# Patient Record
Sex: FEMALE | Race: OTHER | Employment: FULL TIME | URBAN - METROPOLITAN AREA
[De-identification: names, ages, dates, MRNs, and addresses within clinical notes are randomized per-mention and may not be internally consistent; named-entity substitution may affect disease eponyms.]

---

## 2018-03-13 ENCOUNTER — APPOINTMENT (OUTPATIENT)
Dept: CT IMAGING | Age: 48
End: 2018-03-13
Payer: COMMERCIAL

## 2018-03-13 ENCOUNTER — HOSPITAL ENCOUNTER (EMERGENCY)
Age: 48
Discharge: HOME | End: 2018-03-13
Attending: EMERGENCY MEDICINE
Payer: COMMERCIAL

## 2018-03-13 VITALS
SYSTOLIC BLOOD PRESSURE: 159 MMHG | OXYGEN SATURATION: 98 % | HEIGHT: 64 IN | BODY MASS INDEX: 23.9 KG/M2 | RESPIRATION RATE: 16 BRPM | TEMPERATURE: 98.8 F | DIASTOLIC BLOOD PRESSURE: 91 MMHG | HEART RATE: 62 BPM | WEIGHT: 140 LBS

## 2018-03-13 DIAGNOSIS — R10.30 LOWER ABDOMINAL PAIN: Primary | ICD-10-CM

## 2018-03-13 LAB
BILIRUBIN URINE: NEGATIVE
BLOOD, URINE: NEGATIVE
CLARITY: CLEAR
COLOR: ABNORMAL
GLUCOSE URINE: NEGATIVE MG/DL
HCT VFR BLD CALC: 31.4 % (ref 34–48)
HEMOGLOBIN: 10.9 G/DL (ref 11.5–15.5)
KETONES, URINE: NEGATIVE MG/DL
LACTIC ACID: 0.4 MMOL/L (ref 0.5–2.2)
LEUKOCYTE ESTERASE, URINE: NEGATIVE
MCH RBC QN AUTO: 29.9 PG (ref 26–35)
MCHC RBC AUTO-ENTMCNC: 34.7 % (ref 32–34.5)
MCV RBC AUTO: 86 FL (ref 80–99.9)
NITRITE, URINE: NEGATIVE
PDW BLD-RTO: 12.7 FL (ref 11.5–15)
PH UA: 7 (ref 5–9)
PLATELET # BLD: 141 E9/L (ref 130–450)
PMV BLD AUTO: 11.1 FL (ref 7–12)
PROTEIN UA: NEGATIVE MG/DL
RBC # BLD: 3.65 E12/L (ref 3.5–5.5)
SPECIFIC GRAVITY UA: 1.02 (ref 1–1.03)
UROBILINOGEN, URINE: 4 E.U./DL
WBC # BLD: 4.1 E9/L (ref 4.5–11.5)

## 2018-03-13 PROCEDURE — 85027 COMPLETE CBC AUTOMATED: CPT

## 2018-03-13 PROCEDURE — 36415 COLL VENOUS BLD VENIPUNCTURE: CPT

## 2018-03-13 PROCEDURE — 6360000002 HC RX W HCPCS: Performed by: EMERGENCY MEDICINE

## 2018-03-13 PROCEDURE — 96374 THER/PROPH/DIAG INJ IV PUSH: CPT

## 2018-03-13 PROCEDURE — 74176 CT ABD & PELVIS W/O CONTRAST: CPT

## 2018-03-13 PROCEDURE — 96375 TX/PRO/DX INJ NEW DRUG ADDON: CPT

## 2018-03-13 PROCEDURE — 81003 URINALYSIS AUTO W/O SCOPE: CPT

## 2018-03-13 PROCEDURE — 99284 EMERGENCY DEPT VISIT MOD MDM: CPT

## 2018-03-13 PROCEDURE — 2580000003 HC RX 258: Performed by: EMERGENCY MEDICINE

## 2018-03-13 PROCEDURE — 83605 ASSAY OF LACTIC ACID: CPT

## 2018-03-13 RX ORDER — 0.9 % SODIUM CHLORIDE 0.9 %
1000 INTRAVENOUS SOLUTION INTRAVENOUS ONCE
Status: COMPLETED | OUTPATIENT
Start: 2018-03-13 | End: 2018-03-13

## 2018-03-13 RX ORDER — DIPHENHYDRAMINE HYDROCHLORIDE 50 MG/ML
25 INJECTION INTRAMUSCULAR; INTRAVENOUS ONCE
Status: COMPLETED | OUTPATIENT
Start: 2018-03-13 | End: 2018-03-13

## 2018-03-13 RX ORDER — OXYCODONE HYDROCHLORIDE AND ACETAMINOPHEN 5; 325 MG/1; MG/1
1 TABLET ORAL EVERY 6 HOURS PRN
Qty: 12 TABLET | Refills: 0 | Status: SHIPPED | OUTPATIENT
Start: 2018-03-13 | End: 2018-03-16

## 2018-03-13 RX ORDER — ONDANSETRON 4 MG/1
4 TABLET, ORALLY DISINTEGRATING ORAL EVERY 8 HOURS PRN
Qty: 10 TABLET | Refills: 0 | Status: SHIPPED | OUTPATIENT
Start: 2018-03-13 | End: 2018-07-25 | Stop reason: ALTCHOICE

## 2018-03-13 RX ORDER — ONDANSETRON 2 MG/ML
4 INJECTION INTRAMUSCULAR; INTRAVENOUS EVERY 6 HOURS PRN
Status: DISCONTINUED | OUTPATIENT
Start: 2018-03-13 | End: 2018-03-13 | Stop reason: HOSPADM

## 2018-03-13 RX ADMIN — SODIUM CHLORIDE 1000 ML: 9 INJECTION, SOLUTION INTRAVENOUS at 16:23

## 2018-03-13 RX ADMIN — ONDANSETRON 4 MG: 2 INJECTION INTRAMUSCULAR; INTRAVENOUS at 16:23

## 2018-03-13 RX ADMIN — HYDROMORPHONE HYDROCHLORIDE 1 MG: 1 INJECTION, SOLUTION INTRAMUSCULAR; INTRAVENOUS; SUBCUTANEOUS at 16:23

## 2018-03-13 RX ADMIN — DIPHENHYDRAMINE HYDROCHLORIDE 25 MG: 50 INJECTION, SOLUTION INTRAMUSCULAR; INTRAVENOUS at 16:29

## 2018-03-13 ASSESSMENT — PAIN SCALES - GENERAL
PAINLEVEL_OUTOF10: 10
PAINLEVEL_OUTOF10: 4
PAINLEVEL_OUTOF10: 10

## 2018-03-13 ASSESSMENT — PAIN DESCRIPTION - DESCRIPTORS: DESCRIPTORS: SHARP

## 2018-03-13 ASSESSMENT — PAIN DESCRIPTION - FREQUENCY: FREQUENCY: CONTINUOUS

## 2018-03-13 ASSESSMENT — PAIN DESCRIPTION - PAIN TYPE: TYPE: ACUTE PAIN

## 2018-03-13 NOTE — ED PROVIDER NOTES
HPI:  3/13/18,   Time: 4:05 PM         Dmitri Tran is a 41 y.o. female presenting to the ED for lower abdominal pain with nausea and vomiting, beginning 1d ago. The complaint has been constant, severe in severity, and worsened by nothing. The patient has a history of chronic abdominal pain since her gastric bypass surgery 10 years ago which was complicated by a postoperative ulcer. She states that she feels the urge to move her bowels but nothing comes out. She denies any urinary symptoms    ROS:   Pertinent positives and negatives are stated within HPI, all other systems reviewed and are negative.  --------------------------------------------- PAST HISTORY ---------------------------------------------  Past Medical History:  has a past medical history of Anorexia; Arthritis of knee; Back pain, chronic; Chronic pain; H/O gastric bypass; Hypertension; Hypertension; Metabolic acidosis; Ovarian cyst; and Seizures ([facility] [address] Past Surgical History:  has a past surgical history that includes Jeannine-en-Y Gastric Bypass (12/2/2008); Endometrial ablation (2003); Tonsillectomy (1980); Cholecystectomy, laparoscopic (9/19/1995); Upper gastrointestinal endoscopy (10/3/2008); Upper gastrointestinal endoscopy (1/9/2009); Upper gastrointestinal endoscopy (2/4/2009); laparoscopy (2/12/2009); Upper gastrointestinal endoscopy (3/6/2009); Upper gastrointestinal endoscopy (6/4/2009); Upper gastrointestinal endoscopy (7/2/2009); Upper gastrointestinal endoscopy (10/27/2009); Upper gastrointestinal endoscopy (1/4/2010); Upper gastrointestinal endoscopy (6/7/2010); Stomach surgery (6/25/2010); Upper gastrointestinal endoscopy (7/30/2010); other surgical history (12/28/11); Gastric bypass surgery; hernia repair; and Hysterectomy (Left, 2/5/2013). Social History:  reports that she has been smoking Cigarettes. She has a 1.00 pack-year smoking history.  She has never used smokeless tobacco. She reports that she uses drugs, including Marijuana. She reports that she does not drink alcohol. Family History: family history is not on file. The patients home medications have been reviewed. Allergies: Ultram [tramadol]    -------------------------------------------------- RESULTS -------------------------------------------------  All laboratory and radiology results have been personally reviewed by myself   LABS:  Results for orders placed or performed during the hospital encounter of 03/13/18   CBC   Result Value Ref Range    WBC 4.1 (L) 4.5 - 11.5 E9/L    RBC 3.65 3.50 - 5.50 E12/L    Hemoglobin 10.9 (L) 11.5 - 15.5 g/dL    Hematocrit 31.4 (L) 34.0 - 48.0 %    MCV 86.0 80.0 - 99.9 fL    MCH 29.9 26.0 - 35.0 pg    MCHC 34.7 (H) 32.0 - 34.5 %    RDW 12.7 11.5 - 15.0 fL    Platelets 684 647 - 864 E9/L    MPV 11.1 7.0 - 12.0 fL   URINALYSIS   Result Value Ref Range    Color, UA DARK YELLOW (A) Straw/Yellow    Clarity, UA Clear Clear    Glucose, Ur Negative Negative mg/dL    Bilirubin Urine Negative Negative    Ketones, Urine Negative Negative mg/dL    Specific Gravity, UA 1.020 1.005 - 1.030    Blood, Urine Negative Negative    pH, UA 7.0 5.0 - 9.0    Protein, UA Negative Negative mg/dL    Urobilinogen, Urine 4.0 (A) <2.0 E.U./dL    Nitrite, Urine Negative Negative    Leukocyte Esterase, Urine Negative Negative       RADIOLOGY:  Interpreted by Radiologist.  CT ABDOMEN PELVIS WO CONTRAST   Final Result      1. No inflammatory changes in the abdomen or pelvis. 2. Postsurgical changes from prior partial gastrectomy and   cholecystectomy. 3. Status post prior hysterectomy. 4. No bowel obstruction or ileus. 5. No obstructive uropathy or urinary stone. 6. Moderate to severe degenerative disc disease at L4-L5 with interval   worsening since prior study. Discitis or osteomyelitis could not be   excluded on this is study. If there is clinical concern MRI of the   lumbar spine could be helpful for follow-up. ------------------------- NURSING NOTES AND VITALS REVIEWED ---------------------------   The nursing notes within the ED encounter and vital signs as below have been reviewed. BP (!) 159/91   Pulse 62   Temp 98.8 °F (37.1 °C)   Resp 16   Ht 5' 4\" (1.626 m)   Wt 140 lb (63.5 kg)   SpO2 98%   BMI 24.03 kg/m²   Oxygen Saturation Interpretation: Normal      ---------------------------------------------------PHYSICAL EXAM--------------------------------------      Constitutional/General: Alert and oriented x3, well appearing, non toxic in NAD  Head: NC/AT  Eyes: PERRL, EOMI    Neck: Supple, full ROM, no meningeal signs  Pulmonary: Lungs clear to auscultation bilaterally, no wheezes, rales, or rhonchi. Not in respiratory distress  Cardiovascular:  Regular rate and rhythm, no murmurs, gallops, or rubs. 2+ distal pulses  Abdomen: There is marked tenderness of the lower part of the abdomen with guarding   Extremities: Moves all extremities x 4. Warm and well perfused  Skin: warm and dry without rash  Neurologic: GCS 15,  Psych: Normal Affect      ------------------------------ ED COURSE/MEDICAL DECISION MAKING----------------------  Medications   0.9 % sodium chloride bolus (1,000 mLs Intravenous New Bag 3/13/18 1623)   ondansetron (ZOFRAN) injection 4 mg (4 mg Intravenous Given 3/13/18 1623)   HYDROmorphone (DILAUDID) injection 1 mg (1 mg Intravenous Given 3/13/18 1623)   diphenhydrAMINE (BENADRYL) injection 25 mg (25 mg Intravenous Given 3/13/18 7305)         Medical Decision Making:      Time: 4:25p  Re-evaluation. Patients symptoms are improving  Repeat physical examination is improved  The patient's white blood cell count is normal and her CT scan is unremarkable and her abdomen is benign. The patient requests some analgesics to get through the next couple days until she can see her bariatric surgeon    Counseling:    The emergency provider has spoken with the patient and discussed todays results, in

## 2018-04-19 ENCOUNTER — APPOINTMENT (OUTPATIENT)
Dept: GENERAL RADIOLOGY | Age: 48
End: 2018-04-19
Payer: COMMERCIAL

## 2018-04-19 ENCOUNTER — HOSPITAL ENCOUNTER (EMERGENCY)
Age: 48
Discharge: HOME | End: 2018-04-19
Payer: COMMERCIAL

## 2018-04-19 VITALS
TEMPERATURE: 98.5 F | HEART RATE: 72 BPM | SYSTOLIC BLOOD PRESSURE: 150 MMHG | OXYGEN SATURATION: 98 % | HEIGHT: 64 IN | DIASTOLIC BLOOD PRESSURE: 90 MMHG | RESPIRATION RATE: 18 BRPM | WEIGHT: 150 LBS | BODY MASS INDEX: 25.61 KG/M2

## 2018-04-19 DIAGNOSIS — S60.221A CONTUSION OF RIGHT HAND, INITIAL ENCOUNTER: Primary | ICD-10-CM

## 2018-04-19 PROCEDURE — 73130 X-RAY EXAM OF HAND: CPT

## 2018-04-19 PROCEDURE — 99283 EMERGENCY DEPT VISIT LOW MDM: CPT

## 2018-04-19 PROCEDURE — 6370000000 HC RX 637 (ALT 250 FOR IP): Performed by: PHYSICIAN ASSISTANT

## 2018-04-19 PROCEDURE — 73110 X-RAY EXAM OF WRIST: CPT

## 2018-04-19 RX ORDER — IBUPROFEN 600 MG/1
600 TABLET ORAL ONCE
Status: COMPLETED | OUTPATIENT
Start: 2018-04-19 | End: 2018-04-19

## 2018-04-19 RX ORDER — IBUPROFEN 600 MG/1
600 TABLET ORAL EVERY 6 HOURS PRN
Qty: 60 TABLET | Refills: 0 | Status: ON HOLD | OUTPATIENT
Start: 2018-04-19 | End: 2019-05-13

## 2018-04-19 RX ADMIN — IBUPROFEN 600 MG: 600 TABLET ORAL at 13:34

## 2018-04-19 ASSESSMENT — PAIN DESCRIPTION - LOCATION
LOCATION: HAND
LOCATION: HAND

## 2018-04-19 ASSESSMENT — PAIN SCALES - GENERAL
PAINLEVEL_OUTOF10: 10

## 2018-04-19 ASSESSMENT — PAIN DESCRIPTION - DESCRIPTORS
DESCRIPTORS: CONSTANT;ACHING
DESCRIPTORS: ACHING;CONSTANT

## 2018-04-19 ASSESSMENT — PAIN DESCRIPTION - PAIN TYPE
TYPE: ACUTE PAIN
TYPE: ACUTE PAIN

## 2018-04-19 ASSESSMENT — PAIN DESCRIPTION - ORIENTATION
ORIENTATION: RIGHT
ORIENTATION: RIGHT

## 2018-07-25 ENCOUNTER — HOSPITAL ENCOUNTER (EMERGENCY)
Age: 48
Discharge: HOME | End: 2018-07-25

## 2018-07-25 ENCOUNTER — APPOINTMENT (OUTPATIENT)
Dept: CT IMAGING | Age: 48
End: 2018-07-25

## 2018-07-25 VITALS
SYSTOLIC BLOOD PRESSURE: 128 MMHG | TEMPERATURE: 97.8 F | OXYGEN SATURATION: 99 % | RESPIRATION RATE: 14 BRPM | HEART RATE: 72 BPM | DIASTOLIC BLOOD PRESSURE: 70 MMHG

## 2018-07-25 DIAGNOSIS — K59.00 CONSTIPATION, UNSPECIFIED CONSTIPATION TYPE: Primary | ICD-10-CM

## 2018-07-25 DIAGNOSIS — R10.84 GENERALIZED ABDOMINAL PAIN: ICD-10-CM

## 2018-07-25 DIAGNOSIS — R11.2 NAUSEA AND VOMITING, INTRACTABILITY OF VOMITING NOT SPECIFIED, UNSPECIFIED VOMITING TYPE: ICD-10-CM

## 2018-07-25 LAB
ALBUMIN SERPL-MCNC: 4 G/DL (ref 3.5–5.2)
ALP BLD-CCNC: 109 U/L (ref 35–104)
ALT SERPL-CCNC: 18 U/L (ref 0–32)
ANION GAP SERPL CALCULATED.3IONS-SCNC: 14 MMOL/L (ref 7–16)
AST SERPL-CCNC: 28 U/L (ref 0–31)
BACTERIA: ABNORMAL /HPF
BASOPHILS ABSOLUTE: 0.02 E9/L (ref 0–0.2)
BASOPHILS RELATIVE PERCENT: 0.4 % (ref 0–2)
BILIRUB SERPL-MCNC: 0.2 MG/DL (ref 0–1.2)
BILIRUBIN URINE: NEGATIVE
BLOOD, URINE: NEGATIVE
BUN BLDV-MCNC: 11 MG/DL (ref 6–20)
CALCIUM SERPL-MCNC: 8.6 MG/DL (ref 8.6–10.2)
CHLORIDE BLD-SCNC: 109 MMOL/L (ref 98–107)
CHP ED QC CHECK: NORMAL
CLARITY: CLEAR
CO2: 17 MMOL/L (ref 22–29)
COLOR: YELLOW
CREAT SERPL-MCNC: 0.9 MG/DL (ref 0.5–1)
EOSINOPHILS ABSOLUTE: 0.06 E9/L (ref 0.05–0.5)
EOSINOPHILS RELATIVE PERCENT: 1.1 % (ref 0–6)
GFR AFRICAN AMERICAN: >60
GFR NON-AFRICAN AMERICAN: >60 ML/MIN/1.73
GLUCOSE BLD-MCNC: 111 MG/DL (ref 74–109)
GLUCOSE URINE: NEGATIVE MG/DL
HCT VFR BLD CALC: 31.6 % (ref 34–48)
HEMOGLOBIN: 11 G/DL (ref 11.5–15.5)
IMMATURE GRANULOCYTES #: 0.01 E9/L
IMMATURE GRANULOCYTES %: 0.2 % (ref 0–5)
KETONES, URINE: NEGATIVE MG/DL
LACTIC ACID: 0.4 MMOL/L (ref 0.5–2.2)
LEUKOCYTE ESTERASE, URINE: NEGATIVE
LIPASE: 16 U/L (ref 13–60)
LYMPHOCYTES ABSOLUTE: 0.85 E9/L (ref 1.5–4)
LYMPHOCYTES RELATIVE PERCENT: 15.3 % (ref 20–42)
MCH RBC QN AUTO: 30.1 PG (ref 26–35)
MCHC RBC AUTO-ENTMCNC: 34.8 % (ref 32–34.5)
MCV RBC AUTO: 86.6 FL (ref 80–99.9)
MONOCYTES ABSOLUTE: 0.28 E9/L (ref 0.1–0.95)
MONOCYTES RELATIVE PERCENT: 5 % (ref 2–12)
NEUTROPHILS ABSOLUTE: 4.34 E9/L (ref 1.8–7.3)
NEUTROPHILS RELATIVE PERCENT: 78 % (ref 43–80)
NITRITE, URINE: NEGATIVE
PDW BLD-RTO: 13.7 FL (ref 11.5–15)
PH UA: 6 (ref 5–9)
PLATELET # BLD: 156 E9/L (ref 130–450)
PMV BLD AUTO: 10.7 FL (ref 7–12)
POTASSIUM SERPL-SCNC: 3.2 MMOL/L (ref 3.5–5)
PREGNANCY TEST URINE, POC: NORMAL
PROTEIN UA: NORMAL MG/DL
RBC # BLD: 3.65 E12/L (ref 3.5–5.5)
RBC UA: ABNORMAL /HPF (ref 0–2)
SODIUM BLD-SCNC: 140 MMOL/L (ref 132–146)
SPECIFIC GRAVITY UA: 1.02 (ref 1–1.03)
TOTAL PROTEIN: 7 G/DL (ref 6.4–8.3)
UROBILINOGEN, URINE: 1 E.U./DL
WBC # BLD: 5.6 E9/L (ref 4.5–11.5)
WBC UA: ABNORMAL /HPF (ref 0–5)

## 2018-07-25 PROCEDURE — 36415 COLL VENOUS BLD VENIPUNCTURE: CPT

## 2018-07-25 PROCEDURE — 85025 COMPLETE CBC W/AUTO DIFF WBC: CPT

## 2018-07-25 PROCEDURE — 6370000000 HC RX 637 (ALT 250 FOR IP): Performed by: NURSE PRACTITIONER

## 2018-07-25 PROCEDURE — 83605 ASSAY OF LACTIC ACID: CPT

## 2018-07-25 PROCEDURE — 80053 COMPREHEN METABOLIC PANEL: CPT

## 2018-07-25 PROCEDURE — 74176 CT ABD & PELVIS W/O CONTRAST: CPT

## 2018-07-25 PROCEDURE — 83690 ASSAY OF LIPASE: CPT

## 2018-07-25 PROCEDURE — 6360000002 HC RX W HCPCS: Performed by: NURSE PRACTITIONER

## 2018-07-25 PROCEDURE — 99284 EMERGENCY DEPT VISIT MOD MDM: CPT

## 2018-07-25 PROCEDURE — 81001 URINALYSIS AUTO W/SCOPE: CPT

## 2018-07-25 RX ORDER — POTASSIUM CHLORIDE 20 MEQ/1
40 TABLET, EXTENDED RELEASE ORAL ONCE
Status: COMPLETED | OUTPATIENT
Start: 2018-07-25 | End: 2018-07-25

## 2018-07-25 RX ORDER — ONDANSETRON 4 MG/1
4 TABLET, ORALLY DISINTEGRATING ORAL ONCE
Status: COMPLETED | OUTPATIENT
Start: 2018-07-25 | End: 2018-07-25

## 2018-07-25 RX ORDER — ONDANSETRON 4 MG/1
4 TABLET, ORALLY DISINTEGRATING ORAL EVERY 8 HOURS PRN
Qty: 10 TABLET | Refills: 0 | Status: ON HOLD | OUTPATIENT
Start: 2018-07-25 | End: 2019-05-13

## 2018-07-25 RX ORDER — POLYETHYLENE GLYCOL 3350, SODIUM CHLORIDE, POTASSIUM CHLORIDE, SODIUM BICARBONATE, AND SODIUM SULFATE 240; 5.84; 2.98; 6.72; 22.72 G/4L; G/4L; G/4L; G/4L; G/4L
4000 POWDER, FOR SOLUTION ORAL ONCE
Qty: 1 BOTTLE | Refills: 0 | Status: SHIPPED | OUTPATIENT
Start: 2018-07-25 | End: 2018-07-25

## 2018-07-25 RX ADMIN — MAGNESIUM CITRATE 300 ML: 1.75 LIQUID ORAL at 17:58

## 2018-07-25 RX ADMIN — POTASSIUM CHLORIDE 40 MEQ: 20 TABLET, EXTENDED RELEASE ORAL at 17:10

## 2018-07-25 RX ADMIN — ONDANSETRON 4 MG: 4 TABLET, ORALLY DISINTEGRATING ORAL at 16:40

## 2018-07-25 ASSESSMENT — PAIN DESCRIPTION - PAIN TYPE: TYPE: ACUTE PAIN

## 2018-07-25 ASSESSMENT — PAIN SCALES - GENERAL: PAINLEVEL_OUTOF10: 6

## 2018-07-25 ASSESSMENT — PAIN DESCRIPTION - DESCRIPTORS: DESCRIPTORS: CRAMPING;DISCOMFORT;ACHING

## 2018-07-25 ASSESSMENT — PAIN DESCRIPTION - LOCATION: LOCATION: ABDOMEN

## 2018-07-25 NOTE — ED NOTES
Radiology Procedure Waiver   Name: Gee Mcdermott  : 1970  MRN: [id number]    Date:  18    Time: 3:05 PM    Benefits of immediately proceeding with Radiology exam(s) without pre-testing outweigh the risks or are not indicated as specified below and therefore the following is/are being waived:    [x] Pregnancy test   [] Patients LMP on-time and regular. [x] Patient had Tubal Ligation or has other Contraception Device. [] Patient  is Menopausal or Premenarcheal.    [] Patient had Full or Partial Hysterectomy. [] Protocol for Iodine allergy    [] MRI Questionnaire     [] BUN/Creatinine   [] Patient age w/no hx of renal dysfunction. [] Patient on Dialysis. [] Recent Normal Labs.   Electronically signed by DA Mosley CNP on 18 at 3:05 PM             DA Mosley CNP  [phone]

## 2018-07-25 NOTE — ED PROVIDER NOTES
anastomotic jejunal ulcers, Dr. Lilia Villalta, [address] ENDOSCOPY  7/30/2010    gastritis, Dr. Lilia Villalta, [facility]   Social History:  reports that she has been smoking Cigarettes. She has a 1.00 pack-year smoking history. She has never used smokeless tobacco. She reports that she uses drugs, including Marijuana. She reports that she does not drink alcohol. Family History: family history is not on file. Allergies: Ultram [tramadol]    Physical Exam           ED Triage Vitals [07/25/18 1313]   BP Temp Temp Source Pulse Resp SpO2 Height Weight   (!) 167/87 97.6 °F (36.4 °C) Temporal 76 16 99 % -- --      Oxygen Saturation Interpretation: Normal.    · General Appearance/Constitutional:  Alert, development consistent with age. · HEENT:  NC/NT. PERRLA. Airway patent. · Neck:  Supple. No lymphadenopathy. · Respiratory:  No retractions. Lungs Clear to auscultation and breath sounds equal.  · CV:  Regular rate and rhythm. · GI:  General Appearance: normal.         Bowel sounds: hypoactive bowel sounds. Distension:  None. Tenderness: No abdominal tenderness, no rebound tenderness, no guarding, abdominal rigidity is absent. Liver: non-palpable and non-tender. Spleen:  non-palpable and non-tender. Pulsatile Mass: absent. Hernia:  no inguinal or femoral hernias noted. · Back: CVA Tenderness: No.             Anus/Perineum:  normal.  · Integument:  Normal turgor. Warm, dry, without visible rash, unless noted elsewhere. · Lymphatics: No edema, cap.refill <3sec. · Neurological:  Orientation age-appropriate. Motor functions intact.     Lab / Imaging Results   (All laboratory and radiology results have been personally reviewed by myself)  Labs:  Results for orders placed or performed during the hospital encounter of 07/25/18   CBC auto differential   Result Value Ref Range    WBC 5.6 4.5 - 11.5 E9/L    RBC 3.65 3.50 - 5.50 E12/L    Hemoglobin 11.0 (L) 11.5 - 15.5 g/dL    Hematocrit 31.6 (L) 34.0 - 48.0 %    MCV 86.6 80.0 - 99.9 fL    MCH 30.1 26.0 - 35.0 pg    MCHC 34.8 (H) 32.0 - 34.5 %    RDW 13.7 11.5 - 15.0 fL    Platelets 153 689 - 718 E9/L    MPV 10.7 7.0 - 12.0 fL    Neutrophils % 78.0 43.0 - 80.0 %    Immature Granulocytes % 0.2 0.0 - 5.0 %    Lymphocytes % 15.3 (L) 20.0 - 42.0 %    Monocytes % 5.0 2.0 - 12.0 %    Eosinophils % 1.1 0.0 - 6.0 %    Basophils % 0.4 0.0 - 2.0 %    Neutrophils # 4.34 1.80 - 7.30 E9/L    Immature Granulocytes # 0.01 E9/L    Lymphocytes # 0.85 (L) 1.50 - 4.00 E9/L    Monocytes # 0.28 0.10 - 0.95 E9/L    Eosinophils # 0.06 0.05 - 0.50 E9/L    Basophils # 0.02 0.00 - 0.20 E9/L   Comprehensive Metabolic Panel   Result Value Ref Range    Sodium 140 132 - 146 mmol/L    Potassium 3.2 (L) 3.5 - 5.0 mmol/L    Chloride 109 (H) 98 - 107 mmol/L    CO2 17 (L) 22 - 29 mmol/L    Anion Gap 14 7 - 16 mmol/L    Glucose 111 (H) 74 - 109 mg/dL    BUN 11 6 - 20 mg/dL    CREATININE 0.9 0.5 - 1.0 mg/dL    GFR Non-African American >60 >=60 mL/min/1.73    GFR African American >60     Calcium 8.6 8.6 - 10.2 mg/dL    Total Protein 7.0 6.4 - 8.3 g/dL    Alb 4.0 3.5 - 5.2 g/dL    Total Bilirubin 0.2 0.0 - 1.2 mg/dL    Alkaline Phosphatase 109 (H) 35 - 104 U/L    ALT 18 0 - 32 U/L    AST 28 0 - 31 U/L   Lipase   Result Value Ref Range    Lipase 16 13 - 60 U/L   Lactic Acid, Plasma   Result Value Ref Range    Lactic Acid 0.4 (L) 0.5 - 2.2 mmol/L   Urinalysis   Result Value Ref Range    Color, UA Yellow Straw/Yellow    Clarity, UA Clear Clear    Glucose, Ur Negative Negative mg/dL    Bilirubin Urine Negative Negative    Ketones, Urine Negative Negative mg/dL    Specific Gravity, UA 1.025 1.005 - 1.030    Blood, Urine Negative Negative    pH, UA 6.0 5.0 - 9.0    Protein, UA TRACE Negative mg/dL    Urobilinogen, Urine 1.0 <2.0 E.U./dL    Nitrite, Urine Negative Negative    Leukocyte Esterase, Urine Negative Negative

## 2019-05-09 ENCOUNTER — HOSPITAL ENCOUNTER (INPATIENT)
Age: 49
LOS: 6 days | Discharge: HOME | DRG: 254 | End: 2019-05-15
Attending: FAMILY MEDICINE | Admitting: SURGERY
Payer: COMMERCIAL

## 2019-05-09 ENCOUNTER — APPOINTMENT (OUTPATIENT)
Dept: GENERAL RADIOLOGY | Age: 49
DRG: 254 | End: 2019-05-09
Payer: COMMERCIAL

## 2019-05-09 ENCOUNTER — HOSPITAL ENCOUNTER (OUTPATIENT)
Age: 49
Discharge: HOME | End: 2019-05-09
Payer: COMMERCIAL

## 2019-05-09 DIAGNOSIS — T39.395A GI BLEED DUE TO NSAIDS: ICD-10-CM

## 2019-05-09 DIAGNOSIS — G89.29 OTHER CHRONIC PAIN: Primary | Chronic | ICD-10-CM

## 2019-05-09 DIAGNOSIS — K25.4 GASTROINTESTINAL HEMORRHAGE ASSOCIATED WITH GASTRIC ULCER: ICD-10-CM

## 2019-05-09 DIAGNOSIS — R57.8 HEMORRHAGIC SHOCK (HCC): ICD-10-CM

## 2019-05-09 DIAGNOSIS — K92.2 GI BLEED DUE TO NSAIDS: ICD-10-CM

## 2019-05-09 DIAGNOSIS — R10.13 EPIGASTRIC PAIN: ICD-10-CM

## 2019-05-09 LAB
ALBUMIN SERPL-MCNC: 4.1 G/DL (ref 3.5–5.2)
ALP BLD-CCNC: 113 U/L (ref 35–104)
ALT SERPL-CCNC: 13 U/L (ref 0–32)
ANION GAP SERPL CALCULATED.3IONS-SCNC: 13 MMOL/L (ref 7–16)
APTT: 33.4 SEC (ref 25.7–34.7)
AST SERPL-CCNC: 24 U/L (ref 0–31)
BASOPHILS ABSOLUTE: 0.04 E9/L (ref 0–0.2)
BASOPHILS RELATIVE PERCENT: 0.5 % (ref 0–2)
BILIRUB SERPL-MCNC: 0.3 MG/DL (ref 0–1.2)
BUN BLDV-MCNC: 37 MG/DL (ref 6–20)
CALCIUM SERPL-MCNC: 8.7 MG/DL (ref 8.6–10.2)
CHLORIDE BLD-SCNC: 106 MMOL/L (ref 98–107)
CHP ED QC CHECK: NORMAL
CO2: 21 MMOL/L (ref 22–29)
CREAT SERPL-MCNC: 0.9 MG/DL (ref 0.5–1)
D DIMER: <200 NG/ML DDU
EKG ATRIAL RATE: 92 BPM
EKG P AXIS: 68 DEGREES
EKG P-R INTERVAL: 160 MS
EKG Q-T INTERVAL: 348 MS
EKG QRS DURATION: 76 MS
EKG QTC CALCULATION (BAZETT): 430 MS
EKG R AXIS: 38 DEGREES
EKG T AXIS: 44 DEGREES
EKG VENTRICULAR RATE: 92 BPM
EOSINOPHILS ABSOLUTE: 0.11 E9/L (ref 0.05–0.5)
EOSINOPHILS RELATIVE PERCENT: 1.4 % (ref 0–6)
GFR AFRICAN AMERICAN: >60
GFR NON-AFRICAN AMERICAN: >60 ML/MIN/1.73
GLUCOSE BLD-MCNC: 104 MG/DL (ref 74–99)
HCT VFR BLD CALC: 21.9 % (ref 34–48)
HCT VFR BLD CALC: 24.8 % (ref 34–48)
HEMOCCULT STL QL: YES
HEMOGLOBIN: 7.6 G/DL (ref 11.5–15.5)
HEMOGLOBIN: 8.5 G/DL (ref 11.5–15.5)
IMMATURE GRANULOCYTES #: 0.03 E9/L
IMMATURE GRANULOCYTES %: 0.4 % (ref 0–5)
INR BLD: 1
LYMPHOCYTES ABSOLUTE: 2.65 E9/L (ref 1.5–4)
LYMPHOCYTES RELATIVE PERCENT: 34.6 % (ref 20–42)
MCH RBC QN AUTO: 30.4 PG (ref 26–35)
MCHC RBC AUTO-ENTMCNC: 34.3 % (ref 32–34.5)
MCV RBC AUTO: 88.6 FL (ref 80–99.9)
MONOCYTES ABSOLUTE: 0.44 E9/L (ref 0.1–0.95)
MONOCYTES RELATIVE PERCENT: 5.8 % (ref 2–12)
NEUTROPHILS ABSOLUTE: 4.38 E9/L (ref 1.8–7.3)
NEUTROPHILS RELATIVE PERCENT: 57.3 % (ref 43–80)
PDW BLD-RTO: 13 FL (ref 11.5–15)
PLATELET # BLD: 217 E9/L (ref 130–450)
PMV BLD AUTO: 11.1 FL (ref 7–12)
POTASSIUM SERPL-SCNC: 3.9 MMOL/L (ref 3.5–5)
PRO-BNP: 95 PG/ML (ref 0–125)
PROTHROMBIN TIME: 12.6 SEC (ref 9.3–12.4)
RBC # BLD: 2.8 E12/L (ref 3.5–5.5)
SODIUM BLD-SCNC: 140 MMOL/L (ref 132–146)
T4 FREE: 0.98 NG/DL (ref 0.93–1.7)
TOTAL PROTEIN: 6.8 G/DL (ref 6.4–8.3)
TROPONIN: <0.01 NG/ML (ref 0–0.03)
TSH SERPL DL<=0.05 MIU/L-ACNC: 1.74 UIU/ML (ref 0.27–4.2)
WBC # BLD: 7.7 E9/L (ref 4.5–11.5)

## 2019-05-09 PROCEDURE — 85014 HEMATOCRIT: CPT

## 2019-05-09 PROCEDURE — 6360000002 HC RX W HCPCS: Performed by: FAMILY MEDICINE

## 2019-05-09 PROCEDURE — 84439 ASSAY OF FREE THYROXINE: CPT

## 2019-05-09 PROCEDURE — 99285 EMERGENCY DEPT VISIT HI MDM: CPT

## 2019-05-09 PROCEDURE — 93005 ELECTROCARDIOGRAM TRACING: CPT | Performed by: FAMILY MEDICINE

## 2019-05-09 PROCEDURE — 84443 ASSAY THYROID STIM HORMONE: CPT

## 2019-05-09 PROCEDURE — 36415 COLL VENOUS BLD VENIPUNCTURE: CPT

## 2019-05-09 PROCEDURE — 6370000000 HC RX 637 (ALT 250 FOR IP): Performed by: SURGERY

## 2019-05-09 PROCEDURE — A0428 BLS: HCPCS

## 2019-05-09 PROCEDURE — C9113 INJ PANTOPRAZOLE SODIUM, VIA: HCPCS | Performed by: FAMILY MEDICINE

## 2019-05-09 PROCEDURE — 6360000002 HC RX W HCPCS: Performed by: SURGERY

## 2019-05-09 PROCEDURE — 80053 COMPREHEN METABOLIC PANEL: CPT

## 2019-05-09 PROCEDURE — 99222 1ST HOSP IP/OBS MODERATE 55: CPT | Performed by: SURGERY

## 2019-05-09 PROCEDURE — 94761 N-INVAS EAR/PLS OXIMETRY MLT: CPT

## 2019-05-09 PROCEDURE — 71045 X-RAY EXAM CHEST 1 VIEW: CPT

## 2019-05-09 PROCEDURE — 96376 TX/PRO/DX INJ SAME DRUG ADON: CPT

## 2019-05-09 PROCEDURE — 85730 THROMBOPLASTIN TIME PARTIAL: CPT

## 2019-05-09 PROCEDURE — 2140000000 HC CCU INTERMEDIATE R&B

## 2019-05-09 PROCEDURE — 6360000002 HC RX W HCPCS: Performed by: EMERGENCY MEDICINE

## 2019-05-09 PROCEDURE — 96374 THER/PROPH/DIAG INJ IV PUSH: CPT

## 2019-05-09 PROCEDURE — C9113 INJ PANTOPRAZOLE SODIUM, VIA: HCPCS | Performed by: EMERGENCY MEDICINE

## 2019-05-09 PROCEDURE — 85610 PROTHROMBIN TIME: CPT

## 2019-05-09 PROCEDURE — C9113 INJ PANTOPRAZOLE SODIUM, VIA: HCPCS | Performed by: SURGERY

## 2019-05-09 PROCEDURE — 93010 ELECTROCARDIOGRAM REPORT: CPT | Performed by: INTERNAL MEDICINE

## 2019-05-09 PROCEDURE — 2580000003 HC RX 258: Performed by: FAMILY MEDICINE

## 2019-05-09 PROCEDURE — 85378 FIBRIN DEGRADE SEMIQUANT: CPT

## 2019-05-09 PROCEDURE — 2580000003 HC RX 258: Performed by: EMERGENCY MEDICINE

## 2019-05-09 PROCEDURE — 83880 ASSAY OF NATRIURETIC PEPTIDE: CPT

## 2019-05-09 PROCEDURE — 85025 COMPLETE CBC W/AUTO DIFF WBC: CPT

## 2019-05-09 PROCEDURE — A0425 GROUND MILEAGE: HCPCS

## 2019-05-09 PROCEDURE — 85018 HEMOGLOBIN: CPT

## 2019-05-09 PROCEDURE — 84484 ASSAY OF TROPONIN QUANT: CPT

## 2019-05-09 PROCEDURE — 2580000003 HC RX 258: Performed by: SURGERY

## 2019-05-09 RX ORDER — SODIUM CHLORIDE, SODIUM LACTATE, POTASSIUM CHLORIDE, CALCIUM CHLORIDE 600; 310; 30; 20 MG/100ML; MG/100ML; MG/100ML; MG/100ML
INJECTION, SOLUTION INTRAVENOUS CONTINUOUS
Status: DISCONTINUED | OUTPATIENT
Start: 2019-05-09 | End: 2019-05-13

## 2019-05-09 RX ORDER — HYDROXYZINE PAMOATE 25 MG/1
25 CAPSULE ORAL 3 TIMES DAILY PRN
Qty: 15 CAPSULE | Refills: 0 | Status: SHIPPED | OUTPATIENT
Start: 2019-05-09 | End: 2019-05-15 | Stop reason: SDUPTHER

## 2019-05-09 RX ORDER — 0.9 % SODIUM CHLORIDE 0.9 %
1000 INTRAVENOUS SOLUTION INTRAVENOUS ONCE
Status: COMPLETED | OUTPATIENT
Start: 2019-05-09 | End: 2019-05-09

## 2019-05-09 RX ORDER — ACETAMINOPHEN 325 MG/1
650 TABLET ORAL EVERY 4 HOURS PRN
Status: DISCONTINUED | OUTPATIENT
Start: 2019-05-09 | End: 2019-05-15 | Stop reason: HOSPADM

## 2019-05-09 RX ORDER — OXYCODONE HYDROCHLORIDE 5 MG/1
5 TABLET ORAL EVERY 4 HOURS PRN
Status: DISCONTINUED | OUTPATIENT
Start: 2019-05-09 | End: 2019-05-15 | Stop reason: HOSPADM

## 2019-05-09 RX ORDER — OXYCODONE HYDROCHLORIDE 10 MG/1
10 TABLET ORAL EVERY 4 HOURS PRN
Status: DISCONTINUED | OUTPATIENT
Start: 2019-05-09 | End: 2019-05-15 | Stop reason: HOSPADM

## 2019-05-09 RX ORDER — PANTOPRAZOLE SODIUM 40 MG/10ML
40 INJECTION, POWDER, LYOPHILIZED, FOR SOLUTION INTRAVENOUS ONCE
Status: COMPLETED | OUTPATIENT
Start: 2019-05-09 | End: 2019-05-09

## 2019-05-09 RX ORDER — SODIUM CHLORIDE 0.9 % (FLUSH) 0.9 %
10 SYRINGE (ML) INJECTION EVERY 12 HOURS SCHEDULED
Status: DISCONTINUED | OUTPATIENT
Start: 2019-05-09 | End: 2019-05-15 | Stop reason: HOSPADM

## 2019-05-09 RX ORDER — PANTOPRAZOLE SODIUM 40 MG/10ML
40 INJECTION, POWDER, LYOPHILIZED, FOR SOLUTION INTRAVENOUS 2 TIMES DAILY
Status: DISCONTINUED | OUTPATIENT
Start: 2019-05-09 | End: 2019-05-15 | Stop reason: HOSPADM

## 2019-05-09 RX ORDER — SODIUM CHLORIDE 0.9 % (FLUSH) 0.9 %
10 SYRINGE (ML) INJECTION PRN
Status: DISCONTINUED | OUTPATIENT
Start: 2019-05-09 | End: 2019-05-15 | Stop reason: HOSPADM

## 2019-05-09 RX ADMIN — SODIUM CHLORIDE, POTASSIUM CHLORIDE, SODIUM LACTATE AND CALCIUM CHLORIDE: 600; 310; 30; 20 INJECTION, SOLUTION INTRAVENOUS at 23:38

## 2019-05-09 RX ADMIN — SODIUM CHLORIDE 1000 ML: 9 INJECTION, SOLUTION INTRAVENOUS at 14:07

## 2019-05-09 RX ADMIN — SODIUM CHLORIDE 1000 ML: 9 INJECTION, SOLUTION INTRAVENOUS at 20:46

## 2019-05-09 RX ADMIN — PANTOPRAZOLE SODIUM 40 MG: 40 INJECTION, POWDER, FOR SOLUTION INTRAVENOUS at 20:46

## 2019-05-09 RX ADMIN — OXYCODONE HYDROCHLORIDE 10 MG: 5 TABLET ORAL at 23:37

## 2019-05-09 RX ADMIN — PANTOPRAZOLE SODIUM 40 MG: 40 INJECTION, POWDER, FOR SOLUTION INTRAVENOUS at 23:37

## 2019-05-09 RX ADMIN — Medication 10 ML: at 23:38

## 2019-05-09 RX ADMIN — PANTOPRAZOLE SODIUM 40 MG: 40 INJECTION, POWDER, FOR SOLUTION INTRAVENOUS at 17:43

## 2019-05-09 ASSESSMENT — PAIN SCALES - GENERAL
PAINLEVEL_OUTOF10: 8
PAINLEVEL_OUTOF10: 8
PAINLEVEL_OUTOF10: 5
PAINLEVEL_OUTOF10: 5

## 2019-05-09 ASSESSMENT — PAIN DESCRIPTION - LOCATION
LOCATION: ABDOMEN

## 2019-05-09 ASSESSMENT — PAIN DESCRIPTION - ORIENTATION
ORIENTATION: LEFT;LOWER

## 2019-05-09 ASSESSMENT — PAIN DESCRIPTION - PAIN TYPE
TYPE: ACUTE PAIN

## 2019-05-09 ASSESSMENT — PAIN DESCRIPTION - DESCRIPTORS: DESCRIPTORS: RADIATING

## 2019-05-09 NOTE — ED NOTES
Bed: 18A-18  Expected date:   Expected time:   Means of arrival:   Comments:  [place] Chinita Rinne, RN  05/09/19 1900

## 2019-05-09 NOTE — ED NOTES
[facility] in transport with pt to [employer]. PT became diaphoretic and nauseated when loaded to stretcher. [facility] staff asked Dr Dawna Quinonez if he wanted pt to be transferred via ACLS squad and physician declined and stated to send pt as is to ER directly. IV infusion stopped. Pt received half the dose of protonix 40 mg IV which was pre-mixed by Ashutosh Castillo RN.       Two [facility]  05/09/19 0847

## 2019-05-09 NOTE — ED NOTES
The following was noted regarding the inpatient consult to general surgery:  Alia 24 for patient admission/dr edwards.   1707pm Aure Fess  Dr Bob Anna returned call  [id number]  Electronically signed by Luis Bentley on 5/9/2019 at 5:36 PM     Nilda Huerta RN  05/09/19 0930

## 2019-05-09 NOTE — ED NOTES
[facility] squad came back to ED now stating they plan to escalate pt from BLS to ACLS transport because pt's HR is now 150 in their squad. Dr Anselmo Habermann is off shift at this time. Dr Munira Agustin was notified and is aware of transport change.      Two [facility], [address] [place] [address]  05/09/19 9923

## 2019-05-09 NOTE — ED PROVIDER NOTES
HPI:  5/9/19, Time: 7:26 PM         Toy Wild is a 57 y.o. female presenting to the ED for shortness of breath. Patient presented to outside facility and was found to be anemic. Her intal complaint was short of breath. Does have a gastric bypass, she was Hemoccult-positive. He received Protonix. She has no other symptoms or complaints at this time. Review of Systems:   Pertinent positives and negatives are stated within HPI, all other systems reviewed and are negative.          --------------------------------------------- PAST HISTORY ---------------------------------------------  Past Medical History:  has a past medical history of Anorexia, Arthritis of knee, Back pain, chronic, Chronic pain, H/O gastric bypass, Hypertension, Hypertension, Metabolic acidosis, Ovarian cyst, and [facility] Past Surgical History:  has a past surgical history that includes Jeannine-en-Y Gastric Bypass (12/2/2008); Endometrial ablation (2003); Tonsillectomy (1980); Cholecystectomy, laparoscopic (9/19/1995); Upper gastrointestinal endoscopy (10/3/2008); Upper gastrointestinal endoscopy (1/9/2009); Upper gastrointestinal endoscopy (2/4/2009); laparoscopy (2/12/2009); Upper gastrointestinal endoscopy (3/6/2009); Upper gastrointestinal endoscopy (6/4/2009); Upper gastrointestinal endoscopy (7/2/2009); Upper gastrointestinal endoscopy (10/27/2009); Upper gastrointestinal endoscopy (1/4/2010); Upper gastrointestinal endoscopy (6/7/2010); Stomach surgery (6/25/2010); Upper gastrointestinal endoscopy (7/30/2010); other surgical history (12/28/11); Gastric bypass surgery; hernia repair; and Hysterectomy (Left, 2/5/2013). Social History:  reports that she has been smoking cigarettes. She has a 1.00 pack-year smoking history. She has never used smokeless tobacco. She reports that she has current or past drug history. Drug: Marijuana. She reports that she does not drink alcohol.     Family History: family history is not on Reflex   Result Value Ref Range    TSH 1.740 0.270 - 4.200 uIU/mL   T4, FREE   Result Value Ref Range    T4 Free 0.98 0.93 - 1.70 ng/dL   Troponin   Result Value Ref Range    Troponin <0.01 0.00 - 0.03 ng/mL   Protime-INR   Result Value Ref Range    Protime 12.6 (H) 9.3 - 12.4 sec    INR 1.0    APTT   Result Value Ref Range    aPTT 33.4 25.7 - 34.7 sec   POCT occult blood stool   Result Value Ref Range    OCCULT BLOOD FECAL yes     QC OK? y    EKG 12 Lead   Result Value Ref Range    Ventricular Rate 92 BPM    Atrial Rate 92 BPM    P-R Interval 160 ms    QRS Duration 76 ms    Q-T Interval 348 ms    QTc Calculation (Bazett) 430 ms    P Axis 68 degrees    R Axis 38 degrees    T Axis 44 degrees       RADIOLOGY:  Interpreted by Radiologist.  XR CHEST PORTABLE   Final Result      No airspace opacities or pleural effusion.                ------------------------- NURSING NOTES AND VITALS REVIEWED ---------------------------   The nursing notes within the ED encounter and vital signs as below have been reviewed. BP (!) 154/92   Pulse 115   Temp 98.8 °F (37.1 °C) (Oral)   Resp 18   Ht 5' 4\" (1.626 m)   Wt 150 lb (68 kg)   SpO2 99%   BMI 25.75 kg/m²   Oxygen Saturation Interpretation: Normal      ---------------------------------------------------PHYSICAL EXAM--------------------------------------      Constitutional/General: Alert and oriented x3, well appearing, non toxic in NAD  Head: Normocephalic and atraumatic  Eyes: PERRL, EOMI  Mouth: Oropharynx clear, handling secretions, no trismus  Neck: Supple, full ROM,   Pulmonary: Lungs clear to auscultation bilaterally, no wheezes, rales, or rhonchi. Not in respiratory distress  Cardiovascular:  Regular rate and rhythm, no murmurs, gallops, or rubs. 2+ distal pulses  Abdomen: Soft, non tender, non distended,   Extremities: Moves all extremities x 4.  Warm and well perfused  Skin: warm and dry without rash  Neurologic: GCS 15,  Psych: Normal Affect      ------------------------------ ED COURSE/MEDICAL DECISION MAKING----------------------  Medications   0.9 % sodium chloride bolus (0 mLs Intravenous Stopped 5/9/19 6222)   pantoprazole (PROTONIX) injection 40 mg (40 mg Intravenous Given 5/9/19 5227)         ED COURSE:       Medical Decision Making:    Patient hemodynamically stable on arrival. Surgery to evaluate. Counseling: The emergency provider has spoken with the patient and discussed todays results, in addition to providing specific details for the plan of care and counseling regarding the diagnosis and prognosis. Questions are answered at this time and they are agreeable with the plan.      --------------------------------- IMPRESSION AND DISPOSITION ---------------------------------    IMPRESSION  1. Gastrointestinal hemorrhage associated with gastric ulcer        DISPOSITION  Disposition: Pending  Patient condition is stable      NOTE: This report was transcribed using voice recognition software.  Every effort was made to ensure accuracy; however, inadvertent computerized transcription errors may be present       Riki Lala MD  05/09/19 6606

## 2019-05-09 NOTE — ED NOTES
Upon entering room, patient's hand was swollen & IV infiltrated. IV was discontinued & warmth applied to site. Patient was very upset & states that when Dr. Pino Curly the room during patient's bout of emesis, she stated he asked her friend to leave the room \"because she was putting on a show\". Patient stated that it hurt her feelings really bad and she was very upset.       Isidra Gibbs RN  05/09/19 8340

## 2019-05-09 NOTE — ED NOTES
Pt is starting to feel better emotionally after speaking with Dr Dawna Quinonez who did apologize to the pt. She has informed her family of her pending transport to [address] [facility] ER. A 24 gauge IV was established to the KULDIP with IV fluids infusing and protonix 40 mg infused. Pt is ST on telemetry. She denies chest pain and states SOB is much less than when she arrived. She is tired and has eyes closed but awakens when spoken to. Respirations are even and unlabored on room air. No distress noted. Nurse is unable to print telemetry strips for chart. Technician was out to [employer] but issue has not been resolved.      Two [facility]  05/09/19 5052

## 2019-05-09 NOTE — ED PROVIDER NOTES
HPI:  5/9/19, Time: 1:42 PM         Joon Romero is a 57 y.o. female presenting via ambulance (see run sheet)  to the ED for feeling of shortness of breath and fatigue as well as anxiety not feeling well, beginning on and off for the past several years. The complaint has been persistent, moderate in severity, and worsened by emotional upset, stress. Patient had been on Cymbalta as well as Neurontin and Losartan but since her PMD past away several years ago has not been seeing anyone. Denies any cough fever chills nausea vomiting no diarrhea no chest pain no skipping or racing of the heart does have headaches but not sudden dizziness and occasional no blurred vision no numbness tingling or weakness no leg swelling no calf tenderness. No suicidal or homicidal ideations denies any alcohol use does smoke marijuana occasional and smokes cigarettes. ROS:   Pertinent positives and negatives are stated within HPI, all other systems reviewed and are negative.  --------------------------------------------- PAST HISTORY ---------------------------------------------  Past Medical History:  has a past medical history of Anorexia, Arthritis of knee, Back pain, chronic, Chronic pain, H/O gastric bypass, Hypertension, Hypertension, Metabolic acidosis, Ovarian cyst, and Seizures ([facility] [address] Past Surgical History:  has a past surgical history that includes Jeannine-en-Y Gastric Bypass (12/2/2008); Endometrial ablation (2003); Tonsillectomy (1980); Cholecystectomy, laparoscopic (9/19/1995); Upper gastrointestinal endoscopy (10/3/2008); Upper gastrointestinal endoscopy (1/9/2009); Upper gastrointestinal endoscopy (2/4/2009); laparoscopy (2/12/2009); Upper gastrointestinal endoscopy (3/6/2009); Upper gastrointestinal endoscopy (6/4/2009); Upper gastrointestinal endoscopy (7/2/2009); Upper gastrointestinal endoscopy (10/27/2009); Upper gastrointestinal endoscopy (1/4/2010); Upper gastrointestinal endoscopy (6/7/2010);  Stomach performed during the hospital encounter of 05/09/19   CBC Auto Differential   Result Value Ref Range    WBC 7.7 4.5 - 11.5 E9/L    RBC 2.80 (L) 3.50 - 5.50 E12/L    Hemoglobin 8.5 (L) 11.5 - 15.5 g/dL    Hematocrit 24.8 (L) 34.0 - 48.0 %    MCV 88.6 80.0 - 99.9 fL    MCH 30.4 26.0 - 35.0 pg    MCHC 34.3 32.0 - 34.5 %    RDW 13.0 11.5 - 15.0 fL    Platelets 907 253 - 487 E9/L    MPV 11.1 7.0 - 12.0 fL    Neutrophils % 57.3 43.0 - 80.0 %    Immature Granulocytes % 0.4 0.0 - 5.0 %    Lymphocytes % 34.6 20.0 - 42.0 %    Monocytes % 5.8 2.0 - 12.0 %    Eosinophils % 1.4 0.0 - 6.0 %    Basophils % 0.5 0.0 - 2.0 %    Neutrophils # 4.38 1.80 - 7.30 E9/L    Immature Granulocytes # 0.03 E9/L    Lymphocytes # 2.65 1.50 - 4.00 E9/L    Monocytes # 0.44 0.10 - 0.95 E9/L    Eosinophils # 0.11 0.05 - 0.50 E9/L    Basophils # 0.04 0.00 - 0.20 E9/L   Comprehensive Metabolic Panel   Result Value Ref Range    Sodium 140 132 - 146 mmol/L    Potassium 3.9 3.5 - 5.0 mmol/L    Chloride 106 98 - 107 mmol/L    CO2 21 (L) 22 - 29 mmol/L    Anion Gap 13 7 - 16 mmol/L    Glucose 104 (H) 74 - 99 mg/dL    BUN 37 (H) 6 - 20 mg/dL    CREATININE 0.9 0.5 - 1.0 mg/dL    GFR Non-African American >60 >=60 mL/min/1.73    GFR African American >60     Calcium 8.7 8.6 - 10.2 mg/dL    Total Protein 6.8 6.4 - 8.3 g/dL    Alb 4.1 3.5 - 5.2 g/dL    Total Bilirubin 0.3 0.0 - 1.2 mg/dL    Alkaline Phosphatase 113 (H) 35 - 104 U/L    ALT 13 0 - 32 U/L    AST 24 0 - 31 U/L   D-Dimer, Quantitative   Result Value Ref Range    D-Dimer, Quant <200 ng/mL DDU   Brain Natriuretic Peptide   Result Value Ref Range    Pro-BNP 95 0 - 125 pg/mL   TSH without Reflex   Result Value Ref Range    TSH 1.740 0.270 - 4.200 uIU/mL   T4, FREE   Result Value Ref Range    T4 Free 0.98 0.93 - 1.70 ng/dL   Troponin   Result Value Ref Range    Troponin <0.01 0.00 - 0.03 ng/mL   Protime-INR   Result Value Ref Range    Protime 12.6 (H) 9.3 - 12.4 sec    INR 1.0    APTT   Result Value Ref Range    aPTT 33.4 25.7 - 34.7 sec   Hemoglobin and hematocrit, blood   Result Value Ref Range    Hemoglobin 7.6 (L) 11.5 - 15.5 g/dL    Hematocrit 21.9 (L) 34.0 - 48.0 %   Basic Metabolic Panel w/ Reflex to MG   Result Value Ref Range    Sodium 141 132 - 146 mmol/L    Potassium reflex Magnesium 4.0 3.5 - 5.0 mmol/L    Chloride 113 (H) 98 - 107 mmol/L    CO2 19 (L) 22 - 29 mmol/L    Anion Gap 9 7 - 16 mmol/L    Glucose 99 74 - 99 mg/dL    BUN 30 (H) 6 - 20 mg/dL    CREATININE 0.8 0.5 - 1.0 mg/dL    GFR Non-African American >60 >=60 mL/min/1.73    GFR African American >60     Calcium 8.4 (L) 8.6 - 10.2 mg/dL   CBC   Result Value Ref Range    WBC 9.8 4.5 - 11.5 E9/L    RBC 2.61 (L) 3.50 - 5.50 E12/L    Hemoglobin 8.1 (L) 11.5 - 15.5 g/dL    Hematocrit 23.9 (L) 34.0 - 48.0 %    MCV 91.6 80.0 - 99.9 fL    MCH 31.0 26.0 - 35.0 pg    MCHC 33.9 32.0 - 34.5 %    RDW 14.0 11.5 - 15.0 fL    Platelets 124 (L) 434 - 450 E9/L    MPV 12.1 (H) 7.0 - 12.0 fL   Hemoglobin and hematocrit, blood   Result Value Ref Range    Hemoglobin 6.6 (L) 11.5 - 15.5 g/dL    Hematocrit 19.6 (L) 34.0 - 48.0 %   POCT occult blood stool   Result Value Ref Range    OCCULT BLOOD FECAL yes     QC OK? y    EKG 12 Lead   Result Value Ref Range    Ventricular Rate 92 BPM    Atrial Rate 92 BPM    P-R Interval 160 ms    QRS Duration 76 ms    Q-T Interval 348 ms    QTc Calculation (Bazett) 430 ms    P Axis 68 degrees    R Axis 38 degrees    T Axis 44 degrees   TYPE AND SCREEN   Result Value Ref Range    ABO/Rh A POS     Antibody Screen NEG    PREPARE RBC (CROSSMATCH), 2 Units   Result Value Ref Range    Product Code Blood Bank [id number]     Description Blood Bank Red Blood Cells, Leuko-reduced     Unit Number [id number]     Dispense Status Blood Bank issued     Product Code Blood Bank [id number]     Description Blood Bank Red Blood Cells, Leuko-reduced     Unit Number [id number]     Dispense Status Blood Bank issued        RADIOLOGY:  Interpreted by Radiologist.  XR CHEST PORTABLE   Final Result      No airspace opacities or pleural effusion. EKG Interpretation  Interpreted by emergency department physician    Rhythm: normal sinus   Rate: normal  Axis: normal  Conduction: normal  ST Segments: normal  T Waves: normal    Clinical Impression: Normal sinus rhythm normal EKG  Comparison to prior EKG: no previous EKG      ------------------------- NURSING NOTES AND VITALS REVIEWED ---------------------------   The nursing notes within the ED encounter and vital signs as below have been reviewed by myself. BP (!) 168/81   Pulse 92   Temp 98.5 °F (36.9 °C) (Temporal)   Resp 17   Ht 5' 4\" (1.626 m)   Wt 165 lb 6.4 oz (75 kg)   SpO2 98%   BMI 28.39 kg/m²   Oxygen Saturation Interpretation: Normal    The patients available past medical records and past encounters were reviewed.         ------------------------------ ED COURSE/MEDICAL DECISION MAKING----------------------  Medications   pantoprazole (PROTONIX) injection 40 mg (40 mg Intravenous Given 5/10/19 0835)   sodium chloride flush 0.9 % injection 10 mL (10 mLs Intravenous Given 5/10/19 0837)   sodium chloride flush 0.9 % injection 10 mL (10 mLs Intravenous Given 5/10/19 0348)   acetaminophen (TYLENOL) tablet 650 mg (has no administration in time range)   lactated ringers infusion ( Intravenous New Bag 5/9/19 8710)   oxyCODONE (ROXICODONE) immediate release tablet 5 mg ( Oral See Alternative 5/10/19 0835)     Or   oxyCODONE HCl (OXY-IR) immediate release tablet 10 mg (10 mg Oral Given 5/10/19 0835)   0.9 % sodium chloride bolus (250 mLs Intravenous New Bag 5/10/19 8287)   0.9 % sodium chloride bolus (0 mLs Intravenous Stopped 5/9/19 1729)   pantoprazole (PROTONIX) injection 40 mg (40 mg Intravenous Given 5/9/19 3198)   pantoprazole (PROTONIX) injection 40 mg (40 mg Intravenous Given 5/9/19 2046)   0.9 % sodium chloride bolus (0 mLs Intravenous Stopped 5/9/19 4756)         Consult :  Dr Jeremiah Jaime Surgery  requested patient be transferred to ED for evaluation and discussed with the ED physician    Medical Decision Making:    Patient had  episode of acute anxiety here sweats thrashing throwing with a hand fan fanning her face. Episode was  when friend was present otherwise patient has been cooperative. She walked around the ED accompanied by EA for pulse ox  check without any desaturation. Workup was Hb 8.5 Guaiac positive stool  EKG normal   Re-Evaluations:     /92 Pulse Ox 100%             Re-evaluation. Patients symptoms are improving        This patient's ED course included: re-evaluation prior to disposition, multiple bedside re-evaluations, IV medications, cardiac monitoring, complex medical decision making and emergency management and a personal history and physicial eaxmination    This patient has remained hemodynamically stable, improved and been closely monitored during their ED course. Counseling: The emergency provider has spoken with the patient and friend and discussed todays results, in addition to providing specific details for the plan of care and counseling regarding the diagnosis and prognosis. Questions are answered at this time and they are agreeable with the plan.       --------------------------------- IMPRESSION AND DISPOSITION ---------------------------------    IMPRESSION  1. Gastrointestinal hemorrhage associated with gastric ulcer        DISPOSITION  Disposition: Transfer to ED  Condition Fair    NOTE: This report was transcribed using voice recognition software.  Every effort was made to ensure accuracy; however, inadvertent computerized transcription errors may be present          Enzo Byers MD  05/09/19 6565 Gabriella Conroy MD  05/09/19 6565 Gabriella Conroy MD  05/09/19 6565 Gabriella Conroy MD  05/10/19 1042

## 2019-05-09 NOTE — ED NOTES
Pt states she no longer takes any of her home medications, has not for the past couple years since her PCP passed away     Leonardo Barba, URI  05/09/19 1700

## 2019-05-09 NOTE — ED NOTES
Bed: 05  Expected date: 5/9/19  Expected time: 1:30 PM  Means of arrival: [facility] / [facility]  Comments:  Felizardo Closs, RN  05/09/19 3368

## 2019-05-09 NOTE — ED NOTES
Pt is complaining of lower back pain 6/10, nausea and feeling SOB while talking.       Magnus Gleason RN  05/09/19 1935

## 2019-05-10 ENCOUNTER — ANESTHESIA EVENT (OUTPATIENT)
Dept: ENDOSCOPY | Age: 49
DRG: 254 | End: 2019-05-10
Payer: COMMERCIAL

## 2019-05-10 ENCOUNTER — ANESTHESIA (OUTPATIENT)
Dept: ENDOSCOPY | Age: 49
DRG: 254 | End: 2019-05-10
Payer: COMMERCIAL

## 2019-05-10 VITALS
OXYGEN SATURATION: 99 % | SYSTOLIC BLOOD PRESSURE: 137 MMHG | RESPIRATION RATE: 29 BRPM | DIASTOLIC BLOOD PRESSURE: 66 MMHG

## 2019-05-10 LAB
ABO/RH: NORMAL
ANION GAP SERPL CALCULATED.3IONS-SCNC: 9 MMOL/L (ref 7–16)
ANTIBODY SCREEN: NORMAL
BUN BLDV-MCNC: 30 MG/DL (ref 6–20)
CALCIUM SERPL-MCNC: 8.4 MG/DL (ref 8.6–10.2)
CHLORIDE BLD-SCNC: 113 MMOL/L (ref 98–107)
CO2: 19 MMOL/L (ref 22–29)
CREAT SERPL-MCNC: 0.8 MG/DL (ref 0.5–1)
GFR AFRICAN AMERICAN: >60
GFR NON-AFRICAN AMERICAN: >60 ML/MIN/1.73
GLUCOSE BLD-MCNC: 99 MG/DL (ref 74–99)
HCT VFR BLD CALC: 19.6 % (ref 34–48)
HCT VFR BLD CALC: 23.9 % (ref 34–48)
HCT VFR BLD CALC: 24.7 % (ref 34–48)
HEMOGLOBIN: 6.6 G/DL (ref 11.5–15.5)
HEMOGLOBIN: 8.1 G/DL (ref 11.5–15.5)
HEMOGLOBIN: 8.4 G/DL (ref 11.5–15.5)
MCH RBC QN AUTO: 31 PG (ref 26–35)
MCHC RBC AUTO-ENTMCNC: 33.9 % (ref 32–34.5)
MCV RBC AUTO: 91.6 FL (ref 80–99.9)
PDW BLD-RTO: 14 FL (ref 11.5–15)
PLATELET # BLD: 118 E9/L (ref 130–450)
PMV BLD AUTO: 12.1 FL (ref 7–12)
POTASSIUM REFLEX MAGNESIUM: 4 MMOL/L (ref 3.5–5)
RBC # BLD: 2.61 E12/L (ref 3.5–5.5)
SODIUM BLD-SCNC: 141 MMOL/L (ref 132–146)
WBC # BLD: 9.8 E9/L (ref 4.5–11.5)

## 2019-05-10 PROCEDURE — 7100000001 HC PACU RECOVERY - ADDTL 15 MIN: Performed by: SURGERY

## 2019-05-10 PROCEDURE — 2580000003 HC RX 258: Performed by: SURGERY

## 2019-05-10 PROCEDURE — 02HV33Z INSERTION OF INFUSION DEVICE INTO SUPERIOR VENA CAVA, PERCUTANEOUS APPROACH: ICD-10-PCS | Performed by: STUDENT IN AN ORGANIZED HEALTH CARE EDUCATION/TRAINING PROGRAM

## 2019-05-10 PROCEDURE — 43235 EGD DIAGNOSTIC BRUSH WASH: CPT | Performed by: SURGERY

## 2019-05-10 PROCEDURE — 2709999900 HC NON-CHARGEABLE SUPPLY: Performed by: SURGERY

## 2019-05-10 PROCEDURE — 3700000000 HC ANESTHESIA ATTENDED CARE: Performed by: SURGERY

## 2019-05-10 PROCEDURE — 6370000000 HC RX 637 (ALT 250 FOR IP): Performed by: SURGERY

## 2019-05-10 PROCEDURE — 36430 TRANSFUSION BLD/BLD COMPNT: CPT

## 2019-05-10 PROCEDURE — 36415 COLL VENOUS BLD VENIPUNCTURE: CPT

## 2019-05-10 PROCEDURE — 86850 RBC ANTIBODY SCREEN: CPT

## 2019-05-10 PROCEDURE — 85014 HEMATOCRIT: CPT

## 2019-05-10 PROCEDURE — 2140000000 HC CCU INTERMEDIATE R&B

## 2019-05-10 PROCEDURE — 86900 BLOOD TYPING SEROLOGIC ABO: CPT

## 2019-05-10 PROCEDURE — 3609017100 HC EGD: Performed by: SURGERY

## 2019-05-10 PROCEDURE — P9016 RBC LEUKOCYTES REDUCED: HCPCS

## 2019-05-10 PROCEDURE — C9113 INJ PANTOPRAZOLE SODIUM, VIA: HCPCS | Performed by: SURGERY

## 2019-05-10 PROCEDURE — 0DJ08ZZ INSPECTION OF UPPER INTESTINAL TRACT, VIA NATURAL OR ARTIFICIAL OPENING ENDOSCOPIC: ICD-10-PCS | Performed by: SURGERY

## 2019-05-10 PROCEDURE — 7100000000 HC PACU RECOVERY - FIRST 15 MIN: Performed by: SURGERY

## 2019-05-10 PROCEDURE — 86901 BLOOD TYPING SEROLOGIC RH(D): CPT

## 2019-05-10 PROCEDURE — 6360000002 HC RX W HCPCS: Performed by: SURGERY

## 2019-05-10 PROCEDURE — 6360000002 HC RX W HCPCS

## 2019-05-10 PROCEDURE — 80048 BASIC METABOLIC PNL TOTAL CA: CPT

## 2019-05-10 PROCEDURE — 85027 COMPLETE CBC AUTOMATED: CPT

## 2019-05-10 PROCEDURE — 85018 HEMOGLOBIN: CPT

## 2019-05-10 PROCEDURE — 86923 COMPATIBILITY TEST ELECTRIC: CPT

## 2019-05-10 PROCEDURE — 6360000002 HC RX W HCPCS: Performed by: ANESTHESIOLOGY

## 2019-05-10 PROCEDURE — 2580000003 HC RX 258

## 2019-05-10 PROCEDURE — 3700000001 HC ADD 15 MINUTES (ANESTHESIA): Performed by: SURGERY

## 2019-05-10 RX ORDER — ONDANSETRON 2 MG/ML
4 INJECTION INTRAMUSCULAR; INTRAVENOUS
Status: DISCONTINUED | OUTPATIENT
Start: 2019-05-10 | End: 2019-05-10 | Stop reason: HOSPADM

## 2019-05-10 RX ORDER — OXYCODONE HYDROCHLORIDE AND ACETAMINOPHEN 5; 325 MG/1; MG/1
1 TABLET ORAL PRN
Status: DISCONTINUED | OUTPATIENT
Start: 2019-05-10 | End: 2019-05-10 | Stop reason: HOSPADM

## 2019-05-10 RX ORDER — PROPOFOL 10 MG/ML
INJECTION, EMULSION INTRAVENOUS PRN
Status: DISCONTINUED | OUTPATIENT
Start: 2019-05-10 | End: 2019-05-10 | Stop reason: SDUPTHER

## 2019-05-10 RX ORDER — SODIUM CHLORIDE 9 MG/ML
INJECTION, SOLUTION INTRAVENOUS CONTINUOUS PRN
Status: DISCONTINUED | OUTPATIENT
Start: 2019-05-10 | End: 2019-05-10 | Stop reason: SDUPTHER

## 2019-05-10 RX ORDER — MEPERIDINE HYDROCHLORIDE 50 MG/ML
12.5 INJECTION INTRAMUSCULAR; INTRAVENOUS; SUBCUTANEOUS
Status: DISCONTINUED | OUTPATIENT
Start: 2019-05-10 | End: 2019-05-10 | Stop reason: HOSPADM

## 2019-05-10 RX ORDER — MORPHINE SULFATE 2 MG/ML
2 INJECTION, SOLUTION INTRAMUSCULAR; INTRAVENOUS EVERY 5 MIN PRN
Status: DISCONTINUED | OUTPATIENT
Start: 2019-05-10 | End: 2019-05-10 | Stop reason: HOSPADM

## 2019-05-10 RX ORDER — 0.9 % SODIUM CHLORIDE 0.9 %
250 INTRAVENOUS SOLUTION INTRAVENOUS ONCE
Status: COMPLETED | OUTPATIENT
Start: 2019-05-10 | End: 2019-05-10

## 2019-05-10 RX ORDER — METOCLOPRAMIDE HYDROCHLORIDE 5 MG/ML
10 INJECTION INTRAMUSCULAR; INTRAVENOUS EVERY 6 HOURS
Status: DISCONTINUED | OUTPATIENT
Start: 2019-05-10 | End: 2019-05-13

## 2019-05-10 RX ORDER — MORPHINE SULFATE 2 MG/ML
1 INJECTION, SOLUTION INTRAMUSCULAR; INTRAVENOUS EVERY 5 MIN PRN
Status: DISCONTINUED | OUTPATIENT
Start: 2019-05-10 | End: 2019-05-10 | Stop reason: HOSPADM

## 2019-05-10 RX ORDER — OXYCODONE HYDROCHLORIDE AND ACETAMINOPHEN 5; 325 MG/1; MG/1
2 TABLET ORAL PRN
Status: DISCONTINUED | OUTPATIENT
Start: 2019-05-10 | End: 2019-05-10 | Stop reason: HOSPADM

## 2019-05-10 RX ADMIN — Medication 10 ML: at 01:31

## 2019-05-10 RX ADMIN — Medication 10 ML: at 03:48

## 2019-05-10 RX ADMIN — METOCLOPRAMIDE 10 MG: 5 INJECTION, SOLUTION INTRAMUSCULAR; INTRAVENOUS at 15:58

## 2019-05-10 RX ADMIN — SODIUM CHLORIDE, POTASSIUM CHLORIDE, SODIUM LACTATE AND CALCIUM CHLORIDE: 600; 310; 30; 20 INJECTION, SOLUTION INTRAVENOUS at 17:44

## 2019-05-10 RX ADMIN — Medication 10 ML: at 15:58

## 2019-05-10 RX ADMIN — Medication 10 ML: at 08:37

## 2019-05-10 RX ADMIN — PANTOPRAZOLE SODIUM 40 MG: 40 INJECTION, POWDER, FOR SOLUTION INTRAVENOUS at 21:21

## 2019-05-10 RX ADMIN — HYDROMORPHONE HYDROCHLORIDE 0.5 MG: 1 INJECTION, SOLUTION INTRAMUSCULAR; INTRAVENOUS; SUBCUTANEOUS at 14:57

## 2019-05-10 RX ADMIN — OXYCODONE HYDROCHLORIDE 10 MG: 5 TABLET ORAL at 13:15

## 2019-05-10 RX ADMIN — PROPOFOL 300 MG: 10 INJECTION, EMULSION INTRAVENOUS at 14:08

## 2019-05-10 RX ADMIN — PANTOPRAZOLE SODIUM 40 MG: 40 INJECTION, POWDER, FOR SOLUTION INTRAVENOUS at 08:35

## 2019-05-10 RX ADMIN — OXYCODONE HYDROCHLORIDE 10 MG: 5 TABLET ORAL at 21:20

## 2019-05-10 RX ADMIN — METOCLOPRAMIDE 10 MG: 5 INJECTION, SOLUTION INTRAMUSCULAR; INTRAVENOUS at 21:21

## 2019-05-10 RX ADMIN — SODIUM CHLORIDE: 9 INJECTION, SOLUTION INTRAVENOUS at 14:06

## 2019-05-10 RX ADMIN — OXYCODONE HYDROCHLORIDE 10 MG: 5 TABLET ORAL at 04:28

## 2019-05-10 RX ADMIN — OXYCODONE HYDROCHLORIDE 10 MG: 5 TABLET ORAL at 17:18

## 2019-05-10 RX ADMIN — HYDROMORPHONE HYDROCHLORIDE 0.5 MG: 1 INJECTION, SOLUTION INTRAMUSCULAR; INTRAVENOUS; SUBCUTANEOUS at 15:12

## 2019-05-10 RX ADMIN — SODIUM CHLORIDE 250 ML: 9 INJECTION, SOLUTION INTRAVENOUS at 03:47

## 2019-05-10 RX ADMIN — Medication 10 ML: at 21:21

## 2019-05-10 RX ADMIN — OXYCODONE HYDROCHLORIDE 10 MG: 5 TABLET ORAL at 08:35

## 2019-05-10 ASSESSMENT — PAIN DESCRIPTION - FREQUENCY
FREQUENCY: CONTINUOUS

## 2019-05-10 ASSESSMENT — PAIN SCALES - GENERAL
PAINLEVEL_OUTOF10: 9
PAINLEVEL_OUTOF10: 2
PAINLEVEL_OUTOF10: 7
PAINLEVEL_OUTOF10: 0
PAINLEVEL_OUTOF10: 10
PAINLEVEL_OUTOF10: 7
PAINLEVEL_OUTOF10: 0
PAINLEVEL_OUTOF10: 8
PAINLEVEL_OUTOF10: 7
PAINLEVEL_OUTOF10: 5
PAINLEVEL_OUTOF10: 0
PAINLEVEL_OUTOF10: 2

## 2019-05-10 ASSESSMENT — PAIN DESCRIPTION - PAIN TYPE
TYPE: ACUTE PAIN
TYPE: CHRONIC PAIN
TYPE: CHRONIC PAIN
TYPE: ACUTE PAIN
TYPE: CHRONIC PAIN
TYPE: ACUTE PAIN

## 2019-05-10 ASSESSMENT — PAIN DESCRIPTION - ORIENTATION
ORIENTATION: LOWER;LEFT
ORIENTATION: LOWER
ORIENTATION: LOWER;LEFT
ORIENTATION: LOWER
ORIENTATION: LEFT;MID;LOWER
ORIENTATION: LOWER
ORIENTATION: LOWER;LEFT
ORIENTATION: LOWER;LEFT

## 2019-05-10 ASSESSMENT — PAIN - FUNCTIONAL ASSESSMENT
PAIN_FUNCTIONAL_ASSESSMENT: PREVENTS OR INTERFERES SOME ACTIVE ACTIVITIES AND ADLS

## 2019-05-10 ASSESSMENT — PAIN DESCRIPTION - PROGRESSION
CLINICAL_PROGRESSION: NOT CHANGED

## 2019-05-10 ASSESSMENT — PAIN DESCRIPTION - ONSET
ONSET: ON-GOING
ONSET: GRADUAL
ONSET: ON-GOING
ONSET: ON-GOING

## 2019-05-10 ASSESSMENT — PAIN DESCRIPTION - DESCRIPTORS
DESCRIPTORS: RADIATING
DESCRIPTORS: DISCOMFORT;CONSTANT
DESCRIPTORS: RADIATING
DESCRIPTORS: ACHING;JABBING;CRAMPING
DESCRIPTORS: DISCOMFORT;CONSTANT
DESCRIPTORS: ACHING;DISCOMFORT;CONSTANT
DESCRIPTORS: DISCOMFORT;CONSTANT
DESCRIPTORS: ACHING;DISCOMFORT
DESCRIPTORS: RADIATING

## 2019-05-10 ASSESSMENT — PAIN DESCRIPTION - LOCATION
LOCATION: BACK
LOCATION: ABDOMEN
LOCATION: BACK
LOCATION: ABDOMEN
LOCATION: BACK
LOCATION: ABDOMEN
LOCATION: ABDOMEN;BACK
LOCATION: ABDOMEN
LOCATION: ABDOMEN

## 2019-05-10 ASSESSMENT — LIFESTYLE VARIABLES: SMOKING_STATUS: 1

## 2019-05-10 ASSESSMENT — PAIN DESCRIPTION - DIRECTION: RADIATING_TOWARDS: LOWER LEFT BACK

## 2019-05-10 NOTE — OP NOTE
[facility]  ENDOSCOPY LAB  UPPER ENDOSCOPY REPORT      DATE OF PROCEDURE: 5/10/2019     PREOPERATIVE DIAGNOSIS: GI bleed    POSTOPERATIVE DIAGNOSIS/FINDINGS: large clot in distal stomach, unable to remove    SURGEON: Solitario De Guzman MD    ASSISTANT: none    OPERATION: Esophagogastroduodenoscopy with biopsies    ANESTHESIA: Local monitored anesthesia. COMPLICATIONS: None. PRIOR TO EXAM: Gen: comfortable, no distress, awake and alert; Lungs: Clear;  Heart:regular rate and rhythm, normal S1S2     BRIEF HISTORY:  This is a 57 y.o. female who presents with the complaint of GI bleed. My recommendation is to proceed with esophagogastroduodenoscopy. The patient was advised of the risks, benefits, complications and options including the risk of bleeding and perforation. The patient understood and agreed to proceed. PROCEDURE:  Under [facility] anesthesia, the patient was positioned in the left side down decubitus position. A bite block was inserted. Under direct visualization the scope was passed through the oral cavity, into the esophagus and then into the stomach. The patient has a biliary limb and jejunal limb which both were inspected and neither appeared to be the source of bleed. There was a lot of clot in the stomach, no fresh bleeding. Copious irrigation and pushing the clot with the scope was performed, but unable to dislodge the clot. The patient was using quit a bit of propofol and agitated, therefor the procedure was terminated and will be placed on reglan and rescoped in 2 days.       THE PATIENT TOLERATED THE PROCEDURE WELL      PLAN:  PPI  Reglan  rescope in 2 days      Solitario De Guzman MD  5/10/2019  2:17 PM

## 2019-05-10 NOTE — ANESTHESIA PRE PROCEDURE
Department of Anesthesiology  Preprocedure Note       Name:  Selwyn Sears   Age:  57 y.o.  :  1970                                          MRN:  [id number]         Date:  5/10/2019      Surgeon: Ted Menchaca):  Sylvester Sullivan MD    Procedure: EGD ESOPHAGOGASTRODUODENOSCOPY (N/A )    Medications prior to admission:   Prior to Admission medications    Medication Sig Start Date End Date Taking? Authorizing Provider   hydrOXYzine (VISTARIL) 25 MG capsule Take 1 capsule by mouth 3 times daily as needed for Itching or Anxiety 19 Yes Yared Darling MD   ondansetron (ZOFRAN ODT) 4 MG disintegrating tablet Take 1 tablet by mouth every 8 hours as needed for Nausea or Vomiting 18  DA Robledo - CNP   ibuprofen (IBU) 600 MG tablet Take 1 tablet by mouth every 6 hours as needed for Pain 18   Marilyn Moody PA-C   diclofenac (VOLTAREN) 75 MG EC tablet Take 1 tablet by mouth 2 times daily 3/22/16   Fidelina Justin MD   baclofen (LIORESAL) 10 MG tablet Take 1 tablet by mouth 3 times daily 7/21/15   Jayme Gallardo, DO   gabapentin (NEURONTIN) 300 MG capsule Take 1 capsule by mouth 3 times daily 7/21/15   Jayme Gallardo DO   methylPREDNISolone (MEDROL DOSEPACK) 4 MG tablet Take by mouth as directed. 7/21/15   Jayme Gallardo DO   lidocaine (XYLOCAINE) 2 % jelly Apply topically as needed.  7/1/15   Jayme Gallardo DO   naproxen (NAPROSYN) 250 MG tablet Take 1 tablet by mouth 2 times daily (with meals) 7/1/15   Jayme Gallardo DO   Multiple Vitamin (THERA) TABS Take 1 tablet by mouth daily 6/12/15   Jory Yuan MD   Prenatal Vit-Fe Fumarate-FA (PRENATAL VITAMIN) 27-0.8 MG TABS Take 1 tablet by mouth daily 6/12/15   Jory Yuan MD   Cholecalciferol (VITAMIN D3) 2000 UNITS TABS Take 1 tablet by mouth daily 6/12/15   Jory Yuan MD   DULoxetine (CYMBALTA) 60 MG capsule Take 1 capsule by mouth daily 6/12/15   Jory Yuan MD   sodium bicarbonate 650 MG tablet Take 1 tablet by mouth 2 times daily 6/6/15   Sloane Isaac MD   losartan (COZAAR) 50 MG tablet Take 1 tablet by mouth daily 6/6/15   Sloane Isaac MD   Docusate Sodium 100 MG TABS Take 100 mg by mouth daily 6/6/15   Sloane Isaac MD   senna (SENOKOT) 8.6 MG TABS tablet Take 1 tablet by mouth 2 times daily 6/6/15   Sloane Isaac MD   zolpidem (AMBIEN CR) 6.25 MG CR tablet Take 1 tablet by mouth nightly as needed for Sleep. May substitute with ambien 5 mg 1 tab q hs prn insomnia if more cost effective for patient 6/24/14   Chichi Maldonado DO       Current medications:    Current Facility-Administered Medications   Medication Dose Route Frequency Provider Last Rate Last Dose    0.9 % sodium chloride bolus  250 mL Intravenous Once Eva Londono MD 20 mL/hr at 05/10/19 0347 250 mL at 05/10/19 0347    pantoprazole (PROTONIX) injection 40 mg  40 mg Intravenous BID Eva Londono MD   40 mg at 05/10/19 0835    sodium chloride flush 0.9 % injection 10 mL  10 mL Intravenous 2 times per day Eva Londono MD   10 mL at 05/10/19 0837    sodium chloride flush 0.9 % injection 10 mL  10 mL Intravenous PRN Eva Londono MD   10 mL at 05/10/19 0348    acetaminophen (TYLENOL) tablet 650 mg  650 mg Oral Q4H PRN Eva Londono MD        lactated ringers infusion   Intravenous Continuous Eva Londono MD   Stopped at 05/10/19 1047    oxyCODONE (ROXICODONE) immediate release tablet 5 mg  5 mg Oral Q4H PRN Eva Londono MD        Or   [place]helsea.Cheeks oxyCODONE HCl (OXY-IR) immediate release tablet 10 mg  10 mg Oral Q4H PRN Eva Londono MD   10 mg at 05/10/19 5922       Allergies:     Allergies   Allergen Reactions    Ultram [Tramadol] Other (See Comments)     Pt states she had a seizure after taking ultram       Problem List:    Patient Active Problem List   Diagnosis Code    Abdominal tenderness, LLQ (left lower quadrant) R10.814    Benign essential hypertension I10    Postoperative anemia due to acute blood loss D62 GASTROINTESTINAL ENDOSCOPY  7/2/2009    severe anastomotic jejunal ulcers, Dr. Solitario Valdes, [facility]    UPPER GASTROINTESTINAL ENDOSCOPY  10/27/2009    severe anastomotic jejunal ulcers, Dr. Solitario Valdes, [facility]    UPPER GASTROINTESTINAL ENDOSCOPY  1/4/2010    severe anastomotic jejunal ulcers, Dr. Solitario Valdes, [address] [place] [address] UPPER GASTROINTESTINAL ENDOSCOPY  6/7/2010    severe anastomotic jejunal ulcers, Dr. Solitario Valdes, [facility]    UPPER GASTROINTESTINAL ENDOSCOPY  7/30/2010    gastritis, Dr. Solitario Valdes, [facility]       Social History:    Social History     Tobacco Use    Smoking status: Current Every Day Smoker     Packs/day: 0.50     Years: 2.00     Pack years: 1.00     Types: Cigarettes    Smokeless tobacco: Never Used   Substance Use Topics    Alcohol use: No     Alcohol/week: 0.0 oz                                Ready to quit: Not Answered  Counseling given: Not Answered      Vital Signs (Current):   Vitals:    05/10/19 0830 05/10/19 1025 05/10/19 1103 05/10/19 1314   BP: (!) 188/90 (!) 168/81 (!) 157/77 (!) 148/72   Pulse: 98 92 89 97   Resp: 17 17 16    Temp: 99 °F (37.2 °C) 98.5 °F (36.9 °C) 98 °F (36.7 °C)    TempSrc: Temporal Temporal Temporal    SpO2: 100% 98% 99%    Weight:       Height:                                                  BP Readings from Last 3 Encounters:   05/10/19 (!) 148/72   07/25/18 128/70   04/19/18 (!) 150/90       NPO Status:  05/09/2019 1200                                                                               BMI:   Wt Readings from Last 3 Encounters:   05/10/19 165 lb 6.4 oz (75 kg)   04/19/18 150 lb (68 kg)   03/13/18 140 lb (63.5 kg)     Body mass index is 28.39 kg/m².     CBC:   Lab Results   Component Value Date    WBC 9.8 05/10/2019    RBC 2.61 05/10/2019    HGB 8.1 05/10/2019    HCT 23.9 05/10/2019    MCV 91.6 05/10/2019    RDW 14.0 05/10/2019     05/10/2019       CMP:   Lab Results   Component Value Date     05/10/2019    K 4.0 05/10/2019     05/10/2019    CO2 19 05/10/2019    BUN 30 05/10/2019    CREATININE 0.8 05/10/2019    GFRAA >60 05/10/2019    LABGLOM >60 05/10/2019    GLUCOSE 99 05/10/2019    GLUCOSE 83 04/28/2012    PROT 6.8 05/09/2019    CALCIUM 8.4 05/10/2019    BILITOT 0.3 05/09/2019    ALKPHOS 113 05/09/2019    AST 24 05/09/2019    ALT 13 05/09/2019       POC Tests: No results for input(s): POCGLU, POCNA, POCK, POCCL, POCBUN, POCHEMO, POCHCT in the last 72 hours. Coags:   Lab Results   Component Value Date    PROTIME 12.6 05/09/2019    PROTIME 12.6 02/24/2012    INR 1.0 05/09/2019    APTT 33.4 05/09/2019       HCG (If Applicable):   Lab Results   Component Value Date    PREGTESTUR NEG 07/25/2018    PREGSERUM NEGATIVE 02/24/2012        ABGs: No results found for: PHART, PO2ART, BHS8ZCG, AMZ9KQZ, BEART, H1BSYFRP     Type & Screen (If Applicable):  Lab Results   Component Value Date    LABABO A 12/28/2011    [address] POS 12/28/2011         EKG 05/09/2019    Normal sinus rhythm  Normal ECG  No previous ECGs available    Chest xray 05/09/2019    FINDINGS:       The heart is normal in size.  No focal airspace opacity. There is no   pleural effusion. There is no pneumothorax. No free air beneath   diaphragm.           Impression       No airspace opacities or pleural effusion. Anesthesia Evaluation  Patient summary reviewed and Nursing notes reviewed no history of anesthetic complications:   Airway: Mallampati: IV  TM distance: <3 FB   Neck ROM: full  Mouth opening: < 3 FB Dental:    (+) edentulous      Pulmonary:normal exam    (+) current smoker                           Cardiovascular:    (+) hypertension:,       ECG reviewed  Rhythm: regular  Rate: normal                    Neuro/Psych:   (+) seizures (Hx: r/t tramadol, last one in 2000):,              ROS comment: Hx: chronic back pain GI/Hepatic/Renal:   (+) PUD,          ROS comment: HX: GASTRIC BYPASS.    Endo/Other:    (+) blood dyscrasia: anemia, arthritis:., .                 Abdominal:           Vascular: negative vascular ROS.                                     Anesthesia Plan      MAC     ASA 3       Induction: intravenous. Anesthetic plan and risks discussed with patient. Use of blood products discussed with patient whom consented to blood products. Plan discussed with CRNA and attending.                   Jane Gibbons DO   5/10/2019

## 2019-05-10 NOTE — CONSULTS
GENERAL SURGERY  CONSULT NOTE  5/9/2019    Physician Consulted: Dr. Shea Key  Reason for Consult: GIB  Referring Physician: DrAbdullahi Aravind Pimentel is a 57 y.o. female who presents for evaluation of abdominal pain, fatigue, and SOB. She states that her fatigue has been progressive but her abdominal pain started today. She was seen at [facility] where she states she had coffee ground emesis and was tx here. She has a history of RyGB in 2008 which was reversed in 2010. She states that she has not seen a doctor in a few years and denies taking any blood thinners or protonix. 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 Her H/H was 8.5 at [facility] and is 7.6 on repeat here. She was tachycardic into the 130s which improved with fluid bolus.    She smokes 1/2 PPD states she takes \"lots of NSAIDs\" for her back pain      Past Medical History:   Diagnosis Date    Anorexia 6/1/2015    Arthritis of knee     Back pain, chronic     Chronic pain 6/1/2015    H/O gastric bypass 6/1/2015    Hypertension 1/1/2012    Hypertension 8/8/2058    Metabolic acidosis 7/2/4115    Ovarian cyst     Seizures ([address]        Past Surgical History:   Procedure Laterality Date    CHOLECYSTECTOMY[facility]AROSCOPIC  9/19/1995    [facility]    ENDOMET[facility]AL [facility]BLATION  2003    [facility]    GASTRIC BYPASS SURGERY      HERNIA REPAIR      umbil    HYSTERECTOMY Left 2/5/2013    Laparotomy;HUGO;JOSUÉ:LSO    LAPAROSCOPY  2/12/2009    lap assisted percutaneous ERCP and removal CBD stone and gastrostomy, [address]  12/28/11    exp lap, hugo, rt so/ repair hernia    EMILY-EN-Y GASTRIC BYPASS  12/2/2008    laparoscopic RYGB, Dr. Darrion aHrding, [address]  6/25/2010    resection of jejunal ulcer, reversal gastric bypass, jejunoduodenostomy, feeding jejunostomy, Dr. Darrion Harding, [address]  10/3/2008 mild gastritis & duodenitis, Dr. Leonardo Andrade, [address] ENDOSCOPY  1/9/2009    multiple severe anastomotic jejunal ulcers, Dr. Leonardo Andrade. [facility]    UPPER GASTROINTESTINAL ENDOSCOPY  2/4/2009    ulcers healed, normal, Dr. Anant Cortez, [address] ENDOSCOPY  3/6/2009    recurrent severe anastomotic jejunal ulcers, Dr. Leonardo Andrade, [address] ENDOSCOPY  6/4/2009    severe anastomotic jejunal ulcers, Dr. Leonardo Andrade, [address] ENDOSCOPY  7/2/2009    severe anastomotic jejunal ulcers, Dr. Leonardo Andrade, [address] ENDOSCOPY  10/27/2009    severe anastomotic jejunal ulcers, Dr. Leonardo Andrade, [address] ENDOSCOPY  1/4/2010    severe anastomotic jejunal ulcers, Dr. Leonardo Andrade, [address] ENDOSCOPY  6/7/2010    severe anastomotic jejunal ulcers, Dr. Leonardo Andrade, [address] ENDOSCOPY  7/30/2010    gastritis, Dr. Leonardo Andrade, [facility]       Medications Prior to Admission:    Prior to Admission medications    Medication Sig Start Date End Date Taking? Authorizing Provider   hydrOXYzine (VISTARIL) 25 MG capsule Take 1 capsule by mouth 3 times daily as needed for Itching or Anxiety 5/9/19 5/23/19 Yes Ankit Son MD   ondansetron (ZOFRAN ODT) 4 MG disintegrating tablet Take 1 tablet by mouth every 8 hours as needed for Nausea or Vomiting 7/25/18 7/25/19  DA Jose - CNP   ibuprofen (IBU) 600 MG tablet Take 1 tablet by mouth every 6 hours as needed for Pain 4/19/18   Castillo Gonzalez PA-C   diclofenac (VOLTAREN) 75 MG EC tablet Take 1 tablet by mouth 2 times daily 3/22/16   Riki Lala MD   baclofen (LIORESAL) 10 MG tablet Take 1 tablet by mouth 3 times daily 7/21/15   Jayme Gallardo DO   gabapentin (NEURONTIN) 300 MG capsule Take 1 capsule by mouth 3 times daily 7/21/15   Jayme Gallardo DO   methylPREDNISolone (MEDROL DOSEPACK) 4 MG tablet Take by mouth as directed.  7/21/15   Jayme Gallardo DO lidocaine (XYLOCAINE) 2 % jelly Apply topically as needed. 7/1/15   Jayme Gallardo DO   naproxen (NAPROSYN) 250 MG tablet Take 1 tablet by mouth 2 times daily (with meals) 7/1/15   Jayme Gallardo DO   Multiple Vitamin (THERA) TABS Take 1 tablet by mouth daily 6/12/15   Cherry Walter MD   Prenatal Vit-Fe Fumarate-FA (PRENATAL VITAMIN) 27-0.8 MG TABS Take 1 tablet by mouth daily 6/12/15   Cherry Walter MD   Cholecalciferol (VITAMIN D3) 2000 UNITS TABS Take 1 tablet by mouth daily 6/12/15   Cherry Walter MD   DULoxetine (CYMBALTA) 60 MG capsule Take 1 capsule by mouth daily 6/12/15   Cherry Walter MD   sodium bicarbonate 650 MG tablet Take 1 tablet by mouth 2 times daily 6/6/15   Lety Mac MD   losartan (COZAAR) 50 MG tablet Take 1 tablet by mouth daily 6/6/15   Lety Mac MD   Docusate Sodium 100 MG TABS Take 100 mg by mouth daily 6/6/15   Lety Mac MD   senna (SENOKOT) 8.6 MG TABS tablet Take 1 tablet by mouth 2 times daily 6/6/15   Lety Mac MD   zolpidem (AMBIEN CR) 6.25 MG CR tablet Take 1 tablet by mouth nightly as needed for Sleep. May substitute with ambien 5 mg 1 tab q hs prn insomnia if more cost effective for patient 6/24/14   Armen Maldonado DO       Allergies   Allergen Reactions    Ultram [Tramadol] Other (See Comments)     Pt states she had a seizure after taking ultram       History reviewed. No pertinent family history.     Social History     Tobacco Use    Smoking status: Current Every Day Smoker     Packs/day: 0.50     Years: 2.00     Pack years: 1.00     Types: Cigarettes    Smokeless tobacco: Never Used   Substance Use Topics    Alcohol use: No     Alcohol/week: 0.0 oz    Drug use: Yes     Types: Marijuana     Comment: pt recently quit         Review of Systems   General ROS: positive for  - fatigue and anxiety  Hematological and Lymphatic ROS: negative  Respiratory ROS: positive for - shortness of breath  negative for - cough  Cardiovascular ROS: no chest pain  Gastrointestinal ROS: positive for - abdominal pain and blood in stools      PHYSICAL EXAM:    Vitals:    19   BP: (!) 144/96   Pulse: 113   Resp: 20   Temp:    SpO2:        General Appearance:  awake, alert, oriented,  Skin: pale. Head/face:  NCAT  Eyes:  No gross abnormalities. Lungs:  No chest wall tenderness. Heart:  Tachycardic   Abdomen:  Soft, epigastric and LLQ tenderness  Female Rectal: FOBT+ black stool    LABS:    CBC  Recent Labs     19  1351 19  2052   WBC 7.7  --    HGB 8.5* 7.6*   HCT 24.8* 21.9*     --      BMP  Recent Labs     19  1351      K 3.9      CO2 21*   BUN 37*   CREATININE 0.9   CALCIUM 8.7     Liver Function  Recent Labs     19  1351   BILITOT 0.3   AST 24   ALT 13   ALKPHOS 113*   PROT 6.8   LABALBU 4.1     No results for input(s): LACTATE in the last 72 hours. Recent Labs     19  1729   INR 1.0       RADIOLOGY    Xr Chest Portable    Result Date: 2019  Patient MRN:  [id number] : 1970 Age: 57 years Gender: Female Order Date:  2019 1:45 PM EXAM: XR CHEST PORTABLE COMPARISON: 2015 INDICATION:  Possible Stroke Possible Stroke FINDINGS: The heart is normal in size. No focal airspace opacity. There is no pleural effusion. There is no pneumothorax. No free air beneath diaphragm. No airspace opacities or pleural effusion. ASSESSMENT:  57 y.o. female with acute blood loss anemia likely UGI in nature.      PLAN:  PPI   CLD/NPOMN  EGD timing  Fluid resuscitation   Monitor H/H  Recommend avoiding NSAIDs    Electronically signed by Tasha Arriaza MD on 19 at 9:16 PM

## 2019-05-10 NOTE — ANESTHESIA POSTPROCEDURE EVALUATION
Department of Anesthesiology  Postprocedure Note    Patient: Sandra Manriquez  MRN: [id number]  YOB: 1970  Date of evaluation: 5/10/2019  Time:  2:52 PM     Procedure Summary     Date:  05/10/19 Room / Location:  [facility]O 01 / [facility]    Anesthesia Start:  1406 Anesthesia Stop:  6957    Procedure:  EGD ESOPHAGOGASTRODUODENOSCOPY (N/A ) Diagnosis:  (?)    Surgeon:  Dia Sam MD Responsible Provider:  Macie Valadez DO    Anesthesia Type:  MAC ASA Status:  3          Anesthesia Type: MAC    Camille Phase I: Camille Score: 10    Camille Phase II:      Last vitals: Reviewed and per EMR flowsheets.        Anesthesia Post Evaluation    Patient location during evaluation: bedside  Patient participation: complete - patient cannot participate  Level of consciousness: awake and alert  Airway patency: patent  Nausea & Vomiting: no nausea and no vomiting  Complications: no  Cardiovascular status: blood pressure returned to baseline  Respiratory status: acceptable  Hydration status: euvolemic

## 2019-05-10 NOTE — PLAN OF CARE
Problem: Pain:  Goal: Pain level will decrease  Description  Pain level will decrease  Outcome: Met This Shift     Problem: Nausea/Vomiting:  Goal: Able to drink  Description  Able to drink  Outcome: Met This Shift

## 2019-05-10 NOTE — H&P
GENERAL SURGERY  H&P NOTE  5/9/2019    Physician Consulted: Dr. Seth Liang  Reason for Consult: GIB  Referring Physician: Dr. Alcaraz Thu is a 57 y.o. female who presents for evaluation of abdominal pain, fatigue, and SOB. She states that her fatigue has been progressive but her abdominal pain started today. She was seen at [facility] where she states she had coffee ground emesis and was tx here. She has a history of RyGB in 2008 which was reversed in 2010. She states that she has not seen a doctor in a few years and denies taking any blood thinners, protonix, or carafate. 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 Her H/H was 8.5 at [facility] and is 7.6 on repeat here. She was tachycardic into the 130s which improved with fluid bolus. She smokes 1/2 PPD states she takes \"lots of NSAIDs\" for her back pain.       Past Medical History:   Diagnosis Date    Anorexia 6/1/2015    Arthritis of knee     Back pain, chronic     Chronic pain 6/1/2015    H/O gastric bypass 6/1/2015    Hypertension 1/1/2012    Hypertension 5/1/1012    Metabolic acidosis 0/7/3335    Ovarian cyst     Se[address] [address]        Past Surgical History:   Procedure Laterality Date    [facility]LE[facility]ST[facility]    ENDOMET[facility]AL [facility]BLATION  2003    [facility]    GASTRIC BYPASS SURGERY      HERNIA REPAIR      umbil    HYSTERECTOMY Left 2/5/2013    Laparotomy;HUGO;JOSUÉ:LSO    LAPAROSCOPY  2/12/2009    lap assisted percutaneous ERCP and removal CBD stone and gastrostomy, [address] HISTORY  12/28/11    exp lap, hugo, rt so/ repair hernia    EMILY-EN-Y GASTRIC BYPASS  12/2/2008    laparoscopic RYGB, Dr. Radha Tejada, [address] [place] [address]   6/25/2010    resection of jejunal ulcer, reversal gastric bypass, jejunoduodenostomy, feeding jejunostomy, Dr. Radha Tejada, [address] 10/3/2008    mild gastritis & duodenitis, Dr. Fred Jolly, [address] ENDOSCOPY  1/9/2009    multiple severe anastomotic jejunal ulcers, Dr. Fred Jolly. [facility]    UPPER GASTROINTESTINAL ENDOSCOPY  2/4/2009    ulcers healed, normal, Dr. Solange Calero, [address] ENDOSCOPY  3/6/2009    recurrent severe anastomotic jejunal ulcers, Dr. Fred Jolly, [address] ENDOSCOPY  6/4/2009    severe anastomotic jejunal ulcers, Dr. Fred Jolly, [address] ENDOSCOPY  7/2/2009    severe anastomotic jejunal ulcers, Dr. Fred Jolly, [address] ENDOSCOPY  10/27/2009    severe anastomotic jejunal ulcers, Dr. Fred Jolly, [address] ENDOSCOPY  1/4/2010    severe anastomotic jejunal ulcers, Dr. Fred Jolly, [address] ENDOSCOPY  6/7/2010    severe anastomotic jejunal ulcers, Dr. Fred Jolly, [address] ENDOSCOPY  7/30/2010    gastritis, Dr. Fred Jolly, [facility]       Medications Prior to Admission:    Prior to Admission medications    Medication Sig Start Date End Date Taking? Authorizing Provider   hydrOXYzine (VISTARIL) 25 MG capsule Take 1 capsule by mouth 3 times daily as needed for Itching or Anxiety 5/9/19 5/23/19 Yes Henry Champion MD   ondansetron (ZOFRAN ODT) 4 MG disintegrating tablet Take 1 tablet by mouth every 8 hours as needed for Nausea or Vomiting 7/25/18 7/25/19  Spring Logan APRN - CNP   ibuprofen (IBU) 600 MG tablet Take 1 tablet by mouth every 6 hours as needed for Pain 4/19/18   Erasto Galloway PA-C   diclofenac (VOLTAREN) 75 MG EC tablet Take 1 tablet by mouth 2 times daily 3/22/16   Kobe Chairez MD   baclofen (LIORESAL) 10 MG tablet Take 1 tablet by mouth 3 times daily 7/21/15   Jayme Gallardo DO   gabapentin (NEURONTIN) 300 MG capsule Take 1 capsule by mouth 3 times daily 7/21/15   Jayme Gallardo DO   methylPREDNISolone (MEDROL DOSEPACK) 4 MG tablet Take by mouth as directed.  7/21/15   Anabel Mansfield DO Sami   lidocaine (XYLOCAINE) 2 % jelly Apply topically as needed. 7/1/15   Jayme Gallardo DO   naproxen (NAPROSYN) 250 MG tablet Take 1 tablet by mouth 2 times daily (with meals) 7/1/15   Jayme Gallardo DO   Multiple Vitamin (THERA) TABS Take 1 tablet by mouth daily 6/12/15   Ninette Mohs, MD   Prenatal Vit-Fe Fumarate-FA (PRENATAL VITAMIN) 27-0.8 MG TABS Take 1 tablet by mouth daily 6/12/15   Ninette Mohs, MD   Cholecalciferol (VITAMIN D3) 2000 UNITS TABS Take 1 tablet by mouth daily 6/12/15   Ninette Mohs, MD   DULoxetine (CYMBALTA) 60 MG capsule Take 1 capsule by mouth daily 6/12/15   Ninette Mohs, MD   sodium bicarbonate 650 MG tablet Take 1 tablet by mouth 2 times daily 6/6/15   Basil Pedraza MD   losartan (COZAAR) 50 MG tablet Take 1 tablet by mouth daily 6/6/15   Basil Pedraza MD   Docusate Sodium 100 MG TABS Take 100 mg by mouth daily 6/6/15   Basil Pedraza MD   senna (SENOKOT) 8.6 MG TABS tablet Take 1 tablet by mouth 2 times daily 6/6/15   Basil Pedraza MD   zolpidem (AMBIEN CR) 6.25 MG CR tablet Take 1 tablet by mouth nightly as needed for Sleep. May substitute with ambien 5 mg 1 tab q hs prn insomnia if more cost effective for patient 6/24/14   Cordelia Maldonado DO       Allergies   Allergen Reactions    Ultram [Tramadol] Other (See Comments)     Pt states she had a seizure after taking ultram       History reviewed. No pertinent family history.     Social History     Tobacco Use    Smoking status: Current Every Day Smoker     Packs/day: 0.50     Years: 2.00     Pack years: 1.00     Types: Cigarettes    Smokeless tobacco: Never Used   Substance Use Topics    Alcohol use: No     Alcohol/week: 0.0 oz    Drug use: Yes     Types: Marijuana     Comment: pt recently quit         Review of Systems   General ROS: positive for  - fatigue and anxiety  Hematological and Lymphatic ROS: negative  Respiratory ROS: positive for - shortness of breath  negative for -

## 2019-05-10 NOTE — DISCHARGE SUMMARY
Physician Discharge Summary     Patient ID:  Dayna Leyva  [id number]  87 y.o.  1970    Admit date: 2019    Discharge date and time: No discharge date for patient encounter. Admitting Physician: Jenaro Quiroga MD     Admission Diagnoses: GIB (gastrointestinal bleeding) [K92.2]    Discharge Diagnoses: Active Problems:    Epigastric pain    GI bleed due to NSAIDs    Hemorrhagic shock (HCC)  Resolved Problems:    * No resolved hospital problems. *      Admission Condition: fair    Discharged Condition: stable    Indication for Admission: [address] Course/Procedures/Operation/treatments:   : Patient seen at OSH for abdominal pain and fatigue and had coffee ground emesis. Found to be anemic and tachycardic. Hx of RNYGB in  which was reversed in . +NSAIDs. Hgb dropped to 6.7, 2 units PRBC ordered  5/10: tachycardia improving. Pain controlled. For EGD today  : Tachycardic, fluid bolus given. Plans for EGD : no complains overnight. No more bleeding overnight. Hb stable. : Hgb 6.9, transfuse 1 unit, tolerating diet,   5/15: Hgb 8 after 1 unit, tolerating diet, will discharge today            Consults:   IP CONSULT TO GENERAL SURGERY    Significant Diagnostic Studies:   Xr Chest Portable    Result Date: 2019  Patient MRN:  [id number] : 1970 Age: 57 years Gender: Female Order Date:  2019 1:45 PM EXAM: XR CHEST PORTABLE COMPARISON: 2015 INDICATION:  Possible Stroke Possible Stroke FINDINGS: The heart is normal in size. No focal airspace opacity. There is no pleural effusion. There is no pneumothorax. No free air beneath diaphragm. No airspace opacities or pleural effusion.        Discharge Exam:  *** Copy and paste last PN exam ***     Disposition: {disposition:96207}    In process/preliminary results:  Outstanding Order Results     Date and Time Order Name Status Description    5/10/2019 0210 PREPARE RBC (CROSSMATCH), 2 Units Preliminary tablet, Refills: 0      zolpidem (AMBIEN CR) 6.25 MG CR tablet Take 1 tablet by mouth nightly as needed for Sleep. May substitute with ambien 5 mg 1 tab q hs prn insomnia if more cost effective for patient  Qty: 30 tablet, Refills: 1    Associated Diagnoses: Insomnia             ***Copy and Paste Discharge Instructions***     Follow up:   No follow-up provider specified.      Signed:  Sharon Grant MD  5/10/2019  7:51 AM

## 2019-05-10 NOTE — ED NOTES
SBAR sent to floor, transportation and floor notified at this time.         Alfred Sam RN  05/09/19 2662

## 2019-05-11 ENCOUNTER — APPOINTMENT (OUTPATIENT)
Dept: GENERAL RADIOLOGY | Age: 49
DRG: 254 | End: 2019-05-11
Payer: COMMERCIAL

## 2019-05-11 LAB
ANION GAP SERPL CALCULATED.3IONS-SCNC: 8 MMOL/L (ref 7–16)
BLOOD BANK DISPENSE STATUS: NORMAL
BLOOD BANK DISPENSE STATUS: NORMAL
BLOOD BANK PRODUCT CODE: NORMAL
BLOOD BANK PRODUCT CODE: NORMAL
BPU ID: NORMAL
BPU ID: NORMAL
BUN BLDV-MCNC: 20 MG/DL (ref 6–20)
CALCIUM SERPL-MCNC: 8 MG/DL (ref 8.6–10.2)
CHLORIDE BLD-SCNC: 110 MMOL/L (ref 98–107)
CO2: 23 MMOL/L (ref 22–29)
CREAT SERPL-MCNC: 0.8 MG/DL (ref 0.5–1)
DESCRIPTION BLOOD BANK: NORMAL
DESCRIPTION BLOOD BANK: NORMAL
GFR AFRICAN AMERICAN: >60
GFR NON-AFRICAN AMERICAN: >60 ML/MIN/1.73
GLUCOSE BLD-MCNC: 133 MG/DL (ref 74–99)
HCT VFR BLD CALC: 16.1 % (ref 34–48)
HCT VFR BLD CALC: 22.4 % (ref 34–48)
HCT VFR BLD CALC: 23.9 % (ref 34–48)
HEMOGLOBIN: 5.5 G/DL (ref 11.5–15.5)
HEMOGLOBIN: 7.6 G/DL (ref 11.5–15.5)
HEMOGLOBIN: 8.2 G/DL (ref 11.5–15.5)
MCH RBC QN AUTO: 30.2 PG (ref 26–35)
MCH RBC QN AUTO: 30.7 PG (ref 26–35)
MCHC RBC AUTO-ENTMCNC: 33.9 % (ref 32–34.5)
MCHC RBC AUTO-ENTMCNC: 34.2 % (ref 32–34.5)
MCV RBC AUTO: 88.9 FL (ref 80–99.9)
MCV RBC AUTO: 89.9 FL (ref 80–99.9)
PDW BLD-RTO: 14.4 FL (ref 11.5–15)
PDW BLD-RTO: 14.6 FL (ref 11.5–15)
PLATELET # BLD: 114 E9/L (ref 130–450)
PLATELET # BLD: 118 E9/L (ref 130–450)
PMV BLD AUTO: 10.9 FL (ref 7–12)
PMV BLD AUTO: 11 FL (ref 7–12)
POTASSIUM REFLEX MAGNESIUM: 4.1 MMOL/L (ref 3.5–5)
RBC # BLD: 1.79 E12/L (ref 3.5–5.5)
RBC # BLD: 2.52 E12/L (ref 3.5–5.5)
SODIUM BLD-SCNC: 141 MMOL/L (ref 132–146)
WBC # BLD: 8.4 E9/L (ref 4.5–11.5)
WBC # BLD: 9.2 E9/L (ref 4.5–11.5)

## 2019-05-11 PROCEDURE — 6370000000 HC RX 637 (ALT 250 FOR IP): Performed by: SURGERY

## 2019-05-11 PROCEDURE — 71045 X-RAY EXAM CHEST 1 VIEW: CPT

## 2019-05-11 PROCEDURE — 80048 BASIC METABOLIC PNL TOTAL CA: CPT

## 2019-05-11 PROCEDURE — 2580000003 HC RX 258: Performed by: SURGERY

## 2019-05-11 PROCEDURE — 6360000002 HC RX W HCPCS: Performed by: STUDENT IN AN ORGANIZED HEALTH CARE EDUCATION/TRAINING PROGRAM

## 2019-05-11 PROCEDURE — 85014 HEMATOCRIT: CPT

## 2019-05-11 PROCEDURE — 6370000000 HC RX 637 (ALT 250 FOR IP): Performed by: STUDENT IN AN ORGANIZED HEALTH CARE EDUCATION/TRAINING PROGRAM

## 2019-05-11 PROCEDURE — 36430 TRANSFUSION BLD/BLD COMPNT: CPT

## 2019-05-11 PROCEDURE — 86923 COMPATIBILITY TEST ELECTRIC: CPT

## 2019-05-11 PROCEDURE — 2140000000 HC CCU INTERMEDIATE R&B

## 2019-05-11 PROCEDURE — 36415 COLL VENOUS BLD VENIPUNCTURE: CPT

## 2019-05-11 PROCEDURE — 85027 COMPLETE CBC AUTOMATED: CPT

## 2019-05-11 PROCEDURE — 6360000002 HC RX W HCPCS: Performed by: SURGERY

## 2019-05-11 PROCEDURE — 85018 HEMOGLOBIN: CPT

## 2019-05-11 PROCEDURE — 99232 SBSQ HOSP IP/OBS MODERATE 35: CPT | Performed by: SURGERY

## 2019-05-11 PROCEDURE — C9113 INJ PANTOPRAZOLE SODIUM, VIA: HCPCS | Performed by: SURGERY

## 2019-05-11 PROCEDURE — P9016 RBC LEUKOCYTES REDUCED: HCPCS

## 2019-05-11 PROCEDURE — 6360000002 HC RX W HCPCS

## 2019-05-11 RX ORDER — HEPARIN SODIUM (PORCINE) LOCK FLUSH IV SOLN 100 UNIT/ML 100 UNIT/ML
SOLUTION INTRAVENOUS
Status: COMPLETED
Start: 2019-05-11 | End: 2019-05-11

## 2019-05-11 RX ORDER — LIDOCAINE 4 G/G
1 PATCH TOPICAL DAILY
Status: DISCONTINUED | OUTPATIENT
Start: 2019-05-11 | End: 2019-05-15 | Stop reason: HOSPADM

## 2019-05-11 RX ORDER — SODIUM CHLORIDE, SODIUM LACTATE, POTASSIUM CHLORIDE, AND CALCIUM CHLORIDE .6; .31; .03; .02 G/100ML; G/100ML; G/100ML; G/100ML
1000 INJECTION, SOLUTION INTRAVENOUS ONCE
Status: COMPLETED | OUTPATIENT
Start: 2019-05-11 | End: 2019-05-11

## 2019-05-11 RX ORDER — 0.9 % SODIUM CHLORIDE 0.9 %
250 INTRAVENOUS SOLUTION INTRAVENOUS ONCE
Status: DISCONTINUED | OUTPATIENT
Start: 2019-05-11 | End: 2019-05-13

## 2019-05-11 RX ORDER — HYDRALAZINE HYDROCHLORIDE 20 MG/ML
10 INJECTION INTRAMUSCULAR; INTRAVENOUS
Status: DISCONTINUED | OUTPATIENT
Start: 2019-05-11 | End: 2019-05-15 | Stop reason: HOSPADM

## 2019-05-11 RX ORDER — ONDANSETRON 2 MG/ML
4 INJECTION INTRAMUSCULAR; INTRAVENOUS EVERY 6 HOURS PRN
Status: DISCONTINUED | OUTPATIENT
Start: 2019-05-11 | End: 2019-05-15 | Stop reason: HOSPADM

## 2019-05-11 RX ADMIN — Medication 10 ML: at 20:36

## 2019-05-11 RX ADMIN — PANTOPRAZOLE SODIUM 40 MG: 40 INJECTION, POWDER, FOR SOLUTION INTRAVENOUS at 20:25

## 2019-05-11 RX ADMIN — Medication 10 ML: at 22:32

## 2019-05-11 RX ADMIN — Medication 10 ML: at 22:30

## 2019-05-11 RX ADMIN — ONDANSETRON HYDROCHLORIDE 4 MG: 2 SOLUTION INTRAMUSCULAR; INTRAVENOUS at 04:09

## 2019-05-11 RX ADMIN — METOCLOPRAMIDE 10 MG: 5 INJECTION, SOLUTION INTRAMUSCULAR; INTRAVENOUS at 09:57

## 2019-05-11 RX ADMIN — SODIUM CHLORIDE, PRESERVATIVE FREE 500 UNITS: 5 INJECTION INTRAVENOUS at 20:25

## 2019-05-11 RX ADMIN — SODIUM CHLORIDE, POTASSIUM CHLORIDE, SODIUM LACTATE AND CALCIUM CHLORIDE: 600; 310; 30; 20 INJECTION, SOLUTION INTRAVENOUS at 03:30

## 2019-05-11 RX ADMIN — Medication 10 ML: at 20:25

## 2019-05-11 RX ADMIN — METOCLOPRAMIDE 10 MG: 5 INJECTION, SOLUTION INTRAMUSCULAR; INTRAVENOUS at 22:21

## 2019-05-11 RX ADMIN — SODIUM CHLORIDE, POTASSIUM CHLORIDE, SODIUM LACTATE AND CALCIUM CHLORIDE: 600; 310; 30; 20 INJECTION, SOLUTION INTRAVENOUS at 22:21

## 2019-05-11 RX ADMIN — HYDROMORPHONE HYDROCHLORIDE 0.5 MG: 1 INJECTION, SOLUTION INTRAMUSCULAR; INTRAVENOUS; SUBCUTANEOUS at 16:53

## 2019-05-11 RX ADMIN — OXYCODONE HYDROCHLORIDE 10 MG: 5 TABLET ORAL at 22:36

## 2019-05-11 RX ADMIN — Medication 10 ML: at 16:53

## 2019-05-11 RX ADMIN — SODIUM CHLORIDE, POTASSIUM CHLORIDE, SODIUM LACTATE AND CALCIUM CHLORIDE 1000 ML: 600; 310; 30; 20 INJECTION, SOLUTION INTRAVENOUS at 06:40

## 2019-05-11 RX ADMIN — OXYCODONE HYDROCHLORIDE 10 MG: 5 TABLET ORAL at 18:36

## 2019-05-11 RX ADMIN — METOCLOPRAMIDE 10 MG: 5 INJECTION, SOLUTION INTRAMUSCULAR; INTRAVENOUS at 03:30

## 2019-05-11 RX ADMIN — METOCLOPRAMIDE 10 MG: 5 INJECTION, SOLUTION INTRAMUSCULAR; INTRAVENOUS at 16:39

## 2019-05-11 RX ADMIN — OXYCODONE HYDROCHLORIDE 10 MG: 5 TABLET ORAL at 01:36

## 2019-05-11 RX ADMIN — PANTOPRAZOLE SODIUM 40 MG: 40 INJECTION, POWDER, FOR SOLUTION INTRAVENOUS at 08:36

## 2019-05-11 RX ADMIN — Medication 10 ML: at 09:57

## 2019-05-11 RX ADMIN — OXYCODONE HYDROCHLORIDE 10 MG: 5 TABLET ORAL at 05:39

## 2019-05-11 RX ADMIN — OXYCODONE HYDROCHLORIDE 10 MG: 5 TABLET ORAL at 14:09

## 2019-05-11 RX ADMIN — OXYCODONE HYDROCHLORIDE 10 MG: 5 TABLET ORAL at 09:57

## 2019-05-11 RX ADMIN — HYDRALAZINE HYDROCHLORIDE 10 MG: 20 INJECTION, SOLUTION INTRAMUSCULAR; INTRAVENOUS at 22:29

## 2019-05-11 RX ADMIN — Medication 10 ML: at 08:37

## 2019-05-11 ASSESSMENT — PAIN DESCRIPTION - ORIENTATION
ORIENTATION: UPPER
ORIENTATION: LOWER
ORIENTATION: LOWER
ORIENTATION: UPPER

## 2019-05-11 ASSESSMENT — PAIN DESCRIPTION - LOCATION
LOCATION: BACK;NECK
LOCATION: BACK

## 2019-05-11 ASSESSMENT — PAIN SCALES - GENERAL
PAINLEVEL_OUTOF10: 10
PAINLEVEL_OUTOF10: 10
PAINLEVEL_OUTOF10: 3
PAINLEVEL_OUTOF10: 5
PAINLEVEL_OUTOF10: 7
PAINLEVEL_OUTOF10: 4
PAINLEVEL_OUTOF10: 8
PAINLEVEL_OUTOF10: 10
PAINLEVEL_OUTOF10: 10
PAINLEVEL_OUTOF10: 5
PAINLEVEL_OUTOF10: 10
PAINLEVEL_OUTOF10: 7
PAINLEVEL_OUTOF10: 0

## 2019-05-11 ASSESSMENT — PAIN DESCRIPTION - DESCRIPTORS
DESCRIPTORS: ACHING;DISCOMFORT
DESCRIPTORS: ACHING;JABBING
DESCRIPTORS: ACHING
DESCRIPTORS: ACHING;DISCOMFORT
DESCRIPTORS: ACHING

## 2019-05-11 ASSESSMENT — PAIN DESCRIPTION - PAIN TYPE
TYPE: CHRONIC PAIN
TYPE: ACUTE PAIN

## 2019-05-11 ASSESSMENT — PAIN DESCRIPTION - PROGRESSION
CLINICAL_PROGRESSION: NOT CHANGED
CLINICAL_PROGRESSION: NOT CHANGED

## 2019-05-11 ASSESSMENT — PAIN DESCRIPTION - FREQUENCY
FREQUENCY: CONTINUOUS

## 2019-05-11 ASSESSMENT — PAIN - FUNCTIONAL ASSESSMENT
PAIN_FUNCTIONAL_ASSESSMENT: PREVENTS OR INTERFERES SOME ACTIVE ACTIVITIES AND ADLS
PAIN_FUNCTIONAL_ASSESSMENT: PREVENTS OR INTERFERES SOME ACTIVE ACTIVITIES AND ADLS

## 2019-05-11 ASSESSMENT — PAIN DESCRIPTION - DIRECTION: RADIATING_TOWARDS: LOWER BACK

## 2019-05-11 ASSESSMENT — PAIN DESCRIPTION - ONSET
ONSET: ON-GOING

## 2019-05-11 NOTE — PROCEDURES
Keenan Guajardo is a 57 y.o. female patient. 1. Gastrointestinal hemorrhage associated with gastric ulcer      Past Medical History:   Diagnosis Date    Anorexia 6/1/2015    Arthritis of knee     Back pain, chronic     Chronic pain 6/1/2015    H/O gastric bypass 6/1/2015    Hypertension 1/1/2012    Hypertension 2/4/9158    Metabolic acidosis 8/2/3092    Ovarian cyst     Seizures (HCC)      Blood pressure (!) 155/71, pulse 124, temperature 97.8 °F (36.6 °C), temperature source Temporal, resp. rate 16, height 5' 4\" (1.626 m), weight 165 lb 6.4 oz (75 kg), SpO2 100 %, not currently breastfeeding. Central Line  Date/Time: 5/11/2019 12:08 PM  Performed by: Kai Wiley MD  Authorized by: Kai Wiley MD   Consent: Written consent obtained. Risks and benefits: risks, benefits and alternatives were discussed  Consent given by: patient  Patient understanding: patient states understanding of the procedure being performed  Patient consent: the patient's understanding of the procedure matches consent given  Procedure consent: procedure consent matches procedure scheduled  Patient identity confirmed: verbally with patient  Time out: Immediately prior to procedure a \"time out\" was called to verify the correct patient, procedure, equipment, support staff and site/side marked as required.   Indications: vascular access  Anesthesia: local infiltration    Anesthesia:  Local Anesthetic: lidocaine 2% without epinephrine  Anesthetic total: 5 mL    Sedation:  Patient sedated: no    Preparation: skin prepped with 2% chlorhexidine  Skin prep agent dried: skin prep agent completely dried prior to procedure  Sterile barriers: all five maximum sterile barriers used - cap, mask, sterile gown, sterile gloves, and large sterile sheet  Hand hygiene: hand hygiene performed prior to central venous catheter insertion  Location details: right internal jugular  Patient position: Trendelenburg  Catheter type: triple

## 2019-05-11 NOTE — PROGRESS NOTES
Patient having episode of nausea, dry heaves. Nothing ordered for nausea. Messaged Dr Darrell Torres via [employer]      [id number]-Dr Paige responded, order placed.

## 2019-05-11 NOTE — PROGRESS NOTES
[facility]  PROGRESS NOTE  ATTENDING NOTE      S:  C/o abdominal pain, dizziness    O:  BP (!) 192/83   Pulse 123   Temp 97.6 °F (36.4 °C) (Temporal)   Resp 17   Ht 5' 4\" (1.626 m)   Wt 165 lb 6.4 oz (75 kg)   SpO2 100%   BMI 28.39 kg/m²   Gen:  Tachycardic  Abd;  Soft, ND, mod epigastric TTP--no different than presentation    ASSESSMENT/PLAN:  GI bleed -- c/w PPI, Carafate  --plan for repeat EGD tomorrow  --CT A/P reviewed  --IVF bolus  --c/w H/H q6h    Juventino Higgins MD, MSc, FACS  5/11/2019  10:43 AM

## 2019-05-11 NOTE — PLAN OF CARE
remain free from injury will improve  Outcome: Met This Shift     Problem: Falls - Risk of:  Goal: Will remain free from falls  Description  Will remain free from falls  Outcome: Met This Shift  Goal: Absence of physical injury  Description  Absence of physical injury  Outcome: Met This Shift

## 2019-05-11 NOTE — PROGRESS NOTES
Notified Dr Obed Peterson that the pt is requesting a pain patch for her lower back pain, orders obtained.

## 2019-05-11 NOTE — PROGRESS NOTES
Procedure note:    Unable to obtain IV access for blood so femoral vein stick was utilized for blood draw. Femoral artery palpated and 18 G needle inserted medially. Blood flash obtained and 30 cc of blood collected. Pressure held for 1 min after procedure    Patient was diaphoretic after procedure, vitals take , BP 96/56. Retaken 5 min later /90 and . Will place triple lumen catheter for further IV access.     Electronically signed by Nadya Longoria MD on 5/11/2019 at 11:09 AM

## 2019-05-11 NOTE — PROGRESS NOTES
Notified Dr Flo Plata notified that the lab is having a hard time obtaining pt. labs. Morning labs were unable to be drawn pt. is a hard stick. Also not sure if you are aware of the events over night, pt. HR went in 160s-170s, pt. was given 1000mL bolus of Lactated Ringers. For this current shift when pt. was up to bathroom getting cleaned up her HR went to 160s, pt. stated at that time she experienced sharp pain in back of her head. I've noticed that when pt. is having increased back pain her HR and BP increase. Pt. also states that she typically drinks Pepsi from morning to bedtime and hasn't had it from her current health situation and she wasn't sure if that was causing her heart rate issues. No new orders at this time. Will continue to monitor.

## 2019-05-12 ENCOUNTER — ANESTHESIA EVENT (OUTPATIENT)
Dept: ENDOSCOPY | Age: 49
DRG: 254 | End: 2019-05-12
Payer: COMMERCIAL

## 2019-05-12 ENCOUNTER — ANESTHESIA (OUTPATIENT)
Dept: ENDOSCOPY | Age: 49
DRG: 254 | End: 2019-05-12
Payer: COMMERCIAL

## 2019-05-12 VITALS
DIASTOLIC BLOOD PRESSURE: 59 MMHG | OXYGEN SATURATION: 95 % | SYSTOLIC BLOOD PRESSURE: 113 MMHG | RESPIRATION RATE: 12 BRPM

## 2019-05-12 LAB
ANION GAP SERPL CALCULATED.3IONS-SCNC: 9 MMOL/L (ref 7–16)
BUN BLDV-MCNC: 22 MG/DL (ref 6–20)
CALCIUM SERPL-MCNC: 8.3 MG/DL (ref 8.6–10.2)
CHLORIDE BLD-SCNC: 107 MMOL/L (ref 98–107)
CO2: 23 MMOL/L (ref 22–29)
CREAT SERPL-MCNC: 0.8 MG/DL (ref 0.5–1)
GFR AFRICAN AMERICAN: >60
GFR NON-AFRICAN AMERICAN: >60 ML/MIN/1.73
GLUCOSE BLD-MCNC: 110 MG/DL (ref 74–99)
HCT VFR BLD CALC: 21.4 % (ref 34–48)
HCT VFR BLD CALC: 23.9 % (ref 34–48)
HEMOGLOBIN: 7.2 G/DL (ref 11.5–15.5)
HEMOGLOBIN: 8.1 G/DL (ref 11.5–15.5)
MCH RBC QN AUTO: 29.9 PG (ref 26–35)
MCHC RBC AUTO-ENTMCNC: 33.6 % (ref 32–34.5)
MCV RBC AUTO: 88.8 FL (ref 80–99.9)
PDW BLD-RTO: 14.6 FL (ref 11.5–15)
PLATELET # BLD: 119 E9/L (ref 130–450)
PMV BLD AUTO: 10.9 FL (ref 7–12)
POTASSIUM REFLEX MAGNESIUM: 3.8 MMOL/L (ref 3.5–5)
RBC # BLD: 2.41 E12/L (ref 3.5–5.5)
SODIUM BLD-SCNC: 139 MMOL/L (ref 132–146)
WBC # BLD: 8.4 E9/L (ref 4.5–11.5)

## 2019-05-12 PROCEDURE — 6370000000 HC RX 637 (ALT 250 FOR IP): Performed by: STUDENT IN AN ORGANIZED HEALTH CARE EDUCATION/TRAINING PROGRAM

## 2019-05-12 PROCEDURE — 6370000000 HC RX 637 (ALT 250 FOR IP): Performed by: SURGERY

## 2019-05-12 PROCEDURE — C9113 INJ PANTOPRAZOLE SODIUM, VIA: HCPCS | Performed by: STUDENT IN AN ORGANIZED HEALTH CARE EDUCATION/TRAINING PROGRAM

## 2019-05-12 PROCEDURE — 2580000003 HC RX 258: Performed by: STUDENT IN AN ORGANIZED HEALTH CARE EDUCATION/TRAINING PROGRAM

## 2019-05-12 PROCEDURE — 6360000002 HC RX W HCPCS: Performed by: INTERNAL MEDICINE

## 2019-05-12 PROCEDURE — P9016 RBC LEUKOCYTES REDUCED: HCPCS

## 2019-05-12 PROCEDURE — 7100000001 HC PACU RECOVERY - ADDTL 15 MIN: Performed by: SURGERY

## 2019-05-12 PROCEDURE — 6360000002 HC RX W HCPCS

## 2019-05-12 PROCEDURE — 2140000000 HC CCU INTERMEDIATE R&B

## 2019-05-12 PROCEDURE — 6360000002 HC RX W HCPCS: Performed by: STUDENT IN AN ORGANIZED HEALTH CARE EDUCATION/TRAINING PROGRAM

## 2019-05-12 PROCEDURE — 3700000001 HC ADD 15 MINUTES (ANESTHESIA): Performed by: SURGERY

## 2019-05-12 PROCEDURE — 3609012800 HC EGD DIAGNOSTIC ONLY: Performed by: SURGERY

## 2019-05-12 PROCEDURE — 6360000002 HC RX W HCPCS: Performed by: SURGERY

## 2019-05-12 PROCEDURE — 2580000003 HC RX 258: Performed by: SURGERY

## 2019-05-12 PROCEDURE — 2709999900 HC NON-CHARGEABLE SUPPLY: Performed by: SURGERY

## 2019-05-12 PROCEDURE — 85018 HEMOGLOBIN: CPT

## 2019-05-12 PROCEDURE — 85027 COMPLETE CBC AUTOMATED: CPT

## 2019-05-12 PROCEDURE — C9113 INJ PANTOPRAZOLE SODIUM, VIA: HCPCS | Performed by: SURGERY

## 2019-05-12 PROCEDURE — 36415 COLL VENOUS BLD VENIPUNCTURE: CPT

## 2019-05-12 PROCEDURE — 85014 HEMATOCRIT: CPT

## 2019-05-12 PROCEDURE — 0DJ08ZZ INSPECTION OF UPPER INTESTINAL TRACT, VIA NATURAL OR ARTIFICIAL OPENING ENDOSCOPIC: ICD-10-PCS | Performed by: SURGERY

## 2019-05-12 PROCEDURE — 43235 EGD DIAGNOSTIC BRUSH WASH: CPT | Performed by: SURGERY

## 2019-05-12 PROCEDURE — 36430 TRANSFUSION BLD/BLD COMPNT: CPT

## 2019-05-12 PROCEDURE — 80048 BASIC METABOLIC PNL TOTAL CA: CPT

## 2019-05-12 PROCEDURE — 3700000000 HC ANESTHESIA ATTENDED CARE: Performed by: SURGERY

## 2019-05-12 PROCEDURE — 86923 COMPATIBILITY TEST ELECTRIC: CPT

## 2019-05-12 PROCEDURE — 6360000002 HC RX W HCPCS: Performed by: NURSE ANESTHETIST, CERTIFIED REGISTERED

## 2019-05-12 PROCEDURE — 7100000000 HC PACU RECOVERY - FIRST 15 MIN: Performed by: SURGERY

## 2019-05-12 RX ORDER — LANOLIN ALCOHOL/MO/W.PET/CERES
3 CREAM (GRAM) TOPICAL NIGHTLY PRN
Status: DISCONTINUED | OUTPATIENT
Start: 2019-05-12 | End: 2019-05-15 | Stop reason: HOSPADM

## 2019-05-12 RX ORDER — FENTANYL CITRATE 50 UG/ML
50 INJECTION, SOLUTION INTRAMUSCULAR; INTRAVENOUS ONCE
Status: COMPLETED | OUTPATIENT
Start: 2019-05-12 | End: 2019-05-12

## 2019-05-12 RX ORDER — PROPOFOL 10 MG/ML
INJECTION, EMULSION INTRAVENOUS PRN
Status: DISCONTINUED | OUTPATIENT
Start: 2019-05-12 | End: 2019-05-12 | Stop reason: SDUPTHER

## 2019-05-12 RX ORDER — FENTANYL CITRATE 50 UG/ML
INJECTION, SOLUTION INTRAMUSCULAR; INTRAVENOUS
Status: COMPLETED
Start: 2019-05-12 | End: 2019-05-12

## 2019-05-12 RX ORDER — SODIUM CHLORIDE 9 MG/ML
250 INJECTION, SOLUTION INTRAVENOUS ONCE
Status: COMPLETED | OUTPATIENT
Start: 2019-05-12 | End: 2019-05-12

## 2019-05-12 RX ORDER — HEPARIN SODIUM (PORCINE) LOCK FLUSH IV SOLN 100 UNIT/ML 100 UNIT/ML
SOLUTION INTRAVENOUS
Status: COMPLETED
Start: 2019-05-12 | End: 2019-05-12

## 2019-05-12 RX ORDER — SIMETHICONE 20 MG/.3ML
EMULSION ORAL PRN
Status: DISCONTINUED | OUTPATIENT
Start: 2019-05-12 | End: 2019-05-12 | Stop reason: ALTCHOICE

## 2019-05-12 RX ADMIN — Medication 10 ML: at 09:16

## 2019-05-12 RX ADMIN — HYDRALAZINE HYDROCHLORIDE 10 MG: 20 INJECTION, SOLUTION INTRAMUSCULAR; INTRAVENOUS at 17:17

## 2019-05-12 RX ADMIN — OXYCODONE HYDROCHLORIDE 10 MG: 5 TABLET ORAL at 12:12

## 2019-05-12 RX ADMIN — OXYCODONE HYDROCHLORIDE 10 MG: 5 TABLET ORAL at 17:41

## 2019-05-12 RX ADMIN — SODIUM CHLORIDE, POTASSIUM CHLORIDE, SODIUM LACTATE AND CALCIUM CHLORIDE: 600; 310; 30; 20 INJECTION, SOLUTION INTRAVENOUS at 12:15

## 2019-05-12 RX ADMIN — METOCLOPRAMIDE 10 MG: 5 INJECTION, SOLUTION INTRAMUSCULAR; INTRAVENOUS at 08:16

## 2019-05-12 RX ADMIN — Medication 10 ML: at 04:59

## 2019-05-12 RX ADMIN — METOCLOPRAMIDE 10 MG: 5 INJECTION, SOLUTION INTRAMUSCULAR; INTRAVENOUS at 17:17

## 2019-05-12 RX ADMIN — FENTANYL CITRATE 50 MCG: 50 INJECTION INTRAMUSCULAR; INTRAVENOUS at 16:21

## 2019-05-12 RX ADMIN — Medication 3 MG: at 22:02

## 2019-05-12 RX ADMIN — OXYCODONE HYDROCHLORIDE 10 MG: 5 TABLET ORAL at 02:41

## 2019-05-12 RX ADMIN — FENTANYL CITRATE 50 MCG: 50 INJECTION, SOLUTION INTRAMUSCULAR; INTRAVENOUS at 16:07

## 2019-05-12 RX ADMIN — OXYCODONE HYDROCHLORIDE 10 MG: 5 TABLET ORAL at 06:58

## 2019-05-12 RX ADMIN — Medication 3 MG: at 02:17

## 2019-05-12 RX ADMIN — PANTOPRAZOLE SODIUM 40 MG: 40 INJECTION, POWDER, FOR SOLUTION INTRAVENOUS at 21:54

## 2019-05-12 RX ADMIN — METOCLOPRAMIDE 10 MG: 5 INJECTION, SOLUTION INTRAMUSCULAR; INTRAVENOUS at 05:00

## 2019-05-12 RX ADMIN — Medication 10 ML: at 12:15

## 2019-05-12 RX ADMIN — Medication 10 ML: at 21:54

## 2019-05-12 RX ADMIN — Medication 10 ML: at 22:02

## 2019-05-12 RX ADMIN — HEPARIN 500 UNITS: 100 SYRINGE at 04:59

## 2019-05-12 RX ADMIN — SODIUM CHLORIDE, POTASSIUM CHLORIDE, SODIUM LACTATE AND CALCIUM CHLORIDE: 600; 310; 30; 20 INJECTION, SOLUTION INTRAVENOUS at 20:55

## 2019-05-12 RX ADMIN — OXYCODONE HYDROCHLORIDE 10 MG: 5 TABLET ORAL at 21:54

## 2019-05-12 RX ADMIN — Medication 10 ML: at 08:09

## 2019-05-12 RX ADMIN — SODIUM CHLORIDE 250 ML: 9 INJECTION, SOLUTION INTRAVENOUS at 09:16

## 2019-05-12 RX ADMIN — PROPOFOL 930 MG: 10 INJECTION, EMULSION INTRAVENOUS at 14:48

## 2019-05-12 RX ADMIN — METOCLOPRAMIDE 10 MG: 5 INJECTION, SOLUTION INTRAMUSCULAR; INTRAVENOUS at 21:54

## 2019-05-12 RX ADMIN — FENTANYL CITRATE 50 MCG: 50 INJECTION INTRAMUSCULAR; INTRAVENOUS at 16:07

## 2019-05-12 RX ADMIN — PANTOPRAZOLE SODIUM 40 MG: 40 INJECTION, POWDER, FOR SOLUTION INTRAVENOUS at 08:09

## 2019-05-12 ASSESSMENT — PAIN DESCRIPTION - DESCRIPTORS
DESCRIPTORS: ACHING
DESCRIPTORS: DISCOMFORT;SHARP
DESCRIPTORS: CONSTANT;ACHING;SHOOTING
DESCRIPTORS: ACHING;DISCOMFORT
DESCRIPTORS: ACHING

## 2019-05-12 ASSESSMENT — PAIN DESCRIPTION - PAIN TYPE
TYPE: ACUTE PAIN
TYPE: CHRONIC PAIN

## 2019-05-12 ASSESSMENT — PAIN SCALES - GENERAL
PAINLEVEL_OUTOF10: 7
PAINLEVEL_OUTOF10: 8
PAINLEVEL_OUTOF10: 0
PAINLEVEL_OUTOF10: 8
PAINLEVEL_OUTOF10: 10
PAINLEVEL_OUTOF10: 6
PAINLEVEL_OUTOF10: 8
PAINLEVEL_OUTOF10: 3
PAINLEVEL_OUTOF10: 10
PAINLEVEL_OUTOF10: 2
PAINLEVEL_OUTOF10: 8
PAINLEVEL_OUTOF10: 10
PAINLEVEL_OUTOF10: 0

## 2019-05-12 ASSESSMENT — PAIN DESCRIPTION - LOCATION
LOCATION: BACK

## 2019-05-12 ASSESSMENT — PAIN DESCRIPTION - FREQUENCY
FREQUENCY: CONTINUOUS

## 2019-05-12 ASSESSMENT — PAIN DESCRIPTION - ORIENTATION
ORIENTATION: LOWER
ORIENTATION: LOWER;MID
ORIENTATION: UPPER;LOWER

## 2019-05-12 ASSESSMENT — PAIN DESCRIPTION - ONSET
ONSET: OTHER (COMMENT)
ONSET: ON-GOING
ONSET: ON-GOING
ONSET: OTHER (COMMENT)

## 2019-05-12 ASSESSMENT — LIFESTYLE VARIABLES: SMOKING_STATUS: 1

## 2019-05-12 NOTE — ANESTHESIA PRE PROCEDURE
Take 1 tablet by mouth 2 times daily 6/6/15   Prescott Dandy, MD   losartan (COZAAR) 50 MG tablet Take 1 tablet by mouth daily 6/6/15   Prescott Dandy, MD   Docusate Sodium 100 MG TABS Take 100 mg by mouth daily 6/6/15   Prescott Dandy, MD   senna (SENOKOT) 8.6 MG TABS tablet Take 1 tablet by mouth 2 times daily 6/6/15   Prescott Dandy, MD   zolpidem (AMBIEN CR) 6.25 MG CR tablet Take 1 tablet by mouth nightly as needed for Sleep. May substitute with ambien 5 mg 1 tab q hs prn insomnia if more cost effective for patient 6/24/14   [facility], DO       Current medications:    No current facility-administered medications for this visit.       Current Outpatient Medications   Medication Sig Dispense Refill    hydrOXYzine (VISTARIL) 25 MG capsule Take 1 capsule by mouth 3 times daily as needed for Itching or Anxiety 15 capsule 0     Facility-Administered Medications Ordered in Other Visits   Medication Dose Route Frequency Provider Last Rate Last Dose    melatonin tablet 3 mg  3 mg Oral Nightly PRN Lydia Sheriff MD   3 mg at 05/12/19 0217    ondansetron (ZOFRAN) injection 4 mg  4 mg Intravenous Q6H PRN Tasha Arriaza MD   4 mg at 05/11/19 0409    0.9 % sodium chloride infusion 250 mL  250 mL Intravenous Once Lydia Sheriff MD        lidocaine 4 % external patch 1 patch  1 patch Transdermal Daily Lydia Sheriff MD   1 patch at 05/12/19 0808    hydrALAZINE (APRESOLINE) injection 10 mg  10 mg Intravenous Q1H PRN Lydia Sheriff MD   10 mg at 05/11/19 2229    metoclopramide (REGLAN) injection 10 mg  10 mg Intravenous Q6H Ramesh Elmore MD   10 mg at 05/12/19 0816    pantoprazole (PROTONIX) injection 40 mg  40 mg Intravenous BID Ramesh Elmore MD   40 mg at 05/12/19 0809    sodium chloride flush 0.9 % injection 10 mL  10 mL Intravenous 2 times per day Ramesh Elmore MD   10 mL at 05/12/19 0809    sodium chloride flush 0.9 % injection 10 mL  10 mL Intravenous PRN Ramesh Elmore MD   10 lap, hugo, rt so/ repair hernia    EMILY-EN-Y GASTRIC BYPASS  12/2/2008    laparoscopic RYGB, Dr. Darrion Harding, [address]  6/25/2010    resection of jejunal ulcer, reversal gastric bypass, jejunoduodenostomy, feeding jejunostomy, Dr. Darrion Harding, [address]  10/3/2008    mild gastritis & duodenitis, Dr. Darrion Harding, [address] ENDOSCOPY  1/9/2009    multiple severe anastomotic jejunal ulcers, Dr. Darrion Harding. [facility]    UPPER GASTROINTESTINAL ENDOSCOPY  2/4/2009    ulcers healed, normal, Dr. Faith Orona, [address] ENDOSCOPY  3/6/2009    recurrent severe anastomotic jejunal ulcers, Dr. Darrion Harding, [address] ENDOSCOPY  6/4/2009    severe anastomotic jejunal ulcers, Dr. Darrion Harding, [address] ENDOSCOPY  7/2/2009    severe anastomotic jejunal ulcers, Dr. Darrion Harding, [address] ENDOSCOPY  10/27/2009    severe anastomotic jejunal ulcers, Dr. Darrion Harding, [facility]    UPPER GASTROINTESTINAL ENDOSCOPY  1/4/2010    severe anastomotic jejunal ulcers, Dr. Darrion Harding, [address] ENDOSCOPY  6/7/2010    severe anastomotic jejunal ulcers, Dr. Darrion Harding, [facility]    UPPER GASTROINTESTINAL ENDOSCOPY  7/30/2010    gastritis, Dr. Darrion Harding, [facility]       Social History:    Social History     Tobacco Use    Smoking status: Current Every Day Smoker     Packs/day: 0.50     Years: 2.00     Pack years: 1.00     Types: Cigarettes    Smokeless tobacco: Never Used   Substance Use Topics    Alcohol use: No     Alcohol/week: 0.0 oz                                Ready to quit: Not Answered  Counseling given: Not Answered      Vital Signs (Current): There were no vitals filed for this visit.                                            BP Readings from Last 3 Encounters:   05/12/19 134/67   05/10/19 137/66   07/25/18 128/70       NPO Status:  05/09/2019 1200 BMI:   Wt Readings from Last 3 Encounters:   05/10/19 165 lb 6.4 oz (75 kg)   04/19/18 150 lb (68 kg)   03/13/18 140 lb (63.5 kg)     There is no height or weight on file to calculate BMI.    CBC:   Lab Results   Component Value Date    WBC 8.4 05/12/2019    RBC 2.41 05/12/2019    HGB 7.2 05/12/2019    HCT 21.4 05/12/2019    MCV 88.8 05/12/2019    RDW 14.6 05/12/2019     05/12/2019       CMP:   Lab Results   Component Value Date     05/12/2019    K 3.8 05/12/2019     05/12/2019    CO2 23 05/12/2019    BUN 22 05/12/2019    CREATININE 0.8 05/12/2019    GFRAA >60 05/12/2019    LABGLOM >60 05/12/2019    GLUCOSE 110 05/12/2019    GLUCOSE 83 04/28/2012    PROT 6.8 05/09/2019    CALCIUM 8.3 05/12/2019    BILITOT 0.3 05/09/2019    ALKPHOS 113 05/09/2019    AST 24 05/09/2019    ALT 13 05/09/2019       POC Tests: No results for input(s): POCGLU, POCNA, POCK, POCCL, POCBUN, POCHEMO, POCHCT in the last 72 hours. Coags:   Lab Results   Component Value Date    PROTIME 12.6 05/09/2019    PROTIME 12.6 02/24/2012    INR 1.0 05/09/2019    APTT 33.4 05/09/2019       HCG (If Applicable):   Lab Results   Component Value Date    PREGTESTUR NEG 07/25/2018    PREGSERUM NEGATIVE 02/24/2012        ABGs: No results found for: PHART, PO2ART, NQW1XTR, SXA0FCV, BEART, M2QGLEBK     Type & Screen (If Applicable):  Lab Results   Component Value Date    LABABO A 12/28/2011    [address] POS 12/28/2011         EKG 05/09/2019    Normal sinus rhythm  Normal ECG  No previous ECGs available    Chest xray 05/09/2019    FINDINGS:       The heart is normal in size.  No focal airspace opacity. There is no   pleural effusion. There is no pneumothorax. No free air beneath   diaphragm.           Impression       No airspace opacities or pleural effusion.          Anesthesia Evaluation  Patient summary reviewed and Nursing notes reviewed no history of anesthetic complications:   Airway: Mallampati: II  TM distance: >3 FB   Neck ROM: full  Mouth opening: > = 3 FB Dental:    (+) edentulous      Pulmonary: breath sounds clear to auscultation  (+) current smoker                           Cardiovascular:    (+) hypertension:,       ECG reviewed  Rhythm: regular  Rate: normal                    Neuro/Psych:   (+) seizures (Hx: r/t tramadol, last one in 2000):,              ROS comment: Hx: chronic back pain GI/Hepatic/Renal:   (+) PUD,          ROS comment: HX: GASTRIC BYPASS  Anemia  Continued GI bleed. Endo/Other:    (+) blood dyscrasia: anemia, arthritis:., .                 Abdominal:           Vascular: negative vascular ROS. Anesthesia Plan      MAC     ASA 3       Induction: intravenous. Anesthetic plan and risks discussed with patient. Use of blood products discussed with patient whom consented to blood products. Plan discussed with CRNA and attending. Amberly Martinez DO   5/12/2019    Patient seen and examined, chart reviewed, agree with above findings. Anesthetic plan, risks, benefits, alternatives, and personnel involved discussed with patient. Patient verbalized an understanding and agreed to proceed. NPO status confirmed. Anesthetic plan discussed with care team members and agreed upon.     Amberly Martinez DO   5/12/2019  12:46 PM

## 2019-05-12 NOTE — OP NOTE
[address]      DATE OF PROCEDURE: 5/12/2019    SURGEON: Dr. Yi Smith MD    ASSISTANT: Juju Stone MD PGY 4    PREOPERATIVE DIAGNOSES:   Anemia, GIB, s/p-reversal of RNYGB    POSTOPERATIVE DIAGNOSES: same, small gastric AVMs     OPERATION:    EGD esophagogastroduodenoscopy      ANESTHESIA: LMAC    CONSENT AND INDICATIONS:  This is a 57y.o. year old female who is having anemia and GIB. We have discussed with the patient and/or the patient representative the indication, alternatives, and the possible risks and/or complications of the planned procedure and the anesthesia methods. The patient and/or patient representative appear to understand and agree to proceed. Complications: none    OPERATIONS: The patient was placed on the table and sedated via LMAC. The throat was numbed with Cetacaine spray. Bite block was placed. A lubricated scope was easily passed into the upper esophagus which looked normal. The distal esophagus looked normal. The scope was passed into the stomach and retroflexed. There was no hiatal hernia. The scope was passed down toward the gastrojejunostomy, there was old blood and new clot which was suctioned and irrigated. Multiple areas of small gastric AVMs which were not actively bleeding. The gastrojejunostomy limbs were intubated and there was old blood within one limb and bile reflux from the other. The scope was passed into the jejunum and no masses or ulcers were seen. Old blood was irrigated. The patient tolerated the procedure well. Dr. Neeta Hernandez was present for the entire procedure.        Physician Signature: Electronically signed by Dr. Amada Lindsay M.D.

## 2019-05-12 NOTE — ANESTHESIA POSTPROCEDURE EVALUATION
Department of Anesthesiology  Postprocedure Note    Patient: Veronica Spencer  MRN: [id number]  YOB: 1970  Date of evaluation: 5/12/2019  Time:  7:59 PM     Procedure Summary     Date:  05/12/19 Room / Location:  [facility] ENDO 02 / [facility] ENDOSCOPY    Anesthesia Start:  0090 Anesthesia Stop:  3635    Procedure:  EGD DIAGNOSTIC ONLY (N/A ) Diagnosis:  (?)    Surgeon:  Oneal Ng MD Responsible Provider:  Ric Hardin DO    Anesthesia Type:  MAC ASA Status:  3          Anesthesia Type: MAC    Camille Phase I: Camille Score: 10    Camille Phase II:      Last vitals: Reviewed and per EMR flowsheets.        Anesthesia Post Evaluation    Patient location during evaluation: PACU  Patient participation: complete - patient participated  Level of consciousness: awake and alert  Pain score: 1  Airway patency: patent  Nausea & Vomiting: no nausea and no vomiting  Complications: no  Cardiovascular status: hemodynamically stable  Respiratory status: acceptable  Hydration status: euvolemic

## 2019-05-12 NOTE — PLAN OF CARE
Problem: Pain:  Goal: Control of acute pain  Description  Control of acute pain  Outcome: Met This Shift     Problem:  Bowel Function - Altered:  Goal: Bowel elimination is within specified parameters  Description  Bowel elimination is within specified parameters  Outcome: Met This Shift     Problem: Nausea/Vomiting:  Goal: Ability to achieve adequate nutritional intake will improve  Description  Ability to achieve adequate nutritional intake will improve  Outcome: Met This Shift     Problem: Safety:  Goal: Ability to remain free from injury will improve  Description  Ability to remain free from injury will improve  Outcome: Met This Shift     Problem: Falls - Risk of:  Goal: Will remain free from falls  Description  Will remain free from falls  Outcome: Met This Shift

## 2019-05-13 LAB
ANION GAP SERPL CALCULATED.3IONS-SCNC: 12 MMOL/L (ref 7–16)
BACTERIA: ABNORMAL /HPF
BILIRUBIN URINE: NEGATIVE
BLOOD, URINE: NEGATIVE
BUN BLDV-MCNC: 17 MG/DL (ref 6–20)
CALCIUM SERPL-MCNC: 8.1 MG/DL (ref 8.6–10.2)
CHLORIDE BLD-SCNC: 108 MMOL/L (ref 98–107)
CLARITY: ABNORMAL
CO2: 22 MMOL/L (ref 22–29)
COLOR: YELLOW
CREAT SERPL-MCNC: 0.8 MG/DL (ref 0.5–1)
GFR AFRICAN AMERICAN: >60
GFR NON-AFRICAN AMERICAN: >60 ML/MIN/1.73
GLUCOSE BLD-MCNC: 101 MG/DL (ref 74–99)
GLUCOSE URINE: NEGATIVE MG/DL
HCT VFR BLD CALC: 21.5 % (ref 34–48)
HCT VFR BLD CALC: 22.3 % (ref 34–48)
HCT VFR BLD CALC: 22.5 % (ref 34–48)
HEMOGLOBIN: 7.3 G/DL (ref 11.5–15.5)
HEMOGLOBIN: 7.6 G/DL (ref 11.5–15.5)
HEMOGLOBIN: 7.6 G/DL (ref 11.5–15.5)
KETONES, URINE: ABNORMAL MG/DL
LEUKOCYTE ESTERASE, URINE: ABNORMAL
MAGNESIUM: 1.5 MG/DL (ref 1.6–2.6)
MCH RBC QN AUTO: 30 PG (ref 26–35)
MCHC RBC AUTO-ENTMCNC: 34 % (ref 32–34.5)
MCV RBC AUTO: 88.5 FL (ref 80–99.9)
NITRITE, URINE: POSITIVE
PDW BLD-RTO: 14.2 FL (ref 11.5–15)
PH UA: 6.5 (ref 5–9)
PLATELET # BLD: 123 E9/L (ref 130–450)
PMV BLD AUTO: 10.5 FL (ref 7–12)
POTASSIUM REFLEX MAGNESIUM: 3.4 MMOL/L (ref 3.5–5)
PROTEIN UA: NEGATIVE MG/DL
RBC # BLD: 2.43 E12/L (ref 3.5–5.5)
RBC UA: ABNORMAL /HPF (ref 0–2)
SODIUM BLD-SCNC: 142 MMOL/L (ref 132–146)
SPECIFIC GRAVITY UA: 1.01 (ref 1–1.03)
UROBILINOGEN, URINE: 1 E.U./DL
WBC # BLD: 6.5 E9/L (ref 4.5–11.5)
WBC UA: ABNORMAL /HPF (ref 0–5)

## 2019-05-13 PROCEDURE — 87077 CULTURE AEROBIC IDENTIFY: CPT

## 2019-05-13 PROCEDURE — 6360000002 HC RX W HCPCS: Performed by: STUDENT IN AN ORGANIZED HEALTH CARE EDUCATION/TRAINING PROGRAM

## 2019-05-13 PROCEDURE — 99232 SBSQ HOSP IP/OBS MODERATE 35: CPT | Performed by: SURGERY

## 2019-05-13 PROCEDURE — 2580000003 HC RX 258: Performed by: STUDENT IN AN ORGANIZED HEALTH CARE EDUCATION/TRAINING PROGRAM

## 2019-05-13 PROCEDURE — 85018 HEMOGLOBIN: CPT

## 2019-05-13 PROCEDURE — 80048 BASIC METABOLIC PNL TOTAL CA: CPT

## 2019-05-13 PROCEDURE — 6370000000 HC RX 637 (ALT 250 FOR IP): Performed by: STUDENT IN AN ORGANIZED HEALTH CARE EDUCATION/TRAINING PROGRAM

## 2019-05-13 PROCEDURE — 6370000000 HC RX 637 (ALT 250 FOR IP): Performed by: SURGERY

## 2019-05-13 PROCEDURE — 36415 COLL VENOUS BLD VENIPUNCTURE: CPT

## 2019-05-13 PROCEDURE — 87186 SC STD MICRODIL/AGAR DIL: CPT

## 2019-05-13 PROCEDURE — 87088 URINE BACTERIA CULTURE: CPT

## 2019-05-13 PROCEDURE — 1200000000 HC SEMI PRIVATE

## 2019-05-13 PROCEDURE — 85027 COMPLETE CBC AUTOMATED: CPT

## 2019-05-13 PROCEDURE — 85014 HEMATOCRIT: CPT

## 2019-05-13 PROCEDURE — 81001 URINALYSIS AUTO W/SCOPE: CPT

## 2019-05-13 PROCEDURE — C9113 INJ PANTOPRAZOLE SODIUM, VIA: HCPCS | Performed by: STUDENT IN AN ORGANIZED HEALTH CARE EDUCATION/TRAINING PROGRAM

## 2019-05-13 PROCEDURE — 6360000002 HC RX W HCPCS

## 2019-05-13 PROCEDURE — 83735 ASSAY OF MAGNESIUM: CPT

## 2019-05-13 RX ORDER — CHOLECALCIFEROL (VITAMIN D3) 50 MCG
2000 TABLET ORAL DAILY
Status: DISCONTINUED | OUTPATIENT
Start: 2019-05-13 | End: 2019-05-15 | Stop reason: HOSPADM

## 2019-05-13 RX ORDER — SUCRALFATE 1 G/1
1 TABLET ORAL
Status: DISCONTINUED | OUTPATIENT
Start: 2019-05-13 | End: 2019-05-15 | Stop reason: HOSPADM

## 2019-05-13 RX ORDER — MULTIVITAMIN WITH FOLIC ACID 400 MCG
1 TABLET ORAL DAILY
Status: DISCONTINUED | OUTPATIENT
Start: 2019-05-13 | End: 2019-05-15 | Stop reason: HOSPADM

## 2019-05-13 RX ORDER — PRENATAL WITH FERROUS FUM AND FOLIC ACID 3080; 920; 120; 400; 22; 1.84; 3; 20; 10; 1; 12; 200; 27; 25; 2 [IU]/1; [IU]/1; MG/1; [IU]/1; MG/1; MG/1; MG/1; MG/1; MG/1; MG/1; UG/1; MG/1; MG/1; MG/1; MG/1
1 TABLET ORAL DAILY
Status: DISCONTINUED | OUTPATIENT
Start: 2019-05-13 | End: 2019-05-15 | Stop reason: HOSPADM

## 2019-05-13 RX ORDER — HEPARIN SODIUM (PORCINE) LOCK FLUSH IV SOLN 100 UNIT/ML 100 UNIT/ML
SOLUTION INTRAVENOUS
Status: COMPLETED
Start: 2019-05-13 | End: 2019-05-13

## 2019-05-13 RX ORDER — BUTALBITAL, ACETAMINOPHEN AND CAFFEINE 50; 325; 40 MG/1; MG/1; MG/1
1 TABLET ORAL EVERY 4 HOURS PRN
Status: DISCONTINUED | OUTPATIENT
Start: 2019-05-13 | End: 2019-05-15 | Stop reason: HOSPADM

## 2019-05-13 RX ORDER — MAGNESIUM SULFATE IN WATER 40 MG/ML
2 INJECTION, SOLUTION INTRAVENOUS ONCE
Status: COMPLETED | OUTPATIENT
Start: 2019-05-13 | End: 2019-05-13

## 2019-05-13 RX ORDER — PROCHLORPERAZINE MALEATE 5 MG/1
5 TABLET ORAL EVERY 6 HOURS PRN
Status: DISCONTINUED | OUTPATIENT
Start: 2019-05-13 | End: 2019-05-13

## 2019-05-13 RX ORDER — DIPHENHYDRAMINE HCL 25 MG
25 TABLET ORAL EVERY 6 HOURS PRN
Status: DISCONTINUED | OUTPATIENT
Start: 2019-05-13 | End: 2019-05-13

## 2019-05-13 RX ORDER — POTASSIUM CHLORIDE 20 MEQ/1
40 TABLET, EXTENDED RELEASE ORAL
Status: COMPLETED | OUTPATIENT
Start: 2019-05-13 | End: 2019-05-13

## 2019-05-13 RX ADMIN — Medication 10 ML: at 21:59

## 2019-05-13 RX ADMIN — OXYCODONE HYDROCHLORIDE 10 MG: 5 TABLET ORAL at 06:01

## 2019-05-13 RX ADMIN — Medication 10 ML: at 09:00

## 2019-05-13 RX ADMIN — METOCLOPRAMIDE 10 MG: 5 INJECTION, SOLUTION INTRAMUSCULAR; INTRAVENOUS at 09:00

## 2019-05-13 RX ADMIN — Medication 10 ML: at 12:05

## 2019-05-13 RX ADMIN — BUTALBITAL, ACETAMINOPHEN, AND CAFFEINE 1 TABLET: 50; 325; 40 TABLET ORAL at 10:49

## 2019-05-13 RX ADMIN — Medication 10 ML: at 21:58

## 2019-05-13 RX ADMIN — OXYCODONE HYDROCHLORIDE 10 MG: 5 TABLET ORAL at 23:47

## 2019-05-13 RX ADMIN — MAGNESIUM SULFATE HEPTAHYDRATE 2 G: 40 INJECTION, SOLUTION INTRAVENOUS at 17:39

## 2019-05-13 RX ADMIN — Medication 10 ML: at 19:42

## 2019-05-13 RX ADMIN — METOCLOPRAMIDE 10 MG: 5 INJECTION, SOLUTION INTRAMUSCULAR; INTRAVENOUS at 05:55

## 2019-05-13 RX ADMIN — Medication 3 MG: at 21:59

## 2019-05-13 RX ADMIN — Medication 2000 UNITS: at 14:25

## 2019-05-13 RX ADMIN — OXYCODONE HYDROCHLORIDE 10 MG: 5 TABLET ORAL at 19:11

## 2019-05-13 RX ADMIN — HEPARIN 500 UNITS: 100 SYRINGE at 05:55

## 2019-05-13 RX ADMIN — OXYCODONE HYDROCHLORIDE 10 MG: 5 TABLET ORAL at 10:55

## 2019-05-13 RX ADMIN — HEPARIN 300 UNITS: 100 SYRINGE at 21:58

## 2019-05-13 RX ADMIN — MULTIVITAMIN TABLET 1 TABLET: TABLET at 14:25

## 2019-05-13 RX ADMIN — SODIUM CHLORIDE, POTASSIUM CHLORIDE, SODIUM LACTATE AND CALCIUM CHLORIDE: 600; 310; 30; 20 INJECTION, SOLUTION INTRAVENOUS at 07:12

## 2019-05-13 RX ADMIN — PANTOPRAZOLE SODIUM 40 MG: 40 INJECTION, POWDER, FOR SOLUTION INTRAVENOUS at 09:00

## 2019-05-13 RX ADMIN — Medication 1 TABLET: at 14:25

## 2019-05-13 RX ADMIN — POTASSIUM CHLORIDE 40 MEQ: 20 TABLET, EXTENDED RELEASE ORAL at 13:16

## 2019-05-13 RX ADMIN — ACETAMINOPHEN 650 MG: 325 TABLET, FILM COATED ORAL at 03:42

## 2019-05-13 RX ADMIN — OXYCODONE HYDROCHLORIDE 10 MG: 5 TABLET ORAL at 01:58

## 2019-05-13 RX ADMIN — PANTOPRAZOLE SODIUM 40 MG: 40 INJECTION, POWDER, FOR SOLUTION INTRAVENOUS at 21:58

## 2019-05-13 RX ADMIN — Medication 10 ML: at 13:18

## 2019-05-13 RX ADMIN — SUCRALFATE 1 G: 1 TABLET ORAL at 17:38

## 2019-05-13 RX ADMIN — OXYCODONE HYDROCHLORIDE 10 MG: 5 TABLET ORAL at 15:03

## 2019-05-13 RX ADMIN — SUCRALFATE 1 G: 1 TABLET ORAL at 22:08

## 2019-05-13 RX ADMIN — Medication 10 ML: at 05:55

## 2019-05-13 RX ADMIN — POTASSIUM CHLORIDE 40 MEQ: 20 TABLET, EXTENDED RELEASE ORAL at 17:39

## 2019-05-13 RX ADMIN — Medication 10 ML: at 21:57

## 2019-05-13 RX ADMIN — BUTALBITAL, ACETAMINOPHEN, AND CAFFEINE 1 TABLET: 50; 325; 40 TABLET ORAL at 22:08

## 2019-05-13 RX ADMIN — Medication 10 ML: at 22:00

## 2019-05-13 ASSESSMENT — PAIN DESCRIPTION - ONSET
ONSET: GRADUAL
ONSET: ON-GOING
ONSET: ON-GOING

## 2019-05-13 ASSESSMENT — PAIN DESCRIPTION - DESCRIPTORS
DESCRIPTORS: POUNDING;HEADACHE
DESCRIPTORS: HEADACHE;ACHING;CONSTANT
DESCRIPTORS: ACHING;DISCOMFORT;CONSTANT
DESCRIPTORS: ACHING;DISCOMFORT;HEADACHE

## 2019-05-13 ASSESSMENT — PAIN DESCRIPTION - LOCATION
LOCATION: BACK
LOCATION: HEAD
LOCATION: BACK
LOCATION: BACK

## 2019-05-13 ASSESSMENT — PAIN - FUNCTIONAL ASSESSMENT
PAIN_FUNCTIONAL_ASSESSMENT: ACTIVITIES ARE NOT PREVENTED

## 2019-05-13 ASSESSMENT — PAIN SCALES - GENERAL
PAINLEVEL_OUTOF10: 10
PAINLEVEL_OUTOF10: 3
PAINLEVEL_OUTOF10: 0
PAINLEVEL_OUTOF10: 8
PAINLEVEL_OUTOF10: 8
PAINLEVEL_OUTOF10: 0
PAINLEVEL_OUTOF10: 9
PAINLEVEL_OUTOF10: 8
PAINLEVEL_OUTOF10: 6
PAINLEVEL_OUTOF10: 0
PAINLEVEL_OUTOF10: 9
PAINLEVEL_OUTOF10: 5
PAINLEVEL_OUTOF10: 9
PAINLEVEL_OUTOF10: 10
PAINLEVEL_OUTOF10: 9

## 2019-05-13 ASSESSMENT — PAIN DESCRIPTION - PROGRESSION
CLINICAL_PROGRESSION: GRADUALLY WORSENING
CLINICAL_PROGRESSION: GRADUALLY WORSENING
CLINICAL_PROGRESSION: GRADUALLY IMPROVING
CLINICAL_PROGRESSION: GRADUALLY IMPROVING

## 2019-05-13 ASSESSMENT — PAIN SCALES - WONG BAKER: WONGBAKER_NUMERICALRESPONSE: 0

## 2019-05-13 ASSESSMENT — PAIN DESCRIPTION - PAIN TYPE
TYPE: CHRONIC PAIN
TYPE: ACUTE PAIN
TYPE: CHRONIC PAIN
TYPE: CHRONIC PAIN

## 2019-05-13 ASSESSMENT — PAIN DESCRIPTION - ORIENTATION
ORIENTATION: LOWER
ORIENTATION: LOWER

## 2019-05-13 ASSESSMENT — PAIN DESCRIPTION - FREQUENCY
FREQUENCY: INTERMITTENT
FREQUENCY: CONTINUOUS
FREQUENCY: CONTINUOUS
FREQUENCY: INTERMITTENT

## 2019-05-13 NOTE — CARE COORDINATION
5/13/2019  Initial assessment done with patient. Patient lives with her family and has no hhc or dme and is independent. Patient she has no dr and has not been to once since her's passed away. SW updated nursing to need to get a dr appointment for patient. Discussed sw role and discharge plan/transition of care. Plan is home with family and no needs. Sw did discussed counseling agencies ect with patient per her request for depression( she denies any substance abuse issues) Her family will pick her up she states at discharged.  Will follow

## 2019-05-13 NOTE — PROGRESS NOTES
[facility]   Department of Pharmacy   Pharmacist Transition of Care Services         Patient Demographics  Name:  Selwyn Sears   Medical Record Number:  [id number]  Gender:  female   Age:  57 y.o. YOB: 1970    Primary Care Physician: No primary care provider on file. Primary Care Physician phone number:  None  Readmission Risk (% from EPIC Patient List): 11 %     Patient plans to participate in [facility] Meds to [facility] (Y/N): no    Pharmacist Review and Summary of Medications     Date of last reviewed/update: 5/13/19     Category Comments   New Medication Started   1. Oxycodone 5-10mg Q4hrs PRN  2. fioricet 1 tablet Q4hrs PRN headache  3. Hydroxyzine 25mg capsule TID PRN   4. Lidocaine 4% external patch   5. melatonin 3mg PO nightly PRN  6. Pantoprazole 40mg IV BID           Change in Outpatient Medication  (Dosage Form, Route,   Dose, or Frequency) 1. Discontinued Outpatient Medication   (or on Hold During Admission) 1. ambiem- on hold in hospital          Other              Pharmacist Patient Education:    Date  Person Educated Content of Education                   Documentation of Pharmacist Interventions and Follow-up Plan:     The following Pharmacist Transition of Care Services were completed:   []  Reviewed and summarized medication changes  []  Entire home medication list was reviewed for accuracy (sources: **)  []  Home medication list was updated or corrected (sources: **)    []  Patient education was provided on new medications  []  Patient education was provided on medication changes  []  Reviewed the After Visit Summary (AVS) with patient    Additional Interventions:  []  Inpatient prescriber was contacted and the following pharmacy recommendations        were accepted: **     [] Other interventions: **        Pharmacist: Eula Bird PharmD, [facility]  Date:  5/13/2019 1:38 PM  Time spent counseling on medications:  minutes

## 2019-05-13 NOTE — PROGRESS NOTES
GENERAL SURGERY  ATTENDING PROGRESS NOTE        CC: GIB    Active Problems:    Epigastric pain    GI bleed due to NSAIDs    Hemorrhagic shock (HCC)  Resolved Problems:    * No resolved hospital problems. *      S:  5/13/19 - tolerating liquids. No melena. No hematemesis. Pain well-controlled. O:   Vitals:    05/13/19 0145 05/13/19 0600 05/13/19 0845 05/13/19 1640   BP: (!) 144/75 (!) 154/82 (!) 164/81 (!) 152/84   Pulse: 113 104 94 98   Resp: 18 18 13 16   Temp: 97.9 °F (36.6 °C) 98 °F (36.7 °C) 98.6 °F (37 °C) 98.3 °F (36.8 °C)   TempSrc: Temporal Temporal Oral Oral   SpO2:    98%   Weight:       Height:           I/O last 3 completed shifts:   In: 3705 [P.O.:1440; I.V.:2265]  Out: 1200 [Urine:1200]    Gen - no apparent distress   Neuro - Awake, alert, attentive   HEENT - NC/AT, oral mucosa moist  Lungs - non labored, on room air  Heart - NSR   Abdomen -  non distended, nontender  Ext -   no deformities, no edema  Skin - warm, dry    A/P:    --GI bleed due to small gastric AVM - has stopped   -Continue ppi   -advance diet   -monitor H&H   -transfer to [facility] floor        Rhonda Barron MD, FACS

## 2019-05-14 PROBLEM — N30.00 ACUTE CYSTITIS WITHOUT HEMATURIA: Status: ACTIVE | Noted: 2019-05-14

## 2019-05-14 LAB
ABO/RH: NORMAL
ANION GAP SERPL CALCULATED.3IONS-SCNC: 7 MMOL/L (ref 7–16)
ANTIBODY SCREEN: NORMAL
BLOOD BANK DISPENSE STATUS: NORMAL
BLOOD BANK PRODUCT CODE: NORMAL
BPU ID: NORMAL
BUN BLDV-MCNC: 14 MG/DL (ref 6–20)
CALCIUM SERPL-MCNC: 8.3 MG/DL (ref 8.6–10.2)
CHLORIDE BLD-SCNC: 109 MMOL/L (ref 98–107)
CO2: 26 MMOL/L (ref 22–29)
CREAT SERPL-MCNC: 0.9 MG/DL (ref 0.5–1)
DESCRIPTION BLOOD BANK: NORMAL
GFR AFRICAN AMERICAN: >60
GFR NON-AFRICAN AMERICAN: >60 ML/MIN/1.73
GLUCOSE BLD-MCNC: 98 MG/DL (ref 74–99)
HCT VFR BLD CALC: 20.5 % (ref 34–48)
HCT VFR BLD CALC: 20.5 % (ref 34–48)
HCT VFR BLD CALC: 25.2 % (ref 34–48)
HEMOGLOBIN: 6.9 G/DL (ref 11.5–15.5)
HEMOGLOBIN: 6.9 G/DL (ref 11.5–15.5)
HEMOGLOBIN: 8.6 G/DL (ref 11.5–15.5)
MCH RBC QN AUTO: 30.3 PG (ref 26–35)
MCHC RBC AUTO-ENTMCNC: 33.7 % (ref 32–34.5)
MCV RBC AUTO: 89.9 FL (ref 80–99.9)
PDW BLD-RTO: 14.6 FL (ref 11.5–15)
PLATELET # BLD: 134 E9/L (ref 130–450)
PMV BLD AUTO: 10.3 FL (ref 7–12)
POTASSIUM REFLEX MAGNESIUM: 3.7 MMOL/L (ref 3.5–5)
RBC # BLD: 2.28 E12/L (ref 3.5–5.5)
SODIUM BLD-SCNC: 142 MMOL/L (ref 132–146)
WBC # BLD: 5.6 E9/L (ref 4.5–11.5)

## 2019-05-14 PROCEDURE — 80048 BASIC METABOLIC PNL TOTAL CA: CPT

## 2019-05-14 PROCEDURE — 6370000000 HC RX 637 (ALT 250 FOR IP): Performed by: SURGERY

## 2019-05-14 PROCEDURE — 2580000003 HC RX 258: Performed by: STUDENT IN AN ORGANIZED HEALTH CARE EDUCATION/TRAINING PROGRAM

## 2019-05-14 PROCEDURE — 85014 HEMATOCRIT: CPT

## 2019-05-14 PROCEDURE — 85018 HEMOGLOBIN: CPT

## 2019-05-14 PROCEDURE — 99232 SBSQ HOSP IP/OBS MODERATE 35: CPT | Performed by: SURGERY

## 2019-05-14 PROCEDURE — 86923 COMPATIBILITY TEST ELECTRIC: CPT

## 2019-05-14 PROCEDURE — 6370000000 HC RX 637 (ALT 250 FOR IP): Performed by: STUDENT IN AN ORGANIZED HEALTH CARE EDUCATION/TRAINING PROGRAM

## 2019-05-14 PROCEDURE — 86901 BLOOD TYPING SEROLOGIC RH(D): CPT

## 2019-05-14 PROCEDURE — 36415 COLL VENOUS BLD VENIPUNCTURE: CPT

## 2019-05-14 PROCEDURE — 36430 TRANSFUSION BLD/BLD COMPNT: CPT

## 2019-05-14 PROCEDURE — P9016 RBC LEUKOCYTES REDUCED: HCPCS

## 2019-05-14 PROCEDURE — C9113 INJ PANTOPRAZOLE SODIUM, VIA: HCPCS | Performed by: STUDENT IN AN ORGANIZED HEALTH CARE EDUCATION/TRAINING PROGRAM

## 2019-05-14 PROCEDURE — 6360000002 HC RX W HCPCS: Performed by: STUDENT IN AN ORGANIZED HEALTH CARE EDUCATION/TRAINING PROGRAM

## 2019-05-14 PROCEDURE — 1200000000 HC SEMI PRIVATE

## 2019-05-14 PROCEDURE — 85027 COMPLETE CBC AUTOMATED: CPT

## 2019-05-14 PROCEDURE — 6360000002 HC RX W HCPCS

## 2019-05-14 PROCEDURE — 86850 RBC ANTIBODY SCREEN: CPT

## 2019-05-14 PROCEDURE — 86900 BLOOD TYPING SEROLOGIC ABO: CPT

## 2019-05-14 RX ORDER — LIDOCAINE 4 G/G
1 PATCH TOPICAL DAILY
Qty: 1 BOX | Refills: 0 | Status: SHIPPED | OUTPATIENT
Start: 2019-05-15 | End: 2019-06-05

## 2019-05-14 RX ORDER — HEPARIN SODIUM (PORCINE) LOCK FLUSH IV SOLN 100 UNIT/ML 100 UNIT/ML
SOLUTION INTRAVENOUS
Status: DISPENSED
Start: 2019-05-14 | End: 2019-05-15

## 2019-05-14 RX ORDER — HEPARIN SODIUM (PORCINE) LOCK FLUSH IV SOLN 100 UNIT/ML 100 UNIT/ML
SOLUTION INTRAVENOUS
Status: COMPLETED
Start: 2019-05-14 | End: 2019-05-14

## 2019-05-14 RX ORDER — 0.9 % SODIUM CHLORIDE 0.9 %
250 INTRAVENOUS SOLUTION INTRAVENOUS ONCE
Status: DISCONTINUED | OUTPATIENT
Start: 2019-05-14 | End: 2019-05-15 | Stop reason: HOSPADM

## 2019-05-14 RX ORDER — OXYCODONE HYDROCHLORIDE 5 MG/1
5 TABLET ORAL EVERY 6 HOURS PRN
Qty: 20 TABLET | Refills: 0 | Status: ON HOLD | OUTPATIENT
Start: 2019-05-14 | End: 2019-05-18 | Stop reason: HOSPADM

## 2019-05-14 RX ORDER — SULFAMETHOXAZOLE AND TRIMETHOPRIM 800; 160 MG/1; MG/1
1 TABLET ORAL EVERY 12 HOURS SCHEDULED
Status: DISCONTINUED | OUTPATIENT
Start: 2019-05-14 | End: 2019-05-15 | Stop reason: HOSPADM

## 2019-05-14 RX ORDER — SULFAMETHOXAZOLE AND TRIMETHOPRIM 800; 160 MG/1; MG/1
1 TABLET ORAL EVERY 12 HOURS SCHEDULED
Qty: 4 TABLET | Refills: 0 | Status: SHIPPED | OUTPATIENT
Start: 2019-05-14 | End: 2019-05-16

## 2019-05-14 RX ORDER — SUCRALFATE 1 G/1
1 TABLET ORAL
Qty: 120 TABLET | Refills: 0 | Status: SHIPPED | OUTPATIENT
Start: 2019-05-14 | End: 2019-08-27

## 2019-05-14 RX ADMIN — Medication 2000 UNITS: at 09:04

## 2019-05-14 RX ADMIN — PANTOPRAZOLE SODIUM 40 MG: 40 INJECTION, POWDER, FOR SOLUTION INTRAVENOUS at 09:05

## 2019-05-14 RX ADMIN — SUCRALFATE 1 G: 1 TABLET ORAL at 22:17

## 2019-05-14 RX ADMIN — Medication 10 ML: at 16:47

## 2019-05-14 RX ADMIN — Medication 10 ML: at 22:17

## 2019-05-14 RX ADMIN — Medication 10 ML: at 09:05

## 2019-05-14 RX ADMIN — BUTALBITAL, ACETAMINOPHEN, AND CAFFEINE 1 TABLET: 50; 325; 40 TABLET ORAL at 09:05

## 2019-05-14 RX ADMIN — Medication 10 ML: at 16:49

## 2019-05-14 RX ADMIN — HEPARIN 500 UNITS: 100 SYRINGE at 16:48

## 2019-05-14 RX ADMIN — HEPARIN 500 UNITS: 100 SYRINGE at 22:18

## 2019-05-14 RX ADMIN — SUCRALFATE 1 G: 1 TABLET ORAL at 12:43

## 2019-05-14 RX ADMIN — OXYCODONE HYDROCHLORIDE 10 MG: 5 TABLET ORAL at 10:58

## 2019-05-14 RX ADMIN — Medication 3 MG: at 22:18

## 2019-05-14 RX ADMIN — Medication 1 TABLET: at 09:04

## 2019-05-14 RX ADMIN — SUCRALFATE 1 G: 1 TABLET ORAL at 16:48

## 2019-05-14 RX ADMIN — OXYCODONE HYDROCHLORIDE 10 MG: 5 TABLET ORAL at 19:51

## 2019-05-14 RX ADMIN — SUCRALFATE 1 G: 1 TABLET ORAL at 09:05

## 2019-05-14 RX ADMIN — PANTOPRAZOLE SODIUM 40 MG: 40 INJECTION, POWDER, FOR SOLUTION INTRAVENOUS at 22:17

## 2019-05-14 RX ADMIN — BUTALBITAL, ACETAMINOPHEN, AND CAFFEINE 1 TABLET: 50; 325; 40 TABLET ORAL at 23:46

## 2019-05-14 RX ADMIN — Medication 10 ML: at 16:45

## 2019-05-14 RX ADMIN — OXYCODONE HYDROCHLORIDE 10 MG: 5 TABLET ORAL at 05:22

## 2019-05-14 RX ADMIN — Medication 10 ML: at 16:46

## 2019-05-14 RX ADMIN — MULTIVITAMIN TABLET 1 TABLET: TABLET at 09:04

## 2019-05-14 RX ADMIN — SULFAMETHOXAZOLE AND TRIMETHOPRIM 1 TABLET: 800; 160 TABLET ORAL at 22:17

## 2019-05-14 RX ADMIN — OXYCODONE HYDROCHLORIDE 10 MG: 5 TABLET ORAL at 15:08

## 2019-05-14 ASSESSMENT — PAIN SCALES - GENERAL
PAINLEVEL_OUTOF10: 0
PAINLEVEL_OUTOF10: 9
PAINLEVEL_OUTOF10: 10
PAINLEVEL_OUTOF10: 0
PAINLEVEL_OUTOF10: 9
PAINLEVEL_OUTOF10: 4
PAINLEVEL_OUTOF10: 10
PAINLEVEL_OUTOF10: 3
PAINLEVEL_OUTOF10: 5
PAINLEVEL_OUTOF10: 8
PAINLEVEL_OUTOF10: 10
PAINLEVEL_OUTOF10: 9
PAINLEVEL_OUTOF10: 3

## 2019-05-14 ASSESSMENT — PAIN DESCRIPTION - ONSET
ONSET: ON-GOING

## 2019-05-14 ASSESSMENT — PAIN DESCRIPTION - FREQUENCY
FREQUENCY: CONTINUOUS
FREQUENCY: INTERMITTENT
FREQUENCY: CONTINUOUS

## 2019-05-14 ASSESSMENT — PAIN DESCRIPTION - ORIENTATION
ORIENTATION: LOWER;MID
ORIENTATION: MID
ORIENTATION: MID;RIGHT;LEFT
ORIENTATION: MID
ORIENTATION: MID
ORIENTATION: MID;LOWER
ORIENTATION: LOWER;MID

## 2019-05-14 ASSESSMENT — PAIN DESCRIPTION - PAIN TYPE
TYPE: CHRONIC PAIN
TYPE: ACUTE PAIN
TYPE: CHRONIC PAIN
TYPE: CHRONIC PAIN

## 2019-05-14 ASSESSMENT — PAIN DESCRIPTION - DESCRIPTORS
DESCRIPTORS: DISCOMFORT
DESCRIPTORS: ACHING
DESCRIPTORS: ACHING;DISCOMFORT
DESCRIPTORS: ACHING;DISCOMFORT;CONSTANT
DESCRIPTORS: ACHING;CONSTANT;DISCOMFORT
DESCRIPTORS: ACHING;HEADACHE
DESCRIPTORS: ACHING;CONSTANT;DISCOMFORT

## 2019-05-14 ASSESSMENT — PAIN - FUNCTIONAL ASSESSMENT
PAIN_FUNCTIONAL_ASSESSMENT: ACTIVITIES ARE NOT PREVENTED

## 2019-05-14 ASSESSMENT — PAIN DESCRIPTION - PROGRESSION
CLINICAL_PROGRESSION: GRADUALLY IMPROVING
CLINICAL_PROGRESSION: NOT CHANGED
CLINICAL_PROGRESSION: GRADUALLY IMPROVING
CLINICAL_PROGRESSION: GRADUALLY WORSENING

## 2019-05-14 ASSESSMENT — PAIN DESCRIPTION - LOCATION
LOCATION: BACK
LOCATION: HEAD
LOCATION: BACK
LOCATION: BACK

## 2019-05-14 NOTE — PROGRESS NOTES
Patient understands she is a general floor, but requests to not have her bed changed since she is most likely discharging tomorrow.    Aline Mcfarlane

## 2019-05-14 NOTE — PLAN OF CARE
Problem: Pain:  Description  Pain management should include both nonpharmacologic and pharmacologic interventions. Goal: Control of acute pain  Description  Control of acute pain  5/14/2019 0236 by Queen Aaron RN  Outcome: Met This Shift  5/13/2019 2041 by Eyad Wynn RN  Outcome: Ongoing     Problem: Bowel Function - Altered:  Goal: Bowel elimination is within specified parameters  Description  Bowel elimination is within specified parameters  5/13/2019 2041 by Eyad Wynn RN  Outcome: Met This Shift     Problem: Nausea/Vomiting:  Goal: Absence of nausea/vomiting  Description  Absence of nausea/vomiting  5/14/2019 0236 by Queen Aaron RN  Outcome: Met This Shift  5/13/2019 2041 by Eyad Wynn RN  Outcome: Met This Shift  Goal: Able to drink  Description  Able to drink  5/13/2019 2041 by Eyad Wynn RN  Outcome: Met This Shift  Goal: Able to eat  Description  Able to eat  5/13/2019 2041 by Eyad Wynn RN  Outcome: Met This Shift  Goal: Ability to achieve adequate nutritional intake will improve  Description  Ability to achieve adequate nutritional intake will improve  5/13/2019 2041 by Eyad Wynn RN  Outcome: Met This Shift     Problem: Activity:  Goal: Fatigue will decrease  Description  Fatigue will decrease  5/13/2019 2041 by Eyad Wynn RN  Outcome: Met This Shift  Goal: Ability to tolerate increased activity will improve  Description  Ability to tolerate increased activity will improve  5/13/2019 2041 by Eyad Wynn RN  Outcome: Met This Shift  Goal: Ability to maintain optimal joint mobility will improve  Description  Ability to maintain optimal joint mobility will improve  5/13/2019 2041 by Eyad Wynn RN  Outcome: Met This Shift     Problem: Safety:  Goal: Ability to remain free from injury will improve  Description  Ability to remain free from injury will improve  5/13/2019 2041 by Eyad Wynn RN  Outcome:  Met This Shift     Problem: Falls - Risk of:  Goal: Will remain free from falls  Description  Will remain free from falls  5/14/2019 0236 by Rahul Best RN  Outcome: Met This Shift  5/13/2019 2041 by Hortensia Curiel RN  Outcome: Met This Shift  Goal: Absence of physical injury  Description  Absence of physical injury  5/13/2019 2041 by Hortensia Curiel RN  Outcome: Met This Shift

## 2019-05-15 VITALS
HEIGHT: 64 IN | SYSTOLIC BLOOD PRESSURE: 155 MMHG | OXYGEN SATURATION: 98 % | RESPIRATION RATE: 16 BRPM | TEMPERATURE: 97.1 F | DIASTOLIC BLOOD PRESSURE: 85 MMHG | HEART RATE: 82 BPM | WEIGHT: 167.1 LBS | BODY MASS INDEX: 28.53 KG/M2

## 2019-05-15 LAB
ANION GAP SERPL CALCULATED.3IONS-SCNC: 12 MMOL/L (ref 7–16)
BUN BLDV-MCNC: 17 MG/DL (ref 6–20)
CALCIUM SERPL-MCNC: 8.5 MG/DL (ref 8.6–10.2)
CHLORIDE BLD-SCNC: 104 MMOL/L (ref 98–107)
CO2: 24 MMOL/L (ref 22–29)
CREAT SERPL-MCNC: 0.8 MG/DL (ref 0.5–1)
GFR AFRICAN AMERICAN: >60
GFR NON-AFRICAN AMERICAN: >60 ML/MIN/1.73
GLUCOSE BLD-MCNC: 94 MG/DL (ref 74–99)
HCT VFR BLD CALC: 25.5 % (ref 34–48)
HCT VFR BLD CALC: 25.8 % (ref 34–48)
HEMOGLOBIN: 8.5 G/DL (ref 11.5–15.5)
HEMOGLOBIN: 8.6 G/DL (ref 11.5–15.5)
MCH RBC QN AUTO: 30 PG (ref 26–35)
MCHC RBC AUTO-ENTMCNC: 33.3 % (ref 32–34.5)
MCV RBC AUTO: 89.9 FL (ref 80–99.9)
ORGANISM: ABNORMAL
PDW BLD-RTO: 15 FL (ref 11.5–15)
PLATELET # BLD: 159 E9/L (ref 130–450)
PMV BLD AUTO: 10.7 FL (ref 7–12)
POTASSIUM REFLEX MAGNESIUM: 3.8 MMOL/L (ref 3.5–5)
RBC # BLD: 2.87 E12/L (ref 3.5–5.5)
SODIUM BLD-SCNC: 140 MMOL/L (ref 132–146)
URINE CULTURE, ROUTINE: ABNORMAL
URINE CULTURE, ROUTINE: ABNORMAL
WBC # BLD: 4.6 E9/L (ref 4.5–11.5)

## 2019-05-15 PROCEDURE — 80048 BASIC METABOLIC PNL TOTAL CA: CPT

## 2019-05-15 PROCEDURE — 85014 HEMATOCRIT: CPT

## 2019-05-15 PROCEDURE — 6370000000 HC RX 637 (ALT 250 FOR IP): Performed by: STUDENT IN AN ORGANIZED HEALTH CARE EDUCATION/TRAINING PROGRAM

## 2019-05-15 PROCEDURE — C9113 INJ PANTOPRAZOLE SODIUM, VIA: HCPCS | Performed by: STUDENT IN AN ORGANIZED HEALTH CARE EDUCATION/TRAINING PROGRAM

## 2019-05-15 PROCEDURE — 6370000000 HC RX 637 (ALT 250 FOR IP): Performed by: SURGERY

## 2019-05-15 PROCEDURE — 36415 COLL VENOUS BLD VENIPUNCTURE: CPT

## 2019-05-15 PROCEDURE — 85027 COMPLETE CBC AUTOMATED: CPT

## 2019-05-15 PROCEDURE — 6360000002 HC RX W HCPCS: Performed by: STUDENT IN AN ORGANIZED HEALTH CARE EDUCATION/TRAINING PROGRAM

## 2019-05-15 PROCEDURE — 2580000003 HC RX 258: Performed by: STUDENT IN AN ORGANIZED HEALTH CARE EDUCATION/TRAINING PROGRAM

## 2019-05-15 PROCEDURE — 85018 HEMOGLOBIN: CPT

## 2019-05-15 PROCEDURE — 99238 HOSP IP/OBS DSCHRG MGMT 30/<: CPT | Performed by: SURGERY

## 2019-05-15 RX ORDER — HYDROXYZINE PAMOATE 25 MG/1
25 CAPSULE ORAL 3 TIMES DAILY PRN
Qty: 15 CAPSULE | Refills: 0 | Status: SHIPPED | OUTPATIENT
Start: 2019-05-15 | End: 2019-05-29

## 2019-05-15 RX ORDER — PANTOPRAZOLE SODIUM 40 MG/1
40 TABLET, DELAYED RELEASE ORAL
Qty: 60 TABLET | Refills: 0 | Status: SHIPPED | OUTPATIENT
Start: 2019-05-15 | End: 2019-08-27

## 2019-05-15 RX ADMIN — Medication 10 ML: at 04:37

## 2019-05-15 RX ADMIN — PANTOPRAZOLE SODIUM 40 MG: 40 INJECTION, POWDER, FOR SOLUTION INTRAVENOUS at 08:11

## 2019-05-15 RX ADMIN — OXYCODONE HYDROCHLORIDE 10 MG: 5 TABLET ORAL at 05:30

## 2019-05-15 RX ADMIN — BUTALBITAL, ACETAMINOPHEN, AND CAFFEINE 1 TABLET: 50; 325; 40 TABLET ORAL at 09:00

## 2019-05-15 RX ADMIN — OXYCODONE HYDROCHLORIDE 10 MG: 5 TABLET ORAL at 09:47

## 2019-05-15 RX ADMIN — MULTIVITAMIN TABLET 1 TABLET: TABLET at 08:11

## 2019-05-15 RX ADMIN — SULFAMETHOXAZOLE AND TRIMETHOPRIM 1 TABLET: 800; 160 TABLET ORAL at 08:11

## 2019-05-15 RX ADMIN — Medication 10 ML: at 08:11

## 2019-05-15 RX ADMIN — SUCRALFATE 1 G: 1 TABLET ORAL at 08:11

## 2019-05-15 RX ADMIN — OXYCODONE HYDROCHLORIDE 10 MG: 5 TABLET ORAL at 00:37

## 2019-05-15 RX ADMIN — Medication 1 TABLET: at 08:10

## 2019-05-15 RX ADMIN — SUCRALFATE 1 G: 1 TABLET ORAL at 11:58

## 2019-05-15 RX ADMIN — Medication 2000 UNITS: at 08:10

## 2019-05-15 ASSESSMENT — PAIN SCALES - GENERAL
PAINLEVEL_OUTOF10: 2
PAINLEVEL_OUTOF10: 5
PAINLEVEL_OUTOF10: 0
PAINLEVEL_OUTOF10: 9
PAINLEVEL_OUTOF10: 0
PAINLEVEL_OUTOF10: 8
PAINLEVEL_OUTOF10: 10
PAINLEVEL_OUTOF10: 0
PAINLEVEL_OUTOF10: 10

## 2019-05-15 ASSESSMENT — PAIN DESCRIPTION - PAIN TYPE
TYPE: CHRONIC PAIN
TYPE: ACUTE PAIN
TYPE: ACUTE PAIN
TYPE: CHRONIC PAIN
TYPE: CHRONIC PAIN

## 2019-05-15 ASSESSMENT — PAIN DESCRIPTION - DESCRIPTORS
DESCRIPTORS: ACHING;CONSTANT;DISCOMFORT
DESCRIPTORS: ACHING
DESCRIPTORS: ACHING;DISCOMFORT
DESCRIPTORS: ACHING;CONSTANT;DISCOMFORT

## 2019-05-15 ASSESSMENT — PAIN DESCRIPTION - FREQUENCY
FREQUENCY: CONTINUOUS
FREQUENCY: CONTINUOUS
FREQUENCY: INTERMITTENT
FREQUENCY: CONTINUOUS

## 2019-05-15 ASSESSMENT — PAIN DESCRIPTION - ONSET
ONSET: GRADUAL
ONSET: ON-GOING

## 2019-05-15 ASSESSMENT — PAIN DESCRIPTION - LOCATION
LOCATION: BACK
LOCATION: BACK
LOCATION: HEAD
LOCATION: HEAD
LOCATION: BACK

## 2019-05-15 ASSESSMENT — PAIN - FUNCTIONAL ASSESSMENT
PAIN_FUNCTIONAL_ASSESSMENT: ACTIVITIES ARE NOT PREVENTED
PAIN_FUNCTIONAL_ASSESSMENT: ACTIVITIES ARE NOT PREVENTED

## 2019-05-15 ASSESSMENT — PAIN DESCRIPTION - ORIENTATION
ORIENTATION: LOWER
ORIENTATION: MID;RIGHT;LEFT
ORIENTATION: LOWER

## 2019-05-15 ASSESSMENT — PAIN DESCRIPTION - PROGRESSION
CLINICAL_PROGRESSION: NOT CHANGED
CLINICAL_PROGRESSION: GRADUALLY IMPROVING

## 2019-05-15 NOTE — PLAN OF CARE
Patient had no episodes of nausea or vomiting during shift. Patient able to drink during shift.  Matthew Holder  5/15/2019    Problem: Nausea/Vomiting:  Goal: Absence of nausea/vomiting  Description  Absence of nausea/vomiting  5/15/2019 0454 by Matthew Holder RN  Outcome: Met This Shift  5/14/2019 2117 by Russ Tolbert RN  Outcome: Met This Shift     Problem: Nausea/Vomiting:  Goal: Able to drink  Description  Able to drink  5/15/2019 0454 by Matthew Holder RN  Outcome: Met This Shift  5/14/2019 2117 by Russ Tolbert RN  Outcome: Met This Shift

## 2019-05-15 NOTE — PROGRESS NOTES
GENERAL SURGERY  ATTENDING PROGRESS NOTE        CC: GIB    Active Problems:    Epigastric pain    GI bleed due to NSAIDs    Hemorrhagic shock (HCC)    Acute cystitis without hematuria  Resolved Problems:    * No resolved hospital problems. *      S:  5/13/19 - tolerating liquids. No melena. No hematemesis. Pain well-controlled. 5/14/19 - no melena, hematochezia, no dysuria, hematuria; tolerating diet, minimal pain on meds  5/15/19 - doing well today; no complaints    O:   Vitals:    05/14/19 1645 05/14/19 1951 05/14/19 2350 05/15/19 0808   BP: (!) 153/88 (!) 159/83 (!) 159/74 (!) 155/85   Pulse: 92  81 82   Resp: 18  18 16   Temp: 98.2 °F (36.8 °C)  96.7 °F (35.9 °C) 97.1 °F (36.2 °C)   TempSrc: Temporal  Temporal Temporal   SpO2: 100%  97% 98%   Weight:       Height:           I/O last 3 completed shifts: In: 951.7 [P.O.:600; Blood:351.7]  Out: 800 [Urine:800]    Gen - no apparent distress   Neuro - Awake, alert, attentive   HEENT - NC/AT, oral mucosa moist  Lungs - non labored, on room air      Hgb = 8.6    A/P:    --GI bleed due to small gastric AVM - has stopped   -Continue ppi  --UTI   -bactrim    Discharge home today.      Jarek Agarwal MD, FACS

## 2019-05-15 NOTE — PROGRESS NOTES
[facility]   Department of Pharmacy   Pharmacist Transition of Care Services         Patient Demographics  Name:  Jessica Tierney   Medical Record Number:  [id number]  Gender:  female   Age:  57 y.o. YOB: 1970    Primary Care Physician: No primary care provider on file. Primary Care Physician phone number:  None  Readmission Risk (% from EPIC Patient List): 13%     Patient plans to participate in [facility] Meds to [facility] (Y/N): N    Pharmacist Review and Summary of Medications     Date of last reviewed/update: 05/15/19     Category Comments   New Medication Started   1. Carafate 1 g QID  2. Protonix 40 mg BID  3. Bactrim 800-160 mg BID  4. Hydroxyzine 25 mg TID PRN  5. Oxycodone 5 mg Q6H PRN  6. Lidocaine patch QD     Change in Outpatient Medication  (Dosage Form, Route,   Dose, or Frequency) 1. Discontinued Outpatient Medication   (or on Hold During Admission) 1. Other Patient was not taking ANY medications at home for years- cleaned up med list to reflect this. She wishes to keep taking multivitamin out patient- educated her that she can take a women's multivitamin, usually have some generic options for better price point. May be able to get a script covered under her insurance (depending on coverage) when she follows up with PCP. Pharmacist Patient Education:    Date  Person Educated Content of Education    05/15/19 Ethel MUNGUIA and handouts on Protonix, Carafate, Bactrim, Oxycodone, hydroxyzine       Documentation of Pharmacist Interventions and Follow-up Plan:     The following Pharmacist Transition of Care Services were completed:   [x]  Reviewed and summarized medication changes  [x]  Entire home medication list was reviewed for accuracy (sources: Patient and Outside medications tab)  [x]  Home medication list was updated or corrected (sources: Patient and Outside medications tab)    [x]  Patient education was provided on new medications  [] Patient education was provided on medication changes  [x]  Reviewed the After Visit Summary (AVS) with patient    Additional Interventions:  [x]  Inpatient prescriber was contacted and the following pharmacy recommendations        were accepted: [facility]- need for outpatient PPI prescription, not ordered on discharge. Accepted- order placed.      [] Other interventions: **        Pharmacist: Lilli Andersen PharmD, [facility]  Date:  5/15/2019 9:15 AM  Time spent counseling on medications: 10 minutes

## 2019-05-15 NOTE — PROGRESS NOTES
GENERAL SURGERY  ATTENDING PROGRESS NOTE        CC: GIB    Active Problems:    Epigastric pain    GI bleed due to NSAIDs    Hemorrhagic shock (HCC)    Acute cystitis without hematuria  Resolved Problems:    * No resolved hospital problems. *      S:  5/13/19 - tolerating liquids. No melena. No hematemesis. Pain well-controlled. 5/14/19 - no melena, hematochezia, no dysuria, hematuria; tolerating diet, minimal pain on meds    O:   Vitals:    05/14/19 1645 05/14/19 1951 05/14/19 2350 05/15/19 0808   BP: (!) 153/88 (!) 159/83 (!) 159/74 (!) 155/85   Pulse: 92  81 82   Resp: 18  18 16   Temp: 98.2 °F (36.8 °C)  96.7 °F (35.9 °C) 97.1 °F (36.2 °C)   TempSrc: Temporal  Temporal Temporal   SpO2: 100%  97% 98%   Weight:       Height:           I/O last 3 completed shifts:   In: 951.7 [P.O.:600; Blood:351.7]  Out: 800 [Urine:800]    Gen - no apparent distress   Neuro - Awake, alert, attentive   HEENT - NC/AT, oral mucosa moist  Lungs - non labored, on room air  Heart - NSR   Abdomen -  non distended, nontender  Ext -   no deformities, no edema  Skin - warm, dry    Hgb = 6.9    A/P:    --GI bleed due to small gastric AVM - has stopped   -Continue ppi   -advance diet   -transfuse one unit rbc for Hgb < 7  --UTI   -start bactrim     -transfer to gen floor - home tomorrow      Coreen Johnston MD, FACS

## 2019-05-15 NOTE — PROGRESS NOTES
CLINICAL PHARMACY: DISCHARGE MED RECONCILIATION/REVIEW    [facility]) Select Patient?: No  Total # of Interventions Recommended: 1 - New Order #: 1 PPI outpatient script. Total # Interventions Accepted: 1  Intervention Severity:   - Level 1 Intervention Present?: No   - Level 2 #: 1   - Level 3 #: 0   Time Spent (min): 60    Additional Documentation:  See previous BRANDO note for full documentation. Thank you,  Annemarie Santos.  David Pereira, PharmD 5/15/2019 12:18 PM

## 2019-05-17 ENCOUNTER — ANESTHESIA EVENT (OUTPATIENT)
Dept: ENDOSCOPY | Age: 49
DRG: 253 | End: 2019-05-17
Payer: COMMERCIAL

## 2019-05-17 ENCOUNTER — ANESTHESIA (OUTPATIENT)
Dept: ENDOSCOPY | Age: 49
DRG: 253 | End: 2019-05-17
Payer: COMMERCIAL

## 2019-05-17 ENCOUNTER — HOSPITAL ENCOUNTER (INPATIENT)
Age: 49
LOS: 1 days | Discharge: HOME | DRG: 253 | End: 2019-05-18
Attending: EMERGENCY MEDICINE | Admitting: SURGERY
Payer: COMMERCIAL

## 2019-05-17 VITALS
DIASTOLIC BLOOD PRESSURE: 70 MMHG | OXYGEN SATURATION: 100 % | SYSTOLIC BLOOD PRESSURE: 133 MMHG | RESPIRATION RATE: 16 BRPM

## 2019-05-17 DIAGNOSIS — K92.2 GASTROINTESTINAL HEMORRHAGE, UNSPECIFIED GASTROINTESTINAL HEMORRHAGE TYPE: Primary | ICD-10-CM

## 2019-05-17 LAB
ABO/RH: NORMAL
ALBUMIN SERPL-MCNC: 4 G/DL (ref 3.5–5.2)
ALP BLD-CCNC: 107 U/L (ref 35–104)
ALT SERPL-CCNC: 20 U/L (ref 0–32)
ANION GAP SERPL CALCULATED.3IONS-SCNC: 11 MMOL/L (ref 7–16)
ANTIBODY SCREEN: NORMAL
AST SERPL-CCNC: 23 U/L (ref 0–31)
BASOPHILS ABSOLUTE: 0.04 E9/L (ref 0–0.2)
BASOPHILS RELATIVE PERCENT: 0.7 % (ref 0–2)
BILIRUB SERPL-MCNC: <0.2 MG/DL (ref 0–1.2)
BUN BLDV-MCNC: 19 MG/DL (ref 6–20)
CALCIUM SERPL-MCNC: 9 MG/DL (ref 8.6–10.2)
CHLORIDE BLD-SCNC: 108 MMOL/L (ref 98–107)
CO2: 25 MMOL/L (ref 22–29)
CREAT SERPL-MCNC: 1 MG/DL (ref 0.5–1)
EOSINOPHILS ABSOLUTE: 0.2 E9/L (ref 0.05–0.5)
EOSINOPHILS RELATIVE PERCENT: 3.4 % (ref 0–6)
GFR AFRICAN AMERICAN: >60
GFR NON-AFRICAN AMERICAN: 59 ML/MIN/1.73
GLUCOSE BLD-MCNC: 98 MG/DL (ref 74–99)
HCT VFR BLD CALC: 28.9 % (ref 34–48)
HCT VFR BLD CALC: 29.7 % (ref 34–48)
HEMOGLOBIN: 9.5 G/DL (ref 11.5–15.5)
HEMOGLOBIN: 9.8 G/DL (ref 11.5–15.5)
IMMATURE GRANULOCYTES #: 0.01 E9/L
IMMATURE GRANULOCYTES %: 0.2 % (ref 0–5)
INR BLD: 0.9
LACTIC ACID: 1.2 MMOL/L (ref 0.5–2.2)
LYMPHOCYTES ABSOLUTE: 1.02 E9/L (ref 1.5–4)
LYMPHOCYTES RELATIVE PERCENT: 17.3 % (ref 20–42)
MCH RBC QN AUTO: 29.6 PG (ref 26–35)
MCHC RBC AUTO-ENTMCNC: 33 % (ref 32–34.5)
MCV RBC AUTO: 89.7 FL (ref 80–99.9)
MONOCYTES ABSOLUTE: 0.45 E9/L (ref 0.1–0.95)
MONOCYTES RELATIVE PERCENT: 7.7 % (ref 2–12)
NEUTROPHILS ABSOLUTE: 4.16 E9/L (ref 1.8–7.3)
NEUTROPHILS RELATIVE PERCENT: 70.7 % (ref 43–80)
PDW BLD-RTO: 14.8 FL (ref 11.5–15)
PLATELET # BLD: 213 E9/L (ref 130–450)
PMV BLD AUTO: 10.5 FL (ref 7–12)
POTASSIUM SERPL-SCNC: 4.2 MMOL/L (ref 3.5–5)
PROTHROMBIN TIME: 10.6 SEC (ref 9.3–12.4)
RBC # BLD: 3.31 E12/L (ref 3.5–5.5)
SODIUM BLD-SCNC: 144 MMOL/L (ref 132–146)
TOTAL PROTEIN: 6.9 G/DL (ref 6.4–8.3)
WBC # BLD: 5.9 E9/L (ref 4.5–11.5)

## 2019-05-17 PROCEDURE — 85610 PROTHROMBIN TIME: CPT

## 2019-05-17 PROCEDURE — 6370000000 HC RX 637 (ALT 250 FOR IP): Performed by: SURGERY

## 2019-05-17 PROCEDURE — 6370000000 HC RX 637 (ALT 250 FOR IP): Performed by: STUDENT IN AN ORGANIZED HEALTH CARE EDUCATION/TRAINING PROGRAM

## 2019-05-17 PROCEDURE — 86850 RBC ANTIBODY SCREEN: CPT

## 2019-05-17 PROCEDURE — 86900 BLOOD TYPING SEROLOGIC ABO: CPT

## 2019-05-17 PROCEDURE — 83605 ASSAY OF LACTIC ACID: CPT

## 2019-05-17 PROCEDURE — 0DJ08ZZ INSPECTION OF UPPER INTESTINAL TRACT, VIA NATURAL OR ARTIFICIAL OPENING ENDOSCOPIC: ICD-10-PCS | Performed by: STUDENT IN AN ORGANIZED HEALTH CARE EDUCATION/TRAINING PROGRAM

## 2019-05-17 PROCEDURE — 2580000003 HC RX 258: Performed by: STUDENT IN AN ORGANIZED HEALTH CARE EDUCATION/TRAINING PROGRAM

## 2019-05-17 PROCEDURE — 7100000000 HC PACU RECOVERY - FIRST 15 MIN: Performed by: SURGERY

## 2019-05-17 PROCEDURE — 1200000000 HC SEMI PRIVATE

## 2019-05-17 PROCEDURE — 2580000003 HC RX 258: Performed by: NURSE ANESTHETIST, CERTIFIED REGISTERED

## 2019-05-17 PROCEDURE — 85025 COMPLETE CBC W/AUTO DIFF WBC: CPT

## 2019-05-17 PROCEDURE — 3609017100 HC EGD: Performed by: SURGERY

## 2019-05-17 PROCEDURE — 7100000001 HC PACU RECOVERY - ADDTL 15 MIN: Performed by: SURGERY

## 2019-05-17 PROCEDURE — 85014 HEMATOCRIT: CPT

## 2019-05-17 PROCEDURE — 96374 THER/PROPH/DIAG INJ IV PUSH: CPT

## 2019-05-17 PROCEDURE — 6360000002 HC RX W HCPCS: Performed by: EMERGENCY MEDICINE

## 2019-05-17 PROCEDURE — 36415 COLL VENOUS BLD VENIPUNCTURE: CPT

## 2019-05-17 PROCEDURE — 86901 BLOOD TYPING SEROLOGIC RH(D): CPT

## 2019-05-17 PROCEDURE — 85018 HEMOGLOBIN: CPT

## 2019-05-17 PROCEDURE — 99284 EMERGENCY DEPT VISIT MOD MDM: CPT

## 2019-05-17 PROCEDURE — C9113 INJ PANTOPRAZOLE SODIUM, VIA: HCPCS | Performed by: STUDENT IN AN ORGANIZED HEALTH CARE EDUCATION/TRAINING PROGRAM

## 2019-05-17 PROCEDURE — 2709999900 HC NON-CHARGEABLE SUPPLY: Performed by: SURGERY

## 2019-05-17 PROCEDURE — 3700000001 HC ADD 15 MINUTES (ANESTHESIA): Performed by: SURGERY

## 2019-05-17 PROCEDURE — 6360000002 HC RX W HCPCS: Performed by: STUDENT IN AN ORGANIZED HEALTH CARE EDUCATION/TRAINING PROGRAM

## 2019-05-17 PROCEDURE — 3700000000 HC ANESTHESIA ATTENDED CARE: Performed by: SURGERY

## 2019-05-17 PROCEDURE — 80053 COMPREHEN METABOLIC PANEL: CPT

## 2019-05-17 PROCEDURE — 6360000002 HC RX W HCPCS: Performed by: NURSE ANESTHETIST, CERTIFIED REGISTERED

## 2019-05-17 PROCEDURE — 43235 EGD DIAGNOSTIC BRUSH WASH: CPT | Performed by: SURGERY

## 2019-05-17 RX ORDER — ONDANSETRON 2 MG/ML
4 INJECTION INTRAMUSCULAR; INTRAVENOUS EVERY 6 HOURS PRN
Status: DISCONTINUED | OUTPATIENT
Start: 2019-05-17 | End: 2019-05-18 | Stop reason: HOSPADM

## 2019-05-17 RX ORDER — 0.9 % SODIUM CHLORIDE 0.9 %
10 VIAL (ML) INJECTION DAILY
Status: DISCONTINUED | OUTPATIENT
Start: 2019-05-17 | End: 2019-05-18 | Stop reason: HOSPADM

## 2019-05-17 RX ORDER — PROPOFOL 10 MG/ML
INJECTION, EMULSION INTRAVENOUS PRN
Status: DISCONTINUED | OUTPATIENT
Start: 2019-05-17 | End: 2019-05-17 | Stop reason: SDUPTHER

## 2019-05-17 RX ORDER — SODIUM CHLORIDE 0.9 % (FLUSH) 0.9 %
10 SYRINGE (ML) INJECTION EVERY 12 HOURS SCHEDULED
Status: DISCONTINUED | OUTPATIENT
Start: 2019-05-17 | End: 2019-05-18 | Stop reason: HOSPADM

## 2019-05-17 RX ORDER — PANTOPRAZOLE SODIUM 40 MG/10ML
40 INJECTION, POWDER, LYOPHILIZED, FOR SOLUTION INTRAVENOUS 2 TIMES DAILY
Status: DISCONTINUED | OUTPATIENT
Start: 2019-05-17 | End: 2019-05-18 | Stop reason: HOSPADM

## 2019-05-17 RX ORDER — HYDROXYZINE PAMOATE 25 MG/1
25 CAPSULE ORAL 3 TIMES DAILY PRN
Status: DISCONTINUED | OUTPATIENT
Start: 2019-05-17 | End: 2019-05-18 | Stop reason: HOSPADM

## 2019-05-17 RX ORDER — ACETAMINOPHEN 325 MG/1
650 TABLET ORAL EVERY 4 HOURS PRN
Status: DISCONTINUED | OUTPATIENT
Start: 2019-05-17 | End: 2019-05-18 | Stop reason: HOSPADM

## 2019-05-17 RX ORDER — OXYCODONE HYDROCHLORIDE 5 MG/1
5 TABLET ORAL EVERY 6 HOURS PRN
Status: DISCONTINUED | OUTPATIENT
Start: 2019-05-17 | End: 2019-05-18 | Stop reason: HOSPADM

## 2019-05-17 RX ORDER — SIMETHICONE 80 MG
80 TABLET,CHEWABLE ORAL EVERY 6 HOURS PRN
Status: DISCONTINUED | OUTPATIENT
Start: 2019-05-17 | End: 2019-05-18 | Stop reason: HOSPADM

## 2019-05-17 RX ORDER — SODIUM CHLORIDE 0.9 % (FLUSH) 0.9 %
10 SYRINGE (ML) INJECTION PRN
Status: DISCONTINUED | OUTPATIENT
Start: 2019-05-17 | End: 2019-05-18 | Stop reason: HOSPADM

## 2019-05-17 RX ORDER — MELATON/B.COHOSH/VALERIAN/HOPS 3 MG-40 MG
1 CAPSULE ORAL DAILY
COMMUNITY
End: 2019-08-27

## 2019-05-17 RX ORDER — SODIUM CHLORIDE, SODIUM LACTATE, POTASSIUM CHLORIDE, CALCIUM CHLORIDE 600; 310; 30; 20 MG/100ML; MG/100ML; MG/100ML; MG/100ML
INJECTION, SOLUTION INTRAVENOUS CONTINUOUS
Status: DISCONTINUED | OUTPATIENT
Start: 2019-05-17 | End: 2019-05-18

## 2019-05-17 RX ORDER — FENTANYL CITRATE 50 UG/ML
INJECTION, SOLUTION INTRAMUSCULAR; INTRAVENOUS PRN
Status: DISCONTINUED | OUTPATIENT
Start: 2019-05-17 | End: 2019-05-17 | Stop reason: SDUPTHER

## 2019-05-17 RX ORDER — SUCRALFATE 1 G/1
1 TABLET ORAL
Status: DISCONTINUED | OUTPATIENT
Start: 2019-05-17 | End: 2019-05-18 | Stop reason: HOSPADM

## 2019-05-17 RX ORDER — LIDOCAINE 4 G/G
1 PATCH TOPICAL DAILY
Status: DISCONTINUED | OUTPATIENT
Start: 2019-05-17 | End: 2019-05-18 | Stop reason: HOSPADM

## 2019-05-17 RX ORDER — MULTIVIT,CALC,MINS/IRON/FOLIC 500-18-0.4
1 TABLET ORAL DAILY
COMMUNITY
End: 2019-08-27

## 2019-05-17 RX ORDER — MIDAZOLAM HYDROCHLORIDE 1 MG/ML
INJECTION INTRAMUSCULAR; INTRAVENOUS PRN
Status: DISCONTINUED | OUTPATIENT
Start: 2019-05-17 | End: 2019-05-17 | Stop reason: SDUPTHER

## 2019-05-17 RX ORDER — FENTANYL CITRATE 50 UG/ML
50 INJECTION, SOLUTION INTRAMUSCULAR; INTRAVENOUS ONCE
Status: COMPLETED | OUTPATIENT
Start: 2019-05-17 | End: 2019-05-17

## 2019-05-17 RX ORDER — SODIUM CHLORIDE 9 MG/ML
INJECTION, SOLUTION INTRAVENOUS CONTINUOUS PRN
Status: DISCONTINUED | OUTPATIENT
Start: 2019-05-17 | End: 2019-05-17 | Stop reason: SDUPTHER

## 2019-05-17 RX ADMIN — FENTANYL CITRATE 50 MCG: 50 INJECTION, SOLUTION INTRAMUSCULAR; INTRAVENOUS at 07:52

## 2019-05-17 RX ADMIN — SUCRALFATE 1 G: 1 TABLET ORAL at 16:16

## 2019-05-17 RX ADMIN — SUCRALFATE 1 G: 1 TABLET ORAL at 21:23

## 2019-05-17 RX ADMIN — SUCRALFATE 1 G: 1 TABLET ORAL at 13:29

## 2019-05-17 RX ADMIN — OXYCODONE HYDROCHLORIDE 5 MG: 5 TABLET ORAL at 13:45

## 2019-05-17 RX ADMIN — SODIUM CHLORIDE: 9 INJECTION, SOLUTION INTRAVENOUS at 10:28

## 2019-05-17 RX ADMIN — Medication 10 ML: at 13:28

## 2019-05-17 RX ADMIN — OXYCODONE HYDROCHLORIDE 5 MG: 5 TABLET ORAL at 21:23

## 2019-05-17 RX ADMIN — SIMETHICONE CHEW TAB 80 MG 80 MG: 80 TABLET ORAL at 11:45

## 2019-05-17 RX ADMIN — SODIUM CHLORIDE, POTASSIUM CHLORIDE, SODIUM LACTATE AND CALCIUM CHLORIDE: 600; 310; 30; 20 INJECTION, SOLUTION INTRAVENOUS at 13:29

## 2019-05-17 RX ADMIN — Medication 10 ML: at 21:24

## 2019-05-17 RX ADMIN — PROPOFOL 160 MG: 10 INJECTION, EMULSION INTRAVENOUS at 10:32

## 2019-05-17 RX ADMIN — SIMETHICONE CHEW TAB 80 MG 80 MG: 80 TABLET ORAL at 17:39

## 2019-05-17 RX ADMIN — MIDAZOLAM HYDROCHLORIDE 2 MG: 1 INJECTION, SOLUTION INTRAMUSCULAR; INTRAVENOUS at 10:29

## 2019-05-17 RX ADMIN — FENTANYL CITRATE 100 MCG: 50 INJECTION, SOLUTION INTRAMUSCULAR; INTRAVENOUS at 10:30

## 2019-05-17 RX ADMIN — PANTOPRAZOLE SODIUM 40 MG: 40 INJECTION, POWDER, FOR SOLUTION INTRAVENOUS at 13:28

## 2019-05-17 RX ADMIN — PANTOPRAZOLE SODIUM 40 MG: 40 INJECTION, POWDER, FOR SOLUTION INTRAVENOUS at 21:23

## 2019-05-17 ASSESSMENT — PAIN DESCRIPTION - LOCATION
LOCATION: ABDOMEN
LOCATION: ABDOMEN;BACK
LOCATION: ABDOMEN;BACK
LOCATION: ABDOMEN
LOCATION: ABDOMEN

## 2019-05-17 ASSESSMENT — PAIN SCALES - GENERAL
PAINLEVEL_OUTOF10: 9
PAINLEVEL_OUTOF10: 7
PAINLEVEL_OUTOF10: 9
PAINLEVEL_OUTOF10: 4
PAINLEVEL_OUTOF10: 4
PAINLEVEL_OUTOF10: 8
PAINLEVEL_OUTOF10: 6
PAINLEVEL_OUTOF10: 5
PAINLEVEL_OUTOF10: 10

## 2019-05-17 ASSESSMENT — PAIN DESCRIPTION - ORIENTATION
ORIENTATION: LOWER
ORIENTATION: MID;LOWER
ORIENTATION: LOWER

## 2019-05-17 ASSESSMENT — PAIN DESCRIPTION - DESCRIPTORS
DESCRIPTORS: CRAMPING
DESCRIPTORS: CONSTANT;DISCOMFORT
DESCRIPTORS: CRAMPING

## 2019-05-17 ASSESSMENT — PAIN DESCRIPTION - PAIN TYPE
TYPE: ACUTE PAIN
TYPE: ACUTE PAIN
TYPE: ACUTE PAIN;CHRONIC PAIN
TYPE: ACUTE PAIN;CHRONIC PAIN

## 2019-05-17 ASSESSMENT — LIFESTYLE VARIABLES: SMOKING_STATUS: 1

## 2019-05-17 ASSESSMENT — PAIN DESCRIPTION - PROGRESSION: CLINICAL_PROGRESSION: GRADUALLY IMPROVING

## 2019-05-17 NOTE — ED PROVIDER NOTES
HPI:  5/17/19, Time: 6:43 AM         Uday Damon is a 57 y.o. female presenting to the ED for rectal bleeding. Patient says she's had dark stools since last night. She is on no anticoagulation. She was recently admitted for hemorrhagic shock after a GI bleed. She says she received several units of blood during that admission. She denies any fever, chills, nausea, vomiting, hematemesis, change in bladder habits, or any other symptoms or complaints. Review of Systems:   Pertinent positives and negatives are stated within HPI, all other systems reviewed and are negative.          --------------------------------------------- PAST HISTORY ---------------------------------------------  Past Medical History:  has a past medical history of Anorexia, Arthritis of knee, Back pain, chronic, Chronic pain, H/O gastric bypass, Hypertension, Hypertension, Metabolic acidosis, Ovarian cyst, and [facility] Past Surgical History:  has a past surgical history that includes Jeannine-en-Y Gastric Bypass (12/2/2008); Endometrial ablation (2003); Tonsillectomy (1980); Cholecystectomy, laparoscopic (9/19/1995); Upper gastrointestinal endoscopy (10/3/2008); Upper gastrointestinal endoscopy (1/9/2009); Upper gastrointestinal endoscopy (2/4/2009); laparoscopy (2/12/2009); Upper gastrointestinal endoscopy (3/6/2009); Upper gastrointestinal endoscopy (6/4/2009); Upper gastrointestinal endoscopy (7/2/2009); Upper gastrointestinal endoscopy (10/27/2009); Upper gastrointestinal endoscopy (1/4/2010); Upper gastrointestinal endoscopy (6/7/2010); Stomach surgery (6/25/2010); Upper gastrointestinal endoscopy (7/30/2010); other surgical history (12/28/11); Gastric bypass surgery; hernia repair; Hysterectomy (Left, 2/5/2013); Upper gastrointestinal endoscopy (N/A, 5/10/2019); and Upper gastrointestinal endoscopy (N/A, 5/12/2019). Social History:  reports that she quit smoking 7 days ago. Her smoking use included cigarettes.  She has a 1.00 pack-year smoking history. She has never used smokeless tobacco. She reports that she has current or past drug history. Drug: Marijuana. She reports that she does not drink alcohol. Family History: family history is not on file. The patients home medications have been reviewed.     Allergies: Ultram [tramadol]    -------------------------------------------------- RESULTS -------------------------------------------------  All laboratory and radiology results have been personally reviewed by myself   LABS:  Results for orders placed or performed during the hospital encounter of 05/17/19   CBC Auto Differential   Result Value Ref Range    WBC 5.9 4.5 - 11.5 E9/L    RBC 3.31 (L) 3.50 - 5.50 E12/L    Hemoglobin 9.8 (L) 11.5 - 15.5 g/dL    Hematocrit 29.7 (L) 34.0 - 48.0 %    MCV 89.7 80.0 - 99.9 fL    MCH 29.6 26.0 - 35.0 pg    MCHC 33.0 32.0 - 34.5 %    RDW 14.8 11.5 - 15.0 fL    Platelets 280 599 - 561 E9/L    MPV 10.5 7.0 - 12.0 fL    Neutrophils % 70.7 43.0 - 80.0 %    Immature Granulocytes % 0.2 0.0 - 5.0 %    Lymphocytes % 17.3 (L) 20.0 - 42.0 %    Monocytes % 7.7 2.0 - 12.0 %    Eosinophils % 3.4 0.0 - 6.0 %    Basophils % 0.7 0.0 - 2.0 %    Neutrophils # 4.16 1.80 - 7.30 E9/L    Immature Granulocytes # 0.01 E9/L    Lymphocytes # 1.02 (L) 1.50 - 4.00 E9/L    Monocytes # 0.45 0.10 - 0.95 E9/L    Eosinophils # 0.20 0.05 - 0.50 E9/L    Basophils # 0.04 0.00 - 0.20 E9/L   Comprehensive Metabolic Panel   Result Value Ref Range    Sodium 144 132 - 146 mmol/L    Potassium 4.2 3.5 - 5.0 mmol/L    Chloride 108 (H) 98 - 107 mmol/L    CO2 25 22 - 29 mmol/L    Anion Gap 11 7 - 16 mmol/L    Glucose 98 74 - 99 mg/dL    BUN 19 6 - 20 mg/dL    CREATININE 1.0 0.5 - 1.0 mg/dL    GFR Non-African American 59 >=60 mL/min/1.73    GFR African American >60     Calcium 9.0 8.6 - 10.2 mg/dL    Total Protein 6.9 6.4 - 8.3 g/dL    Alb 4.0 3.5 - 5.2 g/dL    Total Bilirubin <0.2 0.0 - 1.2 mg/dL    Alkaline Phosphatase 107 (H) 35 - prognosis. Questions are answered at this time and they are agreeable with the plan.      --------------------------------- IMPRESSION AND DISPOSITION ---------------------------------    IMPRESSION  1. Gastrointestinal hemorrhage, unspecified gastrointestinal hemorrhage type        DISPOSITION  Disposition: Pending  Patient condition is stable      NOTE: This report was transcribed using voice recognition software.  Every effort was made to ensure accuracy; however, inadvertent computerized transcription errors may be present        Aline Tran MD  05/17/19 3861

## 2019-05-17 NOTE — ANESTHESIA PRE PROCEDURE
Department of Anesthesiology  Preprocedure Note       Name:  Kaye Preciado   Age:  57 y.o.  :  1970                                          MRN:  [id number]         Date:  2019      Surgeon: Nohemi Lugo):  Carolina Benavidez MD    Procedure: EGD ESOPHAGOGASTRODUODENOSCOPY (N/A )    Medications prior to admission:   Prior to Admission medications    Medication Sig Start Date End Date Taking? Authorizing Provider   Multiple Vitamins-Calcium (ONE-A-DAY WOMENS FORMULA) TABS Take 1 tablet by mouth daily    Historical Provider, MD   Black Cohosh-SoyIsoflav-Magnol (ESTROVEN MENOPAUSE RELIEF) CAPS Take 1 capsule by mouth daily    Historical Provider, MD   hydrOXYzine (VISTARIL) 25 MG capsule Take 1 capsule by mouth 3 times daily as needed for Itching or Anxiety 5/15/19 5/29/19  Carolina Benavidez MD   pantoprazole (PROTONIX) 40 MG tablet Take 1 tablet by mouth 2 times daily (before meals) 5/15/19   Terry Hart MD   lidocaine 4 % external patch Place 1 patch onto the skin daily 5/15/19 6/14/19  Terry Hart MD   sucralfate (CARAFATE) 1 GM tablet Take 1 tablet by mouth 4 times daily (with meals and nightly) 19   Terry Hart MD   oxyCODONE (ROXICODONE) 5 MG immediate release tablet Take 1 tablet by mouth every 6 hours as needed for Pain for up to 5 days. 19  Terry Hart MD       Current medications:    No current outpatient medications on file. No current facility-administered medications for this visit. Allergies:     Allergies   Allergen Reactions    Ultram [Tramadol] Other (See Comments)     Pt states she had a seizure after taking ultram       Problem List:    Patient Active Problem List   Diagnosis Code    Abdominal tenderness, LLQ (left lower quadrant) R10.814    Benign essential hypertension I10    Postoperative anemia due to acute blood loss P09    Metabolic acidosis F52.4    Chronic pain G89.29    Anorexia R63.0    H/O gastric bypass Z98.84    Abdominal pain R10.9    Back pain M54.9    Encounter for palliative care Z51.5    Hypertension I10    Epigastric pain R10.13    GI bleed due to NSAIDs K92.2, T39.395A    Hemorrhagic shock (HCC) R57.8    Gastrointestinal hemorrhage associated with gastric ulcer K25.4    Acute cystitis without hematuria N30.00    GIB (gastrointestinal bleeding) K92.2       Past Medical History:        Diagnosis Date    Anorexia 6/1/2015    Arthritis of knee     Back pain, chronic     Chronic pain 6/1/2015    H/O gastric bypass 6/1/2015    Hypertension 1/1/2012    Hypertension 2/0/4584    Metabolic acidosis 9/1/0242    Ovarian cyst     Seizures ([address]        Past Surgical History:        Procedure Laterality Date    [facility]LECYST[facility]19/1995    [facility]    [facility]  [facility]    [facility]    GASTRIC BYPASS SURGERY      HERNIA REPAIR      umbil    HYSTERECTOMY Left 2/5/2013    Laparotomy;HUGO;JOSUÉ:LSO    LAPAROSCOPY  2/12/2009    lap assisted percutaneous ERCP and removal CBD stone and gastrostomy, [address]  12/28/11    exp lap, hugo, rt so/ repair hernia    EMILY-EN-Y GASTRIC BYPASS  12/2/2008    laparoscopic RYGB, Dr. Jose Jarvis, [facility]  6/25/2010    resection of jejunal ulcer, reversal gastric bypass, jejunoduodenostomy, feeding jejunostomy, Dr. Jose Jarvis, [address]  10/3/2008    mild gastritis & duodenitis, Dr. Jose Jarvis, [address] ENDOSCOPY  1/9/2009    multiple severe anastomotic jejunal ulcers, Dr. Jose Jarvis.  [facility]    UPPER GASTROINTESTINAL ENDOSCOPY  2/4/2009    ulcers healed, normal, Dr. Melquiades Shelton, [address] ENDOSCOPY  3/6/2009    recurrent severe anastomotic jejunal ulcers, Dr. Jose Jarvis, [address] ENDOSCOPY  6/4/2009    severe anastomotic jejunal ulcers, Dr. Jose Jarvis, [facility] 05/17/2019    CO2 25 05/17/2019    BUN 19 05/17/2019    CREATININE 1.0 05/17/2019    GFRAA >60 05/17/2019    LABGLOM 59 05/17/2019    GLUCOSE 98 05/17/2019    GLUCOSE 83 04/28/2012    PROT 6.9 05/17/2019    CALCIUM 9.0 05/17/2019    BILITOT <0.2 05/17/2019    ALKPHOS 107 05/17/2019    AST 23 05/17/2019    ALT 20 05/17/2019       POC Tests: No results for input(s): POCGLU, POCNA, POCK, POCCL, POCBUN, POCHEMO, POCHCT in the last 72 hours. Coags:   Lab Results   Component Value Date    PROTIME 10.6 05/17/2019    PROTIME 12.6 02/24/2012    INR 0.9 05/17/2019    APTT 33.4 05/09/2019       HCG (If Applicable):   Lab Results   Component Value Date    PREGTESTUR NEG 07/25/2018    PREGSERUM NEGATIVE 02/24/2012        ABGs: No results found for: PHART, PO2ART, YPT6BIP, ZWS7QCW, BEART, Z9WEDPIS     Type & Screen (If Applicable):  Lab Results   Component Value Date    LABABO A 12/28/2011    [address] 12/28/2011         EKG 05/09/2019    Normal sinus rhythm  Normal ECG  No previous ECGs available    Chest xray 05/09/2019    FINDINGS:       The heart is normal in size.  No focal airspace opacity. There is no   pleural effusion. There is no pneumothorax. No free air beneath   diaphragm.           Impression       No airspace opacities or pleural effusion. Anesthesia Evaluation  Patient summary reviewed and Nursing notes reviewed no history of anesthetic complications:   Airway: Mallampati: II  TM distance: >3 FB   Neck ROM: full  Mouth opening: > = 3 FB Dental:    (+) edentulous      Pulmonary: breath sounds clear to auscultation  (+) current smoker                           Cardiovascular:    (+) hypertension:,       ECG reviewed  Rhythm: regular  Rate: normal                    Neuro/Psych:   (+) seizures (Hx: r/t tramadol, last one in 2000):,              ROS comment: Hx: chronic back pain GI/Hepatic/Renal:   (+) PUD,      Renal disease: peachman. ROS comment: HX: GASTRIC BYPASS  Anemia  Continued GI bleed. Endo/Other:    (+) blood dyscrasia: anemia, arthritis:., .                 Abdominal:           Vascular: negative vascular ROS. Anesthesia Plan      MAC     ASA 3       Induction: intravenous. Anesthetic plan and risks discussed with patient. Use of blood products discussed with patient whom consented to blood products. Plan discussed with CRNA and attending.                   DA Stern - CAS   5/17/2019

## 2019-05-17 NOTE — OP NOTE
Brenda Amber       DATE OF PROCEDURE: 5/17/2019     SURGEON: Dr. Jerilyn Cowan MD     ASSISTANT: Lydia Sheriff MD PGY 1     PREOPERATIVE DIAGNOSES:   Anemia, GIB, s/p-reversal of RNYGB     POSTOPERATIVE DIAGNOSES: same     OPERATION:    EGD esophagogastroduodenoscopy                  ANESTHESIA: LMAC     CONSENT AND INDICATIONS:  This is a 57y.o. year old female who is having melena and concern GIB. We have discussed with the patient and/or the patient representative the indication, alternatives, and the possible risks and/or complications of the planned procedure and the anesthesia methods. The patient and/or patient representative appear to understand and agree to proceed. Complications: none     OPERATIONS: The patient was placed on the table and sedated via LMAC. Bite block was placed. A lubricated scope was easily passed into the upper esophagus which looked normal. The distal esophagus looked normal. The scope was passed into the stomach and retroflexed. There was no hiatal hernia. The scope was passed down toward the gastrojejunostomy, there was no blood or clot noted. Some retained food was noted. The stomach was copiously irrigated and suctioned. The previously described areas of small gastric AVMs were not noted. The gastrojejunostomy limbs were intubated and there was no blood within the limbs. The scope was passed into the jejunum and no masses or ulcers were seen. The patient tolerated the procedure well. Dr. Riana Steinberg was present for the entire procedure.      Electronically signed by Lydia Sheriff MD on 5/17/2019 at 11:02 AM

## 2019-05-17 NOTE — ED NOTES
Pt pain re-evaluated, states its a 4 and is gradually improving. Cramping from abdomen down to legs.       Susy Villegas RN  05/17/19 7191

## 2019-05-17 NOTE — ED NOTES
Dr Crissy Patron called and asked to keep pt in ED for OR at 10:15. Informed pt of OR time.      Geronimo Pérez RN  05/17/19 6468

## 2019-05-17 NOTE — H&P
GENERAL SURGERY  HISTORY AND PHYSICAL NOTE  5/17/2019    [facility]  Toy Wild is a 57 y.o. female who presents for evaluation of abdominal cramps and melanic stool. Patient was discharged two days ago after a GIB. She underwent two EGD's (5/10 and 5/12) which at first demonstrated a large clot in distal stomach and the second demonstrated some gastric AVMs. She was discharged home and had some brown stool, she then noted last night she woke up with some lower abdominal cramping and had a black bowel movement. Her hgb was 9.8 and on discharge was 8.3. She denies taking NSAIDs since discharge. Past Medical History:   Diagnosis Date    Anorexia 6/1/2015    Arthritis of knee     Back pain, chronic     Chronic pain 6/1/2015    H/O gastric bypass 6/1/2015    Hypertension 1/1/2012    Hypertension 7/0/3013    Metabolic acidosis 1/7/5426    Ovarian cyst     Seizures ([address]        Past Surgical History:   Procedure Laterality Date    CHOLECYSTECTOMY, [facility]AROSCOPIC  9/19/1995    [facility]    ENDOMETRIAL ABLATION  2003    [facility]    GASTRIC BYPASS SURGERY      HERNIA REPAIR      umbil    HYSTERECTOMY Left 2/5/2013    Laparotomy;HUGO;JOSUÉ:LSO    LAPAROSCOPY  2/12/2009    lap assisted percutaneous ERCP and removal CBD stone and gastrostomy, [address]  12/28/11    exp lap, hugo, rt so/ repair hernia    EMILY-EN-Y GASTRIC BYPASS  12/2/2008    laparoscopic RYGB, Dr. Nicolette Wild, [address]   6/25/2010    resection of jejunal ulcer, reversal gastric bypass, jejunoduodenostomy, feeding jejunostomy, Dr. Nicolette Wild, [address]  10/3/2008    mild gastritis & duodenitis, Dr. Nicolette Wild, [address] ENDOSCOPY  1/9/2009    multiple severe anastomotic jejunal ulcers, Dr. Nicolette Wild.  [facility]    UPPER GASTROINTESTINAL ENDOSCOPY  2/4/2009    ulcers healed, normal, Dr. Alberto Rodriguez, [address] History     Tobacco Use    Smoking status: Former Smoker     Packs/day: 0.50     Years: 2.00     Pack years: 1.00     Types: Cigarettes     Last attempt to quit: 5/10/2019     Years since quittin.0    Smokeless tobacco: Never Used   Substance Use Topics    Alcohol use: No     Alcohol/week: 0.0 oz    Drug use: Yes     Types: Marijuana         Review of Systems   General ROS: positive for  - fatigue  Hematological and Lymphatic ROS: negative  Respiratory ROS: negative  Cardiovascular ROS: negative  Gastrointestinal ROS: positive for - abdominal pain and melena  Genito-Urinary ROS: negative  Musculoskeletal ROS: negative      PHYSICAL EXAM:    Vitals:    19 0518   BP: 135/87   Pulse: 100   Resp: 16   Temp: 96.9 °F (36.1 °C)   SpO2: 99%       General Appearance:  awake, alert, oriented, in no acute distress  Skin:  Skin color, texture, turgor normal. No rashes or lesions. Head/face:  NCAT  Eyes:  No gross abnormalities. Lungs:  No increased work of breathing   Heart:  RR  Abdomen:  Soft, minimally tender in lower abdomen, non-distended   Extremities: Extremities warm to touch, pink, with no edema. Female Rectal: No hemorrhoids, normal rectal tone, no masses. Stool assess: Consistency- soft and melanotic and Guiac positive    LABS:  CBC  Recent Labs     19  0530   WBC 5.9   HGB 9.8*   HCT 29.7*        BMP  Recent Labs     19  0530      K 4.2   *   CO2 25   BUN 19   CREATININE 1.0   CALCIUM 9.0     Liver Function  Recent Labs     19  0530   BILITOT <0.2   AST 23   ALT 20   ALKPHOS 107*   PROT 6.9   LABALBU 4.0     No results for input(s): LACTATE in the last 72 hours. Recent Labs     19  0530   INR 0.9       RADIOLOGY  No results found.       ASSESSMENT:  57 y.o. female with melanic stool with concern for UGIB    PLAN:  Admit to tele  Will arrange for EGD today   Pain and nausea control prn  Trend Hgb q6 horus -- Transfuse for Hb <7 or symptomatic  IV PPI

## 2019-05-18 VITALS
HEIGHT: 64 IN | BODY MASS INDEX: 28.51 KG/M2 | HEART RATE: 75 BPM | RESPIRATION RATE: 18 BRPM | WEIGHT: 167 LBS | SYSTOLIC BLOOD PRESSURE: 143 MMHG | DIASTOLIC BLOOD PRESSURE: 88 MMHG | OXYGEN SATURATION: 97 % | TEMPERATURE: 97.5 F

## 2019-05-18 LAB
ALBUMIN SERPL-MCNC: 3.6 G/DL (ref 3.5–5.2)
ALP BLD-CCNC: 110 U/L (ref 35–104)
ALT SERPL-CCNC: 19 U/L (ref 0–32)
ANION GAP SERPL CALCULATED.3IONS-SCNC: 13 MMOL/L (ref 7–16)
AST SERPL-CCNC: 32 U/L (ref 0–31)
BASOPHILS ABSOLUTE: 0.02 E9/L (ref 0–0.2)
BASOPHILS RELATIVE PERCENT: 0.4 % (ref 0–2)
BILIRUB SERPL-MCNC: 0.4 MG/DL (ref 0–1.2)
BUN BLDV-MCNC: 12 MG/DL (ref 6–20)
CALCIUM SERPL-MCNC: 9 MG/DL (ref 8.6–10.2)
CHLORIDE BLD-SCNC: 104 MMOL/L (ref 98–107)
CO2: 21 MMOL/L (ref 22–29)
CREAT SERPL-MCNC: 0.8 MG/DL (ref 0.5–1)
EOSINOPHILS ABSOLUTE: 0.12 E9/L (ref 0.05–0.5)
EOSINOPHILS RELATIVE PERCENT: 2.5 % (ref 0–6)
GFR AFRICAN AMERICAN: >60
GFR NON-AFRICAN AMERICAN: >60 ML/MIN/1.73
GLUCOSE BLD-MCNC: 117 MG/DL (ref 74–99)
HCT VFR BLD CALC: 32.9 % (ref 34–48)
HEMOGLOBIN: 10.4 G/DL (ref 11.5–15.5)
IMMATURE GRANULOCYTES #: 0.01 E9/L
IMMATURE GRANULOCYTES %: 0.2 % (ref 0–5)
LYMPHOCYTES ABSOLUTE: 1.32 E9/L (ref 1.5–4)
LYMPHOCYTES RELATIVE PERCENT: 27.9 % (ref 20–42)
MCH RBC QN AUTO: 29.2 PG (ref 26–35)
MCHC RBC AUTO-ENTMCNC: 31.6 % (ref 32–34.5)
MCV RBC AUTO: 92.4 FL (ref 80–99.9)
MONOCYTES ABSOLUTE: 0.32 E9/L (ref 0.1–0.95)
MONOCYTES RELATIVE PERCENT: 6.8 % (ref 2–12)
NEUTROPHILS ABSOLUTE: 2.94 E9/L (ref 1.8–7.3)
NEUTROPHILS RELATIVE PERCENT: 62.2 % (ref 43–80)
PDW BLD-RTO: 14.4 FL (ref 11.5–15)
PLATELET # BLD: 199 E9/L (ref 130–450)
PMV BLD AUTO: 11.5 FL (ref 7–12)
POTASSIUM REFLEX MAGNESIUM: 4.4 MMOL/L (ref 3.5–5)
RBC # BLD: 3.56 E12/L (ref 3.5–5.5)
SODIUM BLD-SCNC: 138 MMOL/L (ref 132–146)
TOTAL PROTEIN: 6.8 G/DL (ref 6.4–8.3)
WBC # BLD: 4.7 E9/L (ref 4.5–11.5)

## 2019-05-18 PROCEDURE — 99238 HOSP IP/OBS DSCHRG MGMT 30/<: CPT | Performed by: SURGERY

## 2019-05-18 PROCEDURE — 36415 COLL VENOUS BLD VENIPUNCTURE: CPT

## 2019-05-18 PROCEDURE — 85025 COMPLETE CBC W/AUTO DIFF WBC: CPT

## 2019-05-18 PROCEDURE — 80053 COMPREHEN METABOLIC PANEL: CPT

## 2019-05-18 PROCEDURE — 6370000000 HC RX 637 (ALT 250 FOR IP): Performed by: SURGERY

## 2019-05-18 PROCEDURE — 6370000000 HC RX 637 (ALT 250 FOR IP): Performed by: STUDENT IN AN ORGANIZED HEALTH CARE EDUCATION/TRAINING PROGRAM

## 2019-05-18 RX ORDER — FERROUS SULFATE 325(65) MG
325 TABLET ORAL 2 TIMES DAILY WITH MEALS
Qty: 30 TABLET | Refills: 0 | Status: SHIPPED | OUTPATIENT
Start: 2019-05-18 | End: 2019-06-05

## 2019-05-18 RX ORDER — FERROUS SULFATE 325(65) MG
325 TABLET ORAL 2 TIMES DAILY WITH MEALS
Status: DISCONTINUED | OUTPATIENT
Start: 2019-05-18 | End: 2019-05-18 | Stop reason: HOSPADM

## 2019-05-18 RX ADMIN — FERROUS SULFATE TAB 325 MG (65 MG ELEMENTAL FE) 325 MG: 325 (65 FE) TAB at 10:31

## 2019-05-18 RX ADMIN — SUCRALFATE 1 G: 1 TABLET ORAL at 08:25

## 2019-05-18 RX ADMIN — SUCRALFATE 1 G: 1 TABLET ORAL at 12:20

## 2019-05-18 RX ADMIN — OXYCODONE HYDROCHLORIDE 5 MG: 5 TABLET ORAL at 04:10

## 2019-05-18 RX ADMIN — OXYCODONE HYDROCHLORIDE 5 MG: 5 TABLET ORAL at 10:30

## 2019-05-18 RX ADMIN — ACETAMINOPHEN 650 MG: 325 TABLET, FILM COATED ORAL at 06:34

## 2019-05-18 ASSESSMENT — PAIN SCALES - GENERAL
PAINLEVEL_OUTOF10: 3
PAINLEVEL_OUTOF10: 10
PAINLEVEL_OUTOF10: 0
PAINLEVEL_OUTOF10: 10
PAINLEVEL_OUTOF10: 0
PAINLEVEL_OUTOF10: 9
PAINLEVEL_OUTOF10: 0

## 2019-05-18 NOTE — PLAN OF CARE
Problem: Pain:  Goal: Pain level will decrease  Description  Pain level will decrease  Outcome: Met This Shift     Problem: Pain:  Goal: Control of acute pain  Description  Control of acute pain  Outcome: Met This Shift     Problem: Falls - Risk of:  Goal: Will remain free from falls  Description  Will remain free from falls  Outcome: Met This Shift     Problem: Falls - Risk of:  Goal: Absence of physical injury  Description  Absence of physical injury  Outcome: Met This Shift

## 2019-05-18 NOTE — PROGRESS NOTES
GENERAL SURGERY  ATTENDING PROGRESS NOTE        CC: melena    Active Problems:    GIB (gastrointestinal bleeding)  Resolved Problems:    * No resolved hospital problems. *      S:  5/18/19 - EGD yesterday showed no UGI bleeding; doing well ; no hematemesis no melena; chronic abdominal pain; wants to go home and RTW on Monday     O:   Vitals:    05/17/19 1145 05/17/19 1600 05/18/19 0045 05/18/19 0815   BP: (!) 129/91 134/75 (!) 142/79 (!) 143/88   Pulse: 81 71 71 75   Resp: 18 16 16 18   Temp: 98 °F (36.7 °C) 97.1 °F (36.2 °C) 97.1 °F (36.2 °C) 97.5 °F (36.4 °C)   TempSrc: Temporal Temporal Temporal Temporal   SpO2: 100%  97%    Weight:       Height:           I/O last 3 completed shifts: In: 3799 [I.V.:1386]  Out: -     Gen - no apparent distress   Neuro - Awake, alert, attentive   HEENT - NC/AT, oral mucosa moist  Lungs - non labored, on room air  Heart - NSR   Abdomen - , non distended, nontender  Ext -   no deformities, no edema  Skin - warm, dry    A/P:    --melena with recent gastric bleed - no active bleeding now   -Continue PPI/carafate   -Discharge home today.          Shante Diggs MD, FACS

## 2019-05-18 NOTE — PROGRESS NOTES
GENERAL SURGERY  ATTENDING PROGRESS NOTE        CC: melena    Active Problems:    GIB (gastrointestinal bleeding)  Resolved Problems:    * No resolved hospital problems. *      S:  5/18/19 - EGD yesterday showed no UGI bleeding; doing well;     O:   Vitals:    05/17/19 1145 05/17/19 1600 05/18/19 0045 05/18/19 0815   BP: (!) 129/91 134/75 (!) 142/79 (!) 143/88   Pulse: 81 71 71 75   Resp: 18 16 16 18   Temp: 98 °F (36.7 °C) 97.1 °F (36.2 °C) 97.1 °F (36.2 °C) 97.5 °F (36.4 °C)   TempSrc: Temporal Temporal Temporal Temporal   SpO2: 100%  97%    Weight:       Height:           I/O last 3 completed shifts: In: 8163 [I.V.:1386]  Out: -     Gen - no apparent distress   Neuro - Awake, alert, attentive   HEENT - NC/AT, oral mucosa moist  Lungs - non labored, on room air  Heart - NSR   Abdomen - , non distended, nontender  Ext -   no deformities, no edema  Skin - warm, dry    A/P:    --melena with recent gastric bleed - no active bleeding now   -Continue PPI/carafate   -Discharge home today.        Vasile Villanueva MD, FACS

## 2019-05-18 NOTE — DISCHARGE SUMMARY
mouth 3 times daily as needed for Itching or Anxiety, Disp-15 capsule, R-0Normal      pantoprazole (PROTONIX) 40 MG tablet Take 1 tablet by mouth 2 times daily (before meals), Disp-60 tablet, R-0Normal      lidocaine 4 % external patch Place 1 patch onto the skin daily, Transdermal, DAILY Starting Wed 5/15/2019, Until Fri 6/14/2019, For 30 days, Disp-1 box, R-0, Normal      sucralfate (CARAFATE) 1 GM tablet Take 1 tablet by mouth 4 times daily (with meals and nightly), Disp-120 tablet, R-0Normal                   SURGERY DISCHARGE INSTRUCTIONS    · FOLLOW UP: Call office to schedule follow-up appointment. · DIET: Advance your diet as tolerated. Start with light diet and progress to a [place]/Peptic Ulcer Diet as you feel like eating. If you experience nausea or repeated episodes of vomiting which persist beyond 12-24 hours, notify your doctor. · ACTIVITY: Rest today. Increase activity gradually. No driving while on prescription pain medication. · MEDICATIONS: Take as prescribed. You may take over the counter ibuprofen or tylenol for pain as directed, limit total amount of acetaminophen (tylenol) to 3 grams per 24-hour period. Okay to resume anticoagulant medication after 24hrs. You may experience constipation while taking pain medication, you may take over the counter stool softeners (Docusate/Colace or Senokot-S) as directed.        SPECIAL INSTRUCTIONS:   Call physician if they or any other problems occur:  - Call the office if you have a fever over >101F, or if your incision becomes red, tender, or drains more than a small amount of clear fluid  - Fever over 101°    - Redness, swelling, hardness or warmth at the operative site  - Unrelieved nausea    - Foul smelling or cloudy drainage at the operative site   - Unrelieved pain    - Blood soaked dressing (Some oozing may be normal)      Follow up:   Surya Dial, [address] [facility] [phone]    Schedule an appointment as soon as possible for a visit in 2 weeks         Signed:  Tisha Nguyen DO  Resident, PGY-1  5/21/2019  10:45 AM

## 2019-06-03 NOTE — ANESTHESIA POSTPROCEDURE EVALUATION
Department of Anesthesiology  Postprocedure Note    Patient: Keenan Guajardo  MRN: [id number]  YOB: 1970  Date of evaluation: 6/3/2019  Time:  2:02 PM     Procedure Summary     Date:  05/17/19 [place]  [facility] [facility]    Anesthesia Start:  3666 Anesthesia Stop:  4058    Procedure:  EGD ESOPHAGOGASTRODUODENOSCOPY (N/A ) Diagnosis:  (GI BLEED)    Surgeon:  Jarek Agarwal MD Responsible Provider:  Ferdinand Beauchamp MD    Anesthesia Type:  MAC ASA Status:  3          Anesthesia Type: MAC    Camille Phase I: Camille Score: 10    Camille Phase II:      Last vitals: Reviewed and per EMR flowsheets.        Anesthesia Post Evaluation    Patient location during evaluation: PACU  Patient participation: complete - patient participated  Level of consciousness: awake  Airway patency: patent  Nausea & Vomiting: no nausea and no vomiting  Complications: no  Cardiovascular status: hemodynamically stable  Respiratory status: acceptable  Hydration status: euvolemic

## 2019-06-05 ENCOUNTER — OFFICE VISIT (OUTPATIENT)
Dept: SURGERY | Age: 49
End: 2019-06-05
Payer: COMMERCIAL

## 2019-06-05 VITALS
HEART RATE: 71 BPM | WEIGHT: 172 LBS | BODY MASS INDEX: 29.37 KG/M2 | SYSTOLIC BLOOD PRESSURE: 147 MMHG | OXYGEN SATURATION: 99 % | DIASTOLIC BLOOD PRESSURE: 90 MMHG | TEMPERATURE: 98.8 F | RESPIRATION RATE: 16 BRPM | HEIGHT: 64 IN

## 2019-06-05 DIAGNOSIS — K25.4 GASTROINTESTINAL HEMORRHAGE ASSOCIATED WITH GASTRIC ULCER: ICD-10-CM

## 2019-06-05 DIAGNOSIS — Z98.84 H/O GASTRIC BYPASS: Primary | Chronic | ICD-10-CM

## 2019-06-05 PROBLEM — R57.8 HEMORRHAGIC SHOCK (HCC): Status: RESOLVED | Noted: 2019-05-09 | Resolved: 2019-06-05

## 2019-06-05 PROBLEM — K92.2 GIB (GASTROINTESTINAL BLEEDING): Status: RESOLVED | Noted: 2019-05-17 | Resolved: 2019-06-05

## 2019-06-05 PROCEDURE — 99213 OFFICE O/P EST LOW 20 MIN: CPT | Performed by: SURGERY

## 2019-06-05 PROCEDURE — G8427 DOCREV CUR MEDS BY ELIG CLIN: HCPCS | Performed by: SURGERY

## 2019-06-05 PROCEDURE — 99212 OFFICE O/P EST SF 10 MIN: CPT | Performed by: SURGERY

## 2019-06-05 PROCEDURE — 1036F TOBACCO NON-USER: CPT | Performed by: SURGERY

## 2019-06-05 PROCEDURE — 1111F DSCHRG MED/CURRENT MED MERGE: CPT | Performed by: SURGERY

## 2019-06-05 PROCEDURE — G8419 CALC BMI OUT NRM PARAM NOF/U: HCPCS | Performed by: SURGERY

## 2019-06-05 NOTE — PROGRESS NOTES
GENERAL SURGERY  OFFICE NOTE    Chief Complaint   Patient presents with    GI Bleeding     Hospital/EGD follow up-Gastirc bleed.  pt c/o ongoing abdominal discomfort        Subjective:  F/u from recent admit and readmit for GIB  Found to have small ulcer in gastric pouch but no bleeding on readmission  Doing fairly well  No blood in stool  Taking carafate/protonix  Back to work    Objective:   BP (!) 147/90 (Site: Left Upper Arm, Position: Sitting, Cuff Size: Medium Adult)   Pulse 71   Temp 98.8 °F (37.1 °C) (Oral)   Resp 16   Ht 5' 4\" (1.626 m)   Wt 172 lb (78 kg)   SpO2 99%   BMI 29.52 kg/m²   no apparent distress     Assessment:  Recent UGIB s/p remote RYGB - no active bleeding      Plan:    Continue PPI/carafate  follow up 6 weeks      Michelle Mathew MD, FACS

## 2019-08-27 ENCOUNTER — APPOINTMENT (OUTPATIENT)
Dept: CT IMAGING | Age: 49
End: 2019-08-27
Payer: COMMERCIAL

## 2019-08-27 ENCOUNTER — HOSPITAL ENCOUNTER (EMERGENCY)
Age: 49
Discharge: HOME | End: 2019-08-27
Attending: EMERGENCY MEDICINE
Payer: COMMERCIAL

## 2019-08-27 VITALS
BODY MASS INDEX: 29.18 KG/M2 | WEIGHT: 170 LBS | RESPIRATION RATE: 16 BRPM | DIASTOLIC BLOOD PRESSURE: 33 MMHG | TEMPERATURE: 97.7 F | OXYGEN SATURATION: 98 % | SYSTOLIC BLOOD PRESSURE: 136 MMHG | HEART RATE: 88 BPM

## 2019-08-27 DIAGNOSIS — R10.84 GENERALIZED ABDOMINAL PAIN: Primary | ICD-10-CM

## 2019-08-27 LAB
ALBUMIN SERPL-MCNC: 4.4 G/DL (ref 3.5–5.2)
ALP BLD-CCNC: 151 U/L (ref 35–104)
ALT SERPL-CCNC: 18 U/L (ref 0–32)
ANION GAP SERPL CALCULATED.3IONS-SCNC: 11 MMOL/L (ref 7–16)
AST SERPL-CCNC: 36 U/L (ref 0–31)
BASOPHILS ABSOLUTE: 0.02 E9/L (ref 0–0.2)
BASOPHILS RELATIVE PERCENT: 0.3 % (ref 0–2)
BILIRUB SERPL-MCNC: 0.5 MG/DL (ref 0–1.2)
BILIRUBIN URINE: NEGATIVE
BLOOD, URINE: NEGATIVE
BUN BLDV-MCNC: 9 MG/DL (ref 6–20)
CALCIUM SERPL-MCNC: 9.5 MG/DL (ref 8.6–10.2)
CHLORIDE BLD-SCNC: 108 MMOL/L (ref 98–107)
CLARITY: CLEAR
CO2: 24 MMOL/L (ref 22–29)
COLOR: YELLOW
CREAT SERPL-MCNC: 0.9 MG/DL (ref 0.5–1)
EKG ATRIAL RATE: 50 BPM
EKG P AXIS: 60 DEGREES
EKG P-R INTERVAL: 176 MS
EKG Q-T INTERVAL: 448 MS
EKG QRS DURATION: 82 MS
EKG QTC CALCULATION (BAZETT): 408 MS
EKG R AXIS: 13 DEGREES
EKG T AXIS: 32 DEGREES
EKG VENTRICULAR RATE: 50 BPM
EOSINOPHILS ABSOLUTE: 0.06 E9/L (ref 0.05–0.5)
EOSINOPHILS RELATIVE PERCENT: 1 % (ref 0–6)
GFR AFRICAN AMERICAN: >60
GFR AFRICAN AMERICAN: >60
GFR NON-AFRICAN AMERICAN: >60 ML/MIN/1.73
GFR NON-AFRICAN AMERICAN: >60 ML/MIN/1.73
GLUCOSE BLD-MCNC: 101 MG/DL (ref 74–99)
GLUCOSE BLD-MCNC: 99 MG/DL (ref 74–99)
GLUCOSE URINE: NEGATIVE MG/DL
HCT VFR BLD CALC: 38.7 % (ref 34–48)
HEMOGLOBIN: 12.8 G/DL (ref 11.5–15.5)
IMMATURE GRANULOCYTES #: 0.02 E9/L
IMMATURE GRANULOCYTES %: 0.3 % (ref 0–5)
KETONES, URINE: NEGATIVE MG/DL
LACTIC ACID: 1.3 MMOL/L (ref 0.5–2.2)
LEUKOCYTE ESTERASE, URINE: NEGATIVE
LIPASE: 15 U/L (ref 13–60)
LYMPHOCYTES ABSOLUTE: 1.12 E9/L (ref 1.5–4)
LYMPHOCYTES RELATIVE PERCENT: 18 % (ref 20–42)
MCH RBC QN AUTO: 27.7 PG (ref 26–35)
MCHC RBC AUTO-ENTMCNC: 33.1 % (ref 32–34.5)
MCV RBC AUTO: 83.8 FL (ref 80–99.9)
MONOCYTES ABSOLUTE: 0.29 E9/L (ref 0.1–0.95)
MONOCYTES RELATIVE PERCENT: 4.7 % (ref 2–12)
NEUTROPHILS ABSOLUTE: 4.71 E9/L (ref 1.8–7.3)
NEUTROPHILS RELATIVE PERCENT: 75.7 % (ref 43–80)
NITRITE, URINE: NEGATIVE
PDW BLD-RTO: 14.6 FL (ref 11.5–15)
PERFORMED ON: ABNORMAL
PH UA: 6 (ref 5–9)
PLATELET # BLD: 210 E9/L (ref 130–450)
PMV BLD AUTO: 11.6 FL (ref 7–12)
POC CHLORIDE: 114 MMOL/L (ref 100–108)
POC CREATININE: 0.9 MG/DL (ref 0.5–1)
POC POTASSIUM: 4.2 MMOL/L (ref 3.5–5)
POC SODIUM: 145 MMOL/L (ref 132–146)
POTASSIUM REFLEX MAGNESIUM: 4.4 MMOL/L (ref 3.5–5)
PROTEIN UA: NEGATIVE MG/DL
RBC # BLD: 4.62 E12/L (ref 3.5–5.5)
SODIUM BLD-SCNC: 143 MMOL/L (ref 132–146)
SPECIFIC GRAVITY UA: 1.01 (ref 1–1.03)
TOTAL PROTEIN: 7.8 G/DL (ref 6.4–8.3)
TROPONIN: <0.01 NG/ML (ref 0–0.03)
UROBILINOGEN, URINE: 0.2 E.U./DL
WBC # BLD: 6.2 E9/L (ref 4.5–11.5)

## 2019-08-27 PROCEDURE — 82565 ASSAY OF CREATININE: CPT

## 2019-08-27 PROCEDURE — 80053 COMPREHEN METABOLIC PANEL: CPT

## 2019-08-27 PROCEDURE — 82435 ASSAY OF BLOOD CHLORIDE: CPT

## 2019-08-27 PROCEDURE — 6370000000 HC RX 637 (ALT 250 FOR IP): Performed by: EMERGENCY MEDICINE

## 2019-08-27 PROCEDURE — 93005 ELECTROCARDIOGRAM TRACING: CPT | Performed by: EMERGENCY MEDICINE

## 2019-08-27 PROCEDURE — 84132 ASSAY OF SERUM POTASSIUM: CPT

## 2019-08-27 PROCEDURE — 83690 ASSAY OF LIPASE: CPT

## 2019-08-27 PROCEDURE — 2580000003 HC RX 258: Performed by: RADIOLOGY

## 2019-08-27 PROCEDURE — 36415 COLL VENOUS BLD VENIPUNCTURE: CPT

## 2019-08-27 PROCEDURE — 85025 COMPLETE CBC W/AUTO DIFF WBC: CPT

## 2019-08-27 PROCEDURE — 6360000002 HC RX W HCPCS: Performed by: EMERGENCY MEDICINE

## 2019-08-27 PROCEDURE — 84484 ASSAY OF TROPONIN QUANT: CPT

## 2019-08-27 PROCEDURE — 96372 THER/PROPH/DIAG INJ SC/IM: CPT

## 2019-08-27 PROCEDURE — 82947 ASSAY GLUCOSE BLOOD QUANT: CPT

## 2019-08-27 PROCEDURE — 99284 EMERGENCY DEPT VISIT MOD MDM: CPT

## 2019-08-27 PROCEDURE — 74177 CT ABD & PELVIS W/CONTRAST: CPT

## 2019-08-27 PROCEDURE — 81003 URINALYSIS AUTO W/O SCOPE: CPT

## 2019-08-27 PROCEDURE — 84295 ASSAY OF SERUM SODIUM: CPT

## 2019-08-27 PROCEDURE — 83605 ASSAY OF LACTIC ACID: CPT

## 2019-08-27 PROCEDURE — 93010 ELECTROCARDIOGRAM REPORT: CPT | Performed by: INTERNAL MEDICINE

## 2019-08-27 PROCEDURE — 6360000004 HC RX CONTRAST MEDICATION: Performed by: RADIOLOGY

## 2019-08-27 PROCEDURE — 87088 URINE BACTERIA CULTURE: CPT

## 2019-08-27 RX ORDER — SUCRALFATE 1 G/1
1 TABLET ORAL 4 TIMES DAILY
Qty: 60 TABLET | Refills: 0 | Status: SHIPPED | OUTPATIENT
Start: 2019-08-27 | End: 2020-01-30 | Stop reason: SDUPTHER

## 2019-08-27 RX ORDER — DICYCLOMINE HYDROCHLORIDE 10 MG/ML
20 INJECTION INTRAMUSCULAR ONCE
Status: COMPLETED | OUTPATIENT
Start: 2019-08-27 | End: 2019-08-27

## 2019-08-27 RX ORDER — FAMOTIDINE 20 MG/1
20 TABLET, FILM COATED ORAL 2 TIMES DAILY
Qty: 14 TABLET | Refills: 0 | Status: SHIPPED | OUTPATIENT
Start: 2019-08-27 | End: 2020-01-30 | Stop reason: SDUPTHER

## 2019-08-27 RX ORDER — FENTANYL CITRATE 50 UG/ML
50 INJECTION, SOLUTION INTRAMUSCULAR; INTRAVENOUS ONCE
Status: COMPLETED | OUTPATIENT
Start: 2019-08-27 | End: 2019-08-27

## 2019-08-27 RX ORDER — ONDANSETRON 4 MG/1
4 TABLET, ORALLY DISINTEGRATING ORAL ONCE
Status: COMPLETED | OUTPATIENT
Start: 2019-08-27 | End: 2019-08-27

## 2019-08-27 RX ORDER — ONDANSETRON 2 MG/ML
4 INJECTION INTRAMUSCULAR; INTRAVENOUS ONCE
Status: DISCONTINUED | OUTPATIENT
Start: 2019-08-27 | End: 2019-08-27

## 2019-08-27 RX ORDER — SODIUM CHLORIDE 0.9 % (FLUSH) 0.9 %
10 SYRINGE (ML) INJECTION PRN
Status: DISCONTINUED | OUTPATIENT
Start: 2019-08-27 | End: 2019-08-27 | Stop reason: HOSPADM

## 2019-08-27 RX ADMIN — Medication 10 ML: at 12:02

## 2019-08-27 RX ADMIN — ONDANSETRON 4 MG: 4 TABLET, ORALLY DISINTEGRATING ORAL at 09:22

## 2019-08-27 RX ADMIN — FENTANYL CITRATE 50 MCG: 50 INJECTION INTRAMUSCULAR; INTRAVENOUS at 10:50

## 2019-08-27 RX ADMIN — LIDOCAINE HYDROCHLORIDE: 20 SOLUTION ORAL; TOPICAL at 13:29

## 2019-08-27 RX ADMIN — IOPAMIDOL 110 ML: 755 INJECTION, SOLUTION INTRAVENOUS at 12:02

## 2019-08-27 RX ADMIN — DICYCLOMINE HYDROCHLORIDE 20 MG: 10 INJECTION INTRAMUSCULAR at 09:21

## 2019-08-27 ASSESSMENT — PAIN SCALES - GENERAL
PAINLEVEL_OUTOF10: 7
PAINLEVEL_OUTOF10: 2
PAINLEVEL_OUTOF10: 8

## 2019-08-27 ASSESSMENT — PAIN DESCRIPTION - DESCRIPTORS: DESCRIPTORS: CRAMPING

## 2019-08-27 ASSESSMENT — PAIN DESCRIPTION - LOCATION: LOCATION: ABDOMEN

## 2019-08-27 ASSESSMENT — PAIN DESCRIPTION - PAIN TYPE: TYPE: ACUTE PAIN

## 2019-08-27 NOTE — ED PROVIDER NOTES
DECISION MAKING----------------------  Medications   sodium chloride flush 0.9 % injection 10 mL (10 mLs Intravenous Given 8/27/19 1202)   dicyclomine (BENTYL) injection 20 mg (20 mg Intramuscular Given 8/27/19 0921)   ondansetron (ZOFRAN-ODT) disintegrating tablet 4 mg (4 mg Oral Given 8/27/19 0922)   fentaNYL (SUBLIMAZE) injection 50 mcg (50 mcg Intramuscular Given 8/27/19 1050)   iopamidol (ISOVUE-370) 76 % injection 110 mL (110 mLs Intravenous Given 8/27/19 1202)   aluminum & magnesium hydroxide-simethicone (MAALOX) 30 mL, lidocaine viscous hcl (XYLOCAINE) 5 mL (GI COCKTAIL) ( Oral Given 8/27/19 1329)     Medical Decision Making:   Pt presents with abdominal pain worsening this morning, stating her abdominal pain feels similar to her previous bleeding ulcer in May 2019. Bentyl and nausea given. CT A&P, labs, and EKG obtained. Pt placed on telemetry monitoring. All results reviewed and discussed with the pt. Reevaluation:  ED Course as of Aug 27 1539   Tue Aug 27, 2019   1032 Patient is in no acute distress. She is complaining of mild pain. Will medicate. [MT]   7779 Discussed the differential with a patient of potential ulcer. Patient states she has had some improvement since arrival in the emergency department. Patient will be discharged home follow-up outpatient. Rectal exam performed with female chaperone present. Rectal exam was negative. [MT]      ED Course User Index  [MT] Rose Hurt DO         Counseling: The emergency provider has spoken with the patient and discussed todays results, in addition to providing specific details for the plan of care and counseling regarding the diagnosis and prognosis. Questions are answered at this time and they are agreeable with the plan.      --------------------------------- IMPRESSION AND DISPOSITION ---------------------------------    IMPRESSION  1.  Generalized abdominal pain        DISPOSITION  Disposition: Discharge to home  Patient condition

## 2019-08-28 ENCOUNTER — TELEPHONE (OUTPATIENT)
Dept: SURGERY | Age: 49
End: 2019-08-28

## 2019-08-29 LAB — URINE CULTURE, ROUTINE: NORMAL

## 2019-09-04 ENCOUNTER — HOSPITAL ENCOUNTER (EMERGENCY)
Age: 49
Discharge: HOME | End: 2019-09-04
Attending: EMERGENCY MEDICINE
Payer: COMMERCIAL

## 2019-09-04 ENCOUNTER — APPOINTMENT (OUTPATIENT)
Dept: CT IMAGING | Age: 49
End: 2019-09-04
Payer: COMMERCIAL

## 2019-09-04 VITALS
HEIGHT: 64 IN | HEART RATE: 60 BPM | TEMPERATURE: 98.6 F | SYSTOLIC BLOOD PRESSURE: 180 MMHG | RESPIRATION RATE: 16 BRPM | DIASTOLIC BLOOD PRESSURE: 80 MMHG | OXYGEN SATURATION: 100 % | WEIGHT: 170 LBS | BODY MASS INDEX: 29.02 KG/M2

## 2019-09-04 DIAGNOSIS — S31.41XA LABIAL TEAR, INITIAL ENCOUNTER: Primary | ICD-10-CM

## 2019-09-04 LAB
ALBUMIN SERPL-MCNC: 4.1 G/DL (ref 3.5–5.2)
ALP BLD-CCNC: 146 U/L (ref 35–104)
ALT SERPL-CCNC: 13 U/L (ref 0–32)
ANION GAP SERPL CALCULATED.3IONS-SCNC: 12 MMOL/L (ref 7–16)
APTT: 34.4 SEC (ref 25.7–34.7)
AST SERPL-CCNC: 25 U/L (ref 0–31)
BASOPHILS ABSOLUTE: 0.03 E9/L (ref 0–0.2)
BASOPHILS RELATIVE PERCENT: 0.5 % (ref 0–2)
BILIRUB SERPL-MCNC: 0.2 MG/DL (ref 0–1.2)
BUN BLDV-MCNC: 10 MG/DL (ref 6–20)
CALCIUM SERPL-MCNC: 9.2 MG/DL (ref 8.6–10.2)
CHLORIDE BLD-SCNC: 103 MMOL/L (ref 98–107)
CO2: 23 MMOL/L (ref 22–29)
CREAT SERPL-MCNC: 0.8 MG/DL (ref 0.5–1)
EOSINOPHILS ABSOLUTE: 0.12 E9/L (ref 0.05–0.5)
EOSINOPHILS RELATIVE PERCENT: 2.1 % (ref 0–6)
GFR AFRICAN AMERICAN: >60
GFR NON-AFRICAN AMERICAN: >60 ML/MIN/1.73
GLUCOSE BLD-MCNC: 90 MG/DL (ref 74–99)
HCG QUALITATIVE: NEGATIVE
HCT VFR BLD CALC: 34.9 % (ref 34–48)
HEMOGLOBIN: 11.4 G/DL (ref 11.5–15.5)
IMMATURE GRANULOCYTES #: 0.01 E9/L
IMMATURE GRANULOCYTES %: 0.2 % (ref 0–5)
INR BLD: 1.1
LYMPHOCYTES ABSOLUTE: 2.27 E9/L (ref 1.5–4)
LYMPHOCYTES RELATIVE PERCENT: 39.1 % (ref 20–42)
MCH RBC QN AUTO: 27.8 PG (ref 26–35)
MCHC RBC AUTO-ENTMCNC: 32.7 % (ref 32–34.5)
MCV RBC AUTO: 85.1 FL (ref 80–99.9)
MONOCYTES ABSOLUTE: 0.36 E9/L (ref 0.1–0.95)
MONOCYTES RELATIVE PERCENT: 6.2 % (ref 2–12)
NEUTROPHILS ABSOLUTE: 3.02 E9/L (ref 1.8–7.3)
NEUTROPHILS RELATIVE PERCENT: 51.9 % (ref 43–80)
PDW BLD-RTO: 14.6 FL (ref 11.5–15)
PLATELET # BLD: 204 E9/L (ref 130–450)
PMV BLD AUTO: 10.8 FL (ref 7–12)
POTASSIUM REFLEX MAGNESIUM: 3.6 MMOL/L (ref 3.5–5)
PROTHROMBIN TIME: 11.9 SEC (ref 9.3–12.4)
RBC # BLD: 4.1 E12/L (ref 3.5–5.5)
SODIUM BLD-SCNC: 138 MMOL/L (ref 132–146)
TOTAL PROTEIN: 7.8 G/DL (ref 6.4–8.3)
WBC # BLD: 5.8 E9/L (ref 4.5–11.5)

## 2019-09-04 PROCEDURE — 80053 COMPREHEN METABOLIC PANEL: CPT

## 2019-09-04 PROCEDURE — 85025 COMPLETE CBC W/AUTO DIFF WBC: CPT

## 2019-09-04 PROCEDURE — 84703 CHORIONIC GONADOTROPIN ASSAY: CPT

## 2019-09-04 PROCEDURE — 85730 THROMBOPLASTIN TIME PARTIAL: CPT

## 2019-09-04 PROCEDURE — 36415 COLL VENOUS BLD VENIPUNCTURE: CPT

## 2019-09-04 PROCEDURE — 85610 PROTHROMBIN TIME: CPT

## 2019-09-04 PROCEDURE — 2580000003 HC RX 258: Performed by: EMERGENCY MEDICINE

## 2019-09-04 PROCEDURE — 6360000004 HC RX CONTRAST MEDICATION: Performed by: RADIOLOGY

## 2019-09-04 PROCEDURE — 74177 CT ABD & PELVIS W/CONTRAST: CPT

## 2019-09-04 PROCEDURE — 99284 EMERGENCY DEPT VISIT MOD MDM: CPT

## 2019-09-04 RX ORDER — 0.9 % SODIUM CHLORIDE 0.9 %
1000 INTRAVENOUS SOLUTION INTRAVENOUS ONCE
Status: COMPLETED | OUTPATIENT
Start: 2019-09-04 | End: 2019-09-04

## 2019-09-04 RX ADMIN — SODIUM CHLORIDE 1000 ML: 9 INJECTION, SOLUTION INTRAVENOUS at 18:35

## 2019-09-04 RX ADMIN — IOPAMIDOL 100 ML: 755 INJECTION, SOLUTION INTRAVENOUS at 19:20

## 2019-09-04 ASSESSMENT — PAIN SCALES - GENERAL
PAINLEVEL_OUTOF10: 5
PAINLEVEL_OUTOF10: 5
PAINLEVEL_OUTOF10: 1

## 2019-09-04 ASSESSMENT — PAIN DESCRIPTION - ORIENTATION
ORIENTATION_2: RIGHT;LEFT;LOWER
ORIENTATION: LOWER

## 2019-09-04 ASSESSMENT — PAIN DESCRIPTION - LOCATION
LOCATION_2: ABDOMEN
LOCATION: VAGINA
LOCATION: HEAD
LOCATION: ABDOMEN

## 2019-09-04 ASSESSMENT — PAIN DESCRIPTION - PAIN TYPE
TYPE: ACUTE PAIN
TYPE_2: ACUTE PAIN
TYPE: CHRONIC PAIN
TYPE: ACUTE PAIN

## 2019-09-04 ASSESSMENT — PAIN DESCRIPTION - DESCRIPTORS
DESCRIPTORS: BURNING
DESCRIPTORS: CRAMPING

## 2019-09-04 ASSESSMENT — PAIN DESCRIPTION - FREQUENCY: FREQUENCY: INTERMITTENT

## 2019-09-04 ASSESSMENT — PAIN - FUNCTIONAL ASSESSMENT: PAIN_FUNCTIONAL_ASSESSMENT: 0-10

## 2019-09-04 ASSESSMENT — PAIN DESCRIPTION - PROGRESSION: CLINICAL_PROGRESSION: GRADUALLY IMPROVING

## 2019-09-04 ASSESSMENT — PAIN DESCRIPTION - ONSET: ONSET: ON-GOING

## 2019-09-04 ASSESSMENT — PAIN DESCRIPTION - INTENSITY: RATING_2: 6

## 2019-09-13 ENCOUNTER — OFFICE VISIT (OUTPATIENT)
Dept: FAMILY MEDICINE CLINIC | Age: 49
End: 2019-09-13
Payer: COMMERCIAL

## 2019-09-13 ENCOUNTER — HOSPITAL ENCOUNTER (OUTPATIENT)
Age: 49
Discharge: HOME | End: 2019-09-15
Payer: COMMERCIAL

## 2019-09-13 VITALS
WEIGHT: 177 LBS | HEART RATE: 79 BPM | DIASTOLIC BLOOD PRESSURE: 98 MMHG | RESPIRATION RATE: 18 BRPM | BODY MASS INDEX: 30.22 KG/M2 | TEMPERATURE: 98.7 F | OXYGEN SATURATION: 98 % | SYSTOLIC BLOOD PRESSURE: 142 MMHG | HEIGHT: 64 IN

## 2019-09-13 DIAGNOSIS — Z13.31 POSITIVE DEPRESSION SCREENING: ICD-10-CM

## 2019-09-13 DIAGNOSIS — M51.36 DEGENERATIVE DISC DISEASE, LUMBAR: ICD-10-CM

## 2019-09-13 DIAGNOSIS — E55.9 VITAMIN D DEFICIENCY, UNSPECIFIED: ICD-10-CM

## 2019-09-13 DIAGNOSIS — M54.50 CHRONIC BILATERAL LOW BACK PAIN WITHOUT SCIATICA: ICD-10-CM

## 2019-09-13 DIAGNOSIS — F32.0 MILD MAJOR DEPRESSION (HCC): ICD-10-CM

## 2019-09-13 DIAGNOSIS — G89.29 CHRONIC BILATERAL LOW BACK PAIN WITHOUT SCIATICA: ICD-10-CM

## 2019-09-13 DIAGNOSIS — I10 ESSENTIAL HYPERTENSION: Primary | ICD-10-CM

## 2019-09-13 DIAGNOSIS — K25.4 GASTROINTESTINAL HEMORRHAGE ASSOCIATED WITH GASTRIC ULCER: ICD-10-CM

## 2019-09-13 LAB
CHOLESTEROL, TOTAL: 211 MG/DL (ref 0–199)
FOLATE: 18.9 NG/ML (ref 4.8–24.2)
HDLC SERPL-MCNC: 47 MG/DL
LDL CHOLESTEROL CALCULATED: 145 MG/DL (ref 0–99)
TRIGL SERPL-MCNC: 93 MG/DL (ref 0–149)
VITAMIN B-12: >2000 PG/ML (ref 211–946)
VITAMIN D 25-HYDROXY: 24 NG/ML (ref 30–100)
VLDLC SERPL CALC-MCNC: 19 MG/DL

## 2019-09-13 PROCEDURE — G8431 POS CLIN DEPRES SCRN F/U DOC: HCPCS | Performed by: NURSE PRACTITIONER

## 2019-09-13 PROCEDURE — 99214 OFFICE O/P EST MOD 30 MIN: CPT | Performed by: NURSE PRACTITIONER

## 2019-09-13 PROCEDURE — 82607 VITAMIN B-12: CPT

## 2019-09-13 PROCEDURE — 82746 ASSAY OF FOLIC ACID SERUM: CPT

## 2019-09-13 PROCEDURE — 80061 LIPID PANEL: CPT

## 2019-09-13 PROCEDURE — 96160 PT-FOCUSED HLTH RISK ASSMT: CPT | Performed by: NURSE PRACTITIONER

## 2019-09-13 PROCEDURE — 82306 VITAMIN D 25 HYDROXY: CPT

## 2019-09-13 RX ORDER — SERTRALINE HYDROCHLORIDE 25 MG/1
25 TABLET, FILM COATED ORAL DAILY
Qty: 30 TABLET | Refills: 0 | Status: SHIPPED | OUTPATIENT
Start: 2019-09-13 | End: 2019-10-04 | Stop reason: ALTCHOICE

## 2019-09-13 RX ORDER — HYDROCHLOROTHIAZIDE 12.5 MG/1
12.5 CAPSULE, GELATIN COATED ORAL DAILY
Qty: 90 CAPSULE | Refills: 3 | Status: SHIPPED | OUTPATIENT
Start: 2019-09-13 | End: 2020-06-01 | Stop reason: SDUPTHER

## 2019-09-13 ASSESSMENT — PATIENT HEALTH QUESTIONNAIRE - PHQ9
SUM OF ALL RESPONSES TO PHQ QUESTIONS 1-9: 14
SUM OF ALL RESPONSES TO PHQ9 QUESTIONS 1 & 2: 4
SUM OF ALL RESPONSES TO PHQ QUESTIONS 1-9: 14
1. LITTLE INTEREST OR PLEASURE IN DOING THINGS: 2
7. TROUBLE CONCENTRATING ON THINGS, SUCH AS READING THE NEWSPAPER OR WATCHING TELEVISION: 2
3. TROUBLE FALLING OR STAYING ASLEEP: 2
2. FEELING DOWN, DEPRESSED OR HOPELESS: 2
8. MOVING OR SPEAKING SO SLOWLY THAT OTHER PEOPLE COULD HAVE NOTICED. OR THE OPPOSITE, BEING SO FIGETY OR RESTLESS THAT YOU HAVE BEEN MOVING AROUND A LOT MORE THAN USUAL: 0
5. POOR APPETITE OR OVEREATING: 3
4. FEELING TIRED OR HAVING LITTLE ENERGY: 3
10. IF YOU CHECKED OFF ANY PROBLEMS, HOW DIFFICULT HAVE THESE PROBLEMS MADE IT FOR YOU TO DO YOUR WORK, TAKE CARE OF THINGS AT HOME, OR GET ALONG WITH OTHER PEOPLE: 1
9. THOUGHTS THAT YOU WOULD BE BETTER OFF DEAD, OR OF HURTING YOURSELF: 0
6. FEELING BAD ABOUT YOURSELF - OR THAT YOU ARE A FAILURE OR HAVE LET YOURSELF OR YOUR FAMILY DOWN: 0

## 2019-09-13 ASSESSMENT — ENCOUNTER SYMPTOMS
RESPIRATORY NEGATIVE: 1
TROUBLE SWALLOWING: 1
BACK PAIN: 1
ALLERGIC/IMMUNOLOGIC NEGATIVE: 1
ABDOMINAL PAIN: 1

## 2019-09-18 RX ORDER — B-COMPLEX WITH VITAMIN C
1 TABLET ORAL 2 TIMES DAILY
Qty: 60 TABLET | Refills: 2 | Status: SHIPPED | OUTPATIENT
Start: 2019-09-18 | End: 2020-02-03

## 2019-09-18 RX ORDER — CYCLOBENZAPRINE HCL 10 MG
10 TABLET ORAL NIGHTLY PRN
Qty: 14 TABLET | Refills: 0 | Status: SHIPPED | OUTPATIENT
Start: 2019-09-18 | End: 2019-10-02

## 2019-10-04 ENCOUNTER — OFFICE VISIT (OUTPATIENT)
Dept: FAMILY MEDICINE CLINIC | Age: 49
End: 2019-10-04
Payer: COMMERCIAL

## 2019-10-04 VITALS
HEIGHT: 64 IN | TEMPERATURE: 98.8 F | BODY MASS INDEX: 30.39 KG/M2 | SYSTOLIC BLOOD PRESSURE: 122 MMHG | WEIGHT: 178 LBS | OXYGEN SATURATION: 97 % | DIASTOLIC BLOOD PRESSURE: 84 MMHG | HEART RATE: 83 BPM

## 2019-10-04 DIAGNOSIS — F32.2 CURRENT SEVERE EPISODE OF MAJOR DEPRESSIVE DISORDER WITHOUT PSYCHOTIC FEATURES WITHOUT PRIOR EPISODE (HCC): Primary | ICD-10-CM

## 2019-10-04 PROCEDURE — 1036F TOBACCO NON-USER: CPT | Performed by: NURSE PRACTITIONER

## 2019-10-04 PROCEDURE — G8417 CALC BMI ABV UP PARAM F/U: HCPCS | Performed by: NURSE PRACTITIONER

## 2019-10-04 PROCEDURE — G8484 FLU IMMUNIZE NO ADMIN: HCPCS | Performed by: NURSE PRACTITIONER

## 2019-10-04 PROCEDURE — G8427 DOCREV CUR MEDS BY ELIG CLIN: HCPCS | Performed by: NURSE PRACTITIONER

## 2019-10-04 PROCEDURE — 99213 OFFICE O/P EST LOW 20 MIN: CPT | Performed by: NURSE PRACTITIONER

## 2019-10-04 RX ORDER — CYCLOBENZAPRINE HCL 5 MG
TABLET ORAL
Qty: 14 TABLET | Refills: 0 | Status: SHIPPED | OUTPATIENT
Start: 2019-10-04 | End: 2019-11-15

## 2019-11-15 ENCOUNTER — OFFICE VISIT (OUTPATIENT)
Dept: FAMILY MEDICINE CLINIC | Age: 49
End: 2019-11-15
Payer: COMMERCIAL

## 2019-11-15 VITALS
SYSTOLIC BLOOD PRESSURE: 132 MMHG | OXYGEN SATURATION: 98 % | DIASTOLIC BLOOD PRESSURE: 78 MMHG | WEIGHT: 179 LBS | HEART RATE: 97 BPM | HEIGHT: 64 IN | BODY MASS INDEX: 30.56 KG/M2 | TEMPERATURE: 97.9 F

## 2019-11-15 DIAGNOSIS — R53.83 FATIGUE, UNSPECIFIED TYPE: ICD-10-CM

## 2019-11-15 DIAGNOSIS — F32.4 MAJOR DEPRESSIVE DISORDER IN PARTIAL REMISSION, UNSPECIFIED WHETHER RECURRENT (HCC): ICD-10-CM

## 2019-11-15 DIAGNOSIS — R10.9 ABDOMINAL CRAMPS: Primary | ICD-10-CM

## 2019-11-15 PROCEDURE — 99213 OFFICE O/P EST LOW 20 MIN: CPT | Performed by: NURSE PRACTITIONER

## 2019-11-15 PROCEDURE — G8484 FLU IMMUNIZE NO ADMIN: HCPCS | Performed by: NURSE PRACTITIONER

## 2019-11-15 PROCEDURE — G8427 DOCREV CUR MEDS BY ELIG CLIN: HCPCS | Performed by: NURSE PRACTITIONER

## 2019-11-15 PROCEDURE — G8417 CALC BMI ABV UP PARAM F/U: HCPCS | Performed by: NURSE PRACTITIONER

## 2019-11-15 PROCEDURE — 1036F TOBACCO NON-USER: CPT | Performed by: NURSE PRACTITIONER

## 2019-11-15 RX ORDER — CYCLOBENZAPRINE HCL 5 MG
5 TABLET ORAL 2 TIMES DAILY PRN
Qty: 14 TABLET | Refills: 0 | Status: SHIPPED | OUTPATIENT
Start: 2019-11-15 | End: 2019-11-22

## 2019-11-15 ASSESSMENT — ENCOUNTER SYMPTOMS
EYES NEGATIVE: 1
BACK PAIN: 1
ABDOMINAL PAIN: 1
ALLERGIC/IMMUNOLOGIC NEGATIVE: 1
RESPIRATORY NEGATIVE: 1

## 2020-01-30 ENCOUNTER — OFFICE VISIT (OUTPATIENT)
Dept: FAMILY MEDICINE CLINIC | Age: 50
End: 2020-01-30
Payer: COMMERCIAL

## 2020-01-30 ENCOUNTER — HOSPITAL ENCOUNTER (EMERGENCY)
Age: 50
Discharge: LEFT BEFORE DISCHARGE | End: 2020-01-31
Payer: COMMERCIAL

## 2020-01-30 VITALS
TEMPERATURE: 98.2 F | RESPIRATION RATE: 18 BRPM | BODY MASS INDEX: 30.73 KG/M2 | HEART RATE: 80 BPM | OXYGEN SATURATION: 98 % | HEIGHT: 64 IN | SYSTOLIC BLOOD PRESSURE: 127 MMHG | WEIGHT: 180 LBS | DIASTOLIC BLOOD PRESSURE: 82 MMHG

## 2020-01-30 VITALS
DIASTOLIC BLOOD PRESSURE: 82 MMHG | HEIGHT: 64 IN | HEART RATE: 84 BPM | RESPIRATION RATE: 16 BRPM | SYSTOLIC BLOOD PRESSURE: 122 MMHG | OXYGEN SATURATION: 98 % | WEIGHT: 182.5 LBS | TEMPERATURE: 98.8 F | BODY MASS INDEX: 31.16 KG/M2

## 2020-01-30 LAB
ALBUMIN SERPL-MCNC: 4.5 G/DL (ref 3.5–5.2)
ALP BLD-CCNC: 140 U/L (ref 35–104)
ALT SERPL-CCNC: 5 U/L (ref 0–32)
ANION GAP SERPL CALCULATED.3IONS-SCNC: 11 MMOL/L (ref 7–16)
AST SERPL-CCNC: 29 U/L (ref 0–31)
BASOPHILS ABSOLUTE: 0.04 E9/L (ref 0–0.2)
BASOPHILS RELATIVE PERCENT: 0.5 % (ref 0–2)
BILIRUB SERPL-MCNC: 0.6 MG/DL (ref 0–1.2)
BILIRUBIN URINE: NEGATIVE
BLOOD, URINE: NEGATIVE
BUN BLDV-MCNC: 11 MG/DL (ref 6–20)
CALCIUM SERPL-MCNC: 9.4 MG/DL (ref 8.6–10.2)
CHLORIDE BLD-SCNC: 103 MMOL/L (ref 98–107)
CLARITY: CLEAR
CO2: 26 MMOL/L (ref 22–29)
COLOR: YELLOW
CREAT SERPL-MCNC: 1.1 MG/DL (ref 0.5–1)
EOSINOPHILS ABSOLUTE: 0.1 E9/L (ref 0.05–0.5)
EOSINOPHILS RELATIVE PERCENT: 1.3 % (ref 0–6)
GFR AFRICAN AMERICAN: >60
GFR NON-AFRICAN AMERICAN: 53 ML/MIN/1.73
GLUCOSE BLD-MCNC: 85 MG/DL (ref 74–99)
GLUCOSE URINE: NEGATIVE MG/DL
HCT VFR BLD CALC: 43 % (ref 34–48)
HEMOGLOBIN: 13.9 G/DL (ref 11.5–15.5)
IMMATURE GRANULOCYTES #: 0.02 E9/L
IMMATURE GRANULOCYTES %: 0.3 % (ref 0–5)
KETONES, URINE: NEGATIVE MG/DL
LACTIC ACID: 1.4 MMOL/L (ref 0.5–2.2)
LEUKOCYTE ESTERASE, URINE: NEGATIVE
LIPASE: 14 U/L (ref 13–60)
LYMPHOCYTES ABSOLUTE: 1.5 E9/L (ref 1.5–4)
LYMPHOCYTES RELATIVE PERCENT: 19.3 % (ref 20–42)
MCH RBC QN AUTO: 29.8 PG (ref 26–35)
MCHC RBC AUTO-ENTMCNC: 32.3 % (ref 32–34.5)
MCV RBC AUTO: 92.3 FL (ref 80–99.9)
MONOCYTES ABSOLUTE: 0.5 E9/L (ref 0.1–0.95)
MONOCYTES RELATIVE PERCENT: 6.4 % (ref 2–12)
NEUTROPHILS ABSOLUTE: 5.6 E9/L (ref 1.8–7.3)
NEUTROPHILS RELATIVE PERCENT: 72.2 % (ref 43–80)
NITRITE, URINE: NEGATIVE
PDW BLD-RTO: 12.7 FL (ref 11.5–15)
PH UA: 5.5 (ref 5–9)
PLATELET # BLD: 225 E9/L (ref 130–450)
PMV BLD AUTO: 10.5 FL (ref 7–12)
POTASSIUM REFLEX MAGNESIUM: 3.9 MMOL/L (ref 3.5–5)
PROTEIN UA: NEGATIVE MG/DL
RBC # BLD: 4.66 E12/L (ref 3.5–5.5)
SODIUM BLD-SCNC: 140 MMOL/L (ref 132–146)
SPECIFIC GRAVITY UA: >=1.03 (ref 1–1.03)
TOTAL PROTEIN: 8.2 G/DL (ref 6.4–8.3)
UROBILINOGEN, URINE: 0.2 E.U./DL
WBC # BLD: 7.8 E9/L (ref 4.5–11.5)

## 2020-01-30 PROCEDURE — G8417 CALC BMI ABV UP PARAM F/U: HCPCS | Performed by: NURSE PRACTITIONER

## 2020-01-30 PROCEDURE — 80053 COMPREHEN METABOLIC PANEL: CPT

## 2020-01-30 PROCEDURE — 36415 COLL VENOUS BLD VENIPUNCTURE: CPT

## 2020-01-30 PROCEDURE — 85025 COMPLETE CBC W/AUTO DIFF WBC: CPT

## 2020-01-30 PROCEDURE — 1036F TOBACCO NON-USER: CPT | Performed by: NURSE PRACTITIONER

## 2020-01-30 PROCEDURE — 83690 ASSAY OF LIPASE: CPT

## 2020-01-30 PROCEDURE — 99213 OFFICE O/P EST LOW 20 MIN: CPT | Performed by: NURSE PRACTITIONER

## 2020-01-30 PROCEDURE — G8427 DOCREV CUR MEDS BY ELIG CLIN: HCPCS | Performed by: NURSE PRACTITIONER

## 2020-01-30 PROCEDURE — G8484 FLU IMMUNIZE NO ADMIN: HCPCS | Performed by: NURSE PRACTITIONER

## 2020-01-30 PROCEDURE — 81003 URINALYSIS AUTO W/O SCOPE: CPT

## 2020-01-30 PROCEDURE — 83605 ASSAY OF LACTIC ACID: CPT

## 2020-01-30 RX ORDER — ONDANSETRON 4 MG/1
4 TABLET, ORALLY DISINTEGRATING ORAL ONCE
Status: COMPLETED | OUTPATIENT
Start: 2020-01-30 | End: 2020-01-30

## 2020-01-30 RX ORDER — FAMOTIDINE 20 MG/1
20 TABLET, FILM COATED ORAL 2 TIMES DAILY
Qty: 14 TABLET | Refills: 0 | Status: SHIPPED
Start: 2020-01-30 | End: 2020-02-11 | Stop reason: SDUPTHER

## 2020-01-30 RX ORDER — SUCRALFATE 1 G/1
1 TABLET ORAL 4 TIMES DAILY
Qty: 60 TABLET | Refills: 0 | Status: SHIPPED
Start: 2020-01-30 | End: 2020-06-01 | Stop reason: ALTCHOICE

## 2020-01-30 RX ADMIN — ONDANSETRON 4 MG: 4 TABLET, ORALLY DISINTEGRATING ORAL at 10:52

## 2020-01-30 ASSESSMENT — PAIN DESCRIPTION - FREQUENCY: FREQUENCY: CONTINUOUS

## 2020-01-30 ASSESSMENT — ENCOUNTER SYMPTOMS
DIARRHEA: 1
SHORTNESS OF BREATH: 0
ABDOMINAL DISTENTION: 0
STRIDOR: 0
CHEST TIGHTNESS: 0
VOMITING: 0
ABDOMINAL PAIN: 1
BLOOD IN STOOL: 0
NAUSEA: 1
CONSTIPATION: 0
ANAL BLEEDING: 0
RECTAL PAIN: 0
COUGH: 0
COLOR CHANGE: 0
CHOKING: 0
WHEEZING: 0

## 2020-01-30 ASSESSMENT — PAIN SCALES - GENERAL: PAINLEVEL_OUTOF10: 8

## 2020-01-30 ASSESSMENT — PAIN DESCRIPTION - LOCATION: LOCATION: ABDOMEN

## 2020-01-30 ASSESSMENT — PAIN DESCRIPTION - DESCRIPTORS: DESCRIPTORS: STABBING;CONSTANT

## 2020-01-30 ASSESSMENT — PAIN DESCRIPTION - ORIENTATION: ORIENTATION: RIGHT;LOWER

## 2020-02-03 RX ORDER — CALCIUM CARBONATE/VITAMIN D3 500MG-5MCG
TABLET ORAL
Qty: 60 TABLET | Refills: 2 | Status: SHIPPED | OUTPATIENT
Start: 2020-02-03 | End: 2020-04-30 | Stop reason: SDUPTHER

## 2020-02-10 ENCOUNTER — APPOINTMENT (OUTPATIENT)
Dept: GENERAL RADIOLOGY | Age: 50
End: 2020-02-10
Payer: COMMERCIAL

## 2020-02-10 ENCOUNTER — HOSPITAL ENCOUNTER (EMERGENCY)
Age: 50
Discharge: LEFT BEFORE DISCHARGE | End: 2020-02-10
Payer: COMMERCIAL

## 2020-02-10 VITALS
HEART RATE: 64 BPM | HEIGHT: 64 IN | WEIGHT: 180 LBS | TEMPERATURE: 97.7 F | DIASTOLIC BLOOD PRESSURE: 67 MMHG | SYSTOLIC BLOOD PRESSURE: 174 MMHG | RESPIRATION RATE: 16 BRPM | OXYGEN SATURATION: 97 % | BODY MASS INDEX: 30.73 KG/M2

## 2020-02-10 LAB
ALBUMIN SERPL-MCNC: 4.3 G/DL (ref 3.5–5.2)
ALP BLD-CCNC: 124 U/L (ref 35–104)
ALT SERPL-CCNC: 21 U/L (ref 0–32)
ANION GAP SERPL CALCULATED.3IONS-SCNC: 14 MMOL/L (ref 7–16)
AST SERPL-CCNC: 38 U/L (ref 0–31)
BACTERIA: ABNORMAL /HPF
BASOPHILS ABSOLUTE: 0.05 E9/L (ref 0–0.2)
BASOPHILS RELATIVE PERCENT: 1 % (ref 0–2)
BILIRUB SERPL-MCNC: 0.4 MG/DL (ref 0–1.2)
BILIRUBIN URINE: NEGATIVE
BLOOD, URINE: NEGATIVE
BUN BLDV-MCNC: 9 MG/DL (ref 6–20)
CALCIUM SERPL-MCNC: 9.4 MG/DL (ref 8.6–10.2)
CHLORIDE BLD-SCNC: 104 MMOL/L (ref 98–107)
CLARITY: CLEAR
CO2: 21 MMOL/L (ref 22–29)
COLOR: YELLOW
CREAT SERPL-MCNC: 1 MG/DL (ref 0.5–1)
D DIMER: <200 NG/ML DDU
EKG ATRIAL RATE: 56 BPM
EKG P AXIS: 57 DEGREES
EKG P-R INTERVAL: 184 MS
EKG Q-T INTERVAL: 444 MS
EKG QRS DURATION: 88 MS
EKG QTC CALCULATION (BAZETT): 428 MS
EKG R AXIS: 21 DEGREES
EKG T AXIS: 35 DEGREES
EKG VENTRICULAR RATE: 56 BPM
EOSINOPHILS ABSOLUTE: 0.14 E9/L (ref 0.05–0.5)
EOSINOPHILS RELATIVE PERCENT: 2.8 % (ref 0–6)
EPITHELIAL CELLS, UA: ABNORMAL /HPF
GFR AFRICAN AMERICAN: >60
GFR NON-AFRICAN AMERICAN: 59 ML/MIN/1.73
GLUCOSE BLD-MCNC: 80 MG/DL (ref 74–99)
GLUCOSE URINE: NEGATIVE MG/DL
HCT VFR BLD CALC: 41 % (ref 34–48)
HEMOGLOBIN: 13.4 G/DL (ref 11.5–15.5)
IMMATURE GRANULOCYTES #: 0.01 E9/L
IMMATURE GRANULOCYTES %: 0.2 % (ref 0–5)
KETONES, URINE: NEGATIVE MG/DL
LEUKOCYTE ESTERASE, URINE: ABNORMAL
LYMPHOCYTES ABSOLUTE: 2 E9/L (ref 1.5–4)
LYMPHOCYTES RELATIVE PERCENT: 39.8 % (ref 20–42)
MCH RBC QN AUTO: 30.3 PG (ref 26–35)
MCHC RBC AUTO-ENTMCNC: 32.7 % (ref 32–34.5)
MCV RBC AUTO: 92.8 FL (ref 80–99.9)
MONOCYTES ABSOLUTE: 0.43 E9/L (ref 0.1–0.95)
MONOCYTES RELATIVE PERCENT: 8.6 % (ref 2–12)
NEUTROPHILS ABSOLUTE: 2.39 E9/L (ref 1.8–7.3)
NEUTROPHILS RELATIVE PERCENT: 47.6 % (ref 43–80)
NITRITE, URINE: NEGATIVE
PDW BLD-RTO: 12.5 FL (ref 11.5–15)
PH UA: 6 (ref 5–9)
PLATELET # BLD: 194 E9/L (ref 130–450)
PMV BLD AUTO: 10.7 FL (ref 7–12)
POTASSIUM REFLEX MAGNESIUM: 4.3 MMOL/L (ref 3.5–5)
PROTEIN UA: NEGATIVE MG/DL
RBC # BLD: 4.42 E12/L (ref 3.5–5.5)
RBC UA: ABNORMAL /HPF (ref 0–2)
SODIUM BLD-SCNC: 139 MMOL/L (ref 132–146)
SPECIFIC GRAVITY UA: 1.02 (ref 1–1.03)
TOTAL PROTEIN: 7.9 G/DL (ref 6.4–8.3)
TROPONIN: <0.01 NG/ML (ref 0–0.03)
UROBILINOGEN, URINE: 0.2 E.U./DL
WBC # BLD: 5 E9/L (ref 4.5–11.5)
WBC UA: ABNORMAL /HPF (ref 0–5)

## 2020-02-10 PROCEDURE — 85025 COMPLETE CBC W/AUTO DIFF WBC: CPT

## 2020-02-10 PROCEDURE — 71046 X-RAY EXAM CHEST 2 VIEWS: CPT

## 2020-02-10 PROCEDURE — 4500000002 HC ER NO CHARGE

## 2020-02-10 PROCEDURE — 80053 COMPREHEN METABOLIC PANEL: CPT

## 2020-02-10 PROCEDURE — 36415 COLL VENOUS BLD VENIPUNCTURE: CPT

## 2020-02-10 PROCEDURE — 93005 ELECTROCARDIOGRAM TRACING: CPT | Performed by: PHYSICIAN ASSISTANT

## 2020-02-10 PROCEDURE — 81001 URINALYSIS AUTO W/SCOPE: CPT

## 2020-02-10 PROCEDURE — 84484 ASSAY OF TROPONIN QUANT: CPT

## 2020-02-10 PROCEDURE — 85378 FIBRIN DEGRADE SEMIQUANT: CPT

## 2020-02-10 ASSESSMENT — PAIN DESCRIPTION - FREQUENCY: FREQUENCY: CONTINUOUS

## 2020-02-10 ASSESSMENT — PAIN DESCRIPTION - LOCATION: LOCATION: BREAST;BACK;CHEST

## 2020-02-10 ASSESSMENT — PAIN DESCRIPTION - PAIN TYPE: TYPE: ACUTE PAIN

## 2020-02-10 ASSESSMENT — PAIN DESCRIPTION - DESCRIPTORS: DESCRIPTORS: ACHING

## 2020-02-10 ASSESSMENT — PAIN SCALES - GENERAL: PAINLEVEL_OUTOF10: 7

## 2020-02-10 ASSESSMENT — PAIN DESCRIPTION - PROGRESSION: CLINICAL_PROGRESSION: GRADUALLY WORSENING

## 2020-02-10 NOTE — ED TRIAGE NOTES
FIRST PROVIDER CONTACT ASSESSMENT NOTE      Department of Emergency Medicine   Admit Date: No admission date for patient encounter. Chief Complaint: Shortness of Breath (states she has been more sob with activity since yesterday with occ heart racing )      History of Present Illness:    Justo Chapman is a 54 y.o. female who presents to the ED for lightheadedness, SOB upon exertion, heart racing. Reports it feels similar to previous anxiety attacks and when her ulcer was flaring up. Been ongoing for 1 day. Nausea but no vomiting. Denies black or bloody stools.          -----------------END OF FIRST PROVIDER CONTACT ASSESSMENT NOTE--------------  Electronically signed by DANA German   DD: 2/10/20

## 2020-02-11 ENCOUNTER — HOSPITAL ENCOUNTER (EMERGENCY)
Age: 50
Discharge: HOME | End: 2020-02-11
Attending: EMERGENCY MEDICINE
Payer: COMMERCIAL

## 2020-02-11 VITALS
OXYGEN SATURATION: 99 % | HEART RATE: 69 BPM | TEMPERATURE: 97.7 F | HEIGHT: 64 IN | BODY MASS INDEX: 30.73 KG/M2 | RESPIRATION RATE: 12 BRPM | DIASTOLIC BLOOD PRESSURE: 92 MMHG | WEIGHT: 180 LBS | SYSTOLIC BLOOD PRESSURE: 136 MMHG

## 2020-02-11 LAB
EKG ATRIAL RATE: 76 BPM
EKG P AXIS: 66 DEGREES
EKG P-R INTERVAL: 172 MS
EKG Q-T INTERVAL: 388 MS
EKG QRS DURATION: 76 MS
EKG QTC CALCULATION (BAZETT): 436 MS
EKG R AXIS: 37 DEGREES
EKG T AXIS: 52 DEGREES
EKG VENTRICULAR RATE: 76 BPM
HCT VFR BLD CALC: 37.1 % (ref 34–48)
HEMOGLOBIN: 12.5 G/DL (ref 11.5–15.5)
TROPONIN: <0.01 NG/ML (ref 0–0.03)

## 2020-02-11 PROCEDURE — C9113 INJ PANTOPRAZOLE SODIUM, VIA: HCPCS | Performed by: INTERNAL MEDICINE

## 2020-02-11 PROCEDURE — 85014 HEMATOCRIT: CPT

## 2020-02-11 PROCEDURE — 84484 ASSAY OF TROPONIN QUANT: CPT

## 2020-02-11 PROCEDURE — 85018 HEMOGLOBIN: CPT

## 2020-02-11 PROCEDURE — 99284 EMERGENCY DEPT VISIT MOD MDM: CPT

## 2020-02-11 PROCEDURE — 6360000002 HC RX W HCPCS: Performed by: INTERNAL MEDICINE

## 2020-02-11 PROCEDURE — 96374 THER/PROPH/DIAG INJ IV PUSH: CPT

## 2020-02-11 PROCEDURE — 2580000003 HC RX 258: Performed by: INTERNAL MEDICINE

## 2020-02-11 PROCEDURE — 93005 ELECTROCARDIOGRAM TRACING: CPT | Performed by: EMERGENCY MEDICINE

## 2020-02-11 PROCEDURE — 36415 COLL VENOUS BLD VENIPUNCTURE: CPT

## 2020-02-11 PROCEDURE — 6370000000 HC RX 637 (ALT 250 FOR IP): Performed by: INTERNAL MEDICINE

## 2020-02-11 RX ORDER — PANTOPRAZOLE SODIUM 40 MG/1
40 TABLET, DELAYED RELEASE ORAL DAILY
Qty: 14 TABLET | Refills: 0 | Status: SHIPPED | OUTPATIENT
Start: 2020-02-11 | End: 2020-06-01

## 2020-02-11 RX ORDER — LORAZEPAM 1 MG/1
1 TABLET ORAL ONCE
Status: COMPLETED | OUTPATIENT
Start: 2020-02-11 | End: 2020-02-11

## 2020-02-11 RX ORDER — SUCRALFATE 1 G/1
1 TABLET ORAL EVERY 6 HOURS SCHEDULED
Status: DISCONTINUED | OUTPATIENT
Start: 2020-02-11 | End: 2020-02-11 | Stop reason: HOSPADM

## 2020-02-11 RX ORDER — SODIUM CHLORIDE 9 MG/ML
10 INJECTION INTRAVENOUS ONCE
Status: COMPLETED | OUTPATIENT
Start: 2020-02-11 | End: 2020-02-11

## 2020-02-11 RX ORDER — PANTOPRAZOLE SODIUM 40 MG/10ML
40 INJECTION, POWDER, LYOPHILIZED, FOR SOLUTION INTRAVENOUS ONCE
Status: COMPLETED | OUTPATIENT
Start: 2020-02-11 | End: 2020-02-11

## 2020-02-11 RX ORDER — FAMOTIDINE 20 MG/1
20 TABLET, FILM COATED ORAL 2 TIMES DAILY
Qty: 14 TABLET | Refills: 0 | Status: SHIPPED | OUTPATIENT
Start: 2020-02-11 | End: 2020-06-01 | Stop reason: ALTCHOICE

## 2020-02-11 RX ADMIN — LIDOCAINE HYDROCHLORIDE: 20 SOLUTION ORAL; TOPICAL at 08:52

## 2020-02-11 RX ADMIN — LORAZEPAM 1 MG: 1 TABLET ORAL at 10:24

## 2020-02-11 RX ADMIN — PANTOPRAZOLE SODIUM 40 MG: 40 INJECTION, POWDER, FOR SOLUTION INTRAVENOUS at 09:14

## 2020-02-11 RX ADMIN — SUCRALFATE 1 G: 1 TABLET ORAL at 08:52

## 2020-02-11 RX ADMIN — SODIUM CHLORIDE, PRESERVATIVE FREE 10 ML: 5 INJECTION INTRAVENOUS at 09:16

## 2020-02-11 ASSESSMENT — ENCOUNTER SYMPTOMS
DIARRHEA: 0
EYE DISCHARGE: 0
CHEST TIGHTNESS: 0
BLOOD IN STOOL: 0
BACK PAIN: 1
NAUSEA: 1
ABDOMINAL PAIN: 1
WHEEZING: 0
COUGH: 0
CONSTIPATION: 0
SHORTNESS OF BREATH: 0
EYE ITCHING: 0
ABDOMINAL DISTENTION: 0
VOMITING: 1
TROUBLE SWALLOWING: 0
RHINORRHEA: 0

## 2020-02-11 ASSESSMENT — PAIN SCALES - GENERAL: PAINLEVEL_OUTOF10: 5

## 2020-02-11 NOTE — ED PROVIDER NOTES
Department of Emergency Medicine   ED  Provider Note  Admit Date/RoomTime: 2/11/2020  7:53 AM  ED Room: 16/16          History of Present Illness:  2/11/20, Time: 8:07 AM  Chief Complaint   Patient presents with    Chest Pain     seen here yesterday for the same left due to the wait states it hurts to take a deep breath                 Gabi William is a 54 y.o. female presenting to the ED for epigastric pain, beginning a few days ago. The complaint has been persistent, moderate in severity, and worsened by nothing. Patient reports epigastric pain for the last few days. She reports a burning type pain that is nonradiating and has some increased intensity when she pushes her stomach and when she breathes. She denies trauma. No fevers or chills. No exertional chest pain. No orthopnea. No peripheral edema. She denies any rectal bleeding. No nausea or vomiting. No lower abdominal pain. She reports he has a history of a gastric ulcer and has had some similar symptoms in the past from this. She was seen here yesterday and had laboratory test done that were reassuring but she was unable to stay secondary to the weight. She returns now for further evaluation. Review of Systems:   Pertinent positives and negatives are stated within HPI, all other systems reviewed and are negative.        --------------------------------------------- PAST HISTORY ---------------------------------------------  Past Medical History:  has a past medical history of Anorexia, Arthritis of knee, Back pain, chronic, Chronic pain, H/O gastric bypass, Hypertension, Hypertension, Metabolic acidosis, Ovarian cyst, and Seizures ([facility] [address] Past Surgical History:  has a past surgical history that includes Jeannine-en-Y Gastric Bypass (12/2/2008); Endometrial ablation (2003); Tonsillectomy (1980); Cholecystectomy, laparoscopic (9/19/1995); Upper gastrointestinal endoscopy (10/3/2008); Upper gastrointestinal endoscopy (1/9/2009);  Upper abdominal tenderness. Non distended. No rebound, guarding, or rigidity. No pulsatile masses. Musculoskeletal: Moves all extremities x 4. Warm and well perfused, no clubbing, cyanosis, or edema. Capillary refill <3 seconds  Skin: skin warm and dry. No rashes. Neurologic: GCS 15, no focal deficits, symmetric strength 5/5 in the upper and lower extremities bilaterally  Psychiatric: Normal Affect          -------------------------------------------------- RESULTS -------------------------------------------------  I have personally reviewed all laboratory and imaging results for this patient. Results are listed below.      LABS: (Lab results interpreted by me)  Results for orders placed or performed during the hospital encounter of 20   Hemoglobin and hematocrit, blood   Result Value Ref Range    Hemoglobin 12.5 11.5 - 15.5 g/dL    Hematocrit 37.1 34.0 - 48.0 %   Troponin   Result Value Ref Range    Troponin <0.01 0.00 - 0.03 ng/mL   EKG 12 Lead   Result Value Ref Range    Ventricular Rate 76 BPM    Atrial Rate 76 BPM    P-R Interval 172 ms    QRS Duration 76 ms    Q-T Interval 388 ms    QTc Calculation (Bazett) 436 ms    P Axis 66 degrees    R Axis 37 degrees    T Axis 52 degrees   ,       RADIOLOGY:  Interpreted by Radiologist unless otherwise specified  No orders to display         EKG Interpretation  Interpreted by emergency department physician, Dr. Radha Haq     Date of EK20  Time: 0800    Rhythm: normal sinus   Rate: normal  Axis: normal  Conduction: normal  ST Segments: no acute change  T Waves: no acute change    Clinical Impression: Sinus rhythm, no acute ischemic changes    Comparison to prior EKG: stable as compared to patient's most recent EKG      ------------------------- NURSING NOTES AND VITALS REVIEWED ---------------------------   The nursing notes within the ED encounter and vital signs as below have been reviewed by myself  BP (!) 136/92   Pulse 69   Temp 97.7 °F (36.5 °C) (Temporal) Resp 12   Ht 5' 4\" (1.626 m)   Wt 180 lb (81.6 kg)   SpO2 99%   Breastfeeding No   BMI 30.90 kg/m²     Oxygen Saturation Interpretation: Normal    The patients available past medical records and past encounters were reviewed. ------------------------------ ED COURSE/MEDICAL DECISION MAKING----------------------  Medications   aluminum & magnesium hydroxide-simethicone (MAALOX) 30 mL, lidocaine viscous hcl (XYLOCAINE) 5 mL (GI COCKTAIL) ( Oral Given 2/11/20 3764)   pantoprazole (PROTONIX) injection 40 mg (40 mg Intravenous Given 2/11/20 0914)     And   sodium chloride (PF) 0.9 % injection 10 mL (10 mLs Intravenous Given 2/11/20 0916)   LORazepam (ATIVAN) tablet 1 mg (1 mg Oral Given 2/11/20 1024)              Medical Decision Making:   Testing reassuring  Trop neg today and was negative yesterday  ddimer negative <12 hrs ago  Other testing reassuring  No indication for abdominal imaging  Sx improving with GI meds in the ED  She is hemodynamically stable. MI ruled out with 2 negative troponin  Will recommend supportive care        Re-Evaluations:  ED Course as of Feb 11 1445   Tue Feb 11, 2020   1011 Digital exam: no blood. Hemoccult negative   Hemoglobin and hematocrit, blood:    Hemoglobin Quant 12.5   Hematocrit 37.1 [TP]      ED Course User Index  [TP] Rosaura Kaiser MD              This patient's ED course included: a personal history and physicial examination, re-evaluation prior to disposition, IV medications, cardiac monitoring, continuous pulse oximetry and complex medical decision making and emergency management    This patient has remained hemodynamically stable during their ED course. Counseling: The emergency provider has spoken with the patient and discussed todays results, in addition to providing specific details for the plan of care and counseling regarding the diagnosis and prognosis. Questions are answered at this time and they are agreeable with the plan. --------------------------------- IMPRESSION AND DISPOSITION ---------------------------------    IMPRESSION  1. Chronic gastric ulcer without hemorrhage and without perforation    2. Abdominal pain, generalized    3. Diarrhea, unspecified type        DISPOSITION  Disposition: Discharge to home  Patient condition is good        NOTE: This report was transcribed using voice recognition software.  Every effort was made to ensure accuracy; however, inadvertent computerized transcription errors may be present        Trudy Veronica MD  02/11/20 6093

## 2020-02-11 NOTE — ED PROVIDER NOTES
normal.   Eyes:      Conjunctiva/sclera: Conjunctivae normal.   Neck:      Musculoskeletal: Normal range of motion and neck supple. Cardiovascular:      Rate and Rhythm: Normal rate and regular rhythm. Heart sounds: Normal heart sounds. No murmur. No friction rub. No gallop. Pulmonary:      Effort: Pulmonary effort is normal.      Breath sounds: Normal breath sounds. Abdominal:      General: Bowel sounds are normal. There is no distension. Palpations: Abdomen is soft. Tenderness: There is abdominal tenderness. There is no guarding or rebound. Skin:     General: Skin is warm and dry. Capillary Refill: Capillary refill takes less than 2 seconds. Neurological:      Mental Status: She is alert and oriented to person, place, and time. Procedures     MDM  Number of Diagnoses or Management Options  Diagnosis management comments: Patient is 54years old female, with significant history of gastric ulcer with bleeding, anxiety disorder, depression, history of gastric bypass 2008, presented to ER with nausea, vomiting, upper abdominal pain along with anxiety. Troponin was negative, EKG no significant change. Place her on GI cocktail, Protonix, and follow another troponin. ED Course as of Feb 11 1123   Tue Feb 11, 2020   1011 Digital exam: no blood. Hemoccult negative   Hemoglobin and hematocrit, blood:    Hemoglobin Quant 12.5   Hematocrit 37.1 [TP]      ED Course User Index  [TP] Adithya Sullivan MD      --------------------------------------------- PAST HISTORY ---------------------------------------------  Past Medical History:  has a past medical history of Anorexia, Arthritis of knee, Back pain, chronic, Chronic pain, H/O gastric bypass, Hypertension, Hypertension, Metabolic acidosis, Ovarian cyst, and Seizures ([facility] [address] Past Surgical History:  has a past surgical history that includes Jeannine-en-Y Gastric Bypass (12/2/2008); Endometrial ablation (2003);  Tonsillectomy (1980); perforation    2. Abdominal pain, generalized    3. Diarrhea, unspecified type        Disposition:  Patient's disposition: Discharge to home  Patient's condition is stable.          Adithya Sullivan MD  Resident  02/11/20 6549

## 2020-03-08 ENCOUNTER — HOSPITAL ENCOUNTER (EMERGENCY)
Age: 50
Discharge: HOME | End: 2020-03-08
Payer: COMMERCIAL

## 2020-03-08 VITALS
HEART RATE: 63 BPM | OXYGEN SATURATION: 98 % | RESPIRATION RATE: 16 BRPM | SYSTOLIC BLOOD PRESSURE: 130 MMHG | WEIGHT: 180 LBS | BODY MASS INDEX: 30.9 KG/M2 | DIASTOLIC BLOOD PRESSURE: 84 MMHG | TEMPERATURE: 97.9 F

## 2020-03-08 PROCEDURE — 99282 EMERGENCY DEPT VISIT SF MDM: CPT

## 2020-03-08 RX ORDER — CEPHALEXIN 500 MG/1
500 CAPSULE ORAL 4 TIMES DAILY
Qty: 28 CAPSULE | Refills: 0 | Status: SHIPPED | OUTPATIENT
Start: 2020-03-08 | End: 2020-03-15

## 2020-03-08 RX ORDER — IBUPROFEN 800 MG/1
800 TABLET ORAL EVERY 8 HOURS PRN
Qty: 30 TABLET | Refills: 1 | Status: SHIPPED | OUTPATIENT
Start: 2020-03-08 | End: 2020-04-29

## 2020-03-08 ASSESSMENT — PAIN DESCRIPTION - ORIENTATION: ORIENTATION: LEFT

## 2020-03-08 ASSESSMENT — PAIN DESCRIPTION - PAIN TYPE: TYPE: ACUTE PAIN

## 2020-03-08 ASSESSMENT — PAIN DESCRIPTION - LOCATION: LOCATION: FINGER (COMMENT WHICH ONE)

## 2020-03-08 ASSESSMENT — PAIN DESCRIPTION - DESCRIPTORS: DESCRIPTORS: ACHING

## 2020-03-08 NOTE — ED PROVIDER NOTES
[facility]     Department of Emergency Medicine   ED  Provider Note  Admit Date/RoomTime: 3/8/2020  1:47 PM  ED Room: 03/03  Chief Complaint   Other (left index finger pain and swelling beginning 2 days ago)    History of Present Illness   Source of history provided by:  patient. History/Exam Limitations: none. Blake Denton is a 54 y.o. old female who has a past medical history of:   Past Medical History:   Diagnosis Date    Anorexia 6/1/2015    Arthritis of knee     Back pain, chronic     Chronic pain 6/1/2015    H/O gastric bypass 6/1/2015    Hypertension 1/1/2012    Hypertension 4/7/8373    Metabolic acidosis 7/2/8857    Ovarian cyst     Seizures ([address]     presents to the emergency department by private vehicle, for tender area on Left Index Finger which occured 1 day(s) prior to arrival.  Since onset the symptoms have been worsening, associated with pain and swelling and mild in severity. There is not a possibility of retained foreign body in the affected area. The patients tetanus status is up to date. She has a history of no prior episodes. Patient states she recently got acrylic nails applied approximately 1 week ago. She stated she started having pain and swelling to her index finger starting yesterday. ROS    Pertinent positives and negatives are stated within HPI, all other systems reviewed and are negative.     Past Surgical History:   Procedure Laterality Date    CHOLECYSTECTOMY, LAPAROSCOPIC  9/19/1995    [facility]    ENDOMETRIAL [facility]BLATION  2003    [facility]    GASTRIC BYPASS SURGERY      HERNIA REPAIR      umbil    HYSTERECTOMY Left 2/5/2013    Laparotomy;HUGO;JOSUÉ:LSO    LAPAROSCOPY  2/12/2009    lap assisted percutaneous ERCP and removal CBD stone and gastrostomy, [address]  12/28/11    exp lap, hugo, rt so/ repair hernia    EMILY-EN-Y GASTRIC BYPASS  12/2/2008    laparoscopic RYGB, Dr. Lennox Cardoso, [facility] results, in addition to providing specific details for the plan of care and counseling regarding the diagnosis and prognosis. Questions are answered at this time and they are agreeable with the plan. Assessment      1. Paronychia of finger of left hand      Plan   Discharge to home  Patient condition is good    New Medications     Discharge Medication List as of 3/8/2020  2:03 PM      START taking these medications    Details   cephALEXin (KEFLEX) 500 MG capsule Take 1 capsule by mouth 4 times daily for 7 days, Disp-28 capsule, R-0Print      ibuprofen (IBU) 800 MG tablet Take 1 tablet by mouth every 8 hours as needed for Pain Take with food. , Disp-30 tablet, R-1Print           Electronically signed by DA Hoover NP   DD: 3/8/20  **This report was transcribed using voice recognition software. Every effort was made to ensure accuracy; however, inadvertent computerized transcription errors may be present.   END OF ED PROVIDER NOTE      DA Romano NP  03/08/20 1532

## 2020-04-27 ENCOUNTER — TELEPHONE (OUTPATIENT)
Dept: ADMINISTRATIVE | Age: 50
End: 2020-04-27

## 2020-04-28 ENCOUNTER — TELEPHONE (OUTPATIENT)
Dept: FAMILY MEDICINE CLINIC | Age: 50
End: 2020-04-28

## 2020-04-29 ENCOUNTER — OFFICE VISIT (OUTPATIENT)
Dept: FAMILY MEDICINE CLINIC | Age: 50
End: 2020-04-29
Payer: COMMERCIAL

## 2020-04-29 ENCOUNTER — HOSPITAL ENCOUNTER (OUTPATIENT)
Age: 50
Discharge: HOME | End: 2020-05-01
Payer: COMMERCIAL

## 2020-04-29 VITALS
HEART RATE: 65 BPM | WEIGHT: 184.7 LBS | SYSTOLIC BLOOD PRESSURE: 170 MMHG | OXYGEN SATURATION: 99 % | RESPIRATION RATE: 16 BRPM | HEIGHT: 64 IN | DIASTOLIC BLOOD PRESSURE: 102 MMHG | TEMPERATURE: 97.8 F | BODY MASS INDEX: 31.53 KG/M2

## 2020-04-29 LAB
ALBUMIN SERPL-MCNC: 4.3 G/DL (ref 3.5–5.2)
ALP BLD-CCNC: 134 U/L (ref 35–104)
ALT SERPL-CCNC: 13 U/L (ref 0–32)
ANION GAP SERPL CALCULATED.3IONS-SCNC: 16 MMOL/L (ref 7–16)
AST SERPL-CCNC: 22 U/L (ref 0–31)
BASOPHILS ABSOLUTE: 0.02 E9/L (ref 0–0.2)
BASOPHILS RELATIVE PERCENT: 0.4 % (ref 0–2)
BILIRUB SERPL-MCNC: 0.6 MG/DL (ref 0–1.2)
BUN BLDV-MCNC: 12 MG/DL (ref 6–20)
CALCIUM SERPL-MCNC: 9.4 MG/DL (ref 8.6–10.2)
CHLORIDE BLD-SCNC: 111 MMOL/L (ref 98–107)
CHOLESTEROL, TOTAL: 185 MG/DL (ref 0–199)
CO2: 17 MMOL/L (ref 22–29)
CREAT SERPL-MCNC: 0.8 MG/DL (ref 0.5–1)
EOSINOPHILS ABSOLUTE: 0.06 E9/L (ref 0.05–0.5)
EOSINOPHILS RELATIVE PERCENT: 1.3 % (ref 0–6)
FOLATE: 19.9 NG/ML (ref 4.8–24.2)
GFR AFRICAN AMERICAN: >60
GFR NON-AFRICAN AMERICAN: >60 ML/MIN/1.73
GLUCOSE BLD-MCNC: 96 MG/DL (ref 74–99)
HBA1C MFR BLD: 4.8 % (ref 4–5.6)
HCT VFR BLD CALC: 35.4 % (ref 34–48)
HDLC SERPL-MCNC: 40 MG/DL
HEMOGLOBIN: 11.9 G/DL (ref 11.5–15.5)
IMMATURE GRANULOCYTES #: 0.01 E9/L
IMMATURE GRANULOCYTES %: 0.2 % (ref 0–5)
LDL CHOLESTEROL CALCULATED: 120 MG/DL (ref 0–99)
LYMPHOCYTES ABSOLUTE: 1.57 E9/L (ref 1.5–4)
LYMPHOCYTES RELATIVE PERCENT: 33.8 % (ref 20–42)
MCH RBC QN AUTO: 30.5 PG (ref 26–35)
MCHC RBC AUTO-ENTMCNC: 33.6 % (ref 32–34.5)
MCV RBC AUTO: 90.8 FL (ref 80–99.9)
MONOCYTES ABSOLUTE: 0.33 E9/L (ref 0.1–0.95)
MONOCYTES RELATIVE PERCENT: 7.1 % (ref 2–12)
NEUTROPHILS ABSOLUTE: 2.65 E9/L (ref 1.8–7.3)
NEUTROPHILS RELATIVE PERCENT: 57.2 % (ref 43–80)
PDW BLD-RTO: 13.2 FL (ref 11.5–15)
PLATELET # BLD: 191 E9/L (ref 130–450)
PMV BLD AUTO: 11.5 FL (ref 7–12)
POTASSIUM SERPL-SCNC: 4 MMOL/L (ref 3.5–5)
RBC # BLD: 3.9 E12/L (ref 3.5–5.5)
SODIUM BLD-SCNC: 144 MMOL/L (ref 132–146)
T4 FREE: 1.07 NG/DL (ref 0.93–1.7)
TOTAL PROTEIN: 7.6 G/DL (ref 6.4–8.3)
TRIGL SERPL-MCNC: 127 MG/DL (ref 0–149)
TSH SERPL DL<=0.05 MIU/L-ACNC: 0.72 UIU/ML (ref 0.27–4.2)
VITAMIN B-12: 1062 PG/ML (ref 211–946)
VITAMIN D 25-HYDROXY: 17 NG/ML (ref 30–100)
VLDLC SERPL CALC-MCNC: 25 MG/DL
WBC # BLD: 4.6 E9/L (ref 4.5–11.5)

## 2020-04-29 PROCEDURE — 82306 VITAMIN D 25 HYDROXY: CPT

## 2020-04-29 PROCEDURE — 85025 COMPLETE CBC W/AUTO DIFF WBC: CPT

## 2020-04-29 PROCEDURE — 99214 OFFICE O/P EST MOD 30 MIN: CPT | Performed by: NURSE PRACTITIONER

## 2020-04-29 PROCEDURE — G8431 POS CLIN DEPRES SCRN F/U DOC: HCPCS | Performed by: NURSE PRACTITIONER

## 2020-04-29 PROCEDURE — 1036F TOBACCO NON-USER: CPT | Performed by: NURSE PRACTITIONER

## 2020-04-29 PROCEDURE — 80053 COMPREHEN METABOLIC PANEL: CPT

## 2020-04-29 PROCEDURE — 84443 ASSAY THYROID STIM HORMONE: CPT

## 2020-04-29 PROCEDURE — G8427 DOCREV CUR MEDS BY ELIG CLIN: HCPCS | Performed by: NURSE PRACTITIONER

## 2020-04-29 PROCEDURE — 84439 ASSAY OF FREE THYROXINE: CPT

## 2020-04-29 PROCEDURE — 81002 URINALYSIS NONAUTO W/O SCOPE: CPT | Performed by: NURSE PRACTITIONER

## 2020-04-29 PROCEDURE — 96160 PT-FOCUSED HLTH RISK ASSMT: CPT | Performed by: NURSE PRACTITIONER

## 2020-04-29 PROCEDURE — 82607 VITAMIN B-12: CPT

## 2020-04-29 PROCEDURE — 80061 LIPID PANEL: CPT

## 2020-04-29 PROCEDURE — 36415 COLL VENOUS BLD VENIPUNCTURE: CPT

## 2020-04-29 PROCEDURE — G8417 CALC BMI ABV UP PARAM F/U: HCPCS | Performed by: NURSE PRACTITIONER

## 2020-04-29 PROCEDURE — 82746 ASSAY OF FOLIC ACID SERUM: CPT

## 2020-04-29 PROCEDURE — 83036 HEMOGLOBIN GLYCOSYLATED A1C: CPT

## 2020-04-29 ASSESSMENT — ENCOUNTER SYMPTOMS
SORE THROAT: 0
SINUS PAIN: 0
BLOOD IN STOOL: 1
NAUSEA: 1
COUGH: 0
SHORTNESS OF BREATH: 1
COLOR CHANGE: 0
WHEEZING: 0
VOMITING: 0
EYE PAIN: 0
CONSTIPATION: 0
CHEST TIGHTNESS: 0
BACK PAIN: 1
ABDOMINAL DISTENTION: 1
DIARRHEA: 1
ABDOMINAL PAIN: 1

## 2020-04-29 ASSESSMENT — PATIENT HEALTH QUESTIONNAIRE - PHQ9
1. LITTLE INTEREST OR PLEASURE IN DOING THINGS: 3
2. FEELING DOWN, DEPRESSED OR HOPELESS: 3
3. TROUBLE FALLING OR STAYING ASLEEP: 3
SUM OF ALL RESPONSES TO PHQ QUESTIONS 1-9: 23
8. MOVING OR SPEAKING SO SLOWLY THAT OTHER PEOPLE COULD HAVE NOTICED. OR THE OPPOSITE, BEING SO FIGETY OR RESTLESS THAT YOU HAVE BEEN MOVING AROUND A LOT MORE THAN USUAL: 2
10. IF YOU CHECKED OFF ANY PROBLEMS, HOW DIFFICULT HAVE THESE PROBLEMS MADE IT FOR YOU TO DO YOUR WORK, TAKE CARE OF THINGS AT HOME, OR GET ALONG WITH OTHER PEOPLE: 2
7. TROUBLE CONCENTRATING ON THINGS, SUCH AS READING THE NEWSPAPER OR WATCHING TELEVISION: 3
4. FEELING TIRED OR HAVING LITTLE ENERGY: 3
6. FEELING BAD ABOUT YOURSELF - OR THAT YOU ARE A FAILURE OR HAVE LET YOURSELF OR YOUR FAMILY DOWN: 3
SUM OF ALL RESPONSES TO PHQ9 QUESTIONS 1 & 2: 6
SUM OF ALL RESPONSES TO PHQ QUESTIONS 1-9: 23
9. THOUGHTS THAT YOU WOULD BE BETTER OFF DEAD, OR OF HURTING YOURSELF: 0
5. POOR APPETITE OR OVEREATING: 3

## 2020-04-29 ASSESSMENT — COLUMBIA-SUICIDE SEVERITY RATING SCALE - C-SSRS
2. HAVE YOU ACTUALLY HAD ANY THOUGHTS OF KILLING YOURSELF?: NO
6. HAVE YOU EVER DONE ANYTHING, STARTED TO DO ANYTHING, OR PREPARED TO DO ANYTHING TO END YOUR LIFE?: NO
1. WITHIN THE PAST MONTH, HAVE YOU WISHED YOU WERE DEAD OR WISHED YOU COULD GO TO SLEEP AND NOT WAKE UP?: NO

## 2020-04-29 NOTE — PROGRESS NOTES
Dr. Delia Nation, [address] ENDOSCOPY  10/3/2008    mild gastritis & duodenitis, Dr. Delia Nation, [address] ENDOSCOPY  1/9/2009    multiple severe anastomotic jejunal ulcers, Dr. Delia Nation.  [facility]    UPPER GASTROINTESTINAL ENDOSCOPY  2/4/2009    ulcers healed, normal, Dr. Alma Diggs, [address] ENDOSCOPY  3/6/2009    recurrent severe anastomotic jejunal ulcers, Dr. Delia Nation, [facility]    UPPER GASTROINTESTINAL ENDOSCOPY  6/4/2009    severe anastomotic jejunal ulcers, Dr. Delia Nation, [address] ENDOSCOPY  7/2/2009    severe anastomotic jejunal ulcers, Dr. Delia Nation, [address] ENDOSCOPY  10/27/2009    severe anastomotic jejunal ulcers, Dr. Delia Nation, [facility]    UPPER GASTROINTESTINAL ENDOSCOPY  1/4/2010    severe anastomotic jejunal ulcers, Dr. Delia Nation, [facility]    UPPER GASTROINTESTINAL ENDOSCOPY  6/7/2010    severe anastomotic jejunal ulcers, Dr. Delia Nation, [facility]    UPPER GASTROINTESTINAL ENDOSCOPY  7/30/2010    gastritis, Dr. Delia Nation, [facility]    UPPER GASTROINTESTINAL ENDOSCOPY N/A 5/10/2019    EGD ESOPHAGOGASTRODUODENOSCOPY performed by Naye Arguello MD at [address] [place] [address] N/A 5/12/2019    EGD DIAGNOSTIC ONLY performed by Naye Arguello MD at [address] [place] [address] Avenue N/A 5/17/2019    EGD ESOPHAGOGASTRODUODENOSCOPY performed by Drea Borges MD at [facility] ENDOSCOPY      Family History   Problem Relation Age of Onset    ADHD Paternal Grandfather      Social History     Socioeconomic History    Marital status:      Spouse name: Not on file    Number of children: Not on file    Years of education: Not on file    Highest education level: Not on file   Occupational History    Not on file   Social Needs    Financial resource strain: Not on file    Food insecurity     Worry: Not on file     Inability: Not on file    Transportation needs     Medical: Not on file

## 2020-04-30 RX ORDER — ERGOCALCIFEROL 1.25 MG/1
50000 CAPSULE ORAL WEEKLY
Qty: 12 CAPSULE | Refills: 0 | Status: SHIPPED
Start: 2020-04-30 | End: 2020-07-20

## 2020-04-30 RX ORDER — CALCIUM CARBONATE/VITAMIN D3 500MG-5MCG
1 TABLET ORAL 2 TIMES DAILY
Qty: 60 TABLET | Refills: 2 | Status: SHIPPED
Start: 2020-04-30 | End: 2021-03-16 | Stop reason: SDUPTHER

## 2020-04-30 RX ORDER — ELECTROLYTES/DEXTROSE
1 SOLUTION, ORAL ORAL DAILY
Qty: 90 TABLET | Refills: 1 | Status: SHIPPED
Start: 2020-04-30 | End: 2021-03-16 | Stop reason: SDUPTHER

## 2020-05-07 ENCOUNTER — NURSE TRIAGE (OUTPATIENT)
Dept: OTHER | Facility: CLINIC | Age: 50
End: 2020-05-07

## 2020-05-07 ENCOUNTER — TELEPHONE (OUTPATIENT)
Dept: ADMINISTRATIVE | Age: 50
End: 2020-05-07

## 2020-05-07 ENCOUNTER — HOSPITAL ENCOUNTER (EMERGENCY)
Age: 50
Discharge: HOME | End: 2020-05-07
Attending: EMERGENCY MEDICINE
Payer: COMMERCIAL

## 2020-05-07 VITALS
OXYGEN SATURATION: 98 % | SYSTOLIC BLOOD PRESSURE: 196 MMHG | HEIGHT: 64 IN | TEMPERATURE: 97.9 F | DIASTOLIC BLOOD PRESSURE: 92 MMHG | RESPIRATION RATE: 16 BRPM | HEART RATE: 80 BPM | BODY MASS INDEX: 31.41 KG/M2 | WEIGHT: 184 LBS

## 2020-05-07 LAB
ACETAMINOPHEN LEVEL: <5 MCG/ML (ref 10–30)
ALBUMIN SERPL-MCNC: 3.9 G/DL (ref 3.5–5.2)
ALP BLD-CCNC: 137 U/L (ref 35–104)
ALT SERPL-CCNC: 15 U/L (ref 0–32)
AMPHETAMINE SCREEN, URINE: NOT DETECTED
ANION GAP SERPL CALCULATED.3IONS-SCNC: 14 MMOL/L (ref 7–16)
AST SERPL-CCNC: 27 U/L (ref 0–31)
BACTERIA: NORMAL /HPF
BARBITURATE SCREEN URINE: NOT DETECTED
BASOPHILS ABSOLUTE: 0.02 E9/L (ref 0–0.2)
BASOPHILS RELATIVE PERCENT: 0.3 % (ref 0–2)
BENZODIAZEPINE SCREEN, URINE: NOT DETECTED
BILIRUB SERPL-MCNC: 0.3 MG/DL (ref 0–1.2)
BILIRUBIN URINE: NEGATIVE
BLOOD, URINE: NEGATIVE
BUN BLDV-MCNC: 10 MG/DL (ref 6–20)
C-REACTIVE PROTEIN: 0.1 MG/DL (ref 0–0.4)
CALCIUM SERPL-MCNC: 8.5 MG/DL (ref 8.6–10.2)
CANNABINOID SCREEN URINE: POSITIVE
CHLORIDE BLD-SCNC: 107 MMOL/L (ref 98–107)
CLARITY: CLEAR
CO2: 19 MMOL/L (ref 22–29)
COCAINE METABOLITE SCREEN URINE: NOT DETECTED
COLOR: YELLOW
CREAT SERPL-MCNC: 0.9 MG/DL (ref 0.5–1)
EOSINOPHILS ABSOLUTE: 0.09 E9/L (ref 0.05–0.5)
EOSINOPHILS RELATIVE PERCENT: 1.5 % (ref 0–6)
ETHANOL: <10 MG/DL (ref 0–0.08)
FENTANYL SCREEN, URINE: NOT DETECTED
GFR AFRICAN AMERICAN: >60
GFR NON-AFRICAN AMERICAN: >60 ML/MIN/1.73
GLUCOSE BLD-MCNC: 132 MG/DL (ref 74–99)
GLUCOSE URINE: NEGATIVE MG/DL
HCT VFR BLD CALC: 32.9 % (ref 34–48)
HEMOGLOBIN: 11 G/DL (ref 11.5–15.5)
IMMATURE GRANULOCYTES #: 0.01 E9/L
IMMATURE GRANULOCYTES %: 0.2 % (ref 0–5)
KETONES, URINE: NEGATIVE MG/DL
LEUKOCYTE ESTERASE, URINE: ABNORMAL
LYMPHOCYTES ABSOLUTE: 1.6 E9/L (ref 1.5–4)
LYMPHOCYTES RELATIVE PERCENT: 27 % (ref 20–42)
Lab: ABNORMAL
MAGNESIUM: 1.8 MG/DL (ref 1.6–2.6)
MCH RBC QN AUTO: 30.8 PG (ref 26–35)
MCHC RBC AUTO-ENTMCNC: 33.4 % (ref 32–34.5)
MCV RBC AUTO: 92.2 FL (ref 80–99.9)
METHADONE SCREEN, URINE: NOT DETECTED
MONOCYTES ABSOLUTE: 0.34 E9/L (ref 0.1–0.95)
MONOCYTES RELATIVE PERCENT: 5.7 % (ref 2–12)
NEUTROPHILS ABSOLUTE: 3.86 E9/L (ref 1.8–7.3)
NEUTROPHILS RELATIVE PERCENT: 65.3 % (ref 43–80)
NITRITE, URINE: NEGATIVE
OPIATE SCREEN URINE: NOT DETECTED
OXYCODONE URINE: NOT DETECTED
PDW BLD-RTO: 13.1 FL (ref 11.5–15)
PH UA: 6.5 (ref 5–9)
PHENCYCLIDINE SCREEN URINE: NOT DETECTED
PLATELET # BLD: 198 E9/L (ref 130–450)
PMV BLD AUTO: 11.4 FL (ref 7–12)
POTASSIUM REFLEX MAGNESIUM: 3.2 MMOL/L (ref 3.5–5)
PRO-BNP: 162 PG/ML (ref 0–125)
PROTEIN UA: NEGATIVE MG/DL
RBC # BLD: 3.57 E12/L (ref 3.5–5.5)
RBC UA: NORMAL /HPF (ref 0–2)
SALICYLATE, SERUM: 3 MG/DL (ref 0–30)
SEDIMENTATION RATE, ERYTHROCYTE: 16 MM/HR (ref 0–20)
SODIUM BLD-SCNC: 140 MMOL/L (ref 132–146)
SPECIFIC GRAVITY UA: <=1.005 (ref 1–1.03)
TOTAL PROTEIN: 7.1 G/DL (ref 6.4–8.3)
TRICYCLIC ANTIDEPRESSANTS SCREEN SERUM: NEGATIVE NG/ML
UROBILINOGEN, URINE: 0.2 E.U./DL
WBC # BLD: 5.9 E9/L (ref 4.5–11.5)
WBC UA: NORMAL /HPF (ref 0–5)

## 2020-05-07 PROCEDURE — 83880 ASSAY OF NATRIURETIC PEPTIDE: CPT

## 2020-05-07 PROCEDURE — 93005 ELECTROCARDIOGRAM TRACING: CPT | Performed by: EMERGENCY MEDICINE

## 2020-05-07 PROCEDURE — 80053 COMPREHEN METABOLIC PANEL: CPT

## 2020-05-07 PROCEDURE — 6360000002 HC RX W HCPCS: Performed by: EMERGENCY MEDICINE

## 2020-05-07 PROCEDURE — 83735 ASSAY OF MAGNESIUM: CPT

## 2020-05-07 PROCEDURE — 99284 EMERGENCY DEPT VISIT MOD MDM: CPT

## 2020-05-07 PROCEDURE — G0480 DRUG TEST DEF 1-7 CLASSES: HCPCS

## 2020-05-07 PROCEDURE — 81001 URINALYSIS AUTO W/SCOPE: CPT

## 2020-05-07 PROCEDURE — 85651 RBC SED RATE NONAUTOMATED: CPT

## 2020-05-07 PROCEDURE — 85025 COMPLETE CBC W/AUTO DIFF WBC: CPT

## 2020-05-07 PROCEDURE — 86140 C-REACTIVE PROTEIN: CPT

## 2020-05-07 PROCEDURE — 96374 THER/PROPH/DIAG INJ IV PUSH: CPT

## 2020-05-07 PROCEDURE — 80307 DRUG TEST PRSMV CHEM ANLYZR: CPT

## 2020-05-07 PROCEDURE — 96375 TX/PRO/DX INJ NEW DRUG ADDON: CPT

## 2020-05-07 RX ORDER — IBUPROFEN 400 MG/1
400 TABLET ORAL EVERY 6 HOURS PRN
Qty: 30 TABLET | Refills: 0 | Status: SHIPPED | OUTPATIENT
Start: 2020-05-07 | End: 2020-06-01

## 2020-05-07 RX ORDER — PREDNISONE 50 MG/1
50 TABLET ORAL DAILY
Qty: 5 TABLET | Refills: 0 | Status: SHIPPED | OUTPATIENT
Start: 2020-05-07 | End: 2020-05-12

## 2020-05-07 RX ORDER — METHYLPREDNISOLONE SODIUM SUCCINATE 125 MG/2ML
125 INJECTION, POWDER, LYOPHILIZED, FOR SOLUTION INTRAMUSCULAR; INTRAVENOUS ONCE
Status: COMPLETED | OUTPATIENT
Start: 2020-05-07 | End: 2020-05-07

## 2020-05-07 RX ORDER — FUROSEMIDE 10 MG/ML
40 INJECTION INTRAMUSCULAR; INTRAVENOUS ONCE
Status: COMPLETED | OUTPATIENT
Start: 2020-05-07 | End: 2020-05-07

## 2020-05-07 RX ORDER — KETOROLAC TROMETHAMINE 30 MG/ML
15 INJECTION, SOLUTION INTRAMUSCULAR; INTRAVENOUS ONCE
Status: COMPLETED | OUTPATIENT
Start: 2020-05-07 | End: 2020-05-07

## 2020-05-07 RX ADMIN — KETOROLAC TROMETHAMINE 15 MG: 30 INJECTION, SOLUTION INTRAMUSCULAR at 17:46

## 2020-05-07 RX ADMIN — METHYLPREDNISOLONE SODIUM SUCCINATE 125 MG: 125 INJECTION, POWDER, FOR SOLUTION INTRAMUSCULAR; INTRAVENOUS at 17:46

## 2020-05-07 RX ADMIN — FUROSEMIDE 40 MG: 10 INJECTION, SOLUTION INTRAMUSCULAR; INTRAVENOUS at 17:46

## 2020-05-07 ASSESSMENT — ENCOUNTER SYMPTOMS
SORE THROAT: 0
SWOLLEN GLANDS: 0
EYE PAIN: 0
EYE REDNESS: 0
VOMITING: 0
DIARRHEA: 0
ABDOMINAL PAIN: 0
ORTHOPNEA: 0
PHOTOPHOBIA: 0
NAUSEA: 0
RHINORRHEA: 0
STRIDOR: 0
COUGH: 0
EYE ITCHING: 0
EYE DISCHARGE: 0
RESPIRATORY NEGATIVE: 1
WHEEZING: 0
DOUBLE VISION: 0
CONSTIPATION: 0
GASTROINTESTINAL NEGATIVE: 1

## 2020-05-07 ASSESSMENT — PAIN SCALES - GENERAL
PAINLEVEL_OUTOF10: 5
PAINLEVEL_OUTOF10: 6

## 2020-05-07 ASSESSMENT — PAIN DESCRIPTION - PAIN TYPE: TYPE: ACUTE PAIN

## 2020-05-07 NOTE — ED PROVIDER NOTES
1.80 - 7.30 E9/L    Immature Granulocytes # 0.01 E9/L    Lymphocytes Absolute 1.60 1.50 - 4.00 E9/L    Monocytes Absolute 0.34 0.10 - 0.95 E9/L    Eosinophils Absolute 0.09 0.05 - 0.50 E9/L    Basophils Absolute 0.02 0.00 - 0.20 E9/L   Comprehensive Metabolic Panel w/ Reflex to MG   Result Value Ref Range    Sodium 140 132 - 146 mmol/L    Potassium reflex Magnesium 3.2 (L) 3.5 - 5.0 mmol/L    Chloride 107 98 - 107 mmol/L    CO2 19 (L) 22 - 29 mmol/L    Anion Gap 14 7 - 16 mmol/L    Glucose 132 (H) 74 - 99 mg/dL    BUN 10 6 - 20 mg/dL    CREATININE 0.9 0.5 - 1.0 mg/dL    GFR Non-African American >60 >=60 mL/min/1.73    GFR African American >60     Calcium 8.5 (L) 8.6 - 10.2 mg/dL    Total Protein 7.1 6.4 - 8.3 g/dL    Alb 3.9 3.5 - 5.2 g/dL    Total Bilirubin 0.3 0.0 - 1.2 mg/dL    Alkaline Phosphatase 137 (H) 35 - 104 U/L    ALT 15 0 - 32 U/L    AST 27 0 - 31 U/L   Brain Natriuretic Peptide   Result Value Ref Range    Pro- (H) 0 - 125 pg/mL   Urinalysis, reflex to microscopic   Result Value Ref Range    Color, UA Yellow Straw/Yellow    Clarity, UA Clear Clear    Glucose, Ur Negative Negative mg/dL    Bilirubin Urine Negative Negative    Ketones, Urine Negative Negative mg/dL    Specific Gravity, UA <=1.005 1.005 - 1.030    Blood, Urine Negative Negative    pH, UA 6.5 5.0 - 9.0    Protein, UA Negative Negative mg/dL    Urobilinogen, Urine 0.2 <2.0 E.U./dL    Nitrite, Urine Negative Negative    Leukocyte Esterase, Urine TRACE (A) Negative   Serum Drug Screen   Result Value Ref Range    Ethanol Lvl <10 mg/dL    Acetaminophen Level <5.0 (L) 10.0 - 90.4 mcg/mL    Salicylate, Serum 3.0 0.0 - 30.0 mg/dL   Urine Drug Screen   Result Value Ref Range    Amphetamine Screen, Urine NOT DETECTED Negative <1000 ng/mL    Barbiturate Screen, Ur NOT DETECTED Negative < 200 ng/mL    Benzodiazepine Screen, Urine NOT DETECTED Negative < 200 ng/mL    Cannabinoid Scrn, Ur POSITIVE (A) Negative < 50ng/mL    Cocaine Metabolite

## 2020-05-08 ENCOUNTER — TELEPHONE (OUTPATIENT)
Dept: PRIMARY CARE CLINIC | Age: 50
End: 2020-05-08

## 2020-05-08 LAB
EKG ATRIAL RATE: 74 BPM
EKG P AXIS: 54 DEGREES
EKG P-R INTERVAL: 176 MS
EKG Q-T INTERVAL: 408 MS
EKG QRS DURATION: 88 MS
EKG QTC CALCULATION (BAZETT): 452 MS
EKG R AXIS: 23 DEGREES
EKG T AXIS: 26 DEGREES
EKG VENTRICULAR RATE: 74 BPM

## 2020-05-08 PROCEDURE — 93010 ELECTROCARDIOGRAM REPORT: CPT | Performed by: INTERNAL MEDICINE

## 2020-05-08 NOTE — TELEPHONE ENCOUNTER
[facility] ED Follow Up Call    ED Visit Date:  2020    Patient: Suzy Speaker Patient : 1970     DA Smith CNP     Spoke with: Patient    Interactive Patient Contact:  Was patient able to fill all prescriptions: yes  Is patient taking all medications as directed on the After Visit Summary? yes  Does patient understand their visit instructions: Yes  Does patient have questions or concerns that need addressed?: no    Current patient status and patient narrative:  improved    Self-care instructions reviewed:  yes    Face to face required?  yes, follow up with PCP  Able to participate in virtual visit? yes, Patient does have a smart phone and is able to participate in VV. Patient has a appointment on 2020.     ED summary reviewed, including labs, tests and important procedures:yes        Cole Tabor RN   20

## 2020-05-31 ASSESSMENT — ENCOUNTER SYMPTOMS
DIARRHEA: 0
COUGH: 0
WHEEZING: 0
SINUS PAIN: 0
CHEST TIGHTNESS: 0
COLOR CHANGE: 0
EYE PAIN: 0
VOMITING: 0
SHORTNESS OF BREATH: 0
CONSTIPATION: 0

## 2020-06-01 ENCOUNTER — OFFICE VISIT (OUTPATIENT)
Dept: FAMILY MEDICINE CLINIC | Age: 50
End: 2020-06-01
Payer: COMMERCIAL

## 2020-06-01 VITALS
SYSTOLIC BLOOD PRESSURE: 148 MMHG | OXYGEN SATURATION: 96 % | DIASTOLIC BLOOD PRESSURE: 92 MMHG | HEIGHT: 64 IN | HEART RATE: 76 BPM | WEIGHT: 186.2 LBS | RESPIRATION RATE: 16 BRPM | BODY MASS INDEX: 31.79 KG/M2 | TEMPERATURE: 98.6 F

## 2020-06-01 PROCEDURE — G8427 DOCREV CUR MEDS BY ELIG CLIN: HCPCS | Performed by: NURSE PRACTITIONER

## 2020-06-01 PROCEDURE — G8417 CALC BMI ABV UP PARAM F/U: HCPCS | Performed by: NURSE PRACTITIONER

## 2020-06-01 PROCEDURE — 1036F TOBACCO NON-USER: CPT | Performed by: NURSE PRACTITIONER

## 2020-06-01 PROCEDURE — 99214 OFFICE O/P EST MOD 30 MIN: CPT | Performed by: NURSE PRACTITIONER

## 2020-06-01 RX ORDER — CLONIDINE HYDROCHLORIDE 0.1 MG/1
0.1 TABLET ORAL ONCE
Status: COMPLETED | OUTPATIENT
Start: 2020-06-01 | End: 2020-06-01

## 2020-06-01 RX ORDER — HYDROCHLOROTHIAZIDE 12.5 MG/1
12.5 CAPSULE, GELATIN COATED ORAL DAILY
Qty: 30 CAPSULE | Refills: 0 | Status: SHIPPED
Start: 2020-06-01 | End: 2020-06-27 | Stop reason: SDUPTHER

## 2020-06-01 RX ORDER — CLONIDINE HYDROCHLORIDE 0.1 MG/1
0.1 TABLET ORAL 2 TIMES DAILY
Status: DISCONTINUED | OUTPATIENT
Start: 2020-06-01 | End: 2020-06-01

## 2020-06-01 RX ORDER — METOPROLOL SUCCINATE 25 MG/1
25 TABLET, EXTENDED RELEASE ORAL DAILY
Qty: 30 TABLET | Refills: 0 | Status: SHIPPED
Start: 2020-06-01 | End: 2020-06-27 | Stop reason: SDUPTHER

## 2020-06-01 RX ADMIN — CLONIDINE HYDROCHLORIDE 0.1 MG: 0.1 TABLET ORAL at 14:11

## 2020-06-01 ASSESSMENT — ENCOUNTER SYMPTOMS
BACK PAIN: 0
NAUSEA: 1

## 2020-06-01 NOTE — PROGRESS NOTES
file     Attends Taoism service: Not on file     Active member of club or organization: Not on file     Attends meetings of clubs or organizations: Not on file     Relationship status: Not on file    Intimate partner violence     Fear of current or ex partner: Not on file     Emotionally abused: Not on file     Physically abused: Not on file     Forced sexual activity: Not on file   Other Topics Concern    Not on file   Social History Narrative    Not on file      Allergies   Allergen Reactions    Ultram [Tramadol] Other (See Comments)     Pt states she had a seizure after taking ultram      Prior to Visit Medications    Medication Sig Taking? Authorizing Provider   sertraline (ZOLOFT) 50 MG tablet Take 1 tablet by mouth daily Yes DA Bae CNP   hydroCHLOROthiazide (MICROZIDE) 12.5 MG capsule Take 1 capsule by mouth daily Yes DA Bae CNP   metoprolol succinate (TOPROL XL) 25 MG extended release tablet Take 1 tablet by mouth daily Yes DA Bae CNP   vitamin D (ERGOCALCIFEROL) 1.25 MG (71997 UT) CAPS capsule Take 1 capsule by mouth once a week Take 50,000 IU every Sunday and 2,000 IU everyday except Sunday. Yes DA Bae CNP   Multiple Vitamins-Minerals (MULTIVITAMIN ADULT) TABS Take 1 tablet by mouth daily Yes DA Bae CNP   calcium-cholecalciferol (OYSCO 500 + D) 500-200 MG-UNIT per tablet Take 1 tablet by mouth 2 times daily Yes DA Bae CNP     Review of Systems:    Review of Systems   Constitutional: Negative for activity change, appetite change, chills and fever. HENT: Negative for congestion, ear pain, sinus pain and sneezing. Eyes: Negative for pain and visual disturbance. Respiratory: Negative for cough, chest tightness, shortness of breath and wheezing. Cardiovascular: Positive for leg swelling (Bilateral feet). Negative for chest pain and palpitations. Gastrointestinal: Positive for nausea.  Negative for

## 2020-06-26 RX ORDER — HYDROCHLOROTHIAZIDE 12.5 MG/1
CAPSULE, GELATIN COATED ORAL
Qty: 30 CAPSULE | Refills: 0 | OUTPATIENT
Start: 2020-06-26

## 2020-06-26 RX ORDER — METOPROLOL SUCCINATE 25 MG/1
TABLET, EXTENDED RELEASE ORAL
Qty: 30 TABLET | Refills: 0 | OUTPATIENT
Start: 2020-06-26

## 2020-06-27 RX ORDER — HYDROCHLOROTHIAZIDE 12.5 MG/1
12.5 CAPSULE, GELATIN COATED ORAL DAILY
Qty: 90 CAPSULE | Refills: 0 | Status: SHIPPED
Start: 2020-06-27 | End: 2020-10-03

## 2020-06-27 RX ORDER — METOPROLOL SUCCINATE 25 MG/1
25 TABLET, EXTENDED RELEASE ORAL DAILY
Qty: 90 TABLET | Refills: 0 | Status: SHIPPED
Start: 2020-06-27 | End: 2020-10-03

## 2020-07-20 RX ORDER — ERGOCALCIFEROL 1.25 MG/1
CAPSULE ORAL
Qty: 12 CAPSULE | Refills: 0 | Status: SHIPPED
Start: 2020-07-20 | End: 2020-10-06

## 2020-08-06 ENCOUNTER — APPOINTMENT (OUTPATIENT)
Dept: CT IMAGING | Age: 50
End: 2020-08-06
Payer: COMMERCIAL

## 2020-08-06 ENCOUNTER — HOSPITAL ENCOUNTER (EMERGENCY)
Age: 50
Discharge: HOME | End: 2020-08-06
Attending: STUDENT IN AN ORGANIZED HEALTH CARE EDUCATION/TRAINING PROGRAM
Payer: COMMERCIAL

## 2020-08-06 ENCOUNTER — APPOINTMENT (OUTPATIENT)
Dept: GENERAL RADIOLOGY | Age: 50
End: 2020-08-06
Payer: COMMERCIAL

## 2020-08-06 VITALS
DIASTOLIC BLOOD PRESSURE: 68 MMHG | OXYGEN SATURATION: 100 % | HEART RATE: 62 BPM | BODY MASS INDEX: 30.73 KG/M2 | RESPIRATION RATE: 16 BRPM | SYSTOLIC BLOOD PRESSURE: 137 MMHG | WEIGHT: 180 LBS | HEIGHT: 64 IN | TEMPERATURE: 97.9 F

## 2020-08-06 LAB
ANION GAP SERPL CALCULATED.3IONS-SCNC: 12 MMOL/L (ref 7–16)
BASOPHILS ABSOLUTE: 0.03 E9/L (ref 0–0.2)
BASOPHILS RELATIVE PERCENT: 0.5 % (ref 0–2)
BUN BLDV-MCNC: 11 MG/DL (ref 6–20)
CALCIUM SERPL-MCNC: 9.2 MG/DL (ref 8.6–10.2)
CHLORIDE BLD-SCNC: 104 MMOL/L (ref 98–107)
CO2: 27 MMOL/L (ref 22–29)
CREAT SERPL-MCNC: 0.8 MG/DL (ref 0.5–1)
EOSINOPHILS ABSOLUTE: 0.15 E9/L (ref 0.05–0.5)
EOSINOPHILS RELATIVE PERCENT: 2.5 % (ref 0–6)
GFR AFRICAN AMERICAN: >60
GFR NON-AFRICAN AMERICAN: >60 ML/MIN/1.73
GLUCOSE BLD-MCNC: 87 MG/DL (ref 74–99)
HCT VFR BLD CALC: 36 % (ref 34–48)
HEMOGLOBIN: 12.2 G/DL (ref 11.5–15.5)
IMMATURE GRANULOCYTES #: 0.01 E9/L
IMMATURE GRANULOCYTES %: 0.2 % (ref 0–5)
LYMPHOCYTES ABSOLUTE: 1.58 E9/L (ref 1.5–4)
LYMPHOCYTES RELATIVE PERCENT: 26.1 % (ref 20–42)
MCH RBC QN AUTO: 30.7 PG (ref 26–35)
MCHC RBC AUTO-ENTMCNC: 33.9 % (ref 32–34.5)
MCV RBC AUTO: 90.5 FL (ref 80–99.9)
MONOCYTES ABSOLUTE: 0.32 E9/L (ref 0.1–0.95)
MONOCYTES RELATIVE PERCENT: 5.3 % (ref 2–12)
NEUTROPHILS ABSOLUTE: 3.97 E9/L (ref 1.8–7.3)
NEUTROPHILS RELATIVE PERCENT: 65.4 % (ref 43–80)
PDW BLD-RTO: 13.2 FL (ref 11.5–15)
PLATELET # BLD: 199 E9/L (ref 130–450)
PMV BLD AUTO: 11 FL (ref 7–12)
POTASSIUM SERPL-SCNC: 3.1 MMOL/L (ref 3.5–5)
RBC # BLD: 3.98 E12/L (ref 3.5–5.5)
SODIUM BLD-SCNC: 143 MMOL/L (ref 132–146)
TROPONIN: <0.01 NG/ML (ref 0–0.03)
WBC # BLD: 6.1 E9/L (ref 4.5–11.5)

## 2020-08-06 PROCEDURE — 80048 BASIC METABOLIC PNL TOTAL CA: CPT

## 2020-08-06 PROCEDURE — 72125 CT NECK SPINE W/O DYE: CPT

## 2020-08-06 PROCEDURE — 70450 CT HEAD/BRAIN W/O DYE: CPT

## 2020-08-06 PROCEDURE — 85025 COMPLETE CBC W/AUTO DIFF WBC: CPT

## 2020-08-06 PROCEDURE — 71045 X-RAY EXAM CHEST 1 VIEW: CPT

## 2020-08-06 PROCEDURE — 6370000000 HC RX 637 (ALT 250 FOR IP): Performed by: STUDENT IN AN ORGANIZED HEALTH CARE EDUCATION/TRAINING PROGRAM

## 2020-08-06 PROCEDURE — 72131 CT LUMBAR SPINE W/O DYE: CPT

## 2020-08-06 PROCEDURE — 73030 X-RAY EXAM OF SHOULDER: CPT

## 2020-08-06 PROCEDURE — 6360000002 HC RX W HCPCS: Performed by: STUDENT IN AN ORGANIZED HEALTH CARE EDUCATION/TRAINING PROGRAM

## 2020-08-06 PROCEDURE — 96374 THER/PROPH/DIAG INJ IV PUSH: CPT

## 2020-08-06 PROCEDURE — 93005 ELECTROCARDIOGRAM TRACING: CPT | Performed by: STUDENT IN AN ORGANIZED HEALTH CARE EDUCATION/TRAINING PROGRAM

## 2020-08-06 PROCEDURE — 72128 CT CHEST SPINE W/O DYE: CPT

## 2020-08-06 PROCEDURE — 84484 ASSAY OF TROPONIN QUANT: CPT

## 2020-08-06 PROCEDURE — 2580000003 HC RX 258: Performed by: STUDENT IN AN ORGANIZED HEALTH CARE EDUCATION/TRAINING PROGRAM

## 2020-08-06 PROCEDURE — 96375 TX/PRO/DX INJ NEW DRUG ADDON: CPT

## 2020-08-06 PROCEDURE — 99285 EMERGENCY DEPT VISIT HI MDM: CPT

## 2020-08-06 RX ORDER — KETOROLAC TROMETHAMINE 30 MG/ML
30 INJECTION, SOLUTION INTRAMUSCULAR; INTRAVENOUS ONCE
Status: COMPLETED | OUTPATIENT
Start: 2020-08-06 | End: 2020-08-06

## 2020-08-06 RX ORDER — 0.9 % SODIUM CHLORIDE 0.9 %
1000 INTRAVENOUS SOLUTION INTRAVENOUS ONCE
Status: COMPLETED | OUTPATIENT
Start: 2020-08-06 | End: 2020-08-06

## 2020-08-06 RX ORDER — KETOROLAC TROMETHAMINE 10 MG/1
10 TABLET, FILM COATED ORAL EVERY 6 HOURS PRN
Qty: 20 TABLET | Refills: 0 | Status: SHIPPED | OUTPATIENT
Start: 2020-08-06 | End: 2020-12-14

## 2020-08-06 RX ORDER — METOCLOPRAMIDE HYDROCHLORIDE 5 MG/ML
10 INJECTION INTRAMUSCULAR; INTRAVENOUS ONCE
Status: COMPLETED | OUTPATIENT
Start: 2020-08-06 | End: 2020-08-06

## 2020-08-06 RX ORDER — POTASSIUM CHLORIDE 20 MEQ/1
40 TABLET, EXTENDED RELEASE ORAL ONCE
Status: COMPLETED | OUTPATIENT
Start: 2020-08-06 | End: 2020-08-06

## 2020-08-06 RX ORDER — FENTANYL CITRATE 50 UG/ML
50 INJECTION, SOLUTION INTRAMUSCULAR; INTRAVENOUS ONCE
Status: COMPLETED | OUTPATIENT
Start: 2020-08-06 | End: 2020-08-06

## 2020-08-06 RX ORDER — DIPHENHYDRAMINE HYDROCHLORIDE 50 MG/ML
12.5 INJECTION INTRAMUSCULAR; INTRAVENOUS ONCE
Status: COMPLETED | OUTPATIENT
Start: 2020-08-06 | End: 2020-08-06

## 2020-08-06 RX ADMIN — FENTANYL CITRATE 50 MCG: 50 INJECTION, SOLUTION INTRAMUSCULAR; INTRAVENOUS at 16:24

## 2020-08-06 RX ADMIN — DIPHENHYDRAMINE HYDROCHLORIDE 12.5 MG: 50 INJECTION, SOLUTION INTRAMUSCULAR; INTRAVENOUS at 16:25

## 2020-08-06 RX ADMIN — KETOROLAC TROMETHAMINE 30 MG: 30 INJECTION, SOLUTION INTRAMUSCULAR at 19:02

## 2020-08-06 RX ADMIN — SODIUM CHLORIDE 1000 ML: 9 INJECTION, SOLUTION INTRAVENOUS at 16:23

## 2020-08-06 RX ADMIN — POTASSIUM CHLORIDE 40 MEQ: 20 TABLET, EXTENDED RELEASE ORAL at 19:02

## 2020-08-06 RX ADMIN — METOCLOPRAMIDE 10 MG: 5 INJECTION, SOLUTION INTRAMUSCULAR; INTRAVENOUS at 16:24

## 2020-08-06 ASSESSMENT — PAIN SCALES - GENERAL
PAINLEVEL_OUTOF10: 8
PAINLEVEL_OUTOF10: 3

## 2020-08-06 NOTE — ED PROVIDER NOTES
HPI:  8/6/20, Time: 3:52 PM EDT         Jah Byers is a 52 y.o. female presenting to the ED for head injury and shoulder injury, beginning 2 hours ago. The complaint has been persistent, moderate in severity, and worsened by nothing. Patient states that she was playing with her granddaughter who is 10years old when she fell from a pull-up bar. States that she does not remember falling from the pull-up bar. Simply remembers her granddaughter yelling for help and calling her son on his cell phone. Patient is complaining of right shoulder pain that is causing some mild numbness to her right arm. States that she has some paraspinal right-sided neck pain. She does not know if she passed out. Denies any vomiting. States that she is mildly nauseous with a headache. No visual changes. She is neurologically intact. Patient also is complaining of some mild lateral thigh numbness/tingling on the right side. She also is having diffuse thoracic and lumbar back pain. Patient takes no anticoagulation. She does have a past history of gastric bypass. No other complaints at this time. She took no medications prior to coming emergency department. Patient denies fever/chills, cough, congestion, chest pain, shortness of breath, edema, headache, visual disturbances, focal paresthesias, focal weakness, abdominal pain, vomiting, diarrhea, constipation, dysuria, hematuria, or other complaints. ROS:   Pertinent positives and negatives are stated within HPI, all other systems reviewed and are negative.      --------------------------------------------- PAST HISTORY ---------------------------------------------  Past Medical History:  has a past medical history of Anorexia, Arthritis of knee, Back pain, chronic, Chronic pain, H/O gastric bypass, Hypertension, Hypertension, Metabolic acidosis, Ovarian cyst, and Seizures ([facility] [address]     Past Surgical History:  has a past surgical history that includes Jeannine-en-Y Gastric Bypass (12/2/2008); Endometrial ablation (2003); Tonsillectomy (1980); Cholecystectomy, laparoscopic (9/19/1995); Upper gastrointestinal endoscopy (10/3/2008); Upper gastrointestinal endoscopy (1/9/2009); Upper gastrointestinal endoscopy (2/4/2009); laparoscopy (2/12/2009); Upper gastrointestinal endoscopy (3/6/2009); Upper gastrointestinal endoscopy (6/4/2009); Upper gastrointestinal endoscopy (7/2/2009); Upper gastrointestinal endoscopy (10/27/2009); Upper gastrointestinal endoscopy (1/4/2010); Upper gastrointestinal endoscopy (6/7/2010); Stomach surgery (6/25/2010); Upper gastrointestinal endoscopy (7/30/2010); other surgical history (12/28/11); Gastric bypass surgery; hernia repair; Hysterectomy (Left, 2/5/2013); Upper gastrointestinal endoscopy (N/A, 5/10/2019); Upper gastrointestinal endoscopy (N/A, 5/12/2019); and Upper gastrointestinal endoscopy (N/A, 5/17/2019). Social History:  reports that she quit smoking about 14 months ago. Her smoking use included cigarettes. She has a 1.00 pack-year smoking history. She has never used smokeless tobacco. She reports current drug use. Drug: Marijuana. She reports that she does not drink alcohol. Family History: family history includes ADHD in her paternal grandfather. The patients home medications have been reviewed. Allergies: Ultram [tramadol]        ---------------------------------------------------PHYSICAL EXAM--------------------------------------    Constitutional:  Well developed, well nourished, no acute distress, non-toxic appearance   Eyes:  PERRL, conjunctiva normal, EOMI  HENT:  Atraumatic, external ears normal, nose normal, oropharynx moist. Neck- normal range of motion, paraspinal tenderness on palpation. Worse on the right. Respiratory:  No respiratory distress, normal breath sounds, no rales, no wheezing   Cardiovascular:  Normal rate, normal rhythm, no murmurs, no gallops, no rubs. Radial and DP pulses 2+ bilaterally.    GI:  Soft, 22 - 29 mmol/L    Anion Gap 12 7 - 16 mmol/L    Glucose 87 74 - 99 mg/dL    BUN 11 6 - 20 mg/dL    CREATININE 0.8 0.5 - 1.0 mg/dL    GFR Non-African American >60 >=60 mL/min/1.73    GFR African American >60     Calcium 9.2 8.6 - 10.2 mg/dL   Troponin   Result Value Ref Range    Troponin <0.01 0.00 - 0.03 ng/mL   EKG 12 Lead   Result Value Ref Range    Ventricular Rate 49 BPM    Atrial Rate 49 BPM    P-R Interval 200 ms    QRS Duration 86 ms    Q-T Interval 466 ms    QTc Calculation (Bazett) 420 ms    P Axis 54 degrees    R Axis 15 degrees    T Axis 29 degrees       RADIOLOGY:  Interpreted by Radiologist.  CT HEAD WO CONTRAST   Final Result   Negative noncontrast CT study. Specifically, there is no evidence of intracranial hemorrhage or mass   effect, and no calvarial traumatic findings are noted. CT CERVICAL SPINE WO CONTRAST   Final Result      No acute fracture or spondylolisthesis is identified on CT of cervical   spine. Diffuse degenerative disc and facet disease result in multilevel   central and foraminal stenosis. Loss of lordosis which can be due to muscle spasm or positional.            CT LUMBAR SPINE WO CONTRAST   Final Result      1. No acute fracture. 2. Stable appearance of chronic discitis L4/L5. 3. Minor disc bulges, no disc herniation. CT THORACIC SPINE WO CONTRAST   Final Result      Negative CT of the thoracic spine. XR SHOULDER RIGHT (MIN 2 VIEWS)   Final Result      No acute fracture is identified. XR CHEST PORTABLE   Final Result      Negative one view chest.            EKG Interpretation  Interpreted by emergency department physician, Dr. Jennifer Parra: 49  Rhythm: normal sinus   Clinical Impression: Sinus bradycardia with no ST elevation or depression. Normal axis. No sign of acute ischemia.   Comparison to prior EKG: stable as compared to patient's most recent EKG      ------------------------- NURSING NOTES AND VITALS REVIEWED ---------------------------   The nursing notes within the ED encounter and vital signs as below have been reviewed by myself. /68   Pulse 62   Temp 97.9 °F (36.6 °C) (Oral)   Resp 16   Ht 5' 4\" (1.626 m)   Wt 180 lb (81.6 kg)   SpO2 100%   BMI 30.90 kg/m²   Oxygen Saturation Interpretation: Normal    The patients available past medical records and past encounters were reviewed. ------------------------------ ED COURSE/MEDICAL DECISION MAKING----------------------  Medications   metoclopramide (REGLAN) injection 10 mg (10 mg Intravenous Given 8/6/20 1624)   diphenhydrAMINE (BENADRYL) injection 12.5 mg (12.5 mg Intravenous Given 8/6/20 1625)   fentaNYL (SUBLIMAZE) injection 50 mcg (50 mcg Intravenous Given 8/6/20 1624)   0.9 % sodium chloride bolus (0 mLs Intravenous Stopped 8/6/20 1837)   potassium chloride (KLOR-CON M) extended release tablet 40 mEq (40 mEq Oral Given 8/6/20 1902)   ketorolac (TORADOL) injection 30 mg (30 mg Intravenous Given 8/6/20 1902)         Medical Decision Making:    Discussed imaging and laboratories with the patient. Patient labs within normal limits. Imaging is negative for any acute pathology or fractures. Patient had good results with pain management emergency department. She was instructed to follow-up with her primary care physician for reassessment and to return the emergency department symptoms worsen. Patient agreed with this plan was discharged home. This patient's ED course included: a personal history and physicial examination, re-evaluation prior to disposition, multiple bedside re-evaluations, IV medications, cardiac monitoring, continuous pulse oximetry, complex medical decision making and emergency management and a personal history and physicial eaxmination    This patient has remained hemodynamically stable and improved during their ED course. Counseling:    The emergency provider has spoken with the patient and discussed todays results, in addition to providing specific details for the plan of care and counseling regarding the diagnosis and prognosis. Questions are answered at this time and they are agreeable with the plan.       --------------------------------- IMPRESSION AND DISPOSITION ---------------------------------    IMPRESSION  1. Fall, initial encounter    2. Injury of head, initial encounter    3. Acute pain of right shoulder    4.  Chronic bilateral low back pain with sciatica, sciatica laterality unspecified        DISPOSITION  Disposition: Discharge to home  Patient condition is stable                  Sanjana Wilson DO  08/07/20 0155

## 2020-08-06 NOTE — ED NOTES
Radiology Procedure Waiver   Name: Cynthia Chew  : 1970  MRN: [id number]    Date:  20    Time: 4:20 PM EDT    Benefits of immediately proceeding with radiology exam(s) without pre-testing outweigh the risks or are not indicated as specified below and therefore the following is/are being waived:    [] Benefits of immediate radiology exam(s) outweigh any risk. OR    Pre-exam testing is not indicated for the following reason(s):  [x] Pregnancy test   [] Patients LMP on-time and regular.   [] Patient had Tubal Ligation or has other Contraception Device. [] Patient  is Menopausal or Premenarcheal.    [x] Patient had Full or Partial Hysterectomy. [] Protocol for CT contrast allegry   [] Patient has tolerated well previously   [] Patient does not have a true allergy    [] MRI Questionnaire     [] BUN/Creatinine   [] Patient age w/no hx of renal dysfunction. [] Patient on Dialysis. [] Recent Normal Labs.   Electronically signed by Kush Chavez DO on 20 at 4:20 PM EDT               Kush Chavez DO  [id number]

## 2020-08-07 LAB
EKG ATRIAL RATE: 49 BPM
EKG P AXIS: 54 DEGREES
EKG P-R INTERVAL: 200 MS
EKG Q-T INTERVAL: 466 MS
EKG QRS DURATION: 86 MS
EKG QTC CALCULATION (BAZETT): 420 MS
EKG R AXIS: 15 DEGREES
EKG T AXIS: 29 DEGREES
EKG VENTRICULAR RATE: 49 BPM

## 2020-08-07 PROCEDURE — 93010 ELECTROCARDIOGRAM REPORT: CPT | Performed by: INTERNAL MEDICINE

## 2020-10-03 RX ORDER — METOPROLOL SUCCINATE 25 MG/1
TABLET, EXTENDED RELEASE ORAL
Qty: 90 TABLET | Refills: 0 | Status: SHIPPED
Start: 2020-10-03 | End: 2020-12-15

## 2020-10-03 RX ORDER — HYDROCHLOROTHIAZIDE 12.5 MG/1
CAPSULE, GELATIN COATED ORAL
Qty: 90 CAPSULE | Refills: 0 | Status: SHIPPED
Start: 2020-10-03 | End: 2021-01-05

## 2020-10-09 ENCOUNTER — HOSPITAL ENCOUNTER (EMERGENCY)
Age: 50
Discharge: HOME | End: 2020-10-09
Payer: COMMERCIAL

## 2020-10-09 ENCOUNTER — APPOINTMENT (OUTPATIENT)
Dept: GENERAL RADIOLOGY | Age: 50
End: 2020-10-09
Payer: COMMERCIAL

## 2020-10-09 ENCOUNTER — APPOINTMENT (OUTPATIENT)
Dept: CT IMAGING | Age: 50
End: 2020-10-09
Payer: COMMERCIAL

## 2020-10-09 VITALS
HEIGHT: 64 IN | WEIGHT: 180 LBS | DIASTOLIC BLOOD PRESSURE: 86 MMHG | TEMPERATURE: 97.2 F | SYSTOLIC BLOOD PRESSURE: 142 MMHG | HEART RATE: 78 BPM | RESPIRATION RATE: 16 BRPM | BODY MASS INDEX: 30.73 KG/M2 | OXYGEN SATURATION: 98 %

## 2020-10-09 LAB
ALBUMIN SERPL-MCNC: 4.1 G/DL (ref 3.5–5.2)
ALP BLD-CCNC: 165 U/L (ref 35–104)
ALT SERPL-CCNC: 19 U/L (ref 0–32)
ANION GAP SERPL CALCULATED.3IONS-SCNC: 13 MMOL/L (ref 7–16)
AST SERPL-CCNC: 31 U/L (ref 0–31)
BASOPHILS ABSOLUTE: 0.03 E9/L (ref 0–0.2)
BASOPHILS RELATIVE PERCENT: 0.4 % (ref 0–2)
BILIRUB SERPL-MCNC: 0.3 MG/DL (ref 0–1.2)
BILIRUBIN URINE: NEGATIVE
BLOOD, URINE: NEGATIVE
BUN BLDV-MCNC: 17 MG/DL (ref 6–20)
CALCIUM SERPL-MCNC: 9.1 MG/DL (ref 8.6–10.2)
CHLORIDE BLD-SCNC: 102 MMOL/L (ref 98–107)
CLARITY: CLEAR
CO2: 23 MMOL/L (ref 22–29)
COLOR: YELLOW
CREAT SERPL-MCNC: 0.9 MG/DL (ref 0.5–1)
EOSINOPHILS ABSOLUTE: 0.21 E9/L (ref 0.05–0.5)
EOSINOPHILS RELATIVE PERCENT: 3 % (ref 0–6)
GFR AFRICAN AMERICAN: >60
GFR NON-AFRICAN AMERICAN: >60 ML/MIN/1.73
GLUCOSE BLD-MCNC: 103 MG/DL (ref 74–99)
GLUCOSE URINE: NEGATIVE MG/DL
HCG, URINE, POC: NEGATIVE
HCT VFR BLD CALC: 38.3 % (ref 34–48)
HEMOGLOBIN: 12.7 G/DL (ref 11.5–15.5)
IMMATURE GRANULOCYTES #: 0.02 E9/L
IMMATURE GRANULOCYTES %: 0.3 % (ref 0–5)
KETONES, URINE: NEGATIVE MG/DL
LACTIC ACID: 1 MMOL/L (ref 0.5–2.2)
LEUKOCYTE ESTERASE, URINE: NEGATIVE
LIPASE: 36 U/L (ref 13–60)
LYMPHOCYTES ABSOLUTE: 1.6 E9/L (ref 1.5–4)
LYMPHOCYTES RELATIVE PERCENT: 23.1 % (ref 20–42)
Lab: NORMAL
MCH RBC QN AUTO: 30.2 PG (ref 26–35)
MCHC RBC AUTO-ENTMCNC: 33.2 % (ref 32–34.5)
MCV RBC AUTO: 91 FL (ref 80–99.9)
MONOCYTES ABSOLUTE: 0.39 E9/L (ref 0.1–0.95)
MONOCYTES RELATIVE PERCENT: 5.6 % (ref 2–12)
NEGATIVE QC PASS/FAIL: NORMAL
NEUTROPHILS ABSOLUTE: 4.68 E9/L (ref 1.8–7.3)
NEUTROPHILS RELATIVE PERCENT: 67.6 % (ref 43–80)
NITRITE, URINE: NEGATIVE
PDW BLD-RTO: 13.1 FL (ref 11.5–15)
PH UA: 6 (ref 5–9)
PLATELET # BLD: 188 E9/L (ref 130–450)
PMV BLD AUTO: 11.9 FL (ref 7–12)
POSITIVE QC PASS/FAIL: NORMAL
POTASSIUM SERPL-SCNC: 4.1 MMOL/L (ref 3.5–5)
PROTEIN UA: NEGATIVE MG/DL
RBC # BLD: 4.21 E12/L (ref 3.5–5.5)
SODIUM BLD-SCNC: 138 MMOL/L (ref 132–146)
SPECIFIC GRAVITY UA: >=1.03 (ref 1–1.03)
TOTAL PROTEIN: 7.7 G/DL (ref 6.4–8.3)
UROBILINOGEN, URINE: 2 E.U./DL
WBC # BLD: 6.9 E9/L (ref 4.5–11.5)

## 2020-10-09 PROCEDURE — 99284 EMERGENCY DEPT VISIT MOD MDM: CPT

## 2020-10-09 PROCEDURE — 96375 TX/PRO/DX INJ NEW DRUG ADDON: CPT

## 2020-10-09 PROCEDURE — 36415 COLL VENOUS BLD VENIPUNCTURE: CPT

## 2020-10-09 PROCEDURE — 71045 X-RAY EXAM CHEST 1 VIEW: CPT

## 2020-10-09 PROCEDURE — 99285 EMERGENCY DEPT VISIT HI MDM: CPT

## 2020-10-09 PROCEDURE — 80053 COMPREHEN METABOLIC PANEL: CPT

## 2020-10-09 PROCEDURE — 93005 ELECTROCARDIOGRAM TRACING: CPT | Performed by: NURSE PRACTITIONER

## 2020-10-09 PROCEDURE — 6360000004 HC RX CONTRAST MEDICATION: Performed by: RADIOLOGY

## 2020-10-09 PROCEDURE — 85025 COMPLETE CBC W/AUTO DIFF WBC: CPT

## 2020-10-09 PROCEDURE — 96374 THER/PROPH/DIAG INJ IV PUSH: CPT

## 2020-10-09 PROCEDURE — 83690 ASSAY OF LIPASE: CPT

## 2020-10-09 PROCEDURE — 74177 CT ABD & PELVIS W/CONTRAST: CPT

## 2020-10-09 PROCEDURE — 2580000003 HC RX 258: Performed by: NURSE PRACTITIONER

## 2020-10-09 PROCEDURE — 6360000002 HC RX W HCPCS: Performed by: NURSE PRACTITIONER

## 2020-10-09 PROCEDURE — 81003 URINALYSIS AUTO W/O SCOPE: CPT

## 2020-10-09 PROCEDURE — 83605 ASSAY OF LACTIC ACID: CPT

## 2020-10-09 PROCEDURE — 2580000003 HC RX 258: Performed by: RADIOLOGY

## 2020-10-09 RX ORDER — SODIUM CHLORIDE 0.9 % (FLUSH) 0.9 %
10 SYRINGE (ML) INJECTION
Status: COMPLETED | OUTPATIENT
Start: 2020-10-09 | End: 2020-10-09

## 2020-10-09 RX ORDER — ONDANSETRON 2 MG/ML
4 INJECTION INTRAMUSCULAR; INTRAVENOUS ONCE
Status: COMPLETED | OUTPATIENT
Start: 2020-10-09 | End: 2020-10-09

## 2020-10-09 RX ORDER — 0.9 % SODIUM CHLORIDE 0.9 %
1000 INTRAVENOUS SOLUTION INTRAVENOUS ONCE
Status: COMPLETED | OUTPATIENT
Start: 2020-10-09 | End: 2020-10-09

## 2020-10-09 RX ORDER — MORPHINE SULFATE 4 MG/ML
4 INJECTION, SOLUTION INTRAMUSCULAR; INTRAVENOUS ONCE
Status: COMPLETED | OUTPATIENT
Start: 2020-10-09 | End: 2020-10-09

## 2020-10-09 RX ORDER — DICYCLOMINE HYDROCHLORIDE 10 MG/1
20 CAPSULE ORAL 4 TIMES DAILY PRN
Qty: 20 CAPSULE | Refills: 0 | Status: ON HOLD | OUTPATIENT
Start: 2020-10-09 | End: 2022-03-16 | Stop reason: HOSPADM

## 2020-10-09 RX ORDER — ONDANSETRON 4 MG/1
4 TABLET, ORALLY DISINTEGRATING ORAL EVERY 8 HOURS PRN
Qty: 24 TABLET | Refills: 0 | Status: SHIPPED | OUTPATIENT
Start: 2020-10-09 | End: 2020-12-15 | Stop reason: ALTCHOICE

## 2020-10-09 RX ADMIN — ONDANSETRON 4 MG: 2 INJECTION INTRAMUSCULAR; INTRAVENOUS at 18:51

## 2020-10-09 RX ADMIN — Medication 10 ML: at 20:13

## 2020-10-09 RX ADMIN — MORPHINE SULFATE 4 MG: 4 INJECTION, SOLUTION INTRAMUSCULAR; INTRAVENOUS at 18:51

## 2020-10-09 RX ADMIN — SODIUM CHLORIDE 1000 ML: 9 INJECTION, SOLUTION INTRAVENOUS at 18:51

## 2020-10-09 RX ADMIN — IOPAMIDOL 90 ML: 755 INJECTION, SOLUTION INTRAVENOUS at 20:12

## 2020-10-09 ASSESSMENT — PAIN SCALES - GENERAL
PAINLEVEL_OUTOF10: 10
PAINLEVEL_OUTOF10: 10

## 2020-10-09 ASSESSMENT — PAIN DESCRIPTION - LOCATION: LOCATION: ABDOMEN

## 2020-10-09 NOTE — ED PROVIDER NOTES
Upper gastrointestinal endoscopy (6/7/2010); Stomach surgery (6/25/2010); Upper gastrointestinal endoscopy (7/30/2010); other surgical history (12/28/11); Gastric bypass surgery; hernia repair; Hysterectomy (Left, 2/5/2013); Upper gastrointestinal endoscopy (N/A, 5/10/2019); Upper gastrointestinal endoscopy (N/A, 5/12/2019); and Upper gastrointestinal endoscopy (N/A, 5/17/2019). Social History:  reports that she quit smoking about 17 months ago. Her smoking use included cigarettes. She has a 1.00 pack-year smoking history. She has never used smokeless tobacco. She reports current drug use. Drug: Marijuana. She reports that she does not drink alcohol. Family History: family history includes ADHD in her paternal grandfather. The patients home medications have been reviewed.     Allergies: Ultram [tramadol]    -------------------------------------------------- RESULTS -------------------------------------------------  All laboratory and radiology results have been personally reviewed by myself   LABS:  Results for orders placed or performed during the hospital encounter of 10/09/20   CBC Auto Differential   Result Value Ref Range    WBC 6.9 4.5 - 11.5 E9/L    RBC 4.21 3.50 - 5.50 E12/L    Hemoglobin 12.7 11.5 - 15.5 g/dL    Hematocrit 38.3 34.0 - 48.0 %    MCV 91.0 80.0 - 99.9 fL    MCH 30.2 26.0 - 35.0 pg    MCHC 33.2 32.0 - 34.5 %    RDW 13.1 11.5 - 15.0 fL    Platelets 849 343 - 482 E9/L    MPV 11.9 7.0 - 12.0 fL    Neutrophils % 67.6 43.0 - 80.0 %    Immature Granulocytes % 0.3 0.0 - 5.0 %    Lymphocytes % 23.1 20.0 - 42.0 %    Monocytes % 5.6 2.0 - 12.0 %    Eosinophils % 3.0 0.0 - 6.0 %    Basophils % 0.4 0.0 - 2.0 %    Neutrophils Absolute 4.68 1.80 - 7.30 E9/L    Immature Granulocytes # 0.02 E9/L    Lymphocytes Absolute 1.60 1.50 - 4.00 E9/L    Monocytes Absolute 0.39 0.10 - 0.95 E9/L    Eosinophils Absolute 0.21 0.05 - 0.50 E9/L    Basophils Absolute 0.03 0.00 - 0.20 E9/L   Comprehensive Metabolic Panel   Result Value Ref Range    Sodium 138 132 - 146 mmol/L    Potassium 4.1 3.5 - 5.0 mmol/L    Chloride 102 98 - 107 mmol/L    CO2 23 22 - 29 mmol/L    Anion Gap 13 7 - 16 mmol/L    Glucose 103 (H) 74 - 99 mg/dL    BUN 17 6 - 20 mg/dL    CREATININE 0.9 0.5 - 1.0 mg/dL    GFR Non-African American >60 >=60 mL/min/1.73    GFR African American >60     Calcium 9.1 8.6 - 10.2 mg/dL    Total Protein 7.7 6.4 - 8.3 g/dL    Alb 4.1 3.5 - 5.2 g/dL    Total Bilirubin 0.3 0.0 - 1.2 mg/dL    Alkaline Phosphatase 165 (H) 35 - 104 U/L    ALT 19 0 - 32 U/L    AST 31 0 - 31 U/L   Lipase   Result Value Ref Range    Lipase 36 13 - 60 U/L   Lactic Acid, Plasma   Result Value Ref Range    Lactic Acid 1.0 0.5 - 2.2 mmol/L   Urinalysis   Result Value Ref Range    Color, UA Yellow Straw/Yellow    Clarity, UA Clear Clear    Glucose, Ur Negative Negative mg/dL    Bilirubin Urine Negative Negative    Ketones, Urine Negative Negative mg/dL    Specific Gravity, UA >=1.030 1.005 - 1.030    Blood, Urine Negative Negative    pH, UA 6.0 5.0 - 9.0    Protein, UA Negative Negative mg/dL    Urobilinogen, Urine 2.0 (A) <2.0 E.U./dL    Nitrite, Urine Negative Negative    Leukocyte Esterase, Urine Negative Negative   POC Pregnancy Urine   Result Value Ref Range    HCG, Urine, POC Negative Negative    Lot Number [id number]     Positive QC Pass/Fail Pass     Negative QC Pass/Fail Pass    EKG 12 Lead   Result Value Ref Range    Ventricular Rate 60 BPM    Atrial Rate 60 BPM    P-R Interval 186 ms    QRS Duration 70 ms    Q-T Interval 414 ms    QTc Calculation (Bazett) 414 ms    P Axis 60 degrees    R Axis 26 degrees    T Axis 34 degrees       RADIOLOGY:  Interpreted by Radiologist.  XR CHEST PORTABLE   Final Result   No pneumonia or pleural effusion. CT ABDOMEN PELVIS W IV CONTRAST Additional Contrast? None   Final Result   Air-fluid levels within the colon suggesting recent or impending diarrhea. Mild ground-glass infiltrates within the lungs. Correlate for pneumonia. Dilated biliary system, of uncertain significance in a post cholecystectomy   patient.             ------------------------- NURSING NOTES AND VITALS REVIEWED ---------------------------   The nursing notes within the ED encounter and vital signs as below have been reviewed. BP (!) 145/93   Pulse 72   Temp 97.2 °F (36.2 °C) (Temporal)   Resp 16   Ht 5' 4\" (1.626 m)   Wt 180 lb (81.6 kg)   SpO2 96%   BMI 30.90 kg/m²   Oxygen Saturation Interpretation: Normal      ---------------------------------------------------PHYSICAL EXAM--------------------------------------      Constitutional/General: Alert and oriented x3, well appearing, non toxic in NAD  Head: Normocephalic and atraumatic  Eyes: PERRL, EOMI  Mouth: Oropharynx clear, handling secretions, no trismus  Neck: Supple, full ROM,   Pulmonary: Lungs clear to auscultation bilaterally, no wheezes, rales, or rhonchi. Not in respiratory distress  Cardiovascular:  Regular rate and rhythm, no murmurs, gallops, or rubs. 2+ distal pulses  Abdomen: Soft,  tender, non distended, mild point tenderness to entire lower abdomen. No unusual bulges or areas of pulsation. Extremities: Moves all extremities x 4. Warm and well perfused  Skin: warm and dry without rash  Neurologic: GCS 15,  Psych: Normal Affect      ------------------------------ ED COURSE/MEDICAL DECISION MAKING----------------------  Medications   0.9 % sodium chloride bolus (0 mLs Intravenous Stopped 10/9/20 7037)   morphine sulfate (PF) injection 4 mg (4 mg Intravenous Given 10/9/20 1851)   ondansetron (ZOFRAN) injection 4 mg (4 mg Intravenous Given 10/9/20 1851)   sodium chloride flush 0.9 % injection 10 mL (10 mLs Intravenous Given 10/9/20 2013)   iopamidol (ISOVUE-370) 76 % injection 110 mL (90 mLs Intravenous Given 10/9/20 2012)         ED COURSE:       Medical Decision Making: Plan will be for labs will also obtain imaging and medicate for symptom relief.    Twelve-lead EKG interpreted showed heart rate of 60, normal sinus rhythm. No acute ST elevation or depression noted. Normal axis deviation. Patient has not had any vomiting or diarrhea here during her entire emergency room stay. Labs were resulted. Everything essentially unremarkable just mild elevation in her alk phos of 165 but CBC negative chemistry completely unremarkable lipase negative urinalysis negative, urine pregnancy negative. CT abdomen pelvis just showing air fluid levels within the colon suggesting impending diarrhea but otherwise unremarkable. It did question a mild groundglass infiltrate within the lungs chest x-ray was then obtained and is completely unremarkable. Patient is nontoxic, she is neurovascularly intact. She required only being medicated once here in the emergency department with resolution. Patient will be discharged home with Bentyl as well as Zofran, she was educated on clear liquid diet to advance slowly and as tolerated and to follow-up with her general surgeon as well as when to return back to the emergency department. Patient expressed understanding and safely discharged home. I do not suspect any acute infectious or surgical etiology. Likely all gastroenteritis/viral in nature    Counseling: The emergency provider has spoken with the patient and discussed todays results, in addition to providing specific details for the plan of care and counseling regarding the diagnosis and prognosis. Questions are answered at this time and they are agreeable with the plan.      --------------------------------- IMPRESSION AND DISPOSITION ---------------------------------    IMPRESSION  1. Lower abdominal pain    2. Nausea and vomiting, intractability of vomiting not specified, unspecified vomiting type    3. Diarrhea, unspecified type        DISPOSITION  Disposition: Discharge to home  Patient condition is good      NOTE: This report was transcribed using voice recognition software.  Every

## 2020-10-10 LAB
EKG ATRIAL RATE: 60 BPM
EKG P AXIS: 60 DEGREES
EKG P-R INTERVAL: 186 MS
EKG Q-T INTERVAL: 414 MS
EKG QRS DURATION: 70 MS
EKG QTC CALCULATION (BAZETT): 414 MS
EKG R AXIS: 26 DEGREES
EKG T AXIS: 34 DEGREES
EKG VENTRICULAR RATE: 60 BPM

## 2020-10-10 PROCEDURE — 93010 ELECTROCARDIOGRAM REPORT: CPT | Performed by: INTERNAL MEDICINE

## 2020-12-15 ENCOUNTER — OFFICE VISIT (OUTPATIENT)
Dept: FAMILY MEDICINE CLINIC | Age: 50
End: 2020-12-15
Payer: COMMERCIAL

## 2020-12-15 VITALS
HEIGHT: 64 IN | SYSTOLIC BLOOD PRESSURE: 142 MMHG | OXYGEN SATURATION: 98 % | WEIGHT: 201 LBS | DIASTOLIC BLOOD PRESSURE: 76 MMHG | BODY MASS INDEX: 34.31 KG/M2 | TEMPERATURE: 97 F | HEART RATE: 57 BPM | RESPIRATION RATE: 16 BRPM

## 2020-12-15 PROCEDURE — 1036F TOBACCO NON-USER: CPT | Performed by: NURSE PRACTITIONER

## 2020-12-15 PROCEDURE — 99213 OFFICE O/P EST LOW 20 MIN: CPT | Performed by: NURSE PRACTITIONER

## 2020-12-15 PROCEDURE — G8417 CALC BMI ABV UP PARAM F/U: HCPCS | Performed by: NURSE PRACTITIONER

## 2020-12-15 PROCEDURE — G8427 DOCREV CUR MEDS BY ELIG CLIN: HCPCS | Performed by: NURSE PRACTITIONER

## 2020-12-15 PROCEDURE — G8484 FLU IMMUNIZE NO ADMIN: HCPCS | Performed by: NURSE PRACTITIONER

## 2020-12-15 RX ORDER — SERTRALINE HYDROCHLORIDE 100 MG/1
100 TABLET, FILM COATED ORAL DAILY
Qty: 30 TABLET | Refills: 0 | Status: SHIPPED
Start: 2020-12-15 | End: 2021-01-11

## 2020-12-15 RX ORDER — METOPROLOL SUCCINATE 100 MG/1
100 TABLET, EXTENDED RELEASE ORAL DAILY
Qty: 30 TABLET | Refills: 0 | Status: SHIPPED
Start: 2020-12-15 | End: 2021-01-11

## 2020-12-15 ASSESSMENT — ENCOUNTER SYMPTOMS
DIARRHEA: 0
COLOR CHANGE: 0
EYE PAIN: 1
COUGH: 0
CHEST TIGHTNESS: 0
VOMITING: 0
SINUS PAIN: 0
WHEEZING: 0
NAUSEA: 0
SHORTNESS OF BREATH: 1
SINUS PRESSURE: 1
CONSTIPATION: 0

## 2020-12-15 NOTE — PROGRESS NOTES
Jessika Conte is a 52 y.o. female who presents today for     Chief Complaint   Patient presents with    Fatigue     x 3 days, and more cold intolerance    Neck Pain     shooting up into head off and on    Abdominal Pain     feels like a knot in stomach and moves     Complains of gas or air sensation in her abdomen, if she is bent over she exhibits pain. Punching her stomach makes it better, moving makes it worse for a few minutes. Has followed with Dr. Riana Silverman in the past, she will contact him again for an appointment. Reports depression increase with stress of COVID, family & work. Knee pain, gives out when she is holding weight or trying to walk. [address]:    Patient's past medical, surgical, social and/or family history reviewed, updated in chart, and are non-contributory (unless otherwise stated). Medications and allergies also reviewed and updated in chart. Review of Systems    Review of Systems   Constitutional: Positive for fatigue. Negative for activity change, appetite change, chills and fever. HENT: Positive for sinus pressure. Negative for congestion, ear pain, sinus pain and sneezing. Eyes: Positive for pain. Negative for visual disturbance. Respiratory: Positive for shortness of breath (on exertion). Negative for cough, chest tightness and wheezing. Cardiovascular: Negative for chest pain, palpitations and leg swelling. Gastrointestinal: Negative for constipation, diarrhea, nausea and vomiting. Endocrine: Negative for cold intolerance, heat intolerance and polyuria. Genitourinary: Negative for difficulty urinating. Musculoskeletal: Positive for back pain, gait problem and myalgias. Negative for arthralgias and neck pain. Skin: Negative for color change, rash and wound. Neurological: Positive for headaches (chronic). Negative for dizziness and light-headedness. Hematological: Negative for adenopathy. Does not bruise/bleed easily. Psychiatric/Behavioral: Negative for agitation, decreased concentration, sleep disturbance and suicidal ideas. The patient is not nervous/anxious. Physical Exam:    VS:  BP (!) 142/76   Pulse 57   Temp 97 °F (36.1 °C) (Infrared)   Resp 16   Ht 5' 4\" (1.626 m)   Wt 201 lb (91.2 kg)   SpO2 98%   BMI 34.50 kg/m²     LAST WEIGHT:  Wt Readings from Last 3 Encounters:   12/15/20 201 lb (91.2 kg)   10/09/20 180 lb (81.6 kg)   08/06/20 180 lb (81.6 kg)       BMI Readings from Last 3 Encounters:   12/15/20 34.50 kg/m²   10/09/20 30.90 kg/m²   08/06/20 30.90 kg/m²     Physical Exam  Vitals signs and nursing note reviewed. Constitutional:       General: She is not in acute distress. Appearance: Normal appearance. She is well-developed and normal weight. She is not ill-appearing, toxic-appearing or diaphoretic. HENT:      Head: Normocephalic and atraumatic. Right Ear: Tympanic membrane, ear canal and external ear normal. There is no impacted cerumen. Left Ear: Tympanic membrane, ear canal and external ear normal. There is no impacted cerumen. Ears:      Comments: ENT: canals clear, TMs intact and pearly gray, nasal turbinates erythematous and boggy, pharynx shows erythema and post nasal drip       Nose: Rhinorrhea present. No congestion. Mouth/Throat:      Mouth: Mucous membranes are moist.      Pharynx: Oropharynx is clear. No oropharyngeal exudate or posterior oropharyngeal erythema. Eyes:      Extraocular Movements: Extraocular movements intact. Conjunctiva/sclera: Conjunctivae normal.      Pupils: Pupils are equal, round, and reactive to light. Neck:      Musculoskeletal: Normal range of motion and neck supple. Thyroid: No thyromegaly. Cardiovascular:      Rate and Rhythm: Normal rate and regular rhythm. Pulses: Normal pulses. Heart sounds: Normal heart sounds. No murmur. Pulmonary:      Effort: Pulmonary effort is normal. No respiratory distress. TRIG 127 04/29/2020    TRIG 93 09/13/2019     Lab Results   Component Value Date    HDL 40 04/29/2020    HDL 47 09/13/2019     Lab Results   Component Value Date    LDLCALC 120 (H) 04/29/2020    LDLCALC 145 (H) 09/13/2019       Lab Results   Component Value Date    LABA1C 4.8 04/29/2020    LABA1C 5.7 06/03/2015     Lab Results   Component Value Date    LDLCALC 120 (H) 04/29/2020    CREATININE 0.9 10/09/2020         Assessment / Plan:      Amarjit Ramirez was seen today for fatigue, neck pain and abdominal pain. Diagnoses and all orders for this visit:    Essential hypertension/Hypertension, unspecified type  -     CBC Auto Differential; Future  -     Comprehensive Metabolic Panel; Future  -     metoprolol succinate (TOPROL XL) 100 MG extended release tablet; Take 1 tablet by mouth daily  - Increase from 50 ml  - Follow up in 1 month or sooner if symptoms of hypotension are noted    Positive depression screening/Mild major depression ([facility] [address]  -     Denies self harm or harm to others  -     sertraline (ZOLOFT) 100 MG tablet; Take 1 tablet by mouth daily  - COVID, work & family stressers    Fatigue, unspecified type  -     IRON AND TIBC; Future  -     Vitamin B12 & Folate; Future    Acute pain of left knee  -     XR KNEE LEFT (3 VIEWS); Future  - Unable to bear weight for extended periods of time, feeling of \"giving way\"    - Will schedule with Sheryl Ramirez for follow up for abdominal pain & colonoscopy    Follow Up:    Return in about 4 weeks (around 1/12/2021). or sooner if necessary. Educational materials and/or home exercises printed for patient's review and were included in patient instructions on his/her AfterVisit Summary and given to patient at the end of visit. Counseled regarding above diagnosis,including possible risks and complications,  especially if left uncontrolled. Counseled regarding the possible side effects, risks, benefits and alternatives to treatment; patient and/or guardian verbalizes understanding, agrees, feels comfortable with and wishes to proceed with above treatment plan. Advised patient to call with any new medication issues, and read all Rx info from pharmacy to assure of all possible risks and side effects of medication before taking. Reviewed age and gender appropriate health screening exams and vaccinations. Advised patient regarding importance of keeping up with recommended health maintenance and to schedule as soon as possible if overdue, as this is important in assessing for undiagnosed pathology, especially cancer, as well as protecting against potentially harmful/life threatening disease. Patient and/or guardian verbalizes understandingand agrees with above counseling, assessment and plan. All questions answered.     Yahaira Toscano, APRN - CNP

## 2020-12-16 ASSESSMENT — ENCOUNTER SYMPTOMS: BACK PAIN: 1

## 2020-12-27 DIAGNOSIS — I10 HYPERTENSION, UNSPECIFIED TYPE: ICD-10-CM

## 2020-12-28 NOTE — TELEPHONE ENCOUNTER
Last Appointment:  12/15/2020  Future Appointments   Date Time Provider Jam Damon   1/11/2021  9:30 AM DA Kessler -  [address]

## 2021-01-05 RX ORDER — METOPROLOL SUCCINATE 25 MG/1
TABLET, EXTENDED RELEASE ORAL
Qty: 90 TABLET | Refills: 0 | Status: SHIPPED
Start: 2021-01-05 | End: 2021-03-16

## 2021-01-05 RX ORDER — HYDROCHLOROTHIAZIDE 12.5 MG/1
CAPSULE, GELATIN COATED ORAL
Qty: 90 CAPSULE | Refills: 0 | Status: SHIPPED
Start: 2021-01-05 | End: 2021-03-16 | Stop reason: SDUPTHER

## 2021-01-09 DIAGNOSIS — I10 HYPERTENSION, UNSPECIFIED TYPE: ICD-10-CM

## 2021-01-09 DIAGNOSIS — Z13.31 POSITIVE DEPRESSION SCREENING: ICD-10-CM

## 2021-01-09 DIAGNOSIS — F32.0 MILD MAJOR DEPRESSION (HCC): ICD-10-CM

## 2021-01-11 RX ORDER — SERTRALINE HYDROCHLORIDE 100 MG/1
100 TABLET, FILM COATED ORAL DAILY
Qty: 30 TABLET | Refills: 0 | Status: SHIPPED
Start: 2021-01-11 | End: 2021-02-09

## 2021-01-11 RX ORDER — METOPROLOL SUCCINATE 100 MG/1
100 TABLET, EXTENDED RELEASE ORAL DAILY
Qty: 30 TABLET | Refills: 0 | Status: SHIPPED
Start: 2021-01-11 | End: 2021-02-09

## 2021-02-06 DIAGNOSIS — Z13.31 POSITIVE DEPRESSION SCREENING: ICD-10-CM

## 2021-02-06 DIAGNOSIS — I10 HYPERTENSION, UNSPECIFIED TYPE: ICD-10-CM

## 2021-02-06 DIAGNOSIS — F32.0 MILD MAJOR DEPRESSION (HCC): ICD-10-CM

## 2021-02-09 RX ORDER — METOPROLOL SUCCINATE 100 MG/1
100 TABLET, EXTENDED RELEASE ORAL DAILY
Qty: 30 TABLET | Refills: 0 | Status: SHIPPED
Start: 2021-02-09 | End: 2021-03-16 | Stop reason: SDUPTHER

## 2021-02-09 RX ORDER — SERTRALINE HYDROCHLORIDE 100 MG/1
100 TABLET, FILM COATED ORAL DAILY
Qty: 30 TABLET | Refills: 0 | Status: SHIPPED
Start: 2021-02-09 | End: 2021-03-16 | Stop reason: SDUPTHER

## 2021-03-16 ENCOUNTER — OFFICE VISIT (OUTPATIENT)
Dept: FAMILY MEDICINE CLINIC | Age: 51
End: 2021-03-16
Payer: COMMERCIAL

## 2021-03-16 VITALS
WEIGHT: 196 LBS | SYSTOLIC BLOOD PRESSURE: 126 MMHG | BODY MASS INDEX: 33.46 KG/M2 | HEART RATE: 66 BPM | HEIGHT: 64 IN | TEMPERATURE: 97 F | DIASTOLIC BLOOD PRESSURE: 78 MMHG | OXYGEN SATURATION: 97 %

## 2021-03-16 DIAGNOSIS — Z13.31 POSITIVE DEPRESSION SCREENING: ICD-10-CM

## 2021-03-16 DIAGNOSIS — Z98.84 H/O GASTRIC BYPASS: Chronic | ICD-10-CM

## 2021-03-16 DIAGNOSIS — F32.0 MILD MAJOR DEPRESSION (HCC): ICD-10-CM

## 2021-03-16 DIAGNOSIS — G89.29 CHRONIC NONINTRACTABLE HEADACHE, UNSPECIFIED HEADACHE TYPE: ICD-10-CM

## 2021-03-16 DIAGNOSIS — R51.9 CHRONIC NONINTRACTABLE HEADACHE, UNSPECIFIED HEADACHE TYPE: ICD-10-CM

## 2021-03-16 DIAGNOSIS — I10 ESSENTIAL HYPERTENSION: Primary | ICD-10-CM

## 2021-03-16 DIAGNOSIS — F32.2 CURRENT SEVERE EPISODE OF MAJOR DEPRESSIVE DISORDER WITHOUT PSYCHOTIC FEATURES WITHOUT PRIOR EPISODE (HCC): ICD-10-CM

## 2021-03-16 DIAGNOSIS — M25.562 ACUTE PAIN OF LEFT KNEE: ICD-10-CM

## 2021-03-16 DIAGNOSIS — I10 HYPERTENSION, UNSPECIFIED TYPE: ICD-10-CM

## 2021-03-16 DIAGNOSIS — B36.9 FUNGAL INFECTION OF SKIN OF ABDOMEN: ICD-10-CM

## 2021-03-16 DIAGNOSIS — M54.2 NECK PAIN: ICD-10-CM

## 2021-03-16 DIAGNOSIS — E55.9 VITAMIN D DEFICIENCY, UNSPECIFIED: ICD-10-CM

## 2021-03-16 PROCEDURE — 96372 THER/PROPH/DIAG INJ SC/IM: CPT | Performed by: NURSE PRACTITIONER

## 2021-03-16 PROCEDURE — G8484 FLU IMMUNIZE NO ADMIN: HCPCS | Performed by: NURSE PRACTITIONER

## 2021-03-16 PROCEDURE — 99214 OFFICE O/P EST MOD 30 MIN: CPT | Performed by: NURSE PRACTITIONER

## 2021-03-16 PROCEDURE — 1036F TOBACCO NON-USER: CPT | Performed by: NURSE PRACTITIONER

## 2021-03-16 PROCEDURE — G8428 CUR MEDS NOT DOCUMENT: HCPCS | Performed by: NURSE PRACTITIONER

## 2021-03-16 PROCEDURE — G8417 CALC BMI ABV UP PARAM F/U: HCPCS | Performed by: NURSE PRACTITIONER

## 2021-03-16 RX ORDER — ERGOCALCIFEROL 1.25 MG/1
CAPSULE ORAL
Qty: 12 CAPSULE | Refills: 3 | Status: ON HOLD
Start: 2021-03-16 | End: 2022-03-16 | Stop reason: SDUPTHER

## 2021-03-16 RX ORDER — ELECTROLYTES/DEXTROSE
1 SOLUTION, ORAL ORAL DAILY
Qty: 90 TABLET | Refills: 3 | Status: ON HOLD
Start: 2021-03-16 | End: 2022-03-16 | Stop reason: SDUPTHER

## 2021-03-16 RX ORDER — KETOROLAC TROMETHAMINE 30 MG/ML
30 INJECTION, SOLUTION INTRAMUSCULAR; INTRAVENOUS ONCE
Status: COMPLETED | OUTPATIENT
Start: 2021-03-16 | End: 2021-03-16

## 2021-03-16 RX ORDER — TRIAMCINOLONE ACETONIDE 40 MG/ML
40 INJECTION, SUSPENSION INTRA-ARTICULAR; INTRAMUSCULAR ONCE
Status: COMPLETED | OUTPATIENT
Start: 2021-03-16 | End: 2021-03-16

## 2021-03-16 RX ORDER — CALCIUM CARBONATE/VITAMIN D3 500MG-5MCG
1 TABLET ORAL 2 TIMES DAILY
Qty: 60 TABLET | Refills: 3 | Status: SHIPPED
Start: 2021-03-16 | End: 2021-07-11

## 2021-03-16 RX ORDER — SERTRALINE HYDROCHLORIDE 100 MG/1
100 TABLET, FILM COATED ORAL DAILY
Qty: 30 TABLET | Refills: 3 | Status: SHIPPED
Start: 2021-03-16 | End: 2021-07-11

## 2021-03-16 RX ORDER — METOPROLOL SUCCINATE 100 MG/1
100 TABLET, EXTENDED RELEASE ORAL DAILY
Qty: 30 TABLET | Refills: 3 | Status: SHIPPED
Start: 2021-03-16 | End: 2021-07-11

## 2021-03-16 RX ORDER — HYDROCHLOROTHIAZIDE 12.5 MG/1
CAPSULE, GELATIN COATED ORAL
Qty: 90 CAPSULE | Refills: 1 | Status: ON HOLD
Start: 2021-03-16 | End: 2022-03-16 | Stop reason: SDUPTHER

## 2021-03-16 RX ADMIN — TRIAMCINOLONE ACETONIDE 40 MG: 40 INJECTION, SUSPENSION INTRA-ARTICULAR; INTRAMUSCULAR at 11:36

## 2021-03-16 RX ADMIN — KETOROLAC TROMETHAMINE 30 MG: 30 INJECTION, SOLUTION INTRAMUSCULAR; INTRAVENOUS at 11:24

## 2021-03-16 ASSESSMENT — PATIENT HEALTH QUESTIONNAIRE - PHQ9
2. FEELING DOWN, DEPRESSED OR HOPELESS: 1
1. LITTLE INTEREST OR PLEASURE IN DOING THINGS: 1
SUM OF ALL RESPONSES TO PHQ QUESTIONS 1-9: 2
SUM OF ALL RESPONSES TO PHQ QUESTIONS 1-9: 2

## 2021-03-16 ASSESSMENT — ENCOUNTER SYMPTOMS
CHEST TIGHTNESS: 0
SHORTNESS OF BREATH: 0
CONSTIPATION: 0
VOMITING: 0
NAUSEA: 0
DIARRHEA: 0
COUGH: 0
SINUS PAIN: 0
COLOR CHANGE: 0
EYE PAIN: 0
WHEEZING: 0

## 2021-03-16 NOTE — PROGRESS NOTES
3/16/2021    Denisse Hess (:  1970) is a 52 y.o. female, here for a     Chief Complaint   Patient presents with    Leg Pain     Rt lower extremity pain x 2 days    Headache     chronic \"shooting pain\" not better      Depression has increased from not working & being able to go into the office. She has daughter & grand-kids living at her house which is also stressful. ASSESSMENT/PLAN:    1. Essential hypertension/Hypertension, unspecified type  -     metoprolol succinate (TOPROL XL) 100 MG extended release tablet; Take 1 tablet by mouth daily, Disp-30 tablet, R-3Normal  -     hydroCHLOROthiazide (MICROZIDE) 12.5 MG capsule; take 1 capsule by mouth once daily, Disp-90 capsule, R-1Normal  - The current medical regimen is effective;  continue present plan and medications. 3. Positive depression screening  -     sertraline (ZOLOFT) 100 MG tablet; Take 1 tablet by mouth daily, Disp-30 tablet, R-3Normal        -  Counselors provided for referral    4. Mild major depression (HCC)  -     sertraline (ZOLOFT) 100 MG tablet; Take 1 tablet by mouth daily, Disp-30 tablet, R-3Normal    5. H/O gastric bypass  -     Multiple Vitamin (MULTIVITAMIN ADULT) TABS; Take 1 tablet by mouth daily, Disp-90 tablet, R-3Normal  - Performed by Betzy Avina, wishes to be referred in symptoms of abdominal pain do not obsolve    6. Vitamin D deficiency, unspecified  -     calcium-cholecalciferol (OYSCO 500 + D) 500-200 MG-UNIT per tablet; Take 1 tablet by mouth 2 times daily, Disp-60 tablet, R-3Normal  -     vitamin D (ERGOCALCIFEROL) 1.25 MG (30805 UT) CAPS capsule; take 1 capsule by mouth every  AND 2000 UNITS DAILY EXCEPT , Disp-12 capsule, R-3Normal    HCC:  No acute findings today meriting change in management plan.     Health Maintenance   Topic Date Due    DTaP/Tdap/Td vaccine (1 - Tdap) Never done    Flu vaccine (1) 2021 (Originally 2020)    Potassium monitoring  10/09/2021    Creatinine monitoring 10/09/2021    Hepatitis A vaccine  Aged Out    Hepatitis B vaccine  Aged Out    Hib vaccine  Aged Out    Meningococcal (ACWY) vaccine  Aged Out    Pneumococcal 0-64 years Vaccine  Aged Out     - Health Maintenance reviewed today & counseled patient as appropriate. Patient Active Problem List   Diagnosis    Abdominal tenderness, LLQ (left lower quadrant)    Postoperative anemia due to acute blood loss    Chronic pain    Anorexia    H/O gastric bypass    Back pain    Encounter for palliative care    Hypertension    Epigastric pain    GI bleed due to NSAIDs    Gastrointestinal hemorrhage associated with gastric ulcer    Acute cystitis without hematuria    Current severe episode of major depressive disorder without psychotic features without prior episode ([facility] [address]     Vitals:    03/16/21 0935   BP: 126/78   Pulse: 66   Temp: 97 °F (36.1 °C)   TempSrc: Oral   SpO2: 97%   Weight: 196 lb (88.9 kg)   Height: 5' 4\" (1.626 m)     Estimated body mass index is 33.64 kg/m² as calculated from the following:    Height as of this encounter: 5' 4\" (1.626 m). Weight as of this encounter: 196 lb (88.9 kg). Review of Systems   Constitutional: Negative for activity change, appetite change, chills and fever. HENT: Negative for congestion, ear pain, sinus pain and sneezing. Eyes: Negative for pain and visual disturbance. Respiratory: Negative for cough, chest tightness, shortness of breath and wheezing. Cardiovascular: Negative for chest pain, palpitations and leg swelling. Gastrointestinal: Negative for constipation, diarrhea, nausea and vomiting. Endocrine: Negative for cold intolerance, heat intolerance and polyuria. Genitourinary: Negative for difficulty urinating. Musculoskeletal: Positive for neck pain (spasms 1-2x/week) and neck stiffness. Negative for arthralgias and myalgias. Skin: Positive for rash. Negative for color change and wound. Neurological: Positive for headaches.  Negative for dizziness and light-headedness. Hematological: Negative for adenopathy. Does not bruise/bleed easily. Psychiatric/Behavioral: Negative for agitation, decreased concentration, sleep disturbance and suicidal ideas. The patient is not nervous/anxious. Physical Exam  Vitals signs and nursing note reviewed. Constitutional:       General: She is not in acute distress. Appearance: Normal appearance. She is well-developed and normal weight. She is not ill-appearing, toxic-appearing or diaphoretic. HENT:      Head: Normocephalic and atraumatic. Right Ear: Tympanic membrane, ear canal and external ear normal. There is no impacted cerumen. Left Ear: Tympanic membrane, ear canal and external ear normal. There is no impacted cerumen. Ears:      Comments:        Nose: No congestion or rhinorrhea. Mouth/Throat:      Mouth: Mucous membranes are moist.      Pharynx: Oropharynx is clear. No oropharyngeal exudate or posterior oropharyngeal erythema. Eyes:      Extraocular Movements: Extraocular movements intact. Conjunctiva/sclera: Conjunctivae normal.      Pupils: Pupils are equal, round, and reactive to light. Neck:      Musculoskeletal: Normal range of motion and neck supple. Thyroid: No thyromegaly. Cardiovascular:      Rate and Rhythm: Normal rate and regular rhythm. Pulses: Normal pulses. Heart sounds: Normal heart sounds. No murmur. Pulmonary:      Effort: Pulmonary effort is normal. No respiratory distress. Breath sounds: Normal breath sounds. No wheezing. Abdominal:      General: Bowel sounds are normal. There is no distension. Palpations: Abdomen is soft. Tenderness: There is no abdominal tenderness. There is no guarding or rebound. Genitourinary:     Vagina: Normal.      Comments: Deferred. Musculoskeletal: Normal range of motion. Lymphadenopathy:      Cervical: No cervical adenopathy. Skin:     General: Skin is warm and dry. Capillary Refill: Capillary refill takes less than 2 seconds. Findings: No bruising, erythema or rash. Neurological:      General: No focal deficit present. Mental Status: She is alert and oriented to person, place, and time. Mental status is at baseline. Cranial Nerves: No cranial nerve deficit. Sensory: No sensory deficit. Motor: No weakness. Coordination: Coordination normal.      Gait: Gait normal.   Psychiatric:         Mood and Affect: Mood normal.         Behavior: Behavior normal.         Thought Content: Thought content normal.         Judgment: Judgment normal.       Prior to Visit Medications    Medication Sig Taking? Authorizing Provider   metoprolol succinate (TOPROL XL) 100 MG extended release tablet Take 1 tablet by mouth daily Yes DA Grady - CNP   sertraline (ZOLOFT) 100 MG tablet Take 1 tablet by mouth daily Yes Javon Rondon APRN - WOLF   Multiple Vitamin (MULTIVITAMIN ADULT) TABS Take 1 tablet by mouth daily Yes DA Grady - CNP   calcium-cholecalciferol (OYSCO 500 + D) 500-200 MG-UNIT per tablet Take 1 tablet by mouth 2 times daily Yes Javon Rondon APRN - WOLF   hydroCHLOROthiazide (MICROZIDE) 12.5 MG capsule take 1 capsule by mouth once daily Yes DA Grady - CNP   vitamin D (ERGOCALCIFEROL) 1.25 MG (72710 UT) CAPS capsule take 1 capsule by mouth every Sunday AND 2000 UNITS DAILY EXCEPT SUNDAY Yes Javon Rondon APRJONO - WOLF   nystatin-triamcinolone (MYCOLOG II) 134698-1.7 UNIT/GM-% cream Apply topically 2 times daily.  Yes DA Grady - CNP   dicyclomine (BENTYL) 10 MG capsule Take 2 capsules by mouth 4 times daily as needed (abd pain) Yes DA Vickers - CNP   Multiple Vitamins-Minerals (MULTIVITAMIN ADULT) TABS Take 1 tablet by mouth daily  Javon Nela APRN - CNP        Allergies   Allergen Reactions    Ultram [Tramadol] Other (See Comments)     Pt states she had a seizure after taking ultram     ADVANCE DIRECTIVE: N, <no information>      There is no immunization history on file for this patient. Return in about 4 weeks (around 4/13/2021). An electronic signature was used to authenticate this note.     --DA Rahman - CNP on 3/16/2021 at 12:51 PM

## 2021-06-14 ENCOUNTER — APPOINTMENT (OUTPATIENT)
Dept: CT IMAGING | Age: 51
End: 2021-06-14
Payer: COMMERCIAL

## 2021-06-14 ENCOUNTER — HOSPITAL ENCOUNTER (EMERGENCY)
Age: 51
Discharge: HOME | End: 2021-06-14
Attending: EMERGENCY MEDICINE
Payer: COMMERCIAL

## 2021-06-14 ENCOUNTER — APPOINTMENT (OUTPATIENT)
Dept: GENERAL RADIOLOGY | Age: 51
End: 2021-06-14
Payer: COMMERCIAL

## 2021-06-14 VITALS
TEMPERATURE: 97.8 F | RESPIRATION RATE: 16 BRPM | HEIGHT: 64 IN | SYSTOLIC BLOOD PRESSURE: 148 MMHG | DIASTOLIC BLOOD PRESSURE: 67 MMHG | HEART RATE: 58 BPM | WEIGHT: 189 LBS | OXYGEN SATURATION: 96 % | BODY MASS INDEX: 32.27 KG/M2

## 2021-06-14 DIAGNOSIS — R11.0 NAUSEA: ICD-10-CM

## 2021-06-14 DIAGNOSIS — R51.9 ACUTE NONINTRACTABLE HEADACHE, UNSPECIFIED HEADACHE TYPE: Primary | ICD-10-CM

## 2021-06-14 DIAGNOSIS — M54.50 ACUTE RIGHT-SIDED LOW BACK PAIN WITHOUT SCIATICA: ICD-10-CM

## 2021-06-14 LAB
ALBUMIN SERPL-MCNC: 4.4 G/DL (ref 3.5–5.2)
ALP BLD-CCNC: 146 U/L (ref 35–104)
ALT SERPL-CCNC: 29 U/L (ref 0–32)
ANION GAP SERPL CALCULATED.3IONS-SCNC: 10 MMOL/L (ref 7–16)
AST SERPL-CCNC: 42 U/L (ref 0–31)
BACTERIA: ABNORMAL /HPF
BASOPHILS ABSOLUTE: 0.02 E9/L (ref 0–0.2)
BASOPHILS RELATIVE PERCENT: 0.3 % (ref 0–2)
BILIRUB SERPL-MCNC: 0.3 MG/DL (ref 0–1.2)
BILIRUBIN URINE: ABNORMAL
BLOOD, URINE: NEGATIVE
BUN BLDV-MCNC: 15 MG/DL (ref 6–20)
CALCIUM SERPL-MCNC: 9.4 MG/DL (ref 8.6–10.2)
CHLORIDE BLD-SCNC: 104 MMOL/L (ref 98–107)
CLARITY: CLEAR
CO2: 26 MMOL/L (ref 22–29)
COLOR: YELLOW
CREAT SERPL-MCNC: 1 MG/DL (ref 0.5–1)
EOSINOPHILS ABSOLUTE: 0.16 E9/L (ref 0.05–0.5)
EOSINOPHILS RELATIVE PERCENT: 2.3 % (ref 0–6)
EPITHELIAL CELLS, UA: ABNORMAL /HPF
GFR AFRICAN AMERICAN: >60
GFR NON-AFRICAN AMERICAN: 59 ML/MIN/1.73
GLUCOSE BLD-MCNC: 85 MG/DL (ref 74–99)
GLUCOSE URINE: NEGATIVE MG/DL
HCT VFR BLD CALC: 39.8 % (ref 34–48)
HEMOGLOBIN: 13.1 G/DL (ref 11.5–15.5)
IMMATURE GRANULOCYTES #: 0.01 E9/L
IMMATURE GRANULOCYTES %: 0.1 % (ref 0–5)
KETONES, URINE: ABNORMAL MG/DL
LACTIC ACID: 1 MMOL/L (ref 0.5–2.2)
LEUKOCYTE ESTERASE, URINE: NEGATIVE
LYMPHOCYTES ABSOLUTE: 1.96 E9/L (ref 1.5–4)
LYMPHOCYTES RELATIVE PERCENT: 28.5 % (ref 20–42)
MCH RBC QN AUTO: 30.3 PG (ref 26–35)
MCHC RBC AUTO-ENTMCNC: 32.9 % (ref 32–34.5)
MCV RBC AUTO: 91.9 FL (ref 80–99.9)
MONOCYTES ABSOLUTE: 0.37 E9/L (ref 0.1–0.95)
MONOCYTES RELATIVE PERCENT: 5.4 % (ref 2–12)
NEUTROPHILS ABSOLUTE: 4.36 E9/L (ref 1.8–7.3)
NEUTROPHILS RELATIVE PERCENT: 63.4 % (ref 43–80)
NITRITE, URINE: NEGATIVE
PDW BLD-RTO: 13 FL (ref 11.5–15)
PH UA: 6 (ref 5–9)
PLATELET # BLD: 194 E9/L (ref 130–450)
PMV BLD AUTO: 11.5 FL (ref 7–12)
POTASSIUM SERPL-SCNC: 4 MMOL/L (ref 3.5–5)
PROTEIN UA: NEGATIVE MG/DL
RBC # BLD: 4.33 E12/L (ref 3.5–5.5)
RBC UA: ABNORMAL /HPF (ref 0–2)
SODIUM BLD-SCNC: 140 MMOL/L (ref 132–146)
SPECIFIC GRAVITY UA: 1.02 (ref 1–1.03)
TOTAL PROTEIN: 8.1 G/DL (ref 6.4–8.3)
UROBILINOGEN, URINE: 1 E.U./DL
WBC # BLD: 6.9 E9/L (ref 4.5–11.5)
WBC UA: ABNORMAL /HPF (ref 0–5)

## 2021-06-14 PROCEDURE — 96375 TX/PRO/DX INJ NEW DRUG ADDON: CPT

## 2021-06-14 PROCEDURE — 80053 COMPREHEN METABOLIC PANEL: CPT

## 2021-06-14 PROCEDURE — 6360000004 HC RX CONTRAST MEDICATION: Performed by: RADIOLOGY

## 2021-06-14 PROCEDURE — 85025 COMPLETE CBC W/AUTO DIFF WBC: CPT

## 2021-06-14 PROCEDURE — 83605 ASSAY OF LACTIC ACID: CPT

## 2021-06-14 PROCEDURE — 96372 THER/PROPH/DIAG INJ SC/IM: CPT

## 2021-06-14 PROCEDURE — 99285 EMERGENCY DEPT VISIT HI MDM: CPT

## 2021-06-14 PROCEDURE — 81001 URINALYSIS AUTO W/SCOPE: CPT

## 2021-06-14 PROCEDURE — 6360000002 HC RX W HCPCS: Performed by: EMERGENCY MEDICINE

## 2021-06-14 PROCEDURE — 93005 ELECTROCARDIOGRAM TRACING: CPT | Performed by: EMERGENCY MEDICINE

## 2021-06-14 PROCEDURE — 70450 CT HEAD/BRAIN W/O DYE: CPT

## 2021-06-14 PROCEDURE — 74177 CT ABD & PELVIS W/CONTRAST: CPT

## 2021-06-14 PROCEDURE — 71101 X-RAY EXAM UNILAT RIBS/CHEST: CPT

## 2021-06-14 PROCEDURE — 96374 THER/PROPH/DIAG INJ IV PUSH: CPT

## 2021-06-14 RX ORDER — ORPHENADRINE CITRATE 30 MG/ML
60 INJECTION INTRAMUSCULAR; INTRAVENOUS ONCE
Status: DISCONTINUED | OUTPATIENT
Start: 2021-06-14 | End: 2021-06-14

## 2021-06-14 RX ORDER — CYCLOBENZAPRINE HCL 5 MG
5 TABLET ORAL 3 TIMES DAILY PRN
Qty: 21 TABLET | Refills: 0 | Status: SHIPPED | OUTPATIENT
Start: 2021-06-14 | End: 2021-06-21

## 2021-06-14 RX ORDER — KETOROLAC TROMETHAMINE 30 MG/ML
15 INJECTION, SOLUTION INTRAMUSCULAR; INTRAVENOUS ONCE
Status: COMPLETED | OUTPATIENT
Start: 2021-06-14 | End: 2021-06-14

## 2021-06-14 RX ORDER — ONDANSETRON 4 MG/1
4 TABLET, ORALLY DISINTEGRATING ORAL EVERY 8 HOURS PRN
Qty: 10 TABLET | Refills: 0 | Status: ON HOLD | OUTPATIENT
Start: 2021-06-14 | End: 2022-03-16 | Stop reason: HOSPADM

## 2021-06-14 RX ORDER — ORPHENADRINE CITRATE 30 MG/ML
60 INJECTION INTRAMUSCULAR; INTRAVENOUS ONCE
Status: COMPLETED | OUTPATIENT
Start: 2021-06-14 | End: 2021-06-14

## 2021-06-14 RX ORDER — DIPHENHYDRAMINE HYDROCHLORIDE 50 MG/ML
25 INJECTION INTRAMUSCULAR; INTRAVENOUS ONCE
Status: COMPLETED | OUTPATIENT
Start: 2021-06-14 | End: 2021-06-14

## 2021-06-14 RX ORDER — ONDANSETRON 2 MG/ML
4 INJECTION INTRAMUSCULAR; INTRAVENOUS ONCE
Status: COMPLETED | OUTPATIENT
Start: 2021-06-14 | End: 2021-06-14

## 2021-06-14 RX ADMIN — ONDANSETRON 4 MG: 2 INJECTION INTRAMUSCULAR; INTRAVENOUS at 20:36

## 2021-06-14 RX ADMIN — DIPHENHYDRAMINE HYDROCHLORIDE 25 MG: 50 INJECTION INTRAMUSCULAR; INTRAVENOUS at 20:35

## 2021-06-14 RX ADMIN — IOPAMIDOL 75 ML: 755 INJECTION, SOLUTION INTRAVENOUS at 19:39

## 2021-06-14 RX ADMIN — ORPHENADRINE CITRATE 60 MG: 60 INJECTION INTRAMUSCULAR; INTRAVENOUS at 22:02

## 2021-06-14 RX ADMIN — KETOROLAC TROMETHAMINE 15 MG: 30 INJECTION, SOLUTION INTRAMUSCULAR; INTRAVENOUS at 20:36

## 2021-06-14 ASSESSMENT — PAIN SCALES - GENERAL
PAINLEVEL_OUTOF10: 4
PAINLEVEL_OUTOF10: 8
PAINLEVEL_OUTOF10: 8

## 2021-06-14 ASSESSMENT — PAIN DESCRIPTION - LOCATION: LOCATION: HEAD

## 2021-06-14 ASSESSMENT — PAIN DESCRIPTION - PAIN TYPE
TYPE: ACUTE PAIN
TYPE: ACUTE PAIN

## 2021-06-15 LAB
EKG ATRIAL RATE: 52 BPM
EKG P AXIS: 52 DEGREES
EKG P-R INTERVAL: 166 MS
EKG Q-T INTERVAL: 450 MS
EKG QRS DURATION: 84 MS
EKG QTC CALCULATION (BAZETT): 418 MS
EKG R AXIS: 7 DEGREES
EKG T AXIS: 27 DEGREES
EKG VENTRICULAR RATE: 52 BPM

## 2021-06-15 PROCEDURE — 93010 ELECTROCARDIOGRAM REPORT: CPT | Performed by: INTERNAL MEDICINE

## 2021-06-15 NOTE — ED PROVIDER NOTES
ROBERT Leal is a 54 y.o. female with a PMHx significant for hypertension, seizures, status post gastric bypass and major depressive disorder who presents with headache, lightheadedness, nausea and diffuse musculoskeletal pain. She states that her symptoms are moderate in severity and constant. The patient denies any chest pain, but states that her right ribs hurt badly, but she denies any trauma to the area. She describes the pain as a generalized soreness that seems to localize to her right lower ribs. The patient describes the headache as a throbbing sensation that localizes to the back of her head. She states that the pain does not radiate anywhere. She states it has been going on for approximately 24 hours. She states that she has taken Tylenol for this, but has not gotten any improvement. She endorses mild nausea, but states that she has not vomited. She states that she also has generalized soreness. The patient denies recent trauma, fever, chills, fatigue, vision changes, congestion, rhinorrhea, palpitations, hx of MI, hx of blood clots, LE edema, SOB, cough, wheezing, abdominal pain, N/V/D/C, hematochezia, melena, dysuria, hematuria, generalized weakness and paresthesias. The patient is currently taking no blood thinners. Tobacco Hx:   reports that she quit smoking about 2 years ago. Her smoking use included cigarettes. She has a 1.00 pack-year smoking history. She has never used smokeless tobacco.    Alcohol Hx:   reports no history of alcohol use. Illicit Drug Hx:  Reports history of marijuana use. The history is provided by the patient. Last Tetanus (if applicable): N/A    Review of Systems   Constitutional: Negative for chills, diaphoresis, fatigue and fever. HENT: Negative for congestion, ear pain, rhinorrhea and sore throat. Eyes: Negative for pain, discharge and redness. Respiratory: Negative for cough, shortness of breath and wheezing.     Cardiovascular: Negative for chest pain, palpitations and leg swelling. Gastrointestinal: Positive for nausea. Negative for abdominal distention, abdominal pain, blood in stool, constipation, diarrhea and vomiting. Genitourinary: Negative for difficulty urinating, dysuria, flank pain, frequency and hematuria. Musculoskeletal: Positive for myalgias and neck pain. Negative for arthralgias, back pain and neck stiffness. Skin: Negative for rash and wound. Neurological: Positive for light-headedness and headaches. Negative for dizziness, syncope, speech difficulty, weakness and numbness. Hematological: Negative for adenopathy. All other systems reviewed and are negative. Physical Exam  Vitals and nursing note reviewed. Constitutional:       General: She is awake. She is not in acute distress. Appearance: She is well-developed. She is not diaphoretic. HENT:      Head: Normocephalic and atraumatic. Right Ear: External ear normal.      Left Ear: External ear normal.      Nose: Nose normal. No congestion or rhinorrhea. Mouth/Throat:      Mouth: Mucous membranes are moist.      Pharynx: Oropharynx is clear. Eyes:      General: No scleral icterus. Right eye: No discharge. Left eye: No discharge. Extraocular Movements: Extraocular movements intact. Conjunctiva/sclera: Conjunctivae normal.      Pupils: Pupils are equal, round, and reactive to light. Cardiovascular:      Rate and Rhythm: Regular rhythm. Bradycardia present. Heart sounds: Normal heart sounds. No murmur heard. No friction rub. No gallop. Comments: Upper extremity and lower extremity distal pulses intact bilaterally +2/4  Pulmonary:      Effort: Pulmonary effort is normal. No respiratory distress. Breath sounds: Normal breath sounds. No wheezing, rhonchi or rales. Comments: Good air movement noted throughout. Abdominal:      General: Bowel sounds are normal. There is no distension. Palpations: Abdomen is soft. There is no mass. Tenderness: There is no abdominal tenderness. There is no guarding or rebound. Musculoskeletal:         General: Tenderness (To mild palpation in the area overlying the right ribs. There is no overlying erythema. There is no mass, fluid collection or induration noted.) present. No deformity. Normal range of motion. Cervical back: Normal range of motion and neck supple. No rigidity. No muscular tenderness. Right lower leg: No edema. Left lower leg: No edema. Lymphadenopathy:      Cervical: No cervical adenopathy. Skin:     General: Skin is warm and dry. Capillary Refill: Capillary refill takes less than 2 seconds. Findings: No erythema or rash. Neurological:      General: No focal deficit present. Mental Status: She is alert and oriented to person, place, and time. Cranial Nerves: No cranial nerve deficit. Sensory: No sensory deficit. Motor: No weakness. Coordination: Coordination normal.        ---------------------------------- PAST HISTORY ---------------------------------------------  Past Medical History:  has a past medical history of Anorexia, Arthritis of knee, Back pain, chronic, Chronic pain, Current severe episode of major depressive disorder without psychotic features without prior episode ([facility] [address] H/O gastric bypass, Hypertension, Hypertension, Metabolic acidosis, Ovarian cyst, and Seizures ([facility] [address] Past Surgical History:  has a past surgical history that includes Jeannine-en-Y Gastric Bypass (12/2/2008); Endometrial ablation (2003); Tonsillectomy (1980); Cholecystectomy, laparoscopic (9/19/1995); Upper gastrointestinal endoscopy (10/3/2008); Upper gastrointestinal endoscopy (1/9/2009); Upper gastrointestinal endoscopy (2/4/2009); laparoscopy (2/12/2009); Upper gastrointestinal endoscopy (3/6/2009); Upper gastrointestinal endoscopy (6/4/2009); Upper gastrointestinal endoscopy (7/2/2009);  Upper gastrointestinal endoscopy (10/27/2009); Upper gastrointestinal endoscopy (1/4/2010); Upper gastrointestinal endoscopy (6/7/2010); Stomach surgery (6/25/2010); Upper gastrointestinal endoscopy (7/30/2010); other surgical history (12/28/11); Gastric bypass surgery; hernia repair; Hysterectomy (Left, 2/5/2013); Upper gastrointestinal endoscopy (N/A, 5/10/2019); Upper gastrointestinal endoscopy (N/A, 5/12/2019); and Upper gastrointestinal endoscopy (N/A, 5/17/2019). Social History:  reports that she quit smoking about 2 years ago. Her smoking use included cigarettes. She has a 1.00 pack-year smoking history. She has never used smokeless tobacco. She reports current drug use. Drug: Marijuana. She reports that she does not drink alcohol. Family History: family history includes ADHD in her paternal grandfather. Home Meds: Not in a hospital admission. The patients home medications have been reviewed. Allergies: Ultram [tramadol]    ------------------------- NURSING NOTES AND VITALS REVIEWED ---------------------------  Date / Time Roomed:  6/14/2021  5:44 PM  ED Bed Assignment:  23/23    The nursing notes within the ED encounter and vital signs as below have been reviewed. BP (!) 148/67   Pulse 58   Temp 97.8 °F (36.6 °C) (Oral)   Resp 16   Ht 5' 4\" (1.626 m)   Wt 189 lb (85.7 kg)   SpO2 96%   BMI 32.44 kg/m²   -------------------------------------------------- RESULTS / INTERVENTIONS -------------------------------------------------  All laboratory and radiology tests have been reviewed by this physician.     LABS:  Results for orders placed or performed during the hospital encounter of 06/14/21   Comprehensive Metabolic Panel   Result Value Ref Range    Sodium 140 132 - 146 mmol/L    Potassium 4.0 3.5 - 5.0 mmol/L    Chloride 104 98 - 107 mmol/L    CO2 26 22 - 29 mmol/L    Anion Gap 10 7 - 16 mmol/L    Glucose 85 74 - 99 mg/dL    BUN 15 6 - 20 mg/dL    CREATININE 1.0 0.5 - 1.0 mg/dL    GFR 84 ms    Q-T Interval 450 ms    QTc Calculation (Bazett) 418 ms    P Axis 52 degrees    R Axis 7 degrees    T Axis 27 degrees       RADIOLOGY: Interpreted by Radiologist unless otherwise noted. CT ABDOMEN PELVIS W IV CONTRAST Additional Contrast? None   Final Result   No acute process in the abdomen and pelvis, status post gastric surgery and   cholecystectomy. XR RIBS RIGHT INCLUDE CHEST (MIN 3 VIEWS)   Final Result   No radiographically visible acute displaced rib fracture. CT HEAD WO CONTRAST   Final Result   No acute intracranial abnormality. Mucosal thickening left maxillary sinus. EKG: As interpreted by this ER physician. Rate: 52 bpm  Rhythm: Sinus  Axis: normal  ST Segments: no acute change  T-Waves: no acute change  Interpretation: Sinus bradycardia, otherwise normal EKG  Comparison: changes compared to previous EKG on 10/9/2020    Oxygen Saturation Interpretation: Normal    Meds Given:  Medications   ondansetron (ZOFRAN) injection 4 mg (4 mg Intravenous Given 6/14/21 2036)   iopamidol (ISOVUE-370) 76 % injection 75 mL (75 mLs Intravenous Given 6/14/21 1939)   ketorolac (TORADOL) injection 15 mg (15 mg Intravenous Given 6/14/21 2036)   diphenhydrAMINE (BENADRYL) injection 25 mg (25 mg Intravenous Given 6/14/21 2035)   orphenadrine (NORFLEX) injection 60 mg (60 mg Intramuscular Given 6/14/21 2202)       Procedures:  No procedures performed. --------------------------------- PROGRESS NOTES / ADDITIONAL PROVIDER NOTES ---------------------------------  Consultations:  As outlined below. ED Course:    ED Course as of Alessandro 15 0142   Mon Jun 14, 2021   2033 ATTENDING PHYSICIAN ATTESTATION:     I have personally performed and/or participated in the history, exam, medical decision making, and procedures and agree with all pertinent clinical information. I have also reviewed and agree with the past medical, family and social history unless otherwise noted.     I have discussed this patient in detail with the resident, and provided the instruction and education regarding headache. My findings/Plan: 42-year-old female with multiple complaints greatest being the headache. Patient is well-appearing nontoxic and imaging and labs are unremarkable for any acute pathology. Symptomatic and supportive care          [MF]   2140 On reevaluation, the patient states that she is feeling somewhat better, but that her headache and low back pain is still present. I told her I will give her a shot of Norflex and that we will then discharge her. I told her I will give her a prescription for Flexeril and I recommended that she also take Tylenol with it. Further symptomatic care was discussed. The patient was also advised to follow-up with her primary care physician in 2 to 3 days. Once the patient gets the medication she will be considered stable and safe for discharge. [ML]      ED Course User Index  [MF] Ella Ribeiro DO  [ML] Banks, DO       2150: All results were discussed with the patient and I have provided specific details regarding the plan of care, diagnosis and associated prognosis. The patient tolerated the visit well and, at the time of discharge, she was without objective evidence of hemodynamic instability or an acute process (biological or psychological) requiring hospitalization and inpatient management. The patient was seen by myself and the assigned attending physician, Dr. Latrice Aldana, who agreed with my assessment and plan as laid out herein. The importance of follow-up was discussed at the end of the visit and I recommended that the patient be seen by her primary care physician in 2-3 days. The patient verbalized her understanding and agreement with the plan as presented and stated her intention to follow up. Reasons to return to the ER or seek immediate evaluation by a medical provider were discussed at length and all questions were answered.  The patient was discharged home in stable condition. MDM:  Patient presented from home complaining of a headache, nausea, lightheadedness and right rib and diffuse musculoskeletal pain. On arrival, the patient is mildly hypertensive and bradycardic with remaining vital signs within normal limits. EKG and physical exam were as documented above. A work-up was initiated to further evaluate her presenting complaints. Labs were grossly unremarkable. X-ray of the right ribs did not reveal any acute fractures. CT of the head did not reveal any acute intracranial pathology. CT of the abdomen pelvis was done due to the location of the patient's right rib pain. It was negative for intra-abdominal pathology. The patient was given a migraine cocktail and a muscle relaxer and reported significant symptomatic improvement. Given that, in addition to her negative work-up, the decision was made to discharge her with recommended follow-up with her PCP in 2 to 3 days. The patient verbalized her understanding and agree with that plan. She was given prescriptions for Flexeril and Zofran and advised on symptomatic care. She was stable to time of her disposition. Discharge Medication List as of 6/14/2021  9:52 PM      START taking these medications    Details   cyclobenzaprine (FLEXERIL) 5 MG tablet Take 1 tablet by mouth 3 times daily as needed for Muscle spasms, Disp-21 tablet, R-0Print             Diagnosis:  1. Acute nonintractable headache, unspecified headache type    2. Acute right-sided low back pain without sciatica    3. Nausea        Disposition:  Patient's disposition: Discharge to home  Patient's condition is stable. This patient was seen, examined and treated with Dr. Jeremy Anaya. All pertinent aspects of the patient's care were discussed with the attending physician.        Candida Hernandes DO  Resident  06/17/21 [id number]

## 2021-06-17 ASSESSMENT — ENCOUNTER SYMPTOMS
NAUSEA: 1
SHORTNESS OF BREATH: 0
COUGH: 0
ABDOMINAL PAIN: 0
CONSTIPATION: 0
EYE REDNESS: 0
ABDOMINAL DISTENTION: 0
VOMITING: 0
EYE DISCHARGE: 0
RHINORRHEA: 0
WHEEZING: 0
BLOOD IN STOOL: 0
EYE PAIN: 0
DIARRHEA: 0
SORE THROAT: 0
BACK PAIN: 0

## 2021-07-10 DIAGNOSIS — F32.0 MILD MAJOR DEPRESSION (HCC): ICD-10-CM

## 2021-07-10 DIAGNOSIS — Z13.31 POSITIVE DEPRESSION SCREENING: ICD-10-CM

## 2021-07-10 DIAGNOSIS — I10 HYPERTENSION, UNSPECIFIED TYPE: ICD-10-CM

## 2021-07-10 DIAGNOSIS — E55.9 VITAMIN D DEFICIENCY, UNSPECIFIED: ICD-10-CM

## 2021-07-11 RX ORDER — B-COMPLEX WITH VITAMIN C
TABLET ORAL
Qty: 60 TABLET | Refills: 2 | Status: SHIPPED
Start: 2021-07-11 | End: 2021-10-07

## 2021-07-11 RX ORDER — METOPROLOL SUCCINATE 100 MG/1
100 TABLET, EXTENDED RELEASE ORAL DAILY
Qty: 30 TABLET | Refills: 3 | Status: SHIPPED
Start: 2021-07-11 | End: 2021-11-16

## 2021-07-11 RX ORDER — SERTRALINE HYDROCHLORIDE 100 MG/1
100 TABLET, FILM COATED ORAL DAILY
Qty: 30 TABLET | Refills: 3 | Status: SHIPPED
Start: 2021-07-11 | End: 2021-11-16

## 2021-10-07 DIAGNOSIS — E55.9 VITAMIN D DEFICIENCY, UNSPECIFIED: ICD-10-CM

## 2021-10-07 RX ORDER — B-COMPLEX WITH VITAMIN C
TABLET ORAL
Qty: 60 TABLET | Refills: 0 | Status: SHIPPED
Start: 2021-10-07 | End: 2021-11-15

## 2021-11-13 DIAGNOSIS — I10 HYPERTENSION, UNSPECIFIED TYPE: ICD-10-CM

## 2021-11-13 DIAGNOSIS — Z13.31 POSITIVE DEPRESSION SCREENING: ICD-10-CM

## 2021-11-13 DIAGNOSIS — E55.9 VITAMIN D DEFICIENCY, UNSPECIFIED: ICD-10-CM

## 2021-11-13 DIAGNOSIS — F32.0 MILD MAJOR DEPRESSION (HCC): ICD-10-CM

## 2021-11-16 RX ORDER — SERTRALINE HYDROCHLORIDE 100 MG/1
100 TABLET, FILM COATED ORAL DAILY
Qty: 30 TABLET | Refills: 0 | Status: ON HOLD
Start: 2021-11-16 | End: 2022-03-16 | Stop reason: HOSPADM

## 2021-11-16 RX ORDER — METOPROLOL SUCCINATE 100 MG/1
100 TABLET, EXTENDED RELEASE ORAL DAILY
Qty: 30 TABLET | Refills: 0 | Status: ON HOLD
Start: 2021-11-16 | End: 2022-03-16 | Stop reason: SDUPTHER

## 2021-11-17 NOTE — TELEPHONE ENCOUNTER
I tried to call patient to let her know that she needs to schedule an appt but phone is no longer in service.

## 2022-03-11 ENCOUNTER — APPOINTMENT (OUTPATIENT)
Dept: GENERAL RADIOLOGY | Age: 52
DRG: 174 | End: 2022-03-11
Payer: COMMERCIAL

## 2022-03-11 ENCOUNTER — APPOINTMENT (OUTPATIENT)
Dept: CT IMAGING | Age: 52
DRG: 174 | End: 2022-03-11
Payer: COMMERCIAL

## 2022-03-11 ENCOUNTER — HOSPITAL ENCOUNTER (INPATIENT)
Age: 52
LOS: 5 days | Discharge: HOME | DRG: 174 | End: 2022-03-16
Attending: EMERGENCY MEDICINE | Admitting: INTERNAL MEDICINE
Payer: COMMERCIAL

## 2022-03-11 DIAGNOSIS — I21.3 ACUTE ST ELEVATION MYOCARDIAL INFARCTION (STEMI), UNSPECIFIED ARTERY (HCC): Primary | ICD-10-CM

## 2022-03-11 DIAGNOSIS — Z98.84 H/O GASTRIC BYPASS: Chronic | ICD-10-CM

## 2022-03-11 DIAGNOSIS — F32.0 MILD MAJOR DEPRESSION (HCC): ICD-10-CM

## 2022-03-11 DIAGNOSIS — E55.9 VITAMIN D DEFICIENCY, UNSPECIFIED: ICD-10-CM

## 2022-03-11 DIAGNOSIS — Z13.31 POSITIVE DEPRESSION SCREENING: ICD-10-CM

## 2022-03-11 DIAGNOSIS — I10 HYPERTENSION, UNSPECIFIED TYPE: ICD-10-CM

## 2022-03-11 PROBLEM — R07.9 CHEST PAIN: Status: ACTIVE | Noted: 2022-03-11

## 2022-03-11 PROBLEM — I21.19 ACUTE MI, INFERIOR WALL (HCC): Status: ACTIVE | Noted: 2022-03-11

## 2022-03-11 LAB
ALBUMIN SERPL-MCNC: 4.3 G/DL (ref 3.5–5.2)
ALP BLD-CCNC: 187 U/L (ref 35–104)
ALT SERPL-CCNC: 17 U/L (ref 0–32)
ANION GAP SERPL CALCULATED.3IONS-SCNC: 16 MMOL/L (ref 7–16)
AST SERPL-CCNC: 38 U/L (ref 0–31)
BASOPHILS ABSOLUTE: 0.06 E9/L (ref 0–0.2)
BASOPHILS RELATIVE PERCENT: 0.8 % (ref 0–2)
BILIRUB SERPL-MCNC: 0.3 MG/DL (ref 0–1.2)
BUN BLDV-MCNC: 12 MG/DL (ref 6–20)
CALCIUM SERPL-MCNC: 9.5 MG/DL (ref 8.6–10.2)
CHLORIDE BLD-SCNC: 107 MMOL/L (ref 98–107)
CO2: 19 MMOL/L (ref 22–29)
CREAT SERPL-MCNC: 1 MG/DL (ref 0.5–1)
EOSINOPHILS ABSOLUTE: 0.1 E9/L (ref 0.05–0.5)
EOSINOPHILS RELATIVE PERCENT: 1.3 % (ref 0–6)
GFR AFRICAN AMERICAN: >60
GFR NON-AFRICAN AMERICAN: 59 ML/MIN/1.73
GLUCOSE BLD-MCNC: 135 MG/DL (ref 74–99)
HCT VFR BLD CALC: 39.5 % (ref 34–48)
HEMOGLOBIN: 13.6 G/DL (ref 11.5–15.5)
IMMATURE GRANULOCYTES #: 0.02 E9/L
IMMATURE GRANULOCYTES %: 0.3 % (ref 0–5)
INR BLD: 1.1
LYMPHOCYTES ABSOLUTE: 2.42 E9/L (ref 1.5–4)
LYMPHOCYTES RELATIVE PERCENT: 30.3 % (ref 20–42)
MCH RBC QN AUTO: 30.5 PG (ref 26–35)
MCHC RBC AUTO-ENTMCNC: 34.4 % (ref 32–34.5)
MCV RBC AUTO: 88.6 FL (ref 80–99.9)
MONOCYTES ABSOLUTE: 0.49 E9/L (ref 0.1–0.95)
MONOCYTES RELATIVE PERCENT: 6.1 % (ref 2–12)
NEUTROPHILS ABSOLUTE: 4.9 E9/L (ref 1.8–7.3)
NEUTROPHILS RELATIVE PERCENT: 61.2 % (ref 43–80)
PDW BLD-RTO: 12.6 FL (ref 11.5–15)
PLATELET # BLD: 201 E9/L (ref 130–450)
PMV BLD AUTO: 11.2 FL (ref 7–12)
POTASSIUM SERPL-SCNC: 3.9 MMOL/L (ref 3.5–5)
PROTHROMBIN TIME: 11.9 SEC (ref 9.3–12.4)
RBC # BLD: 4.46 E12/L (ref 3.5–5.5)
SODIUM BLD-SCNC: 142 MMOL/L (ref 132–146)
TOTAL PROTEIN: 8.3 G/DL (ref 6.4–8.3)
TROPONIN, HIGH SENSITIVITY: 73 NG/L (ref 0–9)
WBC # BLD: 8 E9/L (ref 4.5–11.5)

## 2022-03-11 PROCEDURE — 6360000004 HC RX CONTRAST MEDICATION: Performed by: RADIOLOGY

## 2022-03-11 PROCEDURE — 93005 ELECTROCARDIOGRAM TRACING: CPT | Performed by: EMERGENCY MEDICINE

## 2022-03-11 PROCEDURE — 71275 CT ANGIOGRAPHY CHEST: CPT

## 2022-03-11 PROCEDURE — 84484 ASSAY OF TROPONIN QUANT: CPT

## 2022-03-11 PROCEDURE — 85610 PROTHROMBIN TIME: CPT

## 2022-03-11 PROCEDURE — 85025 COMPLETE CBC W/AUTO DIFF WBC: CPT

## 2022-03-11 PROCEDURE — 99284 EMERGENCY DEPT VISIT MOD MDM: CPT

## 2022-03-11 PROCEDURE — 2580000003 HC RX 258: Performed by: EMERGENCY MEDICINE

## 2022-03-11 PROCEDURE — 80053 COMPREHEN METABOLIC PANEL: CPT

## 2022-03-11 PROCEDURE — 2500000003 HC RX 250 WO HCPCS: Performed by: EMERGENCY MEDICINE

## 2022-03-11 PROCEDURE — 71045 X-RAY EXAM CHEST 1 VIEW: CPT

## 2022-03-11 PROCEDURE — 2000000000 HC ICU R&B

## 2022-03-11 PROCEDURE — 99291 CRITICAL CARE FIRST HOUR: CPT | Performed by: INTERNAL MEDICINE

## 2022-03-11 RX ORDER — NITROGLYCERIN 20 MG/100ML
5-200 INJECTION INTRAVENOUS CONTINUOUS
Status: DISCONTINUED | OUTPATIENT
Start: 2022-03-11 | End: 2022-03-12

## 2022-03-11 RX ORDER — ONDANSETRON 2 MG/ML
4 INJECTION INTRAMUSCULAR; INTRAVENOUS EVERY 6 HOURS PRN
Status: DISCONTINUED | OUTPATIENT
Start: 2022-03-11 | End: 2022-03-16 | Stop reason: HOSPADM

## 2022-03-11 RX ORDER — HEPARIN SODIUM 1000 [USP'U]/ML
4000 INJECTION, SOLUTION INTRAVENOUS; SUBCUTANEOUS ONCE
Status: COMPLETED | OUTPATIENT
Start: 2022-03-11 | End: 2022-03-12

## 2022-03-11 RX ORDER — FENTANYL CITRATE 50 UG/ML
25 INJECTION, SOLUTION INTRAMUSCULAR; INTRAVENOUS ONCE
Status: COMPLETED | OUTPATIENT
Start: 2022-03-12 | End: 2022-03-12

## 2022-03-11 RX ORDER — 0.9 % SODIUM CHLORIDE 0.9 %
1000 INTRAVENOUS SOLUTION INTRAVENOUS ONCE
Status: COMPLETED | OUTPATIENT
Start: 2022-03-11 | End: 2022-03-12

## 2022-03-11 RX ORDER — HYDRALAZINE HYDROCHLORIDE 20 MG/ML
10 INJECTION INTRAMUSCULAR; INTRAVENOUS EVERY 6 HOURS PRN
Status: DISCONTINUED | OUTPATIENT
Start: 2022-03-11 | End: 2022-03-12

## 2022-03-11 RX ORDER — HYDROCHLOROTHIAZIDE 12.5 MG/1
12.5 CAPSULE, GELATIN COATED ORAL DAILY
Status: CANCELLED | OUTPATIENT
Start: 2022-03-12

## 2022-03-11 RX ADMIN — NITROGLYCERIN 5 MCG/MIN: 20 INJECTION INTRAVENOUS at 23:26

## 2022-03-11 RX ADMIN — IOPAMIDOL 75 ML: 755 INJECTION, SOLUTION INTRAVENOUS at 23:45

## 2022-03-11 RX ADMIN — SODIUM CHLORIDE 1000 ML: 9 INJECTION, SOLUTION INTRAVENOUS at 23:20

## 2022-03-11 ASSESSMENT — PAIN DESCRIPTION - LOCATION: LOCATION: CHEST

## 2022-03-11 ASSESSMENT — PAIN SCALES - GENERAL
PAINLEVEL_OUTOF10: 10
PAINLEVEL_OUTOF10: 10

## 2022-03-11 ASSESSMENT — PAIN - FUNCTIONAL ASSESSMENT: PAIN_FUNCTIONAL_ASSESSMENT: 0-10

## 2022-03-11 ASSESSMENT — PAIN DESCRIPTION - PAIN TYPE: TYPE: ACUTE PAIN

## 2022-03-11 ASSESSMENT — PAIN DESCRIPTION - DIRECTION: RADIATING_TOWARDS: RIGHT ARM

## 2022-03-11 ASSESSMENT — PAIN DESCRIPTION - ONSET: ONSET: SUDDEN

## 2022-03-11 ASSESSMENT — PAIN DESCRIPTION - PROGRESSION: CLINICAL_PROGRESSION: GRADUALLY WORSENING

## 2022-03-11 ASSESSMENT — PAIN DESCRIPTION - DESCRIPTORS: DESCRIPTORS: ACHING;SHARP;SHOOTING

## 2022-03-12 ENCOUNTER — APPOINTMENT (OUTPATIENT)
Dept: ULTRASOUND IMAGING | Age: 52
DRG: 174 | End: 2022-03-12
Payer: COMMERCIAL

## 2022-03-12 LAB
ABO/RH: NORMAL
ALBUMIN SERPL-MCNC: 4.3 G/DL (ref 3.5–5.2)
ALP BLD-CCNC: 177 U/L (ref 35–104)
ALT SERPL-CCNC: 31 U/L (ref 0–32)
ANION GAP SERPL CALCULATED.3IONS-SCNC: 15 MMOL/L (ref 7–16)
ANTIBODY SCREEN: NORMAL
AST SERPL-CCNC: 146 U/L (ref 0–31)
BILIRUB SERPL-MCNC: 0.8 MG/DL (ref 0–1.2)
BUN BLDV-MCNC: 13 MG/DL (ref 6–20)
CALCIUM IONIZED: 1.22 MMOL/L (ref 1.15–1.33)
CALCIUM SERPL-MCNC: 9.4 MG/DL (ref 8.6–10.2)
CHLORIDE BLD-SCNC: 106 MMOL/L (ref 98–107)
CHOLESTEROL, TOTAL: 208 MG/DL (ref 0–199)
CO2: 18 MMOL/L (ref 22–29)
CREAT SERPL-MCNC: 0.9 MG/DL (ref 0.5–1)
GFR AFRICAN AMERICAN: >60
GFR NON-AFRICAN AMERICAN: >60 ML/MIN/1.73
GLUCOSE BLD-MCNC: 137 MG/DL (ref 74–99)
HBA1C MFR BLD: 5.1 % (ref 4–5.6)
HDLC SERPL-MCNC: 38 MG/DL
LDL CHOLESTEROL CALCULATED: 139 MG/DL (ref 0–99)
MAGNESIUM: 1.7 MG/DL (ref 1.6–2.6)
PHOSPHORUS: 2.2 MG/DL (ref 2.5–4.5)
POC ACT LR: 328 SECONDS
POC ACT LR: 334 SECONDS
POTASSIUM SERPL-SCNC: 3.2 MMOL/L (ref 3.5–5)
SODIUM BLD-SCNC: 139 MMOL/L (ref 132–146)
TOTAL PROTEIN: 7.8 G/DL (ref 6.4–8.3)
TRIGL SERPL-MCNC: 153 MG/DL (ref 0–149)
TROPONIN, HIGH SENSITIVITY: 1986 NG/L (ref 0–9)
TROPONIN, HIGH SENSITIVITY: 2209 NG/L (ref 0–9)
VLDLC SERPL CALC-MCNC: 31 MG/DL

## 2022-03-12 PROCEDURE — C1753 CATH, INTRAVAS ULTRASOUND: HCPCS

## 2022-03-12 PROCEDURE — 92973 PRQ TRLUML C MCHN ASP THRMBC: CPT

## 2022-03-12 PROCEDURE — 2580000003 HC RX 258: Performed by: INTERNAL MEDICINE

## 2022-03-12 PROCEDURE — C1725 CATH, TRANSLUMIN NON-LASER: HCPCS

## 2022-03-12 PROCEDURE — 2720000010 HC SURG SUPPLY STERILE

## 2022-03-12 PROCEDURE — 2709999900 HC NON-CHARGEABLE SUPPLY

## 2022-03-12 PROCEDURE — 6360000002 HC RX W HCPCS: Performed by: SURGERY

## 2022-03-12 PROCEDURE — C9606 PERC D-E COR REVASC W AMI S: HCPCS

## 2022-03-12 PROCEDURE — 6370000000 HC RX 637 (ALT 250 FOR IP): Performed by: SURGERY

## 2022-03-12 PROCEDURE — 6360000002 HC RX W HCPCS

## 2022-03-12 PROCEDURE — 93971 EXTREMITY STUDY: CPT

## 2022-03-12 PROCEDURE — 83735 ASSAY OF MAGNESIUM: CPT

## 2022-03-12 PROCEDURE — 92973 PRQ TRLUML C MCHN ASP THRMBC: CPT | Performed by: INTERNAL MEDICINE

## 2022-03-12 PROCEDURE — 82330 ASSAY OF CALCIUM: CPT

## 2022-03-12 PROCEDURE — 027036Z DILATION OF CORONARY ARTERY, ONE ARTERY WITH THREE DRUG-ELUTING INTRALUMINAL DEVICES, PERCUTANEOUS APPROACH: ICD-10-PCS | Performed by: INTERNAL MEDICINE

## 2022-03-12 PROCEDURE — 83036 HEMOGLOBIN GLYCOSYLATED A1C: CPT

## 2022-03-12 PROCEDURE — 93005 ELECTROCARDIOGRAM TRACING: CPT | Performed by: INTERNAL MEDICINE

## 2022-03-12 PROCEDURE — 76770 US EXAM ABDO BACK WALL COMP: CPT

## 2022-03-12 PROCEDURE — 02C03ZZ EXTIRPATION OF MATTER FROM CORONARY ARTERY, ONE ARTERY, PERCUTANEOUS APPROACH: ICD-10-PCS | Performed by: INTERNAL MEDICINE

## 2022-03-12 PROCEDURE — 84484 ASSAY OF TROPONIN QUANT: CPT

## 2022-03-12 PROCEDURE — 86901 BLOOD TYPING SEROLOGIC RH(D): CPT

## 2022-03-12 PROCEDURE — 80061 LIPID PANEL: CPT

## 2022-03-12 PROCEDURE — 6360000002 HC RX W HCPCS: Performed by: STUDENT IN AN ORGANIZED HEALTH CARE EDUCATION/TRAINING PROGRAM

## 2022-03-12 PROCEDURE — 36415 COLL VENOUS BLD VENIPUNCTURE: CPT

## 2022-03-12 PROCEDURE — 6360000002 HC RX W HCPCS: Performed by: EMERGENCY MEDICINE

## 2022-03-12 PROCEDURE — 4A023N7 MEASUREMENT OF CARDIAC SAMPLING AND PRESSURE, LEFT HEART, PERCUTANEOUS APPROACH: ICD-10-PCS | Performed by: INTERNAL MEDICINE

## 2022-03-12 PROCEDURE — 85347 COAGULATION TIME ACTIVATED: CPT

## 2022-03-12 PROCEDURE — 6370000000 HC RX 637 (ALT 250 FOR IP): Performed by: INTERNAL MEDICINE

## 2022-03-12 PROCEDURE — 86900 BLOOD TYPING SEROLOGIC ABO: CPT

## 2022-03-12 PROCEDURE — 2500000003 HC RX 250 WO HCPCS

## 2022-03-12 PROCEDURE — B2111ZZ FLUOROSCOPY OF MULTIPLE CORONARY ARTERIES USING LOW OSMOLAR CONTRAST: ICD-10-PCS | Performed by: INTERNAL MEDICINE

## 2022-03-12 PROCEDURE — B240ZZ3 ULTRASONOGRAPHY OF SINGLE CORONARY ARTERY, INTRAVASCULAR: ICD-10-PCS | Performed by: INTERNAL MEDICINE

## 2022-03-12 PROCEDURE — 84100 ASSAY OF PHOSPHORUS: CPT

## 2022-03-12 PROCEDURE — C1894 INTRO/SHEATH, NON-LASER: HCPCS

## 2022-03-12 PROCEDURE — 80053 COMPREHEN METABOLIC PANEL: CPT

## 2022-03-12 PROCEDURE — C1874 STENT, COATED/COV W/DEL SYS: HCPCS

## 2022-03-12 PROCEDURE — 92978 ENDOLUMINL IVUS OCT C 1ST: CPT | Performed by: INTERNAL MEDICINE

## 2022-03-12 PROCEDURE — C1887 CATHETER, GUIDING: HCPCS

## 2022-03-12 PROCEDURE — 99291 CRITICAL CARE FIRST HOUR: CPT | Performed by: INTERNAL MEDICINE

## 2022-03-12 PROCEDURE — 86850 RBC ANTIBODY SCREEN: CPT

## 2022-03-12 PROCEDURE — 93458 L HRT ARTERY/VENTRICLE ANGIO: CPT | Performed by: INTERNAL MEDICINE

## 2022-03-12 PROCEDURE — C1769 GUIDE WIRE: HCPCS

## 2022-03-12 PROCEDURE — 93975 VASCULAR STUDY: CPT

## 2022-03-12 PROCEDURE — 2500000003 HC RX 250 WO HCPCS: Performed by: INTERNAL MEDICINE

## 2022-03-12 PROCEDURE — 99291 CRITICAL CARE FIRST HOUR: CPT | Performed by: SURGERY

## 2022-03-12 PROCEDURE — 2000000000 HC ICU R&B

## 2022-03-12 PROCEDURE — 6360000002 HC RX W HCPCS: Performed by: INTERNAL MEDICINE

## 2022-03-12 PROCEDURE — 2500000003 HC RX 250 WO HCPCS: Performed by: SURGERY

## 2022-03-12 PROCEDURE — 6370000000 HC RX 637 (ALT 250 FOR IP)

## 2022-03-12 PROCEDURE — 99291 CRITICAL CARE FIRST HOUR: CPT | Performed by: THORACIC SURGERY (CARDIOTHORACIC VASCULAR SURGERY)

## 2022-03-12 PROCEDURE — 93458 L HRT ARTERY/VENTRICLE ANGIO: CPT

## 2022-03-12 PROCEDURE — 93571 IV DOP VEL&/PRESS C FLO 1ST: CPT

## 2022-03-12 PROCEDURE — 92941 PRQ TRLML REVSC TOT OCCL AMI: CPT | Performed by: INTERNAL MEDICINE

## 2022-03-12 RX ORDER — SODIUM CHLORIDE 0.9 % (FLUSH) 0.9 %
5-40 SYRINGE (ML) INJECTION PRN
Status: DISCONTINUED | OUTPATIENT
Start: 2022-03-12 | End: 2022-03-16 | Stop reason: HOSPADM

## 2022-03-12 RX ORDER — SENNA AND DOCUSATE SODIUM 50; 8.6 MG/1; MG/1
2 TABLET, FILM COATED ORAL DAILY
Status: DISCONTINUED | OUTPATIENT
Start: 2022-03-12 | End: 2022-03-16 | Stop reason: HOSPADM

## 2022-03-12 RX ORDER — SPIRONOLACTONE 25 MG/1
25 TABLET ORAL DAILY
Status: DISCONTINUED | OUTPATIENT
Start: 2022-03-12 | End: 2022-03-13

## 2022-03-12 RX ORDER — ACETAMINOPHEN 325 MG/1
650 TABLET ORAL EVERY 4 HOURS PRN
Status: DISCONTINUED | OUTPATIENT
Start: 2022-03-12 | End: 2022-03-12 | Stop reason: ALTCHOICE

## 2022-03-12 RX ORDER — MULTIVITAMIN WITH IRON
1 TABLET ORAL DAILY
Status: DISCONTINUED | OUTPATIENT
Start: 2022-03-12 | End: 2022-03-16 | Stop reason: HOSPADM

## 2022-03-12 RX ORDER — ROSUVASTATIN CALCIUM 20 MG/1
40 TABLET, COATED ORAL DAILY
Status: DISCONTINUED | OUTPATIENT
Start: 2022-03-12 | End: 2022-03-16 | Stop reason: HOSPADM

## 2022-03-12 RX ORDER — ACETAMINOPHEN 325 MG/1
650 TABLET ORAL EVERY 6 HOURS PRN
Status: DISCONTINUED | OUTPATIENT
Start: 2022-03-12 | End: 2022-03-16 | Stop reason: HOSPADM

## 2022-03-12 RX ORDER — LABETALOL HYDROCHLORIDE 5 MG/ML
10 INJECTION, SOLUTION INTRAVENOUS
Status: DISCONTINUED | OUTPATIENT
Start: 2022-03-12 | End: 2022-03-13

## 2022-03-12 RX ORDER — ASPIRIN 81 MG/1
81 TABLET ORAL DAILY
Status: DISCONTINUED | OUTPATIENT
Start: 2022-03-13 | End: 2022-03-12

## 2022-03-12 RX ORDER — HYDRALAZINE HYDROCHLORIDE 20 MG/ML
10 INJECTION INTRAMUSCULAR; INTRAVENOUS
Status: DISCONTINUED | OUTPATIENT
Start: 2022-03-12 | End: 2022-03-13

## 2022-03-12 RX ORDER — PANTOPRAZOLE SODIUM 40 MG/1
40 TABLET, DELAYED RELEASE ORAL
Status: DISCONTINUED | OUTPATIENT
Start: 2022-03-13 | End: 2022-03-16 | Stop reason: HOSPADM

## 2022-03-12 RX ORDER — SODIUM CHLORIDE 9 MG/ML
25 INJECTION, SOLUTION INTRAVENOUS PRN
Status: DISCONTINUED | OUTPATIENT
Start: 2022-03-12 | End: 2022-03-16 | Stop reason: HOSPADM

## 2022-03-12 RX ORDER — SODIUM CHLORIDE 9 MG/ML
25 INJECTION, SOLUTION INTRAVENOUS PRN
Status: DISCONTINUED | OUTPATIENT
Start: 2022-03-12 | End: 2022-03-12 | Stop reason: SDUPTHER

## 2022-03-12 RX ORDER — ATROPINE SULFATE 0.4 MG/ML
0.5 AMPUL (ML) INJECTION
Status: ACTIVE | OUTPATIENT
Start: 2022-03-12 | End: 2022-03-12

## 2022-03-12 RX ORDER — HYDROCHLOROTHIAZIDE 25 MG/1
25 TABLET ORAL DAILY
Status: DISCONTINUED | OUTPATIENT
Start: 2022-03-12 | End: 2022-03-16 | Stop reason: HOSPADM

## 2022-03-12 RX ORDER — METOPROLOL SUCCINATE 50 MG/1
100 TABLET, EXTENDED RELEASE ORAL DAILY
Status: DISCONTINUED | OUTPATIENT
Start: 2022-03-12 | End: 2022-03-13

## 2022-03-12 RX ORDER — ONDANSETRON 2 MG/ML
4 INJECTION INTRAMUSCULAR; INTRAVENOUS EVERY 6 HOURS PRN
Status: DISCONTINUED | OUTPATIENT
Start: 2022-03-12 | End: 2022-03-16 | Stop reason: HOSPADM

## 2022-03-12 RX ORDER — ERGOCALCIFEROL 1.25 MG/1
50000 CAPSULE ORAL WEEKLY
Status: DISCONTINUED | OUTPATIENT
Start: 2022-03-13 | End: 2022-03-16 | Stop reason: HOSPADM

## 2022-03-12 RX ORDER — ASPIRIN 81 MG/1
81 TABLET ORAL DAILY
Status: DISCONTINUED | OUTPATIENT
Start: 2022-03-12 | End: 2022-03-16 | Stop reason: HOSPADM

## 2022-03-12 RX ORDER — POLYETHYLENE GLYCOL 3350 17 G/17G
17 POWDER, FOR SOLUTION ORAL DAILY PRN
Status: DISCONTINUED | OUTPATIENT
Start: 2022-03-12 | End: 2022-03-16 | Stop reason: HOSPADM

## 2022-03-12 RX ORDER — 0.9 % SODIUM CHLORIDE 0.9 %
500 INTRAVENOUS SOLUTION INTRAVENOUS PRN
Status: DISCONTINUED | OUTPATIENT
Start: 2022-03-12 | End: 2022-03-16 | Stop reason: HOSPADM

## 2022-03-12 RX ORDER — ACETAMINOPHEN 650 MG/1
650 SUPPOSITORY RECTAL EVERY 6 HOURS PRN
Status: DISCONTINUED | OUTPATIENT
Start: 2022-03-12 | End: 2022-03-16 | Stop reason: HOSPADM

## 2022-03-12 RX ORDER — MAGNESIUM SULFATE IN WATER 40 MG/ML
2000 INJECTION, SOLUTION INTRAVENOUS ONCE
Status: COMPLETED | OUTPATIENT
Start: 2022-03-12 | End: 2022-03-12

## 2022-03-12 RX ORDER — LABETALOL HYDROCHLORIDE 5 MG/ML
20 INJECTION, SOLUTION INTRAVENOUS EVERY 4 HOURS PRN
Status: DISCONTINUED | OUTPATIENT
Start: 2022-03-12 | End: 2022-03-12

## 2022-03-12 RX ORDER — SODIUM CHLORIDE 0.9 % (FLUSH) 0.9 %
5-40 SYRINGE (ML) INJECTION EVERY 12 HOURS SCHEDULED
Status: DISCONTINUED | OUTPATIENT
Start: 2022-03-12 | End: 2022-03-12 | Stop reason: SDUPTHER

## 2022-03-12 RX ORDER — MORPHINE SULFATE 2 MG/ML
2 INJECTION, SOLUTION INTRAMUSCULAR; INTRAVENOUS EVERY 4 HOURS PRN
Status: DISCONTINUED | OUTPATIENT
Start: 2022-03-12 | End: 2022-03-16 | Stop reason: HOSPADM

## 2022-03-12 RX ORDER — POTASSIUM CHLORIDE 20 MEQ/1
40 TABLET, EXTENDED RELEASE ORAL 2 TIMES DAILY WITH MEALS
Status: COMPLETED | OUTPATIENT
Start: 2022-03-12 | End: 2022-03-12

## 2022-03-12 RX ORDER — SERTRALINE HYDROCHLORIDE 100 MG/1
100 TABLET, FILM COATED ORAL DAILY
Status: DISCONTINUED | OUTPATIENT
Start: 2022-03-12 | End: 2022-03-16 | Stop reason: HOSPADM

## 2022-03-12 RX ORDER — SODIUM CHLORIDE 0.9 % (FLUSH) 0.9 %
5-40 SYRINGE (ML) INJECTION EVERY 12 HOURS SCHEDULED
Status: DISCONTINUED | OUTPATIENT
Start: 2022-03-12 | End: 2022-03-16 | Stop reason: HOSPADM

## 2022-03-12 RX ORDER — SODIUM CHLORIDE 0.9 % (FLUSH) 0.9 %
5-40 SYRINGE (ML) INJECTION PRN
Status: DISCONTINUED | OUTPATIENT
Start: 2022-03-12 | End: 2022-03-12 | Stop reason: SDUPTHER

## 2022-03-12 RX ORDER — ONDANSETRON 4 MG/1
4 TABLET, ORALLY DISINTEGRATING ORAL EVERY 8 HOURS PRN
Status: DISCONTINUED | OUTPATIENT
Start: 2022-03-12 | End: 2022-03-16 | Stop reason: HOSPADM

## 2022-03-12 RX ORDER — MAGNESIUM SULFATE IN WATER 40 MG/ML
4000 INJECTION, SOLUTION INTRAVENOUS ONCE
Status: COMPLETED | OUTPATIENT
Start: 2022-03-12 | End: 2022-03-12

## 2022-03-12 RX ADMIN — POTASSIUM CHLORIDE 40 MEQ: 20 TABLET, EXTENDED RELEASE ORAL at 18:27

## 2022-03-12 RX ADMIN — SODIUM CHLORIDE 5 MG/HR: 9 INJECTION, SOLUTION INTRAVENOUS at 00:52

## 2022-03-12 RX ADMIN — NICARDIPINE HYDROCHLORIDE 7.5 MG/HR: 2.5 INJECTION, SOLUTION INTRAVENOUS at 07:52

## 2022-03-12 RX ADMIN — METOPROLOL SUCCINATE 100 MG: 50 TABLET, EXTENDED RELEASE ORAL at 08:06

## 2022-03-12 RX ADMIN — Medication 1 TABLET: at 11:00

## 2022-03-12 RX ADMIN — Medication 250 MG: at 08:07

## 2022-03-12 RX ADMIN — HYDROCHLOROTHIAZIDE 25 MG: 25 TABLET ORAL at 08:06

## 2022-03-12 RX ADMIN — MORPHINE SULFATE 2 MG: 2 INJECTION, SOLUTION INTRAMUSCULAR; INTRAVENOUS at 22:23

## 2022-03-12 RX ADMIN — LABETALOL HYDROCHLORIDE 10 MG: 5 INJECTION INTRAVENOUS at 11:04

## 2022-03-12 RX ADMIN — NICARDIPINE HYDROCHLORIDE 15 MG/HR: 2.5 INJECTION, SOLUTION INTRAVENOUS at 12:18

## 2022-03-12 RX ADMIN — ACETAMINOPHEN 650 MG: 325 TABLET ORAL at 20:29

## 2022-03-12 RX ADMIN — SODIUM CHLORIDE, PRESERVATIVE FREE 10 ML: 5 INJECTION INTRAVENOUS at 08:05

## 2022-03-12 RX ADMIN — ASPIRIN 81 MG: 81 TABLET, COATED ORAL at 08:07

## 2022-03-12 RX ADMIN — ONDANSETRON 4 MG: 2 INJECTION INTRAMUSCULAR; INTRAVENOUS at 00:00

## 2022-03-12 RX ADMIN — CALCIUM CARBONATE-VITAMIN D TAB 500 MG-200 UNIT 1 TABLET: 500-200 TAB at 11:34

## 2022-03-12 RX ADMIN — SODIUM CHLORIDE, PRESERVATIVE FREE 10 ML: 5 INJECTION INTRAVENOUS at 20:33

## 2022-03-12 RX ADMIN — NICARDIPINE HYDROCHLORIDE 10 MG/HR: 2.5 INJECTION, SOLUTION INTRAVENOUS at 02:59

## 2022-03-12 RX ADMIN — MAGNESIUM SULFATE HEPTAHYDRATE 4000 MG: 4 INJECTION, SOLUTION INTRAVENOUS at 16:56

## 2022-03-12 RX ADMIN — SPIRONOLACTONE 25 MG: 25 TABLET ORAL at 08:06

## 2022-03-12 RX ADMIN — ONDANSETRON 4 MG: 2 INJECTION INTRAMUSCULAR; INTRAVENOUS at 15:04

## 2022-03-12 RX ADMIN — POTASSIUM CHLORIDE 40 MEQ: 20 TABLET, EXTENDED RELEASE ORAL at 08:05

## 2022-03-12 RX ADMIN — Medication 250 MG: at 18:26

## 2022-03-12 RX ADMIN — MORPHINE SULFATE 2 MG: 2 INJECTION, SOLUTION INTRAMUSCULAR; INTRAVENOUS at 15:22

## 2022-03-12 RX ADMIN — TICAGRELOR 90 MG: 90 TABLET ORAL at 20:29

## 2022-03-12 RX ADMIN — Medication 1 PACKET: at 20:29

## 2022-03-12 RX ADMIN — NICARDIPINE HYDROCHLORIDE 15 MG/HR: 2.5 INJECTION, SOLUTION INTRAVENOUS at 19:40

## 2022-03-12 RX ADMIN — ROSUVASTATIN 40 MG: 20 TABLET, FILM COATED ORAL at 08:06

## 2022-03-12 RX ADMIN — LABETALOL HYDROCHLORIDE 10 MG: 5 INJECTION INTRAVENOUS at 15:18

## 2022-03-12 RX ADMIN — ONDANSETRON 4 MG: 4 TABLET, ORALLY DISINTEGRATING ORAL at 13:27

## 2022-03-12 RX ADMIN — ENOXAPARIN SODIUM 40 MG: 100 INJECTION SUBCUTANEOUS at 08:05

## 2022-03-12 RX ADMIN — MAGNESIUM SULFATE HEPTAHYDRATE 2000 MG: 40 INJECTION, SOLUTION INTRAVENOUS at 04:57

## 2022-03-12 RX ADMIN — NICARDIPINE HYDROCHLORIDE 15 MG/HR: 2.5 INJECTION, SOLUTION INTRAVENOUS at 14:54

## 2022-03-12 RX ADMIN — ONDANSETRON 4 MG: 2 INJECTION INTRAMUSCULAR; INTRAVENOUS at 20:33

## 2022-03-12 RX ADMIN — SERTRALINE 100 MG: 100 TABLET, FILM COATED ORAL at 08:06

## 2022-03-12 RX ADMIN — FENTANYL CITRATE 25 MCG: 50 INJECTION INTRAMUSCULAR; INTRAVENOUS at 00:01

## 2022-03-12 RX ADMIN — SENNOSIDES AND DOCUSATE SODIUM 2 TABLET: 50; 8.6 TABLET ORAL at 08:06

## 2022-03-12 RX ADMIN — HEPARIN SODIUM 4000 UNITS: 1000 INJECTION, SOLUTION INTRAVENOUS; SUBCUTANEOUS at 00:01

## 2022-03-12 RX ADMIN — MORPHINE SULFATE 2 MG: 2 INJECTION, SOLUTION INTRAMUSCULAR; INTRAVENOUS at 11:01

## 2022-03-12 ASSESSMENT — PAIN DESCRIPTION - ONSET: ONSET: GRADUAL

## 2022-03-12 ASSESSMENT — PAIN DESCRIPTION - LOCATION
LOCATION: BACK

## 2022-03-12 ASSESSMENT — PAIN SCALES - GENERAL
PAINLEVEL_OUTOF10: 0
PAINLEVEL_OUTOF10: 5
PAINLEVEL_OUTOF10: 0
PAINLEVEL_OUTOF10: 6
PAINLEVEL_OUTOF10: 0
PAINLEVEL_OUTOF10: 5
PAINLEVEL_OUTOF10: 0
PAINLEVEL_OUTOF10: 4
PAINLEVEL_OUTOF10: 0
PAINLEVEL_OUTOF10: 5

## 2022-03-12 ASSESSMENT — PAIN DESCRIPTION - PAIN TYPE
TYPE: CHRONIC PAIN

## 2022-03-12 ASSESSMENT — PAIN DESCRIPTION - ORIENTATION
ORIENTATION: MID

## 2022-03-12 ASSESSMENT — PAIN DESCRIPTION - DESCRIPTORS
DESCRIPTORS: ACHING;DISCOMFORT;SORE
DESCRIPTORS: ACHING;SORE
DESCRIPTORS: ACHING;SORE
DESCRIPTORS: ACHING;DISCOMFORT;SORE

## 2022-03-12 NOTE — ED NOTES
Radiology Procedure Waiver   Name: Jess Lozada  : 1970  MRN: [id number]    Date:  3/11/22    Time: 11:23 PM EST    Benefits of immediately proceeding with Radiology exam(s) without pre-testing outweigh the risks or are not indicated as specified below and therefore the following is/are being waived:    [x] Pregnancy test   [] Patients LMP on-time and regular.   [] Patient had Tubal Ligation or has other Contraception Device. [] Patient  is Menopausal or Premenarcheal.    [] Patient had Full or Partial Hysterectomy. [] Protocol for Iodine allergy    [] MRI Questionnaire     [x] BUN/Creatinine   [] Patient age w/no hx of renal dysfunction. [] Patient on Dialysis. [] Recent Normal Labs.   Electronically signed by Emily Jason MD on 3/11/22 at 11:23 PM EST               Emily Jason MD  [phone]

## 2022-03-12 NOTE — PROCEDURES
CARDIAC CATHETERIZATION REPORT    : Jb Flores MD     Date of procedure: 03/12/22     Indication:  1. Acute inferolateral MI    Procedures:  Left heart catheterization, Coronary angiogram, Percutaneous transluminal coronary angioplasty and stenting, IVUS and Mechanical aspiration thrombectomy     Sedation/analgesia:  Midazolam IV     Time sedation was administered: 0017. I was present in the room when sedation was administered. Procedure end time: 7897  Time spent with face to face monitoring of moderate sedation: 78 minutes    Brief history:  44-year-old  female with hypertension and history of gastric bypass status post Jeannine-en-Y, chronic pain who presented with 3 hours of substernal chest pain. EKG was consistent with inferolateral injury. Killip class I and blood pressure 221/112 on presentation consistent with hypertensive emergency. CTA notable for bilateral renal artery stenosis. Description of procedure: The patient presented to the Cath Lab in a(n) emergent fashion. The patient was prepped and draped in a sterile manner. Timeout, airway and ASA assessment were completed. Local anesthesia with 1% lidocaine was administered and sedation/analgesia were administered as above. Access was obtained using the modified Seldinger technique and a micropuncture needle in the right radial artery. A 6F slender sheath was inserted over a wire. Diagnostic angiography was performed using 5F JL3.5 and JR4.0 diagnostic catheters. Coronary anatomy:  Dominance: Right  1. Left main: Angiographically unremarkable  2. Left anterior descending: This is a large vessel that does not reach the apex and has moderate tortuosity with mild diffuse disease. It gives rise to a small D1 and a large bifurcating D2. The mid LAD at the takeoff of D2 has an 80% stenosis that also involves the origin of D2 (Savage 111). 3. Ramus intermedius:  This is a moderate-sized vessel with no significant beginning of the procedure  158/55 with a mean of 86 at the end of the procedure on IV nicardipine at 10 mg/h    Hemostasis:  Transradial band was applied for patent arterial hemostasis. Complications: None  Estimated blood loss: Less than 50 mL  Air kerma: 1897 mGy  Contrast used: 130 mL    Conclusions:  1. Culprit for acute MI was thrombotic occlusion of a distal super dominant RCA. There was successful IVUS guided primary PCI with 3 overlapping MAURICIO from the distal to proximal RCA  2. Residual severe true bifurcation lesion of the LAD/D2 and prox LCx/OM1/OM2. Additionally there was a severe lesion of RPL3 felt best treated conservatively  3. Mildly elevated left filling pressure      Recommendations:  1. Admit to ICU for hypertensive emergency. Goal systolic blood pressure less than 150  2. Increased HCTZ to 25 mg daily and added spironolactone 25 mg daily. Continue metoprolol succinate 100 mg daily  3. TTE  4. Aspirin 81 mg daily and ticagrelor 90 mg p.o. twice daily. a. DAPT for at least 12 months with potential for de-escalating P2Y12 inhibitor early given the patient's history of GI bleed secondary to Jeannine-en-Y surgery  5. Rosuvastatin 40 mg daily  6. TTE  7. Renal artery duplex to verify the diagnosis of bilateral renal artery stenosis noted on CTA. We will consult vascular surgery if needed as this would require revascularization given the patient's presentation. 8. We will consult with cardiothoracic surgery regarding revascularization of residual coronary disease which can be performed electively. Given the presence of 2 bifurcation lesions and the patient's young age and diffuse CAD (at least on IVUS) she may be better served with CABG over PCI.       Leilani Choi MD, [address] [facility]  Interventional Cardiology/Structural Heart Disease  Office: [phone]  Coordinator: Ayden Lopes

## 2022-03-12 NOTE — ED PROVIDER NOTES
HPI:  3/11/22, Time: 11:00 PM KARLY Ray is a 54 y.o. female presenting to the ED for chest pain, beginning 1 hour ago. The complaint has been persistent, moderate in severity, and worsened by nothing. Patient reporting chest pain that started an hour ago. Patient reports that the pressure-like pain she is reports is going into her right arm. She does report shortness of breath she reports the pain does radiate into her back. Patient reporting no hematemesis she does report nausea. Patient reporting no leg pain. Patient reporting no fever chills or productive cough. ROS:   Pertinent positives and negatives are stated within HPI, all other systems reviewed and are negative.  --------------------------------------------- PAST HISTORY ---------------------------------------------  Past Medical History:  has a past medical history of Anorexia, Arthritis of knee, Back pain, chronic, Chronic pain, Current severe episode of major depressive disorder without psychotic features without prior episode ([facility] [address] H/O gastric bypass, Hypertension, Hypertension, Metabolic acidosis, Ovarian cyst, and Seizures ([facility] [address] Past Surgical History:  has a past surgical history that includes Jeannine-en-Y Gastric Bypass (12/2/2008); Endometrial ablation (2003); Tonsillectomy (1980); Cholecystectomy, laparoscopic (9/19/1995); Upper gastrointestinal endoscopy (10/3/2008); Upper gastrointestinal endoscopy (1/9/2009); Upper gastrointestinal endoscopy (2/4/2009); laparoscopy (2/12/2009); Upper gastrointestinal endoscopy (3/6/2009); Upper gastrointestinal endoscopy (6/4/2009); Upper gastrointestinal endoscopy (7/2/2009); Upper gastrointestinal endoscopy (10/27/2009); Upper gastrointestinal endoscopy (1/4/2010); Upper gastrointestinal endoscopy (6/7/2010); Stomach surgery (6/25/2010); Upper gastrointestinal endoscopy (7/30/2010); other surgical history (12/28/11);  Gastric bypass surgery; hernia repair; Hysterectomy (Left, 2/5/2013); Upper gastrointestinal endoscopy (N/A, 5/10/2019); Upper gastrointestinal endoscopy (N/A, 5/12/2019); and Upper gastrointestinal endoscopy (N/A, 5/17/2019). Social History:  reports that she quit smoking about 2 years ago. Her smoking use included cigarettes. She has a 1.00 pack-year smoking history. She has never used smokeless tobacco. She reports current drug use. Drug: Marijuana Alpheus Maryann). She reports that she does not drink alcohol. Family History: family history includes ADHD in her paternal grandfather. The patients home medications have been reviewed. Allergies: Ultram [tramadol]    ---------------------------------------------------PHYSICAL EXAM--------------------------------------    Constitutional/General: Alert and oriented x3, mild distress  Head: Normocephalic and atraumatic  Eyes: PERRL, EOMI  Mouth: Oropharynx clear, handling secretions, no trismus  Neck: Supple, full ROM, non tender to palpation in the midline, no stridor, no crepitus, no meningeal signs  Pulmonary: Lungs clear to auscultation bilaterally, no wheezes, rales, or rhonchi. Not in respiratory distress  Cardiovascular:  Regular rate. Regular rhythm. No murmurs, gallops, or rubs. 2+ distal pulses  Chest: no chest wall tenderness  Abdomen: Soft. Non tender. Non distended. +BS. No rebound, guarding, or rigidity. No pulsatile masses appreciated. Musculoskeletal: Moves all extremities x 4. Warm and well perfused, no clubbing, cyanosis, or edema. Capillary refill <3 seconds  Skin: warm and dry. No rashes. Neurologic: GCS 15, CN 2-12 grossly intact, no focal deficits, symmetric strength 5/5 in the upper and lower extremities bilaterally  Psych: Normal Affect    -------------------------------------------------- RESULTS -------------------------------------------------  I have personally reviewed all laboratory and imaging results for this patient. Results are listed below.      LABS:  Results for orders placed or performed during the hospital encounter of 03/11/22   CBC with Auto Differential   Result Value Ref Range    WBC 8.0 4.5 - 11.5 E9/L    RBC 4.46 3.50 - 5.50 E12/L    Hemoglobin 13.6 11.5 - 15.5 g/dL    Hematocrit 39.5 34.0 - 48.0 %    MCV 88.6 80.0 - 99.9 fL    MCH 30.5 26.0 - 35.0 pg    MCHC 34.4 32.0 - 34.5 %    RDW 12.6 11.5 - 15.0 fL    Platelets 024 449 - 340 E9/L    MPV 11.2 7.0 - 12.0 fL    Neutrophils % 61.2 43.0 - 80.0 %    Immature Granulocytes % 0.3 0.0 - 5.0 %    Lymphocytes % 30.3 20.0 - 42.0 %    Monocytes % 6.1 2.0 - 12.0 %    Eosinophils % 1.3 0.0 - 6.0 %    Basophils % 0.8 0.0 - 2.0 %    Neutrophils Absolute 4.90 1.80 - 7.30 E9/L    Immature Granulocytes # 0.02 E9/L    Lymphocytes Absolute 2.42 1.50 - 4.00 E9/L    Monocytes Absolute 0.49 0.10 - 0.95 E9/L    Eosinophils Absolute 0.10 0.05 - 0.50 E9/L    Basophils Absolute 0.06 0.00 - 0.20 E9/L   Comprehensive Metabolic Panel   Result Value Ref Range    Sodium 142 132 - 146 mmol/L    Potassium 3.9 3.5 - 5.0 mmol/L    Chloride 107 98 - 107 mmol/L    CO2 19 (L) 22 - 29 mmol/L    Anion Gap 16 7 - 16 mmol/L    Glucose 135 (H) 74 - 99 mg/dL    BUN 12 6 - 20 mg/dL    CREATININE 1.0 0.5 - 1.0 mg/dL    GFR Non-African American 59 >=60 mL/min/1.73    GFR African American >60     Calcium 9.5 8.6 - 10.2 mg/dL    Total Protein 8.3 6.4 - 8.3 g/dL    Albumin 4.3 3.5 - 5.2 g/dL    Total Bilirubin 0.3 0.0 - 1.2 mg/dL    Alkaline Phosphatase 187 (H) 35 - 104 U/L    ALT 17 0 - 32 U/L    AST 38 (H) 0 - 31 U/L   Troponin   Result Value Ref Range    Troponin, High Sensitivity 73 (H) 0 - 9 ng/L   Protime-INR   Result Value Ref Range    Protime 11.9 9.3 - 12.4 sec    INR 1.1    POC ACT-Low Range   Result Value Ref Range    POC ACT  Not established seconds   EKG 12 Lead   Result Value Ref Range    Ventricular Rate 55 BPM    Atrial Rate 234 BPM    QRS Duration 84 ms    Q-T Interval 448 ms    QTc Calculation (Bazett) 428 ms    P Axis 34 degrees    R Axis 53 degrees    T Axis 64 degrees   TYPE AND SCREEN   Result Value Ref Range    ABO/Rh A POS        RADIOLOGY:  Interpreted by Radiologist.  XR CHEST PORTABLE   Final Result   No acute process. CTA CHEST W CONTRAST    (Results Pending)         EKG: This EKG is signed and interpreted by me. Rate: 54  Rhythm: Sinus  Interpretation: st elevation inferiorly, noted depression V1 V2  Comparison: changes compared to previous EKG      ------------------------- NURSING NOTES AND VITALS REVIEWED ---------------------------   The nursing notes within the ED encounter and vital signs as below have been reviewed by myself. BP (!) 228/119   Pulse 54   Temp 97.8 °F (36.6 °C)   Resp 21   Ht 5' 4\" (1.626 m)   Wt 190 lb (86.2 kg)   SpO2 97%   BMI 32.61 kg/m²   Oxygen Saturation Interpretation: Normal    The patients available past medical records and past encounters were reviewed. ------------------------------ ED COURSE/MEDICAL DECISION MAKING----------------------  Medications   0.9 % sodium chloride bolus (1,000 mLs IntraVENous New Bag 3/11/22 2320)   nitroGLYCERIN 50 mg in dextrose 5% 250 mL infusion (10 mcg/min IntraVENous Rate/Dose Change 3/12/22 0006)   hydrALAZINE (APRESOLINE) injection 10 mg (has no administration in time range)   ondansetron (ZOFRAN) injection 4 mg (4 mg IntraVENous Given 3/12/22 0000)   niCARdipine (CARDENE) 50 mg in sodium chloride 0.9 % 250 mL infusion (has no administration in time range)   heparin (porcine) injection 4,000 Units (4,000 Units IntraVENous Given 3/12/22 0001)   iopamidol (ISOVUE-370) 76 % injection 75 mL (75 mLs IntraVENous Given 3/11/22 2345)   fentaNYL (SUBLIMAZE) injection 25 mcg (25 mcg IntraVENous Given 3/12/22 0001)             Medical Decision Making:    Patient presenting her because of chest pain radiating to back. Patient reports symptoms started about an hour ago. Patient reports of pain radiating to her back.   Patient reports she was seen several weeks ago by EMS and reportedly was told she had anxiety. Patient EKG shows ST elevation inferiorly. I did speak to cardiology [address] who is on-call and EKG was sent to him. He did review her EKG and Cath Lab was activated. Patient was hypertensive here he requested the patient receive nitro with inferior wall MI. Patient also to receive heparin. He did not want Brilinta. Patient did undergo CTA of her chest due to the concern for her chest pain radiating to her back and patient was hypertensive. I looked at the CT myself I did not see any obvious dissection. Patient still having pain. She was ordered fentanyl as well as Zofran. Patient was made aware of findings and plan    Re-Evaluations:             Patient reeval several times here in the emergency room. Patient still hypertensive and having chest pain. Patient made aware of plan to go to Cath Lab. Patient medicated for her pain as well as treated with nitro for her hypertension. Patient still reporting chest pain. Consultations:             I did speak to cardiology on-call and Cath Lab was activated I spoke to internal medicine as well. Critical Care:      Please note that the withdrawal or failure to initiate urgent interventions for this patient would likely result in a life threatening deterioration or permanent disability. Accordingly this patient received 30 minutes of critical care time, excluding separately billable procedures. This patient's ED course included: a personal history and physicial eaxmination    This patient has been closely monitored during their ED course. Counseling: The emergency provider has spoken with the patient and discussed todays results, in addition to providing specific details for the plan of care and counseling regarding the diagnosis and prognosis.   Questions are answered at this time and they are agreeable with the plan.       --------------------------------- IMPRESSION AND DISPOSITION ---------------------------------    IMPRESSION  1. Acute ST elevation myocardial infarction (STEMI), unspecified artery ([facility] [address]        DISPOSITION  Disposition: Admit to cath lab  Patient condition is fair        NOTE: This report was transcribed using voice recognition software.  Every effort was made to ensure accuracy; however, inadvertent computerized transcription errors may be present          Escobar Ace MD  03/11/22 ANAID Jean 106, MD  03/12/22 Dory Grady MD  03/12/22 1791

## 2022-03-12 NOTE — PROGRESS NOTES
CC: Acute MI    ASSESSMENT:  Acute inferolateral myocardial infarction  Multivessel coronary artery disease  S/p PTCA/MAURICIO 3/12  Hypertensive emergency  History of seizures  History of remote Jeannine-en-Y gastric bypass  Tobacco abuse    PLAN:  Titrate nicardipine to keep SBP less than 150  Continue dual antiplatelet therapy  Continue statin  Continue beta-blocker  Continue HCTZ and spironolactone  Replace potassium and magnesium as needed  Cardiac diet  CT surgery consulted and recommending CABG as an outpatient  Continue post bariatric surgery multivitamins  SCDs, Lovenox      SIGNIFICANT 24 HOUR EVENTS/NEW COMPLAINTS:  3/12-admitted to CVICU earlier today from the Cath Lab status post percutaneous coronary intervention for acute MI and hypertensive crisis. She complaining of moderately severe back pain. PHYSICAL EXAM:  General -anxious 72-year-old  woman  Neuro -awake alert attentive  Head/Face/Neck -poor dentition  CV -normal sinus rhythm warm well perfused no edema  Pulm -nonlabored room air    The patient is at significant risk for life-threatening hemodynamic deterioration and death and requires ongoing critical care management.       Critical care time exclusive of teaching and procedures = 35 minutes

## 2022-03-12 NOTE — H&P
Hospitalist History & Physical      PCP: DA Prieto - CNP    Date of Admission: 3/11/2022    Date of Service: Pt seen/examined on 3/11/2022 and is admitted to Inpatient with expected LOS greater than two midnights due to medical therapy. Chief Complaint:  had concerns including Chest Pain (Chest pain starting 1hour ago that goes down right arm. EMS gave  and zofran 4). History Of Present Illness:    Ms. Guilherme Santizo, a 54y.o. year old female  who  has a past medical history of Anorexia, Arthritis of knee, Back pain, chronic, Chronic pain, Current severe episode of major depressive disorder without psychotic features without prior episode ([address] H/O gastric bypass, Hypertension, Hypertension, Metabolic acidosis, Ovarian cyst, and Seizures ([address] This is a 28-year-old white female who reported to the hospital with substernal chest pain. Symptoms started few hours ago. She describes the pain as moderate in severity radiating to her back. She denies nausea or vomiting. She denies shortness of breath. She denies hematuria dysuria hematemesis hematochezia. No cough, no dizziness or loss of consciousness. She had similar presentations before which were attributed to anxiety. Blood pressure is elevated with a systolic over 816.     Past Medical History:        Diagnosis Date    Anorexia 6/1/2015    Arthritis of knee     Back pain, chronic     Chronic pain 6/1/2015    Current severe episode of major depressive disorder without psychotic features without prior episode ([address] 3/16/2021    H/O gastric bypass 6/1/2015    Hypertension 1/1/2012    Hypertension 9/9/4929    Metabolic acidosis 1/2/8950    Ovarian cyst     Seizures ([address]        Past Surgical History:        Procedure Laterality Date    CHOLECYSTECTOMY, [facility]AROSCOPIC  9/19/1995    [facility]    ENDOMETRIAL ABLATION  2003    [facility]    GASTRIC BYPASS SURGERY      HERNIA REPAIR      umbil    HYSTERECTOMY Left 2/5/2013    Laparotomy;HUGO;JOSUÉ:LSO    LAPAROSCOPY  2/12/2009    lap assisted percutaneous ERCP and removal CBD stone and gastrostomy, [address]  12/28/11    exp lap, hugo, rt so/ repair hernia    EMILY-EN-Y GASTRIC BYPASS  12/2/2008    laparoscopic RYGB, Dr. Elizabeth Davis, [address]   6/25/2010    resection of jejunal ulcer, reversal gastric bypass, jejunoduodenostomy, feeding jejunostomy, Dr. Elizabeth Davis, [address]  10/3/2008    mild gastritis & duodenitis, Dr. Elizabeth Davis, [address] ENDOSCOPY  1/9/2009    multiple severe anastomotic jejunal ulcers, Dr. Elizabeth Davis.  [facility]    UPPER GASTROINTESTINAL ENDOSCOPY  2/4/2009    ulcers healed, normal, Dr. Suly Betts, [address] ENDOSCOPY  3/6/2009    recurrent severe anastomotic jejunal ulcers, Dr. Elizabeth Davis, [address] ENDOSCOPY  6/4/2009    severe anastomotic jejunal ulcers, Dr. Elizaebth Davis, [address] ENDOSCOPY  7/2/2009    severe anastomotic jejunal ulcers, Dr. Elizabeth Davis, [address] ENDOSCOPY  10/27/2009    severe anastomotic jejunal ulcers, Dr. Elizabeth Davis, [address] ENDOSCOPY  1/4/2010    severe anastomotic jejunal ulcers, Dr. Elizabeth Davis, [address] ENDOSCOPY  6/7/2010    severe anastomotic jejunal ulcers, Dr. Elizabeth Davis, [address] ENDOSCOPY  7/30/2010    gastritis, Dr. Elizabeth Davis, [address] ENDOSCOPY N/A 5/10/2019    EGD ESOPHAGOGASTRODUODENOSCOPY performed by Charlie Glover MD at [facility] 5/12/2019    EGD DIAGNOSTIC ONLY performed by Charlie Glover MD at [facility] N/A 5/17/2019    EGD ESOPHAGOGASTRODUODENOSCOPY performed by Tanna Marion MD at [address] [place] [address]       Medications Prior to Admission:      Prior to Admission medications    Medication Sig Start Date End Date Taking? Authorizing Provider   sertraline (ZOLOFT) 100 MG tablet take 1 tablet by mouth daily 11/16/21   Leva Pippins, DO   metoprolol succinate (TOPROL XL) 100 MG extended release tablet take 1 tablet by mouth daily 11/16/21   Leva Pippins, DO   Calcium Carb-Cholecalciferol (OYSTER SHELL CALCIUM W/D) 500-200 MG-UNIT TABS tablet take 1 tablet by mouth twice a day 11/15/21   Geni James DO   ondansetron (ZOFRAN ODT) 4 MG disintegrating tablet Take 1 tablet by mouth every 8 hours as needed for Nausea or Vomiting 6/14/21   Claudia Castanon DO   Multiple Vitamin (MULTIVITAMIN ADULT) TABS Take 1 tablet by mouth daily 3/16/21   DA Crane CNP   hydroCHLOROthiazide (MICROZIDE) 12.5 MG capsule take 1 capsule by mouth once daily 3/16/21   DA Crane CNP   vitamin D (ERGOCALCIFEROL) 1.25 MG (60763 UT) CAPS capsule take 1 capsule by mouth every Sunday AND 2000 UNITS DAILY EXCEPT NATASHA 3/16/21   DA Crane CNP   nystatin-triamcinolone (MYCOLOG II) 691097-0.6 UNIT/GM-% cream Apply topically 2 times daily. 3/16/21   DA Crane CNP   dicyclomine (BENTYL) 10 MG capsule Take 2 capsules by mouth 4 times daily as needed (abd pain) 10/9/20   DA Dupree CNP   Multiple Vitamins-Minerals (MULTIVITAMIN ADULT) TABS Take 1 tablet by mouth daily 4/30/20 3/16/21  DA Crane CNP       Allergies:  Ultram [tramadol]    Social History:    TOBACCO:   reports that she quit smoking about 2 years ago. Her smoking use included cigarettes. She has a 1.00 pack-year smoking history. She has never used smokeless tobacco.  ETOH:   reports no history of alcohol use. Family History:    Reviewed in detail and negative for DM, CAD, Cancer, CVA. Positive as follows\"      Problem Relation Age of Onset    ADHD Paternal Grandfather        REVIEW OF SYSTEMS:   Pertinent positives as noted in the HPI.  All other systems reviewed and negative. Chest pain, no shortness of breath no dizziness  PHYSICAL EXAM:  BP (!) 221/112   Pulse 55   Temp 97.8 °F (36.6 °C)   Resp 20   Ht 5' 4\" (1.626 m)   Wt 190 lb (86.2 kg)   SpO2 100%   BMI 32.61 kg/m²   General appearance: Mild distress, appears stated age and cooperative. HEENT: Normal cephalic, atraumatic without obvious deformity. Pupils equal, round, and reactive to light. Extra ocular muscles intact. Conjunctivae/corneas clear. Neck: Supple, with full range of motion. No jugular venous distention. Trachea midline. Respiratory: Clear to auscultation, no crackles no rhonchi  Cardiovascular: S1-S2 normal no rubs gallops or murmurs  Abdomen: Soft nontender nondistended plus bowel sounds  Musculoskeletal: No clubbing, cyanosis, no edema brisk capillary refill. Skin: Normal skin color. No rashes or lesions. Neurologic:  Neurovascularly intact without any focal sensory/motor deficits. Cranial nerves: II-XII intact, grossly non-focal.  Psychiatric: Alert and oriented, thought content appropriate, normal insight    Reviewed EKG and CXR personally    CBC:   No results for input(s): WBC, RBC, HGB, HCT, MCV, RDW, PLT in the last 72 hours. BMP: No results for input(s): NA, K, CL, CO2, BUN, CREATININE, CA, MG, PHOS in the last 72 hours. LFT:  No results for input(s): PROT, ALB, ALKPHOS, ALT, AST, BILITOT, AMYLASE, LIPASE in the last 72 hours. CE:  No results for input(s): Rosendo Sleeper in the last 72 hours. PT/INR: No results for input(s): INR, APTT in the last 72 hours. BNP: No results for input(s): BNP in the last 72 hours.   ESR:   Lab Results   Component Value Date    SEDRATE 16 05/07/2020     CRP:   Lab Results   Component Value Date    CRP 0.1 05/07/2020     D Dimer:   Lab Results   Component Value Date    DDIMER <200 02/10/2020      Folate and B12:   Lab Results   Component Value Date    RLCZHFIC35 1062 (H) 04/29/2020   ,   Lab Results   Component Value Date    FOLATE 19.9 04/29/2020     Lactic Acid:   Lab Results   Component Value Date    LACTA 1.0 06/14/2021     Thyroid Studies:   Lab Results   Component Value Date    TSH 0.716 04/29/2020       Oupatient labs:  Lab Results   Component Value Date    CHOL 185 04/29/2020    TRIG 127 04/29/2020    HDL 40 04/29/2020    LDLCALC 120 (H) 04/29/2020    TSH 0.716 04/29/2020    INR 1.1 09/04/2019    LABA1C 4.8 04/29/2020       Urinalysis:    Lab Results   Component Value Date    NITRU Negative 06/14/2021    WBCUA 2-5 06/14/2021    WBCUA NONE 04/28/2012    BACTERIA RARE 06/14/2021    RBCUA 2-5 06/14/2021    RBCUA NONE 12/08/2013    BLOODU Negative 06/14/2021    SPECGRAV 1.025 06/14/2021    GLUCOSEU Negative 06/14/2021    GLUCOSEU NEGATIVE 04/28/2012       Imaging:  No results found. ASSESSMENT:    Principal Problem:    Acute MI, inferior wall ([address]  Resolved Problems:    * No resolved hospital problems. *    Assessment plan:  1. Chest pain with suspected STEMI, cardiology consultation, plan for cardiac catheterization. Patient received heparin and will be started on nitroglycerin drip. She will be given aspirin. Plan for CT chest to rule out pulmonary embolism and dissection. 2D echo, further recommendations per cardiology. 2.  Hypertensive urgency/emergency: Nitroglycerin drip, IV hydralazine. Continue HCTZ and metoprolol monitor closely. Continue check cardiac enzyme. 3.  Depression and anxiety: On Zoloft    4.   History of seizures: Not on medications, monitor pain    Admit to ICU      Diet: No diet orders on file  Code Status: Prior    Surrogate decision maker confirmed with patient:  Primary Emergency Contact: Darcy Doty, Home Phone: [phone]    DVT Prophylaxis: []Lovenox [x]Heparin []PCD [] [address]  []Encouraged ambulation    Disposition: []Med/Surg [] Intermediate [x] ICU/CCU  Admit status: [] Observation [x] Inpatient     +++++++++++++++++++++++++++++++++++++++++++++++++  Arlie Kanner, MD  [employer] - [address] [place], [place]  +++++++++++++++++++++++++++++++++++++++++++++++++  NOTE: This report was transcribed using voice recognition software. Every effort was made to ensure accuracy; however, inadvertent computerized transcription errors may be present.

## 2022-03-12 NOTE — CONSULTS
Emergency cardiology consult note:    Narrative:  · 53-year-old  female with history of hypertension, gastric bypass with Jeannine-en-Y, chronic pain, seizures who presented with substernal chest pain of 3 hours duration. Objective:  BP (!) 221/112   Pulse 55   Temp 97.8 °F (36.6 °C)   Resp 20   Ht 5' 4\" (1.626 m)   Wt 190 lb (86.2 kg)   SpO2 100%   BMI 32.61 kg/m²    General: NAD  Lungs: CTAB  Heart: RRR. No M/R/G  Abdomen: soft, NT/ND  Extremities: no pitting edema. Warm. Neuro: A&Ox3, MAEx4    Lab Results   Component Value Date     03/11/2022    K 3.9 03/11/2022    K 3.2 05/07/2020     03/11/2022    CO2 19 03/11/2022    BUN 12 03/11/2022    CREATININE 1.0 03/11/2022    GLUCOSE 135 03/11/2022    GLUCOSE 83 04/28/2012    CALCIUM 9.5 03/11/2022      Lab Results   Component Value Date    WBC 8.0 03/11/2022    HGB 13.6 03/11/2022    HCT 39.5 03/11/2022    MCV 88.6 03/11/2022     03/11/2022     EKG with sinus rhythm and inferolateral injury    CTA chest reviewed and suggests mid RCA occlusion. No dissection or aneurysm. There is also evidence of bilateral severe ostial renal artery stenosis    Assessment:  1. Acute MI  2. Hypertensive emergency  3. Bilateral renal artery stenosis        Recommendations:  · Aspirin 324 mg PO chewed, heparin 4000 U IV  · IV nitroglycerin for hypertension  · Emergency coronary angiography and PCI as needed    Critical care time 35 minutes spent reviewing data, documentation, exam, formulating a therapeutic plan and discussing with family/care team.     We will follow.       Juliana Silva MD, [facility]  Interventional Cardiology/Structural Heart Disease  Office: [phone]

## 2022-03-12 NOTE — PROGRESS NOTES
Cardiology progress note:    Narrative:  · 82-year-old  female with history of hypertension, active smoker, gastric bypass with Jeannine-en-Y, chronic pain, seizures who presented with substernal chest pain of 3 hours' duration. · No acute events overnight  · Heart rate in the 90s to 885F and systolics in the 244T to 953I on max dose IV nicardipine      Objective:  BP (!) 170/92   Pulse 105   Temp 98.5 °F (36.9 °C) (Oral)   Resp 19   Ht 5' 4\" (1.626 m)   Wt 200 lb (90.7 kg)   SpO2 100%   BMI 34.33 kg/m²    General: NAD  Lungs: CTAB  Heart: RRR. No M/R/G  Abdomen: soft, NT/ND  Extremities: no pitting edema. Warm. Neuro: A&Ox3, MAEx4    Lab Results   Component Value Date     03/12/2022    K 3.2 03/12/2022    K 3.2 05/07/2020     03/12/2022    CO2 18 03/12/2022    BUN 13 03/12/2022    CREATININE 0.9 03/12/2022    GLUCOSE 137 03/12/2022    GLUCOSE 83 04/28/2012    CALCIUM 9.4 03/12/2022      Lab Results   Component Value Date    WBC 8.0 03/11/2022    HGB 13.6 03/11/2022    HCT 39.5 03/11/2022    MCV 88.6 03/11/2022     03/11/2022     CTA chest reviewed and suggests mid RCA occlusion. No dissection or aneurysm. There is also evidence of bilateral severe ostial renal artery stenosis    Assessment:  1. Acute inferolateral MI status post successful IVUS guided primary PCI of the RCA with 3 overlapping MAURICIO from proximal to distal  1. Residual disease includes severe mLAD/D2 bifurcation,. pLCx/OM1/OM2 bifurcation and RPL3  2. Hypertensive emergency  3. Bilateral renal artery stenosis        Recommendations:  1. Recommend an arterial line  2. Increased HCTZ to 25 mg daily and added spironolactone 25 mg daily. Continue metoprolol succinate 100 mg daily  3. Aspirin 81 mg daily and ticagrelor 90 mg p.o. twice daily. a. DAPT for at least 12 months with potential for de-escalating P2Y12 inhibitor early given the patient's history of GI bleed secondary to Jeannine-en-Y surgery  4.  Rosuvastatin 40 mg daily  5. TTE  6. Renal artery duplex to verify the diagnosis of bilateral renal artery stenosis noted on CTA. will consult vascular surgery. Bilateral renal artery stenting may be required before discharge. 7. We will consult with cardiothoracic surgery regarding revascularization of residual coronary disease which can be performed electively. Given the presence of 2 bifurcation lesions and the patient's young age and diffuse CAD (at least on IVUS) she may be better served with CABG over PCI. Critical care time 35 minutes spent reviewing data, documentation, exam, formulating a therapeutic plan and discussing with family/care team.     We will follow.       Ting Hernandez MD, [facility]  Interventional Cardiology/Structural Heart Disease  Office: [phone]

## 2022-03-12 NOTE — PROGRESS NOTES
Hospitalist Progress Note      SYNOPSIS: Patient admitted on 3/11/2022 for chest pain. 77-year-old female admitted with chest pain and was found to have inferior STEMI. She was taken directly to the Cath Lab for PCI which was performed via right radial artery concluding in MAURICIO x3 placed in RCA. Started on aspirin and ticagrelor with a plan to continue DAPT for 12 months. She was also seen by cardiothoracic surgery. SUBJECTIVE:    Patient seen and examined in ICU. Alert awake oriented x3 and providing most of the information. Denies any chest pain, shortness of breath, nausea, vomiting  Records reviewed. Stable overnight. No other overnight issues reported. Temp (24hrs), Av.1 °F (36.7 °C), Min:97.7 °F (36.5 °C), Max:98.5 °F (36.9 °C)    DIET: ADULT DIET; Regular; No Added Salt (3-4 gm)  CODE: Full Code    Intake/Output Summary (Last 24 hours) at 3/12/2022 0808  Last data filed at 3/12/2022 0600  Gross per 24 hour   Intake 1246.03 ml   Output 300 ml   Net 946.03 ml       OBJECTIVE:    BP (!) 170/92   Pulse 105   Temp 98.5 °F (36.9 °C) (Oral)   Resp 19   Ht 5' 4\" (1.626 m)   Wt 200 lb (90.7 kg)   SpO2 100%   BMI 34.33 kg/m²     General appearance: No apparent distress, appears stated age and cooperative. HEENT:  Conjunctivae/corneas clear. Neck: Supple. No jugular venous distention. Respiratory: Clear to auscultation bilaterally, normal respiratory effort  Cardiovascular: Regular rate rhythm, normal S1-S2  Abdomen: Soft, nontender, nondistended  Musculoskeletal: No clubbing, cyanosis, no bilateral lower extremity edema. Brisk capillary refill. Skin:  No rashes  on visible skin  Neurologic: awake, alert and following commands     ASSESSMENT & PLAN:    1. Chest pain   Secondary to inferior wall STEMI  Status post left heart cath via right radial artery with MAURICIO in RCA  Cardiothoracic surgery evaluation    2.   Hypertensive urgency/emergency:  Continue HCTZ and metoprolol monitor closely. Continue check cardiac enzyme. Currently on IV nicardipine     3. Depression and anxiety: On Zoloft     4. History of seizures: Not on medications, monitor pain    DISPOSITION:     Medications:  REVIEWED DAILY    Infusion Medications    sodium chloride      niCARdipene (CARDENE) 50 mg in 250 mL 0.9 % sodium chloride infusion 10 mg/hr (03/12/22 0802)     Scheduled Medications    metoprolol succinate  100 mg Oral Daily    sertraline  100 mg Oral Daily    sodium chloride flush  5-40 mL IntraVENous 2 times per day    rosuvastatin  40 mg Oral Daily    ticagrelor  90 mg Oral BID    spironolactone  25 mg Oral Daily    hydroCHLOROthiazide  25 mg Oral Daily    potassium & sodium phosphates  1 packet Oral 4x Daily    potassium chloride  40 mEq Oral BID WC    enoxaparin  40 mg SubCUTAneous Daily    sennosides-docusate sodium  2 tablet Oral Daily    aspirin  81 mg Oral Daily     PRN Meds: sodium chloride flush, sodium chloride, ondansetron **OR** ondansetron, polyethylene glycol, acetaminophen **OR** acetaminophen, atropine, sodium chloride, perflutren lipid microspheres, labetalol, hydrALAZINE, ondansetron    Labs:     Recent Labs     03/11/22 2322   WBC 8.0   HGB 13.6   HCT 39.5          Recent Labs     03/11/22 2322 03/12/22  0330    139   K 3.9 3.2*    106   CO2 19* 18*   BUN 12 13   CREATININE 1.0 0.9   CALCIUM 9.5 9.4   PHOS  --  2.2*       Recent Labs     03/11/22 2322 03/12/22  0330   PROT 8.3 7.8   ALKPHOS 187* 177*   ALT 17 31   AST 38* 146*   BILITOT 0.3 0.8       Recent Labs     03/11/22 2322   INR 1.1       No results for input(s): CKTOTAL, TROPONINI in the last 72 hours.     Chronic labs:    Lab Results   Component Value Date    CHOL 208 (H) 03/12/2022    TRIG 153 (H) 03/12/2022    HDL 38 03/12/2022    LDLCALC 139 (H) 03/12/2022    TSH 0.716 04/29/2020    INR 1.1 03/11/2022    LABA1C 5.1 03/12/2022       Radiology: REVIEWED DAILY    +++++++++++++++++++++++++++++++++++++++++++++++++  Sri Freire MD  [place] [address], [place]  +++++++++++++++++++++++++++++++++++++++++++++++++  NOTE: This report was transcribed using voice recognition software. Every effort was made to ensure accuracy; however, inadvertent computerized transcription errors may be present.

## 2022-03-12 NOTE — CONSULTS
Surgical Intensive Care Unit  Consult Note  Date: 3/12/2022        Patient Name: Pooja Fonseca     YOB: 1970      Age:  54 y.o. Gender: female    Consult by: Dr Mayra Kwok to: Dr Gutierrez Record  Reason:  critical care management    Chief complaint:  Chief Complaint   Patient presents with    Chest Pain     Chest pain starting 1hour ago that goes down right arm. EMS gave  and zofran 4        Date of admission:  3/11/2022  LOS: 1  Dates in ICU: 3/12-now  Reason for ICU:  Post cardiac cath  Hospital course:    3/12 admitted to CVICU after cardiac cath, MUARICIO x3 into RCA    HPI:  History obtained from: patient, electronic medical record  Pooja Fonseca is a 54 y.o. female with remote history of gastric bypass surgery and newly diagnosed (suspected) bilateral renal artery stenosis who presents for chest pain and hypertensive emergency. She had some milder chest pain 1 week ago but she was told she was just having an anxiety attack. She returned for worsened chest pain yesterday and was found to be in hypertensive emergency with acute thrombotic inferolateral myocardial infarction. She was taken for cardiac cath today and 3 drug-eluting stents were placed in the right coronary artery after mechanical thrombectomy.     PMH:  Past Medical History:   Diagnosis Date    Anorexia 6/1/2015    Arthritis of knee     Back pain, chronic     Chronic pain 6/1/2015    Current severe episode of major depressive disorder without psychotic features without prior episode ([address] 3/16/2021    H/O gastric bypass 6/1/2015    Hypertension 1/1/2012    Hypertension 0/5/3425    Metabolic acidosis 7/2/5876    Ovarian cyst     Seizures ([address]         PSH:  Past Surgical History:   Procedure Laterality Date    CHOLECYSTECTOMY[facility]COPIC  9/19/1995    [facility]    ENDOMETRIAL ABLATION  2003    [facility]    GASTRIC BYPASS SURGERY      HERNIA REPAIR      umbil    HYSTERECTOMY Left 2/5/2013 Laparotomy;HUGO;JOSUÉ:LSO    LAPAROSCOPY  2/12/2009    lap assisted percutaneous ERCP and removal CBD stone and gastrostomy, [address] HISTORY  12/28/11    exp lap, hugo, rt so/ repair hernia    EMILY-EN-Y GASTRIC BYPASS  12/2/2008    laparoscopic RYGB, Dr. Sarah Davila, [facility]  6/25/2010    resection of jejunal ulcer, reversal gastric bypass, jejunoduodenostomy, feeding jejunostomy, Dr. Sarah Davila, [address]  10/3/2008    mild gastritis & duodenitis, Dr. Sarah Davila, [address] ENDOSCOPY  1/9/2009    multiple severe anastomotic jejunal ulcers, Dr. Sarah Davila.  [facility]    UPPER GASTROINTESTINAL ENDOSCOPY  2/4/2009    ulcers healed, normal, Dr. Brad Brownlee, [address] ENDOSCOPY  3/6/2009    recurrent severe anastomotic jejunal ulcers, Dr. Sarah Davila, [address] ENDOSCOPY  6/4/2009    severe anastomotic jejunal ulcers, Dr. Sarah Davila, [address] ENDOSCOPY  7/2/2009    severe anastomotic jejunal ulcers, Dr. Sarah Davila, [address] [address] GASTROINTESTINAL ENDOSCOPY  10/27/2009    severe anastomotic jejunal ulcers, Dr. Sarah Davila, [address] ENDOSCOPY  1/4/2010    severe anastomotic jejunal ulcers, Dr. Sarah Davila, [address] ENDOSCOPY  6/7/2010    severe anastomotic jejunal ulcers, Dr. Sarah Davila, [address] ENDOSCOPY  7/30/2010    gastritis, Dr. Sarah Davila, [address] ENDOSCOPY N/A 5/10/2019    EGD ESOPHAGOGASTRODUODENOSCOPY performed by Carmen Mustafa MD at [address] [place] [address] 5/12/2019    EGD DIAGNOSTIC ONLY performed by Carmen Mustafa MD at [address] [place] [address] N/A 5/17/2019    EGD ESOPHAGOGASTRODUODENOSCOPY performed by Sophia Benites MD at [facility]        Allergies:  Ultram [tramadol]     Medications:  Prior to Admission medications Medication Sig Start Date End Date Taking? Authorizing Provider   sertraline (ZOLOFT) 100 MG tablet take 1 tablet by mouth daily 11/16/21   Laxmi Rivera, DO   metoprolol succinate (TOPROL XL) 100 MG extended release tablet take 1 tablet by mouth daily 11/16/21   Laxmi Rivera, DO   Calcium Carb-Cholecalciferol (OYSTER SHELL CALCIUM W/D) 500-200 MG-UNIT TABS tablet take 1 tablet by mouth twice a day 11/15/21   Geni James,    ondansetron (ZOFRAN ODT) 4 MG disintegrating tablet Take 1 tablet by mouth every 8 hours as needed for Nausea or Vomiting 6/14/21   Fadumo Mitul, DO   Multiple Vitamin (MULTIVITAMIN ADULT) TABS Take 1 tablet by mouth daily 3/16/21   DA Ramesh CNP   hydroCHLOROthiazide (MICROZIDE) 12.5 MG capsule take 1 capsule by mouth once daily 3/16/21   DA Ramesh CNP   vitamin D (ERGOCALCIFEROL) 1.25 MG (84244 UT) CAPS capsule take 1 capsule by mouth every Sunday AND 2000 UNITS DAILY EXCEPT NATASHA 3/16/21   DA Ramesh CNP   nystatin-triamcinolone (MYCOLOG II) 209023-8.5 UNIT/GM-% cream Apply topically 2 times daily.  3/16/21   DA Ramesh CNP   dicyclomine (BENTYL) 10 MG capsule Take 2 capsules by mouth 4 times daily as needed (abd pain) 10/9/20   DA Infante CNP   Multiple Vitamins-Minerals (MULTIVITAMIN ADULT) TABS Take 1 tablet by mouth daily 4/30/20 3/16/21  DA Ramesh CNP      Inpatient:  metoprolol succinate (TOPROL XL) extended release tablet 100 mg, Daily  sertraline (ZOLOFT) tablet 100 mg, Daily  sodium chloride flush 0.9 % injection 5-40 mL, 2 times per day  sodium chloride flush 0.9 % injection 5-40 mL, PRN  0.9 % sodium chloride infusion, PRN  ondansetron (ZOFRAN-ODT) disintegrating tablet 4 mg, Q8H PRN   Or  ondansetron (ZOFRAN) injection 4 mg, Q6H PRN  polyethylene glycol (GLYCOLAX) packet 17 g, Daily PRN  acetaminophen (TYLENOL) tablet 650 mg, Q6H PRN   Or  acetaminophen (TYLENOL) suppository 650 mg, Q6H PRN  niCARdipine (CARDENE) 50 mg in sodium chloride 0.9 % 250 mL infusion, Continuous  atropine injection 0.5 mg, Once PRN  0.9 % sodium chloride bolus, PRN  labetalol (NORMODYNE;TRANDATE) injection 20 mg, Q4H PRN  rosuvastatin (CRESTOR) tablet 40 mg, Daily  [START ON 3/13/2022] aspirin EC tablet 81 mg, Daily  ticagrelor (BRILINTA) tablet 90 mg, BID  spironolactone (ALDACTONE) tablet 25 mg, Daily  hydroCHLOROthiazide (HYDRODIURIL) tablet 25 mg, Daily  perflutren lipid microspheres (DEFINITY) injection 1.65 mg, ONCE PRN  ondansetron (ZOFRAN) injection 4 mg, Q6H PRN        SH:  Social History     Tobacco History     Smoking Status  Former Smoker Quit date  5/10/2019 Smoking Frequency  0.5 packs/day for 2 years (1 pk yrs) Smoking Tobacco Type  Cigarettes    Smokeless Tobacco Use  Never Used          Alcohol History     Alcohol Use Status  No          Drug Use     Drug Use Status  Yes Types  Marijuana (Weed)          Sexual Activity     Sexually Active  Not Currently                FH:  Family History   Problem Relation Age of Onset    ADHD Paternal Grandfather        Review of Systems:  Pertinent ROS listed in HPI, all others negative    Physical Exam:  BP (!) 159/90   Pulse 91   Temp 97.7 °F (36.5 °C) (Oral)   Resp 19   Ht 5' 4\" (1.626 m)   Wt 190 lb (86.2 kg)   SpO2 94%   BMI 32.61 kg/m²   Pressors: no  Sedation: no  Other Drips: cardene    Net I/O: n/a    Vent Settings: Additional Respiratory  Assessments  Pulse: 91  Resp: 19  SpO2: 94 %    GENERAL: NAD, awake and conversational  NEURO: GCS 15, PERRL at 4mm  HEAD: Normocephalic, atraumatic  NECK: Trachea midline, no visible goiter  CARDIO-VASCULAR: Normal rate, no cyanosis  RESPIRATORY: Nonlabored breathing, room air, no cough  GENITOURINARY: No avila  ABDOMEN / GI: Soft, non-distended, non-tender. No guarding / rigidity / rebound. SKIN: Warm, dry  MSK: No deformities, no pitting edema.         LABS:    BMP/Lytes  Recent Labs     03/11/22  2322       CO2 19*   BUN 12   CREATININE 1.0     Recent Labs     03/11/22  2322   K 3.9   CALCIUM 9.5     Liver Function  Recent Labs     03/11/22  2322   ALKPHOS 187*   ALT 17   AST 38*   BILITOT 0.3   PROT 8.3   LABALBU 4.3   INR 1.1     CBC/coags  Recent Labs     03/11/22  2322   WBC 8.0   HGB 13.6   HCT 39.5        Recent Labs     03/11/22  2322   INR 1.1     Other  No results for input(s): LACTATE, PROCAL, CORTISOL, TSH, CK, BNP in the last 72 hours. Invalid input(s): TROPONIN      IMAGING:  XR CHEST PORTABLE    Result Date: 3/11/2022  EXAMINATION: ONE XRAY VIEW OF THE CHEST 3/11/2022 11:22 pm COMPARISON: 06/14/2021 HISTORY: ORDERING SYSTEM PROVIDED HISTORY: chest pain TECHNOLOGIST PROVIDED HISTORY: Reason for exam:->chest pain What reading provider will be dictating this exam?->CRC FINDINGS: The lungs are without acute focal process. There is no effusion or pneumothorax. The cardiomediastinal silhouette is without acute process. The osseous structures are without acute process. No acute process. CTA CHEST W CONTRAST    Result Date: 3/12/2022  EXAMINATION: CTA OF THE CHEST 3/11/2022 11:43 pm TECHNIQUE: CTA of the chest was performed after the administration of intravenous contrast.  Multiplanar reformatted images are provided for review. MIP images are provided for review. Dose modulation, iterative reconstruction, and/or weight based adjustment of the mA/kV was utilized to reduce the radiation dose to as low as reasonably achievable. COMPARISON: Chest x-ray 03/11/2022 and CT abdomen/pelvis 06/14/2021 HISTORY: ORDERING SYSTEM PROVIDED HISTORY: Pain radiating to back rule out dissection TECHNOLOGIST PROVIDED HISTORY: Reason for exam:->Pain radiating to back rule out dissection What reading provider will be dictating this exam?->CRC FINDINGS: Aorta: No evidence of thoracic aortic aneurysm, intramural hematoma or dissection. No acute abnormality of the aorta.  Mediastinum: No evidence of mediastinal lymphadenopathy. The heart and pericardium demonstrate no acute abnormality. Lungs/Pleura: There is pulmonary interstitial edema with interlobular septal thickening and patchy ground-glass opacification. There is no pneumothorax or pleural effusion. Upper Abdomen: The gallbladder is surgically absent. Stable postsurgical changes in the stomach. Atherosclerotic plaque at the origins of the bilateral renal arteries. There is a high-grade stenosis of the proximal left renal artery and a moderate to high-grade stenosis at the origin of the right renal artery. Soft Tissues/Bones: No acute bone or soft tissue abnormality. Unremarkable CTA of the chest.  No evidence of aneurysm or dissection. Pulmonary interstitial edema. No pneumothorax or pleural effusion. On the last several images obtained, there is evidence of a high-grade stenosis of the proximal left renal artery and a suspected moderate to high-grade stenosis at the origin of the right renal artery. Follow-up nonemergent renal CTA recommended. Problem List:   Hospital Problems           Last Modified POA    * (Principal) Acute MI, inferior wall ([address] 3/11/2022 Yes    Chest pain 3/11/2022 Yes            ASSESSMENT / PLAN:    Neuro:    Pain: prn tylenol    Depression: home zoloft    CV: Thrombotic myocardial infarction  - Cardiac cath via right radial artery performed 3/12, MAURICIO x3 placed in RCA   - Aspirin and ticagrelor; at least 12mo DAPT per cardiology  - TTE  - cardiothoracic surgery consult pending for possible elective CABG    HTN emergency  - goal SBP <150 (meds per cardiology include HCTZ, spironolactone, metop, cardene drip, & prn's)  - Per cardiology, there is suspicion for bilateral renal artery stenosis, and vascular surgery would need to be consulted if confirmed on renal artery duplex ultrasound.     Hypertriglyceridemia  - continue statin    Pulm:   No acute issues    Renal:  Monitor and replete electrolytes daily  - replace hypokalemia, hypomagnesemia, hypophosphatemia    GI:    No additional salt diet per cardiology    Heme:  No acute issues; monitor Hgb daily on DAPT    ID:  No indication for abx    Endo:   Mild hyperglycemia; monitor daily  - HbA1c pending    MSK:  No acute issues    Bowel reg:  pericolace  DVT ppx:  SCDs, lmwh  GI ppx:  n/a  Seizure proph:  n/a  Mouth/eye care: n/a  Rodriguez:  n/a  Central lines: n/a  Family Update: As available   CODE Status:  Full code     Dispo:  CVICU    Please see attending note for corrections/updates.     Electronically signed by Nella Mcrae MD on 3/12/22 at 2:27 AM EST

## 2022-03-12 NOTE — CONSULTS
CTS Consult    Patient name: Nate Posey    Reason for consult: STEMI, CAD    Referring Physician: Dr. Nicolasa Summers    Primary Care Physician: Jackie Foy, APRN - CNP    Date of service: 3/12/2022    Chief Complaint: CP    HPI: 54year old admitted with CP and inferior STEMI. She went to cath lab for PCI with an excellent result. She also had incredibly high BPs and is on Cardene. She currently denies CP, SOB, BENNETT, LE edema, N/V, F/C, orthopnea, PND and syncope. Her cath showed also sever disease in her LAD at a bifurcation of a large diag and Cx disease. She has some level of residual disease in her RPL as well with excellent flow but the vessel beyond it is huge. Allergies:    Allergies   Allergen Reactions    Ultram [Tramadol] Other (See Comments)     Pt states she had a seizure after taking ultram       Home medications:    Current Facility-Administered Medications   Medication Dose Route Frequency Provider Last Rate Last Admin    metoprolol succinate (TOPROL XL) extended release tablet 100 mg  100 mg Oral Daily Flakita Ayala MD        sertraline (ZOLOFT) tablet 100 mg  100 mg Oral Daily Flakita Ayala MD        sodium chloride flush 0.9 % injection 5-40 mL  5-40 mL IntraVENous 2 times per day Flakita Ayala MD        sodium chloride flush 0.9 % injection 5-40 mL  5-40 mL IntraVENous PRN Flakita Ayala MD        0.9 % sodium chloride infusion  25 mL IntraVENous PRN Flakita Ayala MD        ondansetron (ZOFRAN-ODT) disintegrating tablet 4 mg  4 mg Oral Q8H PRN Flakita Ayala MD        Or    ondansetron [facility]) injection 4 mg  4 mg IntraVENous Q6H PRN Flakita Ayala MD        polyethylene glycol [facility]) packet 17 g  17 g Oral Daily PRN Flakita Ayala MD        acetaminophen (TYLENOL) tablet 650 mg  650 mg Oral Q6H PRN Flakita Ayala MD        Or    acetaminophen (TYLENOL) suppository 650 mg  650 mg Rectal Q6H PRN Flakita Ayala MD        niCARdipine (CARDENE) 50 mg in sodium chloride 0.9 % 250 mL infusion  2.5-15 mg/hr IntraVENous Continuous Georges Willett MD 37.5 mL/hr at 03/12/22 0709 7.5 mg/hr at 03/12/22 0709    atropine injection 0.5 mg  0.5 mg IntraVENous Once PRN Georges Willett MD        0.9 % sodium chloride bolus  500 mL IntraVENous PRN Georges Willett MD        rosuvastatin (CRESTOR) tablet 40 mg  40 mg Oral Daily Georges Willett MD        ticagrelor (BRILINTA) tablet 90 mg  90 mg Oral BID Georges Willett MD        spironolactone (ALDACTONE) tablet 25 mg  25 mg Oral Daily Georegs Willett MD        hydroCHLOROthiazide (HYDRODIURIL) tablet 25 mg  25 mg Oral Daily Georges Willett MD        perflutren lipid microspheres (DEFINITY) injection 1.65 mg  1.5 mL IntraVENous ONCE PRN Georges Willett MD        labetalol (NORMODYNE;TRANDATE) injection 10 mg  10 mg IntraVENous Q1H PRN Vannesa Montalvo MD        hydrALAZINE (APRESOLINE) injection 10 mg  10 mg IntraVENous Q1H PRN Vannesa Montalvo MD        potassium & sodium phosphates (PHOS-NAK) 280-160-250 MG packet 250 mg  1 packet Oral 4x Daily Vannesa Montalvo MD        potassium chloride (KLOR-CON M) extended release tablet 40 mEq  40 mEq Oral BID WC Vannesa Montalvo MD        enoxaparin (LOVENOX) injection 40 mg  40 mg SubCUTAneous Daily Vannesa Montalvo MD        sennosides-docusate sodium (SENOKOT-S) 8.6-50 MG tablet 2 tablet  2 tablet Oral Daily Vannesa Montalvo MD        aspirin EC tablet 81 mg  81 mg Oral Daily Georges Willett MD        ondansetron [facility]) injection 4 mg  4 mg IntraVENous Q6H PRN Georges Willett MD   4 mg at 03/12/22 0000       Past Medical History:  Past Medical History:   Diagnosis Date    Anorexia 6/1/2015    Arthritis of knee     Back pain, chronic     Chronic pain 6/1/2015    Current severe episode of major depressive disorder without psychotic features without prior episode [address] [address] 3/16/2021    H/O gastric bypass 6/1/2015    Hypertension 1/1/2012    Hypertension 8/9/9479    Metabolic acidosis 0/2/1031    Ovarian cyst     Se[address]ures ([facility]        Past Surgical History:  Past Surgical History:   Procedure Laterality Date    CHOLECYSTECTOMY, [facility]AROSCOPIC  9/19/1995    [facility]    ENDOMETRIAL [facility]BLATION  2003    [facility]    GASTRIC BYPASS SURGERY      HERNIA REPAIR      umbil    HYSTERECTOMY Left 2/5/2013    Laparotomy;HUGO;JOSUÉ:LSO    LAPAROSCOPY  2/12/2009    lap assisted percutaneous ERCP and removal CBD stone and gastrostomy, [address] HISTORY  12/28/11    exp lap, hugo, rt so/ repair hernia    EMILY-EN-Y GASTRIC BYPASS  12/2/2008    laparoscopic RYGB, Dr. Bk Goel, [address]  6/25/2010    resection of jejunal ulcer, reversal gastric bypass, jejunoduodenostomy, feeding jejunostomy, Dr. Bourgeois Led, [address]  10/3/2008    mild gastritis & duodenitis, Dr. Bk Goel, [address] ENDOSCOPY  1/9/2009    multiple severe anastomotic jejunal ulcers, Dr. Bk Goel.  [facility]    UPPER GASTROINTESTINAL ENDOSCOPY  2/4/2009    ulcers healed, normal, Dr. Maida Jenkins, [address] ENDOSCOPY  3/6/2009    recurrent severe anastomotic jejunal ulcers, Dr. Bk Goel, [address] ENDOSCOPY  6/4/2009    severe anastomotic jejunal ulcers, Dr. Bk Goel, [address] ENDOSCOPY  7/2/2009    severe anastomotic jejunal ulcers, Dr. Bk Goel, [address] GASTROINTESTINAL ENDOSCOPY  10/27/2009    severe anastomotic jejunal ulcers, Dr. Bk Goel, [address] ENDOSCOPY  1/4/2010    severe anastomotic jejunal ulcers, Dr. Bk Goel, [address] ENDOSCOPY  6/7/2010    severe anastomotic jejunal ulcers, Dr. Bk Goel, [address] ENDOSCOPY  7/30/2010    gastritis, Dr. Bk Goel, [address] ENDOSCOPY N/A 5/10/2019    EGD ESOPHAGOGASTRODUODENOSCOPY performed by Valerie Ellis MD at [facility] 5/12/2019    EGD DIAGNOSTIC ONLY performed by [facility] N/A 2019    EGD ESOPHAGOGASTRODUODENOSCOPY performed by Nettie Hernandez MD at [facility] History:  Social History     Socioeconomic History    Marital status:      Spouse name: Not on file    Number of children: Not on file    Years of education: Not on file    Highest education level: Not on file   Occupational History    Not on file   Tobacco Use    Smoking status: Former Smoker     Packs/day: 0.50     Years: 2.00     Pack years: 1.00     Types: Cigarettes     Quit date: 5/10/2019     Years since quittin.8    Smokeless tobacco: Never Used   Vaping Use    Vaping Use: Never used   Substance and Sexual Activity    Alcohol use: No     Alcohol/week: 0.0 standard drinks    Drug use: Yes     Types: Marijuana [employer])    Sexual activity: Not Currently   Other Topics Concern    Not on file   Social History Narrative    Not on file     Social Determinants of Health     Financial Resource Strain:     Difficulty of Paying Living Expenses: Not on file   Food Insecurity:     Worried About [address] in the Last Year: Not on file    [address] in the Last Year: Not on file   Transportation Needs:     Lack of Transportation (Medical): Not on file    Lack of Transportation (Non-Medical):  Not on file   Physical Activity:     Days of Exercise per Week: Not on file    Minutes of Exercise per Session: Not on file   Stress:     Feeling of Stress : Not on file   Social Connections:     Frequency of Communication with Friends and Family: Not on file    Frequency of Social Gatherings with Friends and Family: Not on file    Attends Spiritism Services: Not on file    Active Member of Clubs or Organizations: Not on file    Attends Club or Organization Meetings: Not on file    Marital Status: Not on file   Intimate Partner Violence:     Fear of Current or Ex-Partner: Not on file    Emotionally Abused: Not on file   Shin Orozco Physically Abused: Not on file    Sexually Abused: Not on file   Housing Stability:     Unable to Pay for Housing in the Last Year: Not on file    Number of Places Lived in the Last Year: Not on file    Unstable Housing in the Last Year: Not on file       Family History:  Family History   Problem Relation Age of Onset    ADHD Paternal Grandfather        Review of Systems:  Constitutional: Denies fevers, chills, or weight loss. HEENT: Denies visual changes or hearing loss. Heart: As per HPI. Lungs: Denies shortness of breath, cough, or wheezing. Gastrointestinal: Denies nausea, vomiting, constipation, or diarrhea. Genitourinary: dysuria or hematuria. Psychiatric: Patient denies anxiety or depression. Neurologic: Patient denies weakness of the extremities, dizziness, or headaches. All other ROS checked and found to be negative. Labs:  Recent Labs     03/11/22  2322   WBC 8.0   HGB 13.6   HCT 39.5         Recent Labs     03/11/22  2322 03/12/22  0330   BUN 12 13   CREATININE 1.0 0.9       Objective:  Vitals BP (!) 170/92   Pulse 105   Temp 98.5 °F (36.9 °C) (Oral)   Resp 19   Ht 5' 4\" (1.626 m)   Wt 200 lb (90.7 kg)   SpO2 100%   BMI 34.33 kg/m²   General Appearance: Pleasant 54y.o. year old female who appears stated age. Communicates well, no acute distress. HEENT: Head is normocephalic, atraumatic. EOMs intact, PERRL. Trachea midline. Lungs: Normal respiratory rate and normal effort. She is not in respiratory distress. Breath sounds clear to auscultation. No wheezes. Heart: Normal rate. Regular rhythm. S1 normal and S2 normal. Negative for murmur. Chest: Symmetric chest wall expansion. Extremities: Normal range of motion. Neurological: Patient is alert and oriented to person, place and time. Patient has normal reflexes. Skin: Warm and dry. Abdomen: Abdomen is soft and non-distended. Bowel sounds are normal. There is no abdominal tenderness tenderness.  There is

## 2022-03-13 PROBLEM — I70.1 BILATERAL RENAL ARTERY STENOSIS (HCC): Chronic | Status: ACTIVE | Noted: 2022-03-13

## 2022-03-13 LAB
ALBUMIN SERPL-MCNC: 3.7 G/DL (ref 3.5–5.2)
ALP BLD-CCNC: 165 U/L (ref 35–104)
ALT SERPL-CCNC: 32 U/L (ref 0–32)
ANION GAP SERPL CALCULATED.3IONS-SCNC: 13 MMOL/L (ref 7–16)
AST SERPL-CCNC: 106 U/L (ref 0–31)
BILIRUB SERPL-MCNC: 0.8 MG/DL (ref 0–1.2)
BUN BLDV-MCNC: 20 MG/DL (ref 6–20)
CALCIUM IONIZED: 1.32 MMOL/L (ref 1.15–1.33)
CALCIUM SERPL-MCNC: 8.9 MG/DL (ref 8.6–10.2)
CHLORIDE BLD-SCNC: 108 MMOL/L (ref 98–107)
CO2: 19 MMOL/L (ref 22–29)
CREAT SERPL-MCNC: 1 MG/DL (ref 0.5–1)
EKG ATRIAL RATE: 54 BPM
EKG ATRIAL RATE: 96 BPM
EKG P AXIS: 27 DEGREES
EKG P AXIS: 68 DEGREES
EKG P-R INTERVAL: 134 MS
EKG P-R INTERVAL: 184 MS
EKG Q-T INTERVAL: 460 MS
EKG Q-T INTERVAL: 534 MS
EKG QRS DURATION: 84 MS
EKG QRS DURATION: 86 MS
EKG QTC CALCULATION (BAZETT): 436 MS
EKG QTC CALCULATION (BAZETT): 674 MS
EKG R AXIS: -12 DEGREES
EKG R AXIS: 37 DEGREES
EKG T AXIS: -43 DEGREES
EKG T AXIS: 54 DEGREES
EKG VENTRICULAR RATE: 54 BPM
EKG VENTRICULAR RATE: 96 BPM
GFR AFRICAN AMERICAN: >60
GFR NON-AFRICAN AMERICAN: 59 ML/MIN/1.73
GLUCOSE BLD-MCNC: 111 MG/DL (ref 74–99)
HCT VFR BLD CALC: 35 % (ref 34–48)
HEMOGLOBIN: 12 G/DL (ref 11.5–15.5)
LV EF: 65 %
LVEF MODALITY: NORMAL
MAGNESIUM: 2.6 MG/DL (ref 1.6–2.6)
MCH RBC QN AUTO: 30.6 PG (ref 26–35)
MCHC RBC AUTO-ENTMCNC: 34.3 % (ref 32–34.5)
MCV RBC AUTO: 89.3 FL (ref 80–99.9)
PDW BLD-RTO: 12.9 FL (ref 11.5–15)
PHOSPHORUS: 3.6 MG/DL (ref 2.5–4.5)
PLATELET # BLD: 203 E9/L (ref 130–450)
PMV BLD AUTO: 10.9 FL (ref 7–12)
POTASSIUM REFLEX MAGNESIUM: 4 MMOL/L (ref 3.5–5)
POTASSIUM SERPL-SCNC: 4 MMOL/L (ref 3.5–5)
RBC # BLD: 3.92 E12/L (ref 3.5–5.5)
SODIUM BLD-SCNC: 140 MMOL/L (ref 132–146)
TOTAL PROTEIN: 7 G/DL (ref 6.4–8.3)
WBC # BLD: 13.6 E9/L (ref 4.5–11.5)

## 2022-03-13 PROCEDURE — 84100 ASSAY OF PHOSPHORUS: CPT

## 2022-03-13 PROCEDURE — 6360000002 HC RX W HCPCS: Performed by: SURGERY

## 2022-03-13 PROCEDURE — 99255 IP/OBS CONSLTJ NEW/EST HI 80: CPT | Performed by: SURGERY

## 2022-03-13 PROCEDURE — 2580000003 HC RX 258: Performed by: INTERNAL MEDICINE

## 2022-03-13 PROCEDURE — 6370000000 HC RX 637 (ALT 250 FOR IP): Performed by: INTERNAL MEDICINE

## 2022-03-13 PROCEDURE — 83735 ASSAY OF MAGNESIUM: CPT

## 2022-03-13 PROCEDURE — 82330 ASSAY OF CALCIUM: CPT

## 2022-03-13 PROCEDURE — 6370000000 HC RX 637 (ALT 250 FOR IP): Performed by: SURGERY

## 2022-03-13 PROCEDURE — 36415 COLL VENOUS BLD VENIPUNCTURE: CPT

## 2022-03-13 PROCEDURE — 99291 CRITICAL CARE FIRST HOUR: CPT | Performed by: INTERNAL MEDICINE

## 2022-03-13 PROCEDURE — 85027 COMPLETE CBC AUTOMATED: CPT

## 2022-03-13 PROCEDURE — 80053 COMPREHEN METABOLIC PANEL: CPT

## 2022-03-13 PROCEDURE — 6360000002 HC RX W HCPCS: Performed by: INTERNAL MEDICINE

## 2022-03-13 PROCEDURE — 2500000003 HC RX 250 WO HCPCS: Performed by: INTERNAL MEDICINE

## 2022-03-13 PROCEDURE — 6370000000 HC RX 637 (ALT 250 FOR IP): Performed by: STUDENT IN AN ORGANIZED HEALTH CARE EDUCATION/TRAINING PROGRAM

## 2022-03-13 PROCEDURE — 99291 CRITICAL CARE FIRST HOUR: CPT | Performed by: THORACIC SURGERY (CARDIOTHORACIC VASCULAR SURGERY)

## 2022-03-13 PROCEDURE — 80048 BASIC METABOLIC PNL TOTAL CA: CPT

## 2022-03-13 PROCEDURE — 2140000000 HC CCU INTERMEDIATE R&B

## 2022-03-13 PROCEDURE — 93306 TTE W/DOPPLER COMPLETE: CPT

## 2022-03-13 PROCEDURE — 99291 CRITICAL CARE FIRST HOUR: CPT | Performed by: SURGERY

## 2022-03-13 RX ORDER — OXYCODONE HCL 10 MG/1
10 TABLET, FILM COATED, EXTENDED RELEASE ORAL ONCE
Status: COMPLETED | OUTPATIENT
Start: 2022-03-13 | End: 2022-03-14

## 2022-03-13 RX ORDER — METOPROLOL SUCCINATE 50 MG/1
200 TABLET, EXTENDED RELEASE ORAL DAILY
Status: DISCONTINUED | OUTPATIENT
Start: 2022-03-13 | End: 2022-03-16 | Stop reason: HOSPADM

## 2022-03-13 RX ORDER — SPIRONOLACTONE 25 MG/1
50 TABLET ORAL DAILY
Status: DISCONTINUED | OUTPATIENT
Start: 2022-03-13 | End: 2022-03-16 | Stop reason: HOSPADM

## 2022-03-13 RX ORDER — AMLODIPINE BESYLATE 10 MG/1
10 TABLET ORAL DAILY
Status: DISCONTINUED | OUTPATIENT
Start: 2022-03-13 | End: 2022-03-16 | Stop reason: HOSPADM

## 2022-03-13 RX ADMIN — SODIUM CHLORIDE, PRESERVATIVE FREE 10 ML: 5 INJECTION INTRAVENOUS at 21:31

## 2022-03-13 RX ADMIN — MORPHINE SULFATE 2 MG: 2 INJECTION, SOLUTION INTRAMUSCULAR; INTRAVENOUS at 11:32

## 2022-03-13 RX ADMIN — NICARDIPINE HYDROCHLORIDE 10 MG/HR: 2.5 INJECTION, SOLUTION INTRAVENOUS at 11:48

## 2022-03-13 RX ADMIN — Medication 1 TABLET: at 08:59

## 2022-03-13 RX ADMIN — MORPHINE SULFATE 2 MG: 2 INJECTION, SOLUTION INTRAMUSCULAR; INTRAVENOUS at 07:31

## 2022-03-13 RX ADMIN — METOPROLOL SUCCINATE 200 MG: 50 TABLET, EXTENDED RELEASE ORAL at 09:00

## 2022-03-13 RX ADMIN — CALCIUM CARBONATE-VITAMIN D TAB 500 MG-200 UNIT 1 TABLET: 500-200 TAB at 09:05

## 2022-03-13 RX ADMIN — SODIUM CHLORIDE, PRESERVATIVE FREE 10 ML: 5 INJECTION INTRAVENOUS at 20:29

## 2022-03-13 RX ADMIN — MORPHINE SULFATE 2 MG: 2 INJECTION, SOLUTION INTRAMUSCULAR; INTRAVENOUS at 03:05

## 2022-03-13 RX ADMIN — NICARDIPINE HYDROCHLORIDE 12.5 MG/HR: 2.5 INJECTION, SOLUTION INTRAVENOUS at 07:46

## 2022-03-13 RX ADMIN — PANTOPRAZOLE SODIUM 40 MG: 40 TABLET, DELAYED RELEASE ORAL at 06:06

## 2022-03-13 RX ADMIN — ERGOCALCIFEROL 50000 UNITS: 1.25 CAPSULE ORAL at 09:04

## 2022-03-13 RX ADMIN — ASPIRIN 81 MG: 81 TABLET, COATED ORAL at 08:59

## 2022-03-13 RX ADMIN — MORPHINE SULFATE 2 MG: 2 INJECTION, SOLUTION INTRAMUSCULAR; INTRAVENOUS at 17:29

## 2022-03-13 RX ADMIN — SENNOSIDES AND DOCUSATE SODIUM 2 TABLET: 50; 8.6 TABLET ORAL at 08:59

## 2022-03-13 RX ADMIN — ONDANSETRON 4 MG: 2 INJECTION INTRAMUSCULAR; INTRAVENOUS at 11:11

## 2022-03-13 RX ADMIN — ROSUVASTATIN 40 MG: 20 TABLET, FILM COATED ORAL at 08:59

## 2022-03-13 RX ADMIN — SPIRONOLACTONE 50 MG: 25 TABLET ORAL at 09:00

## 2022-03-13 RX ADMIN — ENOXAPARIN SODIUM 40 MG: 100 INJECTION SUBCUTANEOUS at 09:01

## 2022-03-13 RX ADMIN — AMLODIPINE BESYLATE 10 MG: 10 TABLET ORAL at 09:00

## 2022-03-13 RX ADMIN — SODIUM CHLORIDE, PRESERVATIVE FREE 10 ML: 5 INJECTION INTRAVENOUS at 09:01

## 2022-03-13 RX ADMIN — MORPHINE SULFATE 2 MG: 2 INJECTION, SOLUTION INTRAMUSCULAR; INTRAVENOUS at 21:32

## 2022-03-13 RX ADMIN — HYDROCHLOROTHIAZIDE 25 MG: 25 TABLET ORAL at 09:05

## 2022-03-13 RX ADMIN — TICAGRELOR 90 MG: 90 TABLET ORAL at 20:29

## 2022-03-13 RX ADMIN — NICARDIPINE HYDROCHLORIDE 5 MG/HR: 2.5 INJECTION, SOLUTION INTRAVENOUS at 01:37

## 2022-03-13 RX ADMIN — TICAGRELOR 90 MG: 90 TABLET ORAL at 09:04

## 2022-03-13 RX ADMIN — SERTRALINE 100 MG: 100 TABLET, FILM COATED ORAL at 09:05

## 2022-03-13 ASSESSMENT — PAIN SCALES - GENERAL
PAINLEVEL_OUTOF10: 0
PAINLEVEL_OUTOF10: 6
PAINLEVEL_OUTOF10: 0
PAINLEVEL_OUTOF10: 0
PAINLEVEL_OUTOF10: 6
PAINLEVEL_OUTOF10: 6
PAINLEVEL_OUTOF10: 3
PAINLEVEL_OUTOF10: 0
PAINLEVEL_OUTOF10: 6
PAINLEVEL_OUTOF10: 5
PAINLEVEL_OUTOF10: 0

## 2022-03-13 ASSESSMENT — PAIN DESCRIPTION - PAIN TYPE
TYPE: CHRONIC PAIN
TYPE: CHRONIC PAIN

## 2022-03-13 ASSESSMENT — PAIN DESCRIPTION - LOCATION
LOCATION: BACK
LOCATION: BACK

## 2022-03-13 ASSESSMENT — PAIN DESCRIPTION - ORIENTATION: ORIENTATION: MID

## 2022-03-13 ASSESSMENT — PAIN DESCRIPTION - DESCRIPTORS: DESCRIPTORS: ACHING;DISCOMFORT;SORE

## 2022-03-13 NOTE — PROGRESS NOTES
CC:CP    Brief HPI:  Awake, alert. No complaints. Past Medical History:   Diagnosis Date    Anorexia 6/1/2015    Arthritis of knee     Back pain, chronic     Chronic pain 6/1/2015    Current severe episode of major depressive disorder without psychotic features without prior episode ([address] 3/16/2021    H/O gastric bypass 6/1/2015    Hypertension 1/1/2012    Hypertension 4/2/9904    Metabolic acidosis 1/8/7712    Ovarian cyst     Seizures ([facility] [address]      Past Surgical History:   Procedure Laterality Date    CHOLECYSTECTOMY, LAPAROSCOPIC  9/19/1995    [facility]    ENDOMETRIAL ABLATION  2003    [facility]    GASTRIC BYPASS SURGERY      HERNIA REPAIR      umbil    HYSTERECTOMY Left 2/5/2013    Laparotomy;HUGO;JOSUÉ:LSO    LAPAROSCOPY  2/12/2009    lap assisted percutaneous ERCP and removal CBD stone and gastrostomy, [address]  12/28/11    exp lap, hugo, rt so/ repair hernia    EMILY-EN-Y GASTRIC BYPASS  12/2/2008    laparoscopic RYGB, Dr. Leigh Loco, [facility]  6/25/2010    resection of jejunal ulcer, reversal gastric bypass, jejunoduodenostomy, feeding jejunostomy, Dr. Leigh Loco, [address]  10/3/2008    mild gastritis & duodenitis, Dr. Leigh Loco, [address] ENDOSCOPY  1/9/2009    multiple severe anastomotic jejunal ulcers, Dr. Leigh Loco.  [facility]    UPPER GASTROINTESTINAL ENDOSCOPY  2/4/2009    ulcers healed, normal, Dr. Racquel Ulloa, [address] ENDOSCOPY  3/6/2009    recurrent severe anastomotic jejunal ulcers, Dr. Leigh Loco, [address] ENDOSCOPY  6/4/2009    severe anastomotic jejunal ulcers, Dr. Leigh Loco, [address] ENDOSCOPY  7/2/2009    severe anastomotic jejunal ulcers, Dr. Leigh Loco, [address] ENDOSCOPY  10/27/2009    severe anastomotic jejunal ulcers, Dr. Leigh Loco, [place] [id number] 2010    severe anastomotic jejunal ulcers, Dr[facility] Stephanie [facility], [facility]  2010    severe anastomotic jejunal ulcers, Dr. Stephanie Beach, [facility]    UPPER GASTROINTESTINAL ENDOSCOPY  2010    gastritis, Dr. Stephanie Beach, [facility]    UPPER GASTROINTESTINAL ENDOSCOPY N/A 5/10/2019    EGD ESOPHAGOGASTRODUODENOSCOPY performed by Coby Leyva MD at [facility] N/A 2019    EGD DIAGNOSTIC ONLY performed by Coby Leyva MD at [facility] N/A 2019    EGD ESOPHAG[facility]OD[facility]OS[facility] [address] Marital status:      Spouse name: Not on file    Number of children: Not on file    Years of education: Not on file    Highest education level: Not on file   Occupational History    Not on file   Tobacco Use    Smoking status: Former Smoker     Packs/day: 0.50     Years: 2.00     Pack years: 1.00     Types: Cigarettes     Quit date: 5/10/2019     Years since quittin.8    Smokeless tobacco: Never Used   Vaping Use    Vaping Use: Never used   Substance and Sexual Activity    Alcohol use: No     Alcohol/week: 0.0 standard drinks    Drug use: Yes     Types: Marijuana [place])    Sexual activity: Not Currently   Other Topics Concern    Not on file   Social History Narrative    Not on file     Social Determinants of Health     Financial Resource Strain:     Difficulty of Paying Living Expenses: Not on file   Food Insecurity:     Worried About [address] in the Last Year: Not on file    [address] in the Last Year: Not on file   Transportation Needs:     Lack of Transportation (Medical): Not on file    Lack of Transportation (Non-Medical):  Not on file   Physical Activity:     Days of Exercise per Week: Not on file    Minutes of Exercise per Session: Not on file   Stress:     Feeling of Stress : Not on file Social Connections:     Frequency of Communication with Friends and Family: Not on file    Frequency of Social Gatherings with Friends and Family: Not on file    Attends Anabaptism Services: Not on file    Active Member of Clubs or Organizations: Not on file    Attends Club or Organization Meetings: Not on file    Marital Status: Not on file   Intimate Partner Violence:     Fear of Current or Ex-Partner: Not on file    Emotionally Abused: Not on file    Physically Abused: Not on file    Sexually Abused: Not on file   Housing Stability:     Unable to Pay for Housing in the Last Year: Not on file    Number of [place] in the Last Year: Not on file    Unstable Housing in the Last Year: Not on file     Family History   Problem Relation Age of Onset    ADHD Paternal Grandfather        Vitals:    03/13/22 0600 03/13/22 0615 03/13/22 0700 03/13/22 0800   BP: (!) 151/72 (!) 146/71 136/62    Pulse: 87 85 90    Resp: 19 20 19    Temp:    98.1 °F (36.7 °C)   TempSrc:    Oral   SpO2: 97% 97% 97%    Weight:       Height:             Intake/Output Summary (Last 24 hours) at 3/13/2022 0810  Last data filed at 3/13/2022 0700  Gross per 24 hour   Intake 1508.4 ml   Output 550 ml   Net 958.4 ml       Recent Labs     03/11/22 2322 03/13/22  0455   WBC 8.0 13.6*   HGB 13.6 12.0   HCT 39.5 35.0    203      Recent Labs     03/11/22 2322 03/12/22  0330 03/13/22  0455   BUN 12 13 20   CREATININE 1.0 0.9 1.0       ROS:   Negative for CP, palpitations, SOB at rest, dizziness/lightheadedness. Physical Exam   Constitutional: Oriented to person, place, and time. Appears well-developed. No distress. CARDIOVASCULAR:  Normal apical impulse, regular rate and rhythm, normal S1 and S2, no S3 or S4, and no murmur noted  Pulmonary/Chest: Effort normal. No respiratory distress. Abdominal: Soft. Bowel sounds are normal.   Musculoskeletal: Normal range of motion.    Neurological: alert and oriented to person, place, and time.   Skin: Skin is warm and dry. Psychiatric: normal mood and affect. ASSESSMENT:   Patient Active Problem List   Diagnosis    Abdominal tenderness, LLQ (left lower quadrant)    Postoperative anemia due to acute blood loss    Chronic pain    Anorexia    H/O gastric bypass    Back pain    Encounter for palliative care    Hypertension    Epigastric pain    GI bleed due to NSAIDs    Gastrointestinal hemorrhage associated with gastric ulcer    Acute cystitis without hematuria    Current severe episode of major depressive disorder without psychotic features without prior episode ([facility] [address]    Acute MI, inferior wall (HCC)    Chest pain               PLAN:  STEMI. Interval CABG. D/W  [facility]. I will see the patient in a month. CCT 35 minutes  I will sign off. Thank you. Kennon Kocher, MD      Note: 25 minutes was spent providing face-to-face patient care, including:  and coordinating care, reviewing the chart, labs, and diagnostics, as well as medical decision making. Greater than 50% of this time was spent instructing and counseling the patient face to face regarding findings and recommendations.

## 2022-03-13 NOTE — PROGRESS NOTES
Cardiology progress note:    Narrative:  · 72-year-old  female with history of hypertension, active smoker, gastric bypass with Jeannine-en-Y, chronic pain, seizures who presented with substernal chest pain of 3 hours' duration. · No acute events overnight  · Nauseated yesterday afternoon  · Heart rate in the 80s to 18R with systolics in the 189Q on 7.5 to 12.5 mg/h IV nicardipine  · Creatinine stable at 1.0  · WBC 13.6 up from 8.0 without fever      Objective:  /62   Pulse 90   Temp 98.1 °F (36.7 °C) (Oral)   Resp 19   Ht 5' 4\" (1.626 m)   Wt 204 lb (92.5 kg)   SpO2 97%   BMI 35.02 kg/m²    General: NAD  Lungs: CTAB  Heart: RRR. No M/R/G  Abdomen: soft, NT/ND  Extremities: no pitting edema. Warm. Neuro: A&Ox3, MAEx4    Lab Results   Component Value Date     03/13/2022    K 4.0 03/13/2022    K 4.0 03/13/2022     03/13/2022    CO2 19 03/13/2022    BUN 20 03/13/2022    CREATININE 1.0 03/13/2022    GLUCOSE 111 03/13/2022    GLUCOSE 83 04/28/2012    CALCIUM 8.9 03/13/2022      Lab Results   Component Value Date    WBC 13.6 (H) 03/13/2022    HGB 12.0 03/13/2022    HCT 35.0 03/13/2022    MCV 89.3 03/13/2022     03/13/2022     CTA chest reviewed and suggests mid RCA occlusion. No dissection or aneurysm. There is also evidence of bilateral severe ostial renal artery stenosis    Assessment:  1. Acute inferolateral MI status post successful IVUS guided primary PCI of the RCA with 3 overlapping MAURICIO from proximal to distal  1. Residual disease includes severe mLAD/D2 bifurcation,. pLCx/OM1/OM2 bifurcation and RPL3  2. Hypertensive emergency on a background of resistant hypertension  3. Bilateral renal artery stenosis        Recommendations:  1. We will try to wean off IV nicardipine today as tolerated  2. Continue HCTZ 25 mg daily. Amlodipine 10 mg daily started. Increase metoprolol succinate to 200 mg daily and spironolactone to 50 mg daily  3.  Aspirin 81 mg daily and ticagrelor 90 mg p.o. twice daily. a. DAPT for at least 12 months with potential for de-escalating P2Y12 inhibitor early given the patient's history of GI bleed secondary to Jeannine-en-Y surgery  4. Rosuvastatin 40 mg daily  5. TTE  6. Renal angiogram +/- stenting on Tuesday - discussed with Dr. Tutu Galan   7. For her residual CAD she will undergo CABG in 3 months as outpatient      Critical care time 35 minutes spent reviewing data, documentation, exam, formulating a therapeutic plan and discussing with family/care team.     We will follow.       Dorothea Tang MD, [facility]  Interventional Cardiology/Structural Heart Disease  Office: [phone]

## 2022-03-13 NOTE — PROGRESS NOTES
Hospitalist Progress Note      SYNOPSIS: Patient admitted on 3/11/2022 for chest pain. 56-year-old female admitted with chest pain and was found to have inferior STEMI. She was taken directly to the Cath Lab for PCI which was performed via right radial artery concluding in MAURICIO x3 placed in RCA. Started on aspirin and ticagrelor with a plan to continue DAPT for 12 months. She was also seen by cardiothoracic surgery who recommended outpatient CABG for the remaining coronary artery disease. SUBJECTIVE:    Patient seen and examined in ICU. Alert awake oriented x3 and providing most of the information. Denies any chest pain, shortness of breath, nausea, vomiting  Complains of low back pain from restricted mobility. Records reviewed. Stable overnight. No other overnight issues reported. Temp (24hrs), Av.1 °F (36.7 °C), Min:97.7 °F (36.5 °C), Max:98.4 °F (36.9 °C)    DIET: ADULT DIET; Regular; No Added Salt (3-4 gm)  CODE: Full Code    Intake/Output Summary (Last 24 hours) at 3/13/2022 0744  Last data filed at 3/13/2022 0700  Gross per 24 hour   Intake 1508.4 ml   Output 850 ml   Net 658.4 ml       OBJECTIVE:    /62   Pulse 90   Temp 98.2 °F (36.8 °C) (Oral)   Resp 19   Ht 5' 4\" (1.626 m)   Wt 204 lb (92.5 kg)   SpO2 97%   BMI 35.02 kg/m²     General appearance: No apparent distress, appears stated age and cooperative. HEENT:  Conjunctivae/corneas clear. Neck: Supple. No jugular venous distention. Respiratory: Clear to auscultation bilaterally, normal respiratory effort  Cardiovascular: Regular rate rhythm, normal S1-S2  Abdomen: Soft, nontender, nondistended  Musculoskeletal: No clubbing, cyanosis, no bilateral lower extremity edema. Brisk capillary refill. Skin:  No rashes  on visible skin  Neurologic: awake, alert and following commands     ASSESSMENT & PLAN:    1.   Chest pain   Secondary to inferior wall STEMI  Status post left heart cath via right radial artery with MAURICIO in RCA  Cardiothoracic surgery evaluation, recommend outpatient CABG for the remaining coronary artery disease    2. Hypertensive urgency/emergency:  Continue HCTZ and metoprolol monitor closely. Currently on IV nicardipine  On Aldactone and Toprol-XL  Recommend starting ACE/ARBs, probably involve nephrology as hypertension may be due to renal artery stenosis     3. Depression and anxiety: On Zoloft     4. History of seizures: Not on medications, monitor pain    5.   Possible renal artery stenosis  Incidentally noted on CTA chest  Follow-up retroperitoneal ultrasound showed findings compatible with left renal artery stenosis  Plan for CT angiogram for further delineation of renal artery stenosis, possibly outpatient    DISPOSITION:   Unclear discharge disposition at the moment    Medications:  REVIEWED DAILY    Infusion Medications    sodium chloride      niCARdipene (CARDENE) 50 mg in 250 mL 0.9 % sodium chloride infusion 12.5 mg/hr (03/13/22 0517)     Scheduled Medications    metoprolol succinate  100 mg Oral Daily    sertraline  100 mg Oral Daily    sodium chloride flush  5-40 mL IntraVENous 2 times per day    rosuvastatin  40 mg Oral Daily    ticagrelor  90 mg Oral BID    spironolactone  25 mg Oral Daily    hydroCHLOROthiazide  25 mg Oral Daily    potassium & sodium phosphates  1 packet Oral 4x Daily    enoxaparin  40 mg SubCUTAneous Daily    sennosides-docusate sodium  2 tablet Oral Daily    aspirin  81 mg Oral Daily    oyster shell calcium w/D  1 tablet Oral Daily    multivitamin  1 tablet Oral Daily    vitamin D  50,000 Units Oral Weekly    pantoprazole  40 mg Oral QAM AC     PRN Meds: sodium chloride flush, sodium chloride, ondansetron **OR** ondansetron, polyethylene glycol, acetaminophen **OR** acetaminophen, sodium chloride, perflutren lipid microspheres, labetalol, hydrALAZINE, morphine, ondansetron    Labs:     Recent Labs     03/11/22  2322 03/13/22  0455   WBC 8.0 13.6*   HGB 13.6 12.0   HCT 39.5 35.0    203       Recent Labs     03/11/22 2322 03/11/22 2322 03/12/22 0330 03/13/22  0455     --  139 140   K 3.9   < > 3.2* 4.0  4.0     --  106 108*   CO2 19*  --  18* 19*   BUN 12  --  13 20   CREATININE 1.0  --  0.9 1.0   CALCIUM 9.5  --  9.4 8.9   PHOS  --   --  2.2* 3.6    < > = values in this interval not displayed. Recent Labs     03/11/22 2322 03/12/22 0330 03/13/22 0455   PROT 8.3 7.8 7.0   ALKPHOS 187* 177* 165*   ALT 17 31 32   AST 38* 146* 106*   BILITOT 0.3 0.8 0.8       Recent Labs     03/11/22 2322   INR 1.1       No results for input(s): CKTOTAL, TROPONINI in the last 72 hours. Chronic labs:    Lab Results   Component Value Date    CHOL 208 (H) 03/12/2022    TRIG 153 (H) 03/12/2022    HDL 38 03/12/2022    LDLCALC 139 (H) 03/12/2022    TSH 0.716 04/29/2020    INR 1.1 03/11/2022    LABA1C 5.1 03/12/2022       Radiology: REVIEWED DAILY    +++++++++++++++++++++++++++++++++++++++++++++++++  Kike Amin MD  [employer] - [address], [facility]  +++++++++++++++++++++++++++++++++++++++++++++++++  NOTE: This report was transcribed using voice recognition software. Every effort was made to ensure accuracy; however, inadvertent computerized transcription errors may be present.

## 2022-03-13 NOTE — CONSULTS
Vascular Surgery Consultation Note    Reason for Consult:  Renal artery stenosis    HPI : This is a 54 y.o. female admitted on 3/11/2022 10:59 PM with chest pain, and HTN urgency sbp > 220. She was noted to have a acute inferolateral MI and underwent IVUS guided PCI of the right coronary artery with MAURICIO placement by Dr. Octavia Booker. She continued to be hypertensive and is on Cardene drip currently which they are trying to wean down and adding new medications to address her hypertension. She has been seen by Dr. Kristina Swain from cardiothoracic and will need CABG in the future as an outpatient. She denies any significant pain associated with these issues. She was incidentally noted on cta of the chest to have bilateral renal artery stenosis. She had RP us noting L renal artery stenosis. Vascular surgery is consulted in regards to renal artery stenosis.       ROS : All others Negative if blank [], Positive if [x]  General Urinary   [] Fevers [] Hematuria   [] Chills [] Dysuria   [] Weight Loss Vascular   Skin [] Claudication   [] Tissue Loss [] Rest Pain   Eyes Neurologic   [x] Wears Glasses/Contacts [] Stroke/TIA   [] Vision Changes [] Focal weakness   Respiratory [] Slurred Speech    [] Shortness of breath ENT   Cardiovascular [] Difficulty swallowing   [] Chest Pain Endocrine    [] Shortness of breath with exertion [] Increased Thirst   Gastrointestinal    [] Abdominal Pain    [] Melena   [] Hematochezia         Past Medical History:   Diagnosis Date    Anorexia 6/1/2015    Arthritis of knee     Back pain, chronic     Chronic pain 6/1/2015    Current severe episode of major depressive disorder without psychotic features without prior episode ([address] 3/16/2021    H/O gastric bypass 6/1/2015    Hypertension 1/1/2012    Hypertension 0/2/7115    Metabolic acidosis 0/2/8465    Ovarian cyst     Seizures ([address]         Past Surgical History:   Procedure Laterality Date    CHOLECYSTECTOMY, LAPAROSCOPIC 9/19/1995    [facility]    ENDOMETRIAL ABLATION  2003    [facility]    GASTRIC BYPASS SURGERY      HERNIA REPAIR      umbil    HYSTERECTOMY Left 2/5/2013    Laparotomy;HUGO;JOSUÉ:LSO    LAPAROSCOPY  2/12/2009    lap assisted percutaneous ERCP and removal CBD stone and gastrostomy, [address] HISTORY  12/28/11    exp lap, hugo, rt so/ repair hernia    EMILY-EN-Y GASTRIC BYPASS  12/2/2008    laparoscopic RYGB, Dr. Agus Quiroga, [address] [facility]  6/25/2010    resection of jejunal ulcer, reversal gastric bypass, jejunoduodenostomy, feeding jejunostomy, Dr. Agus Quiroga, [address]  10/3/2008    mild gastritis & duodenitis, Dr. Agus Quiroga, [address] ENDOSCOPY  1/9/2009    multiple severe anastomotic jejunal ulcers, Dr. Agus Quiroga.  [facility]    UPPER GASTROINTESTINAL ENDOSCOPY  2/4/2009    ulcers healed, normal, Dr. Jack Ochoa, [address] ENDOSCOPY  3/6/2009    recurrent severe anastomotic jejunal ulcers, Dr. Agus Quiroga, [address] ENDOSCOPY  6/4/2009    severe anastomotic jejunal ulcers, Dr. Agus Quiroga, [address] ENDOSCOPY  7/2/2009    severe anastomotic jejunal ulcers, Dr. Agus Quiroga, [address] [facility] [address] GASTROINTESTINAL ENDOSCOPY  10/27/2009    severe anastomotic jejunal ulcers, Dr. Agus Quiroga, [address] [facility] [address] GASTROINTESTINAL ENDOSCOPY  1/4/2010    severe anastomotic jejunal ulcers, Dr. Agus Quiroga, [address] ENDOSCOPY  6/7/2010    severe anastomotic jejunal ulcers, Dr. Agus Quiroga, [address] ENDOSCOPY  7/30/2010    gastritis, Dr. Agus Quiroga, [address] ENDOSCOPY N/A 5/10/2019    EGD ESOPHAGOGASTRODUODENOSCOPY performed by Savannah Zurita MD at [address] [place] [address],Third Floor 5/12/2019    EGD DIAGNOSTIC ONLY performed by Savannah Zurita MD at [address] [place] [address],Third Floor N/A 5/17/2019 EGD ESOPHAGOGASTRODUODENOSCOPY performed by Rylie Grant MD at [facility] ENDOSCOPY     Current Medications:    sodium chloride      niCARdipene (CARDENE) 50 mg in 250 mL 0.9 % sodium chloride infusion 5 mg/hr (22 1230)      sodium chloride flush, sodium chloride, ondansetron **OR** ondansetron, polyethylene glycol, acetaminophen **OR** acetaminophen, sodium chloride, perflutren lipid microspheres, morphine, ondansetron    amLODIPine  10 mg Oral Daily    metoprolol succinate  200 mg Oral Daily    spironolactone  50 mg Oral Daily    ceFAZolin  1,000 mg IntraVENous 60 Min Pre-Op    sertraline  100 mg Oral Daily    sodium chloride flush  5-40 mL IntraVENous 2 times per day    rosuvastatin  40 mg Oral Daily    ticagrelor  90 mg Oral BID    hydroCHLOROthiazide  25 mg Oral Daily    enoxaparin  40 mg SubCUTAneous Daily    sennosides-docusate sodium  2 tablet Oral Daily    aspirin  81 mg Oral Daily    oyster shell calcium w/D  1 tablet Oral Daily    multivitamin  1 tablet Oral Daily    vitamin D  50,000 Units Oral Weekly    pantoprazole  40 mg Oral QAM AC      Allergies:  Ultram [tramadol]  Social History     Socioeconomic History    Marital status:      Spouse name: Not on file    Number of children: Not on file    Years of education: Not on file    Highest education level: Not on file   Occupational History    Not on file   Tobacco Use    Smoking status: Former Smoker     Packs/day: 0.50     Years: 2.00     Pack years: 1.00     Types: Cigarettes     Quit date: 5/10/2019     Years since quittin.8    Smokeless tobacco: Never Used   Vaping Use    Vaping Use: Never used   Substance and Sexual Activity    Alcohol use: No     Alcohol/week: 0.0 standard drinks    Drug use: Yes     Types: Marijuana [place])    Sexual activity: Not Currently   Other Topics Concern    Not on file   Social History Narrative    Not on file     Social Determinants of Health     Financial Resource Strain:    Rick Marx Difficulty of Paying Living Expenses: Not on file   Food Insecurity:     Worried About Running Out of Food in the Last Year: Not on file    Ran Out of Food in the Last Year: Not on file   Transportation Needs:     Lack of Transportation (Medical): Not on file    Lack of Transportation (Non-Medical):  Not on file   Physical Activity:     Days of Exercise per Week: Not on file    Minutes of Exercise per Session: Not on file   Stress:     Feeling of Stress : Not on file   Social Connections:     Frequency of Communication with Friends and Family: Not on file    Frequency of Social Gatherings with Friends and Family: Not on file    Attends Zoroastrian Services: Not on file    Active Member of [address] [place] [address] [employer] or Organizations: Not on file    Attends Club or Organization Meetings: Not on file    Marital Status: Not on file   Intimate Partner Violence:     Fear of Current or Ex-Partner: Not on file    Emotionally Abused: Not on file    Physically Abused: Not on file    Sexually Abused: Not on file   Housing Stability:     Unable to Pay for Housing in the Last Year: Not on file    Number of [place] in the Last Year: Not on file    Unstable Housing in the Last Year: Not on file     Family History   Problem Relation Age of Onset    ADHD Paternal Grandfather      PHYSICAL EXAM:    BP (!) 114/52   Pulse 75   Temp 98.2 °F (36.8 °C) (Oral)   Resp 21   Ht 5' 4\" (1.626 m)   Wt 204 lb (92.5 kg)   SpO2 97%   BMI 35.02 kg/m²   CONSTITUTIONAL:   Awake, alert, cooperative  PSYCHIATRIC :  Oriented to time, place and person      Appropriate insight to disease process  EYES: Lids and lashes normal  ENT:  External ears and nose without lesions   Hearing deficits notnoted  NECK: Supple, symmetrical, trachea midline   Thyroid goiter not appreciated  LUNGS:  No increased work of breathing                 Good respiratory excursion  CARDIOVASCULAR:  regular rate and rhythm   ABDOMEN:  soft, non-distended, non-tender   Aorta is not palpable  Lymphatics : Cervical lymphadenopathy notnoted     Femoral lymphadenopathy notnoted  SKIN:   Normal skin color   Texture and turgor normal, no induration  EXTREMITIES:   R UE 5/5 strength   No cyanosis noted in nail beds  L UE 5/5 strength   No cyanosis noted in nail beds  R LE Edema absent   Open wounds absent   L LE Edema absent   Open wound absent  R femoral 2+ L femoral 2+   R posterior tibial 2+ L posterior tibial 2+   R dorsalis pedis 2+ L dorsalis pedis 2+     LABS:    Lab Results   Component Value Date    WBC 13.6 (H) 03/13/2022    HGB 12.0 03/13/2022    HCT 35.0 03/13/2022     03/13/2022    PROTIME 11.9 03/11/2022    INR 1.1 03/11/2022    K 4.0 03/13/2022    K 4.0 03/13/2022    BUN 20 03/13/2022    CREATININE 1.0 03/13/2022     Lab Results   Component Value Date    LABA1C 5.1 03/12/2022     No results found for: EAG  Lab Results   Component Value Date    LABPROT 0.2 06/01/2015    LABALBU 3.7 03/13/2022        Radiology pertinent to vascular surgery evaluation reviewed  CTA  Bilateral high-grade stenosis of renal arteries  Ultrasound or RP  Right renal artery peak systolic velocity 828 cm/s  · RAR 1.1  · RI 0.83  Leftrenal artery peak systolic velocity 902 cm/s  · RAR 1.7  · RI 0.78    Assesment/Plan  Bilateral Renal artery stenosis  Hypertensive Urgency  · CTA does not significant bilateral renal artery disease  · Retroperitoneal ultrasound notes  · Right renal artery does not have significantly elevated peak systolic velocity or RAR  · Left renal artery does have elevated peak systolic velocity but RAR is within normal limits  · Right RI does indicate parenchymal disease and left RI is borderline as cutoff is typically greater than 0.8  · Medical management with ASA and statin   · The patient is currently on 4 oral medications and IV Cardene at this time  · These medications are at high doses  · Patient does have accelerated HTN  · Patient does have poorly controlled HTN resistant to medical management  · Patient does not have worsening CKD   · Intervention is  felt to be necessary at this time  · Discussed with pt tobacco use and significant relationship to PVD    pt currently is not a smoker   Pt understands tobacco uses potential to cause increased problems post intervention, future worsening of disease,  · Discussed with Dr Johnson Roberto and Dr Juarez   · Patient is planned for cabg in future with Dr. Brook Shay  · Will proceed with aortogram, bilateral renal arteriogram, possible intervention on Tuesday  I reviewed the procedure with the patient and family as available. I discussed the procedure, risks, benefits, complications, and alternatives of the procedure. They understand and consent.   All questions were answered    Michael Brizuela MD

## 2022-03-13 NOTE — PROGRESS NOTES
CC: Acute MI    ASSESSMENT:  Acute inferolateral myocardial infarction  Multivessel coronary artery disease  S/p PTCA/MAURICIO 3/12  Hypertensive emergency  Left renal artery stenosis  History of seizures  History of remote Jeannine-en-Y gastric bypass  Tobacco abuse    PLAN:  Consult vascular surgery to evaluate GAYATRI  Add norvasc  Consult nephrology   Titrate nicardipine to keep SBP less than 150  Continue dual antiplatelet therapy  Continue statin  Continue beta-blocker  Continue HCTZ and spironolactone  Replace potassium and magnesium as needed  Cardiac diet  CT surgery consulted and recommending CABG as an outpatient  Continue post bariatric surgery multivitamins  SCDs, Lovenox      SIGNIFICANT 24 HOUR EVENTS/NEW COMPLAINTS:  3/12-admitted to CVICU earlier today from the Cath Lab status post percutaneous coronary intervention for acute MI and hypertensive crisis. She complaining of moderately severe back pain. 3/13-renal US showed left renal stenosis. Doing better. Still with some back pain that is relieved by morphine      PHYSICAL EXAM:  General -anxious 44-year-old  woman  Neuro -awake alert attentive  Head/Face/Neck -poor dentition  CV -normal sinus rhythm warm well perfused no edema  Pulm -nonlabored room air    The patient is at significant risk for life-threatening hemodynamic deterioration and death and requires ongoing critical care management.       Critical care time exclusive of teaching and procedures = 35 minutes

## 2022-03-14 LAB
ALBUMIN SERPL-MCNC: 3.8 G/DL (ref 3.5–5.2)
ALP BLD-CCNC: 166 U/L (ref 35–104)
ALT SERPL-CCNC: 26 U/L (ref 0–32)
ANION GAP SERPL CALCULATED.3IONS-SCNC: 13 MMOL/L (ref 7–16)
AST SERPL-CCNC: 60 U/L (ref 0–31)
BILIRUB SERPL-MCNC: 0.8 MG/DL (ref 0–1.2)
BUN BLDV-MCNC: 18 MG/DL (ref 6–20)
CALCIUM IONIZED: 1.3 MMOL/L (ref 1.15–1.33)
CALCIUM SERPL-MCNC: 9.1 MG/DL (ref 8.6–10.2)
CHLORIDE BLD-SCNC: 105 MMOL/L (ref 98–107)
CO2: 21 MMOL/L (ref 22–29)
CREAT SERPL-MCNC: 1 MG/DL (ref 0.5–1)
GFR AFRICAN AMERICAN: >60
GFR NON-AFRICAN AMERICAN: 59 ML/MIN/1.73
GLUCOSE BLD-MCNC: 92 MG/DL (ref 74–99)
HCT VFR BLD CALC: 35.8 % (ref 34–48)
HEMOGLOBIN: 11.9 G/DL (ref 11.5–15.5)
MAGNESIUM: 1.7 MG/DL (ref 1.6–2.6)
MCH RBC QN AUTO: 30.6 PG (ref 26–35)
MCHC RBC AUTO-ENTMCNC: 33.2 % (ref 32–34.5)
MCV RBC AUTO: 92 FL (ref 80–99.9)
PDW BLD-RTO: 12.8 FL (ref 11.5–15)
PHOSPHORUS: 3.4 MG/DL (ref 2.5–4.5)
PLATELET # BLD: 165 E9/L (ref 130–450)
PMV BLD AUTO: 11.1 FL (ref 7–12)
POTASSIUM SERPL-SCNC: 4.1 MMOL/L (ref 3.5–5)
RBC # BLD: 3.89 E12/L (ref 3.5–5.5)
REASON FOR REJECTION: NORMAL
REJECTED TEST: NORMAL
SODIUM BLD-SCNC: 139 MMOL/L (ref 132–146)
TOTAL PROTEIN: 7.2 G/DL (ref 6.4–8.3)
WBC # BLD: 10.9 E9/L (ref 4.5–11.5)

## 2022-03-14 PROCEDURE — 6370000000 HC RX 637 (ALT 250 FOR IP): Performed by: INTERNAL MEDICINE

## 2022-03-14 PROCEDURE — 6370000000 HC RX 637 (ALT 250 FOR IP): Performed by: EMERGENCY MEDICINE

## 2022-03-14 PROCEDURE — 80053 COMPREHEN METABOLIC PANEL: CPT

## 2022-03-14 PROCEDURE — 6370000000 HC RX 637 (ALT 250 FOR IP): Performed by: SURGERY

## 2022-03-14 PROCEDURE — 6360000002 HC RX W HCPCS: Performed by: SURGERY

## 2022-03-14 PROCEDURE — 84100 ASSAY OF PHOSPHORUS: CPT

## 2022-03-14 PROCEDURE — 85027 COMPLETE CBC AUTOMATED: CPT

## 2022-03-14 PROCEDURE — 99232 SBSQ HOSP IP/OBS MODERATE 35: CPT | Performed by: INTERNAL MEDICINE

## 2022-03-14 PROCEDURE — 99233 SBSQ HOSP IP/OBS HIGH 50: CPT | Performed by: SURGERY

## 2022-03-14 PROCEDURE — 36415 COLL VENOUS BLD VENIPUNCTURE: CPT

## 2022-03-14 PROCEDURE — 6370000000 HC RX 637 (ALT 250 FOR IP): Performed by: STUDENT IN AN ORGANIZED HEALTH CARE EDUCATION/TRAINING PROGRAM

## 2022-03-14 PROCEDURE — 83735 ASSAY OF MAGNESIUM: CPT

## 2022-03-14 PROCEDURE — 82330 ASSAY OF CALCIUM: CPT

## 2022-03-14 PROCEDURE — 2140000000 HC CCU INTERMEDIATE R&B

## 2022-03-14 RX ORDER — GABAPENTIN 100 MG/1
100 CAPSULE ORAL 3 TIMES DAILY
Status: DISCONTINUED | OUTPATIENT
Start: 2022-03-14 | End: 2022-03-16 | Stop reason: HOSPADM

## 2022-03-14 RX ADMIN — METOPROLOL SUCCINATE 200 MG: 50 TABLET, EXTENDED RELEASE ORAL at 09:29

## 2022-03-14 RX ADMIN — OXYCODONE HYDROCHLORIDE 10 MG: 10 TABLET, FILM COATED, EXTENDED RELEASE ORAL at 02:19

## 2022-03-14 RX ADMIN — ENOXAPARIN SODIUM 40 MG: 100 INJECTION SUBCUTANEOUS at 17:12

## 2022-03-14 RX ADMIN — GABAPENTIN 100 MG: 100 CAPSULE ORAL at 20:23

## 2022-03-14 RX ADMIN — ROSUVASTATIN 40 MG: 20 TABLET, FILM COATED ORAL at 20:23

## 2022-03-14 RX ADMIN — AMLODIPINE BESYLATE 10 MG: 10 TABLET ORAL at 09:32

## 2022-03-14 RX ADMIN — HYDROCHLOROTHIAZIDE 25 MG: 25 TABLET ORAL at 09:37

## 2022-03-14 RX ADMIN — PANTOPRAZOLE SODIUM 40 MG: 40 TABLET, DELAYED RELEASE ORAL at 06:25

## 2022-03-14 RX ADMIN — SERTRALINE 100 MG: 100 TABLET, FILM COATED ORAL at 09:37

## 2022-03-14 RX ADMIN — TICAGRELOR 90 MG: 90 TABLET ORAL at 20:23

## 2022-03-14 RX ADMIN — GABAPENTIN 100 MG: 100 CAPSULE ORAL at 14:00

## 2022-03-14 RX ADMIN — SPIRONOLACTONE 50 MG: 25 TABLET ORAL at 09:32

## 2022-03-14 RX ADMIN — ASPIRIN 81 MG: 81 TABLET, COATED ORAL at 17:11

## 2022-03-14 RX ADMIN — GABAPENTIN 100 MG: 100 CAPSULE ORAL at 09:34

## 2022-03-14 ASSESSMENT — PAIN DESCRIPTION - LOCATION
LOCATION: BACK
LOCATION: BACK

## 2022-03-14 ASSESSMENT — PAIN SCALES - GENERAL
PAINLEVEL_OUTOF10: 5
PAINLEVEL_OUTOF10: 6

## 2022-03-14 ASSESSMENT — PAIN DESCRIPTION - PAIN TYPE: TYPE: CHRONIC PAIN

## 2022-03-14 ASSESSMENT — PAIN DESCRIPTION - ORIENTATION: ORIENTATION: MID

## 2022-03-14 ASSESSMENT — PAIN DESCRIPTION - DESCRIPTORS: DESCRIPTORS: ACHING

## 2022-03-14 ASSESSMENT — PAIN DESCRIPTION - ONSET: ONSET: GRADUAL

## 2022-03-14 NOTE — PROGRESS NOTES
Cardiology progress note:    Narrative:  · 44-year-old  female with history of hypertension, active smoker, gastric bypass with Jeannine-en-Y with reversal, chronic pain, marijuana use, tobacco use, seizures who presented with substernal chest pain of 3 hours' duration. · No acute events overnight  · Nauseated yesterday afternoon  · SR in the 34'D with systolics in the 373U Cardene gtt weaned off. · SCr 1.0        Objective:  BP (!) 144/85   Pulse 64   Temp 97.5 °F (36.4 °C) (Temporal)   Resp 18   Ht 5' 4\" (1.626 m)   Wt 196 lb 6.4 oz (89.1 kg)   SpO2 99%   BMI 33.71 kg/m²    General: NAD  Lungs: CTAB  Heart: RRR. No M/R/G  Abdomen: soft, NT/ND  PV: R radial site no redness or hematoma. R radial pulse palpable. Extremities: no pitting edema. Warm. Neuro: A&Ox3, CANTU x 4  Multiple ecchymotic areas on BUE. Lab Results   Component Value Date     03/14/2022    K 4.1 03/14/2022    K 4.0 03/13/2022     03/14/2022    CO2 21 03/14/2022    BUN 18 03/14/2022    CREATININE 1.0 03/14/2022    GLUCOSE 92 03/14/2022    GLUCOSE 83 04/28/2012    CALCIUM 9.1 03/14/2022      Lab Results   Component Value Date    WBC 10.9 03/14/2022    HGB 11.9 03/14/2022    HCT 35.8 03/14/2022    MCV 92.0 03/14/2022     03/14/2022     CTA Chest 3/11/2022:  Reviewed by Dr Livan Alex and suggests mid RCA occlusion. No dissection or aneurysm. There is also evidence of bilateral severe ostial  GAYATRI    3/12/2022 [facility] Dr Livan Alex: IVUS guided PCI with 3 overlapping MAURICIO from distal to proximal RCA. Residual severe true bifurcation lesion of the LAD/D2 and prox LCx/OM1/OM2. Additionally there was a severe lesion of RPL3 felt best treated conservatively      TTE 3/13/2022 Dr Livan Alex: Normal left ventricle size. EF 65+/-5 %. Normal RV size and function. Mild MR    Assessment:  1.  Acute inferolateral MI status post successful IVUS guided primary PCI of the RCA with 3 overlapping MAURICIO from proximal to distal  · Residual disease includes severe mLAD/D2 bifurcation,. pLCx/OM1/OM2 bifurcation and RPL3 as per CTS  2. HTN emergency on a background of resistant hypertension. Cardene Weaned off, renal following  3. Bilateral GAYATRI  4. HLD T Chol 208 triglycerides 152, HDL 38   5. Hx Jeannine-en-Y in 2008 with reversal of gastric bypass in 2010 and resection of jejunal ulcer. Hx duodenitis, several severe anastomotic jejunal ulcers 2008/2009  6. Tobacco use 2-4 cigarettes/daily  7. Daily marijuana use due to chronic pain  8. Seizure disorder  9. BMI 33  10. Depression        Recommendations:  1. Continue HCTZ 25 mg daily, amlodipine 10 mg QD, Toprol  mg daily, Aldactone 50 mg daily, and Crestor 40 mg QD  2. Aspirin 81 mg daily and ticagrelor 90 mg p.o. twice daily. a. DAPT for at least 12 months with potential for de-escalating P2Y12 inhibitor early given the patient's history of GI bleed secondary to Jeannine-en-Y surgery  3. Renal angiogram +/- stenting tentatively tomorrow  4. Residual CAD she will undergo CABG in 3 months as outpatient. 5. Dicussed with patient the importance of DAPT  6. Will sign off. May call if needed. Follow-up with Dr. Nas Nayak 4 weeks after hospital discharge. Discussed with Dr Dianelys Peacock    Electronically signed by Jesica Braxton. ABRAHAM Jamison on 3/14/2022 at 3:02 PM     I agree with the above assessment and plan made in collaboration with SIOMARA Joseph MD

## 2022-03-14 NOTE — PROGRESS NOTES
Attempted to see patient but she is SAMANTHA for PICC line insertion. Plan for renal arteriogram tomorrow with Dr Alex Terrazas. NPO after midnight tonight.      Ruthy Hall PA-C

## 2022-03-14 NOTE — PROGRESS NOTES
Nephrology Progress Note    Patient's Name: Huyen Ray  5:22 AM  3/14/2022     Reason for Consult: Hypertensive emergency; bilateral renal artery stenosis    Chief Complaint: Chest pain    From HPI 3/13:    Huyen Ray \"is a 54 y.o. female with a longstanding history of hypertension, seizure disorder Jeannine-en-Y gastric bypass surgery and several other medical problems listed below. She presented to ED 2 days ago with complaints of chest pain. She stated that she was basically doing household chores when this occurred. Radiated to her right arm and back. She denies associated shortness of breath. No nausea or emesis. No diaphoresis. Presenting vital signs showed a blood pressure of 221/112 which peaked to 228/119, pulse of 55 and a respiration of 20. Laboratory data showed a CBC essentially normal.  Chemistry profile was significant for a BUN 12, creatinine 1, potassium 3.9. High sensitivity troponin was 78. Twelve-lead EKG showed ST elevation in the inferior leads. . CTA of the chest performed revealed no PE but did demonstrate bilateral renal artery stenosis. Patient received heparin, she was started on a nitroglycerin and Cardene drips. Interventional cardiology consultation was obtained. She was taken to Cath Lab and left heart cath demonstrated multivessel coronary artery disease. PCI with MAURICIO was performed to the culprit vessel (RCA ). Postprocedure blood pressure continues to be very difficult to control requiring very high dose of Cardene drip and several oral meds. Hence Renal consult. She is scheduled to undergo CABG to her residual disease at a later time. \"    Subjective:    3/14: Resting in bed, lab attempting second lab draw, having difficulty. The patient states she is feeling great, denies sob, chest pain.        Allergies:  Ultram [tramadol]    Current Medications:    amLODIPine (NORVASC) tablet 10 mg, Daily  metoprolol succinate (TOPROL XL) extended release tablet 200 mg, Daily  spironolactone (ALDACTONE) tablet 50 mg, Daily  ceFAZolin (ANCEF) 1,000 mg in sterile water 10 mL IV syringe, 60 Min Pre-Op  sertraline (ZOLOFT) tablet 100 mg, Daily  sodium chloride flush 0.9 % injection 5-40 mL, 2 times per day  sodium chloride flush 0.9 % injection 5-40 mL, PRN  0.9 % sodium chloride infusion, PRN  ondansetron (ZOFRAN-ODT) disintegrating tablet 4 mg, Q8H PRN   Or  ondansetron (ZOFRAN) injection 4 mg, Q6H PRN  polyethylene glycol (GLYCOLAX) packet 17 g, Daily PRN  acetaminophen (TYLENOL) tablet 650 mg, Q6H PRN   Or  acetaminophen (TYLENOL) suppository 650 mg, Q6H PRN  niCARdipine (CARDENE) 50 mg in sodium chloride 0.9 % 250 mL infusion, Continuous  0.9 % sodium chloride bolus, PRN  rosuvastatin (CRESTOR) tablet 40 mg, Daily  ticagrelor (BRILINTA) tablet 90 mg, BID  hydroCHLOROthiazide (HYDRODIURIL) tablet 25 mg, Daily  perflutren lipid microspheres (DEFINITY) injection 1.65 mg, ONCE PRN  enoxaparin (LOVENOX) injection 40 mg, Daily  sennosides-docusate sodium (SENOKOT-S) 8.6-50 MG tablet 2 tablet, Daily  aspirin EC tablet 81 mg, Daily  oyster shell calcium w/D 500-200 MG-UNIT tablet 1 tablet, Daily  multivitamin 1 tablet, Daily  vitamin D (ERGOCALCIFEROL) capsule 50,000 Units, Weekly  pantoprazole (PROTONIX) tablet 40 mg, QAM AC  morphine (PF) injection 2 mg, Q4H PRN  ondansetron (ZOFRAN) injection 4 mg, Q6H PRN        Review of Systems:   Pertinent items are noted in HPI. Physical exam:  Vitals:    03/14/22 0219   BP: (!) 147/73   Pulse: 62   Resp: 16   Temp:    SpO2:            General appearance:  In no acute distress  Skin: No rashes or lesions on exposed skin  Neck: No JVD  Lungs: Clear, no adventitious sounds  Heart: RRR, no rub  Abdomen: Soft, non-tender, + bowel sounds  Extremities: No edema  Neurologic: Mental status: Alert, oriented      Data:   Labs:  Lab Results   Component Value Date     03/13/2022     03/12/2022     03/11/2022    K 4.0 03/13/2022    K 4.0 03/13/2022    K 3.2 (L) 03/12/2022     (H) 03/13/2022    CO2 19 (L) 03/13/2022    CO2 18 (L) 03/12/2022    CO2 19 (L) 03/11/2022    CREATININE 1.0 03/13/2022    CREATININE 0.9 03/12/2022    CREATININE 1.0 03/11/2022    BUN 20 03/13/2022    BUN 13 03/12/2022    BUN 12 03/11/2022    GLUCOSE 111 (H) 03/13/2022    GLUCOSE 137 (H) 03/12/2022    GLUCOSE 135 (H) 03/11/2022    PHOS 3.6 03/13/2022    PHOS 2.2 (L) 03/12/2022    PHOS 4.6 (H) 06/06/2015    WBC 13.6 (H) 03/13/2022    WBC 8.0 03/11/2022    WBC 6.9 06/14/2021    HGB 12.0 03/13/2022    HGB 13.6 03/11/2022    HGB 13.1 06/14/2021    HCT 35.0 03/13/2022    HCT 39.5 03/11/2022    HCT 39.8 06/14/2021    MCV 89.3 03/13/2022     03/13/2022         Imaging:  XR CHEST PORTABLE    Result Date: 3/11/2022  EXAMINATION: ONE XRAY VIEW OF THE CHEST 3/11/2022 11:22 pm COMPARISON: 06/14/2021 HISTORY: ORDERING SYSTEM PROVIDED HISTORY: chest pain TECHNOLOGIST PROVIDED HISTORY: Reason for exam:->chest pain What reading provider will be dictating this exam?->CRC FINDINGS: The lungs are without acute focal process. There is no effusion or pneumothorax. The cardiomediastinal silhouette is without acute process. The osseous structures are without acute process. No acute process. CTA CHEST W CONTRAST    Result Date: 3/12/2022  EXAMINATION: CTA OF THE CHEST 3/11/2022 11:43 pm TECHNIQUE: CTA of the chest was performed after the administration of intravenous contrast.  Multiplanar reformatted images are provided for review. MIP images are provided for review. Dose modulation, iterative reconstruction, and/or weight based adjustment of the mA/kV was utilized to reduce the radiation dose to as low as reasonably achievable.  COMPARISON: Chest x-ray 03/11/2022 and CT abdomen/pelvis 06/14/2021 HISTORY: ORDERING SYSTEM PROVIDED HISTORY: Pain radiating to back rule out dissection TECHNOLOGIST PROVIDED HISTORY: Reason for exam:->Pain radiating to back rule out dissection What reading provider will be dictating this exam?->CRC FINDINGS: Aorta: No evidence of thoracic aortic aneurysm, intramural hematoma or dissection. No acute abnormality of the aorta. Mediastinum: No evidence of mediastinal lymphadenopathy. The heart and pericardium demonstrate no acute abnormality. Lungs/Pleura: There is pulmonary interstitial edema with interlobular septal thickening and patchy ground-glass opacification. There is no pneumothorax or pleural effusion. Upper Abdomen: The gallbladder is surgically absent. Stable postsurgical changes in the stomach. Atherosclerotic plaque at the origins of the bilateral renal arteries. There is a high-grade stenosis of the proximal left renal artery and a moderate to high-grade stenosis at the origin of the right renal artery. Soft Tissues/Bones: No acute bone or soft tissue abnormality. Unremarkable CTA of the chest.  No evidence of aneurysm or dissection. Pulmonary interstitial edema. No pneumothorax or pleural effusion. On the last several images obtained, there is evidence of a high-grade stenosis of the proximal left renal artery and a suspected moderate to high-grade stenosis at the origin of the right renal artery. Follow-up nonemergent renal CTA recommended. US DUP ABD PEL RETRO SCROT COMPLETE    Result Date: 3/12/2022  EXAMINATION: DOPPLER EVALUATION OF THE PELVIS 3/12/2022 3:10 pm COMPARISON: None. HISTORY: ORDERING SYSTEM PROVIDED HISTORY: jenna TECHNOLOGIST PROVIDED HISTORY: Reason for exam:->jenna What reading provider will be dictating this exam?->CRC FINDINGS: Kidneys: The right kidney measures 10 in length and the left kidney measures 10 in length. Kidneys demonstrate normal cortical echogenicity. No evidence of hydronephrosis or intrarenal stones. Trace left perinephric fluid. Right renal vasculature: Main renal artery PSV of 129 centimeters/second, proximally.  Left renal vasculature: Main renal artery PSV of 207 centimeters/second, proximally. Aorta: PSV of 123 centimeters/second. Bladder: Bladder volume measures 108 mL. No evidence of hydronephrosis. Trace left perinephric fluid is nonspecific. Sonographic findings compatible with left renal artery stenosis. US DUP UPPER EXTREMITY LEFT VENOUS    Result Date: 3/12/2022  EXAMINATION: VENOUS ULTRASOUND OF THE LEFT UPPER EXTREMITY, 3/12/2022 6:10 pm TECHNIQUE: Duplex ultrasound using B-mode/gray scaled imaging and Doppler spectral analysis and color flow was obtained of the deep venous structures of the left upper extremity. COMPARISON: None. HISTORY: ORDERING SYSTEM PROVIDED HISTORY: Unable to insert picc line possible thrombus? TECHNOLOGIST PROVIDED HISTORY: Reason for exam:->Unable to insert picc line possible thrombus? What reading provider will be dictating this exam?->CRC FINDINGS: There is normal flow and compressibility of the visualized venous structures. There is no evidence of echogenic thrombus. The veins demonstrate good compressibility with normal color flow study and spectral analysis. No evidence of DVT. US RETROPERITONEAL GAYATRI    Result Date: 3/12/2022  EXAMINATION: RETROPERITONEAL ULTRASOUND OF THE KIDNEYS AND URINARY BLADDER 3/12/2022 COMPARISON: None HISTORY: ORDERING SYSTEM PROVIDED HISTORY: bilateral GAYATRI, hypertensive emergency TECHNOLOGIST PROVIDED HISTORY: Reason for exam:->bilateral GAYATRI, hypertensive emergency What reading provider will be dictating this exam?->CRC FINDINGS: Kidneys: The right kidney measures 10 in length and the left kidney measures 10 in length. Kidneys demonstrate normal cortical echogenicity. No evidence of hydronephrosis or intrarenal stones. Trace left perinephric fluid. Right renal vasculature: Main renal artery PSV of 129 centimeters/second, proximally. Left renal vasculature: Main renal artery PSV of 207 centimeters/second, proximally. Aorta: PSV of 123 centimeters/second. Bladder: Bladder volume measures 108 mL.      No evidence of hydronephrosis. Trace left perinephric fluid is nonspecific. Sonographic findings compatible with left renal artery stenosis. Assessment  1. Hypertensive emergency, bilateral GAYATRI   2. Acute STEMI   S/P LHC with PCI to RCA  On DAPT  3. H/O gastric bypass surgery     Plan  1. Needs bilateral renal angiogram, possible revascularization, in view of difficulty in controlling BP and clinical presentation; need to medically optimize prior to eventual CABG        Vascular surgery input noted        For aortogram, bilateral renal arteriogram tomorrow      2. Blood pressure much improved, SBP is <150        Off cardene drip    3. Longterm lifestyle modification to include smoking cessation and  routine exercise     No am labs available at the time of this note d/t difficult lab draw. Will follow  Thanks      DA Teran - CNS  5:22 AM  3/14/2022     Patient seen and examined all key components of the physical performed independently , case discussed with NP, all pertinent labs and radiologic tests personally reviewed agree with above.       Edgar Ramos MD

## 2022-03-14 NOTE — PROGRESS NOTES
Hospitalist Progress Note      SYNOPSIS: Patient admitted on 3/11/2022 for chest pain. 53-year-old female admitted with chest pain and was found to have inferior STEMI. She was taken directly to the Cath Lab for PCI which was performed via right radial artery concluding in MAURICIO x3 placed in RCA. Started on aspirin and ticagrelor with a plan to continue DAPT for 12 months. She was also seen by cardiothoracic surgery who recommended outpatient CABG for the remaining coronary artery disease. Patient transferred out of ICU on 3/13 and her blood pressure remained stable on oral medications. Patient was seen by vascular surgery on 3/13 who recommended angiogram of the renal arteries for further delineation of the disease and possible intervention. SUBJECTIVE:    Patient seen and examined in her room. States that lower back pain better. Denies any chest pain, shortness of breath, nausea, vomiting. Records reviewed. Stable overnight. No other overnight issues reported. Temp (24hrs), Av.2 °F (36.8 °C), Min:98 °F (36.7 °C), Max:98.4 °F (36.9 °C)    DIET: Diet NPO Exceptions are: Sips of Water with Meds, Ice Chips  CODE: Full Code    Intake/Output Summary (Last 24 hours) at 3/14/2022 0842  Last data filed at 3/14/2022 0618  Gross per 24 hour   Intake 720 ml   Output 600 ml   Net 120 ml       OBJECTIVE:    BP (!) 147/73   Pulse 62   Temp 98 °F (36.7 °C)   Resp 16   Ht 5' 4\" (1.626 m)   Wt 196 lb 6.4 oz (89.1 kg)   SpO2 99%   BMI 33.71 kg/m²     General appearance: No apparent distress, appears stated age and cooperative. HEENT:  Conjunctivae/corneas clear. Neck: Supple. No jugular venous distention. Respiratory: Clear to auscultation bilaterally, normal respiratory effort  Cardiovascular: Regular rate rhythm, normal S1-S2  Abdomen: Soft, nontender, nondistended  Musculoskeletal: No clubbing, cyanosis, no bilateral lower extremity edema. Brisk capillary refill.    Skin:  No rashes  on visible skin  Neurologic: awake, alert and following commands     ASSESSMENT & PLAN:    1. Chest pain   Secondary to inferior wall STEMI  Status post left heart cath via right radial artery with MAURICIO in RCA  Cardiothoracic surgery evaluation, recommend outpatient CABG for the remaining coronary artery disease    2. Hypertensive urgency/emergency:  Continue HCTZ and metoprolol monitor closely. Currently on IV nicardipine  On Aldactone and Toprol-XL  Recommend starting ACE/ARBs, probably involve nephrology as hypertension may be due to renal artery stenosis     3. Depression and anxiety: On Zoloft     4. History of seizures: Not on medications, monitor pain    5. Possible renal artery stenosis  Incidentally noted on CTA chest  Follow-up retroperitoneal ultrasound showed findings compatible with left renal artery stenosis  Vascular surgery on board, plan for arteriogram of the renal arteries on 3/14    6.   Low back pain  Start on low-dose gabapentin 100milligram p.o. 3 times daily    DISPOSITION:   Unclear discharge disposition at the moment    Medications:  REVIEWED DAILY    Infusion Medications    sodium chloride      niCARdipene (CARDENE) 50 mg in 250 mL 0.9 % sodium chloride infusion 2.5 mg/hr (03/13/22 1331)     Scheduled Medications    amLODIPine  10 mg Oral Daily    metoprolol succinate  200 mg Oral Daily    spironolactone  50 mg Oral Daily    ceFAZolin  1,000 mg IntraVENous 60 Min Pre-Op    sertraline  100 mg Oral Daily    sodium chloride flush  5-40 mL IntraVENous 2 times per day    rosuvastatin  40 mg Oral Daily    ticagrelor  90 mg Oral BID    hydroCHLOROthiazide  25 mg Oral Daily    enoxaparin  40 mg SubCUTAneous Daily    sennosides-docusate sodium  2 tablet Oral Daily    aspirin  81 mg Oral Daily    oyster shell calcium w/D  1 tablet Oral Daily    multivitamin  1 tablet Oral Daily    vitamin D  50,000 Units Oral Weekly    pantoprazole  40 mg Oral QAM AC     PRN Meds: sodium chloride flush, sodium chloride, ondansetron **OR** ondansetron, polyethylene glycol, acetaminophen **OR** acetaminophen, sodium chloride, perflutren lipid microspheres, morphine, ondansetron    Labs:     Recent Labs     03/11/22 2322 03/13/22 0455   WBC 8.0 13.6*   HGB 13.6 12.0   HCT 39.5 35.0    203       Recent Labs     03/11/22 2322 03/11/22 2322 03/12/22 0330 03/13/22 0455     --  139 140   K 3.9   < > 3.2* 4.0  4.0     --  106 108*   CO2 19*  --  18* 19*   BUN 12  --  13 20   CREATININE 1.0  --  0.9 1.0   CALCIUM 9.5  --  9.4 8.9   PHOS  --   --  2.2* 3.6    < > = values in this interval not displayed. Recent Labs     03/11/22 2322 03/12/22 0330 03/13/22 0455   PROT 8.3 7.8 7.0   ALKPHOS 187* 177* 165*   ALT 17 31 32   AST 38* 146* 106*   BILITOT 0.3 0.8 0.8       Recent Labs     03/11/22 2322   INR 1.1       No results for input(s): CKTOTAL, TROPONINI in the last 72 hours. Chronic labs:    Lab Results   Component Value Date    CHOL 208 (H) 03/12/2022    TRIG 153 (H) 03/12/2022    HDL 38 03/12/2022    LDLCALC 139 (H) 03/12/2022    TSH 0.716 04/29/2020    INR 1.1 03/11/2022    LABA1C 5.1 03/12/2022       Radiology: REVIEWED DAILY    +++++++++++++++++++++++++++++++++++++++++++++++++  Claire Baldwin MD  [employer] - [address] [facility], [place]  +++++++++++++++++++++++++++++++++++++++++++++++++  NOTE: This report was transcribed using voice recognition software. Every effort was made to ensure accuracy; however, inadvertent computerized transcription errors may be present.

## 2022-03-14 NOTE — PLAN OF CARE
Problem: Pain:  Goal: Pain level will decrease  Description  Pain level will decrease  Outcome: Met This Shift  Goal: Control of acute pain  Description  Control of acute pain  5/12/2019 1845 by Savannah Melgoza RN  Outcome: Met This Shift  Goal: Control of chronic pain  Description  Control of chronic pain  Outcome: Met This Shift     Problem: Nausea/Vomiting:  Goal: Absence of nausea/vomiting  Description  Absence of nausea/vomiting  Outcome: Met This Shift  Goal: Able to drink  Description  Able to drink  Outcome: Met This Shift  Goal: Able to eat  Description  Able to eat  Outcome: Met This Shift  Goal: Ability to achieve adequate nutritional intake will improve  Description  Ability to achieve adequate nutritional intake will improve  5/12/2019 1932 by Radha Kunz RN  Outcome: Met This Shift  5/12/2019 1845 by Savannah Melgoza RN  Outcome: Met This Shift     Problem: Activity:  Goal: Fatigue will decrease  Description  Fatigue will decrease  Outcome: Met This Shift     Problem:  Bowel/Gastric:  Goal: Ability to achieve a regular elimination pattern will improve  Description  Ability to achieve a regular elimination pattern will improve  Outcome: Met This Shift     Problem: Safety:  Goal: Ability to remain free from injury will improve  Description  Ability to remain free from injury will improve  5/12/2019 1932 by Radha Kunz RN  Outcome: Met This Shift  5/12/2019 1845 by Savannah Melgoza RN  Outcome: Met This Shift     Problem: Falls - Risk of:  Goal: Will remain free from falls  Description  Will remain free from falls  5/12/2019 1932 by Radha Kunz RN  Outcome: Met This Shift  5/12/2019 1845 by Savannah Melgoza RN  Outcome: Met This Shift  Goal: Absence of physical injury  Description  Absence of physical injury  Outcome: Met This Shift Venous doppler order entered.    Orders for dexa and mammogram in chart.

## 2022-03-14 NOTE — CARE COORDINATION
3/14/22 Transition of Care: Patient here due to c/o chest pain. Spoke with her and she is alert and oriented. She resides at home with her son. She follows with [facility] [place] and she uses [facility] in [place]. She does not use any dme equipment and she is independent at home. She has 4 children that help at home. She does not have a history with [facility]. She has a history with [facility] back in 2008. Jason Neighbours She is currently to be NPO after midnight for renal stenting in the am. She states she will need CABG in the future and will follow up as an outpatient with Dr Tony Daniels. Her plan is to return home possibly Wednesday. she does not have a homecare preference if needed but feels she will have no needs. Will follow.  Electronically signed by ASHWIN Velasquez on 3/14/2022 at 3:06 PM

## 2022-03-15 ENCOUNTER — APPOINTMENT (OUTPATIENT)
Dept: INTERVENTIONAL RADIOLOGY/VASCULAR | Age: 52
DRG: 174 | End: 2022-03-15
Payer: COMMERCIAL

## 2022-03-15 LAB
ALBUMIN SERPL-MCNC: 4.3 G/DL (ref 3.5–5.2)
ALP BLD-CCNC: 190 U/L (ref 35–104)
ALT SERPL-CCNC: 27 U/L (ref 0–32)
ANION GAP SERPL CALCULATED.3IONS-SCNC: 14 MMOL/L (ref 7–16)
AST SERPL-CCNC: 48 U/L (ref 0–31)
BILIRUB SERPL-MCNC: 0.6 MG/DL (ref 0–1.2)
BUN BLDV-MCNC: 23 MG/DL (ref 6–20)
CALCIUM IONIZED: 1.32 MMOL/L (ref 1.15–1.33)
CALCIUM SERPL-MCNC: 9.9 MG/DL (ref 8.6–10.2)
CHLORIDE BLD-SCNC: 103 MMOL/L (ref 98–107)
CO2: 23 MMOL/L (ref 22–29)
CREAT SERPL-MCNC: 1 MG/DL (ref 0.5–1)
GFR AFRICAN AMERICAN: >60
GFR NON-AFRICAN AMERICAN: 59 ML/MIN/1.73
GLUCOSE BLD-MCNC: 102 MG/DL (ref 74–99)
HCT VFR BLD CALC: 38.6 % (ref 34–48)
HEMOGLOBIN: 13.3 G/DL (ref 11.5–15.5)
MAGNESIUM: 2 MG/DL (ref 1.6–2.6)
MCH RBC QN AUTO: 30.6 PG (ref 26–35)
MCHC RBC AUTO-ENTMCNC: 34.5 % (ref 32–34.5)
MCV RBC AUTO: 88.9 FL (ref 80–99.9)
PDW BLD-RTO: 12.4 FL (ref 11.5–15)
PHOSPHORUS: 3.8 MG/DL (ref 2.5–4.5)
PLATELET # BLD: 213 E9/L (ref 130–450)
PMV BLD AUTO: 10.9 FL (ref 7–12)
POTASSIUM SERPL-SCNC: 4 MMOL/L (ref 3.5–5)
RBC # BLD: 4.34 E12/L (ref 3.5–5.5)
SODIUM BLD-SCNC: 140 MMOL/L (ref 132–146)
TOTAL PROTEIN: 7.8 G/DL (ref 6.4–8.3)
WBC # BLD: 11.1 E9/L (ref 4.5–11.5)

## 2022-03-15 PROCEDURE — 36005 INJECTION EXT VENOGRAPHY: CPT

## 2022-03-15 PROCEDURE — 6370000000 HC RX 637 (ALT 250 FOR IP): Performed by: SURGERY

## 2022-03-15 PROCEDURE — 02HV33Z INSERTION OF INFUSION DEVICE INTO SUPERIOR VENA CAVA, PERCUTANEOUS APPROACH: ICD-10-PCS | Performed by: RADIOLOGY

## 2022-03-15 PROCEDURE — 82330 ASSAY OF CALCIUM: CPT

## 2022-03-15 PROCEDURE — 6370000000 HC RX 637 (ALT 250 FOR IP): Performed by: INTERNAL MEDICINE

## 2022-03-15 PROCEDURE — C1894 INTRO/SHEATH, NON-LASER: HCPCS

## 2022-03-15 PROCEDURE — 6360000002 HC RX W HCPCS: Performed by: INTERNAL MEDICINE

## 2022-03-15 PROCEDURE — 37236 OPEN/PERQ PLACE STENT 1ST: CPT | Performed by: SURGERY

## 2022-03-15 PROCEDURE — 2500000003 HC RX 250 WO HCPCS

## 2022-03-15 PROCEDURE — C1769 GUIDE WIRE: HCPCS

## 2022-03-15 PROCEDURE — 36573 INSJ PICC RS&I 5 YR+: CPT | Performed by: RADIOLOGY

## 2022-03-15 PROCEDURE — 84100 ASSAY OF PHOSPHORUS: CPT

## 2022-03-15 PROCEDURE — 6360000002 HC RX W HCPCS: Performed by: SURGERY

## 2022-03-15 PROCEDURE — C1751 CATH, INF, PER/CENT/MIDLINE: HCPCS

## 2022-03-15 PROCEDURE — 6360000004 HC RX CONTRAST MEDICATION: Performed by: RADIOLOGY

## 2022-03-15 PROCEDURE — 37236 OPEN/PERQ PLACE STENT 1ST: CPT

## 2022-03-15 PROCEDURE — 80053 COMPREHEN METABOLIC PANEL: CPT

## 2022-03-15 PROCEDURE — 6370000000 HC RX 637 (ALT 250 FOR IP): Performed by: STUDENT IN AN ORGANIZED HEALTH CARE EDUCATION/TRAINING PROGRAM

## 2022-03-15 PROCEDURE — 6360000002 HC RX W HCPCS

## 2022-03-15 PROCEDURE — 75820 VEIN X-RAY ARM/LEG: CPT

## 2022-03-15 PROCEDURE — B4101ZZ FLUOROSCOPY OF ABDOMINAL AORTA USING LOW OSMOLAR CONTRAST: ICD-10-PCS | Performed by: SURGERY

## 2022-03-15 PROCEDURE — 36415 COLL VENOUS BLD VENIPUNCTURE: CPT

## 2022-03-15 PROCEDURE — 2140000000 HC CCU INTERMEDIATE R&B

## 2022-03-15 PROCEDURE — C1893 INTRO/SHEATH, FIXED,NON-PEEL: HCPCS

## 2022-03-15 PROCEDURE — 85027 COMPLETE CBC AUTOMATED: CPT

## 2022-03-15 PROCEDURE — B51N1ZZ FLUOROSCOPY OF LEFT UPPER EXTREMITY VEINS USING LOW OSMOLAR CONTRAST: ICD-10-PCS | Performed by: RADIOLOGY

## 2022-03-15 PROCEDURE — 04HA3DZ INSERTION OF INTRALUMINAL DEVICE INTO LEFT RENAL ARTERY, PERCUTANEOUS APPROACH: ICD-10-PCS | Performed by: SURGERY

## 2022-03-15 PROCEDURE — C1887 CATHETER, GUIDING: HCPCS

## 2022-03-15 PROCEDURE — B5181ZA FLUOROSCOPY OF SUPERIOR VENA CAVA USING LOW OSMOLAR CONTRAST, GUIDANCE: ICD-10-PCS | Performed by: RADIOLOGY

## 2022-03-15 PROCEDURE — 36573 INSJ PICC RS&I 5 YR+: CPT

## 2022-03-15 PROCEDURE — C1760 CLOSURE DEV, VASC: HCPCS

## 2022-03-15 PROCEDURE — C1874 STENT, COATED/COV W/DEL SYS: HCPCS

## 2022-03-15 PROCEDURE — 2709999900 HC NON-CHARGEABLE SUPPLY

## 2022-03-15 PROCEDURE — 2580000003 HC RX 258: Performed by: SURGERY

## 2022-03-15 PROCEDURE — 99253 IP/OBS CNSLTJ NEW/EST LOW 45: CPT | Performed by: RADIOLOGY

## 2022-03-15 PROCEDURE — B4181ZZ FLUOROSCOPY OF BILATERAL RENAL ARTERIES USING LOW OSMOLAR CONTRAST: ICD-10-PCS | Performed by: SURGERY

## 2022-03-15 PROCEDURE — 04H93DZ INSERTION OF INTRALUMINAL DEVICE INTO RIGHT RENAL ARTERY, PERCUTANEOUS APPROACH: ICD-10-PCS | Performed by: SURGERY

## 2022-03-15 PROCEDURE — 83735 ASSAY OF MAGNESIUM: CPT

## 2022-03-15 PROCEDURE — 36252 INS CATH REN ART 1ST BILAT: CPT

## 2022-03-15 RX ORDER — HEPARIN SODIUM (PORCINE) LOCK FLUSH IV SOLN 100 UNIT/ML 100 UNIT/ML
300 SOLUTION INTRAVENOUS 2 TIMES DAILY
Status: DISCONTINUED | OUTPATIENT
Start: 2022-03-15 | End: 2022-03-16 | Stop reason: HOSPADM

## 2022-03-15 RX ADMIN — ASPIRIN 81 MG: 81 TABLET, COATED ORAL at 08:32

## 2022-03-15 RX ADMIN — SERTRALINE 100 MG: 100 TABLET, FILM COATED ORAL at 08:33

## 2022-03-15 RX ADMIN — HYDROCHLOROTHIAZIDE 25 MG: 25 TABLET ORAL at 08:33

## 2022-03-15 RX ADMIN — CALCIUM CARBONATE-VITAMIN D TAB 500 MG-200 UNIT 1 TABLET: 500-200 TAB at 08:33

## 2022-03-15 RX ADMIN — MORPHINE SULFATE 2 MG: 2 INJECTION, SOLUTION INTRAMUSCULAR; INTRAVENOUS at 23:19

## 2022-03-15 RX ADMIN — GABAPENTIN 100 MG: 100 CAPSULE ORAL at 08:32

## 2022-03-15 RX ADMIN — TICAGRELOR 90 MG: 90 TABLET ORAL at 20:28

## 2022-03-15 RX ADMIN — SPIRONOLACTONE 50 MG: 25 TABLET ORAL at 08:33

## 2022-03-15 RX ADMIN — HEPARIN 300 UNITS: 100 SYRINGE at 22:06

## 2022-03-15 RX ADMIN — ROSUVASTATIN 40 MG: 20 TABLET, FILM COATED ORAL at 20:29

## 2022-03-15 RX ADMIN — MORPHINE SULFATE 2 MG: 2 INJECTION, SOLUTION INTRAMUSCULAR; INTRAVENOUS at 19:19

## 2022-03-15 RX ADMIN — Medication 1 TABLET: at 08:33

## 2022-03-15 RX ADMIN — GABAPENTIN 100 MG: 100 CAPSULE ORAL at 20:29

## 2022-03-15 RX ADMIN — IOPAMIDOL 20 ML: 755 INJECTION, SOLUTION INTRAVENOUS at 09:55

## 2022-03-15 RX ADMIN — AMLODIPINE BESYLATE 10 MG: 10 TABLET ORAL at 08:32

## 2022-03-15 RX ADMIN — TICAGRELOR 90 MG: 90 TABLET ORAL at 08:32

## 2022-03-15 RX ADMIN — SODIUM CHLORIDE, PRESERVATIVE FREE 10 ML: 5 INJECTION INTRAVENOUS at 20:29

## 2022-03-15 RX ADMIN — METOPROLOL SUCCINATE 200 MG: 50 TABLET, EXTENDED RELEASE ORAL at 08:32

## 2022-03-15 ASSESSMENT — PAIN SCALES - GENERAL
PAINLEVEL_OUTOF10: 0
PAINLEVEL_OUTOF10: 0
PAINLEVEL_OUTOF10: 6
PAINLEVEL_OUTOF10: 0
PAINLEVEL_OUTOF10: 3
PAINLEVEL_OUTOF10: 1
PAINLEVEL_OUTOF10: 0
PAINLEVEL_OUTOF10: 4

## 2022-03-15 ASSESSMENT — PAIN DESCRIPTION - PAIN TYPE
TYPE: CHRONIC PAIN

## 2022-03-15 ASSESSMENT — PAIN DESCRIPTION - ONSET
ONSET: GRADUAL
ONSET: GRADUAL

## 2022-03-15 ASSESSMENT — PAIN - FUNCTIONAL ASSESSMENT: PAIN_FUNCTIONAL_ASSESSMENT: ACTIVITIES ARE NOT PREVENTED

## 2022-03-15 ASSESSMENT — PAIN DESCRIPTION - LOCATION
LOCATION: BACK

## 2022-03-15 ASSESSMENT — PAIN DESCRIPTION - ORIENTATION
ORIENTATION: MID

## 2022-03-15 ASSESSMENT — PAIN DESCRIPTION - FREQUENCY
FREQUENCY: CONTINUOUS
FREQUENCY: CONTINUOUS

## 2022-03-15 ASSESSMENT — PAIN DESCRIPTION - DESCRIPTORS
DESCRIPTORS: ACHING;DISCOMFORT
DESCRIPTORS: ACHING;DISCOMFORT;SORE

## 2022-03-15 NOTE — PROGRESS NOTES
renal artery disease  · Retroperitoneal ultrasound notes  ? Right renal artery does not have significantly elevated peak systolic velocity or RAR  ? Left renal artery does have elevated peak systolic velocity but RAR is within normal limits  ? Right RI does indicate parenchymal disease and left RI is borderline as cutoff is typically greater than 0.8  · Medical management with ASA and statin   · The patient is currently on 4 oral medications  · These medications are at high doses  · Patient does have accelerated HTN  · Patient does have poorly controlled HTN resistant to medical management  · Patient does not have worsening CKD   · Intervention is  felt to be necessary at this time  · Discussed with pt tobacco use and significant relationship to PVD   · pt currently is not a smoker  · Pt understands tobacco uses potential to cause increased problems post intervention, future worsening of disease,  · Discussed with Dr Keri Richard and Dr Juarez   · Patient is planned for cabg in future with Dr. Christopher Gamboa  · Will proceed with aortogram, bilateral renal arteriogram, possible intervention on Tuesday  · I reviewed the procedure with the patient and family as available. I discussed the procedure, risks, benefits, complications, and alternatives of the procedure. They understand and consent.   All questions were answered     Roscoe Garcia MD

## 2022-03-15 NOTE — CONSULTS
Neuro-Interventional/ Interventional Consult     Patient Soniya Martínez  MRN: [id number]  YOB: 1970  DATE OF EVALUATION: 3/15/2022    HPI:   Chief Complaint   Patient presents with    Chest Pain     Chest pain starting 1hour ago that goes down right arm. EMS gave  and zofran 4       Assessment:   Reason For Evaluation:   Daivs Montgomery a 54 y.o. female for iv access    Physical Exam: poor bilateral venous access. Swelling and ecchymosis of both upper extremities     Plan:     Rec  Will attempt peripheral access otherwise tunnelled catheter    25 min of which greater then 50% was spent either coordinating care or couseling    We discussed the possible risks including    We had an extensive discussion regarding different treatment options. We discussed possible risks of procedure including death. I answered all questions from patient/family to their satisfaction, they understand the options and risk and wishes to proceed. Allergies: Allergies   Allergen Reactions    Ultram [Tramadol] Other (See Comments)     Pt states she had a seizure after taking ultram     Prior to Visit Medications    Medication Sig Taking?  Authorizing Provider   sertraline (ZOLOFT) 100 MG tablet take 1 tablet by mouth daily  Patrick Burgess, DO   metoprolol succinate (TOPROL XL) 100 MG extended release tablet take 1 tablet by mouth daily  Nanci Romeo, DO   Calcium Carb-Cholecalciferol (OYSTER SHELL CALCIUM W/D) 500-200 MG-UNIT TABS tablet take 1 tablet by mouth twice a day  Geni James, DO   ondansetron (ZOFRAN ODT) 4 MG disintegrating tablet Take 1 tablet by mouth every 8 hours as needed for Nausea or Vomiting  Juanpablo Bentley, DO   Multiple Vitamin (MULTIVITAMIN ADULT) TABS Take 1 tablet by mouth daily  DA Del Rosario - WOLF   hydroCHLOROthiazide (MICROZIDE) 12.5 MG capsule take 1 capsule by mouth once daily  DA Del Rosario - CNP   vitamin D (ERGOCALCIFEROL) 1.25 MG (34724 UT) CAPS capsule take 1 capsule by mouth every  AND 2000 UNITS DAILY EXCEPT   DA Hallman CNP   nystatin-triamcinolone (MYCOLOG II) 002669-2.9 UNIT/GM-% cream Apply topically 2 times daily. DA Hallman CNP   dicyclomine (BENTYL) 10 MG capsule Take 2 capsules by mouth 4 times daily as needed (abd pain)  DA Carroll CNP   Multiple Vitamins-Minerals (MULTIVITAMIN ADULT) TABS Take 1 tablet by mouth daily  DA Hallman CNP     Social History     Tobacco Use    Smoking status: Former Smoker     Packs/day: 0.50     Years: 2.00     Pack years: 1.00     Types: Cigarettes     Quit date: 5/10/2019     Years since quittin.8    Smokeless tobacco: Never Used   Vaping Use    Vaping Use: Never used   Substance Use Topics    Alcohol use: No     Alcohol/week: 0.0 standard drinks    Drug use: Yes     Types: Marijuana Eston Oleg)     Family History   Problem Relation Age of Onset    ADHD Paternal Grandfather      Past Surgical History:   Procedure Laterality Date    CHOLECYSTECTOMY, LAPAROSCOPIC  1995    [facility]    ENDOMETRIAL ABLATION      [facility]    GASTRIC BYPASS SURGERY      HERNIA REPAIR      umbil    HYSTERECTOMY Left 2013    Laparotomy;HUGO;JOSUÉ:LSO    LAPAROSCOPY  2009    lap assisted percutaneous ERCP and removal CBD stone and gastrostomy, [address]    exp lap, hugo, rt so/ repair hernia    EMILY-EN-Y GASTRIC BYPASS  2008    laparoscopic RYGB, Dr. Salomon Adrian, [address]   2010    resection of jejunal ulcer, reversal gastric bypass, jejunoduodenostomy, feeding jejunostomy, Dr. Salomon Adrian, [address]  10/3/2008    mild gastritis & duodenitis, Dr. Salomon Adrian, [address] ENDOSCOPY  2009    multiple severe anastomotic jejunal ulcers, Dr. Salomon Adrian.  [facility]    UPPER GASTROINTESTINAL ENDOSCOPY  2009 ulcers healed, normal, Dr. Lora Mora, [address] [place] [address] ENDOSCOPY  3/6/2009    recurrent severe anastomotic jejunal ulcers, Dr. Tex Marin, [address] [place] [address] ENDOSCOPY  6/4/2009    severe anastomotic jejunal ulcers, Dr. Tex Marin, [address] [place] [address] ENDOSCOPY  7/2/2009    severe anastomotic jejunal ulcers, Dr. Tex Marin, [address] [place] [address] ENDOSCOPY  10/27/2009    severe anastomotic jejunal ulcers, Dr. Tex Marin, [address] [place] [address] ENDOSCOPY  1/4/2010    severe anastomotic jejunal ulcers, Dr. Tex Marin, [address] [place] [address] ENDOSCOPY  6/7/2010    severe anastomotic jejunal ulcers, Dr. Tex Marin, [facility]    UPPER GASTROINTESTINAL ENDOSCOPY  7/30/2010    gastritis, Dr. Tex Marin, [facility]    UPPER GASTROINTESTINAL ENDOSCOPY N/A 5/10/2019    EGD ESOPHAGOGASTRODUODENOSCOPY performed by Delia Marshall MD at [address] [place] [address] [place] [facility] N/A 5/12/2019    EGD DIAGNOSTIC ONLY performed by Delia Marshall MD at [address] [place] [address] N/A 5/17/2019    EGD ESOPHAGOGASTRODUODENOSCOPY performed by Mireya Carcamo MD at [address]     Past Medical History:   Diagnosis Date    Anorexia 6/1/2015    Arthritis of knee     Back pain, chronic     [facility] 3/13/2022    Chronic pain 6/1/2015    Current severe episode of major depressive disorder without psychotic features without prior episode ([facility] 3/16/2021    H/O gastric bypass 6/1/2015    Hypertension 1/1/2012    Hypertension 5/6/3368    Metabolic acidosis 6/1/6504    Ovarian cyst     Seizures (HCC)          Objective:   BP (!) 153/88   Pulse 65   Temp 98.1 °F (36.7 °C) (Temporal)   Resp 16   Ht 5' 4\" (1.626 m)   Wt 196 lb 6.4 oz (89.1 kg)   SpO2 100%   BMI 33.71 kg/m²       Laboratory/Radiology:     Recent Results (from the past 24 hour(s))   Phosphorus    Collection Time: 03/15/22  6:14 AM   Result Value Ref Range    Phosphorus 3.8 2.5 - 4.5 mg/dL   Magnesium    Collection Time: 03/15/22  6:14 AM   Result Value Ref Range    Magnesium 2.0 1.6 - 2.6 mg/dL   Comprehensive Metabolic Panel    Collection Time: 03/15/22  6:14 AM   Result Value Ref Range    Sodium 140 132 - 146 mmol/L    Potassium 4.0 3.5 - 5.0 mmol/L    Chloride 103 98 - 107 mmol/L    CO2 23 22 - 29 mmol/L    Anion Gap 14 7 - 16 mmol/L    Glucose 102 (H) 74 - 99 mg/dL    BUN 23 (H) 6 - 20 mg/dL    CREATININE 1.0 0.5 - 1.0 mg/dL    GFR Non-African American 59 >=60 mL/min/1.73    GFR African American >60     Calcium 9.9 8.6 - 10.2 mg/dL    Total Protein 7.8 6.4 - 8.3 g/dL    Albumin 4.3 3.5 - 5.2 g/dL    Total Bilirubin 0.6 0.0 - 1.2 mg/dL    Alkaline Phosphatase 190 (H) 35 - 104 U/L    ALT 27 0 - 32 U/L    AST 48 (H) 0 - 31 U/L   CBC    Collection Time: 03/15/22  6:14 AM   Result Value Ref Range    WBC 11.1 4.5 - 11.5 E9/L    RBC 4.34 3.50 - 5.50 E12/L    Hemoglobin 13.3 11.5 - 15.5 g/dL    Hematocrit 38.6 34.0 - 48.0 %    MCV 88.9 80.0 - 99.9 fL    MCH 30.6 26.0 - 35.0 pg    MCHC 34.5 32.0 - 34.5 %    RDW 12.4 11.5 - 15.0 fL    Platelets 291 349 - 913 E9/L    MPV 10.9 7.0 - 12.0 fL       No results found.     Patient Active Problem List   Diagnosis    Abdominal tenderness, LLQ (left lower quadrant)    Postoperative anemia due to acute blood loss    Chronic pain    Anorexia    H/O gastric bypass    Back pain    Encounter for palliative care    Hypertension    Epigastric pain    GI bleed due to NSAIDs    Gastrointestinal hemorrhage associated with gastric ulcer    Acute cystitis without hematuria    Current severe episode of major depressive disorder without psychotic features without prior episode ([address]    Acute MI, inferior wall ([address]    Chest pain    Bilateral renal artery stenosis ([address]         Galen Raphael II, MD  9:15 AM  3/15/2022

## 2022-03-15 NOTE — PROGRESS NOTES
Patient received ASA 81 mg this morning before her PICC line placement, Dr. Felicia Moore was made aware, moved forward with the procedure.

## 2022-03-15 NOTE — INTERVAL H&P NOTE
H&P Update    Patient's History and Physical  was reviewed. The patient appears likely to able to tolerate the procedure. Risk and benefits discussed including ultimate complications, possibly death and consent obtained. Patient and/family had the opportunity to ask questions. All questions were answered and patient/family wishes to proceed.      Yancy Andrew, II

## 2022-03-15 NOTE — PROGRESS NOTES
[facility]   Nephrology Progress Note    Patient's Name: Pooja Fonseca    HPI from 3/13 Consult Note: Christine Lemus is a 54 y.o. female with a longstanding history of hypertension, seizure disorder Jeannine-en-Y gastric bypass surgery and several other medical problems listed below. She presented to ED 2 days ago with complaints of chest pain. She stated that she was basically doing household chores when this occurred. Radiated to her right arm and back. She denies associated shortness of breath. No nausea or emesis. No diaphoresis. Presenting vital signs showed a blood pressure of 221/112 which peaked to 228/119, pulse of 55 and a respiration of 20. Laboratory data showed a CBC essentially normal.  Chemistry profile was significant for a BUN 12, creatinine 1, potassium 3.9. High sensitivity troponin was 78. Twelve-lead EKG showed ST elevation in the inferior leads. . CTA of the chest performed revealed no PE but did demonstrate bilateral renal artery stenosis. Patient received heparin, she was started on a nitroglycerin and Cardene drips. Interventional cardiology consultation was obtained. She was taken to Cath Lab and left heart cath demonstrated multivessel coronary artery disease. PCI with MAURICIO was performed to the culprit vessel (RCA ). Postprocedure blood pressure continues to be very difficult to control requiring very high dose of Cardene drip and several oral meds. Hence Renal consult. She is scheduled to undergo CABG to her residual disease at a later time. \"    Subjective:    3/15: Patient was seen and examined. She reports that she feels \"hungry. \"  She denies any chest pain or shortness of breath. No abdominal pain or nausea.     Allergies:  Ultram [tramadol]    Current Medications:    gabapentin (NEURONTIN) capsule 100 mg, TID  amLODIPine (NORVASC) tablet 10 mg, Daily  metoprolol succinate (TOPROL XL) extended release tablet 200 mg, Daily  spironolactone (ALDACTONE) tablet 50 mg, Daily  ceFAZolin (ANCEF) 1,000 mg in sterile water 10 mL IV syringe, 60 Min Pre-Op  sertraline (ZOLOFT) tablet 100 mg, Daily  sodium chloride flush 0.9 % injection 5-40 mL, 2 times per day  sodium chloride flush 0.9 % injection 5-40 mL, PRN  0.9 % sodium chloride infusion, PRN  ondansetron (ZOFRAN-ODT) disintegrating tablet 4 mg, Q8H PRN   Or  ondansetron (ZOFRAN) injection 4 mg, Q6H PRN  polyethylene glycol (GLYCOLAX) packet 17 g, Daily PRN  acetaminophen (TYLENOL) tablet 650 mg, Q6H PRN   Or  acetaminophen (TYLENOL) suppository 650 mg, Q6H PRN  0.9 % sodium chloride bolus, PRN  rosuvastatin (CRESTOR) tablet 40 mg, Daily  ticagrelor (BRILINTA) tablet 90 mg, BID  hydroCHLOROthiazide (HYDRODIURIL) tablet 25 mg, Daily  perflutren lipid microspheres (DEFINITY) injection 1.65 mg, ONCE PRN  enoxaparin (LOVENOX) injection 40 mg, Daily  sennosides-docusate sodium (SENOKOT-S) 8.6-50 MG tablet 2 tablet, Daily  aspirin EC tablet 81 mg, Daily  oyster shell calcium w/D 500-200 MG-UNIT tablet 1 tablet, Daily  multivitamin 1 tablet, Daily  vitamin D (ERGOCALCIFEROL) capsule 50,000 Units, Weekly  pantoprazole (PROTONIX) tablet 40 mg, QAM AC  morphine (PF) injection 2 mg, Q4H PRN  ondansetron (ZOFRAN) injection 4 mg, Q6H PRN    Physical exam:  Vitals:    03/15/22 0746   BP: (!) 153/88   Pulse: 65   Resp: 16   Temp: 98.1 °F (36.7 °C)   SpO2: 100%      General: Awake, alert, no acute distress  Skin: Warm/dry; no rashes on exposed extremities  Neck: No JVD noted  Lungs: Clear bilaterally; no adventitious lung sounds; unlabored  Heart: Regular rate and rhythm.   No rub  Abdomen: Soft, non-tender, nondistended; active bowel sounds  Extremities: No edema  Neurologic: Alert, answers questions appropriately    Data:   Labs:  Lab Results   Component Value Date     03/15/2022     03/14/2022     03/13/2022    K 4.0 03/15/2022    K 4.1 03/14/2022    K 4.0 03/13/2022    K 4.0 03/13/2022     03/15/2022    CO2 23 03/15/2022    CO2 21 (L) 03/14/2022    CO2 19 (L) 03/13/2022    CREATININE 1.0 03/15/2022    CREATININE 1.0 03/14/2022    CREATININE 1.0 03/13/2022    BUN 23 (H) 03/15/2022    BUN 18 03/14/2022    BUN 20 03/13/2022    GLUCOSE 102 (H) 03/15/2022    GLUCOSE 92 03/14/2022    GLUCOSE 111 (H) 03/13/2022    PHOS 3.8 03/15/2022    PHOS 3.4 03/14/2022    PHOS 3.6 03/13/2022    WBC 11.1 03/15/2022    WBC 10.9 03/14/2022    WBC 13.6 (H) 03/13/2022    HGB 13.3 03/15/2022    HGB 11.9 03/14/2022    HGB 12.0 03/13/2022    HCT 38.6 03/15/2022    HCT 35.8 03/14/2022    HCT 35.0 03/13/2022    MCV 88.9 03/15/2022     03/15/2022     Imaging:  XR CHEST PORTABLE    Result Date: 3/11/2022  EXAMINATION: ONE XRAY VIEW OF THE CHEST 3/11/2022 11:22 pm COMPARISON: 06/14/2021 HISTORY: ORDERING SYSTEM PROVIDED HISTORY: chest pain TECHNOLOGIST PROVIDED HISTORY: Reason for exam:->chest pain What reading provider will be dictating this exam?->CRC FINDINGS: The lungs are without acute focal process. There is no effusion or pneumothorax. The cardiomediastinal silhouette is without acute process. The osseous structures are without acute process. No acute process. CTA CHEST W CONTRAST    Result Date: 3/12/2022  EXAMINATION: CTA OF THE CHEST 3/11/2022 11:43 pm TECHNIQUE: CTA of the chest was performed after the administration of intravenous contrast.  Multiplanar reformatted images are provided for review. MIP images are provided for review. Dose modulation, iterative reconstruction, and/or weight based adjustment of the mA/kV was utilized to reduce the radiation dose to as low as reasonably achievable.  COMPARISON: Chest x-ray 03/11/2022 and CT abdomen/pelvis 06/14/2021 HISTORY: ORDERING SYSTEM PROVIDED HISTORY: Pain radiating to back rule out dissection TECHNOLOGIST PROVIDED HISTORY: Reason for exam:->Pain radiating to back rule out dissection What reading provider will be dictating this exam?->CRC FINDINGS: Aorta: No evidence of thoracic aortic aneurysm, intramural hematoma or dissection. No acute abnormality of the aorta. Mediastinum: No evidence of mediastinal lymphadenopathy. The heart and pericardium demonstrate no acute abnormality. Lungs/Pleura: There is pulmonary interstitial edema with interlobular septal thickening and patchy ground-glass opacification. There is no pneumothorax or pleural effusion. Upper Abdomen: The gallbladder is surgically absent. Stable postsurgical changes in the stomach. Atherosclerotic plaque at the origins of the bilateral renal arteries. There is a high-grade stenosis of the proximal left renal artery and a moderate to high-grade stenosis at the origin of the right renal artery. Soft Tissues/Bones: No acute bone or soft tissue abnormality. Unremarkable CTA of the chest.  No evidence of aneurysm or dissection. Pulmonary interstitial edema. No pneumothorax or pleural effusion. On the last several images obtained, there is evidence of a high-grade stenosis of the proximal left renal artery and a suspected moderate to high-grade stenosis at the origin of the right renal artery. Follow-up nonemergent renal CTA recommended. US DUP ABD PEL RETRO SCROT COMPLETE    Result Date: 3/12/2022  EXAMINATION: DOPPLER EVALUATION OF THE PELVIS 3/12/2022 3:10 pm COMPARISON: None. HISTORY: ORDERING SYSTEM PROVIDED HISTORY: jenna TECHNOLOGIST PROVIDED HISTORY: Reason for exam:->jenna What reading provider will be dictating this exam?->CRC FINDINGS: Kidneys: The right kidney measures 10 in length and the left kidney measures 10 in length. Kidneys demonstrate normal cortical echogenicity. No evidence of hydronephrosis or intrarenal stones. Trace left perinephric fluid. Right renal vasculature: Main renal artery PSV of 129 centimeters/second, proximally. Left renal vasculature: Main renal artery PSV of 207 centimeters/second, proximally. Aorta: PSV of 123 centimeters/second. Bladder: Bladder volume measures 108 mL. No evidence of hydronephrosis. Trace left perinephric fluid is nonspecific. Sonographic findings compatible with left renal artery stenosis. US DUP UPPER EXTREMITY LEFT VENOUS    Result Date: 3/12/2022  EXAMINATION: VENOUS ULTRASOUND OF THE LEFT UPPER EXTREMITY, 3/12/2022 6:10 pm TECHNIQUE: Duplex ultrasound using B-mode/gray scaled imaging and Doppler spectral analysis and color flow was obtained of the deep venous structures of the left upper extremity. COMPARISON: None. HISTORY: ORDERING SYSTEM PROVIDED HISTORY: Unable to insert picc line possible thrombus? TECHNOLOGIST PROVIDED HISTORY: Reason for exam:->Unable to insert picc line possible thrombus? What reading provider will be dictating this exam?->CRC FINDINGS: There is normal flow and compressibility of the visualized venous structures. There is no evidence of echogenic thrombus. The veins demonstrate good compressibility with normal color flow study and spectral analysis. No evidence of DVT. US RETROPERITONEAL GAYATRI    Result Date: 3/12/2022  EXAMINATION: RETROPERITONEAL ULTRASOUND OF THE KIDNEYS AND URINARY BLADDER 3/12/2022 COMPARISON: None HISTORY: ORDERING SYSTEM PROVIDED HISTORY: bilateral GAYATRI, hypertensive emergency TECHNOLOGIST PROVIDED HISTORY: Reason for exam:->bilateral GAYATRI, hypertensive emergency What reading provider will be dictating this exam?->CRC FINDINGS: Kidneys: The right kidney measures 10 in length and the left kidney measures 10 in length. Kidneys demonstrate normal cortical echogenicity. No evidence of hydronephrosis or intrarenal stones. Trace left perinephric fluid. Right renal vasculature: Main renal artery PSV of 129 centimeters/second, proximally. Left renal vasculature: Main renal artery PSV of 207 centimeters/second, proximally. Aorta: PSV of 123 centimeters/second. Bladder: Bladder volume measures 108 mL. No evidence of hydronephrosis. Trace left perinephric fluid is nonspecific.  Sonographic findings compatible with left renal artery stenosis. Assessment/ Plans:    1. Hypertensive emergency   Retroperitoneal US-no Hydro; \"left renal artery stenosis\"  S/p Cardene drip  BP improving  Vascular consult for renal artery stenosis  Follow    2. Acute MI   S/P LHC with PCI of RCA  On DAPT  Patient to have CABG in future  Cardiothoracic surg s/o   Cardiology following     3. Renal artery stenosis  For aortogram and bilateral renal arteriogram 3/15  Vascular following     4. H/O gastric bypass surgery    Alexa Gramajo, APRN - CNP     Patient seen and examined all key components of the physical performed independently , case discussed with NP, all pertinent labs and radiologic tests personally reviewed agree with above.     Received call from Vascular( Dr Jessie Lei ), renal angio shows bilateral hemodynamically significant GAYATRI, PTRA with bilateral stenting performed  Will adjust BP meds in anticipation of further decrease in BP    Blanca Lesch, MD

## 2022-03-15 NOTE — PATIENT CARE CONFERENCE
[facility] Quality Flow/Interdisciplinary Rounds Progress Note        Quality Flow Rounds held on March 15, 2022    Disciplines Attending:  Bedside Nurse, ,  and Nursing Unit Leadership    Linda Grant was admitted on 3/11/2022 10:59 PM    Anticipated Discharge Date:  Expected Discharge Date: 03/17/22    Disposition:    Rodrigo Score:  Rodrigo Scale Score: 23    Readmission Risk              Risk of Unplanned Readmission:  12           Discussed patient goal for the day, patient clinical progression, and barriers to discharge.   The following Goal(s) of the Day/Commitment(s) have been identified:  Diagnostics - Report Results      Naty Pandey RN  March 15, 2022

## 2022-03-15 NOTE — OP NOTE
Cardiovascular Lab Procedure Report    Nate Posey  1970    Date : 3/15/2022  Surgeon: Macho Fung M.D. Pre-procedure Diagnosis: Renal artery stenosis  Post-procedure Diagnosis: Same  Procedure:       Right  common femoral artery access with US guidance    6 fr celt used for closure     Aortogram  56752 Bilateral renal arteriogram  01614 Bilateral renal artery stenting 5x22 ICAST stents  Anesthesia: Local with IV sedation  Assistants: Cath Lab Staff  Estimated Blood Loss: Minimal  Complications: none  Findings:  Abdominal aorta patent  Bilateral high grade stenosis of renal arteries  L renal artery gradient worse than R but both were significant  Post stenting bilaterally widely patent     Procedure Details :  Timeout preformed identifying pt and procedure. Groins prepped and draped in sterile fashion. Patient given sedation as needed throughout the case. Right common femoral artery was noted to be patent and was accessed under ultrasound guidance after infiltrating with local. The printer was not available to print out an image of the vessel. Micropuncture placed, exchanged out for 5 fr sheath. Advantage glide wire and pig tail catheter advanced into aorta. Aortogram preformed. Patient was given 7000 units of heparin. High grade stenoses of the bilateral renal arteries was noted. Wire and catheter used to cannulate the left renal artery. Significant gradient was noted. Griffith wire was used. 6 fr 45 cm destination advanced into the left renal artery orifice. 5x22 ICAST stent deployed. Wire and catheter used to cannulate the right renal artery. Significant gradient was noted. Griffith wire was used. 6 fr 45 cm destination advanced into the right renal artery orifice. 5x22 ICAST stent deployed. Excellent flow was noted through both bilateral renal arteries with no significant residual stenosis and filling of the kidneys.       A short  6 fr sheath was exchanged and after femoral angiogram a 6 fr celt device used to close the Right common femoral artery    Postop Exam  Right DP 2+  PT 2+    Plan  Continue Medical Management with asa, statin and brilinta  No plans for further vascular surgical intervention     Sunitha Tejada MD

## 2022-03-15 NOTE — BRIEF OP NOTE
Brief Postoperative Note    Kem Serrano  YOB: 1970  [id number]    Pre-operative Diagnosis and Procedure: iv access for picc    Post-operative Diagnosis: Same    Anesthesia: Local    Estimated Blood Loss: < 10 cc    Surgeon: Jacqueline GREGG     Complications: none    Specimen obtained: none     Findings: none     Brynn Wallace II, MD   3/15/2022 9:55 AM

## 2022-03-15 NOTE — PLAN OF CARE
Problem: Pain:  Goal: Pain level will decrease  3/15/2022 1827 by Reina Hall RN  Outcome: Met This Shift  3/15/2022 0810 by Reina Hall RN  Outcome: Ongoing  Goal: Control of acute pain  3/15/2022 1827 by Reina Hall RN  Outcome: Met This Shift  3/15/2022 0810 by Reina Hall RN  Outcome: Ongoing  Goal: Control of chronic pain  3/15/2022 1827 by Reina Hall RN  Outcome: Met This Shift  3/15/2022 0810 by Reina Hall RN  Outcome: Ongoing     Problem: Skin Integrity:  Goal: Will show no infection signs and symptoms  3/15/2022 1827 by Reina Hall RN  Outcome: Met This Shift  3/15/2022 0810 by Reina Hall RN  Outcome: Ongoing  Goal: Absence of new skin breakdown  3/15/2022 1827 by Reina Hall RN  Outcome: Met This Shift  3/15/2022 0810 by Reina Hall RN  Outcome: Ongoing     Problem: Falls - Risk of:  Goal: Will remain free from falls  3/15/2022 1827 by Reina Hall RN  Outcome: Met This Shift  3/15/2022 0810 by Reina Hall RN  Outcome: Ongoing  Goal: Absence of physical injury  3/15/2022 1827 by Reina Hall RN  Outcome: Met This Shift  3/15/2022 0810 by Reina Hall RN  Outcome: Ongoing

## 2022-03-15 NOTE — OR NURSING
Procedure start: 10:03 Lidocaine 2% left arm intradermal 4cc  Puncture site:  Left upper arm  Catheter  Length at 45

## 2022-03-16 VITALS
OXYGEN SATURATION: 95 % | DIASTOLIC BLOOD PRESSURE: 77 MMHG | BODY MASS INDEX: 33.53 KG/M2 | WEIGHT: 196.4 LBS | HEIGHT: 64 IN | HEART RATE: 63 BPM | TEMPERATURE: 98 F | SYSTOLIC BLOOD PRESSURE: 143 MMHG | RESPIRATION RATE: 18 BRPM

## 2022-03-16 LAB
ALBUMIN SERPL-MCNC: 4.1 G/DL (ref 3.5–5.2)
ALP BLD-CCNC: 194 U/L (ref 35–104)
ALT SERPL-CCNC: 36 U/L (ref 0–32)
ANION GAP SERPL CALCULATED.3IONS-SCNC: 14 MMOL/L (ref 7–16)
AST SERPL-CCNC: 57 U/L (ref 0–31)
BILIRUB SERPL-MCNC: 0.6 MG/DL (ref 0–1.2)
BUN BLDV-MCNC: 22 MG/DL (ref 6–20)
CALCIUM IONIZED: 1.35 MMOL/L (ref 1.15–1.33)
CALCIUM SERPL-MCNC: 9.6 MG/DL (ref 8.6–10.2)
CHLORIDE BLD-SCNC: 101 MMOL/L (ref 98–107)
CO2: 23 MMOL/L (ref 22–29)
CREAT SERPL-MCNC: 1 MG/DL (ref 0.5–1)
GFR AFRICAN AMERICAN: >60
GFR NON-AFRICAN AMERICAN: 59 ML/MIN/1.73
GLUCOSE BLD-MCNC: 111 MG/DL (ref 74–99)
HCT VFR BLD CALC: 39.2 % (ref 34–48)
HEMOGLOBIN: 13.5 G/DL (ref 11.5–15.5)
MAGNESIUM: 2 MG/DL (ref 1.6–2.6)
MCH RBC QN AUTO: 30.5 PG (ref 26–35)
MCHC RBC AUTO-ENTMCNC: 34.4 % (ref 32–34.5)
MCV RBC AUTO: 88.7 FL (ref 80–99.9)
PDW BLD-RTO: 12.4 FL (ref 11.5–15)
PHOSPHORUS: 4.1 MG/DL (ref 2.5–4.5)
PLATELET # BLD: 230 E9/L (ref 130–450)
PMV BLD AUTO: 11.3 FL (ref 7–12)
POTASSIUM SERPL-SCNC: 3.8 MMOL/L (ref 3.5–5)
RBC # BLD: 4.42 E12/L (ref 3.5–5.5)
SODIUM BLD-SCNC: 138 MMOL/L (ref 132–146)
TOTAL PROTEIN: 8.3 G/DL (ref 6.4–8.3)
WBC # BLD: 9.9 E9/L (ref 4.5–11.5)

## 2022-03-16 PROCEDURE — 6370000000 HC RX 637 (ALT 250 FOR IP): Performed by: SURGERY

## 2022-03-16 PROCEDURE — 6370000000 HC RX 637 (ALT 250 FOR IP): Performed by: INTERNAL MEDICINE

## 2022-03-16 PROCEDURE — 2580000003 HC RX 258: Performed by: SURGERY

## 2022-03-16 PROCEDURE — 85027 COMPLETE CBC AUTOMATED: CPT

## 2022-03-16 PROCEDURE — 6360000002 HC RX W HCPCS: Performed by: SURGERY

## 2022-03-16 PROCEDURE — 84100 ASSAY OF PHOSPHORUS: CPT

## 2022-03-16 PROCEDURE — 6360000002 HC RX W HCPCS: Performed by: INTERNAL MEDICINE

## 2022-03-16 PROCEDURE — 82330 ASSAY OF CALCIUM: CPT

## 2022-03-16 PROCEDURE — 36415 COLL VENOUS BLD VENIPUNCTURE: CPT

## 2022-03-16 PROCEDURE — 83735 ASSAY OF MAGNESIUM: CPT

## 2022-03-16 PROCEDURE — 99233 SBSQ HOSP IP/OBS HIGH 50: CPT

## 2022-03-16 PROCEDURE — 80053 COMPREHEN METABOLIC PANEL: CPT

## 2022-03-16 RX ORDER — ELECTROLYTES/DEXTROSE
1 SOLUTION, ORAL ORAL DAILY
Qty: 90 TABLET | Refills: 3 | Status: ON HOLD | OUTPATIENT
Start: 2022-03-16 | End: 2022-06-27 | Stop reason: HOSPADM

## 2022-03-16 RX ORDER — METOPROLOL SUCCINATE 100 MG/1
100 TABLET, EXTENDED RELEASE ORAL DAILY
Qty: 30 TABLET | Refills: 0 | Status: SHIPPED | OUTPATIENT
Start: 2022-03-16 | End: 2022-04-08 | Stop reason: SDUPTHER

## 2022-03-16 RX ORDER — GABAPENTIN 100 MG/1
100 CAPSULE ORAL 3 TIMES DAILY
Qty: 90 CAPSULE | Refills: 0 | Status: SHIPPED | OUTPATIENT
Start: 2022-03-16 | End: 2022-06-07

## 2022-03-16 RX ORDER — SPIRONOLACTONE 50 MG/1
50 TABLET, FILM COATED ORAL DAILY
Qty: 30 TABLET | Refills: 3 | Status: SHIPPED | OUTPATIENT
Start: 2022-03-16 | End: 2022-04-08 | Stop reason: SDUPTHER

## 2022-03-16 RX ORDER — ERGOCALCIFEROL 1.25 MG/1
CAPSULE ORAL
Qty: 12 CAPSULE | Refills: 3 | Status: SHIPPED | OUTPATIENT
Start: 2022-03-16

## 2022-03-16 RX ORDER — AMLODIPINE BESYLATE 10 MG/1
10 TABLET ORAL DAILY
Qty: 30 TABLET | Refills: 3 | Status: SHIPPED | OUTPATIENT
Start: 2022-03-16 | End: 2022-04-08 | Stop reason: SDUPTHER

## 2022-03-16 RX ORDER — ASPIRIN 81 MG/1
81 TABLET ORAL DAILY
Qty: 30 TABLET | Refills: 3 | Status: SHIPPED | OUTPATIENT
Start: 2022-03-16 | End: 2022-04-08 | Stop reason: SDUPTHER

## 2022-03-16 RX ORDER — HYDROCHLOROTHIAZIDE 12.5 MG/1
CAPSULE, GELATIN COATED ORAL
Qty: 90 CAPSULE | Refills: 1 | Status: SHIPPED | OUTPATIENT
Start: 2022-03-16 | End: 2022-04-08 | Stop reason: ALTCHOICE

## 2022-03-16 RX ORDER — ROSUVASTATIN CALCIUM 40 MG/1
40 TABLET, COATED ORAL DAILY
Qty: 30 TABLET | Refills: 3 | Status: SHIPPED | OUTPATIENT
Start: 2022-03-16 | End: 2022-04-08 | Stop reason: SDUPTHER

## 2022-03-16 RX ADMIN — PANTOPRAZOLE SODIUM 40 MG: 40 TABLET, DELAYED RELEASE ORAL at 06:00

## 2022-03-16 RX ADMIN — MORPHINE SULFATE 2 MG: 2 INJECTION, SOLUTION INTRAMUSCULAR; INTRAVENOUS at 03:34

## 2022-03-16 RX ADMIN — SODIUM CHLORIDE, PRESERVATIVE FREE 10 ML: 5 INJECTION INTRAVENOUS at 03:35

## 2022-03-16 RX ADMIN — METOPROLOL SUCCINATE 200 MG: 50 TABLET, EXTENDED RELEASE ORAL at 09:21

## 2022-03-16 RX ADMIN — ROSUVASTATIN 40 MG: 20 TABLET, FILM COATED ORAL at 09:21

## 2022-03-16 RX ADMIN — SPIRONOLACTONE 50 MG: 25 TABLET ORAL at 09:21

## 2022-03-16 RX ADMIN — MORPHINE SULFATE 2 MG: 2 INJECTION, SOLUTION INTRAMUSCULAR; INTRAVENOUS at 09:22

## 2022-03-16 RX ADMIN — AMLODIPINE BESYLATE 10 MG: 10 TABLET ORAL at 09:21

## 2022-03-16 RX ADMIN — CALCIUM CARBONATE-VITAMIN D TAB 500 MG-200 UNIT 1 TABLET: 500-200 TAB at 09:21

## 2022-03-16 RX ADMIN — HEPARIN 300 UNITS: 100 SYRINGE at 09:22

## 2022-03-16 RX ADMIN — GABAPENTIN 100 MG: 100 CAPSULE ORAL at 13:01

## 2022-03-16 RX ADMIN — Medication 1 TABLET: at 09:20

## 2022-03-16 RX ADMIN — ASPIRIN 81 MG: 81 TABLET, COATED ORAL at 09:21

## 2022-03-16 RX ADMIN — TICAGRELOR 90 MG: 90 TABLET ORAL at 09:21

## 2022-03-16 RX ADMIN — SODIUM CHLORIDE, PRESERVATIVE FREE 10 ML: 5 INJECTION INTRAVENOUS at 13:31

## 2022-03-16 RX ADMIN — HYDROCHLOROTHIAZIDE 25 MG: 25 TABLET ORAL at 09:20

## 2022-03-16 RX ADMIN — GABAPENTIN 100 MG: 100 CAPSULE ORAL at 09:21

## 2022-03-16 RX ADMIN — ENOXAPARIN SODIUM 40 MG: 100 INJECTION SUBCUTANEOUS at 09:21

## 2022-03-16 RX ADMIN — MORPHINE SULFATE 2 MG: 2 INJECTION, SOLUTION INTRAMUSCULAR; INTRAVENOUS at 13:30

## 2022-03-16 RX ADMIN — ONDANSETRON 4 MG: 2 INJECTION INTRAMUSCULAR; INTRAVENOUS at 13:01

## 2022-03-16 RX ADMIN — SODIUM CHLORIDE, PRESERVATIVE FREE 10 ML: 5 INJECTION INTRAVENOUS at 09:22

## 2022-03-16 RX ADMIN — SERTRALINE 100 MG: 100 TABLET, FILM COATED ORAL at 09:21

## 2022-03-16 RX ADMIN — SODIUM CHLORIDE, PRESERVATIVE FREE 10 ML: 5 INJECTION INTRAVENOUS at 13:01

## 2022-03-16 ASSESSMENT — PAIN DESCRIPTION - LOCATION
LOCATION: BACK

## 2022-03-16 ASSESSMENT — PAIN DESCRIPTION - PAIN TYPE
TYPE: CHRONIC PAIN

## 2022-03-16 ASSESSMENT — PAIN DESCRIPTION - ORIENTATION
ORIENTATION: MID

## 2022-03-16 ASSESSMENT — PAIN SCALES - GENERAL
PAINLEVEL_OUTOF10: 0
PAINLEVEL_OUTOF10: 6
PAINLEVEL_OUTOF10: 0
PAINLEVEL_OUTOF10: 5
PAINLEVEL_OUTOF10: 8

## 2022-03-16 ASSESSMENT — PAIN DESCRIPTION - FREQUENCY
FREQUENCY: CONTINUOUS
FREQUENCY: CONTINUOUS

## 2022-03-16 ASSESSMENT — PAIN DESCRIPTION - ONSET
ONSET: GRADUAL
ONSET: GRADUAL

## 2022-03-16 ASSESSMENT — PAIN DESCRIPTION - PROGRESSION
CLINICAL_PROGRESSION: GRADUALLY WORSENING
CLINICAL_PROGRESSION: GRADUALLY WORSENING

## 2022-03-16 ASSESSMENT — PAIN DESCRIPTION - DESCRIPTORS
DESCRIPTORS: ACHING;DISCOMFORT;SORE
DESCRIPTORS: ACHING;DISCOMFORT;SORE

## 2022-03-16 NOTE — CONSULTS
Met with patient and discussed that their physician has ordered a referral to our outpatient Phase II Cardiac Rehabilitation program. Reviewed the benefits of cardiac rehabilitation based on their diagnosis and personal risk factors. Patient demonstrates strong interest in Cardiac Rehabilitation at this time. Cardiac Rehabilitation brochure provided to patient/family. The Cardiac Rehabilitation Program has been provided the patient's referral information and pertinent patient details and history. The patient may call [facility] [place] at [phone] for additional information or questions. Contact information for [facility] and other choices close to the patient's residence have been provided in the discharge instructions so that the patient may call and schedule an appointment when cleared by their physician.  Thank you for the referral.

## 2022-03-16 NOTE — PROGRESS NOTES
Vascular Surgery Progress Note    Pt is being seen in f/u today regarding bilateral renal artery stenosis    Subjective  Pt s/e.   Denies pain  Denies CP, SOB  No tenderness at R groin site  Feels better than she has in a long time  Able to ambulate in the room without difficulty  Discharging home today    Current Medications:    sodium chloride        sodium chloride flush, sodium chloride, ondansetron **OR** ondansetron, polyethylene glycol, acetaminophen **OR** acetaminophen, sodium chloride, perflutren lipid microspheres, morphine, ondansetron    heparin flush  300 Units IntraCATHeter BID    gabapentin  100 mg Oral TID    amLODIPine  10 mg Oral Daily    metoprolol succinate  200 mg Oral Daily    spironolactone  50 mg Oral Daily    ceFAZolin  1,000 mg IntraVENous 60 Min Pre-Op    sertraline  100 mg Oral Daily    sodium chloride flush  5-40 mL IntraVENous 2 times per day    rosuvastatin  40 mg Oral Daily    ticagrelor  90 mg Oral BID    hydroCHLOROthiazide  25 mg Oral Daily    enoxaparin  40 mg SubCUTAneous Daily    sennosides-docusate sodium  2 tablet Oral Daily    aspirin  81 mg Oral Daily    oyster shell calcium w/D  1 tablet Oral Daily    multivitamin  1 tablet Oral Daily    vitamin D  50,000 Units Oral Weekly    pantoprazole  40 mg Oral QAM AC      PHYSICAL EXAM:    BP (!) 150/84   Pulse 70   Temp 97.4 °F (36.3 °C) (Temporal)   Resp 18   Ht 5' 4\" (1.626 m)   Wt 196 lb 6.4 oz (89.1 kg)   SpO2 98%   BMI 33.71 kg/m²     Intake/Output Summary (Last 24 hours) at 3/16/2022 0949  Last data filed at 3/15/2022 2114  Gross per 24 hour   Intake 0 ml   Output 400 ml   Net -400 ml        Gen: awake, alert, oriented x3  CVS: S1, S2  Resp: No increased work of breathing  Abd: soft, non tender, non distended  R LE: normal exam, groin CDI - no hematoma  L LE: normal exam    LABS:    Lab Results   Component Value Date    WBC 9.9 03/16/2022    HGB 13.5 03/16/2022    HCT 39.2 03/16/2022     03/16/2022    PROTIME 11.9 03/11/2022    INR 1.1 03/11/2022    APTT 34.4 09/04/2019    K 3.8 03/16/2022    BUN 22 (H) 03/16/2022    CREATININE 1.0 03/16/2022     A/P   Bilateral renal artery stenosis s/p arteriogram 3/15/22    · Bilateral renal artery arteriogram done 3/15  · B/l renal artery stents placed  · Continue asa, brilinta and statin  · No further vascular surgical interventions planned  · Ok for discharge from vascular surgery pov    Serafin Baumgarten, APRN - CNP

## 2022-03-16 NOTE — DISCHARGE SUMMARY
Hospitalist Discharge Summary    Patient ID: Salo Bolden   Patient : 1970  Patient's PCP: DA Garza CNP    Admit Date: 3/11/2022   Admitting Physician: Eliu Hobbs MD    Discharge Date:  3/16/2022   Discharge Physician: Rafaela Phillips MD   Discharge Condition: Stable  Discharge Disposition: [facility] course in brief:  (Please refer to daily progress notes for a comprehensive review of the hospitalization by requesting medical records)    Patient admitted on 3/11/2022 for chest pain.     44-year-old female admitted with chest pain and was found to have inferior STEMI. She was taken directly to the Cath Lab for PCI which was performed via right radial artery concluding in MAURICIO x3 placed in RCA. Started on aspirin and ticagrelor with a plan to continue DAPT for 12 months. She was also seen by cardiothoracic surgery who recommended outpatient CABG for the remaining coronary artery disease. Patient transferred out of ICU on 3/13 and her blood pressure remained stable on oral medications. Patient was seen by vascular surgery on 3/13 who recommended angiogram of the renal arteries for further delineation of the disease and possible intervention. Patient underwent bilateral renal arteriogram on 3/15 and bilateral stenting for renal artery stenosis. Follow-up with cardiology and nephrology outpatient.       Consults:   IP CONSULT TO INTERNAL MEDICINE  IP CONSULT TO CRITICAL CARE  IP CONSULT TO CARDIOTHORACIC SURGERY  IP CONSULT TO VASCULAR SURGERY  IP CONSULT TO NEPHROLOGY  IP CONSULT TO [facility] RADIOLOGY    Discharge Diagnoses:    1.  Chest pain   Secondary to inferior wall STEMI  Status post left heart cath via right radial artery with MAURICIO in RCA  Cardiothoracic surgery evaluation, recommend outpatient CABG for the remaining coronary artery disease     2.  Hypertensive urgency/emergency:  Continue HCTZ and metoprolol monitor closely.    Currently on IV nicardipine  On Aldactone and Toprol-XL  Recommend starting ACE/ARBs, probably involve nephrology as hypertension may be due to renal artery stenosis     3.  Depression and anxiety: On Zoloft     4.  History of seizures: Not on medications, monitor pain     5. Possible renal artery stenosis  Incidentally noted on CTA chest  Follow-up retroperitoneal ultrasound showed findings compatible with left renal artery stenosis  Vascular surgery on board, plan for arteriogram of the renal arteries on 3/14     6. Low back pain  Start on low-dose gabapentin 100milligram p.o. 3 times daily    Discharge Instructions / Follow up:    Future Appointments   Date Time Provider Jam Damon   4/8/2022 10:30 AM Jb Flores MD [facility] CARDIO [facility]   4/12/2022  9:30 AM Aj Cook MD CARDIO SURG [facility]       Continued appropriate risk factor modification of blood pressure, diabetes and serum lipids will remain essential to reducing risk of future atherosclerotic development    Activity: activity as tolerated    Significant labs:  CBC:   Recent Labs     03/14/22  0914 03/15/22  0614 03/16/22  0620   WBC 10.9 11.1 9.9   RBC 3.89 4.34 4.42   HGB 11.9 13.3 13.5   HCT 35.8 38.6 39.2   MCV 92.0 88.9 88.7   RDW 12.8 12.4 12.4    213 230     BMP:   Recent Labs     03/14/22  0748 03/15/22  0614 03/16/22  0620    140 138   K 4.1 4.0 3.8    103 101   CO2 21* 23 23   BUN 18 23* 22*   CREATININE 1.0 1.0 1.0   MG 1.7 2.0 2.0   PHOS 3.4 3.8 4.1     LFT:  Recent Labs     03/14/22  0748 03/15/22  0614 03/16/22  0620   PROT 7.2 7.8 8.3   ALKPHOS 166* 190* 194*   ALT 26 27 36*   AST 60* 48* 57*   BILITOT 0.8 0.6 0.6     PT/INR: No results for input(s): INR, APTT in the last 72 hours. BNP: No results for input(s): BNP in the last 72 hours.   Hgb A1C:   Lab Results   Component Value Date    LABA1C 5.1 03/12/2022     Folate and B12:   Lab Results   Component Value Date    TZGFUGUC72 1062 (H) 04/29/2020   ,   Lab Results   Component Value Date    FOLATE 19.9 04/29/2020     Thyroid Studies:   Lab Results   Component Value Date    TSH 0.716 04/29/2020       Urinalysis:    Lab Results   Component Value Date    NITRU Negative 06/14/2021    WBCUA 2-5 06/14/2021    WBCUA NONE 04/28/2012    BACTERIA RARE 06/14/2021    RBCUA 2-5 06/14/2021    RBCUA NONE 12/08/2013    BLOODU Negative 06/14/2021    SPECGRAV 1.025 06/14/2021    GLUCOSEU Negative 06/14/2021    GLUCOSEU NEGATIVE 04/28/2012       Imaging:  Echo Complete    Result Date: 3/13/2022  Transthoracic Echocardiography Report (TTE)  Demographics   Patient Name    Ilda Gutierrez        Gender            Female                  [address] Record  [id number]      Room Number       4335  Number   Account #       [de-identified]     Procedure Date    03/13/2022   Corporate ID                  Ordering          Gloria Cantrell MD                                Physician   Accession       [id number]    Referring  Number                        Physician   Date of Birth   1970    Sonographer       Tommy Valdez [facility]   Age             54 year(s)    Interpreting      [address]                                Physician         Physician Cardiology                                                  Gloria Cantrell MD                                 Any Other  Procedure Type of Study   TTE procedure:Echo Complete W/Doppler & Color Flow. Procedure Date Date: 03/13/2022 Start: 07:07 AM Study Location: Portable Technical Quality: Adequate visualization Indications:STEMI. Patient Status: Routine Height: 64 inches Weight: 200 pounds BSA: 1.96 m^2 BMI: 34.33 kg/m^2 Rhythm: Within normal limits BP: 151/87 mmHg  Findings   Left Ventricle  Normal left ventricle size. Estimated left ventricle ejection fraction 65+/-5 %. Moderate left ventricular concentric hypertrophy noted. Inferior hypokinesis noted. Right Ventricle  Normal right ventricular size and function. Left Atrium  Left atrium is of normal size.    Right Atrium  Normal right atrium size.   Mitral Valve  Mild mitral regurgitation is present. the jet is very eccentric and could  be underestimated. Tricuspid Valve  Physiologic and/or trace tricuspid regurgitation. Aortic Valve  Aortic valve opens well. No evidence of aortic valve regurgitation. Individual aortic valve leaflets are not clearly visualized. No hemodynamically significant aortic stenosis is present. Pulmonic Valve  Physiologic and/or trace pulmonic regurgitation present. Pericardial Effusion  No evidence of pericardial effusion. Aorta  Aortic root dimension within normal limits. Miscellaneous  Normal Inferior Vena Cava diameter and respiratory variation. Conclusions   Summary  Normal left ventricle size. Estimated left ventricle ejection fraction 65+/-5 %. Normal right ventricular size and function. Mild mitral regurgitation is present.    Signature   ----------------------------------------------------------------  Electronically signed by Glynn GOMEZInterpreting physician)  on 03/13/2022 02:01 PM  ----------------------------------------------------------------  M-Mode/2D Measurements & Calculations   LV Diastolic    LV Systolic Dimension: 3.2   AV Cusp Separation: 2.2 cmLA  Dimension: 4.9  cm                           Dimension: 3.8 cmAO Root  cm              LV Volume Diastolic: 841 ml  Dimension: 3.1 cm  LV FS:34.7 %    LV Volume Systolic: 02.4 ml  LV PW           LV EDV/LV EDV Index: 348  Diastolic: 1.4  QE/04 BI/R^8BT ESV/LV ESV  cm              Index: 39.6 ml/20ml/ m^2     RV Diastolic Dimension: 2.6  Septum          EF Calculated: 65.6 %        cm  Diastolic: 1.3  LV Mass Index: 137 l/min*m^2  cm                                           Ascending Aorta: 3.4 cm                                               LA volume/Index: 54 ml  LV Mass: 267.94 LVOT: 2 cm                   /27.53ml/m^2  g                                            RA Area: 13.6 cm^2  Doppler Measurements & Calculations   MV Peak E-Wave: AV Peak Velocity: 1.83 m/s     LVOT Peak Velocity: 1.26  0.97 m/s           AV Peak Gradient: 13.34 mmHg   m/s  MV Peak A-Wave:    AV Mean Velocity: 1.34 m/s     LVOT Mean Velocity: 0.93  1.12 m/s           AV Mean Gradient: 7.7 mmHg     m/s  MV E/A Ratio: 0.87 AV VTI: 31.5 cm                LVOT Peak Gradient: 6.3  MV Peak Gradient:  AV Area (Continuity):2.6 cm^2  mmHgLVOT Mean Gradient:  9 mmHg                                            3.7 mmHg  MV Mean Gradient:  LVOT VTI: 26.1 cm  3.9 mmHg           IVRT: 32.3 msec  MV Mean Velocity:  0.93 m/s           Pulm. Vein A Reversal  MV Deceleration    Duration:78.4 msec  Time: 180.1 msec   Pulm. Vein D Velocity:0.59     PV Peak Velocity: 1.26  MV P1/2t: 48.9     m/sPulm. Vein A Reversal       m/s  msec               Velocity:0.3 m/s               PV Peak Gradient: 6.31  MVA by PHT:4.5     Pulm. Vein S Velocity: 0.75    mmHg  cm^2               m/s                            PV Mean Velocity: 0.9  MV Area                                           m/s  (continuity): 2.8                                 PV Mean Gradient: 3.5  cm^2                                              mmHg  MV E' Septal  Velocity: 0.06 m/s  MV E' Lateral  Velocity: 7 m/s  [email or web address] [id number]    XR CHEST PORTABLE    Result Date: 3/11/2022  EXAMINATION: ONE XRAY VIEW OF THE CHEST 3/11/2022 11:22 pm COMPARISON: 06/14/2021 HISTORY: ORDERING SYSTEM PROVIDED HISTORY: chest pain TECHNOLOGIST PROVIDED HISTORY: Reason for exam:->chest pain What reading provider will be dictating this exam?->CRC FINDINGS: The lungs are without acute focal process. There is no effusion or pneumothorax. The cardiomediastinal silhouette is without acute process. The osseous structures are without acute process. No acute process.      CTA CHEST W CONTRAST    Result Date: 3/12/2022  EXAMINATION: CTA OF THE CHEST 3/11/2022 11:43 pm TECHNIQUE: CTA of the chest was performed after the administration of intravenous contrast.  Multiplanar reformatted images are provided for review. MIP images are provided for review. Dose modulation, iterative reconstruction, and/or weight based adjustment of the mA/kV was utilized to reduce the radiation dose to as low as reasonably achievable. COMPARISON: Chest x-ray 03/11/2022 and CT abdomen/pelvis 06/14/2021 HISTORY: ORDERING SYSTEM PROVIDED HISTORY: Pain radiating to back rule out dissection TECHNOLOGIST PROVIDED HISTORY: Reason for exam:->Pain radiating to back rule out dissection What reading provider will be dictating this exam?->CRC FINDINGS: Aorta: No evidence of thoracic aortic aneurysm, intramural hematoma or dissection. No acute abnormality of the aorta. Mediastinum: No evidence of mediastinal lymphadenopathy. The heart and pericardium demonstrate no acute abnormality. Lungs/Pleura: There is pulmonary interstitial edema with interlobular septal thickening and patchy ground-glass opacification. There is no pneumothorax or pleural effusion. Upper Abdomen: The gallbladder is surgically absent. Stable postsurgical changes in the stomach. Atherosclerotic plaque at the origins of the bilateral renal arteries. There is a high-grade stenosis of the proximal left renal artery and a moderate to high-grade stenosis at the origin of the right renal artery. Soft Tissues/Bones: No acute bone or soft tissue abnormality. Unremarkable CTA of the chest.  No evidence of aneurysm or dissection. Pulmonary interstitial edema. No pneumothorax or pleural effusion. On the last several images obtained, there is evidence of a high-grade stenosis of the proximal left renal artery and a suspected moderate to high-grade stenosis at the origin of the right renal artery. Follow-up nonemergent renal CTA recommended.      US DUP ABD PEL RETRO SCROT COMPLETE    Result Date: 3/12/2022  EXAMINATION: DOPPLER EVALUATION OF THE PELVIS 3/12/2022 3:10 pm COMPARISON: None. HISTORY: ORDERING SYSTEM PROVIDED HISTORY: jenna TECHNOLOGIST PROVIDED HISTORY: Reason for exam:->jenna What reading provider will be dictating this exam?->CRC FINDINGS: Kidneys: The right kidney measures 10 in length and the left kidney measures 10 in length. Kidneys demonstrate normal cortical echogenicity. No evidence of hydronephrosis or intrarenal stones. Trace left perinephric fluid. Right renal vasculature: Main renal artery PSV of 129 centimeters/second, proximally. Left renal vasculature: Main renal artery PSV of 207 centimeters/second, proximally. Aorta: PSV of 123 centimeters/second. Bladder: Bladder volume measures 108 mL. No evidence of hydronephrosis. Trace left perinephric fluid is nonspecific. Sonographic findings compatible with left renal artery stenosis. US DUP UPPER EXTREMITY LEFT VENOUS    Result Date: 3/12/2022  EXAMINATION: VENOUS ULTRASOUND OF THE LEFT UPPER EXTREMITY, 3/12/2022 6:10 pm TECHNIQUE: Duplex ultrasound using B-mode/gray scaled imaging and Doppler spectral analysis and color flow was obtained of the deep venous structures of the left upper extremity. COMPARISON: None. HISTORY: ORDERING SYSTEM PROVIDED HISTORY: Unable to insert picc line possible thrombus? TECHNOLOGIST PROVIDED HISTORY: Reason for exam:->Unable to insert picc line possible thrombus? What reading provider will be dictating this exam?->CRC FINDINGS: There is normal flow and compressibility of the visualized venous structures. There is no evidence of echogenic thrombus. The veins demonstrate good compressibility with normal color flow study and spectral analysis. No evidence of DVT.      US RETROPERITONEAL JENNA    Result Date: 3/12/2022  EXAMINATION: RETROPERITONEAL ULTRASOUND OF THE KIDNEYS AND URINARY BLADDER 3/12/2022 COMPARISON: None HISTORY: ORDERING SYSTEM PROVIDED HISTORY: bilateral JENNA, hypertensive emergency TECHNOLOGIST PROVIDED HISTORY: Reason for exam:->bilateral GAYATRI, hypertensive emergency What reading provider will be dictating this exam?->CRC FINDINGS: Kidneys: The right kidney measures 10 in length and the left kidney measures 10 in length. Kidneys demonstrate normal cortical echogenicity. No evidence of hydronephrosis or intrarenal stones. Trace left perinephric fluid. Right renal vasculature: Main renal artery PSV of 129 centimeters/second, proximally. Left renal vasculature: Main renal artery PSV of 207 centimeters/second, proximally. Aorta: PSV of 123 centimeters/second. Bladder: Bladder volume measures 108 mL. No evidence of hydronephrosis. Trace left perinephric fluid is nonspecific. Sonographic findings compatible with left renal artery stenosis. Discharge Medications:      Medication List      START taking these medications    amLODIPine 10 MG tablet  Commonly known as: NORVASC  Take 1 tablet by mouth daily     aspirin 81 MG EC tablet  Take 1 tablet by mouth daily     gabapentin 100 MG capsule  Commonly known as: NEURONTIN  Take 1 capsule by mouth 3 times daily for 30 days.      rosuvastatin 40 MG tablet  Commonly known as: CRESTOR  Take 1 tablet by mouth daily     spironolactone 50 MG tablet  Commonly known as: ALDACTONE  Take 1 tablet by mouth daily     ticagrelor 90 MG Tabs tablet  Commonly known as: BRILINTA  Take 1 tablet by mouth 2 times daily        CONTINUE taking these medications    hydroCHLOROthiazide 12.5 MG capsule  Commonly known as: MICROZIDE  take 1 capsule by mouth once daily     metoprolol succinate 100 MG extended release tablet  Commonly known as: TOPROL XL  Take 1 tablet by mouth daily     Multivitamin Adult Tabs  Take 1 tablet by mouth daily     vitamin D 1.25 MG (86081 UT) Caps capsule  Commonly known as: ERGOCALCIFEROL  take 1 capsule by mouth every Sunday AND 2000 UNITS DAILY EXCEPT SUNDAY        STOP taking these medications    dicyclomine 10 MG capsule  Commonly known as: Bentyl     nystatin-triamcinolone 451051-3.1 UNIT/GM-% cream  Commonly known as: MYCOLOG II     ondansetron 4 MG disintegrating tablet  Commonly known as: Zofran ODT     oyster shell calcium w/D 500-200 MG-UNIT Tabs tablet     sertraline 100 MG tablet  Commonly known as: ZOLOFT           Where to Get Your Medications      These medications were sent to [facility], [address] [address]    Phone: [phone]   · amLODIPine 10 MG tablet  · aspirin 81 MG EC tablet  · gabapentin 100 MG capsule  · hydroCHLOROthiazide 12.5 MG capsule  · metoprolol succinate 100 MG extended release tablet  · Multivitamin Adult Tabs  · rosuvastatin 40 MG tablet  · spironolactone 50 MG tablet  · ticagrelor 90 MG Tabs tablet  · vitamin D 1.25 MG (04264 UT) Caps capsule         Time Spent on discharge is more than 45 minutes in the examination, evaluation, counseling and review of medications and discharge plan.    +++++++++++++++++++++++++++++++++++++++++++++++++  MD RYANNE Panchal/ Steven Diego [address] [place] [address]  +++++++++++++++++++++++++++++++++++++++++++++++++  NOTE: This report was transcribed using voice recognition software. Every effort was made to ensure accuracy; however, inadvertent computerized transcription errors may be present.   3

## 2022-03-16 NOTE — PLAN OF CARE
Problem: Pain:  Goal: Control of chronic pain  Description: Control of chronic pain  3/16/2022 0019 by Chasity Blunt RN  Outcome: Ongoing     Problem: Skin Integrity:  Goal: Will show no infection signs and symptoms  Description: Will show no infection signs and symptoms  3/16/2022 0019 by Chasity Blunt RN  Outcome: Ongoing  3/15/2022 1827 by Purvi Lewis RN  Outcome: Met This Shift

## 2022-03-16 NOTE — PROGRESS NOTES
Hospitalist Progress Note      SYNOPSIS: Patient admitted on 3/11/2022 for chest pain. 81-year-old female admitted with chest pain and was found to have inferior STEMI. She was taken directly to the Cath Lab for PCI which was performed via right radial artery concluding in MAURICIO x3 placed in RCA. Started on aspirin and ticagrelor with a plan to continue DAPT for 12 months. She was also seen by cardiothoracic surgery who recommended outpatient CABG for the remaining coronary artery disease. Patient transferred out of ICU on 3/13 and her blood pressure remained stable on oral medications. Patient was seen by vascular surgery on 3/13 who recommended angiogram of the renal arteries for further delineation of the disease and possible intervention. SUBJECTIVE:    Patient seen and examined in her room. States that lower back pain better. Denies any chest pain, shortness of breath, nausea, vomiting. Records reviewed. Stable overnight. No other overnight issues reported. Temp (24hrs), Av.7 °F (36.5 °C), Min:97.4 °F (36.3 °C), Max:98.3 °F (36.8 °C)    DIET: ADULT DIET; Regular; Low Fat/Low Chol/High Fiber/RAKESH  CODE: Full Code    Intake/Output Summary (Last 24 hours) at 3/16/2022 0905  Last data filed at 3/15/2022 2114  Gross per 24 hour   Intake 0 ml   Output 400 ml   Net -400 ml       OBJECTIVE:    BP (!) 150/84   Pulse 70   Temp 97.4 °F (36.3 °C) (Temporal)   Resp 18   Ht 5' 4\" (1.626 m)   Wt 196 lb 6.4 oz (89.1 kg)   SpO2 98%   BMI 33.71 kg/m²     General appearance: No apparent distress, appears stated age and cooperative. HEENT:  Conjunctivae/corneas clear. Neck: Supple. No jugular venous distention. Respiratory: Clear to auscultation bilaterally, normal respiratory effort  Cardiovascular: Regular rate rhythm, normal S1-S2  Abdomen: Soft, nontender, nondistended  Musculoskeletal: No clubbing, cyanosis, no bilateral lower extremity edema. Brisk capillary refill.    Skin:  No rashes on visible skin  Neurologic: awake, alert and following commands     ASSESSMENT & PLAN:    1. Chest pain   Secondary to inferior wall STEMI  Status post left heart cath via right radial artery with MAURICIO in RCA  Cardiothoracic surgery evaluation, recommend outpatient CABG for the remaining coronary artery disease    2. Hypertensive urgency/emergency:  Continue HCTZ and metoprolol monitor closely. Currently on IV nicardipine  On Aldactone and Toprol-XL  Recommend starting ACE/ARBs, probably involve nephrology as hypertension may be due to renal artery stenosis     3. Depression and anxiety: On Zoloft     4. History of seizures: Not on medications, monitor pain    5. Possible renal artery stenosis  Incidentally noted on CTA chest  Follow-up retroperitoneal ultrasound showed findings compatible with left renal artery stenosis  Vascular surgery on board, plan for arteriogram of the renal arteries on 3/15, underwent bilateral renal artery stent    6.   Low back pain  Start on low-dose gabapentin 100milligram p.o. 3 times daily    DISPOSITION:   Unclear discharge disposition at the moment    Medications:  REVIEWED DAILY    Infusion Medications    sodium chloride       Scheduled Medications    heparin flush  300 Units IntraCATHeter BID    gabapentin  100 mg Oral TID    amLODIPine  10 mg Oral Daily    metoprolol succinate  200 mg Oral Daily    spironolactone  50 mg Oral Daily    ceFAZolin  1,000 mg IntraVENous 60 Min Pre-Op    sertraline  100 mg Oral Daily    sodium chloride flush  5-40 mL IntraVENous 2 times per day    rosuvastatin  40 mg Oral Daily    ticagrelor  90 mg Oral BID    hydroCHLOROthiazide  25 mg Oral Daily    enoxaparin  40 mg SubCUTAneous Daily    sennosides-docusate sodium  2 tablet Oral Daily    aspirin  81 mg Oral Daily    oyster shell calcium w/D  1 tablet Oral Daily    multivitamin  1 tablet Oral Daily    vitamin D  50,000 Units Oral Weekly    pantoprazole  40 mg Oral QAM AC PRN Meds: sodium chloride flush, sodium chloride, ondansetron **OR** ondansetron, polyethylene glycol, acetaminophen **OR** acetaminophen, sodium chloride, perflutren lipid microspheres, morphine, ondansetron    Labs:     Recent Labs     03/14/22  0914 03/15/22  0614 03/16/22  0620   WBC 10.9 11.1 9.9   HGB 11.9 13.3 13.5   HCT 35.8 38.6 39.2    213 230       Recent Labs     03/14/22  0748 03/15/22  0614 03/16/22  0620    140 138   K 4.1 4.0 3.8    103 101   CO2 21* 23 23   BUN 18 23* 22*   CREATININE 1.0 1.0 1.0   CALCIUM 9.1 9.9 9.6   PHOS 3.4 3.8 4.1       Recent Labs     03/14/22  0748 03/15/22  0614 03/16/22  0620   PROT 7.2 7.8 8.3   ALKPHOS 166* 190* 194*   ALT 26 27 36*   AST 60* 48* 57*   BILITOT 0.8 0.6 0.6       No results for input(s): INR in the last 72 hours. No results for input(s): Ricardo Corbin in the last 72 hours. Chronic labs:    Lab Results   Component Value Date    CHOL 208 (H) 03/12/2022    TRIG 153 (H) 03/12/2022    HDL 38 03/12/2022    LDLCALC 139 (H) 03/12/2022    TSH 0.716 04/29/2020    INR 1.1 03/11/2022    LABA1C 5.1 03/12/2022       Radiology: REVIEWED DAILY    +++++++++++++++++++++++++++++++++++++++++++++++++  Terry Reynoso MD  [employer] - [facility], [place]  +++++++++++++++++++++++++++++++++++++++++++++++++  NOTE: This report was transcribed using voice recognition software. Every effort was made to ensure accuracy; however, inadvertent computerized transcription errors may be present.

## 2022-03-16 NOTE — PROGRESS NOTES
CLINICAL PHARMACY NOTE: MEDS TO BEDS    Total # of Prescriptions Filled: 9   The following medications were delivered to the patient:  · Spironolactone 50 mg  · Rosuvastatin calcium 40 mg  · Hydrochlorothiazide 12.5mg capsule  · Gabapentin 100 mg  · Aspirin adult low strength 81 mg  · Vitamin D 1.25 mg  · Metoprolol succinate 100 mg  · Amlodipine besylate 10 mg  · brilinta 90 mg  · Multivitamin OTC  · Delivered to pt @ 10:06A    Additional Documentation:

## 2022-03-16 NOTE — PROGRESS NOTES
[facility]   Nephrology Progress Note    Patient's Name: Nate Posey    ROBERT from 3/13 Consult Note: Vannesa Hameed is a 54 y.o. female with a longstanding history of hypertension, seizure disorder Jeannine-en-Y gastric bypass surgery and several other medical problems listed below. She presented to ED 2 days ago with complaints of chest pain. She stated that she was basically doing household chores when this occurred. Radiated to her right arm and back. She denies associated shortness of breath. No nausea or emesis. No diaphoresis. Presenting vital signs showed a blood pressure of 221/112 which peaked to 228/119, pulse of 55 and a respiration of 20. Laboratory data showed a CBC essentially normal.  Chemistry profile was significant for a BUN 12, creatinine 1, potassium 3.9. High sensitivity troponin was 78. Twelve-lead EKG showed ST elevation in the inferior leads. . CTA of the chest performed revealed no PE but did demonstrate bilateral renal artery stenosis. Patient received heparin, she was started on a nitroglycerin and Cardene drips. Interventional cardiology consultation was obtained. She was taken to Cath Lab and left heart cath demonstrated multivessel coronary artery disease. PCI with MAURICIO was performed to the culprit vessel (RCA ). Postprocedure blood pressure continues to be very difficult to control requiring very high dose of Cardene drip and several oral meds. Hence Renal consult. She is scheduled to undergo CABG to her residual disease at a later time. \"    Subjective:    3/16: Patient was seen and examined. She denies any current chest pain or shortness of breath. Denies any abdominal pain. She did report a little bit of nausea today.     Allergies:  Ultram [tramadol]    Current Medications:    heparin flush 100 UNIT/ML injection 300 Units, BID  gabapentin (NEURONTIN) capsule 100 mg, TID  amLODIPine (NORVASC) tablet 10 mg, Daily  metoprolol succinate (TOPROL XL) extended release tablet 200 mg, Daily  spironolactone (ALDACTONE) tablet 50 mg, Daily  ceFAZolin (ANCEF) 1,000 mg in sterile water 10 mL IV syringe, 60 Min Pre-Op  sertraline (ZOLOFT) tablet 100 mg, Daily  sodium chloride flush 0.9 % injection 5-40 mL, 2 times per day  sodium chloride flush 0.9 % injection 5-40 mL, PRN  0.9 % sodium chloride infusion, PRN  ondansetron (ZOFRAN-ODT) disintegrating tablet 4 mg, Q8H PRN   Or  ondansetron (ZOFRAN) injection 4 mg, Q6H PRN  polyethylene glycol (GLYCOLAX) packet 17 g, Daily PRN  acetaminophen (TYLENOL) tablet 650 mg, Q6H PRN   Or  acetaminophen (TYLENOL) suppository 650 mg, Q6H PRN  0.9 % sodium chloride bolus, PRN  rosuvastatin (CRESTOR) tablet 40 mg, Daily  ticagrelor (BRILINTA) tablet 90 mg, BID  hydroCHLOROthiazide (HYDRODIURIL) tablet 25 mg, Daily  perflutren lipid microspheres (DEFINITY) injection 1.65 mg, ONCE PRN  enoxaparin (LOVENOX) injection 40 mg, Daily  sennosides-docusate sodium (SENOKOT-S) 8.6-50 MG tablet 2 tablet, Daily  aspirin EC tablet 81 mg, Daily  oyster shell calcium w/D 500-200 MG-UNIT tablet 1 tablet, Daily  multivitamin 1 tablet, Daily  vitamin D (ERGOCALCIFEROL) capsule 50,000 Units, Weekly  pantoprazole (PROTONIX) tablet 40 mg, QAM AC  morphine (PF) injection 2 mg, Q4H PRN  ondansetron (ZOFRAN) injection 4 mg, Q6H PRN    Physical exam:  Vitals:    03/16/22 0815   BP: (!) 150/84   Pulse: 70   Resp: 18   Temp: 97.4 °F (36.3 °C)   SpO2: 98%      General: Awake, alert, no acute distress  Skin: Warm/dry; no rashes on exposed extremities  Neck: No JVD noted  Lungs: Clear bilaterally; no adventitious lung sounds; unlabored  Heart: Regular rate and rhythm.   No rub  Abdomen: Soft, non-tender, nondistended; active bowel sounds  Extremities: trace BLE edema  Neurologic: Alert, answers questions appropriately    Data:   Labs:  Lab Results   Component Value Date     03/16/2022     03/15/2022     03/14/2022    K 3.8 03/16/2022    K 4.0 03/15/2022    K 4.1 03/14/2022     03/16/2022    CO2 23 03/16/2022    CO2 23 03/15/2022    CO2 21 (L) 03/14/2022    CREATININE 1.0 03/16/2022    CREATININE 1.0 03/15/2022    CREATININE 1.0 03/14/2022    BUN 22 (H) 03/16/2022    BUN 23 (H) 03/15/2022    BUN 18 03/14/2022    GLUCOSE 111 (H) 03/16/2022    GLUCOSE 102 (H) 03/15/2022    GLUCOSE 92 03/14/2022    PHOS 4.1 03/16/2022    PHOS 3.8 03/15/2022    PHOS 3.4 03/14/2022    WBC 9.9 03/16/2022    WBC 11.1 03/15/2022    WBC 10.9 03/14/2022    HGB 13.5 03/16/2022    HGB 13.3 03/15/2022    HGB 11.9 03/14/2022    HCT 39.2 03/16/2022    HCT 38.6 03/15/2022    HCT 35.8 03/14/2022    MCV 88.7 03/16/2022     03/16/2022     Imaging:  XR CHEST PORTABLE    Result Date: 3/11/2022  EXAMINATION: ONE XRAY VIEW OF THE CHEST 3/11/2022 11:22 pm COMPARISON: 06/14/2021 HISTORY: ORDERING SYSTEM PROVIDED HISTORY: chest pain TECHNOLOGIST PROVIDED HISTORY: Reason for exam:->chest pain What reading provider will be dictating this exam?->CRC FINDINGS: The lungs are without acute focal process. There is no effusion or pneumothorax. The cardiomediastinal silhouette is without acute process. The osseous structures are without acute process. No acute process. CTA CHEST W CONTRAST    Result Date: 3/12/2022  EXAMINATION: CTA OF THE CHEST 3/11/2022 11:43 pm TECHNIQUE: CTA of the chest was performed after the administration of intravenous contrast.  Multiplanar reformatted images are provided for review. MIP images are provided for review. Dose modulation, iterative reconstruction, and/or weight based adjustment of the mA/kV was utilized to reduce the radiation dose to as low as reasonably achievable.  COMPARISON: Chest x-ray 03/11/2022 and CT abdomen/pelvis 06/14/2021 HISTORY: ORDERING SYSTEM PROVIDED HISTORY: Pain radiating to back rule out dissection TECHNOLOGIST PROVIDED HISTORY: Reason for exam:->Pain radiating to back rule out dissection What reading provider will be dictating this exam?->CRC FINDINGS: Aorta: No evidence of thoracic aortic aneurysm, intramural hematoma or dissection. No acute abnormality of the aorta. Mediastinum: No evidence of mediastinal lymphadenopathy. The heart and pericardium demonstrate no acute abnormality. Lungs/Pleura: There is pulmonary interstitial edema with interlobular septal thickening and patchy ground-glass opacification. There is no pneumothorax or pleural effusion. Upper Abdomen: The gallbladder is surgically absent. Stable postsurgical changes in the stomach. Atherosclerotic plaque at the origins of the bilateral renal arteries. There is a high-grade stenosis of the proximal left renal artery and a moderate to high-grade stenosis at the origin of the right renal artery. Soft Tissues/Bones: No acute bone or soft tissue abnormality. Unremarkable CTA of the chest.  No evidence of aneurysm or dissection. Pulmonary interstitial edema. No pneumothorax or pleural effusion. On the last several images obtained, there is evidence of a high-grade stenosis of the proximal left renal artery and a suspected moderate to high-grade stenosis at the origin of the right renal artery. Follow-up nonemergent renal CTA recommended. US DUP ABD PEL RETRO SCROT COMPLETE    Result Date: 3/12/2022  EXAMINATION: DOPPLER EVALUATION OF THE PELVIS 3/12/2022 3:10 pm COMPARISON: None. HISTORY: ORDERING SYSTEM PROVIDED HISTORY: jenna TECHNOLOGIST PROVIDED HISTORY: Reason for exam:->jenna What reading provider will be dictating this exam?->CRC FINDINGS: Kidneys: The right kidney measures 10 in length and the left kidney measures 10 in length. Kidneys demonstrate normal cortical echogenicity. No evidence of hydronephrosis or intrarenal stones. Trace left perinephric fluid. Right renal vasculature: Main renal artery PSV of 129 centimeters/second, proximally. Left renal vasculature: Main renal artery PSV of 207 centimeters/second, proximally. Aorta: PSV of 123 centimeters/second. Bladder: Bladder volume measures 108 mL. No evidence of hydronephrosis. Trace left perinephric fluid is nonspecific. Sonographic findings compatible with left renal artery stenosis. US DUP UPPER EXTREMITY LEFT VENOUS    Result Date: 3/12/2022  EXAMINATION: VENOUS ULTRASOUND OF THE LEFT UPPER EXTREMITY, 3/12/2022 6:10 pm TECHNIQUE: Duplex ultrasound using B-mode/gray scaled imaging and Doppler spectral analysis and color flow was obtained of the deep venous structures of the left upper extremity. COMPARISON: None. HISTORY: ORDERING SYSTEM PROVIDED HISTORY: Unable to insert picc line possible thrombus? TECHNOLOGIST PROVIDED HISTORY: Reason for exam:->Unable to insert picc line possible thrombus? What reading provider will be dictating this exam?->CRC FINDINGS: There is normal flow and compressibility of the visualized venous structures. There is no evidence of echogenic thrombus. The veins demonstrate good compressibility with normal color flow study and spectral analysis. No evidence of DVT. US RETROPERITONEAL GAYATRI    Result Date: 3/12/2022  EXAMINATION: RETROPERITONEAL ULTRASOUND OF THE KIDNEYS AND URINARY BLADDER 3/12/2022 COMPARISON: None HISTORY: ORDERING SYSTEM PROVIDED HISTORY: bilateral GAYATRI, hypertensive emergency TECHNOLOGIST PROVIDED HISTORY: Reason for exam:->bilateral GAYATRI, hypertensive emergency What reading provider will be dictating this exam?->CRC FINDINGS: Kidneys: The right kidney measures 10 in length and the left kidney measures 10 in length. Kidneys demonstrate normal cortical echogenicity. No evidence of hydronephrosis or intrarenal stones. Trace left perinephric fluid. Right renal vasculature: Main renal artery PSV of 129 centimeters/second, proximally. Left renal vasculature: Main renal artery PSV of 207 centimeters/second, proximally. Aorta: PSV of 123 centimeters/second. Bladder: Bladder volume measures 108 mL. No evidence of hydronephrosis.   Trace left perinephric fluid is nonspecific. Sonographic findings compatible with left renal artery stenosis. Assessment/ Plans:    1. Hypertensive emergency   Retroperitoneal US-no Hydro; \"left renal artery stenosis\"  S/p Cardene drip  BP improving  Vascular consulted for renal artery stenosis  Follow    2. Acute MI   S/P LHC with PCI of RCA  On DAPT  Patient to have CABG in future  Cardiothoracic surg s/o   Cardiology following     3. Renal artery stenosis  S/p aortogram and renal arteriogram 3/15 with bilateral renal artery stenosis   Vascular following     4. H/O gastric bypass surgery    Okay to discharge from renal perspective. DA Cook - CNP     Patient seen and examined all key components of the physical performed independently , case discussed with NP, all pertinent labs and radiologic tests personally reviewed agree with above.     Will see in follow up in 2 weeks  Kenney Homans, MD

## 2022-03-25 ENCOUNTER — OFFICE VISIT (OUTPATIENT)
Dept: FAMILY MEDICINE CLINIC | Age: 52
End: 2022-03-25
Payer: COMMERCIAL

## 2022-03-25 VITALS
SYSTOLIC BLOOD PRESSURE: 118 MMHG | BODY MASS INDEX: 33.29 KG/M2 | HEART RATE: 72 BPM | HEIGHT: 64 IN | DIASTOLIC BLOOD PRESSURE: 80 MMHG | WEIGHT: 195 LBS | RESPIRATION RATE: 16 BRPM | OXYGEN SATURATION: 100 % | TEMPERATURE: 97.3 F

## 2022-03-25 DIAGNOSIS — I15.0 RENOVASCULAR HYPERTENSION: Primary | Chronic | ICD-10-CM

## 2022-03-25 DIAGNOSIS — I21.19 ACUTE MI, INFERIOR WALL (HCC): ICD-10-CM

## 2022-03-25 DIAGNOSIS — F32.2 CURRENT SEVERE EPISODE OF MAJOR DEPRESSIVE DISORDER WITHOUT PSYCHOTIC FEATURES WITHOUT PRIOR EPISODE (HCC): ICD-10-CM

## 2022-03-25 DIAGNOSIS — I70.1 BILATERAL RENAL ARTERY STENOSIS (HCC): Chronic | ICD-10-CM

## 2022-03-25 PROCEDURE — 99213 OFFICE O/P EST LOW 20 MIN: CPT | Performed by: FAMILY MEDICINE

## 2022-03-25 PROCEDURE — G8417 CALC BMI ABV UP PARAM F/U: HCPCS | Performed by: FAMILY MEDICINE

## 2022-03-25 PROCEDURE — G8427 DOCREV CUR MEDS BY ELIG CLIN: HCPCS | Performed by: FAMILY MEDICINE

## 2022-03-25 PROCEDURE — 1111F DSCHRG MED/CURRENT MED MERGE: CPT | Performed by: FAMILY MEDICINE

## 2022-03-25 PROCEDURE — 99212 OFFICE O/P EST SF 10 MIN: CPT | Performed by: FAMILY MEDICINE

## 2022-03-25 PROCEDURE — G8484 FLU IMMUNIZE NO ADMIN: HCPCS | Performed by: FAMILY MEDICINE

## 2022-03-25 PROCEDURE — 1036F TOBACCO NON-USER: CPT | Performed by: FAMILY MEDICINE

## 2022-03-25 RX ORDER — ESCITALOPRAM OXALATE 5 MG/1
5 TABLET ORAL DAILY
Qty: 30 TABLET | Refills: 1 | Status: SHIPPED | OUTPATIENT
Start: 2022-03-25

## 2022-03-25 SDOH — ECONOMIC STABILITY: FOOD INSECURITY: WITHIN THE PAST 12 MONTHS, YOU WORRIED THAT YOUR FOOD WOULD RUN OUT BEFORE YOU GOT MONEY TO BUY MORE.: NEVER TRUE

## 2022-03-25 SDOH — ECONOMIC STABILITY: FOOD INSECURITY: WITHIN THE PAST 12 MONTHS, THE FOOD YOU BOUGHT JUST DIDN'T LAST AND YOU DIDN'T HAVE MONEY TO GET MORE.: NEVER TRUE

## 2022-03-25 ASSESSMENT — PATIENT HEALTH QUESTIONNAIRE - PHQ9
SUM OF ALL RESPONSES TO PHQ9 QUESTIONS 1 & 2: 2
10. IF YOU CHECKED OFF ANY PROBLEMS, HOW DIFFICULT HAVE THESE PROBLEMS MADE IT FOR YOU TO DO YOUR WORK, TAKE CARE OF THINGS AT HOME, OR GET ALONG WITH OTHER PEOPLE: 1
7. TROUBLE CONCENTRATING ON THINGS, SUCH AS READING THE NEWSPAPER OR WATCHING TELEVISION: 0
6. FEELING BAD ABOUT YOURSELF - OR THAT YOU ARE A FAILURE OR HAVE LET YOURSELF OR YOUR FAMILY DOWN: 1
SUM OF ALL RESPONSES TO PHQ QUESTIONS 1-9: 7
5. POOR APPETITE OR OVEREATING: 0
SUM OF ALL RESPONSES TO PHQ QUESTIONS 1-9: 7
1. LITTLE INTEREST OR PLEASURE IN DOING THINGS: 0
2. FEELING DOWN, DEPRESSED OR HOPELESS: 2
9. THOUGHTS THAT YOU WOULD BE BETTER OFF DEAD, OR OF HURTING YOURSELF: 0
SUM OF ALL RESPONSES TO PHQ QUESTIONS 1-9: 7
3. TROUBLE FALLING OR STAYING ASLEEP: 3
8. MOVING OR SPEAKING SO SLOWLY THAT OTHER PEOPLE COULD HAVE NOTICED. OR THE OPPOSITE, BEING SO FIGETY OR RESTLESS THAT YOU HAVE BEEN MOVING AROUND A LOT MORE THAN USUAL: 0
SUM OF ALL RESPONSES TO PHQ QUESTIONS 1-9: 7
4. FEELING TIRED OR HAVING LITTLE ENERGY: 1

## 2022-03-25 ASSESSMENT — ENCOUNTER SYMPTOMS
VOMITING: 0
SHORTNESS OF BREATH: 0
CONSTIPATION: 0
DIARRHEA: 0
WHEEZING: 0
ABDOMINAL PAIN: 0
COUGH: 0
NAUSEA: 0

## 2022-03-25 ASSESSMENT — SOCIAL DETERMINANTS OF HEALTH (SDOH): HOW HARD IS IT FOR YOU TO PAY FOR THE VERY BASICS LIKE FOOD, HOUSING, MEDICAL CARE, AND HEATING?: SOMEWHAT HARD

## 2022-03-25 NOTE — PROGRESS NOTES
S: 54 y.o. female here for transfer of care and HTN, HLD. MI 2 wks ago, stents placed, planning possible CABG soon. B/l renal artery stents placed for GAYATRI. Depression. Pmh, but off meds recently and feeling worse. O: VS: /80   Pulse 72   Temp 97.3 °F (36.3 °C) (Temporal)   Resp 16   Ht 5' 4\" (1.626 m)   Wt 195 lb (88.5 kg)   SpO2 100%   BMI 33.47 kg/m²    General: NAD, alert and interacting appropriately. Tearful throughout encounter   CV:  RRR, no gallops, rubs, or murmurs    Resp: CTAB   Abd:  Soft, nontender   Ext:  No edema    Impression: HTN. HLD. CAD s/p stent. GAYATRI. depression  Plan:   CPM HTN  CPM HLD  F/u w/ Cards  F/u w/ Vascular  Lexapro, counseling  rtc 1 mo     Attending Physician Statement  I have discussed the case, including pertinent history and exam findings with the resident. I agree with the documented assessment and plan.

## 2022-03-25 NOTE — PROGRESS NOTES
[address] [place] [address]  FAMILY MEDICINE RESIDENCY PROGRAM  DATE OF VISIT : 3/25/2022    Patient : Tahira Skelton   Age : 54 y.o.  : 1970   MRN : [id number]   ______________________________________________________________________    Chief Complaint :   Chief Complaint   Patient presents with    New Patient     CVE       HPI : Tahira Skelton is 54 y.o. female who presented to the clinic today for new patient encounter. Recent MI 2 weeks ago today (3/11). Was admitted for 4 days following MI, Underwent cardiac catheterization with stent placement. She was also seen by Dr. Priya Gomez for possible CABG, has upcoming appt with Dr. Haleigh Vargas and Dr. Priya Gomez. Tolerating her Brilinta well. Bilateral Renal Artery stenosis:  Underwent bilateral renal artery stents on 3/15 by Dr. Nina Bain. ASA, Lipitor and Brilinta. Upcoming appt with Dr. Karoline Rodriguez.     HTN: Norvasc 10mg daily, HCTZ 12.5 daily, Metoprolol 100mg daily, Spironolactone 50mg. HLD: Crestor 40mg daily. Depression and anxiety: stopped taking her meds 1 week ago. Previously on Zoloft but was not working. Shes constantly tearful, trouble concentrating, irritability, hopelessness. Despite the medication she was still having the same feelings and worsening and more frequent episodes of crying. I reviewed the patient's past medications, allergies and past medical history during this visit.     Past Medical History :        Past Medical History:   Diagnosis Date    Anorexia 2015    Arthritis of knee     Back pain, chronic     Bilateral renal artery stenosis ([facility] 3/13/2022    Chronic pain 2015    Current severe episode of major depressive disorder without psychotic features without prior episode ([facility] 3/16/2021    H/O gastric bypass 2015    Hypertension 2012    Hypertension     Metabolic acidosis 7814    Ovarian cyst     Seizures ([facility]      Past Surgical History:   Procedure Laterality Date    CHOLECYSTECTOMY[facility]AROSCOPIC  9/19/1995    [facility]    ENDOMET[facility]AL [facility]BLATION  2003    [facility]    GASTRIC BYPASS SURGERY      HERNIA REPAIR      umbil    HYSTERECTOMY Left 2/5/2013    Laparotomy;HUGO;JOSUÉ:LSO    LAPAROSCOPY  2/12/2009    lap assisted percutaneous ERCP and removal CBD stone and gastrostomy, [address] HISTORY  12/28/11    exp lap, hugo, rt so/ repair hernia    EMILY-EN-Y GASTRIC BYPASS  12/2/2008    laparoscopic RYGB, Dr. Sang Huertas, [facility]  6/25/2010    resection of jejunal ulcer, reversal gastric bypass, jejunoduodenostomy, feeding jejunostomy, Dr. Sang Huertas, [address]  10/3/2008    mild gastritis & duodenitis, Dr. Sang Huertas, [address] ENDOSCOPY  1/9/2009    multiple severe anastomotic jejunal ulcers, Dr. Sang Huertas.  [facility]    UPPER GASTROINTESTINAL ENDOSCOPY  2/4/2009    ulcers healed, normal, Dr. Krystian Wynne, [address] ENDOSCOPY  3/6/2009    recurrent severe anastomotic jejunal ulcers, Dr. Sang Huertas, [address] ENDOSCOPY  6/4/2009    severe anastomotic jejunal ulcers, Dr. Sang Huertas, [address] ENDOSCOPY  7/2/2009    severe anastomotic jejunal ulcers, Dr. Sang Huertas, [address] GASTROINTESTINAL ENDOSCOPY  10/27/2009    severe anastomotic jejunal ulcers, Dr. Sang Huertas, [address] GASTROINTESTINAL ENDOSCOPY  1/4/2010    severe anastomotic jejunal ulcers, Dr. Sang Huertas, [address] ENDOSCOPY  6/7/2010    severe anastomotic jejunal ulcers, Dr. Sang Huertas, [address] ENDOSCOPY  7/30/2010    gastritis, Dr. Sang Huertas, [address] ENDOSCOPY N/A 5/10/2019    EGD ESOPHAGOGASTRODUODENOSCOPY performed by Abdifatah Posey MD at [address] [place] [address] 5/12/2019    EGD DIAGNOSTIC ONLY performed by Abdifatah Posey MD at [address] [place] [address] N/A 5/17/2019    EGD ESOPHAGOGASTRODUODENOSCOPY performed by Demetrice Sparks MD at [facility] History :  Social History     Tobacco History     Smoking Status  Former Smoker Quit date  3/11/2022 Smoking Frequency  0.5 packs/day for 2 years (1 pk yrs) Smoking Tobacco Type  Cigarettes    Smokeless Tobacco Use  Never Used          Alcohol History     Alcohol Use Status  No          Drug Use     Drug Use Status  Yes Types  Marijuana Marco A Lala)          Sexual Activity     Sexually Active  Not Currently                 Allergies : Allergies   Allergen Reactions    Ultram [Tramadol] Other (See Comments)     Pt states she had a seizure after taking ultram       Medication List :    Current Outpatient Medications   Medication Sig Dispense Refill    escitalopram (LEXAPRO) 5 MG tablet Take 1 tablet by mouth daily 30 tablet 1    ticagrelor (BRILINTA) 90 MG TABS tablet Take 1 tablet by mouth 2 times daily 60 tablet 2    spironolactone (ALDACTONE) 50 MG tablet Take 1 tablet by mouth daily 30 tablet 3    rosuvastatin (CRESTOR) 40 MG tablet Take 1 tablet by mouth daily 30 tablet 3    gabapentin (NEURONTIN) 100 MG capsule Take 1 capsule by mouth 3 times daily for 30 days. 90 capsule 0    aspirin 81 MG EC tablet Take 1 tablet by mouth daily 30 tablet 3    amLODIPine (NORVASC) 10 MG tablet Take 1 tablet by mouth daily 30 tablet 3    hydroCHLOROthiazide (MICROZIDE) 12.5 MG capsule take 1 capsule by mouth once daily 90 capsule 1    metoprolol succinate (TOPROL XL) 100 MG extended release tablet Take 1 tablet by mouth daily 30 tablet 0    Multiple Vitamin (MULTIVITAMIN ADULT) TABS Take 1 tablet by mouth daily 90 tablet 3    vitamin D (ERGOCALCIFEROL) 1.25 MG (40298 UT) CAPS capsule take 1 capsule by mouth every Sunday AND 2000 UNITS DAILY EXCEPT SUNDAY 12 capsule 3     No current facility-administered medications for this visit.         Review of Systems :  Review of Systems   Constitutional: Positive for fatigue. Negative for chills and fever. Respiratory: Negative for cough, shortness of breath and wheezing. Cardiovascular: Negative for chest pain, palpitations and leg swelling. Gastrointestinal: Negative for abdominal pain, constipation, diarrhea, nausea and vomiting. Neurological: Negative for dizziness, weakness, light-headedness, numbness and headaches.     ______________________________________________________________________    Physical Exam :    Vitals: /80   Pulse 72   Temp 97.3 °F (36.3 °C) (Temporal)   Resp 16   Ht 5' 4\" (1.626 m)   Wt 195 lb (88.5 kg)   SpO2 100%   BMI 33.47 kg/m²   Physical Exam  Vitals reviewed. Constitutional:       General: She is not in acute distress. Appearance: Normal appearance. Cardiovascular:      Rate and Rhythm: Normal rate and regular rhythm. Pulses: Normal pulses. Heart sounds: Normal heart sounds. No murmur heard. Pulmonary:      Effort: Pulmonary effort is normal. No respiratory distress. Breath sounds: Normal breath sounds. No wheezing. Abdominal:      General: Bowel sounds are normal. There is no distension. Palpations: Abdomen is soft. Tenderness: There is no abdominal tenderness. Musculoskeletal:      Right lower leg: No edema. Left lower leg: No edema. Neurological:      Mental Status: She is alert. Psychiatric:         Attention and Perception: Attention normal.         Mood and Affect: Mood is depressed. Affect is tearful.         ___________________    Assessment & Plan :    1. Renovascular hypertension  - continue present mgmt  - continue follow-up with Dr. Livan Alex    2. Acute MI, inferior wall (HCC)  - continue follow-up with CTS for possible CABG    3. Bilateral renal artery stenosis (HCC)  - continue following with Dr. Manuel Villalobos    4.  Current severe episode of major depressive disorder without psychotic features without prior episode ([facility]  - start Lexapro  - extensive counseling provided  - schedule appointment for joined appointment with Dr. Ramona Turner  - escitalopram (LEXAPRO) 5 MG tablet; Take 1 tablet by mouth daily  Dispense: 30 tablet; Refill: 1      Educational materials and/or home exercises printed for patient's review and were included in patient instructions on his/her After Visit Summary and given to patient at the end of visit. Counseled regarding above diagnosis, including possible risks and complications,  especially if left uncontrolled. Counseled regarding the possible side effects, risks, benefits and alternatives to treatment; patient and/or guardian verbalizes understanding, agrees, feels comfortable with and wishes to proceed with above treatment plan. Advised patient to call with any new medication issues, and read all Rx info from pharmacy to assure aware of all possible risks and side effects of medication before taking. Reviewed age and gender appropriate health screening exams and vaccinations. Advised patient regarding importance of keeping up with recommended health maintenance and to schedule as soon as possible if overdue, as this is important in assessing for undiagnosed pathology, especially cancer, as well as protecting against potentially harmful/life threatening disease. Patient and/or guardian verbalizes understanding and agrees with above counseling, assessment and plan. All questions answered    Additional plan and future considerations:   RTO in 1mos    Return to Office: Return in about 4 weeks (around 4/22/2022) for Comnined visit with Dr. Ramona Turner.     Beti Núñez MD   Case discussed with Dr. Royer Nesbitt

## 2022-04-08 ENCOUNTER — OFFICE VISIT (OUTPATIENT)
Dept: CARDIOLOGY CLINIC | Age: 52
End: 2022-04-08
Payer: COMMERCIAL

## 2022-04-08 ENCOUNTER — TELEPHONE (OUTPATIENT)
Dept: VASCULAR SURGERY | Age: 52
End: 2022-04-08

## 2022-04-08 VITALS
WEIGHT: 195.8 LBS | HEART RATE: 58 BPM | RESPIRATION RATE: 16 BRPM | SYSTOLIC BLOOD PRESSURE: 112 MMHG | BODY MASS INDEX: 33.43 KG/M2 | DIASTOLIC BLOOD PRESSURE: 70 MMHG | OXYGEN SATURATION: 97 % | HEIGHT: 64 IN

## 2022-04-08 DIAGNOSIS — I70.1 BILATERAL RENAL ARTERY STENOSIS (HCC): ICD-10-CM

## 2022-04-08 DIAGNOSIS — I15.0 RENOVASCULAR HYPERTENSION: ICD-10-CM

## 2022-04-08 DIAGNOSIS — I25.10 CORONARY ARTERY DISEASE INVOLVING NATIVE CORONARY ARTERY OF NATIVE HEART WITHOUT ANGINA PECTORIS: Primary | ICD-10-CM

## 2022-04-08 DIAGNOSIS — I21.19 ACUTE MI, INFERIOR WALL (HCC): ICD-10-CM

## 2022-04-08 PROCEDURE — 1111F DSCHRG MED/CURRENT MED MERGE: CPT | Performed by: INTERNAL MEDICINE

## 2022-04-08 PROCEDURE — 99214 OFFICE O/P EST MOD 30 MIN: CPT | Performed by: INTERNAL MEDICINE

## 2022-04-08 PROCEDURE — G8427 DOCREV CUR MEDS BY ELIG CLIN: HCPCS | Performed by: INTERNAL MEDICINE

## 2022-04-08 PROCEDURE — 93000 ELECTROCARDIOGRAM COMPLETE: CPT | Performed by: INTERNAL MEDICINE

## 2022-04-08 PROCEDURE — 1036F TOBACCO NON-USER: CPT | Performed by: INTERNAL MEDICINE

## 2022-04-08 PROCEDURE — G8417 CALC BMI ABV UP PARAM F/U: HCPCS | Performed by: INTERNAL MEDICINE

## 2022-04-08 RX ORDER — AMLODIPINE BESYLATE 10 MG/1
10 TABLET ORAL DAILY
Qty: 90 TABLET | Refills: 3 | Status: ON HOLD
Start: 2022-04-08 | End: 2022-06-27 | Stop reason: HOSPADM

## 2022-04-08 RX ORDER — ASPIRIN 81 MG/1
81 TABLET ORAL DAILY
Qty: 90 TABLET | Refills: 3 | Status: SHIPPED | OUTPATIENT
Start: 2022-04-08

## 2022-04-08 RX ORDER — ROSUVASTATIN CALCIUM 40 MG/1
40 TABLET, COATED ORAL DAILY
Qty: 90 TABLET | Refills: 3 | Status: SHIPPED | OUTPATIENT
Start: 2022-04-08

## 2022-04-08 RX ORDER — SPIRONOLACTONE 50 MG/1
50 TABLET, FILM COATED ORAL DAILY
Qty: 90 TABLET | Refills: 3 | Status: ON HOLD
Start: 2022-04-08 | End: 2022-06-27 | Stop reason: HOSPADM

## 2022-04-08 RX ORDER — METOPROLOL SUCCINATE 100 MG/1
100 TABLET, EXTENDED RELEASE ORAL DAILY
Qty: 90 TABLET | Refills: 3 | Status: ON HOLD
Start: 2022-04-08 | End: 2022-06-27 | Stop reason: HOSPADM

## 2022-04-08 NOTE — PATIENT INSTRUCTIONS
1. Stop hydrochlorothiazide (HCTZ)  2. Continue all other medications as is  3. Follow up with Dr. Nghia Blunt regarding timing of your open heart surgery  4.  Return in 3 months

## 2022-04-08 NOTE — LETTER
[facility]  [address] [place] [address] Drive  Phone: [phone]  Fax: [phone]    Kavya Drake MD    April 8, 2022     Hector Bowie, [address] [place] [address]    Patient: Nataliia Juan   MR Number: [id number]   YOB: 1970   Date of Visit: 4/8/2022       Dear Dr. Baldo Mauricio:    Thank you for referring Nataliia Juan to me for evaluation/treatment. Below are the relevant portions of my assessment and plan of care. If you have questions, please do not hesitate to call me. I look forward to following Kelsey along with you.     Sincerely,      Kavya Drake MD

## 2022-04-08 NOTE — PROGRESS NOTES
[facility]   [facility] [place]   Clinic Note     Date:4/8/22   Patient Sophy Pabon  YOB: 1970  Age: 54 y.o. Primary Care Provider: Con Lopez MD    Subjective     46-year-old  female who presents for follow-up accompanied by her daughter. She has not had any chest pain since her discharge from the hospital.  She is tolerating her medications without any pathologic bleeding. She has not had any dyspnea or lower extremity edema orthopnea or PND or palpitations or syncope. She is lightheaded when she stands up and has occasional leg cramps. She still gets occasionally short of breath with exertion. A focused history review includes:  1. CAD:   a. Acute inferolateral STEMI (3/12/2022-CHRISTINA) status post successful IVUS guided primary PCI of the RCA with 3 overlapping MAURICIO from proximal to distal  b. Residual disease includes severe mid LAD/D2 bifurcation, proximal circumflex/OM1/OM2 bifurcation and RPL3  c. TTE with normal biventricular size and systolic function mild MR  2. Renovascular hypertension status post bilateral renal artery stenting (3/15/2022-Dr. Gonzales)   3. Smoker 20 pack-years, quit at time of MI  4. Gastric bypass with Jeannine-en-Y  5. Chronic pain  6.  Seizures in past        Past History    Past Medical History:         Diagnosis Date    Anorexia 06/01/2015    Arthritis of knee     Back pain, chronic     Bilateral renal artery stenosis ([address] 03/13/2022    CAD (coronary artery disease)     Chronic pain 06/01/2015    Current severe episode of major depressive disorder without psychotic features without prior episode ([address] 03/16/2021    H/O gastric bypass 06/01/2015    Hypertension 01/01/2012    Hypertension 45/83/7090    Metabolic acidosis 71/32/1785    Ovarian cyst     Seizures ([address]        Past Surgical History:  Past Surgical History:   Procedure Laterality Date    CHOLECYSTECT[facility]COPIC 09/19/1995    [facility]    CORONARY ANGIOPLASTY WITH STENT PLACEMENT  03/12/2022    [facility]    GASTRIC BYPASS SURGERY      HERNIA REPAIR      umbil    HYSTERECTOMY Left 02/05/2013    Laparotomy;HUGO;JOSUÉ:LSO    LAPAROSCOPY  02/12/2009    lap assisted percutaneous ERCP and removal CBD stone and gastrostomy, [address] HISTORY  12/28/2011    exp lap, hugo, rt so/ repair hernia    EMILY-EN-Y GASTRIC BYPASS  12/02/2008    laparoscopic RYGB, Dr. Kristina Newberry, [address]  06/25/2010    resection of jejunal ulcer, reversal gastric bypass, jejunoduodenostomy, feeding jejunostomy, Dr. Kristina Newberry, [address] ENDOSCOPY  10/03/2008    mild gastritis & duodenitis, Dr. Kristina Newberry, [address] ENDOSCOPY  01/09/2009    multiple severe anastomotic jejunal ulcers, Dr. Kristina Newberry.  [facility]    UPPER GASTROINTESTINAL ENDOSCOPY  02/04/2009    ulcers healed, normal, Dr. Rivas Newton, [address] ENDOSCOPY  03/06/2009    recurrent severe anastomotic jejunal ulcers, Dr. Kristina Newberry, [address] ENDOSCOPY  06/04/2009    severe anastomotic jejunal ulcers, Dr. Kristina Newberry, [address] ENDOSCOPY  07/02/2009    severe anastomotic jejunal ulcers, Dr. Kristina Newberry, [address] ENDOSCOPY  10/27/2009    severe anastomotic jejunal ulcers, Dr. Kristina Newberry, [address] ENDOSCOPY  01/04/2010    severe anastomotic jejunal ulcers, Dr. Kristina Newberry, [address] ENDOSCOPY  06/07/2010    severe anastomotic jejunal ulcers, Dr. Kristina Newberry, [address] ENDOSCOPY  07/30/2010    gastritis, Dr. Kristina Newberry, [address] ENDOSCOPY N/A 05/10/2019    EGD ESOPHAGOGASTRODUODENOSCOPY performed by Roman Sanchez MD at [address] [place] [address] 05/12/2019    EGD DIAGNOSTIC ONLY performed by Rao Clayton Poppy Reyes MD at [facility] N/A 05/17/2019    EGD ESOPHAGOGASTRODUODENOSCOPY performed by Giovanna Johnston MD at [facility] History:    Social History     Tobacco History     Smoking Status  Former Smoker Quit date  3/11/2022 Smoking Frequency  0.5 packs/day for 15 years (7.5 pk yrs) Smoking Tobacco Type  Cigarettes    Smokeless Tobacco Use  Never Used          Alcohol History     Alcohol Use Status  Not Currently Drinks/Week  0 Standard drinks or equivalent per week Comment  rare          Drug Use     Drug Use Status  Yes Types  Marijuana (Weed) Frequency   7 times/week          Sexual Activity     Sexually Active  Not Currently                    Family History:-      Problem Relation Age of Onset    ADHD Paternal Grandfather          Review of Systems   Negative except as in HPI        Medications     Current Outpatient Medications   Medication Sig Dispense Refill    ticagrelor (BRILINTA) 90 MG TABS tablet Take 1 tablet by mouth 2 times daily 180 tablet 3    spironolactone (ALDACTONE) 50 MG tablet Take 1 tablet by mouth daily 90 tablet 3    rosuvastatin (CRESTOR) 40 MG tablet Take 1 tablet by mouth daily 90 tablet 3    metoprolol succinate (TOPROL XL) 100 MG extended release tablet Take 1 tablet by mouth daily 90 tablet 3    amLODIPine (NORVASC) 10 MG tablet Take 1 tablet by mouth daily 90 tablet 3    aspirin 81 MG EC tablet Take 1 tablet by mouth daily 90 tablet 3    escitalopram (LEXAPRO) 5 MG tablet Take 1 tablet by mouth daily 30 tablet 1    gabapentin (NEURONTIN) 100 MG capsule Take 1 capsule by mouth 3 times daily for 30 days. 90 capsule 0    Multiple Vitamin (MULTIVITAMIN ADULT) TABS Take 1 tablet by mouth daily 90 tablet 3    vitamin D (ERGOCALCIFEROL) 1.25 MG (42213 UT) CAPS capsule take 1 capsule by mouth every Sunday AND 2000 UNITS DAILY EXCEPT SUNDAY 12 capsule 3     No current facility-administered medications for this visit. Physical Examination      /70 (Site: Left Upper Arm, Position: Sitting, Cuff Size: Medium Adult)   Pulse 58   Resp 16   Ht 5' 4\" (1.626 m)   Wt 195 lb 12.8 oz (88.8 kg)   SpO2 97%   BMI 33.61 kg/m²   Body surface area is 2 meters squared. General: No acute distress, appears as stated age, nonicteric  Head: Atraumatic, no gross abnormalities or bruises  Neck: Supple and nontender, no carotid bruits, no JVP  Lungs: Clear to auscultation bilaterally, no wheezes, rales, or rhonchi  Heart: Regular rate and rhythm, no murmurs, rubs, or gallops  Abdomen: Soft, nontender, nondistended, normal bowel sounds  Extremities: No obvious deformities, no cyanosis, no edema  Neurological: Alert and oriented x3, EOMI, moving all extremities x4  Psychological: Normal mood and affect, cooperative  Skin: Color, texture, and turgor normal for age          Labs/Imaging/Diagnostics   Personally reviewed:    Lab Results   Component Value Date     03/16/2022    K 3.8 03/16/2022    K 4.0 03/13/2022     03/16/2022    CO2 23 03/16/2022    BUN 22 03/16/2022    CREATININE 1.0 03/16/2022    GLUCOSE 111 03/16/2022    GLUCOSE 83 04/28/2012    CALCIUM 9.6 03/16/2022        Estimated Creatinine Clearance: 73 mL/min (based on SCr of 1 mg/dL).     Lab Results   Component Value Date    WBC 9.9 03/16/2022    HGB 13.5 03/16/2022    HCT 39.2 03/16/2022    MCV 88.7 03/16/2022     03/16/2022       Lab Results   Component Value Date    ALT 36 (H) 03/16/2022    AST 57 (H) 03/16/2022    ALKPHOS 194 (H) 03/16/2022    BILITOT 0.6 03/16/2022       Lab Results   Component Value Date    LABPROT 0.2 06/01/2015    LABALBU 4.1 03/16/2022       Lab Results   Component Value Date    CHOL 208 (H) 03/12/2022    CHOL 185 04/29/2020    CHOL 211 (H) 09/13/2019     Lab Results   Component Value Date    TRIG 153 (H) 03/12/2022    TRIG 127 04/29/2020    TRIG 93 09/13/2019     Lab Results   Component Value Date    HDL 38 03/12/2022    HDL 40 04/29/2020    HDL 47 09/13/2019     Lab Results   Component Value Date    LDLCALC 139 (H) 03/12/2022    LDLCALC 120 (H) 04/29/2020    LDLCALC 145 (H) 09/13/2019     Lab Results   Component Value Date    LABVLDL 31 03/12/2022    LABVLDL 25 04/29/2020    LABVLDL 19 09/13/2019         Lab Results   Component Value Date    LABA1C 5.1 03/12/2022         Personally interpreted the following studies.:  EKG: Normal sinus rhythm with extensive ST-T abnormality     Assessment and Plan:        53-year-old  female who is recovering nicely after acute inferior MI treated successfully with primary PCI. She has no angina or heart failure or arrhythmia subjectively and her exam is unremarkable. She is tolerating her medications well. Diagnosis Orders   1. Coronary artery disease involving native coronary artery of native heart without angina pectoris  EKG 12 Lead   2. Renovascular hypertension  metoprolol succinate (TOPROL XL) 100 MG extended release tablet   3. Bilateral renal artery stenosis (HCC)     4. Acute MI, inferior wall ([address]           She has significant residual obstructive CAD including LAD bifurcation and circumflex bifurcation and RPL3. Given her young age and the presence of bifurcation lesions she is better served likely with CABG over multivessel PCI. She will follow up with Dr. Nghia Blunt regarding timing of surgery. Her ticagrelor will be held around the time of surgery but aspirin will be continued without interruption.  Continue metoprolol, amlodipine, spironolactone as is for now. Discontinue hydrochlorothiazide. Her blood pressure is much better controlled after renal artery stenting   Continue rosuvastatin as is       Assessment and plan reviewed with the patient/family and their questions and concerns answered in full.  Follow up with me in 3 months    We appreciate the opportunity to participate in Her care!       Angelina Valadez MD, [facility] [place]  Interventional Cardiology/Structural Heart Disease  Office: [phone]  Fax: [phone]  Office Coordinators: Lizzie Summers

## 2022-04-11 ASSESSMENT — ENCOUNTER SYMPTOMS: COUGH: 0

## 2022-04-11 NOTE — PROGRESS NOTES
Subjective:      Patient ID: Chaparrita Spann is a 54 y.o. female. HPI   This is a 54year old female with significant PMH of inferior STEMI s/p PCI on 3/11/22, bilateral renal artery stenosis, CAD, depression, gastric bypass, HTN, former smoker, and seizures who presents to the office today to discuss surgery. On 3/11/22, the patient presented to the ED with chest pain and was found to have a STEMI. She was emergently brought to the cath lab and successful IVUS guided primary PCI with 3 overlapping MAURICIO from the distal to proximal RCA was performed. She was placed on Brilinta with plan of continuing for at least 12 months. Our service was consulted for staged CABG. Vascular surgery was also consulted to evaluate renal artery stenosis incidentally found on CTA of the chest. She underwent successful bilateral renal artery stenting during the same admission. Echo from 3/11 revealed normal EF and no significant valvular abnormalities. Today she denies CP, SOB, BENNETT, palpations or any other major concerns. Review of Systems   Constitutional: Negative for chills, diaphoresis, fatigue and fever. Respiratory: Negative for cough. See HPI   Cardiovascular: Negative for leg swelling. See HPI   Neurological: Negative for dizziness and weakness. Objective:   Physical Exam  Constitutional:       Appearance: Normal appearance. Cardiovascular:      Rate and Rhythm: Normal rate and regular rhythm. Pulses: Normal pulses. Heart sounds: Normal heart sounds. Pulmonary:      Effort: Pulmonary effort is normal.      Breath sounds: Normal breath sounds. Abdominal:      Palpations: Abdomen is soft. Skin:     General: Skin is warm and dry. Neurological:      Mental Status: She is alert and oriented to person, place, and time.    Psychiatric:         Mood and Affect: Mood normal.         Behavior: Behavior normal.         Assessment:      CAD, S/P PCI      Plan:      She is very stable without any symptoms. She has new MAURICIO in her heart and new RA stents. I think waiting to 3 months would be beneficial unless she has recurrence of her symptoms. I will see her back May 17th, or sooner if she develops symptoms.

## 2022-04-12 ENCOUNTER — INITIAL CONSULT (OUTPATIENT)
Dept: CARDIOTHORACIC SURGERY | Age: 52
End: 2022-04-12
Payer: COMMERCIAL

## 2022-04-12 VITALS
DIASTOLIC BLOOD PRESSURE: 71 MMHG | BODY MASS INDEX: 32.44 KG/M2 | SYSTOLIC BLOOD PRESSURE: 110 MMHG | WEIGHT: 190 LBS | HEIGHT: 64 IN | HEART RATE: 62 BPM

## 2022-04-12 DIAGNOSIS — I25.10 CAD IN NATIVE ARTERY: Primary | ICD-10-CM

## 2022-04-12 PROCEDURE — 99214 OFFICE O/P EST MOD 30 MIN: CPT | Performed by: THORACIC SURGERY (CARDIOTHORACIC VASCULAR SURGERY)

## 2022-04-12 PROCEDURE — 1111F DSCHRG MED/CURRENT MED MERGE: CPT | Performed by: THORACIC SURGERY (CARDIOTHORACIC VASCULAR SURGERY)

## 2022-04-12 PROCEDURE — G8417 CALC BMI ABV UP PARAM F/U: HCPCS | Performed by: THORACIC SURGERY (CARDIOTHORACIC VASCULAR SURGERY)

## 2022-04-12 PROCEDURE — G8427 DOCREV CUR MEDS BY ELIG CLIN: HCPCS | Performed by: THORACIC SURGERY (CARDIOTHORACIC VASCULAR SURGERY)

## 2022-04-12 PROCEDURE — 1036F TOBACCO NON-USER: CPT | Performed by: THORACIC SURGERY (CARDIOTHORACIC VASCULAR SURGERY)

## 2022-05-16 ASSESSMENT — ENCOUNTER SYMPTOMS
SHORTNESS OF BREATH: 0
COUGH: 0
CHEST TIGHTNESS: 0

## 2022-05-16 NOTE — PROGRESS NOTES
Subjective:      Patient ID: Jason Kemp is a 54 y.o. female. HPI   This is a 54year old female with significant PMH of inferior STEMI s/p PCI on 3/11/22, bilateral renal artery stenosis s/p bilateral stenting, CAD, depression, gastric bypass, HTN, former smoker, and seizures who presents to the office today to further discuss surgery. On 3/11/22, the patient presented to the ED with chest pain and was found to have a STEMI. She was emergently brought to the cath lab and successful IVUS guided primary PCI with 3 overlapping MAURICIO from the distal to proximal RCA was performed. She was placed on Brilinta with plans of continuing for at least 12 months. Echo from 3/11 revealed normal EF and no significant valvular abnormalities. She was last seen in our office on 4/12/22. At that time she was completely asymptomatic and it was decided to allow 3 months of [address]. Today she continues to denies CP, SOB, BENNETT, palpations or any other major concerns. Review of Systems   Constitutional: Negative for chills, diaphoresis, fatigue and fever. Respiratory: Negative for cough, chest tightness and shortness of breath. Cardiovascular: Negative for chest pain, palpitations and leg swelling. Neurological: Negative for dizziness, weakness and light-headedness. Objective:   Physical Exam  Constitutional:       Appearance: Normal appearance. Cardiovascular:      Rate and Rhythm: Normal rate and regular rhythm. Pulses: Normal pulses. Heart sounds: Normal heart sounds. Pulmonary:      Effort: Pulmonary effort is normal.      Breath sounds: Normal breath sounds. Abdominal:      Palpations: Abdomen is soft. Skin:     General: Skin is warm and dry. Neurological:      Mental Status: She is alert and oriented to person, place, and time.    Psychiatric:         Mood and Affect: Mood normal.         Behavior: Behavior normal.         Assessment:      CAD      Plan:      PAT on 6/13 with OR 6/20 for CABG LIMA-LAD, Radial-OM, and GSV to Diag +/- GSV to RPL. All risks, benefits, alternatives and potential complications explained thoroughly including, but not limited to, bleeding, infection, lung injury, kidney injury, stroke, heart attack, prolonged ventilation, wound complication, need for re-operation, and death, and the patient agrees to proceed.   Stop Jared 6/13

## 2022-05-17 ENCOUNTER — PREP FOR PROCEDURE (OUTPATIENT)
Dept: CARDIOTHORACIC SURGERY | Age: 52
End: 2022-05-17

## 2022-05-17 ENCOUNTER — OFFICE VISIT (OUTPATIENT)
Dept: CARDIOTHORACIC SURGERY | Age: 52
End: 2022-05-17
Payer: COMMERCIAL

## 2022-05-17 VITALS
HEART RATE: 59 BPM | HEIGHT: 64 IN | SYSTOLIC BLOOD PRESSURE: 113 MMHG | WEIGHT: 190 LBS | BODY MASS INDEX: 32.44 KG/M2 | DIASTOLIC BLOOD PRESSURE: 70 MMHG

## 2022-05-17 DIAGNOSIS — R07.9 CHEST PAIN, UNSPECIFIED TYPE: Primary | ICD-10-CM

## 2022-05-17 DIAGNOSIS — I25.10 CAD IN NATIVE ARTERY: Primary | ICD-10-CM

## 2022-05-17 DIAGNOSIS — I73.9 PVD (PERIPHERAL VASCULAR DISEASE) (HCC): ICD-10-CM

## 2022-05-17 DIAGNOSIS — I25.119 CORONARY ARTERY DISEASE INVOLVING NATIVE CORONARY ARTERY OF NATIVE HEART WITH ANGINA PECTORIS (HCC): ICD-10-CM

## 2022-05-17 PROCEDURE — G8417 CALC BMI ABV UP PARAM F/U: HCPCS | Performed by: THORACIC SURGERY (CARDIOTHORACIC VASCULAR SURGERY)

## 2022-05-17 PROCEDURE — 99214 OFFICE O/P EST MOD 30 MIN: CPT | Performed by: THORACIC SURGERY (CARDIOTHORACIC VASCULAR SURGERY)

## 2022-05-17 PROCEDURE — 1036F TOBACCO NON-USER: CPT | Performed by: THORACIC SURGERY (CARDIOTHORACIC VASCULAR SURGERY)

## 2022-05-17 PROCEDURE — G8428 CUR MEDS NOT DOCUMENT: HCPCS | Performed by: THORACIC SURGERY (CARDIOTHORACIC VASCULAR SURGERY)

## 2022-05-17 RX ORDER — SODIUM CHLORIDE 0.9 % (FLUSH) 0.9 %
5-40 SYRINGE (ML) INJECTION PRN
Status: CANCELLED | OUTPATIENT
Start: 2022-05-17

## 2022-05-17 RX ORDER — SODIUM CHLORIDE 9 MG/ML
INJECTION, SOLUTION INTRAVENOUS PRN
Status: CANCELLED | OUTPATIENT
Start: 2022-05-17

## 2022-05-17 RX ORDER — CHLORHEXIDINE GLUCONATE 0.12 MG/ML
15 RINSE ORAL ONCE
Status: CANCELLED | OUTPATIENT
Start: 2022-05-17 | End: 2022-05-17

## 2022-05-17 RX ORDER — SODIUM CHLORIDE 9 MG/ML
INJECTION, SOLUTION INTRAVENOUS CONTINUOUS
Status: CANCELLED | OUTPATIENT
Start: 2022-05-17

## 2022-05-17 RX ORDER — CHLORHEXIDINE GLUCONATE 4 G/100ML
SOLUTION TOPICAL ONCE
Status: CANCELLED | OUTPATIENT
Start: 2022-05-17 | End: 2022-05-17

## 2022-05-17 RX ORDER — SODIUM CHLORIDE 0.9 % (FLUSH) 0.9 %
5-40 SYRINGE (ML) INJECTION EVERY 12 HOURS SCHEDULED
Status: CANCELLED | OUTPATIENT
Start: 2022-05-17

## 2022-05-17 NOTE — H&P
repair hernia    EMILY-EN-Y GASTRIC BYPASS  12/02/2008    laparoscopic RYGB, Dr. Fidel Real, [address] [facility]  06/25/2010    resection of jejunal ulcer, reversal gastric bypass, jejunoduodenostomy, feeding jejunostomy, Dr. Fidel Real, [address]  10/03/2008    mild gastritis & duodenitis, Dr. Fidel Real, [address] ENDOSCOPY  01/09/2009    multiple severe anastomotic jejunal ulcers, Dr. Fidel Real. [facility]    UPPER GASTROINTESTINAL ENDOSCOPY  02/04/2009    ulcers healed, normal, Dr. Manju Victoria, [address] ENDOSCOPY  03/06/2009    recurrent severe anastomotic jejunal ulcers, Dr. Fidel Real, [address] ENDOSCOPY  06/04/2009    severe anastomotic jejunal ulcers, Dr. Fidel Real, [address] ENDOSCOPY  07/02/2009    severe anastomotic jejunal ulcers, Dr. Fidel Real, [address] ENDOSCOPY  10/27/2009    severe anastomotic jejunal ulcers, Dr. Fidel Real, [address] ENDOSCOPY  01/04/2010    severe anastomotic jejunal ulcers, Dr. Fidel Real, [address] ENDOSCOPY  06/07/2010    severe anastomotic jejunal ulcers, Dr. Fidel Real, [address] ENDOSCOPY  07/30/2010    gastritis, Dr. Fidel Real, [address] ENDOSCOPY N/A 05/10/2019    EGD ESOPHAGOGASTRODUODENOSCOPY performed by Lore Segura MD at [address] [facility] 05/12/2019    EGD DIAGNOSTIC ONLY performed by Lore Segura MD at [address] [facility] N/A 05/17/2019    EGD ESOPHAGOGASTRODUODENOSCOPY performed by Mei Godoy MD at [address] [facility] [address]       No current facility-administered medications for this encounter.     Current Outpatient Medications:     ticagrelor (BRILINTA) 90 MG TABS tablet, Take 1 tablet by mouth 2 times daily, Disp: 180 tablet, Rfl: 3    spironolactone (ALDACTONE) 50 MG tablet, Take 1 tablet by mouth daily, Disp: 90 tablet, Rfl: 3    rosuvastatin (CRESTOR) 40 MG tablet, Take 1 tablet by mouth daily, Disp: 90 tablet, Rfl: 3    metoprolol succinate (TOPROL XL) 100 MG extended release tablet, Take 1 tablet by mouth daily, Disp: 90 tablet, Rfl: 3    amLODIPine (NORVASC) 10 MG tablet, Take 1 tablet by mouth daily, Disp: 90 tablet, Rfl: 3    aspirin 81 MG EC tablet, Take 1 tablet by mouth daily, Disp: 90 tablet, Rfl: 3    escitalopram (LEXAPRO) 5 MG tablet, Take 1 tablet by mouth daily, Disp: 30 tablet, Rfl: 1    gabapentin (NEURONTIN) 100 MG capsule, Take 1 capsule by mouth 3 times daily for 30 days. , Disp: 90 capsule, Rfl: 0    Multiple Vitamin (MULTIVITAMIN ADULT) TABS, Take 1 tablet by mouth daily, Disp: 90 tablet, Rfl: 3    vitamin D (ERGOCALCIFEROL) 1.25 MG (87272 UT) CAPS capsule, take 1 capsule by mouth every Sunday AND 2000 UNITS DAILY EXCEPT SUNDAY, Disp: 12 capsule, Rfl: 3    Allergies   Allergen Reactions    Ultram [Tramadol] Other (See Comments)     Pt states she had a seizure after taking ultram          Review of Systems   Constitutional: Negative for chills, diaphoresis, fatigue and fever. Respiratory: Negative for cough, chest tightness and shortness of breath. Cardiovascular: Negative for chest pain, palpitations and leg swelling. Neurological: Negative for dizziness, weakness and light-headedness.         Objective:   Physical Exam  Constitutional:       Appearance: Normal appearance. Cardiovascular:      Rate and Rhythm: Normal rate and regular rhythm. Pulses: Normal pulses. Heart sounds: Normal heart sounds. Pulmonary:      Effort: Pulmonary effort is normal.      Breath sounds: Normal breath sounds. Abdominal:      Palpations: Abdomen is soft. Skin:     General: Skin is warm and dry. Neurological:      Mental Status: She is alert and oriented to person, place, and time.    Psychiatric:         Mood and Affect: Mood normal.         Behavior: Behavior normal.            Assessment:     Patient Active Problem List   Diagnosis    Abdominal tenderness, LLQ (left lower quadrant)    Postoperative anemia due to acute blood loss    Chronic pain    Anorexia    H/O gastric bypass    Back pain    Encounter for palliative care    Hypertension    Epigastric pain    GI bleed due to NSAIDs    Gastrointestinal hemorrhage associated with gastric ulcer    Acute cystitis without hematuria    Current severe episode of major depressive disorder without psychotic features without prior episode ([facility] [address] [address]    Acute MI, inferior wall (HCC)    Chest pain    Bilateral renal artery stenosis ([facility] [address]         CAD                Plan:   PAT on 6/13 with OR 6/20 for CABG LIMA-LAD, Radial-OM, and GSV to Diag +/- GSV to RPL. All risks, benefits, alternatives and potential complications explained thoroughly including, but not limited to, bleeding, infection, lung injury, kidney injury, stroke, heart attack, prolonged ventilation, wound complication, need for re-operation, and death, and the patient agrees to proceed.   Stop Brilinta 6/13

## 2022-05-18 DIAGNOSIS — I25.10 CAD IN NATIVE ARTERY: Primary | ICD-10-CM

## 2022-05-26 ENCOUNTER — TELEPHONE (OUTPATIENT)
Dept: CARDIOTHORACIC SURGERY | Age: 52
End: 2022-05-26

## 2022-06-07 NOTE — PROGRESS NOTES
[address]   PRE-ADMISSION TESTING GENERAL INSTRUCTIONS  [facility] Phone Number: [phone]      GENERAL INSTRUCTIONS:    [x] Hibiclens shower the night before and the morning of surgery.    -Do not use Hibiclens on your face or head. [x] Do not wear makeup, lotions, powders, deodorant. [x] Nothing by mouth after midnight, including gum, candy, mints, or water. [x] You may brush your teeth, gargle, but do NOT swallow water. [x] No tobacco products, illegal drugs, or alcohol within 24 hours of your surgery. [x] Jewelry or valuables should not be brought to the hospital. All body and/or tongue piercing's must be removed prior to arriving to hospital. ALL hair pins must be removed. [x] Bring insurance card and photo ID. [x] Transfusion Bracelet: Please bring with you to hospital, day of surgery     PARKING INSTRUCTIONS:     [x] ARRIVAL TIME: 6/20/22 @ 5:00AM  · [x] Enter into the [facility]. Two people may accompany you. Masks are required. · [x] Parking Lot \"I\" is where you will park. It is located on the corner of [facility] and [facility]. The entrance is on [facility]. · To enter, press the button and the gate will lift. A free token will be provided to exit the lot. EDUCATION INSTRUCTIONS:        [x] Pre-admission Testing educational folder given  [x] Incentive Spirometry,coughing & deep breathing exercises reviewed. [x] Medication information sheet(s)   [x] Fluoroscopy-Xray used in surgery reviewed with patient. Educational pamphlet placed in chart. [x] Pain: Post-op pain is normal and to be expected. You will be asked to rate your pain from 0-10. [x] Ask your nurse for your pain medication. MEDICATION INSTRUCTIONS:    [x] Bring a complete list of your medications, please write the last time you took the medicine, give this list to the nurse.      [x] Take ONLY the following medications the morning of surgery with 1-2 ounces of water: Aspirin, Lexapro, Norvasc and only HALF your dose of Metoprolol (50mg only the am of surgery). [x] Stop herbal supplements and vitamins 5 days before your surgery. [x] Follow physician instructions regarding any blood thinners you may be taking. Last dose of Brilinta 6/13    WHAT TO EXPECT:    [x] The day of surgery you will be greeted and checked in by the [employer].  In addition, you will be registered in the [place] by a Patient Access Representative. Please bring your photo ID and insurance card. A nurse will greet you in accordance to the time you are needed in the pre-op area to prepare you for surgery. Please do not be discouraged if you are not greeted in the order you arrive as there are many variables that are involved in patient preparation. Your patience is greatly appreciated as you wait for your nurse. Please bring in items such as: books, magazines, newspapers, electronics, or any other items  to occupy your time in the waiting area. [x]  Delays may occur with surgery and staff will make a sincere effort to keep you informed of delays. If any delays occur with your procedure, we apologize ahead of time for your inconvenience as we recognize the value of your time.

## 2022-06-13 ENCOUNTER — TELEPHONE (OUTPATIENT)
Dept: NON INVASIVE DIAGNOSTICS | Age: 52
End: 2022-06-13

## 2022-06-14 ENCOUNTER — HOSPITAL ENCOUNTER (OUTPATIENT)
Dept: ULTRASOUND IMAGING | Age: 52
Discharge: HOME | DRG: 166 | End: 2022-06-16
Payer: COMMERCIAL

## 2022-06-14 ENCOUNTER — HOSPITAL ENCOUNTER (OUTPATIENT)
Dept: NON INVASIVE DIAGNOSTICS | Age: 52
Discharge: HOME | End: 2022-06-14
Payer: COMMERCIAL

## 2022-06-14 ENCOUNTER — HOSPITAL ENCOUNTER (OUTPATIENT)
Dept: INTERVENTIONAL RADIOLOGY/VASCULAR | Age: 52
Discharge: HOME | End: 2022-06-16
Payer: COMMERCIAL

## 2022-06-14 ENCOUNTER — HOSPITAL ENCOUNTER (OUTPATIENT)
Dept: PULMONOLOGY | Age: 52
Discharge: HOME | DRG: 166 | End: 2022-06-14
Payer: COMMERCIAL

## 2022-06-14 DIAGNOSIS — I25.119 CORONARY ARTERY DISEASE INVOLVING NATIVE CORONARY ARTERY OF NATIVE HEART WITH ANGINA PECTORIS (HCC): ICD-10-CM

## 2022-06-14 DIAGNOSIS — I73.9 PVD (PERIPHERAL VASCULAR DISEASE) (HCC): ICD-10-CM

## 2022-06-14 DIAGNOSIS — I25.10 CAD IN NATIVE ARTERY: ICD-10-CM

## 2022-06-14 DIAGNOSIS — R07.9 CHEST PAIN, UNSPECIFIED TYPE: ICD-10-CM

## 2022-06-14 LAB
LV EF: 58 %
LVEF MODALITY: NORMAL

## 2022-06-14 PROCEDURE — 94375 RESPIRATORY FLOW VOLUME LOOP: CPT

## 2022-06-14 PROCEDURE — 93880 EXTRACRANIAL BILAT STUDY: CPT | Performed by: RADIOLOGY

## 2022-06-14 PROCEDURE — 94729 DIFFUSING CAPACITY: CPT

## 2022-06-14 PROCEDURE — 93970 EXTREMITY STUDY: CPT | Performed by: RADIOLOGY

## 2022-06-14 PROCEDURE — 93880 EXTRACRANIAL BILAT STUDY: CPT

## 2022-06-14 PROCEDURE — 93306 TTE W/DOPPLER COMPLETE: CPT

## 2022-06-14 PROCEDURE — 93923 UPR/LXTR ART STDY 3+ LVLS: CPT

## 2022-06-14 PROCEDURE — 93970 EXTREMITY STUDY: CPT

## 2022-06-14 PROCEDURE — 93923 UPR/LXTR ART STDY 3+ LVLS: CPT | Performed by: SURGERY

## 2022-06-14 PROCEDURE — 93922 UPR/L XTREMITY ART 2 LEVELS: CPT

## 2022-06-15 ENCOUNTER — HOSPITAL ENCOUNTER (OUTPATIENT)
Dept: GENERAL RADIOLOGY | Age: 52
Discharge: HOME | End: 2022-06-17
Payer: COMMERCIAL

## 2022-06-15 ENCOUNTER — HOSPITAL ENCOUNTER (OUTPATIENT)
Dept: PREADMISSION TESTING | Age: 52
Discharge: HOME | End: 2022-06-15
Payer: COMMERCIAL

## 2022-06-15 VITALS
SYSTOLIC BLOOD PRESSURE: 112 MMHG | HEIGHT: 64 IN | WEIGHT: 196 LBS | HEART RATE: 56 BPM | TEMPERATURE: 98 F | OXYGEN SATURATION: 96 % | BODY MASS INDEX: 33.46 KG/M2 | RESPIRATION RATE: 16 BRPM | DIASTOLIC BLOOD PRESSURE: 58 MMHG

## 2022-06-15 DIAGNOSIS — R07.9 CHEST PAIN, UNSPECIFIED TYPE: ICD-10-CM

## 2022-06-15 DIAGNOSIS — I73.9 PVD (PERIPHERAL VASCULAR DISEASE) (HCC): ICD-10-CM

## 2022-06-15 DIAGNOSIS — I25.119 CORONARY ARTERY DISEASE INVOLVING NATIVE CORONARY ARTERY OF NATIVE HEART WITH ANGINA PECTORIS (HCC): ICD-10-CM

## 2022-06-15 LAB
ABO/RH: NORMAL
ALBUMIN SERPL-MCNC: 4.2 G/DL (ref 3.5–5.2)
ALP BLD-CCNC: 166 U/L (ref 35–104)
ALT SERPL-CCNC: 13 U/L (ref 0–32)
ANION GAP SERPL CALCULATED.3IONS-SCNC: 13 MMOL/L (ref 7–16)
ANTIBODY SCREEN: NORMAL
APTT: 33 SEC (ref 24.5–35.1)
AST SERPL-CCNC: 24 U/L (ref 0–31)
BACTERIA: ABNORMAL /HPF
BILIRUB SERPL-MCNC: 0.5 MG/DL (ref 0–1.2)
BILIRUBIN URINE: ABNORMAL
BLOOD, URINE: ABNORMAL
BUN BLDV-MCNC: 16 MG/DL (ref 6–20)
CALCIUM SERPL-MCNC: 8.7 MG/DL (ref 8.6–10.2)
CHLORIDE BLD-SCNC: 108 MMOL/L (ref 98–107)
CLARITY: CLEAR
CO2: 18 MMOL/L (ref 22–29)
COLOR: YELLOW
CREAT SERPL-MCNC: 1.3 MG/DL (ref 0.5–1)
GFR AFRICAN AMERICAN: 52
GFR NON-AFRICAN AMERICAN: 43 ML/MIN/1.73
GLUCOSE BLD-MCNC: 79 MG/DL (ref 74–99)
GLUCOSE URINE: NEGATIVE MG/DL
HBA1C MFR BLD: 5.2 % (ref 4–5.6)
HCT VFR BLD CALC: 35 % (ref 34–48)
HEMOGLOBIN: 11.5 G/DL (ref 11.5–15.5)
INR BLD: 1.2
KETONES, URINE: NEGATIVE MG/DL
LEUKOCYTE ESTERASE, URINE: NEGATIVE
MCH RBC QN AUTO: 30.6 PG (ref 26–35)
MCHC RBC AUTO-ENTMCNC: 32.9 % (ref 32–34.5)
MCV RBC AUTO: 93.1 FL (ref 80–99.9)
NITRITE, URINE: NEGATIVE
PDW BLD-RTO: 13.8 FL (ref 11.5–15)
PH UA: 6 (ref 5–9)
PLATELET # BLD: 171 E9/L (ref 130–450)
PMV BLD AUTO: 10.9 FL (ref 7–12)
POTASSIUM SERPL-SCNC: 4.1 MMOL/L (ref 3.5–5)
PROTEIN UA: 100 MG/DL
PROTHROMBIN TIME: 12.7 SEC (ref 9.3–12.4)
RBC # BLD: 3.76 E12/L (ref 3.5–5.5)
RBC UA: ABNORMAL /HPF (ref 0–2)
SODIUM BLD-SCNC: 139 MMOL/L (ref 132–146)
SPECIFIC GRAVITY UA: >=1.03 (ref 1–1.03)
TOTAL PROTEIN: 7.3 G/DL (ref 6.4–8.3)
UROBILINOGEN, URINE: 1 E.U./DL
WBC # BLD: 6.2 E9/L (ref 4.5–11.5)
WBC UA: ABNORMAL /HPF (ref 0–5)

## 2022-06-15 PROCEDURE — 86923 COMPATIBILITY TEST ELECTRIC: CPT

## 2022-06-15 PROCEDURE — 86901 BLOOD TYPING SEROLOGIC RH(D): CPT

## 2022-06-15 PROCEDURE — 85027 COMPLETE CBC AUTOMATED: CPT

## 2022-06-15 PROCEDURE — 36415 COLL VENOUS BLD VENIPUNCTURE: CPT

## 2022-06-15 PROCEDURE — 94010 BREATHING CAPACITY TEST: CPT | Performed by: INTERNAL MEDICINE

## 2022-06-15 PROCEDURE — 87081 CULTURE SCREEN ONLY: CPT

## 2022-06-15 PROCEDURE — 85730 THROMBOPLASTIN TIME PARTIAL: CPT

## 2022-06-15 PROCEDURE — 86850 RBC ANTIBODY SCREEN: CPT

## 2022-06-15 PROCEDURE — 80053 COMPREHEN METABOLIC PANEL: CPT

## 2022-06-15 PROCEDURE — 83036 HEMOGLOBIN GLYCOSYLATED A1C: CPT

## 2022-06-15 PROCEDURE — 94729 DIFFUSING CAPACITY: CPT | Performed by: INTERNAL MEDICINE

## 2022-06-15 PROCEDURE — 81001 URINALYSIS AUTO W/SCOPE: CPT

## 2022-06-15 PROCEDURE — 71046 X-RAY EXAM CHEST 2 VIEWS: CPT

## 2022-06-15 PROCEDURE — 93005 ELECTROCARDIOGRAM TRACING: CPT

## 2022-06-15 PROCEDURE — 85610 PROTHROMBIN TIME: CPT

## 2022-06-15 PROCEDURE — 86900 BLOOD TYPING SEROLOGIC ABO: CPT

## 2022-06-15 PROCEDURE — 87088 URINE BACTERIA CULTURE: CPT

## 2022-06-15 NOTE — PROCEDURES
[address]                  [address] [facility],  [facility]                               PULMONARY FUNCTION    PATIENT NAME: Juan M Dubois                   :        1970  MED REC NO:   [id number]                            ROOM:  ACCOUNT NO:   [de-identified]                           ADMIT DATE: 2022  PROVIDER:     Gabe Carreno DO    DATE OF PROCEDURE:  2022    INDICATIONS:  A 61-year-old  woman, 64 inches, 198 pounds with  chest pain. FINDINGS:  Spirometry reveals forced vital capacity of 3.36 liters, 97%  of predicted and FEV1 of 2.67 L, 96% of predicted with an FEV1-FVC ratio  of 79%. Midflow rates are normal at 96% of predicted with a maximum  voluntary ventilation of 25 liters per minute, and 28% of predicted. Static lung volumes with slow vital capacity of 3.19 L, 92% of  predicted. Expiratory capacity 2.36 liters, 106% of predicted. Expiratory reserve volume 0.82 L, 66% predicted with a diffusion  capacity of 16.14 mL/minute per mmHg, which is 66% of predicted. IMPRESSION:  Normal spirometry and corrected gas transfer.         Jackie Wray DO    D: 2022 18:35:49       T: 2022 18:38:20     [id number]  Job#: [id number]     Doc#: [id number]    CC:

## 2022-06-16 LAB
EKG ATRIAL RATE: 54 BPM
EKG P AXIS: 41 DEGREES
EKG P-R INTERVAL: 194 MS
EKG Q-T INTERVAL: 440 MS
EKG QRS DURATION: 94 MS
EKG QTC CALCULATION (BAZETT): 417 MS
EKG R AXIS: 18 DEGREES
EKG T AXIS: -11 DEGREES
EKG VENTRICULAR RATE: 54 BPM
MRSA CULTURE ONLY: NORMAL

## 2022-06-17 ENCOUNTER — ANESTHESIA EVENT (OUTPATIENT)
Dept: OPERATING ROOM | Age: 52
DRG: 166 | End: 2022-06-17
Payer: COMMERCIAL

## 2022-06-17 LAB — URINE CULTURE, ROUTINE: NORMAL

## 2022-06-17 NOTE — H&P
CC: CP     HPI: This is a 54year old female with significant PMH of inferior STEMI s/p PCI on 3/11/22, bilateral renal artery stenosis s/p bilateral stenting, CAD, depression, gastric bypass, HTN, former smoker, and seizures who presents to the office today to further discuss surgery. On 3/11/22, the patient presented to the ED with chest pain and was found to have a STEMI. She was emergently brought to the cath lab and successful IVUS guided primary PCI with 3 overlapping MAURICIO from the distal to proximal RCA was performed. She was placed on Brilinta with plans of continuing for at least 12 months. Echo from 3/11 revealed normal EF and no significant valvular abnormalities. She was last seen in our office on 4/12/22.  At that time she was completely asymptomatic and it was decided to allow 3 months of OAC. Today she continues to denies CP, SOB, BENNETT, palpations or any other major concerns.       Past Medical History        Past Medical History:   Diagnosis Date    Anorexia 06/01/2015    Arthritis of knee      Back pain, chronic      Bilateral renal artery stenosis ([address] 03/13/2022    CAD (coronary artery disease)      Chronic pain 06/01/2015    Current severe episode of major depressive disorder without psychotic features without prior episode ([address] 03/16/2021    H/O gastric bypass 06/01/2015    Hypertension 01/01/2012    Hypertension 60/58/6619    Metabolic acidosis 51/59/0717    Ovarian cyst      Seizures [facility])              Past Surgical History         Past Surgical History:   Procedure Laterality Date    CHOLECYSTECTOMY, LAPAROSCOPIC   09/19/1995     [address] WITH STENT PLACEMENT   03/12/2022    ENDOMETRIAL ABLATION   2003     [facility]    GASTRIC BYPASS SURGERY        HERNIA REPAIR         umbil    HYSTERECTOMY Left 02/05/2013     Laparotomy;JIA;JOSUÉ:LSO    LAPAROSCOPY   02/12/2009     lap assisted percutaneous ERCP and removal CBD stone and gastrostomy, [address]   12/28/2011     exp lap, hugo, rt so/ repair hernia    EMILY-EN-Y GASTRIC BYPASS   12/02/2008     laparoscopic RYGB, Dr. Dennis Lamas, [address] [place] [address]   06/25/2010     resection of jejunal ulcer, reversal gastric bypass, jejunoduodenostomy, feeding jejunostomy, Dr. Dennis Lamas, [address]   10/03/2008     mild gastritis & duodenitis, Dr. Dennis Lamas, [address]   01/09/2009     multiple severe anastomotic jejunal ulcers, Dr. Dennis Lamas.  [facility]    UPPER GASTROINTESTINAL ENDOSCOPY   02/04/2009     ulcers healed, normal, Dr. Manisha Vazquez, [address] [place] [address] ENDOSCOPY   03/06/2009     recurrent severe anastomotic jejunal ulcers, Dr. Dennis Lamas, [address]   06/04/2009     severe anastomotic jejunal ulcers, Dr. Dennis Lamas, [address]   07/02/2009     severe anastomotic jejunal ulcers, Dr. Dennis Lamas, [address]   10/27/2009     severe anastomotic jejunal ulcers, Dr. Dennis Lamas, [address]   01/04/2010     severe anastomotic jejunal ulcers, Dr. Dennis Lamas, [address]   06/07/2010     severe anastomotic jejunal ulcers, Dr. Dennis Lamas, [address]   07/30/2010     gastritis, Dr. Dennis Lamas, [address] [place] [address] ENDOSCOPY N/A 05/10/2019     EGD ESOPHAGOGASTRODUODENOSCOPY performed by Cammy De Paz MD at [address] [place] [address] 05/12/2019     EGD DIAGNOSTIC ONLY performed by Cammy De Paz MD at [place] [address] N/A 05/17/2019     EGD ESOPHAGOGASTRODUODENOSCOPY performed by Jaqueline Ochoa MD at [facility]       Current Medication   No current facility-administered medications for this encounter.     Current Outpatient Medications:     ticagrelor Neurological:      Mental Status: She is alert and oriented to person, place, and time. Psychiatric:         Mood and Affect: Mood normal.         Behavior: Behavior normal.            Assessment:      Patient Active Problem List   Diagnosis    Abdominal tenderness, LLQ (left lower quadrant)    Postoperative anemia due to acute blood loss    Chronic pain    Anorexia    H/O gastric bypass    Back pain    Encounter for palliative care    Hypertension    Epigastric pain    GI bleed due to NSAIDs    Gastrointestinal hemorrhage associated with gastric ulcer    Acute cystitis without hematuria    Current severe episode of major depressive disorder without psychotic features without prior episode (HCC)    Acute MI, inferior wall (HCC)    Chest pain    Bilateral renal artery stenosis ([address]            CAD                Plan:   Per Dr. Rita Serrato:     PAT on 6/13 with OR 6/20 for CABG LIMA-LAD, Radial-OM, and GSV to Diag +/- GSV to RPL. All risks, benefits, alternatives and potential complications explained thoroughly including, but not limited to, bleeding, infection, lung injury, kidney injury, stroke, heart attack, prolonged ventilation, wound complication, need for re-operation, and death, and the patient agrees to proceed.   Stop Jared 6/13

## 2022-06-17 NOTE — PROGRESS NOTES
Pre-operative testing review:   --carotids with no significant stenosis  --iam with good flow bilaterally  --upper extremity studies - Normal arterial flow noted the fingers of both hands however, after compression, markedly diminished, almost flat pulse volume recordings noted over the fingers indicating poor collateral flow through the ulnar artery bilaterallyto the fingers of both hands  --ct chest reviewed   --TTE with no significant LV dysfunction or valvular abnormalities  --pft with FEV1 96 percent of predicted and DLCO 66 percent of predicted  --hgA1c 5.2  --H&P on chart from date 6/17/2022, to be updated DOS  --Dr. Bertha Justin notified re: Cr 1.3 at PAT and baseline 1.0. Preoperative NYHA Class: 1    Recent Labs     06/15/22  0940   HGB 11.5   HCT 35.0        Recent Labs     06/15/22  0940   BUN 16   CREATININE 1.3*     Lab Results   Component Value Date/Time    LABURIN Growth not present, incubation continues 06/15/2022 09:40 AM           CREATININE   Date/Time Value Ref Range Status   06/15/2022 09:40 AM 1.3 (H) 0.5 - 1.0 mg/dL Final   03/16/2022 06:20 AM 1.0 0.5 - 1.0 mg/dL Final   03/15/2022 06:14 AM 1.0 0.5 - 1.0 mg/dL Final           [id number] Score for Atrial Fibrillation Stroke Risk   Risk   Factors  Component Value   C CHF  0   H HTN  1   A2 Age >= 75  0   D DM  0   S2 Prior Stroke/TIA  0   V Vascular Disease  1   A Age 74-69  0   Sc Sex  1    [id number]  Score  3       Disclaimer:   Definition and scores for [id number]:      [id number] acronym     Score  Congestive HF      1  Hypertension       1  Age ? 75 years      2  Diabetes mellitus      1  Stroke/TIA/TE      2  Vascular disease (prior MI, PAD, or aortic plaque) 1  Age 72 to 76 years      1  Sex category      female=1, male=0    Maximum score      9    Risk Score calculation is dependent on accuracy of patient problem list and past encounter diagnosis.

## 2022-06-20 ENCOUNTER — TELEPHONE (OUTPATIENT)
Dept: CARDIOTHORACIC SURGERY | Age: 52
End: 2022-06-20

## 2022-06-20 ENCOUNTER — ANESTHESIA (OUTPATIENT)
Dept: OPERATING ROOM | Age: 52
DRG: 166 | End: 2022-06-20
Payer: COMMERCIAL

## 2022-06-20 NOTE — TELEPHONE ENCOUNTER
Pt notified surgery on 6/22 moved up to 7 Am with 5AM arrival. She states she wasn't notified of surgery for today being rescheduled and showed up at 5AM. PAT did leave her a message and she was also notified by PA. Informed her she will do wash @ [facility] tomorrow since she already used it today.

## 2022-06-20 NOTE — TELEPHONE ENCOUNTER
I called [facility] to have current authorization updated with new date of 6/22/2022 and per Alecia Skelton at [address] [place] [address] they can not update over the phone a date change has to be faxed to the nurse for change. This was faxed today 6/20/2022 at 10:01am.    Please be advised we may  not have the update prior to 6/22/2022.   Cristy Melo

## 2022-06-22 ENCOUNTER — APPOINTMENT (OUTPATIENT)
Dept: GENERAL RADIOLOGY | Age: 52
DRG: 166 | End: 2022-06-22
Attending: THORACIC SURGERY (CARDIOTHORACIC VASCULAR SURGERY)
Payer: COMMERCIAL

## 2022-06-22 ENCOUNTER — HOSPITAL ENCOUNTER (INPATIENT)
Age: 52
LOS: 5 days | Discharge: HOME HEALTH | DRG: 166 | End: 2022-06-27
Attending: THORACIC SURGERY (CARDIOTHORACIC VASCULAR SURGERY) | Admitting: THORACIC SURGERY (CARDIOTHORACIC VASCULAR SURGERY)
Payer: COMMERCIAL

## 2022-06-22 DIAGNOSIS — G89.18 POST-OP PAIN: Primary | ICD-10-CM

## 2022-06-22 DIAGNOSIS — Z95.1 S/P CABG (CORONARY ARTERY BYPASS GRAFT): ICD-10-CM

## 2022-06-22 PROBLEM — I25.10 CAD IN NATIVE ARTERY: Status: ACTIVE | Noted: 2022-06-22

## 2022-06-22 LAB
AADO2: 146 MMHG
AADO2: 86.9 MMHG
ACTIVATED CLOTTING TIME: 107 SECONDS (ref 99–130)
ACTIVATED CLOTTING TIME: 112 SECONDS (ref 99–130)
ACTIVATED CLOTTING TIME: 990 SECONDS (ref 99–130)
ANION GAP SERPL CALCULATED.3IONS-SCNC: 8 MMOL/L (ref 7–16)
ANION GAP: 11 MMOL/L (ref 7–16)
APTT: 32.7 SEC (ref 24.5–35.1)
B.E.: -0.7 MMOL/L (ref -3–3)
B.E.: -1.9 MMOL/L (ref -3–3)
B.E.: -3.4 MMOL/L (ref -3–3)
B.E.: -4.5 MMOL/L (ref -3–3)
BLOOD BANK DISPENSE STATUS: NORMAL
BLOOD BANK PRODUCT CODE: NORMAL
BPU ID: NORMAL
BUN BLDV-MCNC: 12 MG/DL (ref 6–20)
CALCIUM IONIZED: 1.2 MMOL/L (ref 1.15–1.33)
CALCIUM SERPL-MCNC: 8 MG/DL (ref 8.6–10.2)
CARDIOPULMONARY BYPASS: YES
CHLORIDE BLD-SCNC: 109 MMOL/L (ref 98–107)
CO2: 24 MMOL/L (ref 22–29)
COHB: 0.3 % (ref 0–1.5)
COHB: 0.6 % (ref 0–1.5)
COHB: 0.8 % (ref 0–1.5)
CREAT SERPL-MCNC: 1.2 MG/DL (ref 0.5–1)
CRITICAL: ABNORMAL
DATE ANALYZED: ABNORMAL
DATE OF COLLECTION: ABNORMAL
DESCRIPTION BLOOD BANK: NORMAL
DEVICE: ABNORMAL
FIO2: 40 %
FIO2: 50 %
GFR AFRICAN AMERICAN: 58
GFR AFRICAN AMERICAN: >60
GFR NON-AFRICAN AMERICAN: 48 ML/MIN/1.73
GFR, ESTIMATED: >60 ML/MIN/1.73
GLUCOSE BLD-MCNC: 156 MG/DL (ref 74–99)
GLUCOSE BLD-MCNC: 97 MG/DL (ref 74–99)
HCO3: 21.3 MMOL/L (ref 22–26)
HCO3: 22.9 MMOL/L (ref 22–26)
HCO3: 23.6 MMOL/L (ref 22–26)
HCO3: 23.9 MMOL/L (ref 22–26)
HCT VFR BLD CALC: 30.3 % (ref 34–48)
HEMATOCRIT: 23 % (ref 34–48)
HEMOGLOBIN: 10.1 G/DL (ref 11.5–15.5)
HEMOGLOBIN: 7.9 G/DL (ref 11.5–15.5)
HHB: 1.4 % (ref 0–5)
HHB: 2.1 % (ref 0–5)
HHB: 2.3 % (ref 0–5)
INR BLD: 1.3
LAB: ABNORMAL
MAGNESIUM: 2.8 MG/DL (ref 1.6–2.6)
MCH RBC QN AUTO: 30.8 PG (ref 26–35)
MCHC RBC AUTO-ENTMCNC: 33.3 % (ref 32–34.5)
MCV RBC AUTO: 92.4 FL (ref 80–99.9)
METER GLUCOSE: 132 MG/DL (ref 74–99)
METER GLUCOSE: 136 MG/DL (ref 74–99)
METER GLUCOSE: 137 MG/DL (ref 74–99)
METER GLUCOSE: 234 MG/DL (ref 74–99)
METER GLUCOSE: 98 MG/DL (ref 74–99)
METHB: 0 % (ref 0–1.5)
METHB: 0.2 % (ref 0–1.5)
METHB: 0.2 % (ref 0–1.5)
MODE: ABNORMAL
MODE: ABNORMAL
MODE: AC
O2 CONTENT: 16.4 ML/DL
O2 SATURATION: 100 % (ref 92–98.5)
O2 SATURATION: 97.7 % (ref 92–98.5)
O2 SATURATION: 97.9 % (ref 92–98.5)
O2 SATURATION: 98.6 % (ref 92–98.5)
O2HB: 96.7 % (ref 94–97)
O2HB: 97.3 % (ref 94–97)
O2HB: 98.1 % (ref 94–97)
OPERATOR ID: 421
OPERATOR ID: 7292
OPERATOR ID: ABNORMAL
OPERATOR ID: ABNORMAL
PATIENT TEMP: 37 C
PCO2 37: 32.2 MMHG (ref 35–45)
PCO2: 42.1 MMHG (ref 35–45)
PCO2: 45.4 MMHG (ref 35–45)
PCO2: 51.2 MMHG (ref 35–45)
PDW BLD-RTO: 13.8 FL (ref 11.5–15)
PEEP/CPAP: 5 CMH2O
PEEP/CPAP: 7 CMH2O
PFO2: 2.94 MMHG/%
PFO2: 3.5 MMHG/%
PH 37: 7.46 (ref 7.35–7.45)
PH BLOOD GAS: 7.28 (ref 7.35–7.45)
PH BLOOD GAS: 7.32 (ref 7.35–7.45)
PH BLOOD GAS: 7.34 (ref 7.35–7.45)
PLATELET # BLD: 136 E9/L (ref 130–450)
PMV BLD AUTO: 10.7 FL (ref 7–12)
PO2 37: 469.1 MMHG (ref 80–100)
PO2: 139.9 MMHG (ref 75–100)
PO2: 146.9 MMHG (ref 75–100)
PO2: 180 MMHG (ref 75–100)
POC BUN: 13 MG/DL (ref 8–23)
POC CHLORIDE: 104 MMOL/L (ref 100–108)
POC CO2: 22.8 MMOL/L (ref 22–29)
POC CREATININE: 0.9 MG/DL (ref 0.5–1)
POC IONIZED CALCIUM: 1.1 (ref 1.1–1.3)
POC LACTIC ACID: 1.7 (ref 0.5–2.2)
POC SODIUM: 138 MMOL/L (ref 132–146)
POC SOURCE: ABNORMAL
POTASSIUM SERPL-SCNC: 3.46 MMOL/L (ref 3.5–5)
POTASSIUM SERPL-SCNC: 3.9 MMOL/L (ref 3.5–5)
POTASSIUM SERPL-SCNC: 3.9 MMOL/L (ref 3.5–5)
POTASSIUM SERPL-SCNC: 4.1 MMOL/L (ref 3.5–5)
POTASSIUM SERPL-SCNC: 4.1 MMOL/L (ref 3.5–5.5)
PROTHROMBIN TIME: 13.7 SEC (ref 9.3–12.4)
PS: 10 CMH20
RBC # BLD: 3.28 E12/L (ref 3.5–5.5)
RI(T): 0.62
RI(T): 0.99
RR MECHANICAL: 12 B/MIN
SODIUM BLD-SCNC: 141 MMOL/L (ref 132–146)
SOURCE, BLOOD GAS: ABNORMAL
THB: 11 G/DL (ref 11.5–16.5)
THB: 11.4 G/DL (ref 11.5–16.5)
THB: 11.6 G/DL (ref 11.5–16.5)
TIME ANALYZED: 1109
TIME ANALYZED: 1316
TIME ANALYZED: 1553
VT MECHANICAL: 450 ML
WBC # BLD: 11.2 E9/L (ref 4.5–11.5)

## 2022-06-22 PROCEDURE — C1713 ANCHOR/SCREW BN/BN,TIS/BN: HCPCS | Performed by: THORACIC SURGERY (CARDIOTHORACIC VASCULAR SURGERY)

## 2022-06-22 PROCEDURE — 71045 X-RAY EXAM CHEST 1 VIEW: CPT

## 2022-06-22 PROCEDURE — A4648 IMPLANTABLE TISSUE MARKER: HCPCS | Performed by: THORACIC SURGERY (CARDIOTHORACIC VASCULAR SURGERY)

## 2022-06-22 PROCEDURE — 2500000003 HC RX 250 WO HCPCS: Performed by: THORACIC SURGERY (CARDIOTHORACIC VASCULAR SURGERY)

## 2022-06-22 PROCEDURE — 84132 ASSAY OF SERUM POTASSIUM: CPT

## 2022-06-22 PROCEDURE — 82803 BLOOD GASES ANY COMBINATION: CPT

## 2022-06-22 PROCEDURE — 6360000002 HC RX W HCPCS

## 2022-06-22 PROCEDURE — 33534 CABG ARTERIAL TWO: CPT | Performed by: STUDENT IN AN ORGANIZED HEALTH CARE EDUCATION/TRAINING PROGRAM

## 2022-06-22 PROCEDURE — 2580000003 HC RX 258: Performed by: THORACIC SURGERY (CARDIOTHORACIC VASCULAR SURGERY)

## 2022-06-22 PROCEDURE — 33517 CABG ARTERY-VEIN SINGLE: CPT | Performed by: THORACIC SURGERY (CARDIOTHORACIC VASCULAR SURGERY)

## 2022-06-22 PROCEDURE — 2000000000 HC ICU R&B

## 2022-06-22 PROCEDURE — 33517 CABG ARTERY-VEIN SINGLE: CPT | Performed by: STUDENT IN AN ORGANIZED HEALTH CARE EDUCATION/TRAINING PROGRAM

## 2022-06-22 PROCEDURE — 2580000003 HC RX 258: Performed by: PHYSICIAN ASSISTANT

## 2022-06-22 PROCEDURE — 2500000003 HC RX 250 WO HCPCS

## 2022-06-22 PROCEDURE — 2580000003 HC RX 258

## 2022-06-22 PROCEDURE — 82330 ASSAY OF CALCIUM: CPT

## 2022-06-22 PROCEDURE — 3600000018 HC SURGERY OHS ADDTL 15MIN: Performed by: THORACIC SURGERY (CARDIOTHORACIC VASCULAR SURGERY)

## 2022-06-22 PROCEDURE — APPSS60 APP SPLIT SHARED TIME 46-60 MINUTES: Performed by: PHYSICIAN ASSISTANT

## 2022-06-22 PROCEDURE — 3700000000 HC ANESTHESIA ATTENDED CARE: Performed by: THORACIC SURGERY (CARDIOTHORACIC VASCULAR SURGERY)

## 2022-06-22 PROCEDURE — 94640 AIRWAY INHALATION TREATMENT: CPT

## 2022-06-22 PROCEDURE — 85610 PROTHROMBIN TIME: CPT

## 2022-06-22 PROCEDURE — 6360000002 HC RX W HCPCS: Performed by: THORACIC SURGERY (CARDIOTHORACIC VASCULAR SURGERY)

## 2022-06-22 PROCEDURE — 33508 ENDOSCOPIC VEIN HARVEST: CPT | Performed by: THORACIC SURGERY (CARDIOTHORACIC VASCULAR SURGERY)

## 2022-06-22 PROCEDURE — 6360000002 HC RX W HCPCS: Performed by: PHYSICIAN ASSISTANT

## 2022-06-22 PROCEDURE — 6370000000 HC RX 637 (ALT 250 FOR IP): Performed by: THORACIC SURGERY (CARDIOTHORACIC VASCULAR SURGERY)

## 2022-06-22 PROCEDURE — 82962 GLUCOSE BLOOD TEST: CPT

## 2022-06-22 PROCEDURE — 85730 THROMBOPLASTIN TIME PARTIAL: CPT

## 2022-06-22 PROCEDURE — 3600000008 HC SURGERY OHS BASE: Performed by: THORACIC SURGERY (CARDIOTHORACIC VASCULAR SURGERY)

## 2022-06-22 PROCEDURE — 2580000003 HC RX 258: Performed by: NURSE ANESTHETIST, CERTIFIED REGISTERED

## 2022-06-22 PROCEDURE — C1729 CATH, DRAINAGE: HCPCS | Performed by: THORACIC SURGERY (CARDIOTHORACIC VASCULAR SURGERY)

## 2022-06-22 PROCEDURE — 3700000001 HC ADD 15 MINUTES (ANESTHESIA): Performed by: THORACIC SURGERY (CARDIOTHORACIC VASCULAR SURGERY)

## 2022-06-22 PROCEDURE — C9113 INJ PANTOPRAZOLE SODIUM, VIA: HCPCS | Performed by: THORACIC SURGERY (CARDIOTHORACIC VASCULAR SURGERY)

## 2022-06-22 PROCEDURE — 94002 VENT MGMT INPAT INIT DAY: CPT

## 2022-06-22 PROCEDURE — 36415 COLL VENOUS BLD VENIPUNCTURE: CPT

## 2022-06-22 PROCEDURE — 021109W BYPASS CORONARY ARTERY, TWO ARTERIES FROM AORTA WITH AUTOLOGOUS VENOUS TISSUE, OPEN APPROACH: ICD-10-PCS | Performed by: THORACIC SURGERY (CARDIOTHORACIC VASCULAR SURGERY)

## 2022-06-22 PROCEDURE — 6370000000 HC RX 637 (ALT 250 FOR IP): Performed by: PHYSICIAN ASSISTANT

## 2022-06-22 PROCEDURE — 7100000001 HC PACU RECOVERY - ADDTL 15 MIN

## 2022-06-22 PROCEDURE — A4217 STERILE WATER/SALINE, 500 ML: HCPCS | Performed by: THORACIC SURGERY (CARDIOTHORACIC VASCULAR SURGERY)

## 2022-06-22 PROCEDURE — 7100000000 HC PACU RECOVERY - FIRST 15 MIN

## 2022-06-22 PROCEDURE — P9045 ALBUMIN (HUMAN), 5%, 250 ML: HCPCS

## 2022-06-22 PROCEDURE — 83735 ASSAY OF MAGNESIUM: CPT

## 2022-06-22 PROCEDURE — 85347 COAGULATION TIME ACTIVATED: CPT

## 2022-06-22 PROCEDURE — 2500000003 HC RX 250 WO HCPCS: Performed by: NURSE ANESTHETIST, CERTIFIED REGISTERED

## 2022-06-22 PROCEDURE — 80048 BASIC METABOLIC PNL TOTAL CA: CPT

## 2022-06-22 PROCEDURE — 33534 CABG ARTERIAL TWO: CPT | Performed by: THORACIC SURGERY (CARDIOTHORACIC VASCULAR SURGERY)

## 2022-06-22 PROCEDURE — A4216 STERILE WATER/SALINE, 10 ML: HCPCS | Performed by: THORACIC SURGERY (CARDIOTHORACIC VASCULAR SURGERY)

## 2022-06-22 PROCEDURE — 02100Z9 BYPASS CORONARY ARTERY, ONE ARTERY FROM LEFT INTERNAL MAMMARY, OPEN APPROACH: ICD-10-PCS | Performed by: THORACIC SURGERY (CARDIOTHORACIC VASCULAR SURGERY)

## 2022-06-22 PROCEDURE — 2720000010 HC SURG SUPPLY STERILE: Performed by: THORACIC SURGERY (CARDIOTHORACIC VASCULAR SURGERY)

## 2022-06-22 PROCEDURE — 37799 UNLISTED PX VASCULAR SURGERY: CPT

## 2022-06-22 PROCEDURE — 82805 BLOOD GASES W/O2 SATURATION: CPT

## 2022-06-22 PROCEDURE — P9045 ALBUMIN (HUMAN), 5%, 250 ML: HCPCS | Performed by: THORACIC SURGERY (CARDIOTHORACIC VASCULAR SURGERY)

## 2022-06-22 PROCEDURE — 2709999900 HC NON-CHARGEABLE SUPPLY: Performed by: THORACIC SURGERY (CARDIOTHORACIC VASCULAR SURGERY)

## 2022-06-22 PROCEDURE — 6370000000 HC RX 637 (ALT 250 FOR IP)

## 2022-06-22 PROCEDURE — 06BQ4ZZ EXCISION OF LEFT SAPHENOUS VEIN, PERCUTANEOUS ENDOSCOPIC APPROACH: ICD-10-PCS | Performed by: THORACIC SURGERY (CARDIOTHORACIC VASCULAR SURGERY)

## 2022-06-22 PROCEDURE — 6360000002 HC RX W HCPCS: Performed by: NURSE ANESTHETIST, CERTIFIED REGISTERED

## 2022-06-22 PROCEDURE — 85027 COMPLETE CBC AUTOMATED: CPT

## 2022-06-22 DEVICE — STERNALPLATE, BOX: Type: IMPLANTABLE DEVICE | Site: STERNUM | Status: FUNCTIONAL

## 2022-06-22 DEVICE — LOCKING SCREW,AXS,SELF-DRILLING
Type: IMPLANTABLE DEVICE | Site: STERNUM | Status: FUNCTIONAL
Brand: AXS, SMARTLOCK

## 2022-06-22 DEVICE — STERNALPLATE, LADDER, NARROW: Type: IMPLANTABLE DEVICE | Site: STERNUM | Status: FUNCTIONAL

## 2022-06-22 RX ORDER — SODIUM CHLORIDE 9 MG/ML
INJECTION, SOLUTION INTRAVENOUS CONTINUOUS PRN
Status: DISCONTINUED | OUTPATIENT
Start: 2022-06-22 | End: 2022-06-22 | Stop reason: SDUPTHER

## 2022-06-22 RX ORDER — SODIUM CHLORIDE 9 MG/ML
INJECTION, SOLUTION INTRAVENOUS CONTINUOUS
Status: DISCONTINUED | OUTPATIENT
Start: 2022-06-22 | End: 2022-06-22

## 2022-06-22 RX ORDER — POTASSIUM CHLORIDE 29.8 MG/ML
20 INJECTION INTRAVENOUS PRN
Status: DISCONTINUED | OUTPATIENT
Start: 2022-06-22 | End: 2022-06-24

## 2022-06-22 RX ORDER — HEPARIN SODIUM 10000 [USP'U]/ML
INJECTION, SOLUTION INTRAVENOUS; SUBCUTANEOUS PRN
Status: DISCONTINUED | OUTPATIENT
Start: 2022-06-22 | End: 2022-06-22 | Stop reason: SDUPTHER

## 2022-06-22 RX ORDER — 0.9 % SODIUM CHLORIDE 0.9 %
250 INTRAVENOUS SOLUTION INTRAVENOUS CONTINUOUS PRN
Status: DISCONTINUED | OUTPATIENT
Start: 2022-06-22 | End: 2022-06-24

## 2022-06-22 RX ORDER — ACETAMINOPHEN 325 MG/1
650 TABLET ORAL EVERY 4 HOURS PRN
Status: DISCONTINUED | OUTPATIENT
Start: 2022-06-22 | End: 2022-06-24

## 2022-06-22 RX ORDER — ONDANSETRON 4 MG/1
4 TABLET, ORALLY DISINTEGRATING ORAL EVERY 8 HOURS PRN
Status: DISCONTINUED | OUTPATIENT
Start: 2022-06-22 | End: 2022-06-27 | Stop reason: HOSPADM

## 2022-06-22 RX ORDER — MIDAZOLAM HYDROCHLORIDE 1 MG/ML
INJECTION INTRAMUSCULAR; INTRAVENOUS PRN
Status: DISCONTINUED | OUTPATIENT
Start: 2022-06-22 | End: 2022-06-22 | Stop reason: SDUPTHER

## 2022-06-22 RX ORDER — OXYCODONE HYDROCHLORIDE 5 MG/1
5 TABLET ORAL EVERY 4 HOURS PRN
Status: DISCONTINUED | OUTPATIENT
Start: 2022-06-22 | End: 2022-06-27 | Stop reason: HOSPADM

## 2022-06-22 RX ORDER — LIDOCAINE HYDROCHLORIDE 20 MG/ML
INJECTION, SOLUTION INTRAVENOUS PRN
Status: DISCONTINUED | OUTPATIENT
Start: 2022-06-22 | End: 2022-06-22 | Stop reason: SDUPTHER

## 2022-06-22 RX ORDER — PANTOPRAZOLE SODIUM 40 MG/1
40 TABLET, DELAYED RELEASE ORAL DAILY
Status: DISCONTINUED | OUTPATIENT
Start: 2022-06-23 | End: 2022-06-24

## 2022-06-22 RX ORDER — MAGNESIUM SULFATE IN WATER 40 MG/ML
2000 INJECTION, SOLUTION INTRAVENOUS PRN
Status: DISCONTINUED | OUTPATIENT
Start: 2022-06-22 | End: 2022-06-24

## 2022-06-22 RX ORDER — BISACODYL 10 MG
10 SUPPOSITORY, RECTAL RECTAL DAILY PRN
Status: DISCONTINUED | OUTPATIENT
Start: 2022-06-23 | End: 2022-06-24

## 2022-06-22 RX ORDER — SODIUM CHLORIDE 9 MG/ML
INJECTION, SOLUTION INTRAVENOUS PRN
Status: DISCONTINUED | OUTPATIENT
Start: 2022-06-22 | End: 2022-06-22 | Stop reason: HOSPADM

## 2022-06-22 RX ORDER — INSULIN GLARGINE 100 [IU]/ML
0.15 INJECTION, SOLUTION SUBCUTANEOUS NIGHTLY
Status: DISCONTINUED | OUTPATIENT
Start: 2022-06-23 | End: 2022-06-23 | Stop reason: SDUPTHER

## 2022-06-22 RX ORDER — ALBUMIN, HUMAN INJ 5% 5 %
25 SOLUTION INTRAVENOUS PRN
Status: DISCONTINUED | OUTPATIENT
Start: 2022-06-22 | End: 2022-06-24

## 2022-06-22 RX ORDER — CHLORHEXIDINE GLUCONATE 0.12 MG/ML
15 RINSE ORAL 2 TIMES DAILY
Status: DISCONTINUED | OUTPATIENT
Start: 2022-06-22 | End: 2022-06-22

## 2022-06-22 RX ORDER — SODIUM CHLORIDE, SODIUM LACTATE, POTASSIUM CHLORIDE, CALCIUM CHLORIDE 600; 310; 30; 20 MG/100ML; MG/100ML; MG/100ML; MG/100ML
INJECTION, SOLUTION INTRAVENOUS CONTINUOUS PRN
Status: DISCONTINUED | OUTPATIENT
Start: 2022-06-22 | End: 2022-06-22 | Stop reason: SDUPTHER

## 2022-06-22 RX ORDER — AMINOCAPROIC ACID 250 MG/ML
INJECTION, SOLUTION INTRAVENOUS PRN
Status: DISCONTINUED | OUTPATIENT
Start: 2022-06-22 | End: 2022-06-22 | Stop reason: SDUPTHER

## 2022-06-22 RX ORDER — SODIUM CHLORIDE 0.9 % (FLUSH) 0.9 %
5-40 SYRINGE (ML) INJECTION PRN
Status: DISCONTINUED | OUTPATIENT
Start: 2022-06-22 | End: 2022-06-27 | Stop reason: HOSPADM

## 2022-06-22 RX ORDER — ALBUMIN, HUMAN INJ 5% 5 %
SOLUTION INTRAVENOUS
Status: COMPLETED
Start: 2022-06-22 | End: 2022-06-22

## 2022-06-22 RX ORDER — ROSUVASTATIN CALCIUM 20 MG/1
40 TABLET, COATED ORAL NIGHTLY
Status: DISCONTINUED | OUTPATIENT
Start: 2022-06-23 | End: 2022-06-25

## 2022-06-22 RX ORDER — FENTANYL CITRATE 50 UG/ML
25 INJECTION, SOLUTION INTRAMUSCULAR; INTRAVENOUS
Status: DISCONTINUED | OUTPATIENT
Start: 2022-06-22 | End: 2022-06-24

## 2022-06-22 RX ORDER — DEXTROSE MONOHYDRATE 50 MG/ML
100 INJECTION, SOLUTION INTRAVENOUS PRN
Status: DISCONTINUED | OUTPATIENT
Start: 2022-06-22 | End: 2022-06-27 | Stop reason: HOSPADM

## 2022-06-22 RX ORDER — SENNA AND DOCUSATE SODIUM 50; 8.6 MG/1; MG/1
1 TABLET, FILM COATED ORAL 2 TIMES DAILY
Status: DISCONTINUED | OUTPATIENT
Start: 2022-06-23 | End: 2022-06-24

## 2022-06-22 RX ORDER — IPRATROPIUM BROMIDE AND ALBUTEROL SULFATE 2.5; .5 MG/3ML; MG/3ML
1 SOLUTION RESPIRATORY (INHALATION)
Status: DISCONTINUED | OUTPATIENT
Start: 2022-06-22 | End: 2022-06-23

## 2022-06-22 RX ORDER — FENTANYL CITRATE 50 UG/ML
50 INJECTION, SOLUTION INTRAMUSCULAR; INTRAVENOUS
Status: DISCONTINUED | OUTPATIENT
Start: 2022-06-22 | End: 2022-06-24

## 2022-06-22 RX ORDER — ASPIRIN 81 MG/1
81 TABLET ORAL DAILY
Status: DISCONTINUED | OUTPATIENT
Start: 2022-06-23 | End: 2022-06-24

## 2022-06-22 RX ORDER — SODIUM CHLORIDE 9 MG/ML
INJECTION, SOLUTION INTRAVENOUS CONTINUOUS PRN
Status: DISCONTINUED | OUTPATIENT
Start: 2022-06-22 | End: 2022-06-22

## 2022-06-22 RX ORDER — ONDANSETRON 2 MG/ML
4 INJECTION INTRAMUSCULAR; INTRAVENOUS EVERY 6 HOURS PRN
Status: DISCONTINUED | OUTPATIENT
Start: 2022-06-22 | End: 2022-06-27 | Stop reason: HOSPADM

## 2022-06-22 RX ORDER — PROPOFOL 10 MG/ML
10 INJECTION, EMULSION INTRAVENOUS CONTINUOUS PRN
Status: DISCONTINUED | OUTPATIENT
Start: 2022-06-22 | End: 2022-06-22

## 2022-06-22 RX ORDER — LANOLIN ALCOHOL/MO/W.PET/CERES
400 CREAM (GRAM) TOPICAL DAILY
Status: DISCONTINUED | OUTPATIENT
Start: 2022-06-23 | End: 2022-06-27 | Stop reason: HOSPADM

## 2022-06-22 RX ORDER — MEPERIDINE HYDROCHLORIDE 25 MG/ML
25 INJECTION INTRAMUSCULAR; INTRAVENOUS; SUBCUTANEOUS
Status: ACTIVE | OUTPATIENT
Start: 2022-06-22 | End: 2022-06-22

## 2022-06-22 RX ORDER — SODIUM CHLORIDE 0.9 % (FLUSH) 0.9 %
5-40 SYRINGE (ML) INJECTION EVERY 12 HOURS SCHEDULED
Status: DISCONTINUED | OUTPATIENT
Start: 2022-06-22 | End: 2022-06-27 | Stop reason: HOSPADM

## 2022-06-22 RX ORDER — CHLORHEXIDINE GLUCONATE 0.12 MG/ML
15 RINSE ORAL ONCE
Status: COMPLETED | OUTPATIENT
Start: 2022-06-22 | End: 2022-06-22

## 2022-06-22 RX ORDER — HYDROMORPHONE HCL 110MG/55ML
PATIENT CONTROLLED ANALGESIA SYRINGE INTRAVENOUS PRN
Status: DISCONTINUED | OUTPATIENT
Start: 2022-06-22 | End: 2022-06-22 | Stop reason: SDUPTHER

## 2022-06-22 RX ORDER — PROTAMINE SULFATE 10 MG/ML
INJECTION, SOLUTION INTRAVENOUS PRN
Status: DISCONTINUED | OUTPATIENT
Start: 2022-06-22 | End: 2022-06-22 | Stop reason: SDUPTHER

## 2022-06-22 RX ORDER — ACETAMINOPHEN 650 MG/1
650 SUPPOSITORY RECTAL EVERY 4 HOURS PRN
Status: DISCONTINUED | OUTPATIENT
Start: 2022-06-22 | End: 2022-06-24

## 2022-06-22 RX ORDER — PROPOFOL 10 MG/ML
INJECTION, EMULSION INTRAVENOUS PRN
Status: DISCONTINUED | OUTPATIENT
Start: 2022-06-22 | End: 2022-06-22 | Stop reason: SDUPTHER

## 2022-06-22 RX ORDER — ASPIRIN 81 MG/1
81 TABLET, CHEWABLE ORAL ONCE
Status: COMPLETED | OUTPATIENT
Start: 2022-06-22 | End: 2022-06-22

## 2022-06-22 RX ORDER — ESCITALOPRAM OXALATE 5 MG/1
5 TABLET ORAL DAILY
Status: DISCONTINUED | OUTPATIENT
Start: 2022-06-22 | End: 2022-06-27 | Stop reason: HOSPADM

## 2022-06-22 RX ORDER — SODIUM CHLORIDE 0.9 % (FLUSH) 0.9 %
5-40 SYRINGE (ML) INJECTION PRN
Status: DISCONTINUED | OUTPATIENT
Start: 2022-06-22 | End: 2022-06-22 | Stop reason: HOSPADM

## 2022-06-22 RX ORDER — VECURONIUM BROMIDE 1 MG/ML
INJECTION, POWDER, LYOPHILIZED, FOR SOLUTION INTRAVENOUS PRN
Status: DISCONTINUED | OUTPATIENT
Start: 2022-06-22 | End: 2022-06-22 | Stop reason: SDUPTHER

## 2022-06-22 RX ORDER — INSULIN LISPRO 100 [IU]/ML
0-18 INJECTION, SOLUTION INTRAVENOUS; SUBCUTANEOUS EVERY 4 HOURS
Status: DISCONTINUED | OUTPATIENT
Start: 2022-06-22 | End: 2022-06-24

## 2022-06-22 RX ORDER — SODIUM CHLORIDE 0.9 % (FLUSH) 0.9 %
5-40 SYRINGE (ML) INJECTION EVERY 12 HOURS SCHEDULED
Status: DISCONTINUED | OUTPATIENT
Start: 2022-06-22 | End: 2022-06-22 | Stop reason: HOSPADM

## 2022-06-22 RX ORDER — PHENYLEPHRINE HYDROCHLORIDE 10 MG/ML
INJECTION INTRAVENOUS PRN
Status: DISCONTINUED | OUTPATIENT
Start: 2022-06-22 | End: 2022-06-22 | Stop reason: SDUPTHER

## 2022-06-22 RX ORDER — FENTANYL CITRATE 0.05 MG/ML
INJECTION, SOLUTION INTRAMUSCULAR; INTRAVENOUS PRN
Status: DISCONTINUED | OUTPATIENT
Start: 2022-06-22 | End: 2022-06-22 | Stop reason: SDUPTHER

## 2022-06-22 RX ORDER — OXYCODONE HYDROCHLORIDE 5 MG/1
10 TABLET ORAL EVERY 4 HOURS PRN
Status: DISCONTINUED | OUTPATIENT
Start: 2022-06-22 | End: 2022-06-27 | Stop reason: HOSPADM

## 2022-06-22 RX ORDER — SODIUM CHLORIDE 9 MG/ML
INJECTION, SOLUTION INTRAVENOUS PRN
Status: DISCONTINUED | OUTPATIENT
Start: 2022-06-22 | End: 2022-06-24

## 2022-06-22 RX ORDER — NITROGLYCERIN 5 MG/ML
INJECTION, SOLUTION INTRAVENOUS PRN
Status: DISCONTINUED | OUTPATIENT
Start: 2022-06-22 | End: 2022-06-22 | Stop reason: SDUPTHER

## 2022-06-22 RX ORDER — SODIUM CHLORIDE 9 MG/ML
30 INJECTION, SOLUTION INTRAVENOUS CONTINUOUS
Status: DISCONTINUED | OUTPATIENT
Start: 2022-06-22 | End: 2022-06-24

## 2022-06-22 RX ORDER — PROPOFOL 10 MG/ML
INJECTION, EMULSION INTRAVENOUS
Status: COMPLETED
Start: 2022-06-22 | End: 2022-06-22

## 2022-06-22 RX ADMIN — FENTANYL CITRATE 250 MCG: 50 INJECTION, SOLUTION INTRAMUSCULAR; INTRAVENOUS at 07:37

## 2022-06-22 RX ADMIN — MIDAZOLAM 1 MG: 1 INJECTION INTRAMUSCULAR; INTRAVENOUS at 06:25

## 2022-06-22 RX ADMIN — SODIUM NITROPRUSSIDE 0.1 MCG/KG/MIN: 25 INJECTION, SOLUTION, CONCENTRATE INTRAVENOUS at 21:37

## 2022-06-22 RX ADMIN — FENTANYL CITRATE 25 MCG: 50 INJECTION INTRAMUSCULAR; INTRAVENOUS at 16:36

## 2022-06-22 RX ADMIN — SODIUM CHLORIDE, PRESERVATIVE FREE 10 ML: 5 INJECTION INTRAVENOUS at 11:24

## 2022-06-22 RX ADMIN — HYDROMORPHONE HYDROCHLORIDE 1 MG: 2 INJECTION, SOLUTION INTRAMUSCULAR; INTRAVENOUS; SUBCUTANEOUS at 10:00

## 2022-06-22 RX ADMIN — SODIUM CHLORIDE 30 ML/HR: 9 INJECTION, SOLUTION INTRAVENOUS at 10:48

## 2022-06-22 RX ADMIN — POTASSIUM CHLORIDE 20 MEQ: 400 INJECTION, SOLUTION INTRAVENOUS at 13:54

## 2022-06-22 RX ADMIN — HEPARIN SODIUM 40000 UNITS: 10000 INJECTION, SOLUTION INTRAVENOUS; SUBCUTANEOUS at 08:10

## 2022-06-22 RX ADMIN — MIDAZOLAM 1 MG: 1 INJECTION INTRAMUSCULAR; INTRAVENOUS at 06:32

## 2022-06-22 RX ADMIN — FENTANYL CITRATE 200 MCG: 50 INJECTION, SOLUTION INTRAMUSCULAR; INTRAVENOUS at 07:41

## 2022-06-22 RX ADMIN — FENTANYL CITRATE 50 MCG: 50 INJECTION INTRAMUSCULAR; INTRAVENOUS at 20:50

## 2022-06-22 RX ADMIN — ALBUMIN (HUMAN) 12.5 G: 12.5 INJECTION, SOLUTION INTRAVENOUS at 11:23

## 2022-06-22 RX ADMIN — ONDANSETRON 4 MG: 2 INJECTION INTRAMUSCULAR; INTRAVENOUS at 17:42

## 2022-06-22 RX ADMIN — 0.12% CHLORHEXIDINE GLUCONATE 15 ML: 1.2 RINSE ORAL at 13:18

## 2022-06-22 RX ADMIN — FENTANYL CITRATE 25 MCG: 50 INJECTION INTRAMUSCULAR; INTRAVENOUS at 13:54

## 2022-06-22 RX ADMIN — AMINOCAPROIC ACID 1 G/HR: 250 INJECTION, SOLUTION INTRAVENOUS at 06:58

## 2022-06-22 RX ADMIN — ALBUMIN (HUMAN) 12.5 G: 12.5 INJECTION, SOLUTION INTRAVENOUS at 15:26

## 2022-06-22 RX ADMIN — FENTANYL CITRATE 50 MCG: 50 INJECTION INTRAMUSCULAR; INTRAVENOUS at 18:02

## 2022-06-22 RX ADMIN — VECURONIUM BROMIDE 20 MG: 10 INJECTION, POWDER, LYOPHILIZED, FOR SOLUTION INTRAVENOUS at 06:50

## 2022-06-22 RX ADMIN — PROTAMINE SULFATE 250 MG: 10 INJECTION, SOLUTION INTRAVENOUS at 09:30

## 2022-06-22 RX ADMIN — PROPOFOL 10 MCG/KG/MIN: 10 INJECTION, EMULSION INTRAVENOUS at 10:41

## 2022-06-22 RX ADMIN — NITROGLYCERIN 50 MCG: 5 INJECTION, SOLUTION INTRAVENOUS at 07:49

## 2022-06-22 RX ADMIN — LIDOCAINE HYDROCHLORIDE 100 MG: 20 INJECTION INTRAVENOUS at 09:31

## 2022-06-22 RX ADMIN — IPRATROPIUM BROMIDE AND ALBUTEROL SULFATE 1 AMPULE: 2.5; .5 SOLUTION RESPIRATORY (INHALATION) at 12:29

## 2022-06-22 RX ADMIN — FENTANYL CITRATE 150 MCG: 50 INJECTION, SOLUTION INTRAMUSCULAR; INTRAVENOUS at 07:31

## 2022-06-22 RX ADMIN — CEFAZOLIN 2000 MG: 2 INJECTION, POWDER, FOR SOLUTION INTRAMUSCULAR; INTRAVENOUS at 17:28

## 2022-06-22 RX ADMIN — MUPIROCIN: 20 OINTMENT TOPICAL at 15:14

## 2022-06-22 RX ADMIN — SODIUM CHLORIDE: 9 INJECTION, SOLUTION INTRAVENOUS at 06:57

## 2022-06-22 RX ADMIN — FENTANYL CITRATE 100 MCG: 50 INJECTION, SOLUTION INTRAMUSCULAR; INTRAVENOUS at 07:21

## 2022-06-22 RX ADMIN — POTASSIUM CHLORIDE 20 MEQ: 400 INJECTION, SOLUTION INTRAVENOUS at 22:42

## 2022-06-22 RX ADMIN — AMINOCAPROIC ACID 5000 MG: 250 INJECTION, SOLUTION INTRAVENOUS at 06:58

## 2022-06-22 RX ADMIN — FENTANYL CITRATE 50 MCG: 50 INJECTION INTRAMUSCULAR; INTRAVENOUS at 23:18

## 2022-06-22 RX ADMIN — CEFAZOLIN 2000 MG: 2 INJECTION, POWDER, FOR SOLUTION INTRAMUSCULAR; INTRAVENOUS at 09:32

## 2022-06-22 RX ADMIN — PHENYLEPHRINE HYDROCHLORIDE 200 MCG: 10 INJECTION, SOLUTION INTRAVENOUS at 08:29

## 2022-06-22 RX ADMIN — IPRATROPIUM BROMIDE AND ALBUTEROL SULFATE 1 AMPULE: 2.5; .5 SOLUTION RESPIRATORY (INHALATION) at 19:36

## 2022-06-22 RX ADMIN — MUPIROCIN: 20 OINTMENT TOPICAL at 20:50

## 2022-06-22 RX ADMIN — 0.12% CHLORHEXIDINE GLUCONATE 15 ML: 1.2 RINSE ORAL at 05:47

## 2022-06-22 RX ADMIN — IPRATROPIUM BROMIDE AND ALBUTEROL SULFATE 1 AMPULE: 2.5; .5 SOLUTION RESPIRATORY (INHALATION) at 16:08

## 2022-06-22 RX ADMIN — MIDAZOLAM 2 MG: 1 INJECTION INTRAMUSCULAR; INTRAVENOUS at 06:50

## 2022-06-22 RX ADMIN — FENTANYL CITRATE 50 MCG: 50 INJECTION INTRAMUSCULAR; INTRAVENOUS at 22:13

## 2022-06-22 RX ADMIN — CEFAZOLIN 2000 MG: 2 INJECTION, POWDER, FOR SOLUTION INTRAMUSCULAR; INTRAVENOUS at 07:12

## 2022-06-22 RX ADMIN — SODIUM CHLORIDE, POTASSIUM CHLORIDE, SODIUM LACTATE AND CALCIUM CHLORIDE: 600; 310; 30; 20 INJECTION, SOLUTION INTRAVENOUS at 06:57

## 2022-06-22 RX ADMIN — FENTANYL CITRATE 25 MCG: 50 INJECTION INTRAMUSCULAR; INTRAVENOUS at 15:14

## 2022-06-22 RX ADMIN — INSULIN HUMAN 7 UNITS: 100 INJECTION, SOLUTION PARENTERAL at 09:37

## 2022-06-22 RX ADMIN — PROPOFOL 100 MG: 10 INJECTION, EMULSION INTRAVENOUS at 06:50

## 2022-06-22 RX ADMIN — ASPIRIN 81 MG CHEWABLE TABLET 81 MG: 81 TABLET CHEWABLE at 17:28

## 2022-06-22 RX ADMIN — SODIUM CHLORIDE 40 MG: 9 INJECTION, SOLUTION INTRAMUSCULAR; INTRAVENOUS; SUBCUTANEOUS at 13:17

## 2022-06-22 RX ADMIN — FENTANYL CITRATE 50 MCG: 50 INJECTION INTRAMUSCULAR; INTRAVENOUS at 19:40

## 2022-06-22 RX ADMIN — FENTANYL CITRATE 200 MCG: 50 INJECTION, SOLUTION INTRAMUSCULAR; INTRAVENOUS at 07:45

## 2022-06-22 RX ADMIN — FENTANYL CITRATE 100 MCG: 50 INJECTION, SOLUTION INTRAMUSCULAR; INTRAVENOUS at 06:50

## 2022-06-22 RX ADMIN — SODIUM CHLORIDE, PRESERVATIVE FREE 10 ML: 5 INJECTION INTRAVENOUS at 20:50

## 2022-06-22 ASSESSMENT — PAIN SCALES - GENERAL
PAINLEVEL_OUTOF10: 6
PAINLEVEL_OUTOF10: 4
PAINLEVEL_OUTOF10: 6
PAINLEVEL_OUTOF10: 8
PAINLEVEL_OUTOF10: 6
PAINLEVEL_OUTOF10: 7
PAINLEVEL_OUTOF10: 6
PAINLEVEL_OUTOF10: 7
PAINLEVEL_OUTOF10: 7
PAINLEVEL_OUTOF10: 4
PAINLEVEL_OUTOF10: 8
PAINLEVEL_OUTOF10: 0
PAINLEVEL_OUTOF10: 4
PAINLEVEL_OUTOF10: 8
PAINLEVEL_OUTOF10: 7
PAINLEVEL_OUTOF10: 4
PAINLEVEL_OUTOF10: 6

## 2022-06-22 ASSESSMENT — PAIN DESCRIPTION - ONSET
ONSET: ON-GOING
ONSET: GRADUAL

## 2022-06-22 ASSESSMENT — PULMONARY FUNCTION TESTS
PIF_VALUE: 15
PIF_VALUE: 24
PIF_VALUE: 22
PIF_VALUE: 21
PIF_VALUE: 15
PIF_VALUE: 22
PIF_VALUE: 22
PIF_VALUE: 21
PIF_VALUE: 22
PIF_VALUE: 16
PIF_VALUE: 21
PIF_VALUE: 22

## 2022-06-22 ASSESSMENT — PAIN DESCRIPTION - PAIN TYPE
TYPE: SURGICAL PAIN

## 2022-06-22 ASSESSMENT — PAIN DESCRIPTION - LOCATION
LOCATION: CHEST;STERNUM
LOCATION: STERNUM;CHEST
LOCATION: CHEST
LOCATION: CHEST;STERNUM
LOCATION: CHEST
LOCATION: CHEST
LOCATION: CHEST;STERNUM
LOCATION: CHEST

## 2022-06-22 ASSESSMENT — PAIN DESCRIPTION - ORIENTATION
ORIENTATION: LOWER;MID
ORIENTATION: MID
ORIENTATION: MID;LOWER
ORIENTATION: LOWER;MID
ORIENTATION: MID;LOWER
ORIENTATION: MID;UPPER
ORIENTATION: MID;UPPER
ORIENTATION: MID

## 2022-06-22 ASSESSMENT — LIFESTYLE VARIABLES: SMOKING_STATUS: 1

## 2022-06-22 ASSESSMENT — PAIN DESCRIPTION - FREQUENCY
FREQUENCY: CONTINUOUS
FREQUENCY: INTERMITTENT
FREQUENCY: CONTINUOUS

## 2022-06-22 ASSESSMENT — PAIN - FUNCTIONAL ASSESSMENT
PAIN_FUNCTIONAL_ASSESSMENT: PREVENTS OR INTERFERES SOME ACTIVE ACTIVITIES AND ADLS
PAIN_FUNCTIONAL_ASSESSMENT: NONE - DENIES PAIN
PAIN_FUNCTIONAL_ASSESSMENT: PREVENTS OR INTERFERES SOME ACTIVE ACTIVITIES AND ADLS

## 2022-06-22 ASSESSMENT — PAIN DESCRIPTION - DESCRIPTORS
DESCRIPTORS: ACHING;DISCOMFORT;SORE
DESCRIPTORS: THROBBING;DISCOMFORT
DESCRIPTORS: ACHING;DISCOMFORT;SORE
DESCRIPTORS: ACHING;SORE
DESCRIPTORS: SORE;DISCOMFORT
DESCRIPTORS: DISCOMFORT;SORE

## 2022-06-22 NOTE — ANESTHESIA PROCEDURE NOTES
Procedure Performed: NARGIS     Start Time:        End Time:      Preanesthesia Checklist:  Patient identified, IV assessed, risks and benefits discussed, monitors and equipment assessed, procedure being performed at surgeon's request and anesthesia consent obtained. General Procedure Information  Diagnostic Indications for Echo:  assessment of surgical repair, hemodynamic monitoring and assessment of valve function  Physician Requesting Echo: Caitie Lott MD  Location performed:  OR  Intubated  Bite block placed  Heart visualized  Probe Insertion:  Easy  Probe Type:  3D and mulitplane  Modalities:  3D, color flow mapping, pulse wave Doppler and continuous wave Doppler    Echocardiographic and Doppler Measurements    Ventricles    Right Ventricle:  Cavity size normal.  Hypertrophy not present. Thrombus not present. Global function normal.    Left Ventricle:  Cavity size normal.  Hypertrophy not present. Thrombus not present. Global Function normal.  Ejection Fraction 55%. Ventricular Regional Function:  1- Basal Anteroseptal:  normal  2- Basal Anterior:  normal  3- Basal Anterolateral:  normal  4- Basal Inferolateral:  normal  5- Basal Inferior:  normal  6- Basal Inferoseptal:  normal  7- Mid Anteroseptal:  normal  8- Mid Anterior:  normal  9- Mid Anterolateral:  normal  10- Mid Inferolateral:  normal  11- Mid Inferior:  normal  12- Mid Inferoseptal:  normal  13- Apical Anterior:  normal  14- Apical Lateral:  normal  15- Apical Inferior:  normal  16- Apical Septal:  normal  17- [place]:  normal      Valves    Aortic Valve: Annulus normal.  Stenosis not present. Regurgitation none. Leaflets normal.  Leaflet motions normal.      Mitral Valve: Annulus normal.  Stenosis not present. Regurgitation mild. Leaflets normal.  Leaflet motions normal.      Tricuspid Valve: Annulus normal.  Stenosis not present. Regurgitation moderate. Leaflets normal.  Leaflet motions normal.    Pulmonic Valve:   Annulus normal. Stenosis not present. Regurgitation none. Aorta    Ascending Aorta:  Size normal.  Dissection not present. Aortic Arch:  Size normal.  Dissection not present. Descending Aorta:  Size normal.  Dissection not present. Other Aortic Findings:       Tor Maha grade 1 atheroma throughout the aorta    Atria    Right Atrium:  Size normal.  Spontaneous echo contrast not present. Thrombus not present. Tumor not present. Device not present. Left Atrium:  Size normal.  Spontaneous echo contrast not present. Thrombus not present. Tumor not present. Device not present. Left atrial appendage normal.      Septa    Atrial Septum:  Intra-atrial septal morphology normal.      Ventricular Septum:  Intra-ventricular septum morphology normal.          Other Findings  Pericardium:  normal  Pleural Effusion:  none  Pulmonary Arteries:  normal  Pulmonary Venous Flow:  normal    Anesthesia Information  Performed Personally  Anesthesiologist:  Lucia Lou DO      Echocardiogram Comments: All findings d/w surgeonPost Intervention Follow-up Study  Aortic Function: unchangedMitral Function: unchangedTricuspid Function: unchangedComments: s/p CABG x 3. LVEF ~ 55% RVEF WNL.  No new valvular changes from pre-CPB        All views obtained from esophageal views secondary to stability and gastric bypass surgery

## 2022-06-22 NOTE — CONSULTS
Inpatient Cardiology Progress note     PATIENT IS BEING FOLLOWED FOR: s/p CABG LIMA-Diag sequenced onto LAD, SVG-OM    Antonio Aldana is a 50 y.o. female who follows with Dr Karlene Yu, last seen 4/8/2022. PMH incldues CAD with h/o inferolateral STEMI 3/12/2022 s/p IVUS guided PCI/MAURICIO-RCA x3 with residual LAD/D2,  LCx / OM1/OM2,  RPL disease; renovascular hypertension s/p bilateral renal artery stenign 3/15/2022, tobacco abuse, seizures in the remote past and gastric bypass with h/o Jeannine-en-Y.        SUBJECTIVE: intubated   OBJECTIVE: No apparent distress, resting flat in bed waking up from anesthesia     ROS:  Unable to obtain as the patient is intubated     PHYSICAL EXAM:   BP 97/61   Pulse 63   Temp 97 °F (36.1 °C) (Bladder)   Resp 12   Ht 5' 4\" (1.626 m)   Wt 196 lb (88.9 kg)   SpO2 100%   BMI 33.64 kg/m²      CONST:  Well developed,  female who appears her stated age. Laying flat in bed, n apparent distress. Drowsy from anesthesia -- waking up   HEENT:   Head- Normocephalic, atraumatic   Eyes- Conjunctivae pink, anicteric  Throat- Oral mucosa pink and moist. ET tube in place . R [place]   Neck-  No stridor, trachea midline, no jugular venous distention. CHEST: Chest symmetrical, stenostomy incision with dressing clean dry and intact. CT x2 with pacing wires. Minimal output in CT  RESPIRATORY: Lung sounds clear throughout fields bilaterally. No wheeze or rhonchi noted. CARDIOVASCULAR:     No carotid bruit noted bilaterally   Heart Ausculation- Regular rate and rhythm, no murmur. No s3, s4 or rub   PV: No lower extremity edema. No varicosities. LLE EVH with ACE wrap   ABDOMEN: Soft, non-tender to light palpation. Bowel sounds present. MS: Good muscle strength and tone. No atrophy or abnormal movements.    : avila with yellow urine   SKIN: Warm and dry no statis dermatitis or ulcers   NEURO / PSYCH: waking from anesthesia - drowsy       Intake/Output Summary (Last 24 hours) at 6/22/2022 3/12/2022  1. Left main: Angiographically unremarkable  2. Left anterior descending: This is a large vessel that does not reach the apex and has moderate tortuosity with mild diffuse disease. It gives rise to a small D1 and a large bifurcating D2. The mid LAD at the takeoff of D2 has an 80% stenosis that also involves the origin of D2 ([place] 111). 3. Ramus intermedius: This is a moderate-sized vessel with no significant disease  4. Left circumflex: This is a large vessel with retroflexed origin. It gives rise to a large OM1 and a medium sized OM 2 and a diminutive continuation in the AV groove (effectively a trifurcation to OM1, OM 2, AV groove branch). At the trifurcation there is an 80% stenosis which extends into OM1 and OM 2.  5. Right coronary artery: This is a dominant and very large artery that gives rise to a large RV branch, very large RPDA that supplies the entire inferior wall and the apex 3 RPL branches of moderate sized with RPL 3 being a bifurcating vessel with a 90% stenosis into its lateral branch with SLY-3 flow. The RCA has moderate diffuse disease with a thrombotic occlusion in its distal segment. Interventions :   · Lesion location: Distal RCA; stenosis 100%; SLY flow 0. Guiding catheter: 6F ART4.0. The lesion was crossed with a [place] wire and primary mechanical aspiration thrombectomy was performed using a [employer] system for 2 passes which restored SLY-3 flow to the vessel. .  IVUS was performed and revealed diffuse soft atheroma in the vessel. The RCA was stented from distal to proximal using 4.0 x 38, 4.0 x 12 and 4.0 x 55 [facility] MAURICIO in an overlapping fashion. Post dilation was performed using a 4.5 NC and 5.5 NC balloons at high pressure from distal to proximal.  IVUS was performed post PCI to optimize results.   The RPL 3 stenosis was treated conservatively due to having normal flow and it being a bifurcation lesion in a vessel that is angiographically no larger than 2 mm. Residual stenosis 0%; post-PCI SLY flow 3. Complications: None    Conclusions:  · Culprit for acute MI was thrombotic occlusion of a distal super dominant RCA. There was successful IVUS guided primary PCI with 3 overlapping MAURICIO from the distal to proximal RCA  · Residual severe true bifurcation lesion of the LAD/D2 and prox LCx/OM1/OM2. Additionally there was a severe lesion of RPL3 felt best treated conservatively  · Mildly elevated left filling pressure          TTE 6/14/2022   Summary   Normal left ventricle size and systolic function. Ejection fraction is visually estimated at 55-60%. No regional wall motion abnormalities seen. Mild concentric left ventricular hypertrophy. Normal diastolic function. Normal right ventricular size and function. TAPSE 23 mm. No hemodynamically significant aortic stenosis is present. Physiologic and/or trace tricuspid regurgitation. RVSP is 22 mmHg. Normal estimated PA systolic pressure. No evidence for hemodynamically significant pericardial effusion. Assessment:  1. S/p CABG (LIMA-LAD/Diagonal sequential, SVG-OM) with LLE EVH   2. Acute post operative respiratory insufficiency, mechanical ventilation  3. Acute post operative blood loss anemia   4. H/o inferolateral STEMI 3/2022 s/p PCI/MAURICIO x3 - RCA   5. Renovascular hypertension s/p bilateral renal artery stenting 3/2022   6. Hyperlipidemia    7. Obesity with h/o gastric bypass   8. H/o seizures   9. Depression   10. Tobacco abuse       Plan:  · Wean sedation /mechincal ventilation as tolerated.    · Resume home cardiac medications, including DAPT when able   · Monitor telemetry for arrhythmias   · Rest as per primary service, ICU team, CTS and other consultants   · Cardiology will sign off, please call if needed       Electronically signed by Jaylyn Street on 6/22/2022 at 11:51 AM     I agree with the above assessment and plan made in collaboration with Jaylyn Street.

## 2022-06-22 NOTE — OP NOTE
[address]                  [address] [place],  [facility]                                OPERATIVE REPORT    PATIENT NAME: Gagan Coker                   :        1970  MED REC NO:   [id number]                            ROOM:       3815  ACCOUNT NO:   [de-identified]                           ADMIT DATE: 2022  PROVIDER:     Nette Garrison MD    DATE OF PROCEDURE:  2022    PREOPERATIVE DIAGNOSES:  Severe multivessel coronary artery disease;  history of ST-elevation MI with PCI of the right coronary artery;  arthritis; anorexia; renal artery stenosis, status post stenting;  peripheral vascular disease; depression; history of gastric bypass;  hypertension; hyperlipidemia; and seizures. POSTOPERATIVE DIAGNOSES:  Severe multivessel coronary artery disease;  history of ST-elevation MI with PCI of the right coronary artery;  arthritis; anorexia; renal artery stenosis, status post stenting;  peripheral vascular disease; depression; history of gastric bypass;  hypertension; hyperlipidemia; and seizures. INDICATIONS:  Severe multivessel coronary artery disease; history of  ST-elevation MI with PCI of the right coronary artery; arthritis;  anorexia; renal artery stenosis, status post stenting; peripheral  vascular disease; depression; history of gastric bypass; hypertension;  hyperlipidemia; and seizures. SURGEON:  Nette Garrison MD    ASSISTANT:  Reji Winston DO (no other resident was adequately  trained to be able to assist). Dr. Fransisco Dodd was present assisting  throughout the critical portion of the operation. SECOND ASSISTANTS:  Ian Bal NP;  Lajune Landau, PA Cynthia Furry  harvested the vein). COMPLICATIONS:  None, tolerated well. ESTIMATED BLOOD LOSS:  Approximately 1000 mL. ANESTHESIA:  General endotracheal.    ANESTHESIA ATTENDING:  Driss Pham DO    SPECIMENS:  None.     DRAINS:  Two in the mediastinum, one in the left chest and one in the  right chest.    OPERATIONS:  1. Sternotomy. 2.  Coronary artery bypass grafting x3:  Left internal mammary artery to  the diagonal branch of the LAD with a sequence on the LAD and saphenous  vein graft to the obtuse marginal.  3.  Endoscopic harvesting, left lower extremity greater saphenous vein. 4.  Rigid internal fixation of the sternum using Clive plates x2. HISTORY:  This is a 53-year-old female who was admitted several months  ago with an ST-elevation MI. This was inferior and she underwent  primary PCI with an excellent result to her right coronary artery by Dr. Brenda Dalton. She was noted to have severe multivessel coronary artery disease  including bifurcation lesion of her LAD and so, she was referred for  coronary artery bypass grafting. The patient was described the  procedure in full including the risks and complications, including but  not limited to bleeding, infection, need for reoperation, hemothorax,  pneumothorax, stroke, myocardial infarction, and death. The patient  agreed to proceed. FINDINGS:  Preoperative NARGIS revealed preserved ejection fraction without  any significant valvular abnormalities. Intraoperatively, left internal  mammary artery was a good conduit for bypass. The vein was good conduit  for bypass. I elected to sequence the diagonal branch in the LAD, and  the diagonal branch was a 2-mm vessel and good target for bypass. The  LAD was a 1.8-mm vessel and a fair to good target for bypass. The  obtuse marginal was a 1.7-mm vessel and a fair target for bypass. The  patient was  from cardiopulmonary bypass without the assistance  of any inotropic support. Postoperative NARGIS revealed preserved ejection  fraction without any new valvular abnormalities. All sponge,  instrument, and needle counts were correct at the end of the case.     DESCRIPTION OF OPERATION:  After adequate informed consent was obtained  and adequate preoperative antibiotics were given, the patient was  brought to the operating room in stable condition. She was laid in the  supine position and induced under general endotracheal anesthesia by  Anesthesia staff. Rodriguez catheter and adequate monitoring lines were  placed. The patient's chest, abdomen, pelvis, and lower extremities  were prepped and draped in the usual sterile fashion. Left lower  extremity greater saphenous vein was harvested in an endoscopic  technique and prepared on the back table for anastomosis. These wounds  were closed with multiple layers of absorbable stitch. Standard sternotomy was performed. Left internal mammary artery was  harvested in a pedicle fashion. The patient was then systemically  heparinized and the mammary was clipped distally and transected. Excellent pulsatile flow was noted. It was injected with papaverine,  clipped distally, and placed in the left chest.  Standard retractor was  placed. The pericardium was entered and tacked to the chest wall. After assuring adequate ACT, the patient was instituted onto  cardiopulmonary bypass by cannulating the ascending aorta using  18-Dominican aortic cannula in the right atrial appendage using a two-stage  venous cannula. Retrograde catheter and root vent were placed. The  aorta was cross-clamped, and the heart was arrested with cold blood  antegrade and retrograde Buckberg cardioplegia. Excellent diastolic  arrest was noted. Topical ice was used. Cardioplegia was given  intermittently throughout the remainder of the operation. The vein was brought into the field and was grafted to the obtuse  marginal using 7-0 Prolene. This was right underneath the left atrial  appendage, and I did not feel that placement of the left atrial  appendage clip would be appropriate underneath this graft, so I did not.   Distal anastomosis was created using 7-0 Prolene and this was brought up  to the ascending aorta, and a proximal anastomosis was created using 6-0  Prolene. The left internal mammary artery was brought onto the field. It was grafted to the diagonal branch of the LAD in a side-to-side  fashion using 7-0 Prolene. The anastomosis was tested and noted to have  great flow as well as excellent flow in the left internal mammary artery  beyond the anastomosis. We then grafted the mid LAD using the terminal  portion of the left internal mammary artery using 7-0 Prolene. The  pedicle was tacked to the epicardium. Warm antegrade and retrograde  hotshots were given. The cross clamp was released. The patient  returned in a sinus rhythm. Ventricular pacing wires were placed. Two drains were placed in the mediastinum, one in the left chest, one in  the right chest.  The patient was then easily weaned from  cardiopulmonary bypass without the assistance of inotropic support. Once off bypass, the patient was decannulated in the usual fashion. Protamine was given. Mediastinum was inspected for hemostasis. Hemostasis was achieved using Bovie electrocautery and stitches. With  the patient warm, hemodynamically stable, and in sinus rhythm, I closed  the chest using #6 steel wires including double wires as well as [place]  plates x2. The remainder of the wound was closed with multiple layers  of absorbable stitch. Dressings were applied over top. The patient  tolerated the procedure well. The patient was transferred to PACU in  stable, but guarded condition. All sponge, instruments, and needle  counts were correct at the end of the case.         Mckay Jenkins MD    D: 06/22/2022 11:20:52       T: 06/22/2022 15:08:02     [email or web address]  Job#: [id number]     Doc#: [id number]    CC:  Binta Falk, Md Dorene Skinner MD Mancel Pollack, [place]

## 2022-06-22 NOTE — PROGRESS NOTES
Patient arrived to PACU. Anesthesia at bedside. Placed on ventilator/ bedside monitor. Chest tubes to suction. Hemodynamics established.

## 2022-06-22 NOTE — ANESTHESIA PROCEDURE NOTES
Central Venous Line:    A central venous line was placed using ultrasound guidance, in the OR for the following indication(s): central venous access. Sterility preparation included the following: hand hygiene performed prior to procedure, maximum sterile barriers used and sterile technique used to drape from head to toe. The patient was placed in Trendelenburg position. The right internal jugular vein was prepped. The site was prepped with Chloraprep. A 8.5 Fr (size), introducer SLIC was placed. During the procedure, the following specific steps were taken: target vein identified, needle advanced into vein and blood aspirated and guidewire advanced into vein. Intravenous verification was obtained by ultrasound and venous blood return. Post insertion care included: all ports aspirated, all ports flushed easily, guidewire removed intact, Biopatch applied, line sutured in place and dressing applied. During the procedure the patient experienced: patient tolerated procedure well with no complications.       Real-time US image taken/store: yes  Anesthesia type: local..No  Staffing  Performed: Anesthesiologist   Anesthesiologist: Carlyn Leos DO  Preanesthetic Checklist  Completed: patient identified, IV checked, site marked, risks and benefits discussed, surgical/procedural consents, equipment checked, pre-op evaluation, timeout performed, anesthesia consent given, oxygen available and monitors applied/VS acknowledged

## 2022-06-22 NOTE — ANESTHESIA PRE PROCEDURE
Department of Anesthesiology  Preprocedure Note       Name:  Nicole Wright   Age:  50 y.o.  :  1970                                          MRN:  [id number]         Date:  2022      Surgeon: Hema De Jesus):  Brian De MD    Procedure: CABG CORONARY ARTERY BYPASS GRAFTING, POSSIBLE LEFT  RADIAL HARVEST (N/A )    Medications prior to admission:   Prior to Admission medications    Medication Sig Start Date End Date Taking? Authorizing Provider   ticagrelor (BRILINTA) 90 MG TABS tablet Take 1 tablet by mouth 2 times daily 22  Reina Garza MD   spironolactone (ALDACTONE) 50 MG tablet Take 1 tablet by mouth daily 22   Reina Garza MD   rosuvastatin (CRESTOR) 40 MG tablet Take 1 tablet by mouth daily  Patient taking differently: Take 40 mg by mouth nightly  22   Reina Garza MD   metoprolol succinate (TOPROL XL) 100 MG extended release tablet Take 1 tablet by mouth daily 22   Reina Garza MD   amLODIPine (NORVASC) 10 MG tablet Take 1 tablet by mouth daily 22   Reina Garza MD   aspirin 81 MG EC tablet Take 1 tablet by mouth daily 22   Reina Garza MD   escitalopram (LEXAPRO) 5 MG tablet Take 1 tablet by mouth daily 3/25/22   Yobani Pennington MD   Multiple Vitamin (MULTIVITAMIN ADULT) TABS Take 1 tablet by mouth daily 3/16/22   Juice Gutierrez MD   vitamin D (ERGOCALCIFEROL) 1.25 MG (79488 UT) CAPS capsule take 1 capsule by mouth every  AND  UNITS DAILY EXCEPT NATASHA 3/16/22   Juice Gutierrez MD   Multiple Vitamins-Minerals (MULTIVITAMIN ADULT) TABS Take 1 tablet by mouth daily 4/30/20 3/16/21  DA Felton CNP       Current medications:    No current facility-administered medications for this encounter.      Current Outpatient Medications   Medication Sig Dispense Refill    ticagrelor (BRILINTA) 90 MG TABS tablet Take 1 tablet by mouth 2 times daily 180 tablet 3    spironolactone (ALDACTONE) 50 MG tablet Take 1 tablet by mouth daily 90 tablet 3    rosuvastatin (CRESTOR) 40 MG tablet Take 1 tablet by mouth daily (Patient taking differently: Take 40 mg by mouth nightly ) 90 tablet 3    metoprolol succinate (TOPROL XL) 100 MG extended release tablet Take 1 tablet by mouth daily 90 tablet 3    amLODIPine (NORVASC) 10 MG tablet Take 1 tablet by mouth daily 90 tablet 3    aspirin 81 MG EC tablet Take 1 tablet by mouth daily 90 tablet 3    escitalopram (LEXAPRO) 5 MG tablet Take 1 tablet by mouth daily 30 tablet 1    Multiple Vitamin (MULTIVITAMIN ADULT) TABS Take 1 tablet by mouth daily 90 tablet 3    vitamin D (ERGOCALCIFEROL) 1.25 MG (57544 UT) CAPS capsule take 1 capsule by mouth every Sunday AND 2000 UNITS DAILY EXCEPT SUNDAY 12 capsule 3       Allergies:     Allergies   Allergen Reactions    Ultram [Tramadol] Other (See Comments)     Pt states she had a seizure after taking ultram       Problem List:    Patient Active Problem List   Diagnosis Code    Abdominal tenderness, LLQ (left lower quadrant) R10.814    Postoperative anemia due to acute blood loss D62    Chronic pain G89.29    Anorexia R63.0    H/O gastric bypass Z98.84    Back pain M54.9    Encounter for palliative care Z51.5    Hypertension I10    Epigastric pain R10.13    GI bleed due to NSAIDs K92.2, T39.395A    Gastrointestinal hemorrhage associated with gastric ulcer K25.4    Acute cystitis without hematuria N30.00    Current severe episode of major depressive disorder without psychotic features without prior episode (HCC) F32.2    Acute MI, inferior wall ([facility]) I21.19    Chest pain R07.9    Bilateral renal artery stenosis ([facility]) I70.1       Past Medical History:        Diagnosis Date    Anorexia 06/01/2015    Arthritis of knee     Back pain, chronic     Bilateral renal artery stenosis ([facility] 03/13/2022    CAD (coronary artery disease)     Chronic pain 06/01/2015    Current severe episode of major depressive disorder without psychotic features without prior episode ([facility] 03/16/2021    H/O gastric bypass 06/01/2015    Hypertension 01/01/2012    Hypertension 11/55/2228    Metabolic acidosis 75/40/7635    Ovarian cyst     Seizures ([address]        Past Surgical History:        Procedure Laterality Date    CHOLECYSTECT[facility]OSCOPIC  09/19/1995    [facility]    CORONARY ANGIOPLASTY WITH STENT PLACEMENT  03/12/2022    ENDOMETRIAL ABLATION  2003    [facility]    GASTRIC BYPASS SURGERY      HERNIA REPAIR      umbil    HYSTERECTOMY (CERVIX STATUS UNKNOWN) Left 02/05/2013    Laparotomy;HUGO;JOSUÉ:LSO    LAPAROSCOPY  02/12/2009    lap assisted percutaneous ERCP and removal CBD stone and gastrostomy, [email or web address]  12/28/2011    exp lap, hugo, rt so/ repair hernia    EMILY-EN-Y GASTRIC BYPASS  12/02/2008    laparoscopic RYGB, Dr. Daksha Nielson, [facility]  06/25/2010    resection of jejunal ulcer, reversal gastric bypass, jejunoduodenostomy, feeding jejunostomy, Dr. Daksha Nielson, [address] ENDOSCOPY  10/03/2008    mild gastritis & duodenitis, Dr. Daksha Nielson, [address] ENDOSCOPY  01/09/2009    multiple severe anastomotic jejunal ulcers, Dr. Daksha Nielson.  [facility]    UPPER GASTROINTESTINAL ENDOSCOPY  02/04/2009    ulcers healed, normal, Dr. Alivia He, [address] ENDOSCOPY  03/06/2009    recurrent severe anastomotic jejunal ulcers, Dr. Daksha Nielson, [address] ENDOSCOPY  06/04/2009    severe anastomotic jejunal ulcers, Dr. Daksha Nielson, [address] ENDOSCOPY  07/02/2009    severe anastomotic jejunal ulcers, Dr. Daksha Nielson, [address] ENDOSCOPY  10/27/2009    severe anastomotic jejunal ulcers, Dr. Daksha Nielson, [address] ENDOSCOPY  01/04/2010    severe anastomotic jejunal ulcers, Dr. Daksha Nielson, [address] ENDOSCOPY  06/07/2010    severe anastomotic jejunal ulcers, Dr. Daksha Nielson, Nathalie Lynch 2010    gastritis, Dr. Nir Torres, [facility] GASTROINTESTINAL ENDOSCOPY N/A 05/10/2019    EGD ESOPHAGOGASTRODUODENOSCOPY performed by Giacomo Ayers MD at [address] [place] [address] N/A 2019    EGD DIAGNOSTIC ONLY performed by Giacomo Ayers MD at [address] [place] [address] N/A 2019    EGD ESOPHAGOGASTRODUODENOSCOPY performed by Uriah Mendoza MD at [facility] History:    Social History     Tobacco Use    Smoking status: Former Smoker     Packs/day: 0.50     Years: 15.00     Pack years: 7.50     Types: Cigarettes     Quit date: 3/11/2022     Years since quittin.2    Smokeless tobacco: Never Used   Substance Use Topics    Alcohol use: Not Currently     Comment: rare                                Counseling given: Not Answered      Vital Signs (Current): There were no vitals filed for this visit.                                            BP Readings from Last 3 Encounters:   06/15/22 (!) 112/58   22 113/70   22 110/71       NPO Status:  > 8hrs                                                                               BMI:   Wt Readings from Last 3 Encounters:   06/15/22 196 lb (88.9 kg)   22 190 lb (86.2 kg)   22 190 lb (86.2 kg)     There is no height or weight on file to calculate BMI.    CBC:   Lab Results   Component Value Date    WBC 6.2 06/15/2022    RBC 3.76 06/15/2022    HGB 11.5 06/15/2022    HCT 35.0 06/15/2022    MCV 93.1 06/15/2022    RDW 13.8 06/15/2022     06/15/2022       CMP:   Lab Results   Component Value Date     06/15/2022    K 4.1 06/15/2022    K 4.0 2022     06/15/2022    CO2 18 06/15/2022    BUN 16 06/15/2022    CREATININE 1.3 06/15/2022    GFRAA 52 06/15/2022    LABGLOM 43 06/15/2022    GLUCOSE 79 06/15/2022    GLUCOSE 83 2012    PROT 7.3 06/15/2022    CALCIUM 8.7 06/15/2022    BILITOT 0.5 06/15/2022    ALKPHOS 166 06/15/2022    AST 24 06/15/2022    ALT 13 06/15/2022       POC Tests: No results for input(s): POCGLU, POCNA, POCK, POCCL, POCBUN, POCHEMO, POCHCT in the last 72 hours. Coags:   Lab Results   Component Value Date    PROTIME 12.7 06/15/2022    PROTIME 12.6 02/24/2012    INR 1.2 06/15/2022    APTT 33.0 06/15/2022       HCG (If Applicable):   Lab Results   Component Value Date    PREGTESTUR NEG 07/25/2018    PREGSERUM NEGATIVE 02/24/2012        ABGs: No results found for: PHART, PO2ART, SAT4BQV, LLB6KHA, BEART, Q0TIXEIA     Type & Screen (If Applicable):  Lab Results   Component Value Date    LABABO A 12/28/2011    [facility] POS 12/28/2011         EKG 05/09/2019    Normal sinus rhythm  Normal ECG  No previous ECGs available    Chest xray 05/09/2019    FINDINGS:       The heart is normal in size.  No focal airspace opacity. There is no   pleural effusion. There is no pneumothorax. No free air beneath   diaphragm.           Impression       No airspace opacities or pleural effusion. Anesthesia Evaluation  Patient summary reviewed and Nursing notes reviewed no history of anesthetic complications:   Airway: Mallampati: II  TM distance: >3 FB   Neck ROM: full  Mouth opening: > = 3 FB   Dental:    (+) edentulous      Pulmonary: breath sounds clear to auscultation  (+) current smoker                           Cardiovascular:  Exercise tolerance: poor (<4 METS),   (+) hypertension:, past MI:, CAD: obstructive, CABG/stent:,       ECG reviewed  Rhythm: regular  Rate: normal                    Neuro/Psych:   (+) seizures (Hx: r/t tramadol, last one in 2000):, psychiatric history:             ROS comment: Hx: chronic back pain GI/Hepatic/Renal:   (+) PUD, renal disease ([place]):,          ROS comment: HX: GASTRIC BYPASS  Anemia  GI bleeds. Endo/Other:    (+) blood dyscrasia: anemia, arthritis:., .                 Abdominal:             Vascular:           ROS comment: Bilateral GAYATRI s/p stents.  Other Findings: #22 L hand Anesthesia Plan      general     ASA 4       Induction: intravenous. arterial line, BIS, NARGIS, central line and CVP  MIPS: Postoperative opioids intended, Prophylactic antiemetics administered and Postoperative ventilation. Anesthetic plan and risks discussed with patient. Use of blood products discussed with patient whom consented to blood products. Plan discussed with CRNA.                     Tg Omer DO   6/22/2022

## 2022-06-22 NOTE — ANESTHESIA PROCEDURE NOTES
Arterial Line:    An arterial line was placed using surface landmarks, in the OR for the following indication(s): continuous blood pressure monitoring and blood sampling needed. A 20 gauge (size), 5 cm (length), Arrow (type) catheter was placed, Seldinger technique not used, into the right brachial artery, secured by tape and Tegaderm. Anesthesia type: Local  Local infiltration: Injection    Events:  patient tolerated procedure well with no complications.   Resident/CRNA: DA White - CRNA  Performed: Resident/CRNA   Preanesthetic Checklist  Completed: patient identified, IV checked, site marked, risks and benefits discussed, surgical/procedural consents, equipment checked, pre-op evaluation, timeout performed, anesthesia consent given, oxygen available and monitors applied/VS acknowledged

## 2022-06-22 NOTE — PROCEDURES
Patient was extubated to 4 liters/min via nasal cannula. Breath Sounds post extubation were diminished. Stridor was not present post extubation. SPO2 was 100%. RN Mary Olvera present at bedside.       Performed by  Jeet Jolly RCP

## 2022-06-22 NOTE — PLAN OF CARE
Problem: Discharge Planning  Goal: Discharge to home or other facility with appropriate resources  Outcome: Progressing  Flowsheets  Taken 6/22/2022 1200  Discharge to home or other facility with appropriate resources:   Identify barriers to discharge with patient and caregiver   Identify discharge learning needs (meds, wound care, etc)  Taken 6/22/2022 1045  Discharge to home or other facility with appropriate resources:   Identify barriers to discharge with patient and caregiver   Identify discharge learning needs (meds, wound care, etc)     Problem: Pain  Goal: Verbalizes/displays adequate comfort level or baseline comfort level  Outcome: Progressing  Flowsheets  Taken 6/22/2022 1200  Verbalizes/displays adequate comfort level or baseline comfort level: Assess pain using appropriate pain scale  Taken 6/22/2022 1130  Verbalizes/displays adequate comfort level or baseline comfort level: Assess pain using appropriate pain scale     Problem: Respiratory - Adult  Goal: Achieves optimal ventilation and oxygenation  Outcome: Progressing     Problem: Cardiovascular - Adult  Goal: Maintains optimal cardiac output and hemodynamic stability  Outcome: Progressing  Goal: Absence of cardiac dysrhythmias or at baseline  Outcome: Progressing     Problem: Skin/Tissue Integrity - Adult  Goal: Skin integrity remains intact  Outcome: Progressing  Goal: Incisions, wounds, or drain sites healing without S/S of infection  Outcome: Progressing  Goal: Oral mucous membranes remain intact  Outcome: Progressing     Problem: Genitourinary - Adult  Goal: Absence of urinary retention  Outcome: Progressing  Goal: Urinary catheter remains patent  Outcome: Progressing     Problem: Metabolic/Fluid and Electrolytes - Adult  Goal: Electrolytes maintained within normal limits  Outcome: Progressing  Goal: Hemodynamic stability and optimal renal function maintained  Outcome: Progressing  Goal: Glucose maintained within prescribed range  Outcome: Progressing     Problem: Hematologic - Adult  Goal: Maintains hematologic stability  Outcome: Progressing

## 2022-06-22 NOTE — ANESTHESIA POSTPROCEDURE EVALUATION
Department of Anesthesiology  Postprocedure Note    Patient: Musa Busby  MRN: [id number]  [place]: 1970  Date of evaluation: 6/22/2022      Procedure Summary     Date: 06/22/22 Room / Location: [facility] OR 01 / [facility]    Anesthesia Start: 6752 Anesthesia Stop: 5682    Procedure: CABG CORONARY ARTERY BYPASS GRAFTING x 3 NARGIS (N/A ) Diagnosis:       Coronary artery disease with angina pectoris, unspecified vessel or lesion type, unspecified whether native or transplanted heart ([address]      (CORONARY ARTERY DISEASE)    Surgeons: Caridad Morel MD Responsible Provider: Deshawn Castaneda DO    Anesthesia Type: general ASA Status: 4          Anesthesia Type: general    Camille Phase I: Camille Score: 10    Camille Phase II:      Last vitals:   Vitals Value Taken Time   BP 97/61 06/22/22 1042   Temp 97.5 °F (36.4 °C) 06/22/22 1042   Pulse 63 06/22/22 1047   Resp 14 06/22/22 1047   SpO2 100 % 06/22/22 1047   Vitals shown include unvalidated device data.       Anesthesia Post Evaluation    Patient location during evaluation: ICU  Patient participation: complete - patient cannot participate  Level of consciousness: sedated and ventilated  Airway patency: patent  Nausea & Vomiting: no nausea and no vomiting  Complications: no  Cardiovascular status: blood pressure returned to baseline  Respiratory status: acceptable  Hydration status: euvolemic  Multimodal analgesia pain management approach

## 2022-06-22 NOTE — BRIEF OP NOTE
Brief Postoperative Note    Noy Mendoza  YOB: 1970  [id number]    Pre-operative Diagnosis: CAD, H/O STEMI    Post-operative Diagnosis: Same    Operation: Sternotomy/CABG x 3 (LIMA-Diag sequenced onto LAD, SVG-OM)/EVH LLE/Rigid internal fixation of the sternum with Clive plates x 2    Anesthesia: General    Surgeon: Dhruv Marquez    Assistants: Ryanne/Nathan/Josesito    Estimated Blood Loss: 5088    Complications: None    Specimens:   * No specimens in log *    Implants:  Implant Name Type Inv.  Item Serial No.  Lot No. LRB No. Used Action   DEVICE FIXATION LADDER PLATE NARROW 14 HOLES - [id number]  DEVICE FIXATION LADDER PLATE NARROW 14 HOLES  [employer]  N/A 1 Implanted   PLATE FIXATION BOX STERNAL 4 HOLES - [id number]  PLATE FIXATION BOX STERNAL 4 HOLES  [employer]  N/A 1 Implanted   SCREW BNE SELF DRILLING 2.3X14 MM LCK - [id number]  SCREW BNE SELF DRILLING 2.3X14 MM [facility]  [employer] [facility]-WD  N/A 14 Implanted         Drains:   Chest Tube Mediastinal 1 (Active)       Chest Tube Mediastinal 2 (Active)       Chest Tube Pleural 3 (Active)       Urinary Catheter Rodriguez-Temperature (Active)       Findings: see dictation    Electronically signed by Marc Mcgowan MD on 6/22/2022 at 10:07 AM

## 2022-06-22 NOTE — PROGRESS NOTES
CVICU Admission Note    Name: Colleen Waddell  MRN: [id number]    CC: Postoperative Critical Care Management     Indication for Surgery/Procedure: CAD, H/O STEMI   LVEF:  55%    RVF:  NML    Important/Relevant PMH/PSH:  inferior STEMI s/p PCI with 3 overlapping MAURICIO from the distal to proximal RCA on 3/11/22, bilateral renal artery stenosis s/p bilateral stenting, HTN, HLD, gastric bypass, former smoker quit 3/2022, depression, seizures     Procedure/Surgeries: 6/22/2022 Sternotomy/CABG x 3 (LIMA-Diag sequenced onto LAD, SVG-OM)/EVH LLE/Rigid internal fixation of the sternum with Clive plates x 2    Pacing wires: Ventricular       Physical Exam:    BP 97/61   Pulse 64   Temp 97 °F (36.1 °C) (Bladder)   Resp 12   Ht 5' 4\" (1.626 m)   Wt 196 lb (88.9 kg)   SpO2 100%   BMI 33.64 kg/m²     Recent Labs     06/22/22  0903   HCT 23.0*     Recent Labs     06/22/22  0903   K 4.1   CREATININE 0.9         Post operative CXR:        Atelectasis, No pneumothorax noted, Mildly increased vascular markings bilateral, No significant pleural effusion. ETT/lines/drains appear to be in proper position. Final Radiology report pending. General Appearance: Arrived to PACU in stable condition, intubated placed on ventilator   Eyes: PERRL  Pulmonary: Diminished bibasilar. No wheezes, no accessory muscle use noted   Ventilator: Mode: AC/VC, 50 FiO2, 7 PEEP, 450 Vt   Cardiovascular: RRR, no heaves or thrills palpated   Telemetry: SR   Abdomen: Soft   Extremities: Palpable pulses all extremities, no edema   Neurologic/Psych: Sedated   Skin: Warm and dry   Incision: MSI with kendra dressing intact, LLE SVG sites with ace wrap on     Assessment/Plan: Day of Surgery     1. CAD S/p CABG x 3 (LIMA-Diag sequenced onto LAD, SVG-OM)  -MAURICIO to RCA on Brilinta preop, resume when ok by Dr Rita Serrato   - Christos PENA   - Perioperative Ancef  - Monitor chest tube output and hemodynamics closely    2.  Acute Pulmonary Insufficiency Following Surgery - Expected 2/2 surgery  - Intubated on ventilator  - Wean vent settings and extubate patient once awake, following commands, CANTU, and no signs of bleeding  - ABGs per protocol and PRN     3.  Acute Post Operative Pain   - PRN fentanyl for pain management until able to take PO       Electronically signed by DA Chadwick CNP on 6/22/2022 at 11:22 AM

## 2022-06-23 ENCOUNTER — APPOINTMENT (OUTPATIENT)
Dept: GENERAL RADIOLOGY | Age: 52
DRG: 166 | End: 2022-06-23
Attending: THORACIC SURGERY (CARDIOTHORACIC VASCULAR SURGERY)
Payer: COMMERCIAL

## 2022-06-23 LAB
ANION GAP SERPL CALCULATED.3IONS-SCNC: 10 MMOL/L (ref 7–16)
B.E.: -3.4 MMOL/L (ref -3–3)
BUN BLDV-MCNC: 12 MG/DL (ref 6–20)
CALCIUM IONIZED: 1.27 MMOL/L (ref 1.15–1.33)
CALCIUM SERPL-MCNC: 8.3 MG/DL (ref 8.6–10.2)
CHLORIDE BLD-SCNC: 106 MMOL/L (ref 98–107)
CO2: 22 MMOL/L (ref 22–29)
COHB: 0.4 % (ref 0–1.5)
CREAT SERPL-MCNC: 1 MG/DL (ref 0.5–1)
CRITICAL: ABNORMAL
DATE ANALYZED: ABNORMAL
DATE OF COLLECTION: ABNORMAL
GFR AFRICAN AMERICAN: >60
GFR NON-AFRICAN AMERICAN: 59 ML/MIN/1.73
GLUCOSE BLD-MCNC: 185 MG/DL (ref 74–99)
HCO3: 22.3 MMOL/L (ref 22–26)
HCT VFR BLD CALC: 31.1 % (ref 34–48)
HEMOGLOBIN: 10.2 G/DL (ref 11.5–15.5)
HHB: 3.3 % (ref 0–5)
LAB: ABNORMAL
Lab: ABNORMAL
MAGNESIUM: 1.9 MG/DL (ref 1.6–2.6)
MAGNESIUM: 2.5 MG/DL (ref 1.6–2.6)
MCH RBC QN AUTO: 30.9 PG (ref 26–35)
MCHC RBC AUTO-ENTMCNC: 32.8 % (ref 32–34.5)
MCV RBC AUTO: 94.2 FL (ref 80–99.9)
METER GLUCOSE: 132 MG/DL (ref 74–99)
METER GLUCOSE: 149 MG/DL (ref 74–99)
METER GLUCOSE: 156 MG/DL (ref 74–99)
METER GLUCOSE: 173 MG/DL (ref 74–99)
METER GLUCOSE: 185 MG/DL (ref 74–99)
METER GLUCOSE: 189 MG/DL (ref 74–99)
METHB: 0.2 % (ref 0–1.5)
MODE: ABNORMAL
O2 SATURATION: 96.7 % (ref 92–98.5)
O2HB: 96.1 % (ref 94–97)
OPERATOR ID: 2962
PATIENT TEMP: 37 C
PCO2: 42.6 MMHG (ref 35–45)
PDW BLD-RTO: 14.1 FL (ref 11.5–15)
PH BLOOD GAS: 7.34 (ref 7.35–7.45)
PLATELET # BLD: 140 E9/L (ref 130–450)
PMV BLD AUTO: 11 FL (ref 7–12)
PO2: 97.3 MMHG (ref 75–100)
POTASSIUM SERPL-SCNC: 3.5 MMOL/L (ref 3.5–5)
POTASSIUM SERPL-SCNC: 3.5 MMOL/L (ref 3.5–5)
POTASSIUM SERPL-SCNC: 3.8 MMOL/L (ref 3.5–5)
POTASSIUM SERPL-SCNC: 3.8 MMOL/L (ref 3.5–5)
RBC # BLD: 3.3 E12/L (ref 3.5–5.5)
SODIUM BLD-SCNC: 138 MMOL/L (ref 132–146)
SOURCE, BLOOD GAS: ABNORMAL
THB: 11.2 G/DL (ref 11.5–16.5)
TIME ANALYZED: 454
WBC # BLD: 13.3 E9/L (ref 4.5–11.5)

## 2022-06-23 PROCEDURE — 71045 X-RAY EXAM CHEST 1 VIEW: CPT

## 2022-06-23 PROCEDURE — 82330 ASSAY OF CALCIUM: CPT

## 2022-06-23 PROCEDURE — 6370000000 HC RX 637 (ALT 250 FOR IP): Performed by: THORACIC SURGERY (CARDIOTHORACIC VASCULAR SURGERY)

## 2022-06-23 PROCEDURE — 6360000002 HC RX W HCPCS: Performed by: THORACIC SURGERY (CARDIOTHORACIC VASCULAR SURGERY)

## 2022-06-23 PROCEDURE — P9045 ALBUMIN (HUMAN), 5%, 250 ML: HCPCS | Performed by: THORACIC SURGERY (CARDIOTHORACIC VASCULAR SURGERY)

## 2022-06-23 PROCEDURE — 94640 AIRWAY INHALATION TREATMENT: CPT

## 2022-06-23 PROCEDURE — 2700000000 HC OXYGEN THERAPY PER DAY

## 2022-06-23 PROCEDURE — 37799 UNLISTED PX VASCULAR SURGERY: CPT

## 2022-06-23 PROCEDURE — 2500000003 HC RX 250 WO HCPCS: Performed by: THORACIC SURGERY (CARDIOTHORACIC VASCULAR SURGERY)

## 2022-06-23 PROCEDURE — 82805 BLOOD GASES W/O2 SATURATION: CPT

## 2022-06-23 PROCEDURE — 97162 PT EVAL MOD COMPLEX 30 MIN: CPT

## 2022-06-23 PROCEDURE — 2580000003 HC RX 258: Performed by: NURSE PRACTITIONER

## 2022-06-23 PROCEDURE — 84132 ASSAY OF SERUM POTASSIUM: CPT

## 2022-06-23 PROCEDURE — 6370000000 HC RX 637 (ALT 250 FOR IP): Performed by: PHYSICIAN ASSISTANT

## 2022-06-23 PROCEDURE — 2000000000 HC ICU R&B

## 2022-06-23 PROCEDURE — 80048 BASIC METABOLIC PNL TOTAL CA: CPT

## 2022-06-23 PROCEDURE — 6370000000 HC RX 637 (ALT 250 FOR IP): Performed by: NURSE PRACTITIONER

## 2022-06-23 PROCEDURE — 2500000003 HC RX 250 WO HCPCS

## 2022-06-23 PROCEDURE — P9045 ALBUMIN (HUMAN), 5%, 250 ML: HCPCS | Performed by: NURSE PRACTITIONER

## 2022-06-23 PROCEDURE — 6360000002 HC RX W HCPCS: Performed by: NURSE PRACTITIONER

## 2022-06-23 PROCEDURE — 82962 GLUCOSE BLOOD TEST: CPT

## 2022-06-23 PROCEDURE — 97530 THERAPEUTIC ACTIVITIES: CPT

## 2022-06-23 PROCEDURE — 36415 COLL VENOUS BLD VENIPUNCTURE: CPT

## 2022-06-23 PROCEDURE — 85027 COMPLETE CBC AUTOMATED: CPT

## 2022-06-23 PROCEDURE — 2580000003 HC RX 258: Performed by: THORACIC SURGERY (CARDIOTHORACIC VASCULAR SURGERY)

## 2022-06-23 PROCEDURE — 97166 OT EVAL MOD COMPLEX 45 MIN: CPT

## 2022-06-23 PROCEDURE — S5553 INSULIN LONG ACTING 5 U: HCPCS | Performed by: PHYSICIAN ASSISTANT

## 2022-06-23 PROCEDURE — 83735 ASSAY OF MAGNESIUM: CPT

## 2022-06-23 RX ORDER — ISOSORBIDE MONONITRATE 30 MG/1
30 TABLET, EXTENDED RELEASE ORAL DAILY
Status: DISCONTINUED | OUTPATIENT
Start: 2022-06-23 | End: 2022-06-27 | Stop reason: HOSPADM

## 2022-06-23 RX ORDER — AMIODARONE HYDROCHLORIDE 200 MG/1
400 TABLET ORAL 2 TIMES DAILY
Status: DISCONTINUED | OUTPATIENT
Start: 2022-06-23 | End: 2022-06-27 | Stop reason: HOSPADM

## 2022-06-23 RX ORDER — METOPROLOL TARTRATE 5 MG/5ML
INJECTION INTRAVENOUS
Status: COMPLETED
Start: 2022-06-23 | End: 2022-06-23

## 2022-06-23 RX ORDER — OXYCODONE HYDROCHLORIDE AND ACETAMINOPHEN 5; 325 MG/1; MG/1
1 TABLET ORAL EVERY 4 HOURS PRN
Status: CANCELLED | OUTPATIENT
Start: 2022-06-23

## 2022-06-23 RX ORDER — LEVALBUTEROL INHALATION SOLUTION 0.31 MG/3ML
0.31 SOLUTION RESPIRATORY (INHALATION) EVERY 6 HOURS
Status: DISCONTINUED | OUTPATIENT
Start: 2022-06-23 | End: 2022-06-27 | Stop reason: HOSPADM

## 2022-06-23 RX ORDER — METOPROLOL SUCCINATE 25 MG/1
25 TABLET, EXTENDED RELEASE ORAL 2 TIMES DAILY
Status: DISCONTINUED | OUTPATIENT
Start: 2022-06-23 | End: 2022-06-23

## 2022-06-23 RX ORDER — OXYCODONE HYDROCHLORIDE AND ACETAMINOPHEN 5; 325 MG/1; MG/1
2 TABLET ORAL EVERY 4 HOURS PRN
Status: CANCELLED | OUTPATIENT
Start: 2022-06-23

## 2022-06-23 RX ORDER — ALBUMIN, HUMAN INJ 5% 5 %
25 SOLUTION INTRAVENOUS ONCE
Status: COMPLETED | OUTPATIENT
Start: 2022-06-23 | End: 2022-06-23

## 2022-06-23 RX ORDER — METOPROLOL TARTRATE 5 MG/5ML
5 INJECTION INTRAVENOUS ONCE
Status: COMPLETED | OUTPATIENT
Start: 2022-06-23 | End: 2022-06-23

## 2022-06-23 RX ORDER — AMLODIPINE BESYLATE 10 MG/1
10 TABLET ORAL DAILY
Status: DISCONTINUED | OUTPATIENT
Start: 2022-06-23 | End: 2022-06-23

## 2022-06-23 RX ORDER — METOPROLOL SUCCINATE 25 MG/1
25 TABLET, EXTENDED RELEASE ORAL ONCE
Status: COMPLETED | OUTPATIENT
Start: 2022-06-23 | End: 2022-06-23

## 2022-06-23 RX ORDER — INSULIN GLARGINE-YFGN 100 [IU]/ML
0.15 INJECTION, SOLUTION SUBCUTANEOUS NIGHTLY
Status: DISCONTINUED | OUTPATIENT
Start: 2022-06-23 | End: 2022-06-25

## 2022-06-23 RX ORDER — HYDRALAZINE HYDROCHLORIDE 20 MG/ML
10 INJECTION INTRAMUSCULAR; INTRAVENOUS EVERY 6 HOURS PRN
Status: DISCONTINUED | OUTPATIENT
Start: 2022-06-23 | End: 2022-06-27 | Stop reason: HOSPADM

## 2022-06-23 RX ORDER — ALBUMIN (HUMAN) 12.5 G/50ML
50 SOLUTION INTRAVENOUS ONCE
Status: DISCONTINUED | OUTPATIENT
Start: 2022-06-23 | End: 2022-06-23

## 2022-06-23 RX ORDER — METOPROLOL SUCCINATE 25 MG/1
25 TABLET, EXTENDED RELEASE ORAL DAILY
Status: DISCONTINUED | OUTPATIENT
Start: 2022-06-23 | End: 2022-06-23

## 2022-06-23 RX ORDER — METOPROLOL SUCCINATE 50 MG/1
50 TABLET, EXTENDED RELEASE ORAL 2 TIMES DAILY
Status: DISCONTINUED | OUTPATIENT
Start: 2022-06-23 | End: 2022-06-24

## 2022-06-23 RX ADMIN — Medication 400 MG: at 09:44

## 2022-06-23 RX ADMIN — POTASSIUM CHLORIDE 20 MEQ: 400 INJECTION, SOLUTION INTRAVENOUS at 06:06

## 2022-06-23 RX ADMIN — PANTOPRAZOLE SODIUM 40 MG: 40 TABLET, DELAYED RELEASE ORAL at 08:13

## 2022-06-23 RX ADMIN — AMIODARONE HYDROCHLORIDE 0.5 MG/MIN: 50 INJECTION, SOLUTION INTRAVENOUS at 18:51

## 2022-06-23 RX ADMIN — ACETAMINOPHEN 650 MG: 325 TABLET, FILM COATED ORAL at 08:34

## 2022-06-23 RX ADMIN — CEFAZOLIN 2000 MG: 2 INJECTION, POWDER, FOR SOLUTION INTRAMUSCULAR; INTRAVENOUS at 17:11

## 2022-06-23 RX ADMIN — FENTANYL CITRATE 50 MCG: 50 INJECTION INTRAMUSCULAR; INTRAVENOUS at 06:58

## 2022-06-23 RX ADMIN — TICAGRELOR 90 MG: 90 TABLET ORAL at 21:25

## 2022-06-23 RX ADMIN — INSULIN LISPRO 3 UNITS: 100 INJECTION, SOLUTION INTRAVENOUS; SUBCUTANEOUS at 20:19

## 2022-06-23 RX ADMIN — LEVALBUTEROL 0.31 MG: 0.31 SOLUTION RESPIRATORY (INHALATION) at 09:50

## 2022-06-23 RX ADMIN — FENTANYL CITRATE 25 MCG: 50 INJECTION INTRAMUSCULAR; INTRAVENOUS at 09:43

## 2022-06-23 RX ADMIN — FENTANYL CITRATE 50 MCG: 50 INJECTION INTRAMUSCULAR; INTRAVENOUS at 05:56

## 2022-06-23 RX ADMIN — ACETAMINOPHEN 650 MG: 325 TABLET, FILM COATED ORAL at 12:33

## 2022-06-23 RX ADMIN — LEVALBUTEROL 0.31 MG: 0.31 SOLUTION RESPIRATORY (INHALATION) at 19:54

## 2022-06-23 RX ADMIN — ALBUMIN (HUMAN) 25 G: 12.5 INJECTION, SOLUTION INTRAVENOUS at 08:00

## 2022-06-23 RX ADMIN — INSULIN LISPRO 3 UNITS: 100 INJECTION, SOLUTION INTRAVENOUS; SUBCUTANEOUS at 06:57

## 2022-06-23 RX ADMIN — OXYCODONE 10 MG: 5 TABLET ORAL at 08:13

## 2022-06-23 RX ADMIN — MUPIROCIN: 20 OINTMENT TOPICAL at 21:24

## 2022-06-23 RX ADMIN — ONDANSETRON 4 MG: 2 INJECTION INTRAMUSCULAR; INTRAVENOUS at 17:17

## 2022-06-23 RX ADMIN — CEFAZOLIN 2000 MG: 2 INJECTION, POWDER, FOR SOLUTION INTRAMUSCULAR; INTRAVENOUS at 01:39

## 2022-06-23 RX ADMIN — FENTANYL CITRATE 50 MCG: 50 INJECTION INTRAMUSCULAR; INTRAVENOUS at 02:08

## 2022-06-23 RX ADMIN — INSULIN LISPRO 3 UNITS: 100 INJECTION, SOLUTION INTRAVENOUS; SUBCUTANEOUS at 03:22

## 2022-06-23 RX ADMIN — OXYCODONE 10 MG: 5 TABLET ORAL at 12:33

## 2022-06-23 RX ADMIN — ONDANSETRON 4 MG: 2 INJECTION INTRAMUSCULAR; INTRAVENOUS at 01:06

## 2022-06-23 RX ADMIN — POTASSIUM CHLORIDE 20 MEQ: 400 INJECTION, SOLUTION INTRAVENOUS at 12:00

## 2022-06-23 RX ADMIN — METOPROLOL SUCCINATE 25 MG: 25 TABLET, EXTENDED RELEASE ORAL at 08:13

## 2022-06-23 RX ADMIN — SENNOSIDES AND DOCUSATE SODIUM 1 TABLET: 50; 8.6 TABLET ORAL at 21:24

## 2022-06-23 RX ADMIN — ISOSORBIDE MONONITRATE 30 MG: 30 TABLET, EXTENDED RELEASE ORAL at 09:44

## 2022-06-23 RX ADMIN — METOPROLOL SUCCINATE 50 MG: 50 TABLET, EXTENDED RELEASE ORAL at 21:24

## 2022-06-23 RX ADMIN — CEFAZOLIN 2000 MG: 2 INJECTION, POWDER, FOR SOLUTION INTRAMUSCULAR; INTRAVENOUS at 11:09

## 2022-06-23 RX ADMIN — SODIUM NITROPRUSSIDE 1.6 MCG/KG/MIN: 25 INJECTION, SOLUTION, CONCENTRATE INTRAVENOUS at 08:00

## 2022-06-23 RX ADMIN — SODIUM NITROPRUSSIDE 1.25 MCG/KG/MIN: 25 INJECTION, SOLUTION, CONCENTRATE INTRAVENOUS at 18:00

## 2022-06-23 RX ADMIN — INSULIN LISPRO 3 UNITS: 100 INJECTION, SOLUTION INTRAVENOUS; SUBCUTANEOUS at 23:59

## 2022-06-23 RX ADMIN — ALBUMIN (HUMAN) 25 G: 12.5 INJECTION, SOLUTION INTRAVENOUS at 17:54

## 2022-06-23 RX ADMIN — MAGNESIUM SULFATE HEPTAHYDRATE 2000 MG: 40 INJECTION, SOLUTION INTRAVENOUS at 06:06

## 2022-06-23 RX ADMIN — FENTANYL CITRATE 25 MCG: 50 INJECTION INTRAMUSCULAR; INTRAVENOUS at 15:16

## 2022-06-23 RX ADMIN — ONDANSETRON 4 MG: 2 INJECTION INTRAMUSCULAR; INTRAVENOUS at 22:21

## 2022-06-23 RX ADMIN — METOPROLOL TARTRATE 5 MG: 1 INJECTION, SOLUTION INTRAVENOUS at 17:55

## 2022-06-23 RX ADMIN — ROSUVASTATIN 40 MG: 20 TABLET, FILM COATED ORAL at 21:24

## 2022-06-23 RX ADMIN — MUPIROCIN: 20 OINTMENT TOPICAL at 08:39

## 2022-06-23 RX ADMIN — OXYCODONE 10 MG: 5 TABLET ORAL at 21:20

## 2022-06-23 RX ADMIN — INSULIN GLARGINE-YFGN 13 UNITS: 100 INJECTION, SOLUTION SUBCUTANEOUS at 21:53

## 2022-06-23 RX ADMIN — AMIODARONE HYDROCHLORIDE 400 MG: 200 TABLET ORAL at 09:44

## 2022-06-23 RX ADMIN — ACETAMINOPHEN 650 MG: 325 TABLET, FILM COATED ORAL at 17:11

## 2022-06-23 RX ADMIN — METOPROLOL SUCCINATE 25 MG: 25 TABLET, EXTENDED RELEASE ORAL at 14:00

## 2022-06-23 RX ADMIN — ESCITALOPRAM 5 MG: 5 TABLET, FILM COATED ORAL at 09:44

## 2022-06-23 RX ADMIN — ALBUMIN (HUMAN) 25 G: 12.5 INJECTION, SOLUTION INTRAVENOUS at 03:50

## 2022-06-23 RX ADMIN — POTASSIUM CHLORIDE 20 MEQ: 400 INJECTION, SOLUTION INTRAVENOUS at 15:22

## 2022-06-23 RX ADMIN — SENNOSIDES AND DOCUSATE SODIUM 1 TABLET: 50; 8.6 TABLET ORAL at 09:44

## 2022-06-23 RX ADMIN — FENTANYL CITRATE 50 MCG: 50 INJECTION INTRAMUSCULAR; INTRAVENOUS at 04:56

## 2022-06-23 RX ADMIN — SODIUM CHLORIDE, PRESERVATIVE FREE 10 ML: 5 INJECTION INTRAVENOUS at 08:39

## 2022-06-23 RX ADMIN — FENTANYL CITRATE 50 MCG: 50 INJECTION INTRAMUSCULAR; INTRAVENOUS at 17:17

## 2022-06-23 RX ADMIN — METOPROLOL TARTRATE 5 MG: 5 INJECTION INTRAVENOUS at 17:55

## 2022-06-23 RX ADMIN — FENTANYL CITRATE 50 MCG: 50 INJECTION INTRAMUSCULAR; INTRAVENOUS at 01:08

## 2022-06-23 RX ADMIN — ONDANSETRON 4 MG: 2 INJECTION INTRAMUSCULAR; INTRAVENOUS at 07:06

## 2022-06-23 RX ADMIN — LEVALBUTEROL 0.31 MG: 0.31 SOLUTION RESPIRATORY (INHALATION) at 15:22

## 2022-06-23 RX ADMIN — SODIUM CHLORIDE, PRESERVATIVE FREE 10 ML: 5 INJECTION INTRAVENOUS at 21:25

## 2022-06-23 RX ADMIN — FENTANYL CITRATE 50 MCG: 50 INJECTION INTRAMUSCULAR; INTRAVENOUS at 03:45

## 2022-06-23 RX ADMIN — ASPIRIN 81 MG: 81 TABLET, COATED ORAL at 08:34

## 2022-06-23 RX ADMIN — AMIODARONE HYDROCHLORIDE 400 MG: 200 TABLET ORAL at 21:23

## 2022-06-23 RX ADMIN — HYDRALAZINE HYDROCHLORIDE 10 MG: 20 INJECTION INTRAMUSCULAR; INTRAVENOUS at 13:31

## 2022-06-23 RX ADMIN — INSULIN LISPRO 3 UNITS: 100 INJECTION, SOLUTION INTRAVENOUS; SUBCUTANEOUS at 11:12

## 2022-06-23 ASSESSMENT — PAIN SCALES - GENERAL
PAINLEVEL_OUTOF10: 0
PAINLEVEL_OUTOF10: 0
PAINLEVEL_OUTOF10: 8
PAINLEVEL_OUTOF10: 4
PAINLEVEL_OUTOF10: 8
PAINLEVEL_OUTOF10: 8
PAINLEVEL_OUTOF10: 10
PAINLEVEL_OUTOF10: 8
PAINLEVEL_OUTOF10: 10
PAINLEVEL_OUTOF10: 6
PAINLEVEL_OUTOF10: 4
PAINLEVEL_OUTOF10: 6
PAINLEVEL_OUTOF10: 6
PAINLEVEL_OUTOF10: 8
PAINLEVEL_OUTOF10: 0
PAINLEVEL_OUTOF10: 8
PAINLEVEL_OUTOF10: 4
PAINLEVEL_OUTOF10: 8
PAINLEVEL_OUTOF10: 3
PAINLEVEL_OUTOF10: 4
PAINLEVEL_OUTOF10: 8
PAINLEVEL_OUTOF10: 8
PAINLEVEL_OUTOF10: 6
PAINLEVEL_OUTOF10: 6

## 2022-06-23 ASSESSMENT — PAIN DESCRIPTION - FREQUENCY
FREQUENCY: CONTINUOUS
FREQUENCY: INTERMITTENT
FREQUENCY: CONTINUOUS

## 2022-06-23 ASSESSMENT — PAIN DESCRIPTION - LOCATION
LOCATION: CHEST;STERNUM
LOCATION: CHEST
LOCATION: GENERALIZED;INCISION;CHEST
LOCATION: CHEST
LOCATION: CHEST;STERNUM
LOCATION: CHEST
LOCATION: CHEST;STERNUM
LOCATION: CHEST
LOCATION: CHEST
LOCATION: CHEST;STERNUM
LOCATION: CHEST

## 2022-06-23 ASSESSMENT — PAIN DESCRIPTION - DESCRIPTORS
DESCRIPTORS: SORE;SHARP
DESCRIPTORS: THROBBING
DESCRIPTORS: ACHING;DISCOMFORT;SORE
DESCRIPTORS: ACHING
DESCRIPTORS: SORE
DESCRIPTORS: ACHING;DISCOMFORT;SORE
DESCRIPTORS: SORE;ACHING
DESCRIPTORS: ACHING;DISCOMFORT;SORE
DESCRIPTORS: SORE
DESCRIPTORS: ACHING;DISCOMFORT;SORE
DESCRIPTORS: SORE

## 2022-06-23 ASSESSMENT — PAIN DESCRIPTION - ONSET
ONSET: GRADUAL
ONSET: AWAKENED FROM SLEEP
ONSET: ON-GOING
ONSET: AWAKENED FROM SLEEP
ONSET: ON-GOING

## 2022-06-23 ASSESSMENT — PAIN - FUNCTIONAL ASSESSMENT

## 2022-06-23 ASSESSMENT — PAIN DESCRIPTION - PAIN TYPE
TYPE: SURGICAL PAIN
TYPE: SURGICAL PAIN;ACUTE PAIN
TYPE: SURGICAL PAIN

## 2022-06-23 ASSESSMENT — PAIN DESCRIPTION - ORIENTATION
ORIENTATION: MID
ORIENTATION: MID;LOWER
ORIENTATION: MID;UPPER
ORIENTATION: LEFT;MID
ORIENTATION: MID
ORIENTATION: LOWER;MID

## 2022-06-23 NOTE — PROGRESS NOTES
Physical Therapy  Physical Therapy Initial Assessment     Name: Diego Jonas  : 1970  MRN: [id number]      Date of Service: 2022    Evaluating PT:  Heidi Garcia, PT, DPT [id number]    Room #:  9782/8681-G  Diagnosis:  Coronary artery disease with angina pectoris, unspecified vessel or lesion type, unspecified whether native or transplanted heart [facility]) [I25.119]  CAD in native artery [I25.10]  PMHx/PSHx:  See chart  Procedure/Surgery:   CABG x 3  Precautions:  Falls, Sternal, O2  Equipment Needs:  TBD    SUBJECTIVE:    Pt lives with son, daughter, son-in-law and 2 grandchildren in a 2 story home with 4 stairs to enter and no rail. Bedroom and bathroom on 2nd floor with a full flight of steps and 1 rail. Pt ambulated without device and was independent PTA. OBJECTIVE:   Initial Evaluation  Date: 22 Treatment Short Term/ Long Term   Goals   AM-PAC 6 Clicks 71/60     Was pt agreeable to Eval/treatment? Yes     Does pt have pain?  8/10 chest pain     Bed Mobility  Rolling: NT  Supine to sit: MaxA x 2 with HOB elevated  Sit to supine: NT  Scooting: ModA  Independent   Transfers Sit to stand: Zuleyma  Stand to sit: Zuleyma  Stand pivot: ModA  Independent   Ambulation   25 feet with no AD ModA  >400 feet with no AD Independent   Stair negotiation: ascended and descended NT  >10 steps with 1 rail Modified Independent   ROM BUE:  Defer to OT note  BLE:  WFL     Strength BUE:  Defer to OT note  BLE:  Grossly 4+/5  Increase by 1/3 MMT grade   Balance Sitting EOB:  SBA  Dynamic Standing:  ModA no device  Sitting EOB:  Independent  Dynamic Standing:  Independent     Pt is A & O x 4  CAM-ICU: NT  RASS: 0  Sensation:  No report of paresthesias  Edema:  Unremarkable    Vitals:  Heart Rate at rest 113 bpm Heart Rate post session 119 bpm   SpO2 at rest 95% SpO2 post session 96%   Blood Pressure at rest 145/70 mmHg Blood Pressure post session 123/68 mmHg       Functional Status Score-Intensive Care Unit (FSS-ICU) proper hand placement, technique and safety during sit to stand and stand to sit as well as provided with physical assistance.  Gait training- pt was given verbal and tactile cues to facilitate balance and upright posture during ambulation as well as provided with physical assistance. Pt's/ family goals   1. Return home    Prognosis is good for reaching above PT goals. Patient and or family understand(s) diagnosis, prognosis, and plan of care. Yes    PHYSICAL THERAPY PLAN OF CARE:    PT POC is established based on physician order and patient diagnosis     Referring provider/PT Order:  Jane Padilla MD/6/23/22 0600, PT eval and treat  Diagnosis:  Coronary artery disease with angina pectoris, unspecified vessel or lesion type, unspecified whether native or transplanted heart ([facility] [address] [I25.119]  CAD in native artery [I25.10]  Specific instructions for next treatment:  Progress activity    Current Treatment Recommendations:     [x] Strengthening to improve independence with functional mobility   [x] ROM to improve independence with functional mobility   [x] Balance Training to improve static/dynamic balance and to reduce fall risk  [x] Endurance Training to improve activity tolerance during functional mobility   [x] Transfer Training to improve safety and independence with all functional transfers   [x] Gait Training to improve gait mechanics, endurance and asses need for appropriate assistive device  [x] Stair Training in preparation for safe discharge home and/or into the community   [] Positioning to prevent skin breakdown and contractures  [x] Safety and Education Training   [x] Patient/Caregiver Education   [] HEP  [] Other     PT long term treatment goals are located in above grid    Frequency of treatments: 2-5x/week x 1-2 weeks.     Time in  0900  Time out  0925    Total Treatment Time  15 minutes     Evaluation Time includes thorough review of current medical information, gathering information on past

## 2022-06-23 NOTE — PLAN OF CARE
replacement as ordered   Monitor response to electrolyte replacements, including repeat lab results as appropriate  Goal: Hemodynamic stability and optimal renal function maintained  Outcome: Progressing  Flowsheets (Taken 6/22/2022 2000)  Hemodynamic stability and optimal renal function maintained:   Monitor labs and assess for signs and symptoms of volume excess or deficit   Monitor intake, output and patient weight  Goal: Glucose maintained within prescribed range  Outcome: Progressing  Flowsheets (Taken 6/22/2022 2000)  Glucose maintained within prescribed range:   Monitor blood glucose as ordered   Assess for signs and symptoms of hyperglycemia and hypoglycemia     Problem: Hematologic - Adult  Goal: Maintains hematologic stability  Outcome: Progressing  Flowsheets (Taken 6/22/2022 2000)  Maintains hematologic stability:   Assess for signs and symptoms of bleeding or hemorrhage   Monitor labs for bleeding or clotting disorders     Problem: Safety - Adult  Goal: Free from fall injury  Outcome: Progressing  Flowsheets (Taken 6/22/2022 2146)  Free From Fall Injury: Instruct family/caregiver on patient safety     Problem: ABCDS Injury Assessment  Goal: Absence of physical injury  Outcome: Progressing  Flowsheets (Taken 6/22/2022 2146)  Absence of Physical Injury: Implement safety measures based on patient assessment     Problem: Skin/Tissue Integrity  Goal: Absence of new skin breakdown  Description: 1. Monitor for areas of redness and/or skin breakdown  2. Assess vascular access sites hourly  3. Every 4-6 hours minimum:  Change oxygen saturation probe site  4. Every 4-6 hours:  If on nasal continuous positive airway pressure, respiratory therapy assess nares and determine need for appliance change or resting period.   Outcome: Progressing

## 2022-06-23 NOTE — PROGRESS NOTES
[address]     Date:2022                                                               Patient Name: Edilberto Quiroga  MRN: [id number]  : 1970  Room: -A    Evaluating OT: Eve Cuevas OTR/L 0366    Referring Provider: DANA Shultz   Specific Provider Orders/Date: OT eval and treat (22)      Diagnosis: CAD, H/O STEMI     Surgery/Procedures:   CABG x 3    Pertinent Medical History: inferior STEMI s/p PCI with 3 overlapping MAURICIO from the distal to proximal RCA on 3/11/22, bilateral renal artery stenosis s/p bilateral stenting, HTN, HLD, gastric bypass, former smoker quit 3/2022, depression, seizures       *Precautions:  Fall Risk, sternal, O2    Assessment of current deficits   [x]Functional mobility  [x]ADLs [x]Strength  []Cognition  [x]Functional transfers  [x]IADLs [x]Safety Awareness  [x]Endurance  []Fine Motor Coordination  [x]Balance       []Vision/perception  []Sensation    []Gross Motor Coordination [x]ROM  []Delirium                  [] Motor Control     []Communication     OT PLAN OF CARE   OT POC based on physician orders, patient diagnosis and results of clinical assessment.        Frequency/Duration: 1-3 days/wk for 1-2 weeks PRN     Specific OT Treatment to include:   ADL retraining/adapted techniques and AE recommendations to increase functional independence within precautions                    Energy conservation techniques to improve tolerance for selfcare routine   Functional transfer/mobility training/DME recommendations for increased independence, safety and fall prevention         Patient/family education to increase safety and functional independence within precautions             Environmental modifications for safe mobility and completion of ADLs                             Therapeutic activity to improve functional performance during ADLs                                         Therapeutic exercise to improve tolerance and functional strength for ADLs   Balance retraining exercises/tasks for facilitation of postural control with dynamic challenges during ADLs . Recommended Adaptive Equipment:  LB dressing AE pending progress, shower seat    Home Living: Pt lives with children and grandchildren  in a 2 floor plan with 4 step(s) to enter and no rail(s); bed/bath on 2nd floor  Bathroom setup: [employer] [address] owned: no DME    Prior Level of Function: IND with ADLs;  IND with IADLs. No device for ambulation. Driving: no  Occupation: [employer]-works from home    Pain Level: pt c/o 8/10 chest pain  this session; s/p pain medication    Cognition: A&O: 4/4    Follows 1-2 step commands appropriately. Memory: Good   Comprehension Good   Problem solving: Fair+/Good   Judgement/safety: Fair+/Good               Communication skills: WFL           Vision: WFL               Glasses:yes                                                   Hearing: WFL     RASS: 0  CAM-ICU: (NT) Delirium     UE Assessment:  Hand Dominance: Right [x]  Left []     ROM Strength STM goal: PRN   RUE  Grossly WFL within precautions Not formally tested; grossly WFL              WNL for ADLS     LUE Grossly WFL within precautions Not formally tested; grossly WFL              WNL for ADLS       Sensation: No c/o numbness or tingling in extremities   Tone: WNL   Edema: [facility]     Functional Assessment: AM-PAC Daily Activity Raw Score: 13/24   Initial Eval Status  Date: 6/23/22 Treatment Status  Date: STG=LTG  Time Frame: 5-7days   Feeding Min A                       Deanne  while seated up in chair to increase activity tolerance        Grooming Mod A                       Deanne   while standing sink level demonstrating G tolerance; G balance.      UB dressing/bathing Max A                       Min A   demonstrating G knowledge of precautions during tasks LB dressing/bathing Max A                           Min A  using AE as needed for safe reach/ energy conservation       Toileting NT                       Min A     Bed Mobility  Supine to sit: Max A    Sit to supine:   NT                       Min A  in prep of ADL tasks & transfers   Functional Transfers Sit to stand: Min A  from higher bed surface;    NTfrom lower chair surface    Stand to sit: Mod A                       Deanne  sit<>stand/functional bathroom transfers using AD/DME as needed for balance and safety   Functional Mobility Mod A SPT to chair  no device                        Deanne   functional/bathroom mobility using AD as needed & demonstrating G safety     Balance Sitting:     Static:  Min A    Dynamic:Min A  Standing: Mod A  Deanne dynamic sitting balance; Deanne dynamic standing balance  during ADL tasks & transfers   Endurance/Activity Tolerance   F tolerance with light activity. G   tolerance with moderate activity/self care routine   Visual/  Perceptual               WFL                          Vitals:   HR at rest: 110 bpm HR at end of session: 119 bpm   Spo2 at rest:96% Spo2 at end of session 95%   BP at rest:145/69 mmHg BP at end of session 125/57 mmHg       Treatment: OT intervention provided this date includes:  Functional mobility: Instruction on sternal precautions to facilitate safe bed mobility & functional transfers. Pt required 2 person assist for safe mobility due to complexity of medical condition, medical lines and deconditioning. HOB elevated to assist.     ADL retraining: Instruction on adapted dressing techniques/equipment to maintain sternal precautions during ADLs. Pt demo fair understanding; limited by pain and fearful of movement. Pt educated on precautions and safe movement during self care tasks. Energy Conservation training: Education on postural awareness/positioning and breathing techniques to improve overall tolerance and participation in self care routine.  Pt demonstrated fair tolerance. Review of recommended DME for fall prevention, bathroom safety & energy conservation. Pt/Family Education: Instruction with handouts on energy conservation and sternal precautions during functional activities for safe return home. Pt Pt/family demonstrates G understanding. Line management and environmental modifications made prior to and end of session to ensure patient safety and to increase efficiency of session. Skilled monitoring of HR, O2 saturation, blood pressure and patient's response to activity performed throughout session. Comments: OK from RN to see patient. Upon arrival, patient supine in bed,agreeable to session with min encouragement. At end of session, patient left seated in chair to increase activity tolerance. Call light within reach, all lines and tubes intact. Pt instructed on use of call light for assistance and fall prevention. Patient presents with decreased activity tolerance, dynamic balance, functional mobility and fear of pain limiting completion of ADLs and safety. Pt can benefit from continued skilled OT to increase safety, functional independence and quality of life. Rehab Potential: good for established goals    Patient / Family Goal: return to PLOF    Patient and/or family were instructed/educated on diagnosis, prognosis/goals and plan of care. Pt demonstrated G understanding. [] Malnutrition indicators have been identified and nursing has been notified to ensure a dietitian consult is ordered. Evaluation Complexity: Moderate    · History: Expanded chart review of consults, imaging, and psychosocial history related to current functional performance. · Exam: 5+ performance deficits identified limiting functional independence and safe return home   · Assistance/Modification: Min/mod assistance or modifications required to perform tasks. May have comorbidities that affect occupational performance.     Time In:0910 Time Out: 0935       Total Treatment Time: 10          Min Units   OT Eval Low 95784     OT Eval Medium 43128 X    OT Eval High 65189     OT Re-Eval [id number]     Therapeutic Ex [id number]     Therapeutic Activities 20436 10 1   ADL/Self Care 71985     Orthotic Management 01222     Neuro Re-Ed 11027     Non-Billable Time       Evaluation time includes thorough review of current medical information, gathering information on past medical history/social history and prior level of function, completion of standardized testing/informal observation of tasks, assessment of data and development of POC/Goals.      Tg Robles, OTR/L 1670

## 2022-06-23 NOTE — CARE COORDINATION
Pod#1 s/p cabg x3. Met with pt in room. She states she lives with her son, daughter, son in law and 2 grand kids. She will have family with her at home 24/7. Discussed hhc. She has no preference. Referral made to Heather Carlson at [facility].

## 2022-06-23 NOTE — PROGRESS NOTES
CVICU Progress Note    Name: Cy Reyes  MRN: [id number]    CC: Postoperative Critical Care Management     Indication for Surgery/Procedure: CAD, H/O STEMI   LVEF:  55%    RVF:  NML     Important/Relevant PMH/PSH:  inferior STEMI s/p PCI with 3 overlapping MAURICIO from the distal to proximal RCA on 3/11/22, bilateral renal artery stenosis s/p bilateral stenting, HTN, HLD, gastric bypass, former smoker quit 3/2022, depression, seizures      Procedure/Surgeries: 6/22/2022 Sternotomy/CABG x 3 (LIMA-Diag sequenced onto LAD, SVG-OM)/EVH LLE/Rigid internal fixation of the sternum with Clive plates x 2     Pacing wires: Ventricular       Intake/Output Summary (Last 24 hours) at 6/23/2022 0842  Last data filed at 6/23/2022 0800  Gross per 24 hour   Intake 4015.44 ml   Output 3805 ml   Net 210.44 ml       Recent Labs     06/22/22  0903 06/22/22  1100 06/23/22  0423   WBC  --  11.2 13.3*   HGB  --  10.1* 10.2*   HCT 23.0* 30.3* 31.1*   PLT  --  136 140      Lab Results   Component Value Date     06/23/2022    K 3.8 06/23/2022    K 4.0 03/13/2022     06/23/2022    CO2 22 06/23/2022    BUN 12 06/23/2022    CREATININE 1.0 06/23/2022    GLUCOSE 185 06/23/2022    GLUCOSE 83 04/28/2012    CALCIUM 8.3 06/23/2022         Physical Exam:    BP 97/61   Pulse (!) 102   Temp 98.4 °F (36.9 °C)   Resp 25   Ht 5' 4\" (1.626 m)   Wt 215 lb 6.4 oz (97.7 kg)   SpO2 97%   BMI 36.97 kg/m²       CXR Findings: 6/23/2022      Impression:     1. Improved aeration of the lungs when compared with the prior study. 2. There is minimal bibasilar residual atelectasis   3. Status post removal of the endotracheal tube and NG tube. --CXR personally viewed and interpreted by ICU Nurse Practitioner, agree with above findings     General: Awake, alert. Denies SOB, palpitations   Eyes: PERRL, anicteric   Pulmonary: Diminished bibasilar.  No wheezes, no accessory muscle use noted on 2L NC  Cardiovascular:  RRR, no heaves or thrills on palpation  Tele: ST  Abdomen: Soft, nontender, +BS   Extremities: Palpable pulses all extremities, no edema   Neurologic/Psych: A&Ox3, CANTU to command   Skin: Warm and dry  Incisions: MSI with kendra dressing intact, LLE SVG sites with DSD      Assessment/Plan: POD #1  1. CAD S/p CABG x 3 (LIMA-Diag sequenced onto LAD, SVG-OM)  -MAURICIO to RCA on Brilinta preop, resume when ok by Dr Reina Joshua   - ASA, Crestor  - Start Imdur per Dr Reina Joshua due to sequenced graft    - ST, Negative fluid balance, albumin ordered  - Add PO amio for afib prophylaxis. - Perioperative Ancef  - MS chest tubes removed w/o difficulty, pt tolerated well. blakesx2 placed to bulb sxn  -PT/OT     2. Acute Pulmonary Insufficiency Following Surgery    - Expected 2/2 surgery  - Adequate o/v on 2L NC, Encourage IS, Ezpap per RT. 3. HTN  -On Nipride infusion, start Toprol and Imdur, wean off infusion. uptitrate as needed      4.  Acute Post Operative Pain   - encourage po oxycodone        VTE Prophylaxis: Pharmacologic/Mechanical: Yes, SCDs   Line infection prevention: Can CVC or arterial line be removed: Yes  Continued need for urinary catheter: Remove prior to transfer     Dispo: Possible transfer later today     Electronically signed by DA Quevedo CNP on 6/23/2022 at 8:42 AM

## 2022-06-23 NOTE — PLAN OF CARE
Problem: Discharge Planning  Goal: Discharge to home or other facility with appropriate resources  6/23/2022 0933 by Keyonna Pepper RN  Outcome: Progressing  Flowsheets (Taken 6/23/2022 0800)  Discharge to home or other facility with appropriate resources:   Identify barriers to discharge with patient and caregiver   Identify discharge learning needs (meds, wound care, etc)  6/23/2022 0637 by Macy Duran RN  Outcome: Progressing  Flowsheets  Taken 6/22/2022 2000 by Macy Duran RN  Discharge to home or other facility with appropriate resources:   Identify barriers to discharge with patient and caregiver   Identify discharge learning needs (meds, wound care, etc)  Taken 6/22/2022 1800 by Keyonna Pepper RN  Discharge to home or other facility with appropriate resources:   Identify barriers to discharge with patient and caregiver   Identify discharge learning needs (meds, wound care, etc)     Problem: Pain  Goal: Verbalizes/displays adequate comfort level or baseline comfort level  6/23/2022 0933 by Keyonna Pepper RN  Outcome: Progressing  Flowsheets  Taken 6/23/2022 0900  Verbalizes/displays adequate comfort level or baseline comfort level: Assess pain using appropriate pain scale  Taken 6/23/2022 0813  Verbalizes/displays adequate comfort level or baseline comfort level: Administer analgesics based on type and severity of pain and evaluate response  Taken 6/23/2022 0800  Verbalizes/displays adequate comfort level or baseline comfort level: Assess pain using appropriate pain scale  6/23/2022 0637 by Macy Duran RN  Outcome: Progressing  Flowsheets  Taken 6/22/2022 2000 by Macy Duran RN  Verbalizes/displays adequate comfort level or baseline comfort level:   Encourage patient to monitor pain and request assistance   Assess pain using appropriate pain scale   Administer analgesics based on type and severity of pain and evaluate response   Implement non-pharmacological measures as appropriate and evaluate response  Taken 6/22/2022 1830 by Re Banks RN  Verbalizes/displays adequate comfort level or baseline comfort level: Assess pain using appropriate pain scale  Taken 6/22/2022 1802 by Re Banks RN  Verbalizes/displays adequate comfort level or baseline comfort level: Administer analgesics based on type and severity of pain and evaluate response  Taken 6/22/2022 1800 by Re Banks RN  Verbalizes/displays adequate comfort level or baseline comfort level: Assess pain using appropriate pain scale  Taken 6/22/2022 1700 by Re Banks RN  Verbalizes/displays adequate comfort level or baseline comfort level: Assess pain using appropriate pain scale     Problem: Respiratory - Adult  Goal: Achieves optimal ventilation and oxygenation  6/23/2022 0933 by Re Banks RN  Outcome: Progressing  Flowsheets (Taken 6/23/2022 0800)  Achieves optimal ventilation and oxygenation:   Assess for changes in respiratory status   Assess for changes in mentation and behavior   Position to facilitate oxygenation and minimize respiratory effort   Oxygen supplementation based on oxygen saturation or arterial blood gases  6/23/2022 0637 by Eva Garcia RN  Outcome: Progressing  Flowsheets (Taken 6/22/2022 1800 by Re Banks RN)  Achieves optimal ventilation and oxygenation:   Assess for changes in respiratory status   Assess for changes in mentation and behavior   Position to facilitate oxygenation and minimize respiratory effort   Oxygen supplementation based on oxygen saturation or arterial blood gases     Problem: Cardiovascular - Adult  Goal: Maintains optimal cardiac output and hemodynamic stability  6/23/2022 0933 by Re Banks RN  Outcome: Progressing  Flowsheets (Taken 6/23/2022 0800)  Maintains optimal cardiac output and hemodynamic stability:   Monitor blood pressure and heart rate   Monitor urine output and notify Licensed Independent Practitioner for values outside of normal range   Assess for signs of decreased cardiac output   Administer fluid and/or volume expanders as ordered   Administer vasoactive medications as ordered  6/23/2022 3086 by Sarath Jay RN  Outcome: Progressing  Flowsheets  Taken 6/22/2022 2000 by Sarath Jay RN  Maintains optimal cardiac output and hemodynamic stability:   Monitor blood pressure and heart rate   Monitor urine output and notify Licensed Independent Practitioner for values outside of normal range   Assess for signs of decreased cardiac output   Administer fluid and/or volume expanders as ordered  Taken 6/22/2022 1800 by Cathy Hameed RN  Maintains optimal cardiac output and hemodynamic stability:   Monitor blood pressure and heart rate   Monitor urine output and notify Licensed Independent Practitioner for values outside of normal range   Assess for signs of decreased cardiac output   Administer fluid and/or volume expanders as ordered   Administer vasoactive medications as ordered  Goal: Absence of cardiac dysrhythmias or at baseline  6/23/2022 0933 by Cathy Hameed RN  Outcome: Progressing  6/23/2022 0637 by Sarath Jay RN  Outcome: Progressing  Flowsheets  Taken 6/22/2022 2000 by Sarath Jay RN  Absence of cardiac dysrhythmias or at baseline:   Monitor cardiac rate and rhythm   Assess for signs of decreased cardiac output   Administer antiarrhythmia medication and electrolyte replacement as ordered  Taken 6/22/2022 1800 by Cathy Hameed RN  Absence of cardiac dysrhythmias or at baseline:   Monitor cardiac rate and rhythm   Assess for signs of decreased cardiac output   Administer antiarrhythmia medication and electrolyte replacement as ordered     Problem: Skin/Tissue Integrity - Adult  Goal: Skin integrity remains intact  6/23/2022 0933 by Cathy Hameed RN  Outcome: Progressing  Flowsheets (Taken 6/23/2022 0800)  Skin Integrity Remains Intact:   Monitor for areas of redness and/or skin breakdown   Assess vascular access sites hourly  6/23/2022 0637 by Dorothy Lam Yfn Carter RN  Outcome: Progressing  Flowsheets  Taken 6/22/2022 2146 by Basil Vanegas RN  Skin Integrity Remains Intact:   Monitor for areas of redness and/or skin breakdown   Assess vascular access sites hourly   Every 4-6 hours minimum: Change oxygen saturation probe site   Every 4-6 hours: If on nasal continuous positive airway pressure, respiratory therapy assesses nares and determine need for appliance change or resting period  Taken 6/22/2022 2000 by Basil Vanegas RN  Skin Integrity Remains Intact:   Assess vascular access sites hourly   Monitor for areas of redness and/or skin breakdown   Every 4-6 hours minimum: Change oxygen saturation probe site   Every 4-6 hours: If on nasal continuous positive airway pressure, respiratory therapy assesses nares and determine need for appliance change or resting period  Taken 6/22/2022 1800 by Cory Castillo RN  Skin Integrity Remains Intact:   Monitor for areas of redness and/or skin breakdown   Assess vascular access sites hourly  Goal: Incisions, wounds, or drain sites healing without S/S of infection  6/23/2022 1327 by Basil Vanegas RN  Outcome: Progressing  Flowsheets (Taken 6/22/2022 2000)  Incisions, Wounds, or Drain Sites Healing Without Sign and Symptoms of Infection:   ADMISSION and DAILY: Assess and document risk factors for pressure ulcer development   TWICE DAILY: Assess and document skin integrity   TWICE DAILY: Assess and document dressing/incision, wound bed, drain sites and surrounding tissue  Goal: Oral mucous membranes remain intact  6/23/2022 0637 by Basil Vanegas RN  Outcome: Progressing  Flowsheets  Taken 6/22/2022 2146  Oral Mucous Membranes Remain Intact: Assess oral mucosa and hygiene practices  Taken 6/22/2022 2000  Oral Mucous Membranes Remain Intact:   Assess oral mucosa and hygiene practices   Implement preventative oral hygiene regimen     Problem: Genitourinary - Adult  Goal: Absence of urinary retention  6/23/2022 0933 by Cory Castillo RN  Outcome: Progressing  6/23/2022 0637 by Marisa Deal RN  Outcome: Progressing  Flowsheets (Taken 6/22/2022 2000)  Absence of urinary retention: Monitor intake/output and perform bladder scan as needed  Goal: Urinary catheter remains patent  6/23/2022 2820 by Marisa Deal RN  Outcome: Progressing  Flowsheets (Taken 6/22/2022 2000)  Urinary catheter remains patent: Assess patency of urinary catheter     Problem: Metabolic/Fluid and Electrolytes - Adult  Goal: Electrolytes maintained within normal limits  6/23/2022 0933 by Silvia James RN  Outcome: Progressing  Flowsheets (Taken 6/23/2022 0800)  Electrolytes maintained within normal limits:   Monitor labs and assess patient for signs and symptoms of electrolyte imbalances   Administer electrolyte replacement as ordered   Monitor response to electrolyte replacements, including repeat lab results as appropriate   Fluid restriction as ordered   Instruct patient on fluid and nutrition restrictions as appropriate  6/23/2022 3658 by Marisa Deal RN  Outcome: Progressing  Flowsheets (Taken 6/22/2022 2000)  Electrolytes maintained within normal limits:   Monitor labs and assess patient for signs and symptoms of electrolyte imbalances   Administer electrolyte replacement as ordered   Monitor response to electrolyte replacements, including repeat lab results as appropriate  Goal: Hemodynamic stability and optimal renal function maintained  6/23/2022 0933 by Silvia James RN  Outcome: Progressing  Flowsheets (Taken 6/23/2022 0800)  Hemodynamic stability and optimal renal function maintained:   Monitor labs and assess for signs and symptoms of volume excess or deficit   Monitor intake, output and patient weight   Monitor urine specific gravity, serum osmolarity and serum sodium as indicated or ordered   Monitor response to interventions for patient's volume status, including labs, urine output, blood pressure (other measures as available)  6/23/2022 8546 by Jason Horan Fritz Caraballo RN  Outcome: Progressing  Flowsheets (Taken 6/22/2022 2000)  Hemodynamic stability and optimal renal function maintained:   Monitor labs and assess for signs and symptoms of volume excess or deficit   Monitor intake, output and patient weight  Goal: Glucose maintained within prescribed range  6/23/2022 0637 by Sarath Jay RN  Outcome: Progressing  Flowsheets (Taken 6/22/2022 2000)  Glucose maintained within prescribed range:   Monitor blood glucose as ordered   Assess for signs and symptoms of hyperglycemia and hypoglycemia     Problem: Hematologic - Adult  Goal: Maintains hematologic stability  6/23/2022 0933 by Cathy Hameed RN  Outcome: Progressing  Flowsheets (Taken 6/23/2022 0800)  Maintains hematologic stability:   Assess for signs and symptoms of bleeding or hemorrhage   Monitor labs for bleeding or clotting disorders   Administer blood products/factors as ordered  6/23/2022 3700 by Sarath Jay RN  Outcome: Progressing  Flowsheets (Taken 6/22/2022 2000)  Maintains hematologic stability:   Assess for signs and symptoms of bleeding or hemorrhage   Monitor labs for bleeding or clotting disorders     Problem: Safety - Adult  Goal: Free from fall injury  6/23/2022 0933 by Cathy Hameed RN  Outcome: Progressing  6/23/2022 0637 by Sarath Jay RN  Outcome: Progressing  Flowsheets (Taken 6/22/2022 2146)  Free From Fall Injury: Instruct family/caregiver on patient safety     Problem: ABCDS Injury Assessment  Goal: Absence of physical injury  6/23/2022 0933 by Cathy Hameed RN  Outcome: Progressing  6/23/2022 0637 by Sarath Jay RN  Outcome: Progressing  Flowsheets (Taken 6/22/2022 2146)  Absence of Physical Injury: Implement safety measures based on patient assessment     Problem: Skin/Tissue Integrity  Goal: Absence of new skin breakdown  Description: 1. Monitor for areas of redness and/or skin breakdown  2. Assess vascular access sites hourly  3.   Every 4-6 hours minimum:  Change oxygen saturation probe site  4. Every 4-6 hours:  If on nasal continuous positive airway pressure, respiratory therapy assess nares and determine need for appliance change or resting period.   6/23/2022 0933 by Kellen Urias RN  Outcome: Progressing  6/23/2022 0637 by Jaciel Qiu RN  Outcome: Progressing

## 2022-06-23 NOTE — PROGRESS NOTES
06/23/22 0952   Treatment   Treatment Type HHN;Positive expiratory pressure therapy   $Treatment Type $Inhaled Therapy/Meds   Medications Albuterol/Ipratropium   Pre-Tx Pulse 113   Pre-Tx Resps 18   Breath Sounds Pre-Tx OSMIN Clear   Breath Sounds Pre-Tx LLL Clear   Breath Sounds Pre-Tx RUL Clear   Breath Sounds Pre-Tx RML Clear   Breath Sounds Pre-Tx RLL Clear   Breath Sounds Post-Tx OSMIN Clear   Breath Sounds Post-Tx LLL Clear   Breath Sounds Post-Tx RUL Clear   Breath Sounds Post-Tx RML Clear   Breath Sounds Post-Tx RLL Clear   Post-Tx Pulse 103   Post-Tx Resps 18   Delivery Source Mouthpiece; Oxygen   Position Sitting   Treatment Tolerance Well   Duration 8   Is patient on O2?  Y   Oxygen Therapy/Pulse Ox   O2 Therapy Oxygen   O2 Device Nasal cannula   O2 Flow Rate (L/min) 2 L/min   Heart Rate (!) 113   Resp 22   SpO2 97 %

## 2022-06-24 ENCOUNTER — APPOINTMENT (OUTPATIENT)
Dept: GENERAL RADIOLOGY | Age: 52
DRG: 166 | End: 2022-06-24
Attending: THORACIC SURGERY (CARDIOTHORACIC VASCULAR SURGERY)
Payer: COMMERCIAL

## 2022-06-24 LAB
ANION GAP SERPL CALCULATED.3IONS-SCNC: 14 MMOL/L (ref 7–16)
BUN BLDV-MCNC: 13 MG/DL (ref 6–20)
CALCIUM IONIZED: 1.3 MMOL/L (ref 1.15–1.33)
CALCIUM SERPL-MCNC: 9.2 MG/DL (ref 8.6–10.2)
CHLORIDE BLD-SCNC: 103 MMOL/L (ref 98–107)
CO2: 20 MMOL/L (ref 22–29)
CREAT SERPL-MCNC: 0.9 MG/DL (ref 0.5–1)
GFR AFRICAN AMERICAN: >60
GFR NON-AFRICAN AMERICAN: >60 ML/MIN/1.73
GLUCOSE BLD-MCNC: 153 MG/DL (ref 74–99)
HCT VFR BLD CALC: 28.1 % (ref 34–48)
HEMOGLOBIN: 9.5 G/DL (ref 11.5–15.5)
MAGNESIUM: 1.9 MG/DL (ref 1.6–2.6)
MCH RBC QN AUTO: 30.9 PG (ref 26–35)
MCHC RBC AUTO-ENTMCNC: 33.8 % (ref 32–34.5)
MCV RBC AUTO: 91.5 FL (ref 80–99.9)
METER GLUCOSE: 114 MG/DL (ref 74–99)
METER GLUCOSE: 117 MG/DL (ref 74–99)
METER GLUCOSE: 122 MG/DL (ref 74–99)
METER GLUCOSE: 151 MG/DL (ref 74–99)
METER GLUCOSE: 158 MG/DL (ref 74–99)
PDW BLD-RTO: 14.1 FL (ref 11.5–15)
PLATELET # BLD: 150 E9/L (ref 130–450)
PMV BLD AUTO: 11.5 FL (ref 7–12)
POTASSIUM SERPL-SCNC: 3 MMOL/L (ref 3.5–5)
RBC # BLD: 3.07 E12/L (ref 3.5–5.5)
SODIUM BLD-SCNC: 137 MMOL/L (ref 132–146)
WBC # BLD: 18 E9/L (ref 4.5–11.5)

## 2022-06-24 PROCEDURE — 94640 AIRWAY INHALATION TREATMENT: CPT

## 2022-06-24 PROCEDURE — 83735 ASSAY OF MAGNESIUM: CPT

## 2022-06-24 PROCEDURE — 85027 COMPLETE CBC AUTOMATED: CPT

## 2022-06-24 PROCEDURE — 6370000000 HC RX 637 (ALT 250 FOR IP): Performed by: NURSE PRACTITIONER

## 2022-06-24 PROCEDURE — 6360000002 HC RX W HCPCS: Performed by: THORACIC SURGERY (CARDIOTHORACIC VASCULAR SURGERY)

## 2022-06-24 PROCEDURE — 6370000000 HC RX 637 (ALT 250 FOR IP): Performed by: THORACIC SURGERY (CARDIOTHORACIC VASCULAR SURGERY)

## 2022-06-24 PROCEDURE — 80048 BASIC METABOLIC PNL TOTAL CA: CPT

## 2022-06-24 PROCEDURE — 2140000000 HC CCU INTERMEDIATE R&B

## 2022-06-24 PROCEDURE — 71045 X-RAY EXAM CHEST 1 VIEW: CPT

## 2022-06-24 PROCEDURE — 2580000003 HC RX 258: Performed by: NURSE PRACTITIONER

## 2022-06-24 PROCEDURE — S5553 INSULIN LONG ACTING 5 U: HCPCS | Performed by: NURSE PRACTITIONER

## 2022-06-24 PROCEDURE — 6360000002 HC RX W HCPCS: Performed by: NURSE PRACTITIONER

## 2022-06-24 PROCEDURE — 2700000000 HC OXYGEN THERAPY PER DAY

## 2022-06-24 PROCEDURE — 36415 COLL VENOUS BLD VENIPUNCTURE: CPT

## 2022-06-24 PROCEDURE — 82330 ASSAY OF CALCIUM: CPT

## 2022-06-24 PROCEDURE — 82962 GLUCOSE BLOOD TEST: CPT

## 2022-06-24 PROCEDURE — 2580000003 HC RX 258: Performed by: THORACIC SURGERY (CARDIOTHORACIC VASCULAR SURGERY)

## 2022-06-24 PROCEDURE — 2500000003 HC RX 250 WO HCPCS: Performed by: THORACIC SURGERY (CARDIOTHORACIC VASCULAR SURGERY)

## 2022-06-24 PROCEDURE — 37799 UNLISTED PX VASCULAR SURGERY: CPT

## 2022-06-24 RX ORDER — ASCORBIC ACID 500 MG
500 TABLET ORAL 2 TIMES DAILY
Status: DISCONTINUED | OUTPATIENT
Start: 2022-06-24 | End: 2022-06-27 | Stop reason: HOSPADM

## 2022-06-24 RX ORDER — MORPHINE SULFATE 2 MG/ML
2 INJECTION, SOLUTION INTRAMUSCULAR; INTRAVENOUS EVERY 4 HOURS PRN
Status: DISCONTINUED | OUTPATIENT
Start: 2022-06-24 | End: 2022-06-27 | Stop reason: HOSPADM

## 2022-06-24 RX ORDER — ACETAMINOPHEN 325 MG/1
650 TABLET ORAL EVERY 4 HOURS PRN
Status: DISCONTINUED | OUTPATIENT
Start: 2022-06-24 | End: 2022-06-27 | Stop reason: HOSPADM

## 2022-06-24 RX ORDER — METOPROLOL SUCCINATE 50 MG/1
100 TABLET, EXTENDED RELEASE ORAL 2 TIMES DAILY
Status: DISCONTINUED | OUTPATIENT
Start: 2022-06-24 | End: 2022-06-25

## 2022-06-24 RX ORDER — INSULIN LISPRO 100 [IU]/ML
0-6 INJECTION, SOLUTION INTRAVENOUS; SUBCUTANEOUS
Status: DISCONTINUED | OUTPATIENT
Start: 2022-06-24 | End: 2022-06-26

## 2022-06-24 RX ORDER — INSULIN LISPRO 100 [IU]/ML
0-3 INJECTION, SOLUTION INTRAVENOUS; SUBCUTANEOUS NIGHTLY
Status: DISCONTINUED | OUTPATIENT
Start: 2022-06-24 | End: 2022-06-26

## 2022-06-24 RX ORDER — SENNA AND DOCUSATE SODIUM 50; 8.6 MG/1; MG/1
1 TABLET, FILM COATED ORAL 2 TIMES DAILY
Status: DISCONTINUED | OUTPATIENT
Start: 2022-06-24 | End: 2022-06-27 | Stop reason: HOSPADM

## 2022-06-24 RX ORDER — FERROUS SULFATE 325(65) MG
325 TABLET ORAL 2 TIMES DAILY WITH MEALS
Status: DISCONTINUED | OUTPATIENT
Start: 2022-06-24 | End: 2022-06-27 | Stop reason: HOSPADM

## 2022-06-24 RX ORDER — ACETAMINOPHEN 500 MG
1000 TABLET ORAL EVERY 8 HOURS SCHEDULED
Status: DISCONTINUED | OUTPATIENT
Start: 2022-06-24 | End: 2022-06-27 | Stop reason: HOSPADM

## 2022-06-24 RX ORDER — FOLIC ACID 1 MG/1
1 TABLET ORAL DAILY
Status: DISCONTINUED | OUTPATIENT
Start: 2022-06-24 | End: 2022-06-27 | Stop reason: HOSPADM

## 2022-06-24 RX ORDER — AMIODARONE HYDROCHLORIDE 200 MG/1
400 TABLET ORAL PRN
Status: DISCONTINUED | OUTPATIENT
Start: 2022-06-24 | End: 2022-06-27 | Stop reason: HOSPADM

## 2022-06-24 RX ORDER — AMLODIPINE BESYLATE 10 MG/1
10 TABLET ORAL DAILY
Status: DISCONTINUED | OUTPATIENT
Start: 2022-06-24 | End: 2022-06-27

## 2022-06-24 RX ORDER — DEXTROSE MONOHYDRATE 25 G/50ML
12.5 INJECTION, SOLUTION INTRAVENOUS PRN
Status: DISCONTINUED | OUTPATIENT
Start: 2022-06-24 | End: 2022-06-27 | Stop reason: HOSPADM

## 2022-06-24 RX ORDER — MAGNESIUM SULFATE IN WATER 40 MG/ML
2000 INJECTION, SOLUTION INTRAVENOUS PRN
Status: DISCONTINUED | OUTPATIENT
Start: 2022-06-24 | End: 2022-06-27 | Stop reason: HOSPADM

## 2022-06-24 RX ORDER — BISACODYL 10 MG
10 SUPPOSITORY, RECTAL RECTAL DAILY PRN
Status: DISCONTINUED | OUTPATIENT
Start: 2022-06-24 | End: 2022-06-27 | Stop reason: HOSPADM

## 2022-06-24 RX ORDER — ASPIRIN 81 MG/1
81 TABLET ORAL DAILY
Status: DISCONTINUED | OUTPATIENT
Start: 2022-06-25 | End: 2022-06-27 | Stop reason: HOSPADM

## 2022-06-24 RX ORDER — POTASSIUM CHLORIDE 20 MEQ/1
20 TABLET, EXTENDED RELEASE ORAL PRN
Status: DISCONTINUED | OUTPATIENT
Start: 2022-06-24 | End: 2022-06-27 | Stop reason: HOSPADM

## 2022-06-24 RX ORDER — PANTOPRAZOLE SODIUM 40 MG/1
40 TABLET, DELAYED RELEASE ORAL DAILY
Status: DISCONTINUED | OUTPATIENT
Start: 2022-06-24 | End: 2022-06-27 | Stop reason: HOSPADM

## 2022-06-24 RX ADMIN — OXYCODONE HYDROCHLORIDE AND ACETAMINOPHEN 500 MG: 500 TABLET ORAL at 22:49

## 2022-06-24 RX ADMIN — CEFAZOLIN 2000 MG: 2 INJECTION, POWDER, FOR SOLUTION INTRAMUSCULAR; INTRAVENOUS at 01:32

## 2022-06-24 RX ADMIN — OXYCODONE 10 MG: 5 TABLET ORAL at 18:50

## 2022-06-24 RX ADMIN — SODIUM CHLORIDE, PRESERVATIVE FREE 10 ML: 5 INJECTION INTRAVENOUS at 08:29

## 2022-06-24 RX ADMIN — SODIUM CHLORIDE, PRESERVATIVE FREE 10 ML: 5 INJECTION INTRAVENOUS at 21:08

## 2022-06-24 RX ADMIN — MUPIROCIN: 20 OINTMENT TOPICAL at 21:20

## 2022-06-24 RX ADMIN — AMIODARONE HYDROCHLORIDE 0.5 MG/MIN: 50 INJECTION, SOLUTION INTRAVENOUS at 08:55

## 2022-06-24 RX ADMIN — AMIODARONE HYDROCHLORIDE 400 MG: 200 TABLET ORAL at 09:36

## 2022-06-24 RX ADMIN — SENNOSIDES AND DOCUSATE SODIUM 1 TABLET: 50; 8.6 TABLET ORAL at 10:08

## 2022-06-24 RX ADMIN — FENTANYL CITRATE 50 MCG: 50 INJECTION INTRAMUSCULAR; INTRAVENOUS at 06:48

## 2022-06-24 RX ADMIN — MUPIROCIN: 20 OINTMENT TOPICAL at 09:00

## 2022-06-24 RX ADMIN — ACETAMINOPHEN 1000 MG: 500 TABLET ORAL at 22:49

## 2022-06-24 RX ADMIN — METOPROLOL SUCCINATE 100 MG: 50 TABLET, EXTENDED RELEASE ORAL at 21:08

## 2022-06-24 RX ADMIN — FENTANYL CITRATE 50 MCG: 50 INJECTION INTRAMUSCULAR; INTRAVENOUS at 03:41

## 2022-06-24 RX ADMIN — ASPIRIN 81 MG: 81 TABLET, COATED ORAL at 10:09

## 2022-06-24 RX ADMIN — OXYCODONE 10 MG: 5 TABLET ORAL at 14:17

## 2022-06-24 RX ADMIN — FENTANYL CITRATE 50 MCG: 50 INJECTION INTRAMUSCULAR; INTRAVENOUS at 08:29

## 2022-06-24 RX ADMIN — LEVALBUTEROL 0.31 MG: 0.31 SOLUTION RESPIRATORY (INHALATION) at 21:40

## 2022-06-24 RX ADMIN — SODIUM NITROPRUSSIDE 1.25 MCG/KG/MIN: 25 INJECTION, SOLUTION, CONCENTRATE INTRAVENOUS at 01:46

## 2022-06-24 RX ADMIN — AMIODARONE HYDROCHLORIDE 400 MG: 200 TABLET ORAL at 22:49

## 2022-06-24 RX ADMIN — ACETAMINOPHEN 1000 MG: 500 TABLET ORAL at 13:06

## 2022-06-24 RX ADMIN — TICAGRELOR 90 MG: 90 TABLET ORAL at 22:50

## 2022-06-24 RX ADMIN — HYDRALAZINE HYDROCHLORIDE 10 MG: 20 INJECTION INTRAMUSCULAR; INTRAVENOUS at 01:30

## 2022-06-24 RX ADMIN — INSULIN LISPRO 3 UNITS: 100 INJECTION, SOLUTION INTRAVENOUS; SUBCUTANEOUS at 03:49

## 2022-06-24 RX ADMIN — LEVALBUTEROL 0.31 MG: 0.31 SOLUTION RESPIRATORY (INHALATION) at 07:59

## 2022-06-24 RX ADMIN — ROSUVASTATIN 40 MG: 20 TABLET, FILM COATED ORAL at 22:49

## 2022-06-24 RX ADMIN — AMLODIPINE BESYLATE 10 MG: 10 TABLET ORAL at 08:35

## 2022-06-24 RX ADMIN — ONDANSETRON 4 MG: 2 INJECTION INTRAMUSCULAR; INTRAVENOUS at 03:41

## 2022-06-24 RX ADMIN — Medication 400 MG: at 10:08

## 2022-06-24 RX ADMIN — ESCITALOPRAM 5 MG: 5 TABLET, FILM COATED ORAL at 10:08

## 2022-06-24 RX ADMIN — HYDRALAZINE HYDROCHLORIDE 10 MG: 20 INJECTION INTRAMUSCULAR; INTRAVENOUS at 10:35

## 2022-06-24 RX ADMIN — ONDANSETRON 4 MG: 2 INJECTION INTRAMUSCULAR; INTRAVENOUS at 21:08

## 2022-06-24 RX ADMIN — POTASSIUM CHLORIDE 20 MEQ: 400 INJECTION, SOLUTION INTRAVENOUS at 08:56

## 2022-06-24 RX ADMIN — ISOSORBIDE MONONITRATE 30 MG: 30 TABLET, EXTENDED RELEASE ORAL at 08:35

## 2022-06-24 RX ADMIN — PANTOPRAZOLE SODIUM 40 MG: 40 TABLET, DELAYED RELEASE ORAL at 08:36

## 2022-06-24 RX ADMIN — PANTOPRAZOLE SODIUM 40 MG: 40 TABLET, DELAYED RELEASE ORAL at 21:08

## 2022-06-24 RX ADMIN — INSULIN GLARGINE-YFGN 13 UNITS: 100 INJECTION, SOLUTION SUBCUTANEOUS at 21:07

## 2022-06-24 RX ADMIN — SENNOSIDES AND DOCUSATE SODIUM 1 TABLET: 50; 8.6 TABLET ORAL at 22:50

## 2022-06-24 RX ADMIN — OXYCODONE 10 MG: 5 TABLET ORAL at 23:03

## 2022-06-24 RX ADMIN — INSULIN LISPRO 3 UNITS: 100 INJECTION, SOLUTION INTRAVENOUS; SUBCUTANEOUS at 08:30

## 2022-06-24 RX ADMIN — TICAGRELOR 90 MG: 90 TABLET ORAL at 10:09

## 2022-06-24 RX ADMIN — OXYCODONE 5 MG: 5 TABLET ORAL at 10:10

## 2022-06-24 RX ADMIN — FOLIC ACID 1 MG: 1 TABLET ORAL at 21:08

## 2022-06-24 RX ADMIN — FENTANYL CITRATE 50 MCG: 50 INJECTION INTRAMUSCULAR; INTRAVENOUS at 00:22

## 2022-06-24 RX ADMIN — POTASSIUM CHLORIDE 20 MEQ: 400 INJECTION, SOLUTION INTRAVENOUS at 10:02

## 2022-06-24 RX ADMIN — BISACODYL 5 MG: 5 TABLET, COATED ORAL at 22:48

## 2022-06-24 RX ADMIN — METOPROLOL SUCCINATE 100 MG: 50 TABLET, EXTENDED RELEASE ORAL at 08:35

## 2022-06-24 RX ADMIN — FERROUS SULFATE TAB 325 MG (65 MG ELEMENTAL FE) 325 MG: 325 (65 FE) TAB at 21:08

## 2022-06-24 ASSESSMENT — PAIN DESCRIPTION - LOCATION
LOCATION: ABDOMEN;CHEST
LOCATION: CHEST
LOCATION: CHEST
LOCATION: ABDOMEN;CHEST
LOCATION: CHEST;INCISION
LOCATION: CHEST;STERNUM
LOCATION: CHEST;STERNUM
LOCATION: CHEST;INCISION

## 2022-06-24 ASSESSMENT — PAIN SCALES - GENERAL
PAINLEVEL_OUTOF10: 4
PAINLEVEL_OUTOF10: 8
PAINLEVEL_OUTOF10: 9
PAINLEVEL_OUTOF10: 6
PAINLEVEL_OUTOF10: 4
PAINLEVEL_OUTOF10: 2
PAINLEVEL_OUTOF10: 9
PAINLEVEL_OUTOF10: 8
PAINLEVEL_OUTOF10: 10
PAINLEVEL_OUTOF10: 8
PAINLEVEL_OUTOF10: 6
PAINLEVEL_OUTOF10: 4
PAINLEVEL_OUTOF10: 7
PAINLEVEL_OUTOF10: 7

## 2022-06-24 ASSESSMENT — PAIN DESCRIPTION - DESCRIPTORS
DESCRIPTORS: ACHING;DISCOMFORT
DESCRIPTORS: SORE;SHARP;TENDER
DESCRIPTORS: ACHING;DISCOMFORT
DESCRIPTORS: ACHING;DISCOMFORT
DESCRIPTORS: ACHING;DISCOMFORT;SORE
DESCRIPTORS: ACHING;DISCOMFORT;SORE

## 2022-06-24 ASSESSMENT — PAIN DESCRIPTION - ORIENTATION
ORIENTATION: MID
ORIENTATION: MID

## 2022-06-24 ASSESSMENT — PAIN - FUNCTIONAL ASSESSMENT
PAIN_FUNCTIONAL_ASSESSMENT: ACTIVITIES ARE NOT PREVENTED

## 2022-06-24 ASSESSMENT — PAIN DESCRIPTION - PAIN TYPE: TYPE: SURGICAL PAIN;ACUTE PAIN

## 2022-06-24 ASSESSMENT — PAIN DESCRIPTION - FREQUENCY: FREQUENCY: INTERMITTENT

## 2022-06-24 ASSESSMENT — PAIN DESCRIPTION - ONSET: ONSET: ON-GOING

## 2022-06-24 NOTE — PROGRESS NOTES
Comprehensive Nutrition Assessment    Type and Reason for Visit:  Initial    Nutrition Recommendations/Plan:   1. Glucerna TID  2. Naga BID     Malnutrition Assessment:  Malnutrition Status: At risk for malnutrition (Comment) (06/24/22 1416)    Context:  Chronic Illness     Findings of the 6 clinical characteristics of malnutrition:  Energy Intake:  Mild decrease in energy intake (Comment)  Weight Loss:  No significant weight loss     Body Fat Loss:  No significant body fat loss     Muscle Mass Loss:  No significant muscle mass loss    Fluid Accumulation:  No significant fluid accumulation     Strength:  Not Performed    Nutrition Assessment:    Pt admit w/ CAD s/p CABG x3/EVH. Noted hx NSTEMI, remote hx gastric bypass. PO diet advanced w/ minimal intakes noted. Will add ONS and monitor. Nutrition Related Findings:    pt alert, hypoactive BS, soft abd, CT x2, no edema, -I/Os Wound Type: Multiple,Surgical Incision       Current Nutrition Intake & Therapies:    Average Meal Intake: 1-25%  Average Supplements Intake: None Ordered  ADULT DIET; Regular; Low Fat/Low Chol/High Fiber/RAKESH    Anthropometric Measures:  Height: 5' 4\" (162.6 cm)  Ideal Body Weight (IBW): 120 lbs (55 kg)    Admission Body Weight: 215 lb (97.5 kg) (6/24 bed)  Current Body Weight: 215 lb (97.5 kg) (6/24 bed), 179.2 % IBW.     Current BMI (kg/m2): 36.9  Usual Body Weight: 196 lb (88.9 kg) (3/2022 standing scale, per EMR)  % Weight Change (Calculated): 9.7                    BMI Categories: Obese Class 2 (BMI 35.0 -39.9)    Estimated Daily Nutrient Needs:  Energy Requirements Based On: Formula  Weight Used for Energy Requirements: Current  Energy (kcal/day): MSJ x 1.3 SF = 4666-7022  Weight Used for Protein Requirements: Ideal  Protein (g/day):  (1.5-1.8 gm/kg IBW)     Fluid (ml/day): per critical care    Nutrition Diagnosis:   · Increased nutrient needs related to increase demand for energy/nutrients as evidenced by wounds      Nutrition Interventions:   Food and/or Nutrient Delivery: Continue Current Diet,Start Oral Nutrition Supplement (Glucerna TID, Naga BID)  Nutrition Education/Counseling: Education initiated (ONS options/benefits reviewed w/ pt)  Coordination of Nutrition Care: Continue to monitor while inpatient       Goals:     Goals: PO intake 50% or greater,by next RD assessment       Nutrition Monitoring and Evaluation:      Food/Nutrient Intake Outcomes: Food and Nutrient Intake,Supplement Intake  Physical Signs/Symptoms Outcomes: Biochemical Data,GI Status,Fluid Status or Edema,Nutrition Focused Physical Findings,Skin,Weight    Discharge Planning:     Too soon to determine     Galileo Amaya, MS, RD, LD  Contact: [phone]

## 2022-06-24 NOTE — CARE COORDINATION
Pod #2. Remains on Iv amio and Nipride drips. Po antihypertensives started, weaning nipride. Dc plan is to return home with family and [facility] when stable for discharge.

## 2022-06-24 NOTE — PLAN OF CARE
and oxygenation  Outcome: Progressing  Flowsheets  Taken 6/23/2022 1400 by Isabela Shaw RN  Achieves optimal ventilation and oxygenation:   Assess for changes in respiratory status   Assess for changes in mentation and behavior   Position to facilitate oxygenation and minimize respiratory effort   Oxygen supplementation based on oxygen saturation or arterial blood gases  Taken 6/23/2022 1200 by Isabela Shaw RN  Achieves optimal ventilation and oxygenation:   Assess for changes in respiratory status   Assess for changes in mentation and behavior   Position to facilitate oxygenation and minimize respiratory effort   Oxygen supplementation based on oxygen saturation or arterial blood gases     Problem: Cardiovascular - Adult  Goal: Maintains optimal cardiac output and hemodynamic stability  Outcome: Progressing  Flowsheets  Taken 6/23/2022 1400 by Isabela Shaw RN  Maintains optimal cardiac output and hemodynamic stability:   Monitor blood pressure and heart rate   Monitor urine output and notify Licensed Independent Practitioner for values outside of normal range   Assess for signs of decreased cardiac output   Administer fluid and/or volume expanders as ordered   Administer vasoactive medications as ordered  Taken 6/23/2022 1200 by Isabela Shaw RN  Maintains optimal cardiac output and hemodynamic stability:   Monitor blood pressure and heart rate   Monitor urine output and notify Licensed Independent Practitioner for values outside of normal range   Assess for signs of decreased cardiac output   Administer fluid and/or volume expanders as ordered   Administer vasoactive medications as ordered  Goal: Absence of cardiac dysrhythmias or at baseline  Outcome: Progressing     Problem: Skin/Tissue Integrity - Adult  Goal: Skin integrity remains intact  Outcome: Progressing  Flowsheets  Taken 6/23/2022 1400 by Isabela Shaw RN  Skin Integrity Remains Intact:   Monitor for areas of redness and/or skin breakdown Assess vascular access sites hourly  Taken 6/23/2022 1200 by Cori Jimenez RN  Skin Integrity Remains Intact:   Monitor for areas of redness and/or skin breakdown   Assess vascular access sites hourly  Goal: Incisions, wounds, or drain sites healing without S/S of infection  Outcome: Progressing  Goal: Oral mucous membranes remain intact  Outcome: Progressing     Problem: Genitourinary - Adult  Goal: Absence of urinary retention  Outcome: Progressing  Goal: Urinary catheter remains patent  Outcome: Progressing     Problem: Metabolic/Fluid and Electrolytes - Adult  Goal: Electrolytes maintained within normal limits  Outcome: Progressing  Flowsheets  Taken 6/23/2022 1400 by Cori Jimenez RN  Electrolytes maintained within normal limits:   Monitor labs and assess patient for signs and symptoms of electrolyte imbalances   Administer electrolyte replacement as ordered   Monitor response to electrolyte replacements, including repeat lab results as appropriate   Fluid restriction as ordered  Taken 6/23/2022 1200 by Cori Jimenez RN  Electrolytes maintained within normal limits:   Monitor labs and assess patient for signs and symptoms of electrolyte imbalances   Administer electrolyte replacement as ordered   Monitor response to electrolyte replacements, including repeat lab results as appropriate   Fluid restriction as ordered  Goal: Hemodynamic stability and optimal renal function maintained  Outcome: Progressing  Flowsheets  Taken 6/23/2022 1400 by Cori Jimenez RN  Hemodynamic stability and optimal renal function maintained:   Monitor labs and assess for signs and symptoms of volume excess or deficit   Monitor intake, output and patient weight   Monitor urine specific gravity, serum osmolarity and serum sodium as indicated or ordered   Monitor response to interventions for patient's volume status, including labs, urine output, blood pressure (other measures as available)  Taken 6/23/2022 1200 by Cori Jimenez

## 2022-06-24 NOTE — PLAN OF CARE
Problem: Discharge Planning  Goal: Discharge to home or other facility with appropriate resources  6/24/2022 0040 by Elby Oppenheim, RN  Outcome: Progressing  Flowsheets  Taken 6/23/2022 1400 by Cathy Hameed RN  Discharge to home or other facility with appropriate resources:   Identify barriers to discharge with patient and caregiver   Identify discharge learning needs (meds, wound care, etc)  Taken 6/23/2022 1200 by Cathy Hameed RN  Discharge to home or other facility with appropriate resources:   Identify barriers to discharge with patient and caregiver   Identify discharge learning needs (meds, wound care, etc)     Problem: Pain  Goal: Verbalizes/displays adequate comfort level or baseline comfort level  6/24/2022 0735 by Loyda Duvall RN  Outcome: Progressing  6/24/2022 0040 by Elby Oppenheim, RN  Outcome: Progressing  Flowsheets  Taken 6/23/2022 1516 by Cathy Hameed RN  Verbalizes/displays adequate comfort level or baseline comfort level: Administer analgesics based on type and severity of pain and evaluate response  Taken 6/23/2022 1500 by Cathy Hameed RN  Verbalizes/displays adequate comfort level or baseline comfort level: Assess pain using appropriate pain scale  Taken 6/23/2022 1400 by Cathy Hameed RN  Verbalizes/displays adequate comfort level or baseline comfort level: Assess pain using appropriate pain scale  Taken 6/23/2022 1300 by Cathy Hameed RN  Verbalizes/displays adequate comfort level or baseline comfort level: Assess pain using appropriate pain scale  Taken 6/23/2022 1233 by Cathy Hameed RN  Verbalizes/displays adequate comfort level or baseline comfort level: Administer analgesics based on type and severity of pain and evaluate response  Taken 6/23/2022 1200 by Cathy Hameed RN  Verbalizes/displays adequate comfort level or baseline comfort level: Assess pain using appropriate pain scale  Taken 6/23/2022 1100 by Cathy Hameed RN  Verbalizes/displays adequate comfort level or baseline comfort level: Assess pain using appropriate pain scale     Problem: Respiratory - Adult  Goal: Achieves optimal ventilation and oxygenation  6/24/2022 0735 by Danay Boggs RN  Outcome: Progressing  6/24/2022 0040 by Ladi Rosario RN  Outcome: Progressing  Flowsheets  Taken 6/23/2022 1400 by Cory Castillo RN  Achieves optimal ventilation and oxygenation:   Assess for changes in respiratory status   Assess for changes in mentation and behavior   Position to facilitate oxygenation and minimize respiratory effort   Oxygen supplementation based on oxygen saturation or arterial blood gases  Taken 6/23/2022 1200 by Cory Castillo RN  Achieves optimal ventilation and oxygenation:   Assess for changes in respiratory status   Assess for changes in mentation and behavior   Position to facilitate oxygenation and minimize respiratory effort   Oxygen supplementation based on oxygen saturation or arterial blood gases     Problem: Cardiovascular - Adult  Goal: Maintains optimal cardiac output and hemodynamic stability  6/24/2022 0735 by Danay Boggs RN  Outcome: Progressing  6/24/2022 0040 by Ladi Rosario RN  Outcome: Progressing  Flowsheets  Taken 6/23/2022 1400 by Cory Castillo RN  Maintains optimal cardiac output and hemodynamic stability:   Monitor blood pressure and heart rate   Monitor urine output and notify Licensed Independent Practitioner for values outside of normal range   Assess for signs of decreased cardiac output   Administer fluid and/or volume expanders as ordered   Administer vasoactive medications as ordered  Taken 6/23/2022 1200 by Cory Castillo RN  Maintains optimal cardiac output and hemodynamic stability:   Monitor blood pressure and heart rate   Monitor urine output and notify Licensed Independent Practitioner for values outside of normal range   Assess for signs of decreased cardiac output   Administer fluid and/or volume expanders as ordered   Administer vasoactive medications as ordered  Goal: Absence of cardiac dysrhythmias or at baseline  6/24/2022 0040 by Trip Hernandez RN  Outcome: Progressing     Problem: Skin/Tissue Integrity - Adult  Goal: Skin integrity remains intact  6/24/2022 0735 by Padmini Zavaleta RN  Outcome: Progressing  6/24/2022 0040 by Trip Hernandez RN  Outcome: Progressing  Flowsheets  Taken 6/23/2022 1400 by Hima Cabezas RN  Skin Integrity Remains Intact:   Monitor for areas of redness and/or skin breakdown   Assess vascular access sites hourly  Taken 6/23/2022 1200 by Hima Cabezas RN  Skin Integrity Remains Intact:   Monitor for areas of redness and/or skin breakdown   Assess vascular access sites hourly  Goal: Incisions, wounds, or drain sites healing without S/S of infection  6/24/2022 0735 by Padmini Zavaleta RN  Outcome: Progressing  6/24/2022 0040 by Tirp Hernandez RN  Outcome: Progressing  Goal: Oral mucous membranes remain intact  6/24/2022 0735 by Padmini Zavaleta RN  Outcome: Progressing  6/24/2022 0040 by Trip Hernandez RN  Outcome: Progressing     Problem: Genitourinary - Adult  Goal: Absence of urinary retention  6/24/2022 0040 by Trip Hernandez RN  Outcome: Progressing  Goal: Urinary catheter remains patent  6/24/2022 0735 by Padmini Zavaleta RN  Outcome: Progressing  6/24/2022 0040 by Trip Hernandez RN  Outcome: Progressing     Problem: Metabolic/Fluid and Electrolytes - Adult  Goal: Electrolytes maintained within normal limits  6/24/2022 0735 by Padmini Zavaleta RN  Outcome: Progressing  6/24/2022 0040 by Trip Hernandez RN  Outcome: Progressing  Flowsheets  Taken 6/23/2022 1400 by Hima Cabezas RN  Electrolytes maintained within normal limits:   Monitor labs and assess patient for signs and symptoms of electrolyte imbalances   Administer electrolyte replacement as ordered   Monitor response to electrolyte replacements, including repeat lab results as appropriate   Fluid restriction as ordered  Taken 6/23/2022 1200 by Hima Cabezas RN  Electrolytes maintained within normal limits:   Monitor labs and assess patient for signs and symptoms of electrolyte imbalances   Administer electrolyte replacement as ordered   Monitor response to electrolyte replacements, including repeat lab results as appropriate   Fluid restriction as ordered  Goal: Hemodynamic stability and optimal renal function maintained  6/24/2022 0735 by Loyda Duvall RN  Outcome: Progressing  6/24/2022 0040 by Elby Oppenheim, RN  Outcome: Progressing  Flowsheets  Taken 6/23/2022 1400 by Cathy Hameed RN  Hemodynamic stability and optimal renal function maintained:   Monitor labs and assess for signs and symptoms of volume excess or deficit   Monitor intake, output and patient weight   Monitor urine specific gravity, serum osmolarity and serum sodium as indicated or ordered   Monitor response to interventions for patient's volume status, including labs, urine output, blood pressure (other measures as available)  Taken 6/23/2022 1200 by Cathy Hameed RN  Hemodynamic stability and optimal renal function maintained:   Monitor labs and assess for signs and symptoms of volume excess or deficit   Monitor intake, output and patient weight   Monitor urine specific gravity, serum osmolarity and serum sodium as indicated or ordered   Monitor response to interventions for patient's volume status, including labs, urine output, blood pressure (other measures as available)  Goal: Glucose maintained within prescribed range  6/24/2022 0735 by Loyda Duvall RN  Outcome: Progressing  6/24/2022 0040 by Elby Oppenheim, RN  Outcome: Progressing     Problem: Hematologic - Adult  Goal: Maintains hematologic stability  6/24/2022 0735 by Loyda Duvall RN  Outcome: Progressing  6/24/2022 0040 by Elby Oppenheim, RN  Outcome: Progressing  Flowsheets  Taken 6/23/2022 1400 by Cathy Hameed RN  Maintains hematologic stability:   Assess for signs and symptoms of bleeding or hemorrhage   Monitor labs for bleeding or clotting disorders   Administer blood products/factors as ordered  Taken 6/23/2022 1200 by Britton Knight RN  Maintains hematologic stability:   Assess for signs and symptoms of bleeding or hemorrhage   Monitor labs for bleeding or clotting disorders   Administer blood products/factors as ordered     Problem: Safety - Adult  Goal: Free from fall injury  6/24/2022 0735 by Teresa Wallace RN  Outcome: Progressing  6/24/2022 0040 by Laury Raymond RN  Outcome: Progressing     Problem: ABCDS Injury Assessment  Goal: Absence of physical injury  6/24/2022 0735 by Teresa Wallace RN  Outcome: Progressing  6/24/2022 0040 by Laury Raymond RN  Outcome: Progressing     Problem: Skin/Tissue Integrity  Goal: Absence of new skin breakdown  Description: 1. Monitor for areas of redness and/or skin breakdown  2. Assess vascular access sites hourly  3. Every 4-6 hours minimum:  Change oxygen saturation probe site  4. Every 4-6 hours:  If on nasal continuous positive airway pressure, respiratory therapy assess nares and determine need for appliance change or resting period.   6/24/2022 0735 by Teresa Wallace RN  Outcome: Progressing  6/24/2022 0040 by Laury Raymond RN  Outcome: Progressing

## 2022-06-24 NOTE — PROGRESS NOTES
CVICU Progress Note    Name: Calixto Camarillo  MRN: [id number]    CC: Postoperative Critical Care Management     Indication for Surgery/Procedure: CAD, H/O STEMI   LVEF:  55%    RVF:  NML     Important/Relevant PMH/PSH:  inferior STEMI s/p PCI with 3 overlapping MAURICIO from the distal to proximal RCA on 3/11/22, bilateral renal artery stenosis s/p bilateral stenting, HTN, HLD, gastric bypass, former smoker quit 3/2022, depression, seizures      Procedure/Surgeries: 6/22/2022 Sternotomy/CABG x 3 (LIMA-Diag sequenced onto LAD, SVG-OM)/EVH LLE/Rigid internal fixation of the sternum with Clive plates x 2     Pacing wires: Ventricular       Intake/Output Summary (Last 24 hours) at 6/24/2022 1043  Last data filed at 6/24/2022 0700  Gross per 24 hour   Intake 1877.24 ml   Output 3150 ml   Net -1272.76 ml       Recent Labs     06/22/22  1100 06/23/22  0423 06/24/22  0520   WBC 11.2 13.3* 18.0*   HGB 10.1* 10.2* 9.5*   HCT 30.3* 31.1* 28.1*    140 150      Lab Results   Component Value Date     06/24/2022    K 3.0 06/24/2022    K 4.0 03/13/2022     06/24/2022    CO2 20 06/24/2022    BUN 13 06/24/2022    CREATININE 0.9 06/24/2022    GLUCOSE 153 06/24/2022    GLUCOSE 83 04/28/2012    CALCIUM 9.2 06/24/2022         Physical Exam:    BP (!) 159/95   Pulse 98   Temp 98.8 °F (37.1 °C) (Oral)   Resp 22   Ht 5' 4\" (1.626 m)   Wt 215 lb 6.2 oz (97.7 kg)   SpO2 97%   BMI 36.97 kg/m²       CXR Findings: 6/24/2022       General: Awake, alert. Denies SOB, palpitations   Eyes: PERRL, anicteric   Pulmonary: Diminished bibasilar.  No wheezes, no accessory muscle use noted on 2L NC  Cardiovascular:  RRR, no heaves or thrills on palpation  Tele: SR/ST  Abdomen: Soft, nontender, +BS   Extremities: Palpable pulses all extremities, no edema   Neurologic/Psych: A&Ox3, CANTU to command   Skin: Warm and dry  Incisions: MSI with kendra dressing intact, LLE SVG sites with DSD      Assessment/Plan: POD #2  1. CAD S/p CABG x 3 (LIMA-Diag sequenced onto LAD, SVG-OM)  - MAURICIO to RCA on Brilinta   - ASA, Crestor  - Imdur per Dr Sita Vega due to sequenced graft    - Amio for afib prophylaxis. - Blakesx2 placed to bulb sxn  - PT/OT     2. Acute Pulmonary Insufficiency Following Surgery    - Expected 2/2 surgery  - Adequate o/v on 2L NC, Encourage IS, Ezpap per RT. Ambulate      3. HTN  - Remains on Nipride infusion, Toprol increased to 100mg BID, 10mg Norvasc added. 30mg mdur, prn Hydralazine. Wean off infusion.      4. Acute Post Operative Pain   - D/C fentanyl. Add tylenol ATC-encourage po oxycodone       VTE Prophylaxis: Pharmacologic/Mechanical: Yes, SCDs   Line infection prevention: Can CVC or arterial line be removed: No   Continued need for urinary catheter: Yes - clinical indication: Patient post major surgery requiring fluid balance and input and output measurement.     Dispo: CVICU until off Nipride, possibly later today     Electronically signed by DA Chadwick CNP on 6/24/2022 at 10:43 AM

## 2022-06-25 ENCOUNTER — APPOINTMENT (OUTPATIENT)
Dept: GENERAL RADIOLOGY | Age: 52
DRG: 166 | End: 2022-06-25
Attending: THORACIC SURGERY (CARDIOTHORACIC VASCULAR SURGERY)
Payer: COMMERCIAL

## 2022-06-25 LAB
ANION GAP SERPL CALCULATED.3IONS-SCNC: 11 MMOL/L (ref 7–16)
ANION GAP SERPL CALCULATED.3IONS-SCNC: 13 MMOL/L (ref 7–16)
ANION GAP: 13 MMOL/L (ref 7–16)
B.E.: -4.5 MMOL/L (ref -3–3)
BUN BLDV-MCNC: 27 MG/DL (ref 6–20)
BUN BLDV-MCNC: 38 MG/DL (ref 6–20)
CALCIUM SERPL-MCNC: 9.3 MG/DL (ref 8.6–10.2)
CALCIUM SERPL-MCNC: 9.4 MG/DL (ref 8.6–10.2)
CARDIOPULMONARY BYPASS: YES
CHLORIDE BLD-SCNC: 105 MMOL/L (ref 98–107)
CHLORIDE BLD-SCNC: 106 MMOL/L (ref 98–107)
CO2: 20 MMOL/L (ref 22–29)
CO2: 20 MMOL/L (ref 22–29)
CREAT SERPL-MCNC: 1.1 MG/DL (ref 0.5–1)
CREAT SERPL-MCNC: 1.1 MG/DL (ref 0.5–1)
DEVICE: ABNORMAL
FIO2: 50
GFR AFRICAN AMERICAN: 58
GFR AFRICAN AMERICAN: >60
GFR AFRICAN AMERICAN: >60
GFR NON-AFRICAN AMERICAN: 53 ML/MIN/1.73
GFR NON-AFRICAN AMERICAN: 53 ML/MIN/1.73
GFR, ESTIMATED: 48 ML/MIN/1.73
GLUCOSE BLD-MCNC: 128 MG/DL (ref 74–99)
GLUCOSE BLD-MCNC: 139 MG/DL (ref 74–99)
GLUCOSE BLD-MCNC: 90 MG/DL (ref 74–99)
HCO3: 20.3 MMOL/L (ref 22–26)
HCT VFR BLD CALC: 27.7 % (ref 34–48)
HEMATOCRIT: 26 % (ref 34–48)
HEMOGLOBIN: 8.8 G/DL (ref 11.5–15.5)
HEMOGLOBIN: 9.2 G/DL (ref 11.5–15.5)
MAGNESIUM: 2.3 MG/DL (ref 1.6–2.6)
MCH RBC QN AUTO: 31.3 PG (ref 26–35)
MCHC RBC AUTO-ENTMCNC: 33.2 % (ref 32–34.5)
MCV RBC AUTO: 94.2 FL (ref 80–99.9)
METER GLUCOSE: 105 MG/DL (ref 74–99)
METER GLUCOSE: 114 MG/DL (ref 74–99)
METER GLUCOSE: 134 MG/DL (ref 74–99)
METER GLUCOSE: 95 MG/DL (ref 74–99)
O2 SATURATION: 98.2 % (ref 92–98.5)
OPERATOR ID: 4510
PCO2 37: 35.3 MMHG (ref 35–45)
PDW BLD-RTO: 14.5 FL (ref 11.5–15)
PH 37: 7.37 (ref 7.35–7.45)
PLATELET # BLD: 169 E9/L (ref 130–450)
PMV BLD AUTO: 11.2 FL (ref 7–12)
PO2 37: 109.6 MMHG (ref 80–100)
POC BUN: 11 MG/DL (ref 8–23)
POC CHLORIDE: 109 MMOL/L (ref 100–108)
POC CO2: 20.5 MMOL/L (ref 22–29)
POC CREATININE: 1.2 MG/DL (ref 0.5–1)
POC IONIZED CALCIUM: 1.1 (ref 1.1–1.3)
POC LACTIC ACID: 1.9 (ref 0.5–2.2)
POC SODIUM: 142 MMOL/L (ref 132–146)
POC SOURCE: ABNORMAL
POTASSIUM SERPL-SCNC: 3.1 MMOL/L (ref 3.5–5.5)
POTASSIUM SERPL-SCNC: 3.8 MMOL/L (ref 3.5–5)
POTASSIUM SERPL-SCNC: 4.1 MMOL/L (ref 3.5–5)
RBC # BLD: 2.94 E12/L (ref 3.5–5.5)
SODIUM BLD-SCNC: 137 MMOL/L (ref 132–146)
SODIUM BLD-SCNC: 138 MMOL/L (ref 132–146)
WBC # BLD: 14.6 E9/L (ref 4.5–11.5)

## 2022-06-25 PROCEDURE — 6370000000 HC RX 637 (ALT 250 FOR IP)

## 2022-06-25 PROCEDURE — P9047 ALBUMIN (HUMAN), 25%, 50ML: HCPCS

## 2022-06-25 PROCEDURE — 71045 X-RAY EXAM CHEST 1 VIEW: CPT

## 2022-06-25 PROCEDURE — S5553 INSULIN LONG ACTING 5 U: HCPCS

## 2022-06-25 PROCEDURE — 94640 AIRWAY INHALATION TREATMENT: CPT

## 2022-06-25 PROCEDURE — 36415 COLL VENOUS BLD VENIPUNCTURE: CPT

## 2022-06-25 PROCEDURE — 6360000002 HC RX W HCPCS

## 2022-06-25 PROCEDURE — 51798 US URINE CAPACITY MEASURE: CPT

## 2022-06-25 PROCEDURE — 6370000000 HC RX 637 (ALT 250 FOR IP): Performed by: NURSE PRACTITIONER

## 2022-06-25 PROCEDURE — 80048 BASIC METABOLIC PNL TOTAL CA: CPT

## 2022-06-25 PROCEDURE — 85027 COMPLETE CBC AUTOMATED: CPT

## 2022-06-25 PROCEDURE — 83735 ASSAY OF MAGNESIUM: CPT

## 2022-06-25 PROCEDURE — 6360000002 HC RX W HCPCS: Performed by: NURSE PRACTITIONER

## 2022-06-25 PROCEDURE — 2140000000 HC CCU INTERMEDIATE R&B

## 2022-06-25 PROCEDURE — 2580000003 HC RX 258: Performed by: NURSE PRACTITIONER

## 2022-06-25 PROCEDURE — 93798 PHYS/QHP OP CAR RHAB W/ECG: CPT

## 2022-06-25 PROCEDURE — 2580000003 HC RX 258

## 2022-06-25 PROCEDURE — 82962 GLUCOSE BLOOD TEST: CPT

## 2022-06-25 RX ORDER — METOPROLOL SUCCINATE 25 MG/1
75 TABLET, EXTENDED RELEASE ORAL 2 TIMES DAILY
Status: DISCONTINUED | OUTPATIENT
Start: 2022-06-25 | End: 2022-06-25

## 2022-06-25 RX ORDER — 0.9 % SODIUM CHLORIDE 0.9 %
500 INTRAVENOUS SOLUTION INTRAVENOUS ONCE
Status: COMPLETED | OUTPATIENT
Start: 2022-06-25 | End: 2022-06-25

## 2022-06-25 RX ORDER — ENOXAPARIN SODIUM 100 MG/ML
40 INJECTION SUBCUTANEOUS DAILY
Status: DISCONTINUED | OUTPATIENT
Start: 2022-06-25 | End: 2022-06-27 | Stop reason: HOSPADM

## 2022-06-25 RX ORDER — METOPROLOL SUCCINATE 50 MG/1
50 TABLET, EXTENDED RELEASE ORAL 2 TIMES DAILY
Status: DISCONTINUED | OUTPATIENT
Start: 2022-06-25 | End: 2022-06-27 | Stop reason: HOSPADM

## 2022-06-25 RX ORDER — INSULIN GLARGINE-YFGN 100 [IU]/ML
0.1 INJECTION, SOLUTION SUBCUTANEOUS NIGHTLY
Status: DISCONTINUED | OUTPATIENT
Start: 2022-06-25 | End: 2022-06-27 | Stop reason: HOSPADM

## 2022-06-25 RX ORDER — ALBUMIN (HUMAN) 12.5 G/50ML
50 SOLUTION INTRAVENOUS ONCE
Status: COMPLETED | OUTPATIENT
Start: 2022-06-25 | End: 2022-06-25

## 2022-06-25 RX ORDER — ROSUVASTATIN CALCIUM 20 MG/1
20 TABLET, COATED ORAL NIGHTLY
Status: DISCONTINUED | OUTPATIENT
Start: 2022-06-25 | End: 2022-06-27 | Stop reason: HOSPADM

## 2022-06-25 RX ADMIN — ACETAMINOPHEN 1000 MG: 500 TABLET ORAL at 13:11

## 2022-06-25 RX ADMIN — ACETAMINOPHEN 1000 MG: 500 TABLET ORAL at 22:43

## 2022-06-25 RX ADMIN — ENOXAPARIN SODIUM 40 MG: 100 INJECTION SUBCUTANEOUS at 10:54

## 2022-06-25 RX ADMIN — SENNOSIDES AND DOCUSATE SODIUM 1 TABLET: 50; 8.6 TABLET ORAL at 08:17

## 2022-06-25 RX ADMIN — ACETAMINOPHEN 1000 MG: 500 TABLET ORAL at 06:32

## 2022-06-25 RX ADMIN — ESCITALOPRAM 5 MG: 5 TABLET, FILM COATED ORAL at 08:17

## 2022-06-25 RX ADMIN — OXYCODONE HYDROCHLORIDE AND ACETAMINOPHEN 500 MG: 500 TABLET ORAL at 20:53

## 2022-06-25 RX ADMIN — FOLIC ACID 1 MG: 1 TABLET ORAL at 08:17

## 2022-06-25 RX ADMIN — LEVALBUTEROL 0.31 MG: 0.31 SOLUTION RESPIRATORY (INHALATION) at 08:32

## 2022-06-25 RX ADMIN — ISOSORBIDE MONONITRATE 30 MG: 30 TABLET, EXTENDED RELEASE ORAL at 08:17

## 2022-06-25 RX ADMIN — MUPIROCIN: 20 OINTMENT TOPICAL at 21:13

## 2022-06-25 RX ADMIN — BISACODYL 10 MG: 10 SUPPOSITORY RECTAL at 10:54

## 2022-06-25 RX ADMIN — FERROUS SULFATE TAB 325 MG (65 MG ELEMENTAL FE) 325 MG: 325 (65 FE) TAB at 08:17

## 2022-06-25 RX ADMIN — ASPIRIN 81 MG: 81 TABLET, COATED ORAL at 08:17

## 2022-06-25 RX ADMIN — AMIODARONE HYDROCHLORIDE 400 MG: 200 TABLET ORAL at 08:17

## 2022-06-25 RX ADMIN — MUPIROCIN: 20 OINTMENT TOPICAL at 08:20

## 2022-06-25 RX ADMIN — SODIUM CHLORIDE, PRESERVATIVE FREE 10 ML: 5 INJECTION INTRAVENOUS at 20:53

## 2022-06-25 RX ADMIN — LEVALBUTEROL 0.31 MG: 0.31 SOLUTION RESPIRATORY (INHALATION) at 21:03

## 2022-06-25 RX ADMIN — SODIUM CHLORIDE 500 ML: 9 INJECTION, SOLUTION INTRAVENOUS at 20:52

## 2022-06-25 RX ADMIN — SENNOSIDES AND DOCUSATE SODIUM 1 TABLET: 50; 8.6 TABLET ORAL at 22:44

## 2022-06-25 RX ADMIN — OXYCODONE 5 MG: 5 TABLET ORAL at 08:19

## 2022-06-25 RX ADMIN — INSULIN GLARGINE-YFGN 9 UNITS: 100 INJECTION, SOLUTION SUBCUTANEOUS at 20:54

## 2022-06-25 RX ADMIN — TICAGRELOR 90 MG: 90 TABLET ORAL at 08:17

## 2022-06-25 RX ADMIN — Medication 400 MG: at 08:17

## 2022-06-25 RX ADMIN — BISACODYL 5 MG: 5 TABLET, COATED ORAL at 08:17

## 2022-06-25 RX ADMIN — SODIUM CHLORIDE, PRESERVATIVE FREE 10 ML: 5 INJECTION INTRAVENOUS at 08:18

## 2022-06-25 RX ADMIN — OXYCODONE HYDROCHLORIDE AND ACETAMINOPHEN 500 MG: 500 TABLET ORAL at 08:17

## 2022-06-25 RX ADMIN — ALBUMIN (HUMAN) 50 G: 0.25 INJECTION, SOLUTION INTRAVENOUS at 11:06

## 2022-06-25 RX ADMIN — FERROUS SULFATE TAB 325 MG (65 MG ELEMENTAL FE) 325 MG: 325 (65 FE) TAB at 16:53

## 2022-06-25 RX ADMIN — AMLODIPINE BESYLATE 10 MG: 10 TABLET ORAL at 08:17

## 2022-06-25 RX ADMIN — OXYCODONE 5 MG: 5 TABLET ORAL at 16:53

## 2022-06-25 RX ADMIN — TICAGRELOR 90 MG: 90 TABLET ORAL at 20:53

## 2022-06-25 RX ADMIN — ONDANSETRON 4 MG: 2 INJECTION INTRAMUSCULAR; INTRAVENOUS at 10:00

## 2022-06-25 RX ADMIN — OXYCODONE 10 MG: 5 TABLET ORAL at 04:10

## 2022-06-25 RX ADMIN — OXYCODONE 10 MG: 5 TABLET ORAL at 20:52

## 2022-06-25 RX ADMIN — POTASSIUM CHLORIDE 40 MEQ: 20 TABLET, EXTENDED RELEASE ORAL at 10:58

## 2022-06-25 RX ADMIN — PANTOPRAZOLE SODIUM 40 MG: 40 TABLET, DELAYED RELEASE ORAL at 08:17

## 2022-06-25 RX ADMIN — AMIODARONE HYDROCHLORIDE 400 MG: 200 TABLET ORAL at 20:53

## 2022-06-25 RX ADMIN — ROSUVASTATIN 20 MG: 20 TABLET, FILM COATED ORAL at 20:53

## 2022-06-25 RX ADMIN — METOPROLOL SUCCINATE 100 MG: 50 TABLET, EXTENDED RELEASE ORAL at 08:17

## 2022-06-25 ASSESSMENT — PAIN DESCRIPTION - LOCATION
LOCATION: RIB CAGE
LOCATION: RIB CAGE
LOCATION: CHEST
LOCATION: ABDOMEN;CHEST
LOCATION: ABDOMEN;STERNUM
LOCATION: ABDOMEN;CHEST

## 2022-06-25 ASSESSMENT — PAIN DESCRIPTION - ORIENTATION
ORIENTATION: MID
ORIENTATION: MID
ORIENTATION: LEFT
ORIENTATION: MID
ORIENTATION: MID
ORIENTATION: LEFT

## 2022-06-25 ASSESSMENT — PAIN SCALES - GENERAL
PAINLEVEL_OUTOF10: 8
PAINLEVEL_OUTOF10: 6
PAINLEVEL_OUTOF10: 3
PAINLEVEL_OUTOF10: 6
PAINLEVEL_OUTOF10: 8
PAINLEVEL_OUTOF10: 7
PAINLEVEL_OUTOF10: 2
PAINLEVEL_OUTOF10: 5
PAINLEVEL_OUTOF10: 8

## 2022-06-25 ASSESSMENT — PAIN - FUNCTIONAL ASSESSMENT
PAIN_FUNCTIONAL_ASSESSMENT: ACTIVITIES ARE NOT PREVENTED
PAIN_FUNCTIONAL_ASSESSMENT: PREVENTS OR INTERFERES SOME ACTIVE ACTIVITIES AND ADLS

## 2022-06-25 ASSESSMENT — PAIN DESCRIPTION - DESCRIPTORS
DESCRIPTORS: ACHING;DISCOMFORT;SORE
DESCRIPTORS: DULL;ACHING;DISCOMFORT
DESCRIPTORS: DULL;ACHING;DISCOMFORT
DESCRIPTORS: ACHING;DISCOMFORT;SORE
DESCRIPTORS: DULL;ACHING;DISCOMFORT
DESCRIPTORS: ACHING;DISCOMFORT;SORE
DESCRIPTORS: ACHING;DISCOMFORT;SORE

## 2022-06-25 NOTE — PROGRESS NOTES
POD#3 Awake, alert. No complaints. Some mild nausea this AM Denies CP, palpitations, SOB at rest, dizziness/lightheadedness. Vitals:    06/25/22 0411 06/25/22 0540 06/25/22 0800 06/25/22 0855   BP: 99/66  (!) 104/58    Pulse: 70  67    Resp: 18  18    Temp:   98.6 °F (37 °C)    TempSrc:   Temporal    SpO2:  94% 95% 97%   Weight:       Height:         O2: none     Intake/Output Summary (Last 24 hours) at 6/25/2022 1007  Last data filed at 6/25/2022 8114  Gross per 24 hour   Intake --   Output 1500 ml   Net -1500 ml         No BM yet     UO: 150mL/8hr   CT output: Pleural Right: 10mL/8hr (105mL/24hrs)  Pleural Left: 25mL/8hr (105mL/24hrs)      Recent Labs     06/23/22  0423 06/24/22  0520 06/25/22  0617   WBC 13.3* 18.0* 14.6*   HGB 10.2* 9.5* 9.2*   HCT 31.1* 28.1* 27.7*    150 169      Recent Labs     06/23/22  0423 06/24/22  0520 06/25/22  0617   BUN 12 13 27*   CREATININE 1.0 0.9 1.1*         Telemetry: SR      PE  Cardiac: RRR  Lungs: decreased bases  Chest incision with intact VAHE DSD. Sternum stable. Prior chest tube site incisions C/D/I, no erythema with intact sutures. Chest tubes x 2 and picardial pacing wires present and secure. Abd: Soft, nontender, +BS  Ext: Incisions C/D/I, approximated, no erythema, + edema           A/P: POD#3    1. CAD  --Stable s/p Sternotomy/CABG x 3 (LIMA-Diag sequenced onto LAD, SVG-OM)/EVH LLE/Rigid internal fixation of the sternum with Hector plates x 2 on 7/40/8256  --Post op NARGIS reveals 55% EF  --Scr stable - 1.1  --(+)DAPT/statin/BB -- brilinta restarted yesterday for hx STEMI with MAURICIO to RCA  --On imdur for sequenced arterial graft   --reinforce sternal precautions  --continue epicardial pacing wires  --chest tubes without significant output and without airleaks. Chest tube(s) removed without difficulty. Patient tolerated well       2. Expected acute blood loss anemia secondary to open heart surgery  --stable - hgb 9.8 today       3.  NSR  --continue BB with hold parameters -- increased to 100 mg toprol XL yesterday -- monitor BP   --continue oral amiodarone for afib prophylaxis--with plans to taper on dc  -- on 400mg  BID         4. Expected acute pulmonary insufficiency in the setting following surgery  --keep SpO2 greater than or equal to 92%  --continue duonebs with ezpap  --encourage C&DB, SMI  --currently on RA      5. HTN   --wean off nipride yesterday   --Controlled on current regimen  --on toprol xl 100 mg BID, norvasc 10mg, 30 mg Imdur      6. Bilateral renal artery stenosis s/p bilateral stenting   --Scr 1.1 today (Baseline ~0.9)   --give 50g 25% albumin x1  --monitor urine output  --avoid hypotension       7. Constipation--expected delayed return of bowel function  --secondary to anesthesia, narcotics, decreased oral intake, and decreased physical activity   --no BM since prior to surgery  --encouraged suppository today   --Continue daily MOM/oral bisacodyl until +BM and senna-s as scheduled. --Will give daily suppository until +BM starting POD 3 if no result by then   --Encouraged continued increase in oral intake and activity.          8. Expected deconditioning in the setting following surgery  --Increase activity as tolerated  --PT/OT  --currently ambulating 400ft           DVT prophylaxis:  --continue bilateral knee high DELMA hose  --continue PCDs  --continue progressive ambulation  --lovenox for dvt prophylaxis and continue knee high DELMA hose/pcds/progressive ambulation      Dispo: home over next 1-2 days         This patient's case and care plan was discussed with the attending surgeon

## 2022-06-25 NOTE — PATIENT CARE CONFERENCE
[facility] Quality Flow/Interdisciplinary Rounds Progress Note        Quality Flow Rounds held on June 25, 2022    Disciplines Attending:  Bedside Nurse, ,  and Nursing Unit Leadership    Rony Morris was admitted on 6/22/2022  5:19 AM    Anticipated Discharge Date:       Disposition:    Rodrigo Score:  Rodrigo Scale Score: 20    Readmission Risk              Risk of Unplanned Readmission:  17           Discussed patient goal for the day, patient clinical progression, and barriers to discharge.   The following Goal(s) of the Day/Commitment(s) have been identified:  Diagnostics - Report Results      Leann Hook RN  June 25, 2022

## 2022-06-26 ENCOUNTER — APPOINTMENT (OUTPATIENT)
Dept: GENERAL RADIOLOGY | Age: 52
DRG: 166 | End: 2022-06-26
Attending: THORACIC SURGERY (CARDIOTHORACIC VASCULAR SURGERY)
Payer: COMMERCIAL

## 2022-06-26 DIAGNOSIS — F32.2 CURRENT SEVERE EPISODE OF MAJOR DEPRESSIVE DISORDER WITHOUT PSYCHOTIC FEATURES WITHOUT PRIOR EPISODE (HCC): ICD-10-CM

## 2022-06-26 LAB
ANION GAP SERPL CALCULATED.3IONS-SCNC: 10 MMOL/L (ref 7–16)
BUN BLDV-MCNC: 37 MG/DL (ref 6–20)
CALCIUM SERPL-MCNC: 9.2 MG/DL (ref 8.6–10.2)
CHLORIDE BLD-SCNC: 109 MMOL/L (ref 98–107)
CO2: 18 MMOL/L (ref 22–29)
CREAT SERPL-MCNC: 1.1 MG/DL (ref 0.5–1)
GFR AFRICAN AMERICAN: >60
GFR NON-AFRICAN AMERICAN: 53 ML/MIN/1.73
GLUCOSE BLD-MCNC: 86 MG/DL (ref 74–99)
HCT VFR BLD CALC: 23.7 % (ref 34–48)
HCT VFR BLD CALC: 24.7 % (ref 34–48)
HCT VFR BLD CALC: 25.6 % (ref 34–48)
HEMOGLOBIN: 7.8 G/DL (ref 11.5–15.5)
HEMOGLOBIN: 8.2 G/DL (ref 11.5–15.5)
HEMOGLOBIN: 8.5 G/DL (ref 11.5–15.5)
MAGNESIUM: 2.6 MG/DL (ref 1.6–2.6)
MCH RBC QN AUTO: 31.3 PG (ref 26–35)
MCHC RBC AUTO-ENTMCNC: 33.2 % (ref 32–34.5)
MCV RBC AUTO: 94.3 FL (ref 80–99.9)
METER GLUCOSE: 117 MG/DL (ref 74–99)
METER GLUCOSE: 94 MG/DL (ref 74–99)
PDW BLD-RTO: 14.5 FL (ref 11.5–15)
PLATELET # BLD: 160 E9/L (ref 130–450)
PMV BLD AUTO: 11 FL (ref 7–12)
POTASSIUM SERPL-SCNC: 4.3 MMOL/L (ref 3.5–5)
RBC # BLD: 2.62 E12/L (ref 3.5–5.5)
SODIUM BLD-SCNC: 137 MMOL/L (ref 132–146)
WBC # BLD: 8.8 E9/L (ref 4.5–11.5)

## 2022-06-26 PROCEDURE — 6370000000 HC RX 637 (ALT 250 FOR IP)

## 2022-06-26 PROCEDURE — 6360000002 HC RX W HCPCS: Performed by: NURSE PRACTITIONER

## 2022-06-26 PROCEDURE — 93798 PHYS/QHP OP CAR RHAB W/ECG: CPT

## 2022-06-26 PROCEDURE — 85027 COMPLETE CBC AUTOMATED: CPT

## 2022-06-26 PROCEDURE — 71045 X-RAY EXAM CHEST 1 VIEW: CPT

## 2022-06-26 PROCEDURE — 2580000003 HC RX 258: Performed by: NURSE PRACTITIONER

## 2022-06-26 PROCEDURE — 85014 HEMATOCRIT: CPT

## 2022-06-26 PROCEDURE — 6370000000 HC RX 637 (ALT 250 FOR IP): Performed by: NURSE PRACTITIONER

## 2022-06-26 PROCEDURE — 36415 COLL VENOUS BLD VENIPUNCTURE: CPT

## 2022-06-26 PROCEDURE — 6360000002 HC RX W HCPCS

## 2022-06-26 PROCEDURE — 2140000000 HC CCU INTERMEDIATE R&B

## 2022-06-26 PROCEDURE — 94640 AIRWAY INHALATION TREATMENT: CPT

## 2022-06-26 PROCEDURE — 83735 ASSAY OF MAGNESIUM: CPT

## 2022-06-26 PROCEDURE — 85018 HEMOGLOBIN: CPT

## 2022-06-26 PROCEDURE — 80048 BASIC METABOLIC PNL TOTAL CA: CPT

## 2022-06-26 PROCEDURE — 82962 GLUCOSE BLOOD TEST: CPT

## 2022-06-26 RX ADMIN — OXYCODONE HYDROCHLORIDE AND ACETAMINOPHEN 500 MG: 500 TABLET ORAL at 08:25

## 2022-06-26 RX ADMIN — LEVALBUTEROL 0.31 MG: 0.31 SOLUTION RESPIRATORY (INHALATION) at 01:37

## 2022-06-26 RX ADMIN — FERROUS SULFATE TAB 325 MG (65 MG ELEMENTAL FE) 325 MG: 325 (65 FE) TAB at 16:55

## 2022-06-26 RX ADMIN — OXYCODONE 10 MG: 5 TABLET ORAL at 16:55

## 2022-06-26 RX ADMIN — ONDANSETRON 4 MG: 2 INJECTION INTRAMUSCULAR; INTRAVENOUS at 17:56

## 2022-06-26 RX ADMIN — AMIODARONE HYDROCHLORIDE 400 MG: 200 TABLET ORAL at 08:24

## 2022-06-26 RX ADMIN — METOPROLOL SUCCINATE 50 MG: 50 TABLET, EXTENDED RELEASE ORAL at 08:25

## 2022-06-26 RX ADMIN — ISOSORBIDE MONONITRATE 30 MG: 30 TABLET, EXTENDED RELEASE ORAL at 08:24

## 2022-06-26 RX ADMIN — FOLIC ACID 1 MG: 1 TABLET ORAL at 08:24

## 2022-06-26 RX ADMIN — SODIUM CHLORIDE, PRESERVATIVE FREE 10 ML: 5 INJECTION INTRAVENOUS at 08:25

## 2022-06-26 RX ADMIN — OXYCODONE HYDROCHLORIDE AND ACETAMINOPHEN 500 MG: 500 TABLET ORAL at 20:58

## 2022-06-26 RX ADMIN — TICAGRELOR 90 MG: 90 TABLET ORAL at 20:58

## 2022-06-26 RX ADMIN — ESCITALOPRAM 5 MG: 5 TABLET, FILM COATED ORAL at 08:25

## 2022-06-26 RX ADMIN — PANTOPRAZOLE SODIUM 40 MG: 40 TABLET, DELAYED RELEASE ORAL at 08:24

## 2022-06-26 RX ADMIN — LEVALBUTEROL 0.31 MG: 0.31 SOLUTION RESPIRATORY (INHALATION) at 09:14

## 2022-06-26 RX ADMIN — FERROUS SULFATE TAB 325 MG (65 MG ELEMENTAL FE) 325 MG: 325 (65 FE) TAB at 10:02

## 2022-06-26 RX ADMIN — ACETAMINOPHEN 1000 MG: 500 TABLET ORAL at 06:24

## 2022-06-26 RX ADMIN — OXYCODONE 5 MG: 5 TABLET ORAL at 12:39

## 2022-06-26 RX ADMIN — AMLODIPINE BESYLATE 10 MG: 10 TABLET ORAL at 10:02

## 2022-06-26 RX ADMIN — ENOXAPARIN SODIUM 40 MG: 100 INJECTION SUBCUTANEOUS at 08:25

## 2022-06-26 RX ADMIN — ACETAMINOPHEN 1000 MG: 500 TABLET ORAL at 22:09

## 2022-06-26 RX ADMIN — LEVALBUTEROL 0.31 MG: 0.31 SOLUTION RESPIRATORY (INHALATION) at 12:31

## 2022-06-26 RX ADMIN — MUPIROCIN: 20 OINTMENT TOPICAL at 08:25

## 2022-06-26 RX ADMIN — OXYCODONE 5 MG: 5 TABLET ORAL at 02:27

## 2022-06-26 RX ADMIN — SODIUM CHLORIDE, PRESERVATIVE FREE 10 ML: 5 INJECTION INTRAVENOUS at 20:59

## 2022-06-26 RX ADMIN — SENNOSIDES AND DOCUSATE SODIUM 1 TABLET: 50; 8.6 TABLET ORAL at 08:25

## 2022-06-26 RX ADMIN — ROSUVASTATIN 20 MG: 20 TABLET, FILM COATED ORAL at 20:58

## 2022-06-26 RX ADMIN — BISACODYL 5 MG: 5 TABLET, COATED ORAL at 08:24

## 2022-06-26 RX ADMIN — Medication 400 MG: at 08:24

## 2022-06-26 RX ADMIN — ASPIRIN 81 MG: 81 TABLET, COATED ORAL at 08:25

## 2022-06-26 RX ADMIN — AMIODARONE HYDROCHLORIDE 400 MG: 200 TABLET ORAL at 20:58

## 2022-06-26 RX ADMIN — METOPROLOL SUCCINATE 50 MG: 50 TABLET, EXTENDED RELEASE ORAL at 20:59

## 2022-06-26 RX ADMIN — SENNOSIDES AND DOCUSATE SODIUM 1 TABLET: 50; 8.6 TABLET ORAL at 21:07

## 2022-06-26 RX ADMIN — LEVALBUTEROL 0.31 MG: 0.31 SOLUTION RESPIRATORY (INHALATION) at 21:14

## 2022-06-26 RX ADMIN — MUPIROCIN: 20 OINTMENT TOPICAL at 20:58

## 2022-06-26 RX ADMIN — OXYCODONE 5 MG: 5 TABLET ORAL at 08:24

## 2022-06-26 RX ADMIN — TICAGRELOR 90 MG: 90 TABLET ORAL at 08:24

## 2022-06-26 RX ADMIN — OXYCODONE 10 MG: 5 TABLET ORAL at 22:13

## 2022-06-26 ASSESSMENT — PAIN SCALES - GENERAL
PAINLEVEL_OUTOF10: 6
PAINLEVEL_OUTOF10: 8
PAINLEVEL_OUTOF10: 6
PAINLEVEL_OUTOF10: 8
PAINLEVEL_OUTOF10: 6
PAINLEVEL_OUTOF10: 8
PAINLEVEL_OUTOF10: 3
PAINLEVEL_OUTOF10: 8
PAINLEVEL_OUTOF10: 6

## 2022-06-26 ASSESSMENT — PAIN - FUNCTIONAL ASSESSMENT
PAIN_FUNCTIONAL_ASSESSMENT: ACTIVITIES ARE NOT PREVENTED

## 2022-06-26 ASSESSMENT — PAIN DESCRIPTION - LOCATION
LOCATION: CHEST
LOCATION: STERNUM
LOCATION: CHEST
LOCATION: STERNUM
LOCATION: CHEST
LOCATION: ABDOMEN;STERNUM

## 2022-06-26 ASSESSMENT — PAIN DESCRIPTION - ORIENTATION
ORIENTATION: MID
ORIENTATION: MID

## 2022-06-26 ASSESSMENT — PAIN DESCRIPTION - DESCRIPTORS
DESCRIPTORS: ACHING;SORE
DESCRIPTORS: DULL;ACHING;SORE
DESCRIPTORS: DULL;ACHING;DISCOMFORT
DESCRIPTORS: ACHING;TENDER
DESCRIPTORS: DULL;ACHING;DISCOMFORT
DESCRIPTORS: ACHING;DULL;SORE

## 2022-06-26 NOTE — PATIENT CARE CONFERENCE
P Quality Flow/Interdisciplinary Rounds Progress Note        Quality Flow Rounds held on June 26, 2022    Disciplines Attending:  Bedside Nurse, ,  and Nursing Unit Leadership    Omer Mast was admitted on 6/22/2022  5:19 AM    Anticipated Discharge Date:       Disposition:    Rodrigo Score:  Rodrigo Scale Score: 19    Readmission Risk              Risk of Unplanned Readmission:  18           Discussed patient goal for the day, patient clinical progression, and barriers to discharge.   The following Goal(s) of the Day/Commitment(s) have been identified:  Diagnostics - Report Results      Marisol Rousseau RN  June 26, 2022

## 2022-06-26 NOTE — PLAN OF CARE
Problem: Pain  Goal: Verbalizes/displays adequate comfort level or baseline comfort level  Outcome: Progressing     Problem: Skin/Tissue Integrity - Adult  Goal: Skin integrity remains intact  Outcome: Progressing     Problem: Safety - Adult  Goal: Free from fall injury  Outcome: Progressing     Problem: Metabolic/Fluid and Electrolytes - Adult  Goal: Glucose maintained within prescribed range  Outcome: Adequate for Discharge

## 2022-06-26 NOTE — CONSULTS
Met with patient and discussed that their physician has ordered a referral to our outpatient Phase II Cardiac Rehabilitation program. Reviewed the benefits of cardiac rehabilitation based on their diagnosis and personal risk factors. Patient demonstrates moderate interest in Cardiac Rehabilitation at this time. Cardiac Rehabilitation brochure provided to patient/family. The Cardiac Rehabilitation Program has been provided the patient's referral information and pertinent patient details and history. The patient may call [facility] at [phone] for additional information or questions. Contact information for [facility] and other choices close to the patient's residence have been provided in the discharge instructions so that the patient may call and schedule an appointment when cleared by their physician.  Thank you for the referral.

## 2022-06-26 NOTE — PROGRESS NOTES
POD#4 Awake, alert. No complaints other than fatigue this AM. Denies CP, palpitations, SOB at rest, dizziness/lightheadedness. Vitals:    06/26/22 0223 06/26/22 0227 06/26/22 0636 06/26/22 0733   BP: (!) 95/54      Pulse: 63      Resp: 17 18     Temp: 96.8 °F (36 °C)      TempSrc: Temporal      SpO2:    98%   Weight:   192 lb 9.6 oz (87.4 kg)    Height:         O2: none     Intake/Output Summary (Last 24 hours) at 6/26/2022 0804  Last data filed at 6/26/2022 0640  Gross per 24 hour   Intake 60 ml   Output 900 ml   Net -840 ml         (+) BM 6/25    UO: 400mL/8hr         Recent Labs     06/24/22  0520 06/25/22  0617 06/26/22  0719   WBC 18.0* 14.6* 8.8   HGB 9.5* 9.2* 8.2*   HCT 28.1* 27.7* 24.7*    169 160      Recent Labs     06/25/22  0617 06/25/22  1830 06/26/22  0624   BUN 27* 38* 37*   CREATININE 1.1* 1.1* 1.1*         Telemetry: SR      PE  Cardiac: RRR  Lungs: decreased bases  Chest incision with intact VAHE DSD. Sternum stable. Prior chest tube site incisions C/D/I, no erythema with intact sutures. Epicardial pacing wires present and secure. Abd: Soft, nontender, +BS  Ext: Incisions C/D/I, approximated, no erythema, + minimal edema           A/P: POD#4    1. CAD  --Stable s/p Sternotomy/CABG x 3 (LIMA-Diag sequenced onto LAD, SVG-OM)/EVH LLE/Rigid internal fixation of the sternum with Clive plates x 2 on 5/54/2475  --Post op NARGIS reveals 55% EF  --Scr stable - 1.1  --(+)DAPT/statin/BB -- brilinta restarted for hx STEMI with MAURICIO to RCA  --On imdur for sequenced arterial graft   --reinforce sternal precautions  --continue epicardial pacing wires  --All CTs out       2. Expected acute blood loss anemia secondary to open heart surgery  --stable - hgb 8.2 today, recheck this afternoon 1400      3.  NSR  --continue BB with hold parameters -- decrease toprol xl to 50mg BID from 100mg BID -- mild hypotension through yesterday evening   --continue oral amiodarone for afib prophylaxis--with plans to taper on dc  -- on 400mg  BID         4. Expected acute pulmonary insufficiency in the setting following surgery  --keep SpO2 greater than or equal to 92%  --continue duonebs with ezpap  --encourage C&DB, SMI  --currently on RA      5. HTN   --weaned off nipride 6/24   --Mild hypotension through the evening   --decrease toprol to 50mg BID   -- continue norvasc 10mg, 30 mg Imdur      6. Bilateral renal artery stenosis s/p bilateral stenting   --Scr 1.1 today (Baseline ~0.9)   --monitor urine output  --avoid hypotension       7. Constipation--expected delayed return of bowel function  --secondary to anesthesia, narcotics, decreased oral intake, and decreased physical activity   --resolved   --Continue daily MOM/oral bisacodyl until +BM and senna-s as scheduled. --Will give daily suppository until +BM starting POD 3 if no result by then   --Encouraged continued increase in oral intake and activity.          8. Expected deconditioning in the setting following surgery  --Increase activity as tolerated  --PT/OT  --currently ambulating 400ft           DVT prophylaxis:  --continue bilateral knee high DELMA hose  --continue PCDs  --continue progressive ambulation  --lovenox for dvt prophylaxis and continue knee high DELMA hose/pcds/progressive ambulation      Dispo: home likely tomorrow      This patient's case and care plan was discussed with the attending surgeon

## 2022-06-26 NOTE — DISCHARGE INSTR - DIET
A heart-healthy diet is recommended to reduce your unhealthy blood cholesterol levels, manage high blood pressure, and lower your risk for heart disease. Tips  To follow a heart-healthy diet,    Eat a balanced diet with whole grains, fruits and vegetables, and lean protein sources. Achieve and maintain a healthy weight. Choose heart-healthy unsaturated fats. Limit saturated fats, trans fats, and cholesterol intake. Eat more plant-based or vegetarian meals using beans and soy foods for protein. Eat whole, unprocessed foods to limit the amount of sodium (salt) you eat. Limit refined carbohydrates especially sugar, sweets and sugar-sweetened beverages. If you drink alcohol, do so in moderation: one serving per day (women) and two servings per day (men). One serving is equivalent to 12 ounces beer, 5 ounces wine, or 1.5 ounces distilled spirits  Tips for Choosing Heart-Healthy Fats  Choose lean protein and low-fat dairy foods to reduce saturated fat intake. Saturated fat is usually found in animal-based protein and is associated with certain health risks. Saturated fat is the biggest contributor to raised low-density lipoprotein (LDL) cholesterol levels in the diet. Research shows that limiting saturated fat lowers unhealthy cholesterol levels. Eat no more than 7% of your total calories each day from saturated fat. Ask your RDN to help you determine how much saturated fat is right for you. There are many foods that do not contain large amounts of saturated fats. Swapping these foods to replace foods high in saturated fats will help you limit the saturated fat you eat and improve your cholesterol levels. You can also try eating more plant-based or vegetarian meals.   Instead of    Try:    Whole milk, cheese, yogurt, and ice cream    1%, ½%, or skim milk, low-fat cheese, non-fat yogurt, and low-fat ice cream    Fatty, marbled beef and pork    Lean beef, pork, or venison    Poultry with skin    Poultry without skin    Butter, stick margarine    Reduced-fat, whipped, or liquid spreads    Coconut oil, palm oil    Liquid vegetable oils: corn, canola, olive, soybean and safflower oils    Avoid trans fats. Trans fats increase levels of LDL-cholesterol. Hydrogenated fat in processed foods is the main source of trans fats in foods. Trans fats can be found in stick margarine, shortening, processed sweets, baked goods, some fried foods, and packaged foods made with hydrogenated oils. Avoid foods with partially hydrogenated oil on the ingredient list such as: cookies, pastries, baked goods, biscuits, crackers, microwave popcorn, and frozen dinners. Choose foods with heart healthy fats. Polyunsaturated and monounsaturated fat are unsaturated fats that may help lower your blood cholesterol level when used in place of saturated fat in your diet. Ask your RDN about taking a dietary supplement with plant sterols and stanols to help lower your cholesterol level. Research shows that substituting saturated fats with unsaturated fats is beneficial to cholesterol levels. Try these easy swaps: Instead of    Try:    Butter, stick margarine, or solid shortening    Reduced-fat, whipped, or liquid spreads    Beef, pork, or poultry with skin         Fish and seafood    Chips, crackers, snack foods    Raw or unsalted nuts and seeds or nut butters    Hummus with vegetables    Avocado on toast    Coconut oil, palm oil    Liquid vegetable oils: corn, canola, olive, soybean and safflower oils         Limit the amount of cholesterol you eat to less than 200 milligrams per day. Cholesterol is a substance carried through the bloodstream via lipoproteins, which are known as transporters of fat. Some body functions need cholesterol to work properly, but too much cholesterol in the bloodstream can damage arteries and build up blood vessel linings (which can lead to heart attack and stroke).  You should eat less than 200 milligrams cholesterol per day. People respond differently to eating cholesterol. There is no test available right now that can figure out which people will respond more to dietary cholesterol and which will respond less. For individuals with high intake of dietary cholesterol, different types of increase (none, small, moderate, large) in LDL-cholesterol levels are all possible. Food sources of cholesterol include egg yolks and organ meats such as liver, gizzards. Limit egg yolks to two to four per week and avoid organ meats like liver and gizzards to control cholesterol intake. Tips for Choosing Heart-Healthy Carbohydrates  Consume foods rich in viscous (soluble) fiber    Viscous, or soluble, fiber is found in the walls of plant cells. Viscous fiber is found only in plant-based foods--animal-based foods like meat or dairy products do not contain fiber. In the stomach, viscous fibers absorb water and swell to form a thick, jelly-like mass. This helps to lower your unhealthy cholesterol  Rich sources of viscous fiber include asparagus, [place] sprouts, sweet potatoes, turnips, apricots, mangoes, oranges, legumes, barley, oats, and oat bran. Eat at least 5 to 10 grams of viscous fiber each day. As you increase your fiber intake gradually, also increase the amount of water you drink. This will help prevent constipation. If you have difficulty achieving this goal, ask your RDN about fiber laxatives. Choose fiber supplements made with viscous fibers such as psyllium seed husks or methylcellulose to help lower unhealthy cholesterol. Limit refined carbohydrates    There are three types of carbohydrates: starches, sugar, and fiber. Some carbohydrates occur naturally in food, like the starches in rice or corn or the sugars in fruits and milk. Refined carbohydrates--foods with high amounts of simple sugars--can raise triglyceride levels. High triglyceride levels are associated with coronary heart disease.   Some examples of refined carbohydrate foods are table sugar, sweets, and beverages sweetened with added sugar. Additional Lifestyle Tips  Achieve and maintain a healthy weight. Talk with your RDN or your doctor about what is a healthy weight for you. Set goals to reach and maintain that weight. To lose weight, reduce your calorie intake along with increasing your physical activity. A weight loss of 10 to 15 pounds could reduce LDL-cholesterol by 5 milligrams per deciliter. Participate in physical activity. Talk with your health care team to find out what types of physical activity are best for you. Set a plan to get about 30 minutes of exercise on most days.   Foods Recommended  Food Group    Foods Recommended    Grains    Whole grain breads and cereals, including whole wheat, barley, rye, buckwheat, corn, teff, quinoa, millet, amaranth, brown or wild rice, sorghum, and oats  Pasta, especially whole wheat or other whole grain types  [place] [employer] [place], quinoa or wild rice  Whole grain crackers, bread, rolls, pitas  Home-made bread with reduced-sodium baking soda    Protein Foods    Lean cuts of beef and pork (loin, leg, round, extra lean hamburger)  Skinless poultry  Fish  Venison and other wild game  Dried beans and peas  Nuts and nut butters (unsalted)  Meat alternatives made with soy or textured vegetable protein  Egg whites or egg substitute  Cold cuts made with lean meat or soy protein    Dairy    Nonfat (skim), low-fat, or 1%-fat milk  Nonfat or low-fat yogurt or cottage cheese  Fat-free and low-fat cheese    Vegetables    Fresh, frozen, or canned vegetables without added fat or salt    Fruits    Fresh, frozen, canned, or dried fruit    Oils    Unsaturated oils (corn, olive, peanut, soy, sunflower, canola)  Soft or liquid margarines and vegetable oil spreads  Salad dressings made from unsaturated fats  Seeds and nuts  Avocado    Foods Not Recommended  Food Group    Foods Not Recommended    Grains    Breads or crackers topped with salt  Cereals (hot or cold) with more than 300 mg sodium per serving  Biscuits, cornbread, and other quick breads prepared with baking soda  Bread crumbs or stuffing mix from a store  High-fat bakery products, such as doughnuts, biscuits, croissants, Citizen of [place] pastries, pies, cookies  Instant cooking foods to which you add hot water and stir--potatoes, noodles, rice, etc.  Packaged starchy foods--seasoned noodle or rice dishes, stuffing mix, macaroni and cheese dinner  Snacks made with partially hydrogenated oils, including chips, cheese puffs, snack mixes, regular crackers, butter-flavored popcorn    Protein Foods    Higher-fat cuts of meats (ribs, t-bone steak, regular hamburger)  [place], sausage, or hot dogs  Cold cuts, such as salami or bologna, deli meats, cured meats, corned beef  Organ meats (liver, brains, gizzards, sweetbreads)  Poultry with skin  Fried or smoked meat, poultry, and fish  Whole eggs and egg yolks (more than 2-4 per week)  Salted legumes, nuts, seeds, or nut/seed butters  Meat alternatives with high levels of sodium (>300 mg per serving) or saturated fat (>5 g per serving)    Dairy    Whole milk,?2% fat milk, buttermilk  Whole milk yogurt or ice cream  Cream  Half-&-half  Cream cheese  Sour cream  Cheese    Vegetables    Canned or frozen vegetables with salt, fresh vegetables prepared with salt, butter, cheese, or cream sauce  Fried vegetables  Pickled vegetables such as olives, pickles, or sauerkraut    Fruits    Fried fruits    Fruits served with butter or cream    Oils    Butter, stick margarine, shortening  Partially hydrogenated oils or trans fats  Tropical oils (coconut, palm, palm kernel oils)    Other    Candy, sugar sweetened soft drinks and desserts  Salt, sea salt, garlic salt, and seasoning mixes containing salt  Bouillon cubes  Ketchup, barbecue sauce, Worcestershire sauce, soy sauce, teriyaki sauce  Miso  Salsa  Pickles, olives, relish    Heart-Healthy Eating Sample 1-Day Menu   Breakfast  1 cup oatmeal  1 cup fat-free milk  1 cup blueberries  1 cup brewed coffee  1 ounce almonds  Lunch  2 slices whole-wheat bread  2 oz lean deli turkey breast  1 oz low-fat Swiss cheese  2 slices tomato  2 lettuce leaves  1 pear  1 cup skim milk  Afternoon Snack  1 oz trail mix (with nuts, seeds, raisins)  Evening Meal  3 oz broiled salmon  2/3 cup brown rice  1 tsp margarine  1/2 cup cooked broccoli  1/2 cup cooked carrots  1 cup tossed salad  1 teaspoon olive oil and vinegar dressing  1 small whole-wheat roll  1 tsp margarine  1 cup tea  Evening Snack  1 URSZULA Rodriguez RDN.  Contact: [phone]

## 2022-06-27 VITALS
HEIGHT: 64 IN | SYSTOLIC BLOOD PRESSURE: 124 MMHG | RESPIRATION RATE: 18 BRPM | TEMPERATURE: 98.2 F | WEIGHT: 191.2 LBS | BODY MASS INDEX: 32.64 KG/M2 | DIASTOLIC BLOOD PRESSURE: 58 MMHG | OXYGEN SATURATION: 99 % | HEART RATE: 80 BPM

## 2022-06-27 LAB
ANION GAP SERPL CALCULATED.3IONS-SCNC: 12 MMOL/L (ref 7–16)
BUN BLDV-MCNC: 33 MG/DL (ref 6–20)
CALCIUM SERPL-MCNC: 9.3 MG/DL (ref 8.6–10.2)
CHLORIDE BLD-SCNC: 106 MMOL/L (ref 98–107)
CO2: 21 MMOL/L (ref 22–29)
CREAT SERPL-MCNC: 1.1 MG/DL (ref 0.5–1)
GFR AFRICAN AMERICAN: >60
GFR NON-AFRICAN AMERICAN: 53 ML/MIN/1.73
GLUCOSE BLD-MCNC: 87 MG/DL (ref 74–99)
HCT VFR BLD CALC: 29.6 % (ref 34–48)
HEMOGLOBIN: 9.6 G/DL (ref 11.5–15.5)
MAGNESIUM: 2 MG/DL (ref 1.6–2.6)
MCH RBC QN AUTO: 31.2 PG (ref 26–35)
MCHC RBC AUTO-ENTMCNC: 32.4 % (ref 32–34.5)
MCV RBC AUTO: 96.1 FL (ref 80–99.9)
PDW BLD-RTO: 13.9 FL (ref 11.5–15)
PLATELET # BLD: 201 E9/L (ref 130–450)
PMV BLD AUTO: 10.8 FL (ref 7–12)
POTASSIUM SERPL-SCNC: 3.6 MMOL/L (ref 3.5–5)
RBC # BLD: 3.08 E12/L (ref 3.5–5.5)
SODIUM BLD-SCNC: 139 MMOL/L (ref 132–146)
WBC # BLD: 10.2 E9/L (ref 4.5–11.5)

## 2022-06-27 PROCEDURE — 83735 ASSAY OF MAGNESIUM: CPT

## 2022-06-27 PROCEDURE — 93798 PHYS/QHP OP CAR RHAB W/ECG: CPT

## 2022-06-27 PROCEDURE — 94640 AIRWAY INHALATION TREATMENT: CPT

## 2022-06-27 PROCEDURE — 6370000000 HC RX 637 (ALT 250 FOR IP): Performed by: NURSE PRACTITIONER

## 2022-06-27 PROCEDURE — 85027 COMPLETE CBC AUTOMATED: CPT

## 2022-06-27 PROCEDURE — 6360000002 HC RX W HCPCS: Performed by: NURSE PRACTITIONER

## 2022-06-27 PROCEDURE — 6370000000 HC RX 637 (ALT 250 FOR IP): Performed by: PHYSICIAN ASSISTANT

## 2022-06-27 PROCEDURE — 80048 BASIC METABOLIC PNL TOTAL CA: CPT

## 2022-06-27 PROCEDURE — 6360000002 HC RX W HCPCS

## 2022-06-27 PROCEDURE — 6370000000 HC RX 637 (ALT 250 FOR IP)

## 2022-06-27 PROCEDURE — 36415 COLL VENOUS BLD VENIPUNCTURE: CPT

## 2022-06-27 PROCEDURE — 2580000003 HC RX 258: Performed by: NURSE PRACTITIONER

## 2022-06-27 RX ORDER — ASCORBIC ACID 500 MG
500 TABLET ORAL 2 TIMES DAILY
Qty: 60 TABLET | Refills: 0 | Status: ON HOLD | OUTPATIENT
Start: 2022-06-27 | End: 2022-08-07 | Stop reason: HOSPADM

## 2022-06-27 RX ORDER — AMLODIPINE BESYLATE 5 MG/1
5 TABLET ORAL DAILY
Qty: 30 TABLET | Refills: 3 | Status: ON HOLD | OUTPATIENT
Start: 2022-06-27 | End: 2022-10-23 | Stop reason: HOSPADM

## 2022-06-27 RX ORDER — FERROUS SULFATE 325(65) MG
325 TABLET ORAL 2 TIMES DAILY WITH MEALS
Qty: 60 TABLET | Refills: 0 | Status: SHIPPED | OUTPATIENT
Start: 2022-06-27 | End: 2022-07-27

## 2022-06-27 RX ORDER — AMLODIPINE BESYLATE 5 MG/1
5 TABLET ORAL DAILY
Status: DISCONTINUED | OUTPATIENT
Start: 2022-06-27 | End: 2022-06-27 | Stop reason: HOSPADM

## 2022-06-27 RX ORDER — DIPHENHYDRAMINE HCL 25 MG
25 TABLET ORAL EVERY 8 HOURS PRN
Status: DISCONTINUED | OUTPATIENT
Start: 2022-06-27 | End: 2022-06-27 | Stop reason: HOSPADM

## 2022-06-27 RX ORDER — FOLIC ACID 1 MG/1
1 TABLET ORAL DAILY
Qty: 30 TABLET | Refills: 0 | Status: SHIPPED | OUTPATIENT
Start: 2022-06-27 | End: 2022-07-27

## 2022-06-27 RX ORDER — AMIODARONE HYDROCHLORIDE 200 MG/1
200 TABLET ORAL SEE ADMIN INSTRUCTIONS
Qty: 28 TABLET | Refills: 0 | Status: ON HOLD | OUTPATIENT
Start: 2022-06-27 | End: 2022-08-06

## 2022-06-27 RX ORDER — ESCITALOPRAM OXALATE 5 MG/1
5 TABLET ORAL DAILY
Qty: 30 TABLET | Refills: 1 | OUTPATIENT
Start: 2022-06-27

## 2022-06-27 RX ORDER — PANTOPRAZOLE SODIUM 40 MG/1
40 TABLET, DELAYED RELEASE ORAL DAILY
Qty: 14 TABLET | Refills: 0 | Status: ON HOLD | OUTPATIENT
Start: 2022-06-27 | End: 2022-10-22

## 2022-06-27 RX ORDER — METOPROLOL SUCCINATE 50 MG/1
50 TABLET, EXTENDED RELEASE ORAL 2 TIMES DAILY
Qty: 30 TABLET | Refills: 3 | Status: SHIPPED | OUTPATIENT
Start: 2022-06-27

## 2022-06-27 RX ORDER — HYDROCODONE BITARTRATE AND ACETAMINOPHEN 5; 325 MG/1; MG/1
1 TABLET ORAL EVERY 6 HOURS PRN
Qty: 28 TABLET | Refills: 0 | Status: SHIPPED | OUTPATIENT
Start: 2022-06-27 | End: 2022-07-04

## 2022-06-27 RX ORDER — ISOSORBIDE MONONITRATE 30 MG/1
30 TABLET, EXTENDED RELEASE ORAL DAILY
Qty: 360 TABLET | Refills: 0 | Status: SHIPPED | OUTPATIENT
Start: 2022-06-27 | End: 2023-06-27

## 2022-06-27 RX ADMIN — PANTOPRAZOLE SODIUM 40 MG: 40 TABLET, DELAYED RELEASE ORAL at 08:57

## 2022-06-27 RX ADMIN — Medication 400 MG: at 08:56

## 2022-06-27 RX ADMIN — LEVALBUTEROL 0.31 MG: 0.31 SOLUTION RESPIRATORY (INHALATION) at 08:40

## 2022-06-27 RX ADMIN — ENOXAPARIN SODIUM 40 MG: 100 INJECTION SUBCUTANEOUS at 08:58

## 2022-06-27 RX ADMIN — ISOSORBIDE MONONITRATE 30 MG: 30 TABLET, EXTENDED RELEASE ORAL at 08:57

## 2022-06-27 RX ADMIN — ACETAMINOPHEN 1000 MG: 500 TABLET ORAL at 05:50

## 2022-06-27 RX ADMIN — FOLIC ACID 1 MG: 1 TABLET ORAL at 08:56

## 2022-06-27 RX ADMIN — POTASSIUM CHLORIDE 20 MEQ: 20 TABLET, EXTENDED RELEASE ORAL at 09:06

## 2022-06-27 RX ADMIN — ASPIRIN 81 MG: 81 TABLET, COATED ORAL at 08:57

## 2022-06-27 RX ADMIN — POTASSIUM CHLORIDE 20 MEQ: 20 TABLET, EXTENDED RELEASE ORAL at 09:05

## 2022-06-27 RX ADMIN — DIPHENHYDRAMINE HYDROCHLORIDE 25 MG: 25 TABLET ORAL at 08:57

## 2022-06-27 RX ADMIN — OXYCODONE 10 MG: 5 TABLET ORAL at 11:27

## 2022-06-27 RX ADMIN — ESCITALOPRAM 5 MG: 5 TABLET, FILM COATED ORAL at 08:56

## 2022-06-27 RX ADMIN — AMIODARONE HYDROCHLORIDE 400 MG: 200 TABLET ORAL at 08:56

## 2022-06-27 RX ADMIN — SODIUM CHLORIDE, PRESERVATIVE FREE 10 ML: 5 INJECTION INTRAVENOUS at 09:03

## 2022-06-27 RX ADMIN — METOPROLOL SUCCINATE 50 MG: 50 TABLET, EXTENDED RELEASE ORAL at 08:57

## 2022-06-27 RX ADMIN — FERROUS SULFATE TAB 325 MG (65 MG ELEMENTAL FE) 325 MG: 325 (65 FE) TAB at 08:57

## 2022-06-27 RX ADMIN — POTASSIUM CHLORIDE 20 MEQ: 20 TABLET, EXTENDED RELEASE ORAL at 09:07

## 2022-06-27 RX ADMIN — OXYCODONE HYDROCHLORIDE AND ACETAMINOPHEN 500 MG: 500 TABLET ORAL at 08:57

## 2022-06-27 RX ADMIN — DIPHENHYDRAMINE HYDROCHLORIDE 25 MG: 25 TABLET ORAL at 00:08

## 2022-06-27 RX ADMIN — AMLODIPINE BESYLATE 5 MG: 5 TABLET ORAL at 08:57

## 2022-06-27 RX ADMIN — TICAGRELOR 90 MG: 90 TABLET ORAL at 08:56

## 2022-06-27 RX ADMIN — OXYCODONE 10 MG: 5 TABLET ORAL at 05:50

## 2022-06-27 ASSESSMENT — PAIN SCALES - GENERAL
PAINLEVEL_OUTOF10: 8
PAINLEVEL_OUTOF10: 7
PAINLEVEL_OUTOF10: 0

## 2022-06-27 ASSESSMENT — PAIN DESCRIPTION - DESCRIPTORS: DESCRIPTORS: ACHING;DISCOMFORT

## 2022-06-27 ASSESSMENT — PAIN DESCRIPTION - ORIENTATION: ORIENTATION: MID

## 2022-06-27 ASSESSMENT — PAIN DESCRIPTION - LOCATION: LOCATION: STERNUM

## 2022-06-27 NOTE — CARE COORDINATION
POD#5 CABG x 3. Met with pt in room. On RA. Plan is to return home with her family. Pt will have someone with her 24/7. Voiced no needs for home. Rosey at [facility] notified of discharge order on chart.

## 2022-06-27 NOTE — PROGRESS NOTES
POD# 5  Awake, alert. No complaints. Denies CP, palpitations, SOB at rest, dizziness/lightheadedness. Vitals:    06/26/22 2214 06/27/22 0531 06/27/22 0538 06/27/22 0802   BP: (!) 112/57 116/64  101/66   Pulse: 76 77  79   Resp: 18 19  18   Temp: 99.5 °F (37.5 °C) 98.5 °F (36.9 °C)  98.8 °F (37.1 °C)   TempSrc: Oral Oral  Oral   SpO2: 96% 97%  97%   Weight:   191 lb 3.2 oz (86.7 kg)    Height:         O2: None       Intake/Output Summary (Last 24 hours) at 6/27/2022 0820  Last data filed at 6/27/2022 0009  Gross per 24 hour   Intake 240 ml   Output 1350 ml   Net -1110 ml         +BM         Recent Labs     06/25/22  0617 06/25/22  0617 06/26/22  0719 06/26/22  0719 06/26/22  1405 06/26/22  2150 06/27/22  0542   WBC 14.6*  --  8.8  --   --   --  10.2   HGB 9.2*   < > 8.2*   < > 7.8* 8.5* 9.6*   HCT 27.7*   < > 24.7*   < > 23.7* 25.6* 29.6*     --  160  --   --   --  201    < > = values in this interval not displayed. Recent Labs     06/25/22  1830 06/26/22  0624 06/27/22  0542   BUN 38* 37* 33*   CREATININE 1.1* 1.1* 1.1*         Telemetry: SR       PE  Cardiac: RRR  Lungs: decreased bases  Chest incision with intact VAHE DSD. Sternum stable. Prior chest tube site incisions C/D/I, no erythema with intact sutures. Epicardial pacing wires present and secure. Abd: Soft, nontender, +BS  Ext: Incisions C/D/I, approximated, no erythema, + minimal edema         A/P: POD# 5     1. CAD  --Stable s/p Sternotomy/CABG x 3 (LIMA-Diag sequenced onto LAD, SVG-OM)/EVH LLE/Rigid internal fixation of the sternum with [place] plates x 2 on 1/84/3364  --Post op NARGIS reveals 55% EF  --Scr stable - 1.1  --(+)DAPT/statin/BB -- brilinta restarted for hx STEMI with MAURICIO to RCA  --On imdur for sequenced arterial graft   --reinforce sternal precautions  -- Remove epicardial pacing wires today   --All CTs out        2. Expected acute blood loss anemia secondary to open heart surgery  --stable - hgb 9.6 today         3. NSR  --continue BB with hold parameters -- decreased toprol xl to 50mg BID from 100mg BID -- secondary to mild hypotension through   --continue oral amiodarone for afib prophylaxis--with plans to taper on dc  -- on 400mg  BID         4. Expected acute pulmonary insufficiency in the setting following surgery  --keep SpO2 greater than or equal to 92%  --continue duonebs with ezpap  --encourage C&DB, SMI  --currently on RA        5. HTN   --weaned off nipride 6/24   --Mild hypotension after starting BB and norvasc   --decrease toprol to 50mg BID   -- continue norvasc decrease to Norvasc 5mg , 30 mg Imdur        6. Bilateral renal artery stenosis s/p bilateral stenting   --Scr 1.1 today (Baseline ~0.9)   --monitor urine output  --avoid hypotension         7. Constipation--expected delayed return of bowel function  --secondary to anesthesia, narcotics, decreased oral intake, and decreased physical activity   --resolved   --Continue daily MOM/oral bisacodyl until +BM and senna-s as scheduled.    --Will give daily suppository until +BM starting POD 3 if no result by then   --Encouraged continued increase in oral intake and activity.          8. Expected deconditioning in the setting following surgery  --Increase activity as tolerated  --PT/OT  --currently ambulating 400ft           DVT prophylaxis:  --continue bilateral knee high DELMA hose  --continue PCDs  --continue progressive ambulation  --lovenox for dvt prophylaxis and continue knee high DELMA hose/pcds/progressive ambulation        Dispo: Home today       This patient's case and care plan was discussed with the attending surgeon

## 2022-06-27 NOTE — PATIENT CARE CONFERENCE
P Quality Flow/Interdisciplinary Rounds Progress Note        Quality Flow Rounds held on June 27, 2022    Disciplines Attending:  Bedside Nurse, ,  and Nursing Unit Leadership    Migdalia No was admitted on 6/22/2022  5:19 AM    Anticipated Discharge Date:       Disposition:    Rodrigo Score:  Rodrigo Scale Score: 19    Readmission Risk              Risk of Unplanned Readmission:  18           Discussed patient goal for the day, patient clinical progression, and barriers to discharge.   The following Goal(s) of the Day/Commitment(s) have been identified:  Discharge - Obtain Order and complete home going instructions      Dave Zavala RN  June 27, 2022

## 2022-06-28 NOTE — DISCHARGE SUMMARY
Physician Discharge Summary     Patient ID:  Riana Valdes  [id number]  50 y.o.  1970    Admit date: 6/22/2022    Discharge date and time: 6/27/2022  1:12 PM     Admitting Physician: Marnie Cooper MD     Discharge Physician: Charmaine Thomas MD     Discharge Diagnoses:    Severe multivessel coronary artery disease;  history of ST-elevation MI with PCI of the right coronary artery;  arthritis; anorexia; renal artery stenosis, status post stenting;  peripheral vascular disease; depression; history of gastric bypass;  hypertension; hyperlipidemia; and seizures. OPERATIONS:  1. Sternotomy. 2.  Coronary artery bypass grafting x3:  Left internal mammary artery to  the diagonal branch of the LAD with a sequence on the LAD and saphenous  vein graft to the obtuse marginal.  3.  Endoscopic harvesting, left lower extremity greater saphenous vein. 4.  Rigid internal fixation of the sternum using [place] plates x2.       Discharged Condition: stable    Indication for Admission: This is a 79-year-old female who was admitted several months  ago with an ST-elevation MI. This was inferior and she underwent  primary PCI with an excellent result to her right coronary artery by Dr. Sahil De La Garza. She was noted to have severe multivessel coronary artery disease  including bifurcation lesion of her LAD and so, she was referred for  coronary artery bypass grafting. The patient was described the  procedure in full including the risks and complications, including but  not limited to bleeding, infection, need for reoperation, hemothorax,  pneumothorax, stroke, myocardial infarction, and death. The patient  agreed to proceed.     Hospital Course: Course as above, and on 6/22/22  the patient underwent  Sternotomy/CABG x 3 (LIMA-Diag sequenced onto LAD, SVG-OM)/EVH LLE/Rigid internal fixation of the sternum with [place] plates x 2 Postoperatively, she was transferred to the CVICU in guarded stable condition and extubated once indications as:  NORVASC  Take 1 tablet by mouth daily  What changed:   · medication strength  · how much to take     metoprolol succinate 50 MG extended release tablet  Commonly known as: TOPROL XL  Take 1 tablet by mouth 2 times daily  What changed:   · medication strength  · how much to take  · when to take this     rosuvastatin 40 MG tablet  Commonly known as: CRESTOR  Take 1 tablet by mouth daily  What changed: when to take this        CONTINUE taking these medications    aspirin 81 MG EC tablet  Take 1 tablet by mouth daily     escitalopram 5 MG tablet  Commonly known as: LEXAPRO  Take 1 tablet by mouth daily     ticagrelor 90 MG Tabs tablet  Commonly known as: BRILINTA  Take 1 tablet by mouth 2 times daily     vitamin D 1.25 MG (81079 UT) Caps capsule  Commonly known as: ERGOCALCIFEROL  take 1 capsule by mouth every Sunday AND 2000 UNITS DAILY EXCEPT SUNDAY        STOP taking these medications    Multivitamin Adult Tabs     spironolactone 50 MG tablet  Commonly known as: ALDACTONE           Where to Get Your Medications      You can get these medications from any pharmacy    Bring a paper prescription for each of these medications  · amiodarone 200 MG tablet  · amLODIPine 5 MG tablet  · ascorbic acid 500 MG tablet  · ferrous sulfate 325 (65 Fe) MG tablet  · folic acid 1 MG tablet  · HYDROcodone-acetaminophen 5-325 MG per tablet  · isosorbide mononitrate 30 MG extended release tablet  · metoprolol succinate 50 MG extended release tablet  · pantoprazole 40 MG tablet       Activity: sternal precautions  Diet: cardiac diet  Wound Care: as directed    Follow-up with   Future Appointments   Date Time Provider Jam Damon   7/7/2022  3:00 PM [facility]   7/19/2022 10:15 AM Alcides Amin MD CARDIO SURG [facility]       Smoking cessation education provided prior to discharge    Discussed with patient the benefits of participation in cardiac rehab after discharge once approved safe by the surgeon and that a referral will be made at the follow up appointment. Pt verbalized comprehension.     Signed:  Electronically signed by Ale Freeman PA-C on 6/28/2022 at 1:12 PM

## 2022-07-05 ENCOUNTER — HOSPITAL ENCOUNTER (OUTPATIENT)
Dept: CARDIAC REHAB | Age: 52
Setting detail: THERAPIES SERIES
Discharge: HOME | End: 2022-07-05

## 2022-07-11 ENCOUNTER — HOSPITAL ENCOUNTER (OUTPATIENT)
Dept: CARDIAC REHAB | Age: 52
Setting detail: THERAPIES SERIES
Discharge: HOME | End: 2022-07-11

## 2022-07-14 ENCOUNTER — HOSPITAL ENCOUNTER (OUTPATIENT)
Dept: CARDIAC REHAB | Age: 52
Setting detail: THERAPIES SERIES
Discharge: HOME | End: 2022-07-14

## 2022-07-18 ENCOUNTER — HOSPITAL ENCOUNTER (OUTPATIENT)
Dept: CARDIAC REHAB | Age: 52
Setting detail: THERAPIES SERIES
Discharge: HOME | End: 2022-07-18

## 2022-07-18 ASSESSMENT — ENCOUNTER SYMPTOMS
SHORTNESS OF BREATH: 0
COUGH: 0

## 2022-07-18 NOTE — PROGRESS NOTES
Subjective:      Patient ID: Cameron Loza is a 50 y.o. female who presents to office for routine 1 month follow up s/p  Sternotomy/CABG x 3 (LIMA-Diag sequenced onto LAD, SVG-OM)/EVH LLE/Rigid internal fixation of the sternum with [place] plates x 2 on 0/51/91. Patient post op course was uncomplicated and she was [address] home on POD 5. Patient states she has been doing well and denies any CP, SOB or incisional problems. HPI    Review of Systems   Constitutional:  Negative for chills and fever. Respiratory:  Negative for cough and shortness of breath. Cardiovascular:  Negative for chest pain, palpitations and leg swelling. Neurological:  Negative for syncope and light-headedness. Objective:   Physical Exam  Constitutional:       Appearance: Normal appearance. Cardiovascular:      Rate and Rhythm: Normal rate and regular rhythm. Pulses: Normal pulses. Heart sounds: Normal heart sounds. Pulmonary:      Effort: Pulmonary effort is normal.      Breath sounds: Normal breath sounds. Comments: midsternal and chest tube incisions well healed without evidence of infection. Sternum stable. Abdominal:      General: Bowel sounds are normal.   Musculoskeletal:         General: Normal range of motion. Cervical back: Normal range of motion and neck supple. Comments: Endoscopic vein harvest incisions intact without evidence of infection     Neurological:      Mental Status: She is alert and oriented to person, place, and time. Assessment:      S/p  Sternotomy/CABG x 3 (LIMA-Diag sequenced onto LAD, SVG-OM)/EVH LLE/Rigid internal fixation of the sternum with [place] plates x 2      Plan:      Remove sternal precautions after 6 weeks   Cardiac rehab referral  Continue follow up with PCP, Cardiology as scheduled. Encouraged to call office with any questions, concerns. Otherwise no further follow up necessary from CTS standpoint.             Manan Middleton PA-C

## 2022-07-19 ENCOUNTER — OFFICE VISIT (OUTPATIENT)
Dept: CARDIOTHORACIC SURGERY | Age: 52
End: 2022-07-19

## 2022-07-19 VITALS
HEART RATE: 62 BPM | HEIGHT: 64 IN | BODY MASS INDEX: 32.95 KG/M2 | SYSTOLIC BLOOD PRESSURE: 104 MMHG | WEIGHT: 193 LBS | DIASTOLIC BLOOD PRESSURE: 65 MMHG

## 2022-07-19 DIAGNOSIS — Z95.1 S/P CABG (CORONARY ARTERY BYPASS GRAFT): Primary | ICD-10-CM

## 2022-07-19 PROCEDURE — 99024 POSTOP FOLLOW-UP VISIT: CPT

## 2022-07-19 PROCEDURE — 1073RET COMPLETION OF WORKABILITY PRESCRIPTION

## 2022-07-19 PROCEDURE — 1040RET RETAIN PT ID & RECRUITMENT

## 2022-07-19 NOTE — PROGRESS NOTES
[facility] Prescription     [address] [place] [address] CARDIO SURG  [address]  Dept: [phone]  Dept Fax: [phone]: [phone]  Part 1: General Information:  Patient Information  Name: Bhavesh Bridges   Visit Date: 07/19/22  Date of Injury: 6/22/2022    Part 2: Return to Work Release:  Patient may return to work: Worker is released to return to work without restrictions on (date): 8/3/2022    Activity as per discharge instructions. No lifting over 5-8 lbs, pushing or pulling with upper extremities or weight bearing with upper extremities for 6 weeks post surgery. Sternal precautions are complete 6 weeks post sternotomy/open heart surgery, and patient may return to work at that time without restrictions from a cardiothoracic POV.        Eliberto Galeazzi, APRN - CNP  07/19/22  10:34 AM

## 2022-07-20 ENCOUNTER — HOSPITAL ENCOUNTER (OUTPATIENT)
Dept: CARDIAC REHAB | Age: 52
Setting detail: THERAPIES SERIES
Discharge: HOME | End: 2022-07-20

## 2022-07-21 ENCOUNTER — HOSPITAL ENCOUNTER (OUTPATIENT)
Dept: CARDIAC REHAB | Age: 52
Setting detail: THERAPIES SERIES
Discharge: HOME | End: 2022-07-21

## 2022-08-05 ENCOUNTER — APPOINTMENT (OUTPATIENT)
Dept: GENERAL RADIOLOGY | Age: 52
End: 2022-08-05
Payer: COMMERCIAL

## 2022-08-05 ENCOUNTER — HOSPITAL ENCOUNTER (OUTPATIENT)
Age: 52
Setting detail: OBSERVATION
Discharge: HOME | End: 2022-08-07
Attending: STUDENT IN AN ORGANIZED HEALTH CARE EDUCATION/TRAINING PROGRAM | Admitting: FAMILY MEDICINE
Payer: COMMERCIAL

## 2022-08-05 DIAGNOSIS — Z95.1 HISTORY OF CORONARY ARTERY BYPASS GRAFT: ICD-10-CM

## 2022-08-05 DIAGNOSIS — M79.89 LEG SWELLING: Primary | ICD-10-CM

## 2022-08-05 DIAGNOSIS — R79.89 D-DIMER, ELEVATED: ICD-10-CM

## 2022-08-05 LAB
ALBUMIN SERPL-MCNC: 4 G/DL (ref 3.5–5.2)
ALP BLD-CCNC: 153 U/L (ref 35–104)
ALT SERPL-CCNC: 12 U/L (ref 0–32)
ANION GAP SERPL CALCULATED.3IONS-SCNC: 11 MMOL/L (ref 7–16)
AST SERPL-CCNC: 25 U/L (ref 0–31)
BASOPHILS ABSOLUTE: 0.04 E9/L (ref 0–0.2)
BASOPHILS RELATIVE PERCENT: 0.6 % (ref 0–2)
BILIRUB SERPL-MCNC: 0.3 MG/DL (ref 0–1.2)
BUN BLDV-MCNC: 21 MG/DL (ref 6–20)
CALCIUM SERPL-MCNC: 9.1 MG/DL (ref 8.6–10.2)
CHLORIDE BLD-SCNC: 109 MMOL/L (ref 98–107)
CO2: 20 MMOL/L (ref 22–29)
CREAT SERPL-MCNC: 1.3 MG/DL (ref 0.5–1)
D DIMER: 831 NG/ML DDU
EOSINOPHILS ABSOLUTE: 0.26 E9/L (ref 0.05–0.5)
EOSINOPHILS RELATIVE PERCENT: 4.1 % (ref 0–6)
GFR AFRICAN AMERICAN: 52
GFR NON-AFRICAN AMERICAN: 43 ML/MIN/1.73
GLUCOSE BLD-MCNC: 99 MG/DL (ref 74–99)
HCT VFR BLD CALC: 32.6 % (ref 34–48)
HEMOGLOBIN: 11 G/DL (ref 11.5–15.5)
IMMATURE GRANULOCYTES #: 0.01 E9/L
IMMATURE GRANULOCYTES %: 0.2 % (ref 0–5)
LYMPHOCYTES ABSOLUTE: 1.62 E9/L (ref 1.5–4)
LYMPHOCYTES RELATIVE PERCENT: 25.3 % (ref 20–42)
MCH RBC QN AUTO: 30.7 PG (ref 26–35)
MCHC RBC AUTO-ENTMCNC: 33.7 % (ref 32–34.5)
MCV RBC AUTO: 91.1 FL (ref 80–99.9)
MONOCYTES ABSOLUTE: 0.45 E9/L (ref 0.1–0.95)
MONOCYTES RELATIVE PERCENT: 7 % (ref 2–12)
NEUTROPHILS ABSOLUTE: 4.02 E9/L (ref 1.8–7.3)
NEUTROPHILS RELATIVE PERCENT: 62.8 % (ref 43–80)
PDW BLD-RTO: 13.3 FL (ref 11.5–15)
PLATELET # BLD: 219 E9/L (ref 130–450)
PMV BLD AUTO: 10.6 FL (ref 7–12)
POTASSIUM REFLEX MAGNESIUM: 3.9 MMOL/L (ref 3.5–5)
PRO-BNP: 484 PG/ML (ref 0–125)
RBC # BLD: 3.58 E12/L (ref 3.5–5.5)
SODIUM BLD-SCNC: 140 MMOL/L (ref 132–146)
TOTAL PROTEIN: 7.5 G/DL (ref 6.4–8.3)
TROPONIN, HIGH SENSITIVITY: 24 NG/L (ref 0–9)
TROPONIN, HIGH SENSITIVITY: 24 NG/L (ref 0–9)
WBC # BLD: 6.4 E9/L (ref 4.5–11.5)

## 2022-08-05 PROCEDURE — 93005 ELECTROCARDIOGRAM TRACING: CPT | Performed by: STUDENT IN AN ORGANIZED HEALTH CARE EDUCATION/TRAINING PROGRAM

## 2022-08-05 PROCEDURE — 84484 ASSAY OF TROPONIN QUANT: CPT

## 2022-08-05 PROCEDURE — 96374 THER/PROPH/DIAG INJ IV PUSH: CPT

## 2022-08-05 PROCEDURE — 36415 COLL VENOUS BLD VENIPUNCTURE: CPT

## 2022-08-05 PROCEDURE — 71045 X-RAY EXAM CHEST 1 VIEW: CPT

## 2022-08-05 PROCEDURE — 85378 FIBRIN DEGRADE SEMIQUANT: CPT

## 2022-08-05 PROCEDURE — 80053 COMPREHEN METABOLIC PANEL: CPT

## 2022-08-05 PROCEDURE — 96375 TX/PRO/DX INJ NEW DRUG ADDON: CPT

## 2022-08-05 PROCEDURE — 83880 ASSAY OF NATRIURETIC PEPTIDE: CPT

## 2022-08-05 PROCEDURE — 6360000002 HC RX W HCPCS: Performed by: STUDENT IN AN ORGANIZED HEALTH CARE EDUCATION/TRAINING PROGRAM

## 2022-08-05 PROCEDURE — 73590 X-RAY EXAM OF LOWER LEG: CPT

## 2022-08-05 PROCEDURE — 99285 EMERGENCY DEPT VISIT HI MDM: CPT

## 2022-08-05 PROCEDURE — 96376 TX/PRO/DX INJ SAME DRUG ADON: CPT

## 2022-08-05 PROCEDURE — 96372 THER/PROPH/DIAG INJ SC/IM: CPT

## 2022-08-05 PROCEDURE — 85025 COMPLETE CBC W/AUTO DIFF WBC: CPT

## 2022-08-05 RX ORDER — MORPHINE SULFATE 4 MG/ML
4 INJECTION, SOLUTION INTRAMUSCULAR; INTRAVENOUS ONCE
Status: COMPLETED | OUTPATIENT
Start: 2022-08-05 | End: 2022-08-05

## 2022-08-05 RX ORDER — ENOXAPARIN SODIUM 100 MG/ML
1 INJECTION SUBCUTANEOUS 2 TIMES DAILY
Status: DISCONTINUED | OUTPATIENT
Start: 2022-08-05 | End: 2022-08-06

## 2022-08-05 RX ORDER — ENOXAPARIN SODIUM 100 MG/ML
INJECTION SUBCUTANEOUS
Status: DISCONTINUED
Start: 2022-08-05 | End: 2022-08-06

## 2022-08-05 RX ORDER — DIAZEPAM 5 MG/ML
5 INJECTION, SOLUTION INTRAMUSCULAR; INTRAVENOUS ONCE
Status: DISCONTINUED | OUTPATIENT
Start: 2022-08-05 | End: 2022-08-06

## 2022-08-05 RX ADMIN — ENOXAPARIN SODIUM 80 MG: 100 INJECTION SUBCUTANEOUS at 22:54

## 2022-08-05 RX ADMIN — MORPHINE SULFATE 4 MG: 4 INJECTION, SOLUTION INTRAMUSCULAR; INTRAVENOUS at 19:07

## 2022-08-05 ASSESSMENT — ENCOUNTER SYMPTOMS
DIARRHEA: 0
SHORTNESS OF BREATH: 1
SORE THROAT: 0
SINUS PRESSURE: 0
COUGH: 0
WHEEZING: 0
ABDOMINAL DISTENTION: 0
NAUSEA: 0
VOMITING: 0
BACK PAIN: 0
EYE PAIN: 0
EYE DISCHARGE: 0
EYE REDNESS: 0

## 2022-08-05 ASSESSMENT — PAIN DESCRIPTION - LOCATION: LOCATION: LEG

## 2022-08-05 ASSESSMENT — PAIN DESCRIPTION - DESCRIPTORS
DESCRIPTORS: ACHING
DESCRIPTORS: NUMBNESS

## 2022-08-05 ASSESSMENT — PAIN DESCRIPTION - ORIENTATION
ORIENTATION: LEFT;LOWER
ORIENTATION: LEFT

## 2022-08-05 ASSESSMENT — PAIN DESCRIPTION - PAIN TYPE: TYPE: ACUTE PAIN

## 2022-08-05 ASSESSMENT — PAIN DESCRIPTION - ONSET: ONSET: SUDDEN

## 2022-08-05 ASSESSMENT — PAIN SCALES - GENERAL
PAINLEVEL_OUTOF10: 5
PAINLEVEL_OUTOF10: 10

## 2022-08-05 ASSESSMENT — PAIN - FUNCTIONAL ASSESSMENT: PAIN_FUNCTIONAL_ASSESSMENT: 0-10

## 2022-08-05 ASSESSMENT — PAIN DESCRIPTION - FREQUENCY: FREQUENCY: CONTINUOUS

## 2022-08-05 NOTE — ED PROVIDER NOTES
ADDENDUM NOTE:   6:36 PM EDT  I received this patient at sign out from Dr. Brian Collier. I have discussed the patient's initial exam, treatment and plan of care with the out going physician. I have introduced my self to the patient / family and have answered their questions to this point. I have examined the patient myself and reviewed ordered tests / medications and  reviewed any available results to this point. See Dr. Charis Smith notes RE: HPI and ROS, which I did review. Patient was endorsed to me for follow-up evaluation and also for consultation with the [facility] physician for admission of this patient.    --------------------------------------------- PAST HISTORY ---------------------------------------------  Past Medical History:  has a past medical history of Anorexia, Arthritis of knee, Back pain, chronic, Bilateral renal artery stenosis ([address] CAD (coronary artery disease), Chronic pain, Current severe episode of major depressive disorder without psychotic features without prior episode ([address] H/O gastric bypass, Hypertension, Hypertension, Metabolic acidosis, Ovarian cyst, and Seizures ([address] Past Surgical History:  has a past surgical history that includes Jeannine-en-Y Gastric Bypass (12/02/2008); Endometrial ablation (2003); Tonsillectomy (1980); Cholecystectomy, laparoscopic (09/19/1995); Upper gastrointestinal endoscopy (10/03/2008); Upper gastrointestinal endoscopy (01/09/2009); Upper gastrointestinal endoscopy (02/04/2009); laparoscopy (02/12/2009); Upper gastrointestinal endoscopy (03/06/2009); Upper gastrointestinal endoscopy (06/04/2009); Upper gastrointestinal endoscopy (07/02/2009); Upper gastrointestinal endoscopy (10/27/2009); Upper gastrointestinal endoscopy (01/04/2010); Upper gastrointestinal endoscopy (06/07/2010); Stomach surgery (06/25/2010); Upper gastrointestinal endoscopy (07/30/2010); other surgical history (12/28/2011);  Gastric bypass surgery; hernia repair; Hysterectomy (Left, 02/05/2013); Upper gastrointestinal endoscopy (N/A, 05/10/2019); Upper gastrointestinal endoscopy (N/A, 05/12/2019); Upper gastrointestinal endoscopy (N/A, 05/17/2019); Coronary angioplasty with stent (03/12/2022); and Coronary artery bypass graft (N/A, 6/22/2022). Social History:  reports that she quit smoking about 4 months ago. Her smoking use included cigarettes. She has a 7.50 pack-year smoking history. She has never used smokeless tobacco. She reports that she does not currently use alcohol. She reports current drug use. Frequency: 7.00 times per week. Drug: Marijuana Mae Eglin). Family History: family history includes ADHD in her paternal grandfather. The patients home medications have been reviewed.     Allergies: Ultram [tramadol]    -------------------------------------------------- RESULTS -------------------------------------------------  All laboratory and radiology results have been personally reviewed by myself   LABS:  Results for orders placed or performed during the hospital encounter of 08/05/22   CBC with Auto Differential   Result Value Ref Range    WBC 6.4 4.5 - 11.5 E9/L    RBC 3.58 3.50 - 5.50 E12/L    Hemoglobin 11.0 (L) 11.5 - 15.5 g/dL    Hematocrit 32.6 (L) 34.0 - 48.0 %    MCV 91.1 80.0 - 99.9 fL    MCH 30.7 26.0 - 35.0 pg    MCHC 33.7 32.0 - 34.5 %    RDW 13.3 11.5 - 15.0 fL    Platelets 612 679 - 402 E9/L    MPV 10.6 7.0 - 12.0 fL    Neutrophils % 62.8 43.0 - 80.0 %    Immature Granulocytes % 0.2 0.0 - 5.0 %    Lymphocytes % 25.3 20.0 - 42.0 %    Monocytes % 7.0 2.0 - 12.0 %    Eosinophils % 4.1 0.0 - 6.0 %    Basophils % 0.6 0.0 - 2.0 %    Neutrophils Absolute 4.02 1.80 - 7.30 E9/L    Immature Granulocytes # 0.01 E9/L    Lymphocytes Absolute 1.62 1.50 - 4.00 E9/L    Monocytes Absolute 0.45 0.10 - 0.95 E9/L    Eosinophils Absolute 0.26 0.05 - 0.50 E9/L    Basophils Absolute 0.04 0.00 - 0.20 E9/L   Comprehensive Metabolic Panel w/ Reflex to MG   Result Value Ref Range    Sodium 140 132 - 146 mmol/L    Potassium reflex Magnesium 3.9 3.5 - 5.0 mmol/L    Chloride 109 (H) 98 - 107 mmol/L    CO2 20 (L) 22 - 29 mmol/L    Anion Gap 11 7 - 16 mmol/L    Glucose 99 74 - 99 mg/dL    BUN 21 (H) 6 - 20 mg/dL    Creatinine 1.3 (H) 0.5 - 1.0 mg/dL    GFR Non-African American 43 >=60 mL/min/1.73    GFR African American 52     Calcium 9.1 8.6 - 10.2 mg/dL    Total Protein 7.5 6.4 - 8.3 g/dL    Albumin 4.0 3.5 - 5.2 g/dL    Total Bilirubin 0.3 0.0 - 1.2 mg/dL    Alkaline Phosphatase 153 (H) 35 - 104 U/L    ALT 12 0 - 32 U/L    AST 25 0 - 31 U/L   Troponin   Result Value Ref Range    Troponin, High Sensitivity 24 (H) 0 - 9 ng/L   Brain Natriuretic Peptide   Result Value Ref Range    Pro- (H) 0 - 125 pg/mL   D-Dimer, Quantitative   Result Value Ref Range    D-Dimer, Quant 831 ng/mL DDU   Troponin   Result Value Ref Range    Troponin, High Sensitivity 24 (H) 0 - 9 ng/L   EKG 12 Lead   Result Value Ref Range    Ventricular Rate 62 BPM    Atrial Rate 62 BPM    P-R Interval 206 ms    QRS Duration 92 ms    Q-T Interval 456 ms    QTc Calculation (Bazett) 462 ms    P Axis 42 degrees    R Axis 3 degrees    T Axis 37 degrees       RADIOLOGY:  Interpreted by Radiologist.  XR CHEST PORTABLE   Final Result   No acute process. XR TIBIA FIBULA LEFT (2 VIEWS)   Final Result   No acute osseous abnormality.             ------------------------- NURSING NOTES AND VITALS REVIEWED ---------------------------    The nursing notes within the ED encounter and vital signs as below have been reviewed.    /76   Pulse 67   Temp 98.5 °F (36.9 °C) (Oral)   Resp 16   Ht 5' 4\" (1.626 m)   Wt 180 lb (81.6 kg)   SpO2 98%   BMI 30.90 kg/m² Oxygen Saturation Interpretation: Normal      ---------------------------------------------------PHYSICAL EXAM--------------------------------------      Constitutional/General: Alert and oriented x3, well appearing, non toxic in moderate distress  Head: Normocephalic and atraumatic  Eyes: PERRL, EOMI  Mouth: Oropharynx clear, handling secretions, no trismus  Neck: Supple, full ROM, otherwise normal  Pulmonary: Lungs clear to auscultation bilaterally, no wheezes, rales, or rhonchi. Not in respiratory distress  Cardiovascular:  Regular rate and rhythm, no murmurs, gallops, or rubs. 2+ distal pulses  Abdomen: Soft, non tender, non distended, no organomegaly no masses no guarding or rigidity normal bowel sounds  Extremities: Moves all extremities x 4. Warm and well perfused; swelling and tenderness of the left lower extremity with 1+ edema good cap refill. Good dorsalis pedis and posterior tibial pulses. Some mild calf tenderness noted  Skin: warm and dry without rash  Neurologic: GCS 15, cranial nerves II through XII grossly intact with no focal deficits. Psych: Normal Affect      ------------------------------ ED COURSE/MEDICAL DECISION MAKING----------------------  Medications   enoxaparin (LOVENOX) injection 80 mg (80 mg SubCUTAneous Not Given 8/5/22 2300)   diazePAM (VALIUM) injection 5 mg (5 mg IntraVENous Not Given 8/5/22 2209)   morphine sulfate (PF) injection 4 mg (4 mg IntraVENous Given 8/5/22 1907)   morphine sulfate (PF) injection 4 mg (4 mg IntraVENous Given 8/6/22 0024)     Medical Decision Making:   Patient was given medications for analgesic relief with concerns that she could have a DVT in the setting of recovery after having had a recent CABG on 6/22/2022. The patient has had renal artery stenosis with renal artery stenting per her report and she has had slight increase in her BUN and creatinine compared to baseline. For this reason based on discussions with Dr. Debra Medina and his preference was that the patient be empirically anticoagulated and have a Doppler ultrasound and possible VQ scan for further evaluation during admission.   Spoke to the sound physician on-call at [facility] via the access center who accepted patient for admission there and agree with the management plan.     Counseling: The emergency provider has spoken with the patient and discussed todays results, in addition to providing specific details for the plan of care and counseling regarding the diagnosis and prognosis. Questions are answered at this time and they are agreeable with the plan.    --------------------------------- IMPRESSION AND DISPOSITION ---------------------------------    IMPRESSION  1. Leg swelling    2. D-dimer, elevated    3. History of coronary artery bypass graft        DISPOSITION  Disposition: Admit to telemetry  Patient condition is stable      NOTE: This report was transcribed using voice recognition software.  Every effort was made to ensure accuracy; however, inadvertent computerized transcription errors may be present         Tino Wong MD  08/06/22 0320

## 2022-08-05 NOTE — ED PROVIDER NOTES
Department of Emergency Medicine   ED  Provider Note  Admit Date/RoomTime: 8/5/2022  4:21 PM  ED Room: 06/06          History of Present Illness:  8/5/22, Time: 5:39 PM EDT  Chief Complaint   Patient presents with    Leg Swelling     Left leg swelling. Had open heart surgery June 22nd. Pain x 2 days and is getting worse. Sabra Crowder is a 50 y.o. female presenting to the ED for leg swelling and pain, beginning 2 days ago. The complaint has been progressively worse, here in severity, and worsened by patient. Patient mentions that she did have open heart surgery on June 23. She states that she had been doing well with that up until this complaint. Patient states that her left leg is more swollen than her right but does endorse swelling in bilateral.  She also endorses some shortness of breath. Patient otherwise denies any fevers, chest pain, nausea, vomiting, or abdominal pain. Review of Systems   Constitutional:  Negative for chills and fever. HENT:  Negative for ear pain, sinus pressure and sore throat. Eyes:  Negative for pain, discharge and redness. Respiratory:  Positive for shortness of breath. Negative for cough and wheezing. Cardiovascular:  Positive for leg swelling. Negative for chest pain. Gastrointestinal:  Negative for abdominal distention, diarrhea, nausea and vomiting. Genitourinary:  Negative for dysuria and frequency. Musculoskeletal:  Negative for arthralgias and back pain. Skin:  Negative for rash and wound. Neurological:  Negative for weakness and headaches. Hematological:  Negative for adenopathy.    All other systems reviewed and are negative.       --------------------------------------------- PAST HISTORY ---------------------------------------------  Past Medical History:  has a past medical history of Anorexia, Arthritis of knee, Back pain, chronic, Bilateral renal artery stenosis ([facility] [address] CAD (coronary artery disease), Chronic pain, Current severe episode of major depressive disorder without psychotic features without prior episode ([address] H/O gastric bypass, Hypertension, Hypertension, Metabolic acidosis, Ovarian cyst, and Seizures ([address] Past Surgical History:  has a past surgical history that includes Jeannine-en-Y Gastric Bypass (12/02/2008); Endometrial ablation (2003); Tonsillectomy (1980); Cholecystectomy, laparoscopic (09/19/1995); Upper gastrointestinal endoscopy (10/03/2008); Upper gastrointestinal endoscopy (01/09/2009); Upper gastrointestinal endoscopy (02/04/2009); laparoscopy (02/12/2009); Upper gastrointestinal endoscopy (03/06/2009); Upper gastrointestinal endoscopy (06/04/2009); Upper gastrointestinal endoscopy (07/02/2009); Upper gastrointestinal endoscopy (10/27/2009); Upper gastrointestinal endoscopy (01/04/2010); Upper gastrointestinal endoscopy (06/07/2010); Stomach surgery (06/25/2010); Upper gastrointestinal endoscopy (07/30/2010); other surgical history (12/28/2011); Gastric bypass surgery; hernia repair; Hysterectomy (Left, 02/05/2013); Upper gastrointestinal endoscopy (N/A, 05/10/2019); Upper gastrointestinal endoscopy (N/A, 05/12/2019); Upper gastrointestinal endoscopy (N/A, 05/17/2019); Coronary angioplasty with stent (03/12/2022); and Coronary artery bypass graft (N/A, 6/22/2022). Social History:  reports that she quit smoking about 4 months ago. Her smoking use included cigarettes. She has a 7.50 pack-year smoking history. She has never used smokeless tobacco. She reports that she does not currently use alcohol. She reports current drug use. Frequency: 7.00 times per week. Drug: Marijuana Valdene Talbot). Family History: family history includes ADHD in her paternal grandfather. . Unless otherwise noted, family history is non contributory    The patients home medications have been reviewed.     Allergies: Ultram [tramadol]        ---------------------------------------------------PHYSICAL EXAM--------------------------------------    Physical Exam  Vitals and nursing note reviewed. Constitutional:       General: She is not in acute distress. Appearance: She is well-developed. She is obese. HENT:      Head: Normocephalic and atraumatic. Eyes:      Conjunctiva/sclera: Conjunctivae normal.   Cardiovascular:      Rate and Rhythm: Normal rate and regular rhythm. Pulses: Normal pulses. Heart sounds: Normal heart sounds. No murmur heard. Pulmonary:      Effort: Pulmonary effort is normal. No respiratory distress. Breath sounds: Normal breath sounds. No wheezing or rales. Abdominal:      General: Bowel sounds are normal.      Palpations: Abdomen is soft. Tenderness: There is no abdominal tenderness. There is no guarding or rebound. Musculoskeletal:      Cervical back: Normal range of motion and neck supple. Right lower leg: Edema present. Left lower leg: Edema present. Comments: Positive Homans' sign on right   Skin:     General: Skin is warm and dry. Neurological:      Mental Status: She is alert and oriented to person, place, and time. Cranial Nerves: No cranial nerve deficit. Coordination: Coordination normal.          -------------------------------------------------- RESULTS -------------------------------------------------  I have personally reviewed all laboratory and imaging results for this patient. Results are listed below.      LABS: (Lab results interpreted by me)  Results for orders placed or performed during the hospital encounter of 08/05/22   CBC with Auto Differential   Result Value Ref Range    WBC 6.4 4.5 - 11.5 E9/L    RBC 3.58 3.50 - 5.50 E12/L    Hemoglobin 11.0 (L) 11.5 - 15.5 g/dL    Hematocrit 32.6 (L) 34.0 - 48.0 %    MCV 91.1 80.0 - 99.9 fL    MCH 30.7 26.0 - 35.0 pg    MCHC 33.7 32.0 - 34.5 %    RDW 13.3 11.5 - 15.0 fL    Platelets 033 303 - 216 E9/L    MPV 10.6 7.0 - 12.0 fL    Neutrophils % 62.8 43.0 - 80.0 % Immature Granulocytes % 0.2 0.0 - 5.0 %    Lymphocytes % 25.3 20.0 - 42.0 %    Monocytes % 7.0 2.0 - 12.0 %    Eosinophils % 4.1 0.0 - 6.0 %    Basophils % 0.6 0.0 - 2.0 %    Neutrophils Absolute 4.02 1.80 - 7.30 E9/L    Immature Granulocytes # 0.01 E9/L    Lymphocytes Absolute 1.62 1.50 - 4.00 E9/L    Monocytes Absolute 0.45 0.10 - 0.95 E9/L    Eosinophils Absolute 0.26 0.05 - 0.50 E9/L    Basophils Absolute 0.04 0.00 - 0.20 E9/L   Comprehensive Metabolic Panel w/ Reflex to MG   Result Value Ref Range    Sodium 140 132 - 146 mmol/L    Potassium reflex Magnesium 3.9 3.5 - 5.0 mmol/L    Chloride 109 (H) 98 - 107 mmol/L    CO2 20 (L) 22 - 29 mmol/L    Anion Gap 11 7 - 16 mmol/L    Glucose 99 74 - 99 mg/dL    BUN 21 (H) 6 - 20 mg/dL    Creatinine 1.3 (H) 0.5 - 1.0 mg/dL    GFR Non-African American 43 >=60 mL/min/1.73    GFR African American 52     Calcium 9.1 8.6 - 10.2 mg/dL    Total Protein 7.5 6.4 - 8.3 g/dL    Albumin 4.0 3.5 - 5.2 g/dL    Total Bilirubin 0.3 0.0 - 1.2 mg/dL    Alkaline Phosphatase 153 (H) 35 - 104 U/L    ALT 12 0 - 32 U/L    AST 25 0 - 31 U/L   Troponin   Result Value Ref Range    Troponin, High Sensitivity 24 (H) 0 - 9 ng/L   Brain Natriuretic Peptide   Result Value Ref Range    Pro- (H) 0 - 125 pg/mL   D-Dimer, Quantitative   Result Value Ref Range    D-Dimer, Quant 831 ng/mL DDU   EKG 12 Lead   Result Value Ref Range    Ventricular Rate 62 BPM    Atrial Rate 62 BPM    P-R Interval 206 ms    QRS Duration 92 ms    Q-T Interval 456 ms    QTc Calculation (Bazett) 462 ms    P Axis 42 degrees    R Axis 3 degrees    T Axis 37 degrees   ,       RADIOLOGY:  Interpreted by Radiologist unless otherwise specified  XR CHEST PORTABLE   Final Result   No acute process.          XR TIBIA FIBULA LEFT (2 VIEWS)   Final Result   No acute osseous abnormality.                          ------------------------- NURSING NOTES AND VITALS REVIEWED ---------------------------   The nursing notes within the ED encounter and vital signs as below have been reviewed by myself  /70   Pulse 69   Temp 98.5 °F (36.9 °C) (Oral)   Resp 16   Ht 5' 4\" (1.626 m)   Wt 180 lb (81.6 kg)   SpO2 98%   BMI 30.90 kg/m²     Oxygen Saturation Interpretation: Normal    The patients available past medical records and past encounters were reviewed. ------------------------------ ED COURSE/MEDICAL DECISION MAKING----------------------  Medications - No data to display         Medical Decision Making:     ED Course as of 08/06/22 1530   Fri Aug 05, 2022   1814 Patient presents with left swelling and pain. She does have some shortness of breath. CBC showed a stable anemia. CMP showed patient's baseline CKD. Initial troponin was 24. Repeat is pending. EKG did not meet STEMI criteria. D-dimer was obtained and was elevated at 831. We are unable to obtain an ultrasound here in the edition patient is unable to get a CTA given her kidney function. X-ray was obtained which did not show any signs of infection. Patient will be transferred to [facility] for further imaging and evaluation of her lower extremity swelling and pain. [BB]   1815 EKG shows normal sinus rhythm with a ventricular rate of 62 bpm.  There is a first-degree AV block. Normal axis. Does not meet STEMI criteria. Improved compared to previous EKG on 6/15/2022. As interpreted by myself the ED physician [BB]   [phone] 6:51 PM EDT  I have signed this patient out to the oncoming physician, Dr. Vargas Fletcher. I have discussed the patient's initial exam, treatment and plan of care with the on coming physician. I have notified the patient / family of the change in treating physician and answered their questions to this point. [BB]      ED Course User Index  [BB] Castillo Castle, DO        Re-Evaluations:  Patient was reevaluted and continued to be stable.       This patient's ED course included: a personal history and physicial examination, multiple bedside re-evaluations, cardiac monitoring, and continuous pulse oximetry    This patient has remained hemodynamically stable during their ED course. Consultations:  None      Critical Care: None       Counseling: The emergency provider has spoken with the patient and discussed todays results, in addition to providing specific details for the plan of care and counseling regarding the diagnosis and prognosis. Questions are answered at this time and they are agreeable with the plan.       --------------------------------- IMPRESSION AND DISPOSITION ---------------------------------    IMPRESSION  1. Leg swelling        DISPOSITION  Disposition: Transfer to [place] [id number] to the floor  Patient condition is stable        NOTE: This report was transcribed using voice recognition software.  Every effort was made to ensure accuracy; however, inadvertent computerized transcription errors may be present           Manas Gibbons DO  Resident  08/06/22 7401

## 2022-08-06 ENCOUNTER — APPOINTMENT (OUTPATIENT)
Dept: ULTRASOUND IMAGING | Age: 52
End: 2022-08-06
Payer: COMMERCIAL

## 2022-08-06 ENCOUNTER — APPOINTMENT (OUTPATIENT)
Dept: NUCLEAR MEDICINE | Age: 52
End: 2022-08-06
Payer: COMMERCIAL

## 2022-08-06 PROBLEM — O22.30 DVT (DEEP VEIN THROMBOSIS) IN PREGNANCY: Status: ACTIVE | Noted: 2022-08-06

## 2022-08-06 LAB
ANION GAP SERPL CALCULATED.3IONS-SCNC: 11 MMOL/L (ref 7–16)
APTT: >240 SEC (ref 24.5–35.1)
APTT: >240 SEC (ref 24.5–35.1)
BASOPHILS ABSOLUTE: 0.07 E9/L (ref 0–0.2)
BASOPHILS RELATIVE PERCENT: 1.1 % (ref 0–2)
BUN BLDV-MCNC: 17 MG/DL (ref 6–20)
CALCIUM SERPL-MCNC: 9 MG/DL (ref 8.6–10.2)
CHLORIDE BLD-SCNC: 106 MMOL/L (ref 98–107)
CO2: 21 MMOL/L (ref 22–29)
CREAT SERPL-MCNC: 1.2 MG/DL (ref 0.5–1)
EOSINOPHILS ABSOLUTE: 0.33 E9/L (ref 0.05–0.5)
EOSINOPHILS RELATIVE PERCENT: 5.1 % (ref 0–6)
GFR AFRICAN AMERICAN: 57
GFR NON-AFRICAN AMERICAN: 48 ML/MIN/1.73
GLUCOSE BLD-MCNC: 122 MG/DL (ref 74–99)
HCT VFR BLD CALC: 33.5 % (ref 34–48)
HEMOGLOBIN: 11 G/DL (ref 11.5–15.5)
IMMATURE GRANULOCYTES #: 0.01 E9/L
IMMATURE GRANULOCYTES %: 0.2 % (ref 0–5)
LYMPHOCYTES ABSOLUTE: 2.03 E9/L (ref 1.5–4)
LYMPHOCYTES RELATIVE PERCENT: 31.1 % (ref 20–42)
MAGNESIUM: 2.1 MG/DL (ref 1.6–2.6)
MCH RBC QN AUTO: 30 PG (ref 26–35)
MCHC RBC AUTO-ENTMCNC: 32.8 % (ref 32–34.5)
MCV RBC AUTO: 91.3 FL (ref 80–99.9)
MONOCYTES ABSOLUTE: 0.43 E9/L (ref 0.1–0.95)
MONOCYTES RELATIVE PERCENT: 6.6 % (ref 2–12)
NEUTROPHILS ABSOLUTE: 3.65 E9/L (ref 1.8–7.3)
NEUTROPHILS RELATIVE PERCENT: 55.9 % (ref 43–80)
PDW BLD-RTO: 13.3 FL (ref 11.5–15)
PLATELET # BLD: 191 E9/L (ref 130–450)
PMV BLD AUTO: 10.4 FL (ref 7–12)
POTASSIUM REFLEX MAGNESIUM: 3.4 MMOL/L (ref 3.5–5)
RBC # BLD: 3.67 E12/L (ref 3.5–5.5)
REASON FOR REJECTION: NORMAL
REJECTED TEST: NORMAL
SODIUM BLD-SCNC: 138 MMOL/L (ref 132–146)
WBC # BLD: 6.5 E9/L (ref 4.5–11.5)

## 2022-08-06 PROCEDURE — 2580000003 HC RX 258: Performed by: NURSE PRACTITIONER

## 2022-08-06 PROCEDURE — 6370000000 HC RX 637 (ALT 250 FOR IP): Performed by: NURSE PRACTITIONER

## 2022-08-06 PROCEDURE — A9540 TC99M MAA: HCPCS | Performed by: RADIOLOGY

## 2022-08-06 PROCEDURE — 36415 COLL VENOUS BLD VENIPUNCTURE: CPT

## 2022-08-06 PROCEDURE — 3430000000 HC RX DIAGNOSTIC RADIOPHARMACEUTICAL: Performed by: RADIOLOGY

## 2022-08-06 PROCEDURE — 83735 ASSAY OF MAGNESIUM: CPT

## 2022-08-06 PROCEDURE — G0378 HOSPITAL OBSERVATION PER HR: HCPCS

## 2022-08-06 PROCEDURE — 93970 EXTREMITY STUDY: CPT

## 2022-08-06 PROCEDURE — 96366 THER/PROPH/DIAG IV INF ADDON: CPT

## 2022-08-06 PROCEDURE — 96365 THER/PROPH/DIAG IV INF INIT: CPT

## 2022-08-06 PROCEDURE — 85730 THROMBOPLASTIN TIME PARTIAL: CPT

## 2022-08-06 PROCEDURE — 93971 EXTREMITY STUDY: CPT | Performed by: RADIOLOGY

## 2022-08-06 PROCEDURE — 6360000002 HC RX W HCPCS: Performed by: FAMILY MEDICINE

## 2022-08-06 PROCEDURE — A9539 TC99M PENTETATE: HCPCS | Performed by: RADIOLOGY

## 2022-08-06 PROCEDURE — 78582 LUNG VENTILAT&PERFUS IMAGING: CPT

## 2022-08-06 PROCEDURE — 6360000002 HC RX W HCPCS: Performed by: EMERGENCY MEDICINE

## 2022-08-06 PROCEDURE — 78582 LUNG VENTILAT&PERFUS IMAGING: CPT | Performed by: RADIOLOGY

## 2022-08-06 PROCEDURE — 80048 BASIC METABOLIC PNL TOTAL CA: CPT

## 2022-08-06 PROCEDURE — 96376 TX/PRO/DX INJ SAME DRUG ADON: CPT

## 2022-08-06 PROCEDURE — 6370000000 HC RX 637 (ALT 250 FOR IP): Performed by: FAMILY MEDICINE

## 2022-08-06 PROCEDURE — 96375 TX/PRO/DX INJ NEW DRUG ADDON: CPT

## 2022-08-06 PROCEDURE — 85025 COMPLETE CBC W/AUTO DIFF WBC: CPT

## 2022-08-06 RX ORDER — POLYETHYLENE GLYCOL 3350 17 G/17G
17 POWDER, FOR SOLUTION ORAL DAILY PRN
Status: DISCONTINUED | OUTPATIENT
Start: 2022-08-06 | End: 2022-08-07 | Stop reason: HOSPADM

## 2022-08-06 RX ORDER — POTASSIUM CHLORIDE 20 MEQ/1
40 TABLET, EXTENDED RELEASE ORAL ONCE
Status: COMPLETED | OUTPATIENT
Start: 2022-08-06 | End: 2022-08-06

## 2022-08-06 RX ORDER — MORPHINE SULFATE 4 MG/ML
4 INJECTION, SOLUTION INTRAMUSCULAR; INTRAVENOUS ONCE
Status: COMPLETED | OUTPATIENT
Start: 2022-08-06 | End: 2022-08-06

## 2022-08-06 RX ORDER — HEPARIN SODIUM 1000 [USP'U]/ML
40 INJECTION, SOLUTION INTRAVENOUS; SUBCUTANEOUS PRN
Status: DISCONTINUED | OUTPATIENT
Start: 2022-08-06 | End: 2022-08-07 | Stop reason: ALTCHOICE

## 2022-08-06 RX ORDER — HEPARIN SODIUM 1000 [USP'U]/ML
80 INJECTION, SOLUTION INTRAVENOUS; SUBCUTANEOUS PRN
Status: DISCONTINUED | OUTPATIENT
Start: 2022-08-06 | End: 2022-08-07 | Stop reason: ALTCHOICE

## 2022-08-06 RX ORDER — PANTOPRAZOLE SODIUM 40 MG/1
40 TABLET, DELAYED RELEASE ORAL DAILY
Status: DISCONTINUED | OUTPATIENT
Start: 2022-08-06 | End: 2022-08-07 | Stop reason: HOSPADM

## 2022-08-06 RX ORDER — ASPIRIN 81 MG/1
81 TABLET ORAL DAILY
Status: DISCONTINUED | OUTPATIENT
Start: 2022-08-06 | End: 2022-08-07 | Stop reason: HOSPADM

## 2022-08-06 RX ORDER — ROSUVASTATIN CALCIUM 20 MG/1
40 TABLET, COATED ORAL DAILY
Status: DISCONTINUED | OUTPATIENT
Start: 2022-08-06 | End: 2022-08-07 | Stop reason: HOSPADM

## 2022-08-06 RX ORDER — FERROUS SULFATE 325(65) MG
325 TABLET ORAL 2 TIMES DAILY WITH MEALS
Status: DISCONTINUED | OUTPATIENT
Start: 2022-08-06 | End: 2022-08-07 | Stop reason: HOSPADM

## 2022-08-06 RX ORDER — HYDROCODONE BITARTRATE AND ACETAMINOPHEN 10; 325 MG/1; MG/1
1 TABLET ORAL EVERY 6 HOURS PRN
Status: DISCONTINUED | OUTPATIENT
Start: 2022-08-06 | End: 2022-08-07 | Stop reason: HOSPADM

## 2022-08-06 RX ORDER — ESCITALOPRAM OXALATE 5 MG/1
5 TABLET ORAL DAILY
Status: DISCONTINUED | OUTPATIENT
Start: 2022-08-06 | End: 2022-08-07 | Stop reason: HOSPADM

## 2022-08-06 RX ORDER — HEPARIN SODIUM 1000 [USP'U]/ML
80 INJECTION, SOLUTION INTRAVENOUS; SUBCUTANEOUS ONCE
Status: COMPLETED | OUTPATIENT
Start: 2022-08-06 | End: 2022-08-06

## 2022-08-06 RX ORDER — METOPROLOL SUCCINATE 50 MG/1
50 TABLET, EXTENDED RELEASE ORAL 2 TIMES DAILY
Status: DISCONTINUED | OUTPATIENT
Start: 2022-08-06 | End: 2022-08-07 | Stop reason: HOSPADM

## 2022-08-06 RX ORDER — ONDANSETRON 4 MG/1
4 TABLET, ORALLY DISINTEGRATING ORAL EVERY 8 HOURS PRN
Status: DISCONTINUED | OUTPATIENT
Start: 2022-08-06 | End: 2022-08-07 | Stop reason: HOSPADM

## 2022-08-06 RX ORDER — ACETAMINOPHEN 650 MG/1
650 SUPPOSITORY RECTAL EVERY 6 HOURS PRN
Status: DISCONTINUED | OUTPATIENT
Start: 2022-08-06 | End: 2022-08-07 | Stop reason: HOSPADM

## 2022-08-06 RX ORDER — FOLIC ACID 1 MG/1
1 TABLET ORAL DAILY
Status: DISCONTINUED | OUTPATIENT
Start: 2022-08-06 | End: 2022-08-07 | Stop reason: HOSPADM

## 2022-08-06 RX ORDER — AMLODIPINE BESYLATE 5 MG/1
5 TABLET ORAL DAILY
Status: DISCONTINUED | OUTPATIENT
Start: 2022-08-06 | End: 2022-08-07 | Stop reason: HOSPADM

## 2022-08-06 RX ORDER — ONDANSETRON 2 MG/ML
4 INJECTION INTRAMUSCULAR; INTRAVENOUS EVERY 6 HOURS PRN
Status: DISCONTINUED | OUTPATIENT
Start: 2022-08-06 | End: 2022-08-07 | Stop reason: HOSPADM

## 2022-08-06 RX ORDER — SODIUM CHLORIDE 0.9 % (FLUSH) 0.9 %
5-40 SYRINGE (ML) INJECTION EVERY 12 HOURS SCHEDULED
Status: DISCONTINUED | OUTPATIENT
Start: 2022-08-06 | End: 2022-08-07 | Stop reason: HOSPADM

## 2022-08-06 RX ORDER — KIT FOR THE PREPARATION OF TECHNETIUM TC 99M PENTETATE 20 MG/1
34.2 INJECTION, POWDER, LYOPHILIZED, FOR SOLUTION INTRAVENOUS; RESPIRATORY (INHALATION)
Status: COMPLETED | OUTPATIENT
Start: 2022-08-06 | End: 2022-08-06

## 2022-08-06 RX ORDER — SODIUM CHLORIDE 9 MG/ML
INJECTION, SOLUTION INTRAVENOUS CONTINUOUS
Status: DISCONTINUED | OUTPATIENT
Start: 2022-08-06 | End: 2022-08-07

## 2022-08-06 RX ORDER — ACETAMINOPHEN 325 MG/1
650 TABLET ORAL EVERY 6 HOURS PRN
Status: DISCONTINUED | OUTPATIENT
Start: 2022-08-06 | End: 2022-08-07 | Stop reason: HOSPADM

## 2022-08-06 RX ORDER — HYDROCODONE BITARTRATE AND ACETAMINOPHEN 5; 325 MG/1; MG/1
1 TABLET ORAL EVERY 6 HOURS PRN
Status: DISCONTINUED | OUTPATIENT
Start: 2022-08-06 | End: 2022-08-07 | Stop reason: HOSPADM

## 2022-08-06 RX ORDER — ISOSORBIDE MONONITRATE 30 MG/1
30 TABLET, EXTENDED RELEASE ORAL DAILY
Status: DISCONTINUED | OUTPATIENT
Start: 2022-08-06 | End: 2022-08-07 | Stop reason: HOSPADM

## 2022-08-06 RX ORDER — SODIUM CHLORIDE 9 MG/ML
INJECTION, SOLUTION INTRAVENOUS PRN
Status: DISCONTINUED | OUTPATIENT
Start: 2022-08-06 | End: 2022-08-07 | Stop reason: HOSPADM

## 2022-08-06 RX ORDER — AMIODARONE HYDROCHLORIDE 200 MG/1
200 TABLET ORAL SEE ADMIN INSTRUCTIONS
Status: DISCONTINUED | OUTPATIENT
Start: 2022-08-06 | End: 2022-08-06 | Stop reason: ALTCHOICE

## 2022-08-06 RX ORDER — SODIUM CHLORIDE 0.9 % (FLUSH) 0.9 %
5-40 SYRINGE (ML) INJECTION PRN
Status: DISCONTINUED | OUTPATIENT
Start: 2022-08-06 | End: 2022-08-07 | Stop reason: HOSPADM

## 2022-08-06 RX ORDER — ASCORBIC ACID 500 MG
500 TABLET ORAL 2 TIMES DAILY
Status: DISCONTINUED | OUTPATIENT
Start: 2022-08-06 | End: 2022-08-07 | Stop reason: HOSPADM

## 2022-08-06 RX ORDER — HEPARIN SODIUM 10000 [USP'U]/100ML
5-30 INJECTION, SOLUTION INTRAVENOUS CONTINUOUS
Status: DISCONTINUED | OUTPATIENT
Start: 2022-08-06 | End: 2022-08-07

## 2022-08-06 RX ADMIN — METOPROLOL SUCCINATE 50 MG: 50 TABLET, EXTENDED RELEASE ORAL at 09:02

## 2022-08-06 RX ADMIN — FERROUS SULFATE TAB 325 MG (65 MG ELEMENTAL FE) 325 MG: 325 (65 FE) TAB at 17:07

## 2022-08-06 RX ADMIN — KIT FOR THE PREPARATION OF TECHNETIUM TC 99M PENTETATE 34.2 MILLICURIE: 20 INJECTION, POWDER, LYOPHILIZED, FOR SOLUTION INTRAVENOUS; RESPIRATORY (INHALATION) at 14:43

## 2022-08-06 RX ADMIN — METOPROLOL SUCCINATE 50 MG: 50 TABLET, EXTENDED RELEASE ORAL at 21:29

## 2022-08-06 RX ADMIN — FOLIC ACID 1 MG: 1 TABLET ORAL at 09:01

## 2022-08-06 RX ADMIN — OXYCODONE HYDROCHLORIDE AND ACETAMINOPHEN 500 MG: 500 TABLET ORAL at 21:29

## 2022-08-06 RX ADMIN — HYDROMORPHONE HYDROCHLORIDE 1 MG: 1 INJECTION, SOLUTION INTRAMUSCULAR; INTRAVENOUS; SUBCUTANEOUS at 05:20

## 2022-08-06 RX ADMIN — HEPARIN SODIUM 18 UNITS/KG/HR: 10000 INJECTION, SOLUTION INTRAVENOUS at 05:43

## 2022-08-06 RX ADMIN — AMLODIPINE BESYLATE 5 MG: 5 TABLET ORAL at 09:01

## 2022-08-06 RX ADMIN — POTASSIUM CHLORIDE 40 MEQ: 1500 TABLET, EXTENDED RELEASE ORAL at 09:17

## 2022-08-06 RX ADMIN — Medication 5.8 MILLICURIE: at 15:00

## 2022-08-06 RX ADMIN — SODIUM CHLORIDE: 9 INJECTION, SOLUTION INTRAVENOUS at 09:05

## 2022-08-06 RX ADMIN — OXYCODONE HYDROCHLORIDE AND ACETAMINOPHEN 500 MG: 500 TABLET ORAL at 09:01

## 2022-08-06 RX ADMIN — ONDANSETRON 4 MG: 2 INJECTION INTRAMUSCULAR; INTRAVENOUS at 05:20

## 2022-08-06 RX ADMIN — ONDANSETRON 4 MG: 2 INJECTION INTRAMUSCULAR; INTRAVENOUS at 18:01

## 2022-08-06 RX ADMIN — ISOSORBIDE MONONITRATE 30 MG: 30 TABLET, EXTENDED RELEASE ORAL at 09:01

## 2022-08-06 RX ADMIN — HYDROCODONE BITARTRATE AND ACETAMINOPHEN 1 TABLET: 10; 325 TABLET ORAL at 22:20

## 2022-08-06 RX ADMIN — HEPARIN SODIUM 7080 UNITS: 1000 INJECTION, SOLUTION INTRAVENOUS; SUBCUTANEOUS at 05:21

## 2022-08-06 RX ADMIN — MORPHINE SULFATE 4 MG: 4 INJECTION, SOLUTION INTRAMUSCULAR; INTRAVENOUS at 00:24

## 2022-08-06 RX ADMIN — ASPIRIN 81 MG: 81 TABLET, COATED ORAL at 09:01

## 2022-08-06 RX ADMIN — ROSUVASTATIN 40 MG: 20 TABLET, FILM COATED ORAL at 09:17

## 2022-08-06 RX ADMIN — HYDROCODONE BITARTRATE AND ACETAMINOPHEN 1 TABLET: 10; 325 TABLET ORAL at 09:16

## 2022-08-06 RX ADMIN — ESCITALOPRAM 5 MG: 5 TABLET, FILM COATED ORAL at 09:02

## 2022-08-06 RX ADMIN — TICAGRELOR 90 MG: 90 TABLET ORAL at 09:02

## 2022-08-06 RX ADMIN — HYDROCODONE BITARTRATE AND ACETAMINOPHEN 1 TABLET: 10; 325 TABLET ORAL at 16:05

## 2022-08-06 RX ADMIN — PANTOPRAZOLE SODIUM 40 MG: 40 TABLET, DELAYED RELEASE ORAL at 09:02

## 2022-08-06 RX ADMIN — TICAGRELOR 90 MG: 90 TABLET ORAL at 21:29

## 2022-08-06 RX ADMIN — FERROUS SULFATE TAB 325 MG (65 MG ELEMENTAL FE) 325 MG: 325 (65 FE) TAB at 09:02

## 2022-08-06 ASSESSMENT — PAIN DESCRIPTION - LOCATION
LOCATION: ARM;LEG
LOCATION: LEG
LOCATION: ARM;LEG
LOCATION: ARM;LEG

## 2022-08-06 ASSESSMENT — PAIN DESCRIPTION - ONSET
ONSET: ON-GOING

## 2022-08-06 ASSESSMENT — PAIN - FUNCTIONAL ASSESSMENT
PAIN_FUNCTIONAL_ASSESSMENT: PREVENTS OR INTERFERES SOME ACTIVE ACTIVITIES AND ADLS
PAIN_FUNCTIONAL_ASSESSMENT: PREVENTS OR INTERFERES SOME ACTIVE ACTIVITIES AND ADLS
PAIN_FUNCTIONAL_ASSESSMENT: ACTIVITIES ARE NOT PREVENTED
PAIN_FUNCTIONAL_ASSESSMENT: ACTIVITIES ARE NOT PREVENTED
PAIN_FUNCTIONAL_ASSESSMENT: 0-10

## 2022-08-06 ASSESSMENT — PAIN DESCRIPTION - PAIN TYPE
TYPE: ACUTE PAIN

## 2022-08-06 ASSESSMENT — PAIN DESCRIPTION - FREQUENCY
FREQUENCY: INTERMITTENT

## 2022-08-06 ASSESSMENT — PAIN DESCRIPTION - DESCRIPTORS
DESCRIPTORS: DISCOMFORT;ACHING;BURNING;SORE
DESCRIPTORS: ACHING;DISCOMFORT;SORE

## 2022-08-06 ASSESSMENT — PAIN DESCRIPTION - ORIENTATION
ORIENTATION: LEFT

## 2022-08-06 ASSESSMENT — PAIN SCALES - GENERAL
PAINLEVEL_OUTOF10: 3
PAINLEVEL_OUTOF10: 0
PAINLEVEL_OUTOF10: 3
PAINLEVEL_OUTOF10: 2
PAINLEVEL_OUTOF10: 8
PAINLEVEL_OUTOF10: 1
PAINLEVEL_OUTOF10: 8
PAINLEVEL_OUTOF10: 2
PAINLEVEL_OUTOF10: 10
PAINLEVEL_OUTOF10: 2
PAINLEVEL_OUTOF10: 10

## 2022-08-06 ASSESSMENT — PAIN SCALES - WONG BAKER
WONGBAKER_NUMERICALRESPONSE: 0
WONGBAKER_NUMERICALRESPONSE: 0

## 2022-08-06 NOTE — ED NOTES
Called unit, informed them they were loading the patient up now and would be leaving shortly     URI [place]  08/06/22 0574

## 2022-08-06 NOTE — H&P
Hospital Medicine History & Physical      PCP: Lore Mckeon MD    Date of Admission: 8/5/2022    Date of Service: Pt seen/examined on 8/6/2022 and Admitted to Inpatient with expected LOS greater than two midnights due to medical therapy. Chief Complaint: Left leg pain      History Of Present Illness:    51-year-old lady past medical history of coronary artery disease status post CABG 6/22/2022, bilateral renal stenosis status post stent, hypertension, history of gastric bypass presents due to left leg pain and dyspnea. She went to [facility] ER she was started on with his Lovenox as he did not have ultrasound Dopplers to rule out DVT and also could not do CT angiogram given underlying chronic kidney issues with bilateral renal stenosis with stents given concern of causing DARRION. She was transferred here to [facility]. She mentioned she is having a lot of pain in her left lower extremity increased swelling. She denies any recent travels or family history of personal history of blood clots. She denies any trauma.     Past Medical History:          Diagnosis Date    Anorexia 06/01/2015    Arthritis of knee     Back pain, chronic     Bilateral renal artery stenosis ([facility] 03/13/2022    CAD (coronary artery disease)     Chronic pain 06/01/2015    Current severe episode of major depressive disorder without psychotic features without prior episode ([address] 03/16/2021    H/O gastric bypass 06/01/2015    Hypertension 01/01/2012    Hypertension 98/53/0149    Metabolic acidosis 71/48/1095    Ovarian cyst     Seizures ([address]        Past Surgical History:          Procedure Laterality Date    CHOLECYSTECTOMY, LAPAROSCOPIC  09/19/1995    [address] [place] [address] WITH STENT PLACEMENT  03/12/2022    CORONARY ARTERY BYPASS GRAFT N/A 6/22/2022    CABG CORONARY ARTERY BYPASS GRAFTING x 3 NARGIS performed by Ish Jay MD at [address] [place] [address] Sumeet Zurita MD at [address] [place] [address]       Medications Prior to Admission:      Prior to Admission medications    Medication Sig Start Date End Date Taking?  Authorizing Provider   isosorbide mononitrate (IMDUR) 30 MG extended release tablet Take 1 tablet by mouth daily 6/27/22 6/27/23  Leta Quinonez PA-C   amiodarone (CORDARONE) 200 MG tablet Take 1 tablet by mouth See Admin Instructions Take two tablets one time daily x 1 week, then take one tablet one time daily x 2 weeks, then stop medication 6/27/22   Leta Quinonez PA-C   metoprolol succinate (TOPROL XL) 50 MG extended release tablet Take 1 tablet by mouth 2 times daily 6/27/22   Leta Quinonez PA-C   amLODIPine (NORVASC) 5 MG tablet Take 1 tablet by mouth daily 6/27/22   Leta Quinonez PA-C   ferrous sulfate (IRON 325) 325 (65 Fe) MG tablet Take 1 tablet by mouth 2 times daily (with meals) 6/27/22 7/27/22  Leta Quinonez PA-C   folic acid (FOLVITE) 1 MG tablet Take 1 tablet by mouth daily 6/27/22 7/27/22  Leta Quinonez PA-C   pantoprazole (PROTONIX) 40 MG tablet Take 1 tablet by mouth daily for 14 days 6/27/22 7/11/22  Leta Quinonez PA-C   ascorbic acid (VITAMIN C) 500 MG tablet Take 1 tablet by mouth 2 times daily 6/27/22 7/27/22  Leta Quinonez PA-C   ticagrelor (BRILINTA) 90 MG TABS tablet Take 1 tablet by mouth 2 times daily 4/8/22 7/7/22  Fawad Lei MD   rosuvastatin (CRESTOR) 40 MG tablet Take 1 tablet by mouth daily  Patient taking differently: Take 40 mg by mouth nightly 4/8/22   Fawad Lei MD   aspirin 81 MG EC tablet Take 1 tablet by mouth daily 4/8/22   Fawad Lei MD   escitalopram (LEXAPRO) 5 MG tablet Take 1 tablet by mouth daily 3/25/22   Eran Ocampo MD   vitamin D (ERGOCALCIFEROL) 1.25 MG (21535 UT) CAPS capsule take 1 capsule by mouth every Sunday AND 2000 UNITS DAILY EXCEPT NATASHA 3/16/22   Sandra Strange MD   Multiple Vitamins-Minerals (MULTIVITAMIN ADULT) TABS Take 1 tablet by mouth daily 4/30/20 3/16/21  Ursula Branch APRN - CNP       Allergies:  Ultram [tramadol]    Social History:      The patient currently lives at home with family    TOBACCO:   reports that she quit smoking about 4 months ago. Her smoking use included cigarettes. She has a 7.50 pack-year smoking history. She has never used smokeless tobacco.  ETOH:   reports that she does not currently use alcohol. Family History:       Reviewed in detail and negative for DM, CAD, Cancer, CVA. Positive as follows:        Problem Relation Age of Onset    ADHD Paternal Grandfather        REVIEW OF SYSTEMS:   Pertinent positives as noted in the HPI. All other systems reviewed and negative. PHYSICAL EXAM:    /73   Pulse 59   Temp 98.2 °F (36.8 °C) (Temporal)   Resp 16   Ht 5' 4\" (1.626 m)   Wt 195 lb (88.5 kg)   SpO2 98%   BMI 33.47 kg/m²     General appearance:  No apparent distress, appears stated age and cooperative. HEENT:  Normal cephalic, atraumatic without obvious deformity. Pupils equal, round, and reactive to light. Extra ocular muscles intact. Conjunctivae/corneas clear. Neck: Supple, with full range of motion. No jugular venous distention. Trachea midline. Respiratory:  Normal respiratory effort. Clear to auscultation, bilaterally without Rales/Wheezes/Rhonchi. Cardiovascular:  Regular rate and rhythm with normal S1/S2 without murmurs, rubs or gallops. Abdomen: Soft, non-tender, non-distended with normal bowel sounds. Musculoskeletal:  No clubbing, cyanosis or extensive swelling of the left lower extremity and very tender to palpation of the calf. Full range of motion without deformity. Skin: Skin color, texture, turgor normal.  No rashes or lesions. Neurologic:  Neurovascularly intact without any focal sensory/motor deficits.  Cranial nerves: II-XII intact, grossly non-focal.  Psychiatric:  Alert and oriented, thought content appropriate, normal insight    CXR:  I have reviewed the CXR with the following interpretation: No acute

## 2022-08-06 NOTE — PROGRESS NOTES
Messaged Dr. Lisa Cruz re: [facility]. Wilhemina Analisa has been completed. In need of admission orders. Pt is currently complaining of 10/10 pain in her left leg and nausea. Awaiting orders.

## 2022-08-06 NOTE — ED NOTES
Report called to [facility]. Sudha GREWAL took report at 0105 am. Informed  within the hour by SABRA.      Jacky Brumfield RN  08/06/22 0115

## 2022-08-06 NOTE — ED NOTES
Patient signed emtala and informed of 5 hour ETA for transfer  time.      Ashlee Barajas RN  08/05/22 [id number]

## 2022-08-06 NOTE — PATIENT CARE CONFERENCE
P Quality Flow/Interdisciplinary Rounds Progress Note        Quality Flow Rounds held on August 6, 2022    Disciplines Attending:  Bedside Nurse, , , and Nursing Unit Leadership    Omer Mast was admitted on 8/5/2022  4:21 PM    Anticipated Discharge Date:       Disposition:    Rodrigo Score:  Rodrigo Scale Score: 21    Readmission Risk              Risk of Unplanned Readmission:  0           Discussed patient goal for the day, patient clinical progression, and barriers to discharge.   The following Goal(s) of the Day/Commitment(s) have been identified:  Diagnostics - Report Results      Tj Palmer RN  August 6, 2022

## 2022-08-06 NOTE — PROGRESS NOTES
Hospitalist Progress Note      Patient admitted this morning for concern for DVT. She is on heparin gtt. She is also with DARRION. I will initiate IVF. Monitor renal fxn + H&H.  Await US BLE and VQ scan.     +++++++++++++++++++++++++++++++++++++++++++++++++  Brigette Hui, [facility] Corporation[facility]achid Meek [address]

## 2022-08-07 VITALS
SYSTOLIC BLOOD PRESSURE: 126 MMHG | OXYGEN SATURATION: 100 % | RESPIRATION RATE: 16 BRPM | BODY MASS INDEX: 33.29 KG/M2 | DIASTOLIC BLOOD PRESSURE: 76 MMHG | TEMPERATURE: 97.3 F | HEART RATE: 49 BPM | WEIGHT: 195 LBS | HEIGHT: 64 IN

## 2022-08-07 PROBLEM — M79.89 LEG SWELLING: Status: ACTIVE | Noted: 2022-08-07

## 2022-08-07 LAB
ANION GAP SERPL CALCULATED.3IONS-SCNC: 13 MMOL/L (ref 7–16)
APTT: 37.6 SEC (ref 24.5–35.1)
BASOPHILS ABSOLUTE: 0.06 E9/L (ref 0–0.2)
BASOPHILS RELATIVE PERCENT: 1.1 % (ref 0–2)
BUN BLDV-MCNC: 15 MG/DL (ref 6–20)
CALCIUM SERPL-MCNC: 9.2 MG/DL (ref 8.6–10.2)
CHLORIDE BLD-SCNC: 106 MMOL/L (ref 98–107)
CO2: 19 MMOL/L (ref 22–29)
CREAT SERPL-MCNC: 1.2 MG/DL (ref 0.5–1)
EOSINOPHILS ABSOLUTE: 0.22 E9/L (ref 0.05–0.5)
EOSINOPHILS RELATIVE PERCENT: 4.1 % (ref 0–6)
GFR AFRICAN AMERICAN: 57
GFR NON-AFRICAN AMERICAN: 48 ML/MIN/1.73
GLUCOSE BLD-MCNC: 95 MG/DL (ref 74–99)
HCT VFR BLD CALC: 34.9 % (ref 34–48)
HEMOGLOBIN: 11.8 G/DL (ref 11.5–15.5)
IMMATURE GRANULOCYTES #: 0.01 E9/L
IMMATURE GRANULOCYTES %: 0.2 % (ref 0–5)
LYMPHOCYTES ABSOLUTE: 1.25 E9/L (ref 1.5–4)
LYMPHOCYTES RELATIVE PERCENT: 23.3 % (ref 20–42)
MCH RBC QN AUTO: 30.7 PG (ref 26–35)
MCHC RBC AUTO-ENTMCNC: 33.8 % (ref 32–34.5)
MCV RBC AUTO: 90.9 FL (ref 80–99.9)
MONOCYTES ABSOLUTE: 0.34 E9/L (ref 0.1–0.95)
MONOCYTES RELATIVE PERCENT: 6.3 % (ref 2–12)
NEUTROPHILS ABSOLUTE: 3.49 E9/L (ref 1.8–7.3)
NEUTROPHILS RELATIVE PERCENT: 65 % (ref 43–80)
PDW BLD-RTO: 13.2 FL (ref 11.5–15)
PLATELET # BLD: 208 E9/L (ref 130–450)
PMV BLD AUTO: 10.8 FL (ref 7–12)
POTASSIUM REFLEX MAGNESIUM: 3.7 MMOL/L (ref 3.5–5)
RBC # BLD: 3.84 E12/L (ref 3.5–5.5)
SODIUM BLD-SCNC: 138 MMOL/L (ref 132–146)
WBC # BLD: 5.4 E9/L (ref 4.5–11.5)

## 2022-08-07 PROCEDURE — 85730 THROMBOPLASTIN TIME PARTIAL: CPT

## 2022-08-07 PROCEDURE — 80048 BASIC METABOLIC PNL TOTAL CA: CPT

## 2022-08-07 PROCEDURE — 36415 COLL VENOUS BLD VENIPUNCTURE: CPT

## 2022-08-07 PROCEDURE — 2580000003 HC RX 258: Performed by: FAMILY MEDICINE

## 2022-08-07 PROCEDURE — G0378 HOSPITAL OBSERVATION PER HR: HCPCS

## 2022-08-07 PROCEDURE — 85025 COMPLETE CBC W/AUTO DIFF WBC: CPT

## 2022-08-07 PROCEDURE — 6360000002 HC RX W HCPCS: Performed by: NURSE PRACTITIONER

## 2022-08-07 PROCEDURE — 6370000000 HC RX 637 (ALT 250 FOR IP): Performed by: FAMILY MEDICINE

## 2022-08-07 RX ORDER — GABAPENTIN 100 MG/1
100 CAPSULE ORAL 3 TIMES DAILY
Qty: 90 CAPSULE | Refills: 0 | Status: ON HOLD | OUTPATIENT
Start: 2022-08-07 | End: 2022-10-22

## 2022-08-07 RX ORDER — PROMETHAZINE HYDROCHLORIDE 12.5 MG/1
12.5 TABLET ORAL 4 TIMES DAILY PRN
Qty: 20 TABLET | Refills: 0 | Status: SHIPPED | OUTPATIENT
Start: 2022-08-07 | End: 2022-08-14

## 2022-08-07 RX ORDER — HEPARIN SODIUM 10000 [USP'U]/ML
5000 INJECTION, SOLUTION INTRAVENOUS; SUBCUTANEOUS EVERY 8 HOURS SCHEDULED
Status: DISCONTINUED | OUTPATIENT
Start: 2022-08-07 | End: 2022-08-07 | Stop reason: HOSPADM

## 2022-08-07 RX ADMIN — ASPIRIN 81 MG: 81 TABLET, COATED ORAL at 08:44

## 2022-08-07 RX ADMIN — HEPARIN SODIUM 5000 UNITS: 10000 INJECTION INTRAVENOUS; SUBCUTANEOUS at 08:44

## 2022-08-07 RX ADMIN — FOLIC ACID 1 MG: 1 TABLET ORAL at 08:43

## 2022-08-07 RX ADMIN — ISOSORBIDE MONONITRATE 30 MG: 30 TABLET, EXTENDED RELEASE ORAL at 08:43

## 2022-08-07 RX ADMIN — OXYCODONE HYDROCHLORIDE AND ACETAMINOPHEN 500 MG: 500 TABLET ORAL at 08:44

## 2022-08-07 RX ADMIN — SODIUM CHLORIDE, PRESERVATIVE FREE 10 ML: 5 INJECTION INTRAVENOUS at 08:44

## 2022-08-07 RX ADMIN — METOPROLOL SUCCINATE 50 MG: 50 TABLET, EXTENDED RELEASE ORAL at 08:44

## 2022-08-07 RX ADMIN — AMLODIPINE BESYLATE 5 MG: 5 TABLET ORAL at 08:43

## 2022-08-07 RX ADMIN — FERROUS SULFATE TAB 325 MG (65 MG ELEMENTAL FE) 325 MG: 325 (65 FE) TAB at 08:43

## 2022-08-07 RX ADMIN — HYDROCODONE BITARTRATE AND ACETAMINOPHEN 1 TABLET: 10; 325 TABLET ORAL at 06:23

## 2022-08-07 RX ADMIN — ESCITALOPRAM 5 MG: 5 TABLET, FILM COATED ORAL at 08:43

## 2022-08-07 RX ADMIN — ROSUVASTATIN 40 MG: 20 TABLET, FILM COATED ORAL at 08:43

## 2022-08-07 RX ADMIN — ONDANSETRON 4 MG: 4 TABLET, ORALLY DISINTEGRATING ORAL at 08:46

## 2022-08-07 RX ADMIN — PANTOPRAZOLE SODIUM 40 MG: 40 TABLET, DELAYED RELEASE ORAL at 08:43

## 2022-08-07 RX ADMIN — TICAGRELOR 90 MG: 90 TABLET ORAL at 08:44

## 2022-08-07 ASSESSMENT — PAIN DESCRIPTION - ORIENTATION: ORIENTATION: LEFT

## 2022-08-07 ASSESSMENT — PAIN SCALES - GENERAL: PAINLEVEL_OUTOF10: 8

## 2022-08-07 ASSESSMENT — PAIN - FUNCTIONAL ASSESSMENT: PAIN_FUNCTIONAL_ASSESSMENT: ACTIVITIES ARE NOT PREVENTED

## 2022-08-07 ASSESSMENT — PAIN DESCRIPTION - DESCRIPTORS: DESCRIPTORS: ACHING;DISCOMFORT;SORE

## 2022-08-07 ASSESSMENT — PAIN DESCRIPTION - LOCATION: LOCATION: ARM;LEG

## 2022-08-07 NOTE — PROGRESS NOTES
Bhakti Hutton NP updated on pts aPTT of 37.6. Spoke w/Saravanan. Okay to keep Heparin gtt stopped for now and will reevaluate w/liannaft.

## 2022-08-07 NOTE — PROGRESS NOTES
P Quality Flow/Interdisciplinary Rounds Progress Note        Quality Flow Rounds held on August 7, 2022    Disciplines Attending:  Bedside Nurse, , , and Nursing Unit Leadership    Yomi Robles was admitted on 8/5/2022  4:21 PM    Anticipated Discharge Date:       Disposition:    Rodrigo Score:  Rodrigo Scale Score: 19    Readmission Risk              Risk of Unplanned Readmission:  0           Discussed patient goal for the day, patient clinical progression, and barriers to discharge.   The following Goal(s) of the Day/Commitment(s) have been identified:  discharge plan      Marcela Ernandez RN  August 7, 2022

## 2022-08-07 NOTE — PROGRESS NOTES
Krishd Bhakti Hutton NP re: Pts VQ scan and US of the LLE have resulted. Pt is still on the heparin gtt and her aPTT has been <240 all day. She is also having increased bleeding to a hematoma on her left arm after an IV pulled last night. Do you still want the heparin gtt running? She's also on 90mg of brilinta BID. Order to hold drip for 2 hours and recheck ptt. Update with results.

## 2022-08-07 NOTE — DISCHARGE SUMMARY
Hospitalist Discharge Summary    Patient ID: Yancy Kidd   Patient : 1970  Patient's PCP: Liz Daugherty MD    Admit Date: 2022   Admitting Physician: Rebbecca Cogan, MD    Discharge Date:  2022   Discharge Physician: DA Park NP   Discharge Condition: Stable  Discharge Disposition: [facility] course in brief:  (Please refer to daily progress notes for a comprehensive review of the hospitalization by requesting medical records)    Patient admitted for concern for DVT. Patient presented to [facility] ED with complaints of BLE edema and pain x2 days. Of note she did have a CABG on . She was also found to have an DARRION. She was initiated on a heparin drip and transferred here for further evaluation given concern for DVT. CTA was deferred given renal function, VQ scan with low probability for PE. Ultrasound of BLE negative for DVT. Patient does admit to noncompliance with DELMA hose as they were uncomfortable. She reports resolution of edema and dyspnea. She is now stable for discharge home with OP follow up. Consults:   None    Discharge Diagnoses:  BLE edema - RESOLVED  Dyspnea - RESOLVED  Hypokalemia - RESOLVED  Normocytic anemia  CAD status post CABG x 3 -  Bilateral renal artery stenosis  Hypertension  Hyperlipidemia  Seizures  PVD  Depression  History of gastric bypass    Discharge Instructions / Follow up: Follow-up with PCP within 1 week of discharge. Compliance with medications as prescribed on discharge. DELMA hose to be worn throughout the day. Applied in the morning prior to getting out of bed, removed at night. Future Appointments   Date Time Provider Jam Damon   2022  8:30 AM [address] [place] [address]       The patient's condition is stable. At this time the patient is without objective evidence of an acute process requiring continuing hospitalization or inpatient management.   They are stable for discharge with outpatient follow-up. I have spoken with the patient and discussed the results of the current hospitalization, in addition to providing specific details for the plan of care and counseling regarding the diagnosis and prognosis. The plan has been discussed in detail and they are aware of the specific conditions for emergent return, as well as the importance of follow-up. Their questions are answered at this time and they are agreeable with the plan for discharge home. Continued appropriate risk factor modification of blood pressure, diabetes and serum lipids will remain essential to reducing risk of future atherosclerotic development    Activity: activity as tolerated    Physical exam:  General appearance: Middle aged female in no apparent distress, appears stated age and cooperative. HEENT: Conjunctivae/corneas clear. Mucous membranes moist.  Neck: Supple. No JVD. Respiratory:  Clear to auscultation bilaterally. Normal respiratory effort. Cardiovascular:  RRR. S1, S2 without MRG. PV: Pulses palpable. Mild nonpitting edema. Abdomen: Soft, non-tender, non-distended. +BS  Musculoskeletal: No obvious deformities. Skin: Normal skin color. Sternal and LLE incisions healing well. Neurologic:  Grossly non-focal. Awake, alert, following commands.    Psychiatric: Alert and oriented, thought content appropriate, normal insight and judgement    Significant labs:  CBC:   Recent Labs     08/05/22  1733 08/06/22  0800 08/07/22  0638   WBC 6.4 6.5 5.4   RBC 3.58 3.67 3.84   HGB 11.0* 11.0* 11.8   HCT 32.6* 33.5* 34.9   MCV 91.1 91.3 90.9   RDW 13.3 13.3 13.2    191 208     BMP:   Recent Labs     08/05/22  1733 08/06/22  0800 08/07/22  0638    138 138   K 3.9 3.4* 3.7   * 106 106   CO2 20* 21* 19*   BUN 21* 17 15   CREATININE 1.3* 1.2* 1.2*   MG  --  2.1  --      LFT:  Recent Labs     08/05/22  1733   PROT 7.5   ALKPHOS 153*   ALT 12   AST 25   BILITOT 0.3     PT/INR: Recent Labs     22  0800 22  1606 22  0101   APTT >240.0* >240.0* 37.6*     BNP: No results for input(s): BNP in the last 72 hours. Hgb A1C:   Lab Results   Component Value Date    LABA1C 5.2 06/15/2022     Folate and B12:   Lab Results   Component Value Date    KQDFLFKK36 4811 (H) 2020   ,   Lab Results   Component Value Date    FOLATE 19.9 2020     Thyroid Studies:   Lab Results   Component Value Date    TSH 0.716 2020       Urinalysis:    Lab Results   Component Value Date/Time    NITRU Negative 06/15/2022 09:40 AM    WBCUA 2-5 06/15/2022 09:40 AM    WBCUA NONE 2012 11:20 AM    BACTERIA FEW 06/15/2022 09:40 AM    RBCUA 1-3 06/15/2022 09:40 AM    RBCUA NONE 2013 04:52 PM    BLOODU SMALL 06/15/2022 09:40 AM    SPECGRAV >=1.030 06/15/2022 09:40 AM    GLUCOSEU Negative 06/15/2022 09:40 AM    GLUCOSEU NEGATIVE 2012 11:20 AM       Imaging:  XR TIBIA FIBULA LEFT (2 VIEWS)    Result Date: 2022  EXAMINATION: 4 XRAY VIEWS OF THE LEFT TIBIA AND FIBULA 2022 5:14 pm COMPARISON: None. HISTORY: ORDERING SYSTEM PROVIDED HISTORY: L Leg swelling and pain TECHNOLOGIST PROVIDED HISTORY: Reason for exam:->L Leg swelling and pain FINDINGS: There is no evidence of acute fracture. There is normal alignment. No acute joint abnormality. No focal osseous lesion. No focal soft tissue abnormality. Surgical clips along the medial aspect of the knee and proximal foreleg. No acute osseous abnormality. NM LUNG VENT/PERFUSION (VQ)    Result Date: 2022  Patient MRN: [id number] : 1970 Age:  50 years Gender: Female Order Date: 2022 1:50 PM Exam: NM LUNG VENT/PERFUSION (VQ) Number of Images: 9 views Indication:   dyspnea dyspnea What reading provider will be dictating this exam?->[facility] Comparison: None.  Findings: The patient received 34 mCi of technetium dtpa Ventilation images  reveal normal ventilation throughout the lung fields The patient received 5.8 millicuries of technetium MAA. Perfusion images reveal no segmental perfusion defects. Low probability for pulmonary embolism     XR CHEST PORTABLE    Result Date: 2022  EXAMINATION: ONE XRAY VIEW OF THE CHEST 2022 5:14 pm COMPARISON: 2022 HISTORY: ORDERING SYSTEM PROVIDED HISTORY: Leg Swelling, SOB TECHNOLOGIST PROVIDED HISTORY: Reason for exam:->Leg Swelling, SOB FINDINGS: The lungs are without acute focal process. Bibasilar atelectasis. There is no effusion or pneumothorax. The cardiomediastinal silhouette is without acute process. The osseous structures are without acute process. No acute process. US DUP LOWER EXTREMITIES BILATERAL VENOUS    Result Date: 2022  Patient MRN:  [id number] : 1970 Age: 50 years Gender: Female Order Date:  2022 3:36 PM EXAM: US DUP LOWER EXTREMITIES BILATERAL VENOUS NUMBER OF IMAGES:  54 INDICATION:  swelling, pain swelling, pain What reading provider will be dictating this exam?->[facility] COMPARISON: None Within the visualized vessels, there is no evidence for deep venous thrombosis There is good compressibility, there is good augmentation, there is good color flow. Hypoechoic region is seen adjacent to the prior greater saphenous vein measuring 10.8 x 2.2 x 1.2 cm without convincing flow likely hematoma. Within the visualized vessels there is no evidence for deep venous thrombosis       Discharge Medications:      Medication List        START taking these medications      gabapentin 100 MG capsule  Commonly known as: Neurontin  Take 1 capsule by mouth in the morning and 1 capsule at noon and 1 capsule before bedtime. Do all this for 30 days.      promethazine 12.5 MG tablet  Commonly known as: PHENERGAN  Take 1 tablet by mouth 4 times daily as needed for Nausea            CHANGE how you take these medications      rosuvastatin 40 MG tablet  Commonly known as: CRESTOR  Take 1 tablet by mouth daily  What changed: when to take this CONTINUE taking these medications      amLODIPine 5 MG tablet  Commonly known as: NORVASC  Take 1 tablet by mouth daily     aspirin 81 MG EC tablet  Take 1 tablet by mouth daily     escitalopram 5 MG tablet  Commonly known as: LEXAPRO  Take 1 tablet by mouth daily     ferrous sulfate 325 (65 Fe) MG tablet  Commonly known as: IRON 325  Take 1 tablet by mouth 2 times daily (with meals)     folic acid 1 MG tablet  Commonly known as: FOLVITE  Take 1 tablet by mouth daily     isosorbide mononitrate 30 MG extended release tablet  Commonly known as: IMDUR  Take 1 tablet by mouth daily     metoprolol succinate 50 MG extended release tablet  Commonly known as: TOPROL XL  Take 1 tablet by mouth 2 times daily     pantoprazole 40 MG tablet  Commonly known as: PROTONIX  Take 1 tablet by mouth daily for 14 days     ticagrelor 90 MG Tabs tablet  Commonly known as: BRILINTA  Take 1 tablet by mouth 2 times daily     vitamin D 1.25 MG (25140 UT) Caps capsule  Commonly known as: ERGOCALCIFEROL  take 1 capsule by mouth every Sunday AND 2000 UNITS DAILY EXCEPT SUNDAY            STOP taking these medications      ascorbic acid 500 MG tablet  Commonly known as: VITAMIN C               Where to Get Your Medications        These medications were sent to [facility], [address] [address]      Phone: [phone]   gabapentin 100 MG capsule  promethazine 12.5 MG tablet         Time Spent on discharge is more than 45 minutes in the examination, evaluation, counseling and review of medications and discharge plan.    +++++++++++++++++++++++++++++++++++++++++++++++++  DA Nava - CHRIS  [facility], [place]  +++++++++++++++++++++++++++++++++++++++++++++++++  NOTE: This report was transcribed using voice recognition software.  Every effort was made to ensure accuracy; however, inadvertent computerized transcription errors may be present.

## 2022-08-07 NOTE — PLAN OF CARE
Problem: Pain  Goal: Verbalizes/displays adequate comfort level or baseline comfort level  Outcome: Progressing     Problem: ABCDS Injury Assessment  Goal: Absence of physical injury  Outcome: Progressing     Problem: Skin/Tissue Integrity  Goal: Absence of new skin breakdown  Description: 1. Monitor for areas of redness and/or skin breakdown  2. Assess vascular access sites hourly  3. Every 4-6 hours minimum:  Change oxygen saturation probe site  4. Every 4-6 hours:  If on nasal continuous positive airway pressure, respiratory therapy assess nares and determine need for appliance change or resting period.   Outcome: Progressing     Problem: Safety - Adult  Goal: Free from fall injury  Outcome: Progressing

## 2022-08-08 LAB
EKG ATRIAL RATE: 62 BPM
EKG P AXIS: 42 DEGREES
EKG P-R INTERVAL: 206 MS
EKG Q-T INTERVAL: 456 MS
EKG QRS DURATION: 92 MS
EKG QTC CALCULATION (BAZETT): 462 MS
EKG R AXIS: 3 DEGREES
EKG T AXIS: 37 DEGREES
EKG VENTRICULAR RATE: 62 BPM

## 2022-10-15 ENCOUNTER — HOSPITAL ENCOUNTER (INPATIENT)
Age: 52
LOS: 6 days | Discharge: HOME | DRG: 261 | End: 2022-10-23
Attending: EMERGENCY MEDICINE | Admitting: FAMILY MEDICINE
Payer: COMMERCIAL

## 2022-10-15 ENCOUNTER — APPOINTMENT (OUTPATIENT)
Dept: CT IMAGING | Age: 52
DRG: 261 | End: 2022-10-15
Payer: COMMERCIAL

## 2022-10-15 DIAGNOSIS — Q44.1 GALLBLADDER ANOMALY: ICD-10-CM

## 2022-10-15 DIAGNOSIS — K80.00 ACUTE CHOLECYSTITIS DUE TO BILIARY CALCULUS: ICD-10-CM

## 2022-10-15 DIAGNOSIS — R10.9 ABDOMINAL PAIN, UNSPECIFIED ABDOMINAL LOCATION: Primary | ICD-10-CM

## 2022-10-15 LAB
ALBUMIN SERPL-MCNC: 3.8 G/DL (ref 3.5–5.2)
ALP BLD-CCNC: 273 U/L (ref 35–104)
ALT SERPL-CCNC: 82 U/L (ref 0–32)
ANION GAP SERPL CALCULATED.3IONS-SCNC: 13 MMOL/L (ref 7–16)
AST SERPL-CCNC: 248 U/L (ref 0–31)
BASOPHILS ABSOLUTE: 0.03 E9/L (ref 0–0.2)
BASOPHILS RELATIVE PERCENT: 0.4 % (ref 0–2)
BILIRUB SERPL-MCNC: 0.3 MG/DL (ref 0–1.2)
BILIRUBIN URINE: NEGATIVE
BLOOD, URINE: NEGATIVE
BUN BLDV-MCNC: 18 MG/DL (ref 6–20)
CALCIUM SERPL-MCNC: 9.1 MG/DL (ref 8.6–10.2)
CHLORIDE BLD-SCNC: 104 MMOL/L (ref 98–107)
CLARITY: CLEAR
CO2: 21 MMOL/L (ref 22–29)
COLOR: YELLOW
CREAT SERPL-MCNC: 1.2 MG/DL (ref 0.5–1)
EOSINOPHILS ABSOLUTE: 0.14 E9/L (ref 0.05–0.5)
EOSINOPHILS RELATIVE PERCENT: 2 % (ref 0–6)
GFR AFRICAN AMERICAN: 57
GFR NON-AFRICAN AMERICAN: 47 ML/MIN/1.73
GLUCOSE BLD-MCNC: 106 MG/DL (ref 74–99)
GLUCOSE URINE: NEGATIVE MG/DL
HCT VFR BLD CALC: 35.7 % (ref 34–48)
HEMOGLOBIN: 11.7 G/DL (ref 11.5–15.5)
IMMATURE GRANULOCYTES #: 0.02 E9/L
IMMATURE GRANULOCYTES %: 0.3 % (ref 0–5)
KETONES, URINE: NEGATIVE MG/DL
LACTIC ACID: 1 MMOL/L (ref 0.5–2.2)
LEUKOCYTE ESTERASE, URINE: NEGATIVE
LIPASE: 25 U/L (ref 13–60)
LYMPHOCYTES ABSOLUTE: 1.12 E9/L (ref 1.5–4)
LYMPHOCYTES RELATIVE PERCENT: 15.8 % (ref 20–42)
MCH RBC QN AUTO: 29.6 PG (ref 26–35)
MCHC RBC AUTO-ENTMCNC: 32.8 % (ref 32–34.5)
MCV RBC AUTO: 90.4 FL (ref 80–99.9)
MONOCYTES ABSOLUTE: 0.42 E9/L (ref 0.1–0.95)
MONOCYTES RELATIVE PERCENT: 5.9 % (ref 2–12)
NEUTROPHILS ABSOLUTE: 5.38 E9/L (ref 1.8–7.3)
NEUTROPHILS RELATIVE PERCENT: 75.6 % (ref 43–80)
NITRITE, URINE: NEGATIVE
PDW BLD-RTO: 13.2 FL (ref 11.5–15)
PH UA: 7 (ref 5–9)
PLATELET # BLD: 179 E9/L (ref 130–450)
PMV BLD AUTO: 11.8 FL (ref 7–12)
POTASSIUM SERPL-SCNC: 3.8 MMOL/L (ref 3.5–5)
PROTEIN UA: NEGATIVE MG/DL
RBC # BLD: 3.95 E12/L (ref 3.5–5.5)
SODIUM BLD-SCNC: 138 MMOL/L (ref 132–146)
SPECIFIC GRAVITY UA: 1.01 (ref 1–1.03)
TOTAL PROTEIN: 7.1 G/DL (ref 6.4–8.3)
UROBILINOGEN, URINE: 1 E.U./DL
WBC # BLD: 7.1 E9/L (ref 4.5–11.5)

## 2022-10-15 PROCEDURE — 6360000004 HC RX CONTRAST MEDICATION: Performed by: RADIOLOGY

## 2022-10-15 PROCEDURE — 93005 ELECTROCARDIOGRAM TRACING: CPT | Performed by: EMERGENCY MEDICINE

## 2022-10-15 PROCEDURE — 96374 THER/PROPH/DIAG INJ IV PUSH: CPT

## 2022-10-15 PROCEDURE — 85025 COMPLETE CBC W/AUTO DIFF WBC: CPT

## 2022-10-15 PROCEDURE — 96376 TX/PRO/DX INJ SAME DRUG ADON: CPT

## 2022-10-15 PROCEDURE — 81003 URINALYSIS AUTO W/O SCOPE: CPT

## 2022-10-15 PROCEDURE — 2580000003 HC RX 258: Performed by: EMERGENCY MEDICINE

## 2022-10-15 PROCEDURE — 74177 CT ABD & PELVIS W/CONTRAST: CPT

## 2022-10-15 PROCEDURE — 99285 EMERGENCY DEPT VISIT HI MDM: CPT

## 2022-10-15 PROCEDURE — 6360000002 HC RX W HCPCS: Performed by: EMERGENCY MEDICINE

## 2022-10-15 PROCEDURE — 80053 COMPREHEN METABOLIC PANEL: CPT

## 2022-10-15 PROCEDURE — 96375 TX/PRO/DX INJ NEW DRUG ADDON: CPT

## 2022-10-15 PROCEDURE — 96361 HYDRATE IV INFUSION ADD-ON: CPT

## 2022-10-15 PROCEDURE — 83605 ASSAY OF LACTIC ACID: CPT

## 2022-10-15 PROCEDURE — 83690 ASSAY OF LIPASE: CPT

## 2022-10-15 RX ORDER — SODIUM CHLORIDE 0.9 % (FLUSH) 0.9 %
10 SYRINGE (ML) INJECTION PRN
Status: COMPLETED | OUTPATIENT
Start: 2022-10-15 | End: 2022-10-17

## 2022-10-15 RX ORDER — 0.9 % SODIUM CHLORIDE 0.9 %
1000 INTRAVENOUS SOLUTION INTRAVENOUS ONCE
Status: COMPLETED | OUTPATIENT
Start: 2022-10-15 | End: 2022-10-16

## 2022-10-15 RX ORDER — FENTANYL CITRATE 50 UG/ML
25 INJECTION, SOLUTION INTRAMUSCULAR; INTRAVENOUS ONCE
Status: COMPLETED | OUTPATIENT
Start: 2022-10-15 | End: 2022-10-15

## 2022-10-15 RX ORDER — ONDANSETRON 2 MG/ML
4 INJECTION INTRAMUSCULAR; INTRAVENOUS EVERY 6 HOURS PRN
Status: DISCONTINUED | OUTPATIENT
Start: 2022-10-15 | End: 2022-10-21

## 2022-10-15 RX ADMIN — ONDANSETRON 4 MG: 2 INJECTION INTRAMUSCULAR; INTRAVENOUS at 21:08

## 2022-10-15 RX ADMIN — IOPAMIDOL 75 ML: 755 INJECTION, SOLUTION INTRAVENOUS at 21:47

## 2022-10-15 RX ADMIN — FENTANYL CITRATE 25 MCG: 50 INJECTION, SOLUTION INTRAMUSCULAR; INTRAVENOUS at 21:08

## 2022-10-15 RX ADMIN — SODIUM CHLORIDE 1000 ML: 9 INJECTION, SOLUTION INTRAVENOUS at 21:07

## 2022-10-15 ASSESSMENT — PAIN DESCRIPTION - DESCRIPTORS: DESCRIPTORS: THROBBING

## 2022-10-15 ASSESSMENT — PAIN SCALES - GENERAL
PAINLEVEL_OUTOF10: 10
PAINLEVEL_OUTOF10: 10

## 2022-10-15 ASSESSMENT — PAIN - FUNCTIONAL ASSESSMENT: PAIN_FUNCTIONAL_ASSESSMENT: 0-10

## 2022-10-15 ASSESSMENT — PAIN DESCRIPTION - LOCATION: LOCATION: FLANK

## 2022-10-15 ASSESSMENT — PAIN DESCRIPTION - ORIENTATION: ORIENTATION: RIGHT

## 2022-10-16 ENCOUNTER — APPOINTMENT (OUTPATIENT)
Dept: MRI IMAGING | Age: 52
DRG: 261 | End: 2022-10-16
Payer: COMMERCIAL

## 2022-10-16 PROBLEM — R10.9 ABDOMINAL PAIN: Status: ACTIVE | Noted: 2022-10-16

## 2022-10-16 LAB
ALBUMIN SERPL-MCNC: 3.6 G/DL (ref 3.5–5.2)
ALP BLD-CCNC: 350 U/L (ref 35–104)
ALT SERPL-CCNC: 351 U/L (ref 0–32)
ANION GAP SERPL CALCULATED.3IONS-SCNC: 11 MMOL/L (ref 7–16)
AST SERPL-CCNC: 617 U/L (ref 0–31)
BILIRUB SERPL-MCNC: 0.3 MG/DL (ref 0–1.2)
BUN BLDV-MCNC: 16 MG/DL (ref 6–20)
CALCIUM SERPL-MCNC: 9.1 MG/DL (ref 8.6–10.2)
CHLORIDE BLD-SCNC: 106 MMOL/L (ref 98–107)
CO2: 21 MMOL/L (ref 22–29)
CREAT SERPL-MCNC: 1 MG/DL (ref 0.5–1)
GFR AFRICAN AMERICAN: >60
GFR NON-AFRICAN AMERICAN: 59 ML/MIN/1.73
GLUCOSE BLD-MCNC: 86 MG/DL (ref 74–99)
POTASSIUM SERPL-SCNC: 3.9 MMOL/L (ref 3.5–5)
SODIUM BLD-SCNC: 138 MMOL/L (ref 132–146)
TOTAL PROTEIN: 6.6 G/DL (ref 6.4–8.3)

## 2022-10-16 PROCEDURE — 6360000002 HC RX W HCPCS: Performed by: STUDENT IN AN ORGANIZED HEALTH CARE EDUCATION/TRAINING PROGRAM

## 2022-10-16 PROCEDURE — 96376 TX/PRO/DX INJ SAME DRUG ADON: CPT

## 2022-10-16 PROCEDURE — A9579 GAD-BASE MR CONTRAST NOS,1ML: HCPCS | Performed by: RADIOLOGY

## 2022-10-16 PROCEDURE — 6360000004 HC RX CONTRAST MEDICATION: Performed by: RADIOLOGY

## 2022-10-16 PROCEDURE — G0378 HOSPITAL OBSERVATION PER HR: HCPCS

## 2022-10-16 PROCEDURE — 96361 HYDRATE IV INFUSION ADD-ON: CPT

## 2022-10-16 PROCEDURE — 2580000003 HC RX 258: Performed by: STUDENT IN AN ORGANIZED HEALTH CARE EDUCATION/TRAINING PROGRAM

## 2022-10-16 PROCEDURE — 6370000000 HC RX 637 (ALT 250 FOR IP)

## 2022-10-16 PROCEDURE — 6370000000 HC RX 637 (ALT 250 FOR IP): Performed by: STUDENT IN AN ORGANIZED HEALTH CARE EDUCATION/TRAINING PROGRAM

## 2022-10-16 PROCEDURE — C9113 INJ PANTOPRAZOLE SODIUM, VIA: HCPCS | Performed by: STUDENT IN AN ORGANIZED HEALTH CARE EDUCATION/TRAINING PROGRAM

## 2022-10-16 PROCEDURE — 99222 1ST HOSP IP/OBS MODERATE 55: CPT | Performed by: STUDENT IN AN ORGANIZED HEALTH CARE EDUCATION/TRAINING PROGRAM

## 2022-10-16 PROCEDURE — 2580000003 HC RX 258

## 2022-10-16 PROCEDURE — 80053 COMPREHEN METABOLIC PANEL: CPT

## 2022-10-16 PROCEDURE — 96366 THER/PROPH/DIAG IV INF ADDON: CPT

## 2022-10-16 PROCEDURE — 99222 1ST HOSP IP/OBS MODERATE 55: CPT | Performed by: SURGERY

## 2022-10-16 PROCEDURE — 6360000002 HC RX W HCPCS

## 2022-10-16 PROCEDURE — 74183 MRI ABD W/O CNTR FLWD CNTR: CPT

## 2022-10-16 PROCEDURE — 96375 TX/PRO/DX INJ NEW DRUG ADDON: CPT

## 2022-10-16 PROCEDURE — 6360000002 HC RX W HCPCS: Performed by: EMERGENCY MEDICINE

## 2022-10-16 RX ORDER — ASPIRIN 81 MG/1
81 TABLET ORAL DAILY
Status: DISCONTINUED | OUTPATIENT
Start: 2022-10-16 | End: 2022-10-19

## 2022-10-16 RX ORDER — ROSUVASTATIN CALCIUM 20 MG/1
40 TABLET, COATED ORAL DAILY
Status: DISCONTINUED | OUTPATIENT
Start: 2022-10-16 | End: 2022-10-23 | Stop reason: HOSPADM

## 2022-10-16 RX ORDER — SODIUM CHLORIDE 9 MG/ML
INJECTION, SOLUTION INTRAVENOUS PRN
Status: DISCONTINUED | OUTPATIENT
Start: 2022-10-16 | End: 2022-10-23 | Stop reason: HOSPADM

## 2022-10-16 RX ORDER — ACETAMINOPHEN 650 MG/1
650 SUPPOSITORY RECTAL EVERY 6 HOURS PRN
Status: DISCONTINUED | OUTPATIENT
Start: 2022-10-16 | End: 2022-10-16

## 2022-10-16 RX ORDER — SODIUM CHLORIDE 0.9 % (FLUSH) 0.9 %
5-40 SYRINGE (ML) INJECTION PRN
Status: DISCONTINUED | OUTPATIENT
Start: 2022-10-16 | End: 2022-10-23 | Stop reason: HOSPADM

## 2022-10-16 RX ORDER — ISOSORBIDE MONONITRATE 30 MG/1
30 TABLET, EXTENDED RELEASE ORAL DAILY
Status: DISCONTINUED | OUTPATIENT
Start: 2022-10-16 | End: 2022-10-19

## 2022-10-16 RX ORDER — FERROUS SULFATE 325(65) MG
325 TABLET ORAL 2 TIMES DAILY WITH MEALS
Status: DISCONTINUED | OUTPATIENT
Start: 2022-10-16 | End: 2022-10-23 | Stop reason: HOSPADM

## 2022-10-16 RX ORDER — METOPROLOL SUCCINATE 50 MG/1
50 TABLET, EXTENDED RELEASE ORAL 2 TIMES DAILY
Status: DISCONTINUED | OUTPATIENT
Start: 2022-10-16 | End: 2022-10-19

## 2022-10-16 RX ORDER — SODIUM CHLORIDE 0.9 % (FLUSH) 0.9 %
5-40 SYRINGE (ML) INJECTION EVERY 12 HOURS SCHEDULED
Status: DISCONTINUED | OUTPATIENT
Start: 2022-10-16 | End: 2022-10-23 | Stop reason: HOSPADM

## 2022-10-16 RX ORDER — ACETAMINOPHEN 325 MG/1
650 TABLET ORAL EVERY 6 HOURS PRN
Status: DISCONTINUED | OUTPATIENT
Start: 2022-10-16 | End: 2022-10-16

## 2022-10-16 RX ORDER — ESCITALOPRAM OXALATE 5 MG/1
5 TABLET ORAL DAILY
Status: DISCONTINUED | OUTPATIENT
Start: 2022-10-16 | End: 2022-10-23 | Stop reason: HOSPADM

## 2022-10-16 RX ORDER — FENTANYL CITRATE 50 UG/ML
25 INJECTION, SOLUTION INTRAMUSCULAR; INTRAVENOUS ONCE
Status: COMPLETED | OUTPATIENT
Start: 2022-10-16 | End: 2022-10-16

## 2022-10-16 RX ORDER — POLYETHYLENE GLYCOL 3350 17 G/17G
17 POWDER, FOR SOLUTION ORAL DAILY PRN
Status: DISCONTINUED | OUTPATIENT
Start: 2022-10-16 | End: 2022-10-20

## 2022-10-16 RX ORDER — AMLODIPINE BESYLATE 5 MG/1
5 TABLET ORAL DAILY
Status: DISCONTINUED | OUTPATIENT
Start: 2022-10-16 | End: 2022-10-17

## 2022-10-16 RX ORDER — FENTANYL CITRATE 50 UG/ML
25 INJECTION, SOLUTION INTRAMUSCULAR; INTRAVENOUS EVERY 4 HOURS PRN
Status: DISCONTINUED | OUTPATIENT
Start: 2022-10-16 | End: 2022-10-19

## 2022-10-16 RX ADMIN — Medication 10 ML: at 20:54

## 2022-10-16 RX ADMIN — FENTANYL CITRATE 25 MCG: 50 INJECTION, SOLUTION INTRAMUSCULAR; INTRAVENOUS at 01:37

## 2022-10-16 RX ADMIN — Medication 10 ML: at 10:54

## 2022-10-16 RX ADMIN — AMLODIPINE BESYLATE 5 MG: 5 TABLET ORAL at 08:13

## 2022-10-16 RX ADMIN — GADOTERIDOL 20 ML: 279.3 INJECTION, SOLUTION INTRAVENOUS at 14:17

## 2022-10-16 RX ADMIN — ISOSORBIDE MONONITRATE 30 MG: 30 TABLET, EXTENDED RELEASE ORAL at 08:13

## 2022-10-16 RX ADMIN — FERROUS SULFATE TAB 325 MG (65 MG ELEMENTAL FE) 325 MG: 325 (65 FE) TAB at 08:14

## 2022-10-16 RX ADMIN — FENTANYL CITRATE 25 MCG: 50 INJECTION, SOLUTION INTRAMUSCULAR; INTRAVENOUS at 12:25

## 2022-10-16 RX ADMIN — FERROUS SULFATE TAB 325 MG (65 MG ELEMENTAL FE) 325 MG: 325 (65 FE) TAB at 18:56

## 2022-10-16 RX ADMIN — ONDANSETRON 4 MG: 2 INJECTION INTRAMUSCULAR; INTRAVENOUS at 17:20

## 2022-10-16 RX ADMIN — PIPERACILLIN SODIUM AND TAZOBACTAM SODIUM 4500 MG: 4; .5 INJECTION, POWDER, LYOPHILIZED, FOR SOLUTION INTRAVENOUS at 21:17

## 2022-10-16 RX ADMIN — SODIUM CHLORIDE, PRESERVATIVE FREE 40 MG: 5 INJECTION INTRAVENOUS at 08:13

## 2022-10-16 RX ADMIN — FENTANYL CITRATE 25 MCG: 50 INJECTION, SOLUTION INTRAMUSCULAR; INTRAVENOUS at 07:19

## 2022-10-16 RX ADMIN — LIDOCAINE HYDROCHLORIDE: 20 SOLUTION ORAL; TOPICAL at 01:35

## 2022-10-16 RX ADMIN — ESCITALOPRAM 5 MG: 5 TABLET, FILM COATED ORAL at 08:14

## 2022-10-16 RX ADMIN — FENTANYL CITRATE 25 MCG: 50 INJECTION, SOLUTION INTRAMUSCULAR; INTRAVENOUS at 17:18

## 2022-10-16 RX ADMIN — ROSUVASTATIN CALCIUM 40 MG: 20 TABLET, COATED ORAL at 08:14

## 2022-10-16 RX ADMIN — ACETAMINOPHEN 650 MG: 325 TABLET, FILM COATED ORAL at 14:45

## 2022-10-16 RX ADMIN — ONDANSETRON 4 MG: 2 INJECTION INTRAMUSCULAR; INTRAVENOUS at 12:22

## 2022-10-16 RX ADMIN — METOPROLOL SUCCINATE 50 MG: 50 TABLET, EXTENDED RELEASE ORAL at 20:49

## 2022-10-16 RX ADMIN — METOPROLOL SUCCINATE 50 MG: 50 TABLET, EXTENDED RELEASE ORAL at 08:13

## 2022-10-16 RX ADMIN — FENTANYL CITRATE 25 MCG: 50 INJECTION, SOLUTION INTRAMUSCULAR; INTRAVENOUS at 20:54

## 2022-10-16 ASSESSMENT — PAIN DESCRIPTION - DESCRIPTORS
DESCRIPTORS: SHARP
DESCRIPTORS: ACHING

## 2022-10-16 ASSESSMENT — PAIN SCALES - GENERAL
PAINLEVEL_OUTOF10: 10
PAINLEVEL_OUTOF10: 8

## 2022-10-16 ASSESSMENT — PAIN DESCRIPTION - LOCATION
LOCATION: FLANK
LOCATION: FLANK
LOCATION: ABDOMEN;BACK
LOCATION: ABDOMEN;BACK

## 2022-10-16 ASSESSMENT — PAIN DESCRIPTION - ORIENTATION
ORIENTATION: RIGHT
ORIENTATION: RIGHT
ORIENTATION: MID

## 2022-10-16 NOTE — ED NOTES
Nurse to nursed called to 8S patient placed in transport     [facility], [address] [place] [address]  10/16/22 1399

## 2022-10-16 NOTE — CONSULTS
GENERAL SURGERY  CONSULT NOTE  10/16/2022    Physician Consulted: Dr. Timothy Almanza  Reason for Consult: Abdominal pain with nausea vomiting and biliary duct dilation  Referring Physician: Dr. Racquel JONES  Margret Lam is a 50 y.o. female with history of CAD s/p CABG 6/2022, bilateral renal artery stenosis, HTN, depression, prior Jeannine-en-Y gastric bypass s/p reversal 2010 with history of marginal ulcer and gastric AVM and extensive abdominal surgical history is summarized below who presents for evaluation of right upper quadrant abdominal pain. Patient states that she began to have sharp/stabbing right upper quadrant abdominal pain while eating a stuffed Pasta shells for dinner. She states that this pain is sharp/stabbing and radiates to her right flank and right shoulder. She denies any prior episodes of abdominal pain similar to this. She states that this abdominal pain is associated with nausea/vomiting with approximately 3 episodes of emesis of food and gastric contents with out blood. Patient states she is still currently passing flatus and last BM was yesterday evening which was brown and semisolid in consistency. Patient denies any fever, chills, hematemesis, bright red blood per rectum, black/tarry stools. Initial evaluation in the ED demonstrated, lab work notable for AST elevated 248, ALT elevated 82, alk phos elevated 273, bilirubin normal at 0.3, lipase normal at 25. Patient underwent CT of the abdomen pelvis with IV contrast which demonstrated dilation of intra and extrahepatic ducts with retained calculus at the remnant of the cystic duct from prior cholecystectomy as well as CBD dilated at greater than 1 cm. General surgery was consulted with current abdominal pain and biliary duct dilation on imaging.     Surgical history: Cholecystectomy, Jeannine-en-Y s/p reversal with resection of jejunal ulcer 2010, total abdominal hysterectomy, prior incisional and umbilical hernia repair, as well as CABG 6/2022, multiple EGDs demonstrating marginal ulcers. Patient currently denies any alcohol use. Patient has any prior history of hepatitis. Patient denies any IV drug abuse. Patient does admit to smoking marijuana daily with intermittent nausea related to this. Past Medical History:   Diagnosis Date    Anorexia 06/01/2015    Arthritis of knee     Back pain, chronic     Bilateral renal artery stenosis ([facility] 03/13/2022    CAD (coronary artery disease)     Chronic pain 06/01/2015    Current severe episode of major depressive disorder without psychotic features without prior episode ([facility] 03/16/2021    H/O gastric bypass 06/01/2015    Hypertension 01/01/2012    Hypertension 14/57/5968    Metabolic acidosis 56/37/0629    Ovarian cyst     Seizures ([facility]        Past Surgical History:   Procedure Laterality Date    CHOLECYSTECTOMY, LAPAROSCOPIC  09/19/1995    [address] [place] [address] WITH STENT PLACEMENT  03/12/2022    CORONARY ARTERY BYPASS GRAFT N/A 6/22/2022    CABG CORONARY ARTERY BYPASS GRAFTING x 3 NARGIS performed by Susu Esparza MD at [address] [place] [address] Surgical    GASTRIC BYPASS SURGERY      HERNIA REPAIR      umbil    HYSTERECTOMY (CERVIX STATUS UNKNOWN) Left 02/05/2013    Laparotomy;HUGO;JOSUÉ:LSO    LAPAROSCOPY  02/12/2009    lap assisted percutaneous ERCP and removal CBD stone and gastrostomy, [address] [facility]  12/28/2011    exp lap, hugo, rt so/ repair hernia    EMILY-EN-Y GASTRIC BYPASS  12/02/2008    laparoscopic RYGB, Dr. Carl Veronica, [address] [facility]  06/25/2010    resection of jejunal ulcer, reversal gastric bypass, jejunoduodenostomy, feeding jejunostomy, Dr. Carl Veornica, [facility] ENDOSCOPY  10/03/2008    mild gastritis & duodenitis, Dr. Carl Veronica, [facility] ENDOSCOPY  01/09/2009    multiple severe anastomotic jejunal ulcers, Dr. Carl Veronica.  [facility]    UPPER GASTROINTESTINAL ENDOSCOPY  02/04/2009    ulcers healed, normal, Dr. Deedee Ovalle, [address]  03/06/2009    recurrent severe anastomotic jejunal ulcers, Dr. Kulwinder Flowers, [address] ENDOSCOPY  06/04/2009    severe anastomotic jejunal ulcers, Dr. Kulwinder Flowers, [address] ENDOSCOPY  07/02/2009    severe anastomotic jejunal ulcers, Dr. Kulwinder Flowers, [address] ENDOSCOPY  10/27/2009    severe anastomotic jejunal ulcers, Sushil Johnson ENDOSCOPY  01/04/2010    severe anastomotic jejunal ulcers, Sushil Johnson ENDOSCOPY  06/07/2010    severe anastomotic jejunal ulcers, Dr. Kulwinder Flowers, [address] GASTROINTESTINAL ENDOSCOPY  07/30/2010    gastritis, Dr. Kulwinder Flowers, [address] ENDOSCOPY N/A 05/10/2019    EGD ESOPHAGOGASTRODUODENOSCOPY performed by Waldo Rosa MD at [address] [place] [address] 05/12/2019    EGD DIAGNOSTIC ONLY performed by Waldo Rosa MD at [address] [place] [address] N/A 05/17/2019    EGD ESOPHAGOGASTRODUODENOSCOPY performed by Robby Rodriguez MD at [address] [place] [address]       Medications Prior to Admission:    Prior to Admission medications    Medication Sig Start Date End Date Taking? Authorizing Provider   gabapentin (NEURONTIN) 100 MG capsule Take 1 capsule by mouth in the morning and 1 capsule at noon and 1 capsule before bedtime. Do all this for 30 days.  8/7/22 9/6/22  DA Kendall NP   isosorbide mononitrate (IMDUR) 30 MG extended release tablet Take 1 tablet by mouth daily 6/27/22 6/27/23  Carolee Galo PA-C   metoprolol succinate (TOPROL XL) 50 MG extended release tablet Take 1 tablet by mouth 2 times daily 6/27/22   Carolee Galo PA-C   amLODIPine (NORVASC) 5 MG tablet Take 1 tablet by mouth daily 6/27/22   Carolee Galo PA-C   ferrous sulfate (IRON 325) 325 (65 Fe) MG tablet Take 1 tablet by mouth 2 times daily (with meals) 22  Emily Fore, PA-C   folic acid (FOLVITE) 1 MG tablet Take 1 tablet by mouth daily 22  Emily Fore, PA-C   pantoprazole (PROTONIX) 40 MG tablet Take 1 tablet by mouth daily for 14 days 22  Emily Fore, PA-C   ascorbic acid (VITAMIN C) 500 MG tablet Take 1 tablet by mouth 2 times daily 22  Emily Fore, PA-C   ticagrelor (BRILINTA) 90 MG TABS tablet Take 1 tablet by mouth 2 times daily 22  Dayna Al MD   rosuvastatin (CRESTOR) 40 MG tablet Take 1 tablet by mouth daily 22   Dayna Al MD   aspirin 81 MG EC tablet Take 1 tablet by mouth daily 22   Dayna Al MD   escitalopram (LEXAPRO) 5 MG tablet Take 1 tablet by mouth daily 3/25/22   Carlos Manuel Gamez MD   vitamin D (ERGOCALCIFEROL) 1.25 MG (85428 UT) CAPS capsule take 1 capsule by mouth every  AND 2000 UNITS DAILY EXCEPT NATASHA 3/16/22   Deya Majano MD   Multiple Vitamins-Minerals (MULTIVITAMIN ADULT) TABS Take 1 tablet by mouth daily 4/30/20 3/16/21  Aviva Fat, APRN - CNP       Allergies   Allergen Reactions    Ultram [Tramadol] Other (See Comments)     Pt states she had a seizure after taking ultram       Family History   Problem Relation Age of Onset    ADHD Paternal Grandfather        Social History     Tobacco Use    Smoking status: Former     Packs/day: 0.50     Years: 15.00     Pack years: 7.50     Types: Cigarettes     Quit date: 3/11/2022     Years since quittin.6    Smokeless tobacco: Never   Vaping Use    Vaping Use: Never used   Substance Use Topics    Alcohol use: Not Currently     Comment: rare    Drug use: Yes     Frequency: 7.0 times per week     Types: Marijuana (Efrain Carreon)         Review of Systems negative as otherwise noted in HPI    PHYSICAL EXAM:    Vitals:    10/15/22 2031   BP: (!) 160/93   Pulse: 77   Resp: 18   Temp: 97.8 °F (36.6 °C)   SpO2: 99%       General Appearance:  awake, alert, oriented, in no acute distress  Skin: Skin color, texture, turgor normal. No rashes or lesions. Head/face:  NCAT  Eyes:  No gross abnormalities. , PERRL, EOMI, and Sclera nonicteric  Lungs:  Normal expansion. Clear to auscultation. No rales, rhonchi, or wheezing. Heart:  Heart regular rate and rhythm  Abdomen: Soft, nondistended, focally moderately tender right upper quadrant without rebound/guarding/rigidity. Extremities: Extremities warm to touch, pink, with no edema. Female Rectal: No hemorrhoids, normal rectal tone, no masses. LABS:    CBC  Recent Labs     10/15/22  2037   WBC 7.1   HGB 11.7   HCT 35.7        BMP  Recent Labs     10/15/22  2037      K 3.8      CO2 21*   BUN 18   CREATININE 1.2*   CALCIUM 9.1     Liver Function  Recent Labs     10/15/22  2037   LIPASE 25   BILITOT 0.3   *   ALT 82*   ALKPHOS 273*   PROT 7.1   LABALBU 3.8     No results for input(s): LACTATE in the last 72 hours. No results for input(s): INR, PTT in the last 72 hours. Invalid input(s): PT    RADIOLOGY    CT ABDOMEN PELVIS W IV CONTRAST Additional Contrast? None    Result Date: 10/15/2022  EXAMINATION: CT OF THE ABDOMEN AND PELVIS WITH CONTRAST 10/15/2022 9:48 pm TECHNIQUE: CT of the abdomen and pelvis was performed with the administration of intravenous contrast. Multiplanar reformatted images are provided for review. Automated exposure control, iterative reconstruction, and/or weight based adjustment of the mA/kV was utilized to reduce the radiation dose to as low as reasonably achievable.  COMPARISON: CTA chest 3-22, CT abdomen and pelvis 6-21 HISTORY: ORDERING SYSTEM PROVIDED HISTORY: abdominal pain TECHNOLOGIST PROVIDED HISTORY: Reason for exam:->abdominal pain Additional Contrast?->None Decision Support Exception - unselect if not a suspected or confirmed emergency medical condition->Emergency Medical Condition (MA) What reading provider will be dictating this exam?->CRC FINDINGS: Lower Chest: Low-grade basilar atelectasis,

## 2022-10-16 NOTE — ED NOTES
Radiology Procedure Waiver   Name: Mattie Dorman  : 1970  MRN: [id number]    Date:  10/15/22    Time: 9:39 PM EDT    Benefits of immediately proceeding with Radiology exam(s) without pre-testing outweigh the risks or are not indicated as specified below and therefore the following is/are being waived:    [x] Pregnancy test   [] Patients LMP on-time and regular.   [] Patient had Tubal Ligation or has other Contraception Device. [] Patient  is Menopausal or Premenarcheal.    [] Patient had Full or Partial Hysterectomy. [] Protocol for Iodine allergy    [] MRI Questionnaire     [x] BUN/Creatinine   [] Patient age w/no hx of renal dysfunction. [] Patient on Dialysis. [] Recent Normal Labs.   Electronically signed by Dean Enriquez MD on 10/15/22 at 9:39 PM EDT               Dean Enriquez MD  10/15/22 0552

## 2022-10-16 NOTE — H&P
[facility] Resident Inpatient  History and Physical      CC: Abdominal pain    HPI: History obtained from patient. Patient is oriented to time, place, person. Kristine Bassett is a 50 y.o. female with a PMH of hypertension, history of gastric bypass, CAD status post CABG, depression and bilateral renal artery stenosis who presents to ED for Abdominal Pain (Patient to the ED for right lower quadrant pain that started 1hr ago.  )  . Abdominal pain began this even after having some food. Patient did experience some nausea and mild emesis without blood. She denies every expereince pain like this before. Patient called EMS after pain increased to 10/10 and would not alleviate over time. Pain was sharp and constant in nature. Pain localized to right lower quadrant and radiates to back. Not alleviate with positioning. She denies any chest pain, SOB or loose BM. In ED patient was given fentanyl for pain management and general surgery was consulted. ED Course: The patient remained hemodynamically stable. Labs CBC: No leukocytosis. CMP: Creatinine 1.2 (baseline) GFR: 47, , ALT 82, alk phos 273. Lipase 25. Lactic acid: 1.  UA within normal limits  Imaging CT abdomen: Biliary dilation redemonstrated, mildly worse. Suggestion of a small   chronic cystic duct calculus. EKG: normal sinus rhythm, unchanged from previous tracings. Patient was given fentanyl, IV Zofran, Protonix and 1 L bolus. Pt admitted for biliary duct dilation and abdominal pain        PMH:  has a past medical history of Anorexia, Arthritis of knee, Back pain, chronic, Bilateral renal artery stenosis (HCC), CAD (coronary artery disease), Chronic pain, Current severe episode of major depressive disorder without psychotic features without prior episode ([address] H/O gastric bypass, Hypertension, Hypertension, Metabolic acidosis, Ovarian cyst, and Seizures ([address]     PSH:  has a past surgical history that includes Jeannine-en-Y Gastric Bypass (12/02/2008); Endometrial ablation (2003); Tonsillectomy (1980); Cholecystectomy, laparoscopic (09/19/1995); Upper gastrointestinal endoscopy (10/03/2008); Upper gastrointestinal endoscopy (01/09/2009); Upper gastrointestinal endoscopy (02/04/2009); laparoscopy (02/12/2009); Upper gastrointestinal endoscopy (03/06/2009); Upper gastrointestinal endoscopy (06/04/2009); Upper gastrointestinal endoscopy (07/02/2009); Upper gastrointestinal endoscopy (10/27/2009); Upper gastrointestinal endoscopy (01/04/2010); Upper gastrointestinal endoscopy (06/07/2010); Stomach surgery (06/25/2010); Upper gastrointestinal endoscopy (07/30/2010); other surgical history (12/28/2011); Gastric bypass surgery; hernia repair; Hysterectomy (Left, 02/05/2013); Upper gastrointestinal endoscopy (N/A, 05/10/2019); Upper gastrointestinal endoscopy (N/A, 05/12/2019); Upper gastrointestinal endoscopy (N/A, 05/17/2019); Coronary angioplasty with stent (03/12/2022); and Coronary artery bypass graft (N/A, 6/22/2022). FH: family history includes ADHD in her paternal grandfather. Social:  reports that she quit smoking about 7 months ago. Her smoking use included cigarettes. She has a 7.50 pack-year smoking history. She has never used smokeless tobacco. She reports that she does not currently use alcohol. She reports current drug use. Frequency: 7.00 times per week. Drug: Marijuana Nadia Cotton). Allergies: Allergies   Allergen Reactions    Ultram [Tramadol] Other (See Comments)     Pt states she had a seizure after taking ultram        Home Medications:   No current facility-administered medications on file prior to encounter. Current Outpatient Medications on File Prior to Encounter   Medication Sig Dispense Refill    gabapentin (NEURONTIN) 100 MG capsule Take 1 capsule by mouth in the morning and 1 capsule at noon and 1 capsule before bedtime. Do all this for 30 days.  90 capsule 0    isosorbide mononitrate (IMDUR) 30 MG extended release tablet Take 1 tablet by mouth daily 360 tablet 0    metoprolol succinate (TOPROL XL) 50 MG extended release tablet Take 1 tablet by mouth 2 times daily 30 tablet 3    amLODIPine (NORVASC) 5 MG tablet Take 1 tablet by mouth daily 30 tablet 3    ferrous sulfate (IRON 325) 325 (65 Fe) MG tablet Take 1 tablet by mouth 2 times daily (with meals) 60 tablet 0    folic acid (FOLVITE) 1 MG tablet Take 1 tablet by mouth daily 30 tablet 0    pantoprazole (PROTONIX) 40 MG tablet Take 1 tablet by mouth daily for 14 days 14 tablet 0    [DISCONTINUED] ascorbic acid (VITAMIN C) 500 MG tablet Take 1 tablet by mouth 2 times daily 60 tablet 0    ticagrelor (BRILINTA) 90 MG TABS tablet Take 1 tablet by mouth 2 times daily 180 tablet 3    rosuvastatin (CRESTOR) 40 MG tablet Take 1 tablet by mouth daily 90 tablet 3    aspirin 81 MG EC tablet Take 1 tablet by mouth daily 90 tablet 3    escitalopram (LEXAPRO) 5 MG tablet Take 1 tablet by mouth daily 30 tablet 1    vitamin D (ERGOCALCIFEROL) 1.25 MG (74461 UT) CAPS capsule take 1 capsule by mouth every Sunday AND 2000 UNITS DAILY EXCEPT SUNDAY 12 capsule 3    [DISCONTINUED] Multiple Vitamins-Minerals (MULTIVITAMIN ADULT) TABS Take 1 tablet by mouth daily 90 tablet 1       ROS:   Const: No fever, chills, night sweats, no recent unexplained weight gain/loss  HEENT: No blurred vision, double vision; no URI symptoms  Resp: No cough, no sputum, no pleuritic chest pain, no sob  Cardio: No chest pain, no exertional dyspnea, no PND, no orthopnea, no palpitation, no leg swelling.    GI: No dysphagia, no reflux;  abdominal pain, n/v;     : No dysuria, no frequency, hesitancy; no hematuria  MSK: no joint pain, no myalgia, no change in ROM  Neuro: no focal weakness, no slurred speech, no double vision, no numbness or tingling in extremities  Endo: no heat/cold intolerance, no polyphagia, polydipsia or polyuria  Hem: no increased bleeding, no bruising, no lymphadenopathy  Skin: no skin changes  Psych: no depressed mood, no suicidal ideation    PE:  Blood pressure (!) 160/93, pulse 77, temperature 97.8 °F (36.6 °C), resp. rate 18, height 5' 4\" (1.626 m), weight 209 lb (94.8 kg), SpO2 99 %, not currently breastfeeding. General: Alert, cooperative, no acute distress. HEENT: Normocephalic, atraumatic. PERRLA, conjunctiva/corneas clear   Neck: Supple, symmetrical, trachea midline, no JVD. no obvious masses. No cervical lymphadenopathy. Chest: No tenderness or deformity, full & symmetric excursion  Lung: Clear to auscultation bilaterally,  respirations unlabored. No rales/wheezing/rubs  Heart: RRR, S1 and S2 normal, no murmur, rub or gallop. DP pulses 2/4  Abdomen: Soft, nondistended, tender to palpation on right lower and upper quadrant. , no masses, no organomegaly, no guarding, rebound or rigidity. Genital/Rectal: deferred  Extremities:  Extremities normal, atraumatic, no cyanosis or edema. Distal pulses equal bilaterally  Skin: Skin color, texture, turgor normal, no rashes or lesions  Musculoskeletal: No joint swelling, no muscle tenderness. Normal ROM in extremities. Neurologic: Alert & Oriented; Normal and symmetric strength in UEs and LEs; Sensation intact  Psychiatric: appropriate affect. Intact judgment and insight.      Labs:   Results for orders placed or performed during the hospital encounter of 10/15/22   CBC with Auto Differential   Result Value Ref Range    WBC 7.1 4.5 - 11.5 E9/L    RBC 3.95 3.50 - 5.50 E12/L    Hemoglobin 11.7 11.5 - 15.5 g/dL    Hematocrit 35.7 34.0 - 48.0 %    MCV 90.4 80.0 - 99.9 fL    MCH 29.6 26.0 - 35.0 pg    MCHC 32.8 32.0 - 34.5 %    RDW 13.2 11.5 - 15.0 fL    Platelets 791 894 - 261 E9/L    MPV 11.8 7.0 - 12.0 fL    Neutrophils % 75.6 43.0 - 80.0 %    Immature Granulocytes % 0.3 0.0 - 5.0 %    Lymphocytes % 15.8 (L) 20.0 - 42.0 %    Monocytes % 5.9 2.0 - 12.0 %    Eosinophils % 2.0 0.0 - 6.0 %    Basophils % 0.4 0.0 - 2.0 %    Neutrophils Absolute 5.38 1.80 - 7.30 E9/L    Immature Granulocytes # 0.02 E9/L    Lymphocytes Absolute 1.12 (L) 1.50 - 4.00 E9/L    Monocytes Absolute 0.42 0.10 - 0.95 E9/L    Eosinophils Absolute 0.14 0.05 - 0.50 E9/L    Basophils Absolute 0.03 0.00 - 0.20 E9/L   Comprehensive Metabolic Panel   Result Value Ref Range    Sodium 138 132 - 146 mmol/L    Potassium 3.8 3.5 - 5.0 mmol/L    Chloride 104 98 - 107 mmol/L    CO2 21 (L) 22 - 29 mmol/L    Anion Gap 13 7 - 16 mmol/L    Glucose 106 (H) 74 - 99 mg/dL    BUN 18 6 - 20 mg/dL    Creatinine 1.2 (H) 0.5 - 1.0 mg/dL    GFR Non-African American 47 >=60 mL/min/1.73    GFR African American 57     Calcium 9.1 8.6 - 10.2 mg/dL    Total Protein 7.1 6.4 - 8.3 g/dL    Albumin 3.8 3.5 - 5.2 g/dL    Total Bilirubin 0.3 0.0 - 1.2 mg/dL    Alkaline Phosphatase 273 (H) 35 - 104 U/L    ALT 82 (H) 0 - 32 U/L     (H) 0 - 31 U/L   Lipase   Result Value Ref Range    Lipase 25 13 - 60 U/L   Lactic Acid   Result Value Ref Range    Lactic Acid 1.0 0.5 - 2.2 mmol/L   Urinalysis   Result Value Ref Range    Color, UA Yellow Straw/Yellow    Clarity, UA Clear Clear    Glucose, Ur Negative Negative mg/dL    Bilirubin Urine Negative Negative    Ketones, Urine Negative Negative mg/dL    Specific Gravity, UA 1.015 1.005 - 1.030    Blood, Urine Negative Negative    pH, UA 7.0 5.0 - 9.0    Protein, UA Negative Negative mg/dL    Urobilinogen, Urine 1.0 <2.0 E.U./dL    Nitrite, Urine Negative Negative    Leukocyte Esterase, Urine Negative Negative       Imaging:  CT ABDOMEN PELVIS W IV CONTRAST Additional Contrast? None   Final Result   1. Biliary dilation redemonstrated, mildly worse. Suggestion of a small   chronic cystic duct calculus. 2. Suggestion of constipation and there are small bowel air-fluid levels. 3. Additionally dilation with retained contents within the gastric bypass   including afferent limb which is abnormal but present on prior exam.      RECOMMENDATIONS:   Clinical correlation.          MRI ABDOMEN W WO CONTRAST MRCP    (Results Pending)       Assessment and Plan  Principal Problem:    Abdominal pain  Resolved Problems:    * No resolved hospital problems. *    Abdominal pain  -CT abdomen showed biliary dilation. General surgery consulted. -Status post 1 L bolus in ED  -Awaiting MRCP  -N.p.o. at this time  -Continue to monitor LFTs  -Continue Protonix daily  -Fentanyl for pain management  -Zofran as needed for nausea. CAD s/p CABG  - Continue home dose metoprolol, Crestor and Imdur  -Hold aspirin and Brilinta until after MRCP  -EKG on admission normal sinus rhythm. Unchanged from previous    CKD  -Stable, baseline creatinine 1.2  -Avoid nephrotoxic medications    Hypertension  -Continue home dose Norvasc    Depression  -Continue home dose Lexapro  .        DVT / GI prophylaxis: PCDs and Protonix    Dispo -MedSurg      Electronically signed by Tray Garcia MD on 10/16/22 at 5:11 AM EDT  This case was discussed with attending physician: Dr. Awilda Vigil

## 2022-10-16 NOTE — PROGRESS NOTES
[address] [facility] Attending    S: 50 y.o. female with history of history of gastric bypass, CAD s/p CABG presented to the ED for right sided abdominal pain started day of admission. Pain became 10/10 and she called EMS. Pain happened after eating and associated with some nausea and vomiting. No fevers, chills, nausea, vomiting. No previous episodes of pain. She denies chest pain. CT abdomen and pelvis showed biliary duct dilation and gen surgery was consulted. Patient seen and examined . Continues to have dull abdominal pain but is improved. No longer having nausea or vomiting. Had normal bowel movement this morning. O: VS- Blood pressure (!) 174/93, pulse 56, temperature 97.8 °F (36.6 °C), resp. rate 18, height 5' 4\" (1.626 m), weight 209 lb (94.8 kg), SpO2 97 %, not currently breastfeeding. Exam is as noted by resident with the following changes, additions or corrections:  Gen - lying in bed, NAD  Cardio - RRR, no murmur   Lungs - CTAB, no wheeze   Abd- BS+, soft, + abdominal ttp in right upper quadrant. Impressions:   Principal Problem:    Abdominal pain  Resolved Problems:    * No resolved hospital problems. *      Plan:   right upper quadrant pain with biliary duct dilation on CT scan   Hold ASA and brillinta until after MRCP  Start protonix   General surgery consulted and recs appreciated   Monitor LFTs and bili lipase, WBC   NPO   Fentanyl for pain        Attending Physician Statement  I have reviewed the chart, including any radiology or labs, and seen the patient with the resident(s). I personally reviewed and performed key elements of the history and exam.  I agree with the assessment, plan and orders as documented by the resident. Please refer to the resident note for additional information. Alberto Mauricio.  Ivon Agustin MD

## 2022-10-16 NOTE — ED PROVIDER NOTES
HPI:  10/15/22, Time: 8:28 PM EDT         Teressa Prasad is a 50 y.o. female presenting to the ED for abdominal pain right side, beginning then 1 hour ago. The complaint has been persistent, moderate in severity, and worsened by nothing. Patient reporting right-sided abdominal pain. Patient reports pain in her flank. Patient reporting she is about to urinate and started pain. Patient reports history of cholecystectomy she has had some nausea vomiting. Patient has had gastric bypass. She also reports history of cholecystectomy. Patient reporting no blood in her urine or any blood in her stool or vomit. Patient reporting no chest pain or difficulty breathing. Patient reporting chronic numbness to her right lower extremity. Patient reporting no upper or lower extremity weakness. She does report some right-sided flank pain. Patient denies any shortness of breath. ROS:   Pertinent positives and negatives are stated within HPI, all other systems reviewed and are negative.  --------------------------------------------- PAST HISTORY ---------------------------------------------  Past Medical History:  has a past medical history of Anorexia, Arthritis of knee, Back pain, chronic, Bilateral renal artery stenosis ([facility] [address] CAD (coronary artery disease), Chronic pain, Current severe episode of major depressive disorder without psychotic features without prior episode ([facility] [address] H/O gastric bypass, Hypertension, Hypertension, Metabolic acidosis, Ovarian cyst, and Seizures ([facility] [address] Past Surgical History:  has a past surgical history that includes Jeannine-en-Y Gastric Bypass (12/02/2008); Endometrial ablation (2003); Tonsillectomy (1980); Cholecystectomy, laparoscopic (09/19/1995); Upper gastrointestinal endoscopy (10/03/2008); Upper gastrointestinal endoscopy (01/09/2009); Upper gastrointestinal endoscopy (02/04/2009); laparoscopy (02/12/2009); Upper gastrointestinal endoscopy (03/06/2009);  Upper gastrointestinal endoscopy (06/04/2009); Upper gastrointestinal endoscopy (07/02/2009); Upper gastrointestinal endoscopy (10/27/2009); Upper gastrointestinal endoscopy (01/04/2010); Upper gastrointestinal endoscopy (06/07/2010); Stomach surgery (06/25/2010); Upper gastrointestinal endoscopy (07/30/2010); other surgical history (12/28/2011); Gastric bypass surgery; hernia repair; Hysterectomy (Left, 02/05/2013); Upper gastrointestinal endoscopy (N/A, 05/10/2019); Upper gastrointestinal endoscopy (N/A, 05/12/2019); Upper gastrointestinal endoscopy (N/A, 05/17/2019); Coronary angioplasty with stent (03/12/2022); and Coronary artery bypass graft (N/A, 6/22/2022). Social History:  reports that she quit smoking about 7 months ago. Her smoking use included cigarettes. She has a 7.50 pack-year smoking history. She has never used smokeless tobacco. She reports that she does not currently use alcohol. She reports current drug use. Frequency: 7.00 times per week. Drug: Marijuana Felix Fogo). Family History: family history includes ADHD in her paternal grandfather. The patients home medications have been reviewed. Allergies: Ultram [tramadol]    ---------------------------------------------------PHYSICAL EXAM--------------------------------------    Constitutional/General: Alert and oriented x3,   Head: Normocephalic and atraumatic  Eyes: PERRL, EOMI  Mouth: Oropharynx clear, handling secretions, no trismus  Neck: Supple, full ROM, non tender to palpation in the midline, no stridor, no crepitus, no meningeal signs  Pulmonary: Lungs clear to auscultation bilaterally, no wheezes, rales, or rhonchi. Not in respiratory distress  Cardiovascular:  Regular rate. Regular rhythm. No murmurs, gallops, or rubs. 2+ distal pulses  Chest: no chest wall tenderness  Abdomen: Soft. Tender right upper and lower abdomen as well as right flank. Non distended. +BS. No rebound, guarding, or rigidity. No pulsatile masses appreciated.   Musculoskeletal: Moves all extremities x 4. Warm and well perfused, no clubbing, cyanosis, or edema. Capillary refill <3 seconds. Pulses are intact distally  Skin: warm and dry. No rashes. Neurologic: GCS 15, CN 2-12 grossly intact, no focal deficits, symmetric strength 5/5 in the upper and lower extremities bilaterally  Psych: Normal Affect    -------------------------------------------------- RESULTS -------------------------------------------------  I have personally reviewed all laboratory and imaging results for this patient. Results are listed below.      LABS:  Results for orders placed or performed during the hospital encounter of 10/15/22   CBC with Auto Differential   Result Value Ref Range    WBC 7.1 4.5 - 11.5 E9/L    RBC 3.95 3.50 - 5.50 E12/L    Hemoglobin 11.7 11.5 - 15.5 g/dL    Hematocrit 35.7 34.0 - 48.0 %    MCV 90.4 80.0 - 99.9 fL    MCH 29.6 26.0 - 35.0 pg    MCHC 32.8 32.0 - 34.5 %    RDW 13.2 11.5 - 15.0 fL    Platelets 749 819 - 512 E9/L    MPV 11.8 7.0 - 12.0 fL    Neutrophils % 75.6 43.0 - 80.0 %    Immature Granulocytes % 0.3 0.0 - 5.0 %    Lymphocytes % 15.8 (L) 20.0 - 42.0 %    Monocytes % 5.9 2.0 - 12.0 %    Eosinophils % 2.0 0.0 - 6.0 %    Basophils % 0.4 0.0 - 2.0 %    Neutrophils Absolute 5.38 1.80 - 7.30 E9/L    Immature Granulocytes # 0.02 E9/L    Lymphocytes Absolute 1.12 (L) 1.50 - 4.00 E9/L    Monocytes Absolute 0.42 0.10 - 0.95 E9/L    Eosinophils Absolute 0.14 0.05 - 0.50 E9/L    Basophils Absolute 0.03 0.00 - 0.20 E9/L   Comprehensive Metabolic Panel   Result Value Ref Range    Sodium 138 132 - 146 mmol/L    Potassium 3.8 3.5 - 5.0 mmol/L    Chloride 104 98 - 107 mmol/L    CO2 21 (L) 22 - 29 mmol/L    Anion Gap 13 7 - 16 mmol/L    Glucose 106 (H) 74 - 99 mg/dL    BUN 18 6 - 20 mg/dL    Creatinine 1.2 (H) 0.5 - 1.0 mg/dL    GFR Non-African American 47 >=60 mL/min/1.73    GFR African American 57     Calcium 9.1 8.6 - 10.2 mg/dL    Total Protein 7.1 6.4 - 8.3 g/dL    Albumin 3.8 3.5 - 5.2 g/dL Total Bilirubin 0.3 0.0 - 1.2 mg/dL    Alkaline Phosphatase 273 (H) 35 - 104 U/L    ALT 82 (H) 0 - 32 U/L     (H) 0 - 31 U/L   Lipase   Result Value Ref Range    Lipase 25 13 - 60 U/L   Lactic Acid   Result Value Ref Range    Lactic Acid 1.0 0.5 - 2.2 mmol/L   Urinalysis   Result Value Ref Range    Color, UA Yellow Straw/Yellow    Clarity, UA Clear Clear    Glucose, Ur Negative Negative mg/dL    Bilirubin Urine Negative Negative    Ketones, Urine Negative Negative mg/dL    Specific Gravity, UA 1.015 1.005 - 1.030    Blood, Urine Negative Negative    pH, UA 7.0 5.0 - 9.0    Protein, UA Negative Negative mg/dL    Urobilinogen, Urine 1.0 <2.0 E.U./dL    Nitrite, Urine Negative Negative    Leukocyte Esterase, Urine Negative Negative       RADIOLOGY:  Interpreted by Radiologist.  CT ABDOMEN PELVIS W IV CONTRAST Additional Contrast? None   Preliminary Result   1. Biliary dilation redemonstrated, mildly worse. Suggestion of a small   chronic cystic duct calculus. 2. Suggestion of constipation and there are small bowel air-fluid levels. 3. Additionally dilation with retained contents within the gastric bypass   including afferent limb which is abnormal but present on prior exam.      RECOMMENDATIONS:   Clinical correlation. EKG: This EKG is signed and interpreted by me. Rate: 75  Rhythm: Sinus  Interpretation: non-specific EKG  Comparison: stable as compared to patient's most recent EKG      ------------------------- NURSING NOTES AND VITALS REVIEWED ---------------------------   The nursing notes within the ED encounter and vital signs as below have been reviewed by myself. BP (!) 160/93   Pulse 77   Temp 97.8 °F (36.6 °C)   Resp 18   Ht 5' 4\" (1.626 m)   Wt 209 lb (94.8 kg)   SpO2 99%   BMI 35.87 kg/m²   Oxygen Saturation Interpretation: Normal    The patients available past medical records and past encounters were reviewed.         ------------------------------ ED COURSE/MEDICAL DECISION MAKING----------------------  Medications   ondansetron (ZOFRAN) injection 4 mg (4 mg IntraVENous Given 10/15/22 2108)   sodium chloride flush 0.9 % injection 10 mL (has no administration in time range)   0.9 % sodium chloride bolus (1,000 mLs IntraVENous New Bag 10/15/22 2107)   fentaNYL (SUBLIMAZE) injection 25 mcg (25 mcg IntraVENous Given 10/15/22 2108)   iopamidol (ISOVUE-370) 76 % injection 75 mL (75 mLs IntraVENous Given 10/15/22 2147)             Medical Decision Making:   Patient presenting here because of abdominal pain that started hour prior arrival.  Patient reporting nausea vomiting. Patient reporting some urinary symptoms she reports no fever no chills he reports no chest pain. Patient labs noted reviewed. Patient does have prior history of cholecystectomy. Patient did undergo labs as well as CT around pelvis. There is some concern that she has retained stone in her common bile duct. Patient was medicated still having pain General surgery did evaluate patient and recommended admission and wanted primary to admit call was placed to [facility] for admission. Re-Evaluations:             Reevaluated and unchanged still having abdominal pain. Patient was made aware of findings and plan. Consultations:               Family Practice and general surgery  Critical Care: This patient's ED course included: a personal history and physicial eaxmination    This patient has been closely monitored during their ED course. Counseling: The emergency provider has spoken with the patient and discussed todays results, in addition to providing specific details for the plan of care and counseling regarding the diagnosis and prognosis. Questions are answered at this time and they are agreeable with the plan.       --------------------------------- IMPRESSION AND DISPOSITION ---------------------------------    IMPRESSION  1.  Abdominal pain, unspecified abdominal location DISPOSITION  Disposition: admit  Patient condition is stable        NOTE: This report was transcribed using voice recognition software.  Every effort was made to ensure accuracy; however, inadvertent computerized transcription errors may be present          Katelin Murray MD  10/16/22 [id number]

## 2022-10-16 NOTE — CONSULTS
HEPATOBILIARY AND PANCREATIC SURGERY  CONSULT NOTE  10/16/2022    Physician Consulted: Dr. Brynn Hodges   Reason for Consult: remnant gallbladder cholecystitis   Referring Physician: Dr. Kortney JONES  Miguel Angel Shabazz is a 50 y.o. female with history of CAD s/p CABG 6/2022, bilateral renal artery stenosis, HTN, depression, prior Jeannine-en-Y gastric bypass s/p reversal 2010 with history of marginal ulcer and gastric AVM and extensive abdominal surgical history is summarized below who presents for evaluation of right upper quadrant abdominal pain. Patient states that she began to have sharp/stabbing right upper quadrant abdominal pain while eating a stuffed Pasta shells for dinner yesterday evening. She states that this pain is sharp/stabbing and radiates to her right flank and right shoulder. She denies any prior episodes of abdominal pain similar to this. She states that this abdominal pain is associated with nausea/vomiting with approximately 3 episodes of emesis of food and gastric contents with out blood. Patient states she is still currently passing flatus and last BM was Friday evening which was brown and semisolid in consistency. Patient denies any fever, chills, hematemesis, bright red blood per rectum, black/tarry stools. Initial evaluation in the ED demonstrated, lab work notable for AST elevated 248, ALT elevated 82, alk phos elevated 273, bilirubin normal at 0.3, lipase normal at 25. Patient underwent CT of the abdomen pelvis with IV contrast which demonstrated dilation of intra and extrahepatic ducts with retained calculus at the remnant of the cystic duct from prior cholecystectomy as well as CBD dilated at greater than 1 cm. Surgical history: Cholecystectomy, Jeannine-en-Y s/p reversal with resection of jejunal ulcer 2010, total abdominal hysterectomy, prior incisional and umbilical hernia repair, as well as CABG 6/2022, multiple EGDs demonstrating marginal ulcers.   Patient currently denies any alcohol use. Patient has any prior history of hepatitis. Patient denies any IV drug abuse. Patient does admit to smoking marijuana daily with intermittent nausea related to this    Patient went for MRCP today showing dilated cystic duct 7mm with multiple retained stones, mild biliary ductal dilation without definite stones in CBD. Still endorsing RUQ tenderness and pain. No current n/v. Alk phos 350        Afebrile.  HR 51 /98      Past Medical History:   Diagnosis Date    Anorexia 06/01/2015    Arthritis of knee     Back pain, chronic     Bilateral renal artery stenosis ([address] 03/13/2022    CAD (coronary artery disease)     Chronic pain 06/01/2015    Current severe episode of major depressive disorder without psychotic features without prior episode ([address] 03/16/2021    H/O gastric bypass 06/01/2015    Hypertension 01/01/2012    Hypertension 36/56/4398    Metabolic acidosis 88/44/1501    Ovarian cyst     Seizures ([address]        Past Surgical History:   Procedure Laterality Date    CHOLECYSTECTOMY, LAPAROSCOPIC  09/19/1995    [address] [place] [address] WITH STENT PLACEMENT  03/12/2022    CORONARY ARTERY BYPASS GRAFT N/A 6/22/2022    CABG CORONARY ARTERY BYPASS GRAFTING x 3 NARGIS performed by Jacqueline Leblanc MD at [address] [place] [address] [address]  2003    [facility]    GASTRIC BYPASS SURGERY      HERNIA REPAIR      umbil    HYSTERECTOMY (CERVIX STATUS UNKNOWN) Left 02/05/2013    Laparotomy;HUGO;JOSUÉ:LSO    LAPAROSCOPY  02/12/2009    lap assisted percutaneous ERCP and removal CBD stone and gastrostomy, [address] [facility]  12/28/2011    exp lap, hugo, rt so/ repair hernia    EMILY-EN-Y GASTRIC BYPASS  12/02/2008    laparoscopic RYGB, Dr. Oly Francois, [address] [facility]  06/25/2010    resection of jejunal ulcer, reversal gastric bypass, jejunoduodenostomy, feeding jejunostomy, Dr. Oly Francois, [facility] 10/03/2008    mild gastritis & duodenitis, Dr. Shelby Calero, [facility]  01/09/2009    multiple severe anastomotic jejunal ulcers, Dr. Shelby Calero. [facility]    UPPER GASTROINTESTINAL ENDOSCOPY  02/04/2009    ulcers healed, normal, Dr. Crista Barrera, [facility]  03/06/2009    recurrent severe anastomotic jejunal ulcers, Dr. Shelby Calero, [facility]  06/04/2009    severe anastomotic jejunal ulcers, Dr. Shelby Calero, [facility]  07/02/2009    severe anastomotic jejunal ulcers, Dr. Shelby Calero, [facility]  10/27/2009    severe anastomotic jejunal ulcers, Dr. Shelby Calero, [facility]  01/04/2010    severe anastomotic jejunal ulcers, Dr. Shelby Calreo, [facility]OS[facility]  06/07/2010    severe anastomotic jejunal ulcers, Dr. Shelby Calero, [address] GASTROINTESTINAL ENDOSCOPY  07/30/2010    gastritis, Dr. Shelby Calero, [facility] N/A 05/10/2019    EGD ESOPHAGOGASTRODUODENOSCOPY performed by Micah Hendricks MD at [address] [place] [address] 05/12/2019    EGD DIAGNOSTIC ONLY performed by Micah Hendricks MD at [facility] N/A 05/17/2019    EGD ESOPHAGOGASTRODUODENOSCOPY performed by Denise Moyer MD at [address] [place] [address]       Medications Prior to Admission:    Prior to Admission medications    Medication Sig Start Date End Date Taking? Authorizing Provider   gabapentin (NEURONTIN) 100 MG capsule Take 1 capsule by mouth in the morning and 1 capsule at noon and 1 capsule before bedtime. Do all this for 30 days.  8/7/22 9/6/22  DA Branch NP   isosorbide mononitrate (IMDUR) 30 MG extended release tablet Take 1 tablet by mouth daily 6/27/22 6/27/23  Tim Freeman PA-C   metoprolol succinate (TOPROL XL) 50 MG extended release tablet Take 1 tablet by mouth 2 times daily 6/27/22   Bennett Arroyo ORIANA Amaya   amLODIPine (NORVASC) 5 MG tablet Take 1 tablet by mouth daily 22   Merle Orosco PA-C   ferrous sulfate (IRON 325) 325 (65 Fe) MG tablet Take 1 tablet by mouth 2 times daily (with meals) 22  Merle Orosco PA-C   folic acid (FOLVITE) 1 MG tablet Take 1 tablet by mouth daily 22  Merle Orosoc PA-C   pantoprazole (PROTONIX) 40 MG tablet Take 1 tablet by mouth daily for 14 days 22  Merle Orosco PA-C   ascorbic acid (VITAMIN C) 500 MG tablet Take 1 tablet by mouth 2 times daily 22  Merle Orosco PA-C   ticagrelor (BRILINTA) 90 MG TABS tablet Take 1 tablet by mouth 2 times daily 22  Jennelle Goldmann, MD   rosuvastatin (CRESTOR) 40 MG tablet Take 1 tablet by mouth daily 22   Jennelle Goldmann, MD   aspirin 81 MG EC tablet Take 1 tablet by mouth daily 22   Jennelle Goldmann, MD   escitalopram (LEXAPRO) 5 MG tablet Take 1 tablet by mouth daily 3/25/22   Jordon Robledo MD   vitamin D (ERGOCALCIFEROL) 1.25 MG (36302 UT) CAPS capsule take 1 capsule by mouth every  AND  UNITS DAILY EXCEPT NATASHA 3/16/22   Rohith Gatica MD   Multiple Vitamins-Minerals (MULTIVITAMIN ADULT) TABS Take 1 tablet by mouth daily 4/30/20 3/16/21  DA Amador - CNP       Allergies   Allergen Reactions    Ultram [Tramadol] Other (See Comments)     Pt states she had a seizure after taking ultram       Family History   Problem Relation Age of Onset    ADHD Paternal Grandfather        Social History     Tobacco Use    Smoking status: Former     Packs/day: 0.50     Years: 15.00     Pack years: 7.50     Types: Cigarettes     Quit date: 3/11/2022     Years since quittin.6    Smokeless tobacco: Never   Vaping Use    Vaping Use: Never used   Substance Use Topics    Alcohol use: Not Currently     Comment: rare    Drug use: Yes     Frequency: 7.0 times per week     Types: Marijuana [place])         Review of Systems - pertinent ROS in HPI       PHYSICAL EXAM:    Vitals:    10/16/22 1942   BP: (!) 208/98   Pulse: 51   Resp: 20   Temp: 97.2 °F (36.2 °C)   SpO2: 97%       General Appearance:  awake, alert, oriented, in no acute distress  Skin:  Skin color, texture, turgor normal. No rashes or lesions. Head/face:  NCAT  Eyes:  No gross abnormalities. Lungs:  Normal expansion. Clear to auscultation. No rales, rhonchi, or wheezing. Heart:  Heart regular rate and rhythm  Abdomen:  soft. Non-distended. RUQ tenderness to palpation. No rebound, no guarding. Extremities: Extremities warm to touch, pink, with no edema. LABS:    CBC  Recent Labs     10/15/22  2037   WBC 7.1   HGB 11.7   HCT 35.7        BMP  Recent Labs     10/16/22  0650      K 3.9      CO2 21*   BUN 16   CREATININE 1.0   CALCIUM 9.1     Liver Function  Recent Labs     10/15/22  2037 10/16/22  0650   LIPASE 25  --    BILITOT 0.3 0.3   * 617*   ALT 82* 351*   ALKPHOS 273* 350*   PROT 7.1 6.6   LABALBU 3.8 3.6     No results for input(s): LACTATE in the last 72 hours. No results for input(s): INR, PTT in the last 72 hours. Invalid input(s): PT    RADIOLOGY    CT ABDOMEN PELVIS W IV CONTRAST Additional Contrast? None    Result Date: 10/16/2022  EXAMINATION: CT OF THE ABDOMEN AND PELVIS WITH CONTRAST 10/15/2022 9:48 pm TECHNIQUE: CT of the abdomen and pelvis was performed with the administration of intravenous contrast. Multiplanar reformatted images are provided for review. Automated exposure control, iterative reconstruction, and/or weight based adjustment of the mA/kV was utilized to reduce the radiation dose to as low as reasonably achievable.  COMPARISON: CTA chest 3-22, CT abdomen and pelvis 6-21 HISTORY: ORDERING SYSTEM PROVIDED HISTORY: abdominal pain TECHNOLOGIST PROVIDED HISTORY: Reason for exam:->abdominal pain Additional Contrast?->None Decision Support Exception - unselect if not a suspected or confirmed emergency medical condition->Emergency Medical Condition ([place]) What reading provider will be dictating this exam?->CRC FINDINGS: Lower Chest: Low-grade basilar atelectasis, trace pleural fluid. Organs: Biliary dilation, post cholecystectomy. Question residual calculus image 67 in the cystic duct region. Pancreatic atrophy. There are renal stents particularly of the left side protrude into the aorta. GI/Bowel: Gastric bypass. Note of distension with retained contents within the excluded portion somewhat of a feces sign. The appearance is relatively similar to 3-22 chest CTA. Nonspecific air-fluid levels of pelvic to abdominal small bowel loops and possible constipation. Pelvis: Post hysterectomy. Peritoneum/Retroperitoneum: No ascites. Bones/Soft Tissues: Lumbar degenerative changes relatively prominent at L4-5 disc. 1. Biliary dilation redemonstrated, mildly worse. Suggestion of a small chronic cystic duct calculus. 2. Suggestion of constipation and there are small bowel air-fluid levels. 3. Additionally dilation with retained contents within the gastric bypass including afferent limb which is abnormal but present on prior exam. RECOMMENDATIONS: Clinical correlation. MRI ABDOMEN W WO CONTRAST MRCP    Result Date: 10/16/2022  EXAMINATION: MRI OF THE ABDOMEN WITH AND WITHOUT CONTRAST AND MRCP 10/16/2022 2:00 pm TECHNIQUE: Multiplanar multisequence MRI of the abdomen was performed without and with the administration of 20 mL ProHance intravenous contrast.  After initial T2 axial and coronal images, thick slab, thin slab and 3D coronal MRCP sequences were obtained without the administration of intravenous contrast.  MIP images are provided for review. COMPARISON: CT from yesterday HISTORY: Biliary ductal dilatation. FINDINGS: Image quality significantly degraded by motion artifact. Liver demonstrates normal morphology without steatosis. No liver lesion seen. Hepatic vasculature is patent. Cholecystectomy.   The cystic duct remnant is dilated and appears to contain several stones measuring up to 7 mm. Mild intrahepatic biliary ductal dilatation. The extrahepatic bile duct measures up to 6 mm with no intraductal filling defect seen. Pancreatic duct is not dilated and there is no pancreas divisum. Spleen, pancreas, and adrenals are unremarkable. Kidneys demonstrate symmetric enhancement without hydronephrosis. No ascites or significant lymphadenopathy. Status post gastric bypass. Visualized osseous structures are unremarkable. Abdominal aorta is normal caliber. Susceptibility artifact from sternotomy changes. Limited evaluation due to motion artifact. The cystic duct remnant is dilated and appears to contain several stones measuring up to 7 mm. Mild biliary ductal dilatation without definite stones in the common bile duct. The degree of extrahepatic biliary ductal dilatation is not as pronounced as on CT from yesterday.          ASSESSMENT:  50 y.o. female with remnant gallbladder cholecystitis due to cholelithiasis    PLAN:  - start IV abx   - Absolutely NO Brilinta  - hold lovenox the morning of surgery  - Open completion cholecystectomy on Wednesday with possible common bile duct exploration, intraoperative ultrasound    Electronically signed by Viral Gacria MD on 10/17/2022 at 12:46 PM

## 2022-10-17 PROBLEM — K80.00 ACUTE CHOLECYSTITIS DUE TO BILIARY CALCULUS: Status: ACTIVE | Noted: 2022-10-17

## 2022-10-17 PROBLEM — K81.0 CHOLECYSTITIS, ACUTE: Status: ACTIVE | Noted: 2022-10-17

## 2022-10-17 LAB
ABO/RH: NORMAL
ALBUMIN SERPL-MCNC: 4 G/DL (ref 3.5–5.2)
ALP BLD-CCNC: 386 U/L (ref 35–104)
ALT SERPL-CCNC: 243 U/L (ref 0–32)
ANION GAP SERPL CALCULATED.3IONS-SCNC: 20 MMOL/L (ref 7–16)
ANTIBODY IDENTIFICATION: NORMAL
ANTIBODY SCREEN: NORMAL
AST SERPL-CCNC: 187 U/L (ref 0–31)
BASOPHILS ABSOLUTE: 0.04 E9/L (ref 0–0.2)
BASOPHILS RELATIVE PERCENT: 0.7 % (ref 0–2)
BILIRUB SERPL-MCNC: 0.8 MG/DL (ref 0–1.2)
BUN BLDV-MCNC: 12 MG/DL (ref 6–20)
CALCIUM SERPL-MCNC: 9.8 MG/DL (ref 8.6–10.2)
CHLORIDE BLD-SCNC: 101 MMOL/L (ref 98–107)
CO2: 16 MMOL/L (ref 22–29)
CREAT SERPL-MCNC: 0.9 MG/DL (ref 0.5–1)
DR. NOTIFY: NORMAL
EKG ATRIAL RATE: 75 BPM
EKG P AXIS: 27 DEGREES
EKG P-R INTERVAL: 148 MS
EKG Q-T INTERVAL: 426 MS
EKG QRS DURATION: 88 MS
EKG QTC CALCULATION (BAZETT): 475 MS
EKG R AXIS: 2 DEGREES
EKG T AXIS: 33 DEGREES
EKG VENTRICULAR RATE: 75 BPM
EOSINOPHILS ABSOLUTE: 0.16 E9/L (ref 0.05–0.5)
EOSINOPHILS RELATIVE PERCENT: 2.6 % (ref 0–6)
GFR AFRICAN AMERICAN: >60
GFR NON-AFRICAN AMERICAN: >60 ML/MIN/1.73
GLUCOSE BLD-MCNC: 98 MG/DL (ref 74–99)
HCT VFR BLD CALC: 41.6 % (ref 34–48)
HEMOGLOBIN: 12.8 G/DL (ref 11.5–15.5)
IMMATURE GRANULOCYTES #: 0.02 E9/L
IMMATURE GRANULOCYTES %: 0.3 % (ref 0–5)
LYMPHOCYTES ABSOLUTE: 0.84 E9/L (ref 1.5–4)
LYMPHOCYTES RELATIVE PERCENT: 13.9 % (ref 20–42)
MCH RBC QN AUTO: 29.6 PG (ref 26–35)
MCHC RBC AUTO-ENTMCNC: 30.8 % (ref 32–34.5)
MCV RBC AUTO: 96.3 FL (ref 80–99.9)
MONOCYTES ABSOLUTE: 0.41 E9/L (ref 0.1–0.95)
MONOCYTES RELATIVE PERCENT: 6.8 % (ref 2–12)
NEUTROPHILS ABSOLUTE: 4.58 E9/L (ref 1.8–7.3)
NEUTROPHILS RELATIVE PERCENT: 75.7 % (ref 43–80)
PDW BLD-RTO: 13.6 FL (ref 11.5–15)
PLATELET # BLD: 171 E9/L (ref 130–450)
PMV BLD AUTO: 12 FL (ref 7–12)
POTASSIUM SERPL-SCNC: 4.3 MMOL/L (ref 3.5–5)
RBC # BLD: 4.32 E12/L (ref 3.5–5.5)
SODIUM BLD-SCNC: 137 MMOL/L (ref 132–146)
TOTAL PROTEIN: 7.7 G/DL (ref 6.4–8.3)
WBC # BLD: 6.1 E9/L (ref 4.5–11.5)

## 2022-10-17 PROCEDURE — 85025 COMPLETE CBC W/AUTO DIFF WBC: CPT

## 2022-10-17 PROCEDURE — 99232 SBSQ HOSP IP/OBS MODERATE 35: CPT | Performed by: FAMILY MEDICINE

## 2022-10-17 PROCEDURE — 86900 BLOOD TYPING SEROLOGIC ABO: CPT

## 2022-10-17 PROCEDURE — 6360000002 HC RX W HCPCS: Performed by: STUDENT IN AN ORGANIZED HEALTH CARE EDUCATION/TRAINING PROGRAM

## 2022-10-17 PROCEDURE — 86880 COOMBS TEST DIRECT: CPT

## 2022-10-17 PROCEDURE — 2580000003 HC RX 258: Performed by: STUDENT IN AN ORGANIZED HEALTH CARE EDUCATION/TRAINING PROGRAM

## 2022-10-17 PROCEDURE — A4216 STERILE WATER/SALINE, 10 ML: HCPCS | Performed by: STUDENT IN AN ORGANIZED HEALTH CARE EDUCATION/TRAINING PROGRAM

## 2022-10-17 PROCEDURE — C9113 INJ PANTOPRAZOLE SODIUM, VIA: HCPCS | Performed by: STUDENT IN AN ORGANIZED HEALTH CARE EDUCATION/TRAINING PROGRAM

## 2022-10-17 PROCEDURE — 86901 BLOOD TYPING SEROLOGIC RH(D): CPT

## 2022-10-17 PROCEDURE — 96366 THER/PROPH/DIAG IV INF ADDON: CPT

## 2022-10-17 PROCEDURE — 80053 COMPREHEN METABOLIC PANEL: CPT

## 2022-10-17 PROCEDURE — 2580000003 HC RX 258

## 2022-10-17 PROCEDURE — 96365 THER/PROPH/DIAG IV INF INIT: CPT

## 2022-10-17 PROCEDURE — 6370000000 HC RX 637 (ALT 250 FOR IP): Performed by: STUDENT IN AN ORGANIZED HEALTH CARE EDUCATION/TRAINING PROGRAM

## 2022-10-17 PROCEDURE — 86850 RBC ANTIBODY SCREEN: CPT

## 2022-10-17 PROCEDURE — 2140000000 HC CCU INTERMEDIATE R&B

## 2022-10-17 PROCEDURE — 87040 BLOOD CULTURE FOR BACTERIA: CPT

## 2022-10-17 PROCEDURE — 86870 RBC ANTIBODY IDENTIFICATION: CPT

## 2022-10-17 PROCEDURE — 6360000002 HC RX W HCPCS

## 2022-10-17 PROCEDURE — 36415 COLL VENOUS BLD VENIPUNCTURE: CPT

## 2022-10-17 PROCEDURE — 86922 COMPATIBILITY TEST ANTIGLOB: CPT

## 2022-10-17 PROCEDURE — 96376 TX/PRO/DX INJ SAME DRUG ADON: CPT

## 2022-10-17 PROCEDURE — 99254 IP/OBS CNSLTJ NEW/EST MOD 60: CPT | Performed by: TRANSPLANT SURGERY

## 2022-10-17 RX ORDER — AMLODIPINE BESYLATE 5 MG/1
5 TABLET ORAL ONCE
Status: COMPLETED | OUTPATIENT
Start: 2022-10-17 | End: 2022-10-17

## 2022-10-17 RX ORDER — LIDOCAINE 4 G/G
1 PATCH TOPICAL DAILY
Status: DISCONTINUED | OUTPATIENT
Start: 2022-10-17 | End: 2022-10-23 | Stop reason: HOSPADM

## 2022-10-17 RX ORDER — ENOXAPARIN SODIUM 100 MG/ML
40 INJECTION SUBCUTANEOUS DAILY
Status: COMPLETED | OUTPATIENT
Start: 2022-10-17 | End: 2022-10-18

## 2022-10-17 RX ORDER — SODIUM CHLORIDE, SODIUM LACTATE, POTASSIUM CHLORIDE, CALCIUM CHLORIDE 600; 310; 30; 20 MG/100ML; MG/100ML; MG/100ML; MG/100ML
INJECTION, SOLUTION INTRAVENOUS CONTINUOUS
Status: DISCONTINUED | OUTPATIENT
Start: 2022-10-19 | End: 2022-10-18

## 2022-10-17 RX ORDER — ACETAMINOPHEN 500 MG
500 TABLET ORAL EVERY 8 HOURS PRN
Status: DISCONTINUED | OUTPATIENT
Start: 2022-10-17 | End: 2022-10-19

## 2022-10-17 RX ORDER — AMLODIPINE BESYLATE 10 MG/1
10 TABLET ORAL DAILY
Status: DISCONTINUED | OUTPATIENT
Start: 2022-10-18 | End: 2022-10-19

## 2022-10-17 RX ADMIN — ENOXAPARIN SODIUM 40 MG: 100 INJECTION SUBCUTANEOUS at 12:20

## 2022-10-17 RX ADMIN — AMLODIPINE BESYLATE 5 MG: 5 TABLET ORAL at 08:52

## 2022-10-17 RX ADMIN — ONDANSETRON 4 MG: 2 INJECTION INTRAMUSCULAR; INTRAVENOUS at 08:48

## 2022-10-17 RX ADMIN — PIPERACILLIN AND TAZOBACTAM 3375 MG: 3; .375 INJECTION, POWDER, FOR SOLUTION INTRAVENOUS at 04:52

## 2022-10-17 RX ADMIN — FENTANYL CITRATE 25 MCG: 50 INJECTION, SOLUTION INTRAMUSCULAR; INTRAVENOUS at 21:42

## 2022-10-17 RX ADMIN — FENTANYL CITRATE 25 MCG: 50 INJECTION, SOLUTION INTRAMUSCULAR; INTRAVENOUS at 02:53

## 2022-10-17 RX ADMIN — Medication 10 ML: at 08:52

## 2022-10-17 RX ADMIN — SODIUM CHLORIDE, PRESERVATIVE FREE 40 MG: 5 INJECTION INTRAVENOUS at 08:52

## 2022-10-17 RX ADMIN — ROSUVASTATIN CALCIUM 40 MG: 20 TABLET, COATED ORAL at 08:49

## 2022-10-17 RX ADMIN — FENTANYL CITRATE 25 MCG: 50 INJECTION, SOLUTION INTRAMUSCULAR; INTRAVENOUS at 17:21

## 2022-10-17 RX ADMIN — PIPERACILLIN AND TAZOBACTAM 3375 MG: 3; .375 INJECTION, POWDER, FOR SOLUTION INTRAVENOUS at 20:24

## 2022-10-17 RX ADMIN — SODIUM CHLORIDE, PRESERVATIVE FREE 10 ML: 5 INJECTION INTRAVENOUS at 15:29

## 2022-10-17 RX ADMIN — ONDANSETRON 4 MG: 2 INJECTION INTRAMUSCULAR; INTRAVENOUS at 02:53

## 2022-10-17 RX ADMIN — ISOSORBIDE MONONITRATE 30 MG: 30 TABLET, EXTENDED RELEASE ORAL at 08:50

## 2022-10-17 RX ADMIN — Medication 10 ML: at 20:24

## 2022-10-17 RX ADMIN — AMLODIPINE BESYLATE 5 MG: 5 TABLET ORAL at 11:00

## 2022-10-17 RX ADMIN — FENTANYL CITRATE 25 MCG: 50 INJECTION, SOLUTION INTRAMUSCULAR; INTRAVENOUS at 12:17

## 2022-10-17 RX ADMIN — FERROUS SULFATE TAB 325 MG (65 MG ELEMENTAL FE) 325 MG: 325 (65 FE) TAB at 08:49

## 2022-10-17 RX ADMIN — PIPERACILLIN AND TAZOBACTAM 3375 MG: 3; .375 INJECTION, POWDER, FOR SOLUTION INTRAVENOUS at 12:30

## 2022-10-17 RX ADMIN — METOPROLOL SUCCINATE 50 MG: 50 TABLET, EXTENDED RELEASE ORAL at 08:51

## 2022-10-17 RX ADMIN — ONDANSETRON 4 MG: 2 INJECTION INTRAMUSCULAR; INTRAVENOUS at 15:29

## 2022-10-17 RX ADMIN — FENTANYL CITRATE 25 MCG: 50 INJECTION, SOLUTION INTRAMUSCULAR; INTRAVENOUS at 08:46

## 2022-10-17 RX ADMIN — ONDANSETRON 4 MG: 2 INJECTION INTRAMUSCULAR; INTRAVENOUS at 21:42

## 2022-10-17 RX ADMIN — METOPROLOL SUCCINATE 50 MG: 50 TABLET, EXTENDED RELEASE ORAL at 20:24

## 2022-10-17 RX ADMIN — ESCITALOPRAM 5 MG: 5 TABLET, FILM COATED ORAL at 08:50

## 2022-10-17 ASSESSMENT — PAIN SCALES - GENERAL
PAINLEVEL_OUTOF10: 8
PAINLEVEL_OUTOF10: 10
PAINLEVEL_OUTOF10: 8

## 2022-10-17 ASSESSMENT — PAIN DESCRIPTION - DESCRIPTORS
DESCRIPTORS: ACHING
DESCRIPTORS: ACHING

## 2022-10-17 ASSESSMENT — PAIN DESCRIPTION - LOCATION
LOCATION: ABDOMEN

## 2022-10-17 ASSESSMENT — PAIN DESCRIPTION - ORIENTATION
ORIENTATION: RIGHT
ORIENTATION: RIGHT

## 2022-10-17 NOTE — PROGRESS NOTES
HEPATOBILIARY SURGERY  DAILY PROGRESS NOTE  10/17/2022    Subjective:  Nauseated with episodes of emesis yesterday evening. Abdominal pain has improved. Feeling better overall    Objective:  BP (!) 208/98   Pulse 51   Temp 97.2 °F (36.2 °C) (Temporal)   Resp 20   Ht 5' 4\" (1.626 m)   Wt 209 lb (94.8 kg)   SpO2 97%   BMI 35.87 kg/m²     General Appearance:  awake, alert, oriented, in no acute distress  Skin:  Skin color, texture, turgor normal  Head/face:  NCAT  Eyes:  No gross abnormalities. Sclera nonicteric  Lungs/Chest:  Normal expansion. No respiratory distress. On room air  Heart: Warm throughout. Regular rate   Abdomen:  Soft, mild RUQ tenderness, non distended. Extremities: Extremities warm to touch, pink      Awaiting morning lab results    Assessment/Plan:  50 y.o. female with remnant gallbladder cholecystitis due to cholelithiasis     - - Absolutely NO Brilinta  - hold lovenox the morning of surgery  - Patient and family were made aware of the risks, benefits, alternatives, and complications of a Open completion cholecystectomy on Wednesday with possible common bile duct exploration, intraoperative ultrasound and wish to proceed with surgery.         Electronically signed by Michaelle Boyd MD on 10/17/2022 at 12:48 PM

## 2022-10-17 NOTE — PROGRESS NOTES
GENERAL SURGERY  DAILY PROGRESS NOTE  10/17/2022  Chief Complaint   Patient presents with    Abdominal Pain     Patient to the ED for right lower quadrant pain that started 1hr ago. Subjective:  C/o nausea, and a few episodes of emesis overngiht. States feeling a bit better.   Pain improved    Objective:  BP (!) 208/98   Pulse 51   Temp 97.2 °F (36.2 °C) (Temporal)   Resp 20   Ht 5' 4\" (1.626 m)   Wt 209 lb (94.8 kg)   SpO2 97%   BMI 35.87 kg/m²     GENERAL:  Laying in bed, awake, alert, cooperative, no apparent distress  HEAD: Normocephalic, atraumatic  EYES: No sclera icterus, pupils equal  LUNGS:  No increased work of breathing  CARDIOVASCULAR:  regular rate  ABDOMEN:  Soft, non-distended, mild RUQ tenderness  EXTREMITIES: No edema or swelling  SKIN: Warm and dry, no rashes or lesions    Assessment/Plan:  50 y.o. female w RUQ pain, nausea and likely remnant gallbladder cholecystitis    Appreciate HPB recs  Continue Zosyn  Hold brilinta  No current plans for surgery but will eventually need intervention  Monitor LFTs/bili  Diet as tolerated  Will defer to HPB for surgical intervention timing  Please call as needed for questions    Electronically signed by Eileen Garza DO on 10/17/2022 at 6:12 AM

## 2022-10-17 NOTE — PROGRESS NOTES
GENERAL SURGERY  DAILY PROGRESS NOTE  10/17/2022    Subjective:  Enochfede Dalia. Patient states that she continues to feel nauseated and cannot tolerate PO intake. Denies emesis. Denies changes in urinary habits. Last bowel movement was yesterday morning and was normal in color and consistency. Ambulating without difficulty and educated on incentive spirometry. Objective:  BP (!) 208/98   Pulse 51   Temp 97.2 °F (36.2 °C) (Temporal)   Resp 20   Ht 5' 4\" (1.626 m)   Wt 209 lb (94.8 kg)   SpO2 97%   BMI 35.87 kg/m²     General appearance: alert, cooperative and in no acute distress. Eyes: grossly normal  Lungs: nonlabored breathing ORA. No wheezes or rhonchi   Heart: regular rate and rhythm. No rubs, murmurs or gallops  Abdomen: Soft, non distended, TTP along right abdomen. No rebound or guarding. Skin: No skin abnormalities  Neurologic: Alert and oriented x 3.  Grossly normal  Musculoskeletal: No clubbing cyanosis or edema    Assessment/Plan:  50 y.o. female with RUQ pain and nausea with prior cholecystectomy most likely has remnant gallbladder cholecystitis    HPB c/s, appreciate recs  Continue Zosyn, pain and nausea control   Diet as tolerated  Encourage IS and ambulation  White team to sign off, green is following     Electronically signed by Graciela Spencer on 10/17/2022 at 6:05 AM

## 2022-10-17 NOTE — CARE COORDINATION
10/17 Care Coordination:Pt to ED  w RUQ pain, nausea and likely remnant gallbladder cholecystitis. Plan for Surgery 10/18. continue antibiotics. PMH of gastric bypass, CAD s/p CABG. PTA Pt was independent, Lives with her family. Discharge paln is return home. HAs [facility] after her CABG in June. Will monitor needs after Surgery 10/18 . CM/SW will continue to follow for discharge planning.    Ava TILLEYN,RN-CV-BC  [phone]

## 2022-10-17 NOTE — PROGRESS NOTES
[facility] Attending    S: 50 y.o. female with history of history of gastric bypass, CAD s/p CABG presented to the ED for right sided abdominal pain started day of admission. Pain became 10/10 and she called EMS. Pain happened after eating and associated with some nausea and vomiting. No fevers, chills, nausea, vomiting. No previous episodes of pain. She denies chest pain. CT abdomen and pelvis showed biliary duct dilation and gen surgery was consulted. Patient seen and examined . Continues to have dull abdominal pain but is improved. C/o bifrontal HA overnight and posterior shoulder/back pain overnight. O: VS- Blood pressure (!) 160/120, pulse 56, temperature 97.9 °F (36.6 °C), temperature source Temporal, resp. rate 18, height 5' 4\" (1.626 m), weight 209 lb (94.8 kg), SpO2 96 %, not currently breastfeeding. Exam is as noted by resident with the following changes, additions or corrections:  Gen - lying in bed, NAD, AAOx3  Shoulder/Back: LEFT side, mild TTP to palpation, changes w/ movement of shoulder. Cardio - RRR, no murmur   Lungs - CTAB, no wheeze   Abd- BS+, soft, + abdominal ttp in right upper quadrant. Neuro: CN2-12 intact, CANTU=. 2+ DTR in biceps/Patella. Distal sensation intact (chronic LLE decreased sensation). Finger to nose normal.     Impressions:   Principal Problem:    Abdominal pain  Active Problems:    Cholecystitis, acute  Resolved Problems:    * No resolved hospital problems. *      Plan:   right upper quadrant pain with biliary duct dilation on CT scan   Hepatobilliary Team - rec open surgery for removal of remnant stones 10/19. Cont Zosyn. Hold ASA and brillinta    Headache - Suspect BP related, has improved o/n. Tylenol Prn. Work on BP control. HTN - increase amlodipine to 10mg. Shoulder Ache - likely positional, lidocaine patch, tylenol 500mg po tid.      Monitor LFTs and bili lipase, WBC   Diet as tolerated  Fentanyl for pain     DVT

## 2022-10-17 NOTE — PROGRESS NOTES
[facility] Inpatient   Resident Progress Note    S:  Hospital day: 0    Brief Synopsis: Klever Valdez is a 50 y.o. female with a PMH of hypertension, history of gastric bypass, CAD status post CABG in 6/2022, depression and bilateral renal artery stenosis presented to ED for abdominal pain for RUQ and RLQ that started after food. Pain was sharp and constant in nature. It was not alleviated with positioning. Patient also reports nausea and vomiting. Vomitus was non-bloody. She denies chest pain, SOB or loose BM. In the ED, patient was given fentanyl for pain management and general surgery was consulted. CT abdomen and pelvis showed biliary dilatation. Patient was admitted for abdominal pain possibly biliary stones. IV Zosyn was started. HBP was consulted and MRCP was ordered. Home ASA and Brilinta was held. ALP, AST and ALT were elevated, AST and ALT trended down. It showed several stones in the biliary duct up to 7mm. Open surgery for removal of stones is planned for 10/19. Patient had been having elevated BP since she was admitted. Currently, only on home dose Norvasc and Toprol XL. Overnight, BP was elevated to 208/98. Patient complained of headache since. Patient was seen and examined this morning. She continues to have a headache but denies any visual disturbances. She also has pain in her back, left subscapular region. It is sharp and constant. Abdominal pain has subsided, continues to be nauseous. She had multiple episodes of emesis last night. Vomitus is clear.  She reports her last BM yesterday AM.      Cont meds:    sodium chloride       Scheduled meds:    [START ON 10/18/2022] amLODIPine  10 mg Oral Daily    amLODIPine  5 mg Oral Once    lidocaine  1 patch TransDERmal Daily    enoxaparin  40 mg SubCUTAneous Daily    pantoprazole (PROTONIX) 40 mg injection  40 mg IntraVENous Daily    [Held by provider] aspirin  81 mg Oral Daily    escitalopram  5 mg Oral Daily    isosorbide mononitrate  30 mg Oral Daily    metoprolol succinate  50 mg Oral BID    rosuvastatin  40 mg Oral Daily    [Held by provider] ticagrelor  90 mg Oral BID    ferrous sulfate  325 mg Oral BID WC    sodium chloride flush  5-40 mL IntraVENous 2 times per day    piperacillin-tazobactam  3,375 mg IntraVENous Q8H     PRN meds: acetaminophen, fentanNYL, sodium chloride flush, sodium chloride, polyethylene glycol, ondansetron, sodium chloride flush     I reviewed the patient's Past Medical and Surgical History, Medications and Allergies. O:  VS: BP (!) 160/120   Pulse 56   Temp 97.9 °F (36.6 °C) (Temporal)   Resp 18   Ht 5' 4\" (1.626 m)   Wt 209 lb (94.8 kg)   SpO2 96%   BMI 35.87 kg/m²     Physical Exam:  General: Alert, cooperative, no acute distress. HEENT: Normocephalic, atraumatic. PERRLA, conjunctiva/corneas clear    Chest: No tenderness or deformity, full & symmetric excursion   Lung: Clear to auscultation bilaterally,  respirations unlabored. No rales/wheezing/rubs   Heart: RRR, S1 and S2 normal, no murmur, rub or gallop. DP pulses 2/4   Abdomen: Soft, nondistended, tender to palpation on right lower and upper quadrant, no masses, no organomegaly, no guarding, rebound or rigidity. Genital/Rectal: deferred   Extremities:  Extremities normal, atraumatic, no cyanosis or edema. Distal pulses equal bilaterally   Skin: Skin color, texture, turgor normal, no rashes or lesions   Musculoskeletal: No joint swelling, tender to palpation on left subscapular region. Normal ROM in extremities. Neurologic: Alert & Oriented; Normal and symmetric strength in UEs and LEs; Sensation intact, CN 1-12 intact. Psychiatric: appropriate affect. Intact judgment and insight. Labs:  Na/K/Cl/CO2:  137/4.3/101/16 (10/17 6928)  BUN/Cr/glu/ALT/AST/amyl/lip:  12/0.9/--/243/187/--/-- (10/17 2348)  WBC/Hgb/Hct/Plts:  6.1/12.8/41.6/171 (10/17 2343)  estimated creatinine clearance is 83 mL/min (based on SCr of 0.9 mg/dL).   Other pertinent labs as noted below    New Imaging:  MRI ABDOMEN W WO CONTRAST MRCP   Final Result   Limited evaluation due to motion artifact. The cystic duct remnant is   dilated and appears to contain several stones measuring up to 7 mm. Mild   biliary ductal dilatation without definite stones in the common bile duct. The degree of extrahepatic biliary ductal dilatation is not as pronounced as   on CT from yesterday. CT ABDOMEN PELVIS W IV CONTRAST Additional Contrast? None   Final Result   1. Biliary dilation redemonstrated, mildly worse. Suggestion of a small   chronic cystic duct calculus. 2. Suggestion of constipation and there are small bowel air-fluid levels. 3. Additionally dilation with retained contents within the gastric bypass   including afferent limb which is abnormal but present on prior exam.      RECOMMENDATIONS:   Clinical correlation. A/P:  Principal Problem:    Abdominal pain  Active Problems:    Cholecystitis, acute  Resolved Problems:    * No resolved hospital problems. *      Abdominal pain 2/2 cholelithiasis  -CT abdomen showed biliary dilation. MRCP showed several stones up to 7mm in the cystic duct remnant, mild biliary ductal dilation, no stones seen. -General surgery & HBP consulted, surgery planned for 10/19. NPO starting from midnight 10/19. Hold lovenox on 10/19.  -Restart Lovenox for DVT ppx for now  -Continue to monitor LFTs  -Continue Protonix daily  -Fentanyl and Tylenol PRN for pain management  -Zofran as needed for nausea    2. CAD s/p CABG  -Continue home dose metoprolol, Crestor and Imdur  -Hold aspirin and Brilinta until after surgery  -EKG on admission normal sinus rhythm. Unchanged from previous    3. CKD  -Stable, baseline creatinine 1.2  -Avoid nephrotoxic medications    4.     Hypertension  -BP not controlled since admission, /98 last night, resulting in headache  -Home Norvasc increased to 10mg daily  -Will continue to monitor vitals, consider adding another agent to control    5. Depression  -Continue home dose Lexapro    6.     Back pain  -New onset back pain in the left subscapular region this morning, musculoskeletal pattern  -Lidocaine patches prescribed  -Tylenol 500mg PO TID    DVT Prophylaxis: lovenox 40mg daily  GI Prophylaxis: protonix  Diet: low fat diet      Electronically signed by Pb Keys MD on 10/17/2022 at 12:10 PM  This case was discussed with attending physician Dr. Zaida Stringer

## 2022-10-18 ENCOUNTER — ANESTHESIA EVENT (OUTPATIENT)
Dept: OPERATING ROOM | Age: 52
DRG: 261 | End: 2022-10-18
Payer: COMMERCIAL

## 2022-10-18 LAB
ALBUMIN SERPL-MCNC: 4.3 G/DL (ref 3.5–5.2)
ALP BLD-CCNC: 345 U/L (ref 35–104)
ALT SERPL-CCNC: 165 U/L (ref 0–32)
ANION GAP SERPL CALCULATED.3IONS-SCNC: 14 MMOL/L (ref 7–16)
AST SERPL-CCNC: 82 U/L (ref 0–31)
BASOPHILS ABSOLUTE: 0.05 E9/L (ref 0–0.2)
BASOPHILS RELATIVE PERCENT: 0.8 % (ref 0–2)
BILIRUB SERPL-MCNC: 0.8 MG/DL (ref 0–1.2)
BUN BLDV-MCNC: 18 MG/DL (ref 6–20)
CALCIUM SERPL-MCNC: 9.9 MG/DL (ref 8.6–10.2)
CHLORIDE BLD-SCNC: 101 MMOL/L (ref 98–107)
CO2: 21 MMOL/L (ref 22–29)
CREAT SERPL-MCNC: 1.2 MG/DL (ref 0.5–1)
EOSINOPHILS ABSOLUTE: 0.29 E9/L (ref 0.05–0.5)
EOSINOPHILS RELATIVE PERCENT: 4.8 % (ref 0–6)
GFR SERPL CREATININE-BSD FRML MDRD: 55 ML/MIN/1.73
GLUCOSE BLD-MCNC: 84 MG/DL (ref 74–99)
HCT VFR BLD CALC: 43 % (ref 34–48)
HEMOGLOBIN: 14 G/DL (ref 11.5–15.5)
IMMATURE GRANULOCYTES #: 0.02 E9/L
IMMATURE GRANULOCYTES %: 0.3 % (ref 0–5)
LYMPHOCYTES ABSOLUTE: 1.25 E9/L (ref 1.5–4)
LYMPHOCYTES RELATIVE PERCENT: 20.6 % (ref 20–42)
MCH RBC QN AUTO: 29.3 PG (ref 26–35)
MCHC RBC AUTO-ENTMCNC: 32.6 % (ref 32–34.5)
MCV RBC AUTO: 90 FL (ref 80–99.9)
MONOCYTES ABSOLUTE: 0.46 E9/L (ref 0.1–0.95)
MONOCYTES RELATIVE PERCENT: 7.6 % (ref 2–12)
NEUTROPHILS ABSOLUTE: 4.01 E9/L (ref 1.8–7.3)
NEUTROPHILS RELATIVE PERCENT: 65.9 % (ref 43–80)
PDW BLD-RTO: 13.6 FL (ref 11.5–15)
PLATELET # BLD: 190 E9/L (ref 130–450)
PMV BLD AUTO: 11.9 FL (ref 7–12)
POTASSIUM SERPL-SCNC: 4.1 MMOL/L (ref 3.5–5)
RBC # BLD: 4.78 E12/L (ref 3.5–5.5)
SODIUM BLD-SCNC: 136 MMOL/L (ref 132–146)
TOTAL PROTEIN: 8.2 G/DL (ref 6.4–8.3)
WBC # BLD: 6.1 E9/L (ref 4.5–11.5)

## 2022-10-18 PROCEDURE — 2140000000 HC CCU INTERMEDIATE R&B

## 2022-10-18 PROCEDURE — 6370000000 HC RX 637 (ALT 250 FOR IP): Performed by: STUDENT IN AN ORGANIZED HEALTH CARE EDUCATION/TRAINING PROGRAM

## 2022-10-18 PROCEDURE — 6360000002 HC RX W HCPCS: Performed by: STUDENT IN AN ORGANIZED HEALTH CARE EDUCATION/TRAINING PROGRAM

## 2022-10-18 PROCEDURE — 6360000002 HC RX W HCPCS

## 2022-10-18 PROCEDURE — 85025 COMPLETE CBC W/AUTO DIFF WBC: CPT

## 2022-10-18 PROCEDURE — 99232 SBSQ HOSP IP/OBS MODERATE 35: CPT | Performed by: TRANSPLANT SURGERY

## 2022-10-18 PROCEDURE — 99232 SBSQ HOSP IP/OBS MODERATE 35: CPT | Performed by: FAMILY MEDICINE

## 2022-10-18 PROCEDURE — A4216 STERILE WATER/SALINE, 10 ML: HCPCS | Performed by: STUDENT IN AN ORGANIZED HEALTH CARE EDUCATION/TRAINING PROGRAM

## 2022-10-18 PROCEDURE — 80053 COMPREHEN METABOLIC PANEL: CPT

## 2022-10-18 PROCEDURE — 2580000003 HC RX 258

## 2022-10-18 PROCEDURE — 2580000003 HC RX 258: Performed by: STUDENT IN AN ORGANIZED HEALTH CARE EDUCATION/TRAINING PROGRAM

## 2022-10-18 PROCEDURE — C9113 INJ PANTOPRAZOLE SODIUM, VIA: HCPCS | Performed by: STUDENT IN AN ORGANIZED HEALTH CARE EDUCATION/TRAINING PROGRAM

## 2022-10-18 PROCEDURE — 36415 COLL VENOUS BLD VENIPUNCTURE: CPT

## 2022-10-18 RX ORDER — SODIUM CHLORIDE, SODIUM LACTATE, POTASSIUM CHLORIDE, CALCIUM CHLORIDE 600; 310; 30; 20 MG/100ML; MG/100ML; MG/100ML; MG/100ML
INJECTION, SOLUTION INTRAVENOUS CONTINUOUS
Status: DISCONTINUED | OUTPATIENT
Start: 2022-10-18 | End: 2022-10-21

## 2022-10-18 RX ADMIN — SODIUM CHLORIDE, PRESERVATIVE FREE 10 ML: 5 INJECTION INTRAVENOUS at 16:01

## 2022-10-18 RX ADMIN — PIPERACILLIN AND TAZOBACTAM 3375 MG: 3; .375 INJECTION, POWDER, FOR SOLUTION INTRAVENOUS at 12:01

## 2022-10-18 RX ADMIN — SODIUM CHLORIDE, POTASSIUM CHLORIDE, SODIUM LACTATE AND CALCIUM CHLORIDE: 600; 310; 30; 20 INJECTION, SOLUTION INTRAVENOUS at 12:02

## 2022-10-18 RX ADMIN — FENTANYL CITRATE 25 MCG: 50 INJECTION, SOLUTION INTRAMUSCULAR; INTRAVENOUS at 11:55

## 2022-10-18 RX ADMIN — ONDANSETRON 4 MG: 2 INJECTION INTRAMUSCULAR; INTRAVENOUS at 22:10

## 2022-10-18 RX ADMIN — ROSUVASTATIN CALCIUM 40 MG: 20 TABLET, COATED ORAL at 08:36

## 2022-10-18 RX ADMIN — SODIUM CHLORIDE, PRESERVATIVE FREE 40 MG: 5 INJECTION INTRAVENOUS at 08:36

## 2022-10-18 RX ADMIN — METOPROLOL SUCCINATE 50 MG: 50 TABLET, EXTENDED RELEASE ORAL at 20:43

## 2022-10-18 RX ADMIN — FENTANYL CITRATE 25 MCG: 50 INJECTION, SOLUTION INTRAMUSCULAR; INTRAVENOUS at 06:47

## 2022-10-18 RX ADMIN — FENTANYL CITRATE 25 MCG: 50 INJECTION, SOLUTION INTRAMUSCULAR; INTRAVENOUS at 02:27

## 2022-10-18 RX ADMIN — FENTANYL CITRATE 25 MCG: 50 INJECTION, SOLUTION INTRAMUSCULAR; INTRAVENOUS at 20:52

## 2022-10-18 RX ADMIN — ISOSORBIDE MONONITRATE 30 MG: 30 TABLET, EXTENDED RELEASE ORAL at 08:36

## 2022-10-18 RX ADMIN — FENTANYL CITRATE 25 MCG: 50 INJECTION, SOLUTION INTRAMUSCULAR; INTRAVENOUS at 16:01

## 2022-10-18 RX ADMIN — Medication 10 ML: at 08:37

## 2022-10-18 RX ADMIN — PIPERACILLIN AND TAZOBACTAM 3375 MG: 3; .375 INJECTION, POWDER, FOR SOLUTION INTRAVENOUS at 04:36

## 2022-10-18 RX ADMIN — PIPERACILLIN AND TAZOBACTAM 3375 MG: 3; .375 INJECTION, POWDER, FOR SOLUTION INTRAVENOUS at 20:44

## 2022-10-18 RX ADMIN — METOPROLOL SUCCINATE 50 MG: 50 TABLET, EXTENDED RELEASE ORAL at 08:36

## 2022-10-18 RX ADMIN — ENOXAPARIN SODIUM 40 MG: 100 INJECTION SUBCUTANEOUS at 08:36

## 2022-10-18 RX ADMIN — FERROUS SULFATE TAB 325 MG (65 MG ELEMENTAL FE) 325 MG: 325 (65 FE) TAB at 08:36

## 2022-10-18 RX ADMIN — ONDANSETRON 4 MG: 2 INJECTION INTRAMUSCULAR; INTRAVENOUS at 16:01

## 2022-10-18 RX ADMIN — FERROUS SULFATE TAB 325 MG (65 MG ELEMENTAL FE) 325 MG: 325 (65 FE) TAB at 16:00

## 2022-10-18 RX ADMIN — ESCITALOPRAM 5 MG: 5 TABLET, FILM COATED ORAL at 08:36

## 2022-10-18 RX ADMIN — AMLODIPINE BESYLATE 10 MG: 10 TABLET ORAL at 08:36

## 2022-10-18 RX ADMIN — ONDANSETRON 4 MG: 2 INJECTION INTRAMUSCULAR; INTRAVENOUS at 06:47

## 2022-10-18 ASSESSMENT — PAIN DESCRIPTION - DESCRIPTORS
DESCRIPTORS: ACHING
DESCRIPTORS: ACHING;THROBBING

## 2022-10-18 ASSESSMENT — PAIN DESCRIPTION - PAIN TYPE: TYPE: ACUTE PAIN

## 2022-10-18 ASSESSMENT — PAIN SCALES - GENERAL
PAINLEVEL_OUTOF10: 3
PAINLEVEL_OUTOF10: 7

## 2022-10-18 ASSESSMENT — PAIN DESCRIPTION - LOCATION
LOCATION: BACK
LOCATION: ABDOMEN;BACK

## 2022-10-18 ASSESSMENT — PAIN DESCRIPTION - ORIENTATION
ORIENTATION: LOWER
ORIENTATION: UPPER;LOWER

## 2022-10-18 ASSESSMENT — PAIN DESCRIPTION - FREQUENCY: FREQUENCY: INTERMITTENT

## 2022-10-18 NOTE — PROGRESS NOTES
[facility]  Family Medicine Attending    TEMI RAMIREZ Course: 50 y.o. female with history of history of gastric bypass, CAD s/p CABG presented to the ED for right sided abdominal pain started day of admission. Pain became 10/10 and she called EMS. Pain happened after eating and associated with some nausea and vomiting. No fevers, chills, nausea, vomiting. No previous episodes of pain. She denies chest pain. CT abdomen and pelvis showed biliary duct dilation and gen surgery was consulted. S: Patient seen and examined . Continues to have dull abdominal pain but is improved again. Headache improved, shoulder ache improved. O: VS- Blood pressure (!) 140/81, pulse 54, temperature 97.9 °F (36.6 °C), temperature source Temporal, resp. rate 16, height 5' 4\" (1.626 m), weight 209 lb (94.8 kg), SpO2 97 %, not currently breastfeeding. Exam is as noted by resident with the following changes, additions or corrections:  Gen - lying in bed, NAD, AAOx3  Cardio - RRR, no murmur   Lungs - CTAB, no wheeze   Abd- BS+, soft, + mild abdominal ttp in right upper quadrant. Back: Shoulder on left NTTP. Lumbar area mild generalized ttp (improves w/ position changes)    Impressions:   Principal Problem:    Abdominal pain  Active Problems:    Cholecystitis, acute    Acute cholecystitis due to biliary calculus  Resolved Problems:    * No resolved hospital problems. *      Plan:   right upper quadrant pain with biliary duct dilation on CT scan   Hepatobilliary Team - rec open surgery for removal of remnant stones 10/19. Cont Zosyn. Hold ASA and brillinta    Headache - Suspect BP related, has improved o/n. Tylenol Prn. BP control improved. HTN - amlodipine  10mg. Shoulder Ache - likely positional, lidocaine patch resolved, still has some lower back pain and states lidocaine patch helping that. Diet as tolerated  Fentanyl for pain     DVT Ppx: Lovenox, hold day of surgery.       Attending Physician Statement  I have reviewed the chart, including any radiology or labs, and seen the patient with the resident(s). I personally reviewed and performed key elements of the history and exam.  I agree with the assessment, plan and orders as documented by the resident. Please refer to the resident note for additional information.       Konrad Morse MD

## 2022-10-18 NOTE — PROGRESS NOTES
HEPATOBILIARY SURGERY  DAILY PROGRESS NOTE  10/18/2022    Subjective:  Nauseated. Dry heaves overnight. Reporting low appetite. Abdominal pain has improved      Objective:  BP (!) 153/85   Pulse 64   Temp 97.5 °F (36.4 °C) (Temporal)   Resp 16   Ht 5' 4\" (1.626 m)   Wt 209 lb (94.8 kg)   SpO2 96%   BMI 35.87 kg/m²     General Appearance:  awake, alert, oriented, in no acute distress  Skin:  Skin color, texture, turgor normal  Head/face:  NCAT  Eyes:  No gross abnormalities. Sclera nonicteric  Lungs/Chest:  Normal expansion. No respiratory distress. On room air  Heart: Warm throughout. Regular rate   Abdomen:  Soft, minimal tenderness, non-distended  Extremities: Extremities warm to touch, pink      I have personally reviewed all relevant labs and imaging. Assessment/Plan:  50 y.o. female with remnant gallbladder cholecystitis due to cholelithiasis     - Absolutely NO Brilinta  - hold lovenox 10/19  - NPO at midnight  - Open completion cholecystectomy with possible common bile duct exploration, intraoperative ultrasound, 10/19     Patient and family were made aware of the risks, benefits, alternatives, and complications of open completion cholecystectomy with possible common bile duct exploration, intraoperative ultrasound and wish to proceed with surgery. Electronically signed by Zach Pedro DO on 10/18/2022 at 5:43 AM    Has not been on Brilinta per patient for about a week. She was unclear of the medications she should be on.   OR tomorrow    Electronically signed by Marya Nguyen MD on 10/18/2022 at 10:42 AM

## 2022-10-18 NOTE — PROGRESS NOTES
Physician Progress Note      Sharri Guerrier  CSN #:                  [id number]  :                       1970  ADMIT DATE:       10/15/2022 8:27 PM  [facility], [place] DATE:  RESPONDING  PROVIDER #:        Teresa Owens        QUERY TEXT:    Stage of Chronic Kidney Disease: Please provide further specificity, if known. Clinical indicators include: creatinine, gfr, bun, ckd, bun/cr, scr  Options provided:  -- Chronic kidney disease stage 1  -- Chronic kidney disease stage 2  -- Chronic kidney disease stage 3  -- Chronic kidney disease stage 3a  -- Chronic kidney disease stage 3b  -- Chronic kidney disease stage 4  -- Chronic kidney disease stage 5  -- Chronic kidney disease stage 5, requiring dialysis  -- End stage renal disease  -- Other - I will add my own diagnosis  -- Disagree - Not applicable / Not valid  -- Disagree - Clinically Unable to determine / Unknown        PROVIDER RESPONSE TEXT:    The patient has chronic kidney disease stage 1.       Electronically signed by:  Teresa Owens 10/18/2022 1:37 PM

## 2022-10-18 NOTE — PROGRESS NOTES
[facility] - Family [facility] Inpatient   Resident Progress Note    S:  Hospital day: 1    Brief Synopsis: So Escamilla is a 50 y.o. female with a PMH of hypertension, history of gastric bypass, CAD status post CABG in 6/2022, depression and bilateral renal artery stenosis presented to ED for abdominal pain for RUQ and RLQ that started after food. Pain was sharp and constant in nature. It was not alleviated with positioning. Patient also reports nausea and vomiting. Vomitus was non-bloody. She denies chest pain, SOB or loose BM. In the ED, patient was given fentanyl for pain management and general surgery was consulted. CT abdomen and pelvis showed biliary dilatation. Patient was admitted for abdominal pain possibly biliary stones. IV Zosyn was started. HBP was consulted and MRCP was ordered. Home ASA and Brilinta was held. ALP, AST and ALT were elevated, AST and ALT trended down. It showed several stones in the biliary duct up to 7mm. Open surgery for removal of stones is planned for 10/19. Patient had been having elevated BP since she was admitted. On 10/17/2022, BP was eIevated to 208/98 and patient developed a headache. Norvasc was increased to 10mg PO daily. Overnight, patient continued to have nausea but no more emesis. This morning, patient was evaluated at bedside. She was able to finish a bowl of cereal. She continues to have low grade headache, advised to ask for Tylenol if needed in addition to fentanyl. Pain in the back resolved with lidocaine patch and abdominal pain subsided.  Open cholecystectomy planned for tomorrow AM.      Cont meds:    lactated ringers      sodium chloride       Scheduled meds:    amLODIPine  10 mg Oral Daily    lidocaine  1 patch TransDERmal Daily    [START ON 10/19/2022] ceFAZolin  2,000 mg IntraVENous See Admin Instructions    pantoprazole (PROTONIX) 40 mg injection  40 mg IntraVENous Daily    [Held by provider] aspirin  81 mg Oral Daily    escitalopram  5 mg Oral Daily isosorbide mononitrate  30 mg Oral Daily    metoprolol succinate  50 mg Oral BID    rosuvastatin  40 mg Oral Daily    [Held by provider] ticagrelor  90 mg Oral BID    ferrous sulfate  325 mg Oral BID WC    sodium chloride flush  5-40 mL IntraVENous 2 times per day    piperacillin-tazobactam  3,375 mg IntraVENous Q8H     PRN meds: acetaminophen, fentanNYL, sodium chloride flush, sodium chloride, polyethylene glycol, ondansetron     I reviewed the patient's Past Medical and Surgical History, Medications and Allergies. O:  VS: BP (!) 140/81   Pulse 54   Temp 97.9 °F (36.6 °C) (Temporal)   Resp 16   Ht 5' 4\" (1.626 m)   Wt 209 lb (94.8 kg)   SpO2 97%   BMI 35.87 kg/m²     Physical Exam:  General: Alert, cooperative, no acute distress. HEENT: Normocephalic, atraumatic. PERRLA, conjunctiva/corneas clear    Chest: No tenderness or deformity, full & symmetric excursion   Lung: Clear to auscultation bilaterally,  respirations unlabored. No rales/wheezing/rubs   Heart: RRR, S1 and S2 normal, no murmur, rub or gallop. DP pulses 2/4   Abdomen: Soft, nondistended, tender to palpation on right lower and upper quadrant, no masses, no organomegaly, no guarding, rebound or rigidity. Genital/Rectal: deferred   Extremities:  Extremities normal, atraumatic, no cyanosis or edema. Distal pulses equal bilaterally   Skin: Skin color, texture, turgor normal, no rashes or lesions   Musculoskeletal: No joint swelling, no tenderness anywhere. Normal ROM in extremities. Neurologic: Alert & Oriented; Normal and symmetric strength in UEs and LEs; Sensation intact, CN 1-12 intact. Psychiatric: appropriate affect. Intact judgment and insight. Labs:  Na/K/Cl/CO2:  136/4.1/101/21 (10/18 4475)  BUN/Cr/glu/ALT/AST/amyl/lip:  18/1.2/--/165/82/--/-- (10/18 6289)  WBC/Hgb/Hct/Plts:  6.1/14.0/43.0/190 (10/18 3913)  estimated creatinine clearance is 63 mL/min (A) (based on SCr of 1.2 mg/dL (H)).   Other pertinent labs as noted below    New Imaging:  MRI ABDOMEN W WO CONTRAST MRCP   Final Result   Limited evaluation due to motion artifact. The cystic duct remnant is   dilated and appears to contain several stones measuring up to 7 mm. Mild   biliary ductal dilatation without definite stones in the common bile duct. The degree of extrahepatic biliary ductal dilatation is not as pronounced as   on CT from yesterday. CT ABDOMEN PELVIS W IV CONTRAST Additional Contrast? None   Final Result   1. Biliary dilation redemonstrated, mildly worse. Suggestion of a small   chronic cystic duct calculus. 2. Suggestion of constipation and there are small bowel air-fluid levels. 3. Additionally dilation with retained contents within the gastric bypass   including afferent limb which is abnormal but present on prior exam.      RECOMMENDATIONS:   Clinical correlation. A/P:  Principal Problem:    Abdominal pain  Active Problems:    Cholecystitis, acute    Acute cholecystitis due to biliary calculus  Resolved Problems:    * No resolved hospital problems. *      Abdominal pain 2/2 cholelithiasis  -CT abdomen showed biliary dilation. MRCP showed several stones up to 7mm in the cystic duct remnant, mild biliary ductal dilation, no stones seen. -General surgery & HBP consulted, surgery planned for 10/19. NPO starting from midnight 10/19. -Last dose Lovenox on 0900 10/18 before surgery.  -Restart Lovenox for DVT ppx for now  -Continue to monitor LFTs  -Continue Protonix daily  -Fentanyl and Tylenol PRN for pain management  -Zofran as needed for nausea    2. CAD s/p CABG  -Continue home dose metoprolol, Crestor and Imdur  -Hold aspirin and Brilinta until after surgery  -EKG on admission normal sinus rhythm. Unchanged from previous    3. CKD  -Stable, baseline creatinine 1.2  -Avoid nephrotoxic medications    4.     Hypertension  -BP not controlled since admission, /98 on 10/17, resulting in headache  -Home Norvasc increased to 10mg daily  -Will continue to monitor vitals, consider adding another agent to control    5. Depression  -Continue home dose Lexapro    6.     Back pain - stable  -New onset back pain in the left subscapular region this morning, musculoskeletal pattern  -Continue lidocaine patches  -Tylenol 500mg PRN    DVT Prophylaxis: lovenox 40mg daily  GI Prophylaxis: protonix  Diet: low fat diet      Electronically signed by Syd Dailey MD on 10/18/2022 at 12:01 PM  This case was discussed with attending physician Dr. Desiree Mcintyre

## 2022-10-18 NOTE — CARE COORDINATION
10/18 Care Coordination:Plan for  Open completion cholecystectomy on Wednesday with possible common bile duct exploration. PTA Pt was independent, Lives with her family. Discharge paln is return home. HAs [facility] after her CABG in June. Will monitor needs after Surgery 10/19. CM/JERI will continue to follow for discharge planning.    Fantasma DICK,RN-CV-BC  [phone]

## 2022-10-19 ENCOUNTER — ANESTHESIA (OUTPATIENT)
Dept: OPERATING ROOM | Age: 52
DRG: 261 | End: 2022-10-19
Payer: COMMERCIAL

## 2022-10-19 LAB
ALBUMIN SERPL-MCNC: 3.5 G/DL (ref 3.5–5.2)
ALP BLD-CCNC: 272 U/L (ref 35–104)
ALT SERPL-CCNC: 94 U/L (ref 0–32)
ANION GAP SERPL CALCULATED.3IONS-SCNC: 13 MMOL/L (ref 7–16)
AST SERPL-CCNC: 41 U/L (ref 0–31)
BASOPHILS ABSOLUTE: 0.04 E9/L (ref 0–0.2)
BASOPHILS RELATIVE PERCENT: 1 % (ref 0–2)
BILIRUB SERPL-MCNC: 0.5 MG/DL (ref 0–1.2)
BUN BLDV-MCNC: 20 MG/DL (ref 6–20)
CALCIUM SERPL-MCNC: 9.6 MG/DL (ref 8.6–10.2)
CHLORIDE BLD-SCNC: 104 MMOL/L (ref 98–107)
CO2: 20 MMOL/L (ref 22–29)
CREAT SERPL-MCNC: 1.2 MG/DL (ref 0.5–1)
EOSINOPHILS ABSOLUTE: 0.2 E9/L (ref 0.05–0.5)
EOSINOPHILS RELATIVE PERCENT: 4.9 % (ref 0–6)
GFR SERPL CREATININE-BSD FRML MDRD: 55 ML/MIN/1.73
GLUCOSE BLD-MCNC: 86 MG/DL (ref 74–99)
HCT VFR BLD CALC: 34.7 % (ref 34–48)
HEMOGLOBIN: 11.6 G/DL (ref 11.5–15.5)
IMMATURE GRANULOCYTES #: 0.01 E9/L
IMMATURE GRANULOCYTES %: 0.2 % (ref 0–5)
LYMPHOCYTES ABSOLUTE: 1.39 E9/L (ref 1.5–4)
LYMPHOCYTES RELATIVE PERCENT: 33.9 % (ref 20–42)
MCH RBC QN AUTO: 29.7 PG (ref 26–35)
MCHC RBC AUTO-ENTMCNC: 33.4 % (ref 32–34.5)
MCV RBC AUTO: 89 FL (ref 80–99.9)
MONOCYTES ABSOLUTE: 0.36 E9/L (ref 0.1–0.95)
MONOCYTES RELATIVE PERCENT: 8.8 % (ref 2–12)
NEUTROPHILS ABSOLUTE: 2.1 E9/L (ref 1.8–7.3)
NEUTROPHILS RELATIVE PERCENT: 51.2 % (ref 43–80)
PDW BLD-RTO: 13.3 FL (ref 11.5–15)
PLATELET # BLD: 172 E9/L (ref 130–450)
PMV BLD AUTO: 11.9 FL (ref 7–12)
POTASSIUM SERPL-SCNC: 3.7 MMOL/L (ref 3.5–5)
RBC # BLD: 3.9 E12/L (ref 3.5–5.5)
SODIUM BLD-SCNC: 137 MMOL/L (ref 132–146)
TOTAL PROTEIN: 7.2 G/DL (ref 6.4–8.3)
WBC # BLD: 4.1 E9/L (ref 4.5–11.5)

## 2022-10-19 PROCEDURE — 3600000014 HC SURGERY LEVEL 4 ADDTL 15MIN: Performed by: TRANSPLANT SURGERY

## 2022-10-19 PROCEDURE — 3700000000 HC ANESTHESIA ATTENDED CARE: Performed by: TRANSPLANT SURGERY

## 2022-10-19 PROCEDURE — 47610 REMOVAL OF GALLBLADDER: CPT | Performed by: TRANSPLANT SURGERY

## 2022-10-19 PROCEDURE — 7100000001 HC PACU RECOVERY - ADDTL 15 MIN: Performed by: TRANSPLANT SURGERY

## 2022-10-19 PROCEDURE — 0FC80ZZ EXTIRPATION OF MATTER FROM CYSTIC DUCT, OPEN APPROACH: ICD-10-PCS | Performed by: TRANSPLANT SURGERY

## 2022-10-19 PROCEDURE — 6360000002 HC RX W HCPCS

## 2022-10-19 PROCEDURE — 6370000000 HC RX 637 (ALT 250 FOR IP): Performed by: STUDENT IN AN ORGANIZED HEALTH CARE EDUCATION/TRAINING PROGRAM

## 2022-10-19 PROCEDURE — 7100000000 HC PACU RECOVERY - FIRST 15 MIN: Performed by: TRANSPLANT SURGERY

## 2022-10-19 PROCEDURE — 2500000003 HC RX 250 WO HCPCS

## 2022-10-19 PROCEDURE — 2580000003 HC RX 258

## 2022-10-19 PROCEDURE — 88304 TISSUE EXAM BY PATHOLOGIST: CPT

## 2022-10-19 PROCEDURE — 99232 SBSQ HOSP IP/OBS MODERATE 35: CPT | Performed by: FAMILY MEDICINE

## 2022-10-19 PROCEDURE — 6360000002 HC RX W HCPCS: Performed by: STUDENT IN AN ORGANIZED HEALTH CARE EDUCATION/TRAINING PROGRAM

## 2022-10-19 PROCEDURE — 2140000000 HC CCU INTERMEDIATE R&B

## 2022-10-19 PROCEDURE — 85025 COMPLETE CBC W/AUTO DIFF WBC: CPT

## 2022-10-19 PROCEDURE — 3600000004 HC SURGERY LEVEL 4 BASE: Performed by: TRANSPLANT SURGERY

## 2022-10-19 PROCEDURE — 6360000002 HC RX W HCPCS: Performed by: ANESTHESIOLOGY

## 2022-10-19 PROCEDURE — C1757 CATH, THROMBECTOMY/EMBOLECT: HCPCS | Performed by: TRANSPLANT SURGERY

## 2022-10-19 PROCEDURE — 2709999900 HC NON-CHARGEABLE SUPPLY: Performed by: TRANSPLANT SURGERY

## 2022-10-19 PROCEDURE — 2580000003 HC RX 258: Performed by: STUDENT IN AN ORGANIZED HEALTH CARE EDUCATION/TRAINING PROGRAM

## 2022-10-19 PROCEDURE — 36415 COLL VENOUS BLD VENIPUNCTURE: CPT

## 2022-10-19 PROCEDURE — 0FC90ZZ EXTIRPATION OF MATTER FROM COMMON BILE DUCT, OPEN APPROACH: ICD-10-PCS | Performed by: TRANSPLANT SURGERY

## 2022-10-19 PROCEDURE — 0FT40ZZ RESECTION OF GALLBLADDER, OPEN APPROACH: ICD-10-PCS | Performed by: TRANSPLANT SURGERY

## 2022-10-19 PROCEDURE — 80053 COMPREHEN METABOLIC PANEL: CPT

## 2022-10-19 PROCEDURE — 3700000001 HC ADD 15 MINUTES (ANESTHESIA): Performed by: TRANSPLANT SURGERY

## 2022-10-19 RX ORDER — MIDAZOLAM HYDROCHLORIDE 2 MG/2ML
2 INJECTION, SOLUTION INTRAMUSCULAR; INTRAVENOUS
Status: DISCONTINUED | OUTPATIENT
Start: 2022-10-19 | End: 2022-10-19 | Stop reason: HOSPADM

## 2022-10-19 RX ORDER — ONDANSETRON 2 MG/ML
INJECTION INTRAMUSCULAR; INTRAVENOUS PRN
Status: DISCONTINUED | OUTPATIENT
Start: 2022-10-19 | End: 2022-10-19 | Stop reason: SDUPTHER

## 2022-10-19 RX ORDER — PROPOFOL 10 MG/ML
INJECTION, EMULSION INTRAVENOUS PRN
Status: DISCONTINUED | OUTPATIENT
Start: 2022-10-19 | End: 2022-10-19 | Stop reason: SDUPTHER

## 2022-10-19 RX ORDER — ONDANSETRON 2 MG/ML
4 INJECTION INTRAMUSCULAR; INTRAVENOUS
Status: DISCONTINUED | OUTPATIENT
Start: 2022-10-19 | End: 2022-10-19 | Stop reason: HOSPADM

## 2022-10-19 RX ORDER — DIPHENHYDRAMINE HYDROCHLORIDE 50 MG/ML
12.5 INJECTION INTRAMUSCULAR; INTRAVENOUS
Status: DISCONTINUED | OUTPATIENT
Start: 2022-10-19 | End: 2022-10-19 | Stop reason: HOSPADM

## 2022-10-19 RX ORDER — NITROGLYCERIN 5 MG/ML
INJECTION, SOLUTION INTRAVENOUS PRN
Status: DISCONTINUED | OUTPATIENT
Start: 2022-10-19 | End: 2022-10-19 | Stop reason: SDUPTHER

## 2022-10-19 RX ORDER — MIDAZOLAM HYDROCHLORIDE 1 MG/ML
INJECTION INTRAMUSCULAR; INTRAVENOUS PRN
Status: DISCONTINUED | OUTPATIENT
Start: 2022-10-19 | End: 2022-10-19 | Stop reason: SDUPTHER

## 2022-10-19 RX ORDER — ACETAMINOPHEN 500 MG
1000 TABLET ORAL EVERY 8 HOURS SCHEDULED
Status: DISCONTINUED | OUTPATIENT
Start: 2022-10-19 | End: 2022-10-23 | Stop reason: HOSPADM

## 2022-10-19 RX ORDER — LABETALOL HYDROCHLORIDE 5 MG/ML
INJECTION, SOLUTION INTRAVENOUS PRN
Status: DISCONTINUED | OUTPATIENT
Start: 2022-10-19 | End: 2022-10-19 | Stop reason: SDUPTHER

## 2022-10-19 RX ORDER — TRAMADOL HYDROCHLORIDE 50 MG/1
50 TABLET ORAL
Status: DISCONTINUED | OUTPATIENT
Start: 2022-10-19 | End: 2022-10-19

## 2022-10-19 RX ORDER — ISOSORBIDE MONONITRATE 30 MG/1
30 TABLET, EXTENDED RELEASE ORAL DAILY
Status: DISCONTINUED | OUTPATIENT
Start: 2022-10-20 | End: 2022-10-23 | Stop reason: HOSPADM

## 2022-10-19 RX ORDER — LABETALOL HYDROCHLORIDE 5 MG/ML
5 INJECTION, SOLUTION INTRAVENOUS
Status: DISCONTINUED | OUTPATIENT
Start: 2022-10-19 | End: 2022-10-19 | Stop reason: HOSPADM

## 2022-10-19 RX ORDER — SODIUM CHLORIDE 9 MG/ML
INJECTION, SOLUTION INTRAVENOUS CONTINUOUS PRN
Status: DISCONTINUED | OUTPATIENT
Start: 2022-10-19 | End: 2022-10-19 | Stop reason: SDUPTHER

## 2022-10-19 RX ORDER — SODIUM CHLORIDE 0.9 % (FLUSH) 0.9 %
5-40 SYRINGE (ML) INJECTION PRN
Status: DISCONTINUED | OUTPATIENT
Start: 2022-10-19 | End: 2022-10-19 | Stop reason: HOSPADM

## 2022-10-19 RX ORDER — SODIUM CHLORIDE 0.9 % (FLUSH) 0.9 %
5-40 SYRINGE (ML) INJECTION EVERY 12 HOURS SCHEDULED
Status: DISCONTINUED | OUTPATIENT
Start: 2022-10-19 | End: 2022-10-19 | Stop reason: HOSPADM

## 2022-10-19 RX ORDER — DIPHENHYDRAMINE HYDROCHLORIDE 50 MG/ML
25 INJECTION INTRAMUSCULAR; INTRAVENOUS ONCE
Status: COMPLETED | OUTPATIENT
Start: 2022-10-19 | End: 2022-10-19

## 2022-10-19 RX ORDER — METOPROLOL SUCCINATE 50 MG/1
50 TABLET, EXTENDED RELEASE ORAL 2 TIMES DAILY
Status: DISCONTINUED | OUTPATIENT
Start: 2022-10-19 | End: 2022-10-23 | Stop reason: HOSPADM

## 2022-10-19 RX ORDER — SODIUM CHLORIDE 9 MG/ML
25 INJECTION, SOLUTION INTRAVENOUS PRN
Status: DISCONTINUED | OUTPATIENT
Start: 2022-10-19 | End: 2022-10-19 | Stop reason: HOSPADM

## 2022-10-19 RX ORDER — LIDOCAINE HYDROCHLORIDE 20 MG/ML
INJECTION, SOLUTION INTRAVENOUS PRN
Status: DISCONTINUED | OUTPATIENT
Start: 2022-10-19 | End: 2022-10-19 | Stop reason: SDUPTHER

## 2022-10-19 RX ORDER — HYDRALAZINE HYDROCHLORIDE 20 MG/ML
INJECTION INTRAMUSCULAR; INTRAVENOUS PRN
Status: DISCONTINUED | OUTPATIENT
Start: 2022-10-19 | End: 2022-10-19 | Stop reason: SDUPTHER

## 2022-10-19 RX ORDER — DEXAMETHASONE SODIUM PHOSPHATE 10 MG/ML
INJECTION INTRAMUSCULAR; INTRAVENOUS PRN
Status: DISCONTINUED | OUTPATIENT
Start: 2022-10-19 | End: 2022-10-19 | Stop reason: SDUPTHER

## 2022-10-19 RX ORDER — METOPROLOL TARTRATE 5 MG/5ML
INJECTION INTRAVENOUS PRN
Status: DISCONTINUED | OUTPATIENT
Start: 2022-10-19 | End: 2022-10-19 | Stop reason: SDUPTHER

## 2022-10-19 RX ORDER — ENOXAPARIN SODIUM 100 MG/ML
40 INJECTION SUBCUTANEOUS DAILY
Status: DISCONTINUED | OUTPATIENT
Start: 2022-10-20 | End: 2022-10-23 | Stop reason: HOSPADM

## 2022-10-19 RX ORDER — DROPERIDOL 2.5 MG/ML
0.62 INJECTION, SOLUTION INTRAMUSCULAR; INTRAVENOUS
Status: DISCONTINUED | OUTPATIENT
Start: 2022-10-19 | End: 2022-10-19 | Stop reason: HOSPADM

## 2022-10-19 RX ORDER — ASPIRIN 81 MG/1
81 TABLET ORAL DAILY
Status: DISCONTINUED | OUTPATIENT
Start: 2022-10-20 | End: 2022-10-23 | Stop reason: HOSPADM

## 2022-10-19 RX ORDER — NALOXONE HYDROCHLORIDE 0.4 MG/ML
INJECTION, SOLUTION INTRAMUSCULAR; INTRAVENOUS; SUBCUTANEOUS PRN
Status: DISCONTINUED | OUTPATIENT
Start: 2022-10-19 | End: 2022-10-21

## 2022-10-19 RX ORDER — AMLODIPINE BESYLATE 10 MG/1
10 TABLET ORAL DAILY
Status: DISCONTINUED | OUTPATIENT
Start: 2022-10-20 | End: 2022-10-23 | Stop reason: HOSPADM

## 2022-10-19 RX ORDER — ACETAMINOPHEN 325 MG/1
650 TABLET ORAL
Status: DISCONTINUED | OUTPATIENT
Start: 2022-10-19 | End: 2022-10-19 | Stop reason: HOSPADM

## 2022-10-19 RX ORDER — IPRATROPIUM BROMIDE AND ALBUTEROL SULFATE 2.5; .5 MG/3ML; MG/3ML
1 SOLUTION RESPIRATORY (INHALATION)
Status: DISCONTINUED | OUTPATIENT
Start: 2022-10-19 | End: 2022-10-19 | Stop reason: HOSPADM

## 2022-10-19 RX ORDER — ROCURONIUM BROMIDE 10 MG/ML
INJECTION, SOLUTION INTRAVENOUS PRN
Status: DISCONTINUED | OUTPATIENT
Start: 2022-10-19 | End: 2022-10-19 | Stop reason: SDUPTHER

## 2022-10-19 RX ORDER — HYDRALAZINE HYDROCHLORIDE 20 MG/ML
5 INJECTION INTRAMUSCULAR; INTRAVENOUS
Status: DISCONTINUED | OUTPATIENT
Start: 2022-10-19 | End: 2022-10-19 | Stop reason: HOSPADM

## 2022-10-19 RX ORDER — FENTANYL CITRATE 50 UG/ML
INJECTION, SOLUTION INTRAMUSCULAR; INTRAVENOUS PRN
Status: DISCONTINUED | OUTPATIENT
Start: 2022-10-19 | End: 2022-10-19 | Stop reason: SDUPTHER

## 2022-10-19 RX ADMIN — HYDRALAZINE HYDROCHLORIDE 10 MG: 20 INJECTION INTRAMUSCULAR; INTRAVENOUS at 08:24

## 2022-10-19 RX ADMIN — DEXAMETHASONE SODIUM PHOSPHATE 10 MG: 10 INJECTION INTRAMUSCULAR; INTRAVENOUS at 08:10

## 2022-10-19 RX ADMIN — ROCURONIUM BROMIDE 10 MG: 10 INJECTION, SOLUTION INTRAVENOUS at 08:50

## 2022-10-19 RX ADMIN — FENTANYL CITRATE 25 MCG: 50 INJECTION, SOLUTION INTRAMUSCULAR; INTRAVENOUS at 05:24

## 2022-10-19 RX ADMIN — PROPOFOL 140 MG: 10 INJECTION, EMULSION INTRAVENOUS at 07:40

## 2022-10-19 RX ADMIN — SODIUM CHLORIDE: 9 INJECTION, SOLUTION INTRAVENOUS at 08:30

## 2022-10-19 RX ADMIN — NITROGLYCERIN 50 MCG: 5 INJECTION, SOLUTION INTRAVENOUS at 08:39

## 2022-10-19 RX ADMIN — NITROGLYCERIN 50 MCG: 5 INJECTION, SOLUTION INTRAVENOUS at 09:54

## 2022-10-19 RX ADMIN — NITROGLYCERIN 25 MCG: 5 INJECTION, SOLUTION INTRAVENOUS at 08:26

## 2022-10-19 RX ADMIN — PIPERACILLIN AND TAZOBACTAM 3375 MG: 3; .375 INJECTION, POWDER, FOR SOLUTION INTRAVENOUS at 21:21

## 2022-10-19 RX ADMIN — DIPHENHYDRAMINE HYDROCHLORIDE 25 MG: 50 INJECTION, SOLUTION INTRAMUSCULAR; INTRAVENOUS at 16:26

## 2022-10-19 RX ADMIN — ROCURONIUM BROMIDE 40 MG: 10 INJECTION, SOLUTION INTRAVENOUS at 07:40

## 2022-10-19 RX ADMIN — SODIUM CHLORIDE: 9 INJECTION, SOLUTION INTRAVENOUS at 07:30

## 2022-10-19 RX ADMIN — FENTANYL CITRATE 50 MCG: 50 INJECTION, SOLUTION INTRAMUSCULAR; INTRAVENOUS at 08:18

## 2022-10-19 RX ADMIN — FENTANYL CITRATE 50 MCG: 50 INJECTION, SOLUTION INTRAMUSCULAR; INTRAVENOUS at 08:12

## 2022-10-19 RX ADMIN — PIPERACILLIN AND TAZOBACTAM 3375 MG: 3; .375 INJECTION, POWDER, FOR SOLUTION INTRAVENOUS at 05:15

## 2022-10-19 RX ADMIN — ROCURONIUM BROMIDE 10 MG: 10 INJECTION, SOLUTION INTRAVENOUS at 08:12

## 2022-10-19 RX ADMIN — FENTANYL CITRATE 25 MCG: 50 INJECTION, SOLUTION INTRAMUSCULAR; INTRAVENOUS at 01:11

## 2022-10-19 RX ADMIN — ONDANSETRON HYDROCHLORIDE 4 MG: 2 SOLUTION INTRAMUSCULAR; INTRAVENOUS at 09:26

## 2022-10-19 RX ADMIN — PIPERACILLIN AND TAZOBACTAM 3375 MG: 3; .375 INJECTION, POWDER, FOR SOLUTION INTRAVENOUS at 12:59

## 2022-10-19 RX ADMIN — LIDOCAINE HYDROCHLORIDE 100 MG: 20 INJECTION, SOLUTION INTRAVENOUS at 07:40

## 2022-10-19 RX ADMIN — ACETAMINOPHEN 1000 MG: 500 TABLET ORAL at 22:51

## 2022-10-19 RX ADMIN — HYDROMORPHONE HYDROCHLORIDE 0.5 MG: 1 INJECTION, SOLUTION INTRAMUSCULAR; INTRAVENOUS; SUBCUTANEOUS at 11:00

## 2022-10-19 RX ADMIN — METOPROLOL TARTRATE 2.5 MG: 5 INJECTION, SOLUTION INTRAVENOUS at 08:36

## 2022-10-19 RX ADMIN — ONDANSETRON 4 MG: 2 INJECTION INTRAMUSCULAR; INTRAVENOUS at 14:07

## 2022-10-19 RX ADMIN — NITROGLYCERIN 25 MCG: 5 INJECTION, SOLUTION INTRAVENOUS at 08:25

## 2022-10-19 RX ADMIN — FENTANYL CITRATE 100 MCG: 50 INJECTION, SOLUTION INTRAMUSCULAR; INTRAVENOUS at 07:40

## 2022-10-19 RX ADMIN — LABETALOL HYDROCHLORIDE 5 MG: 5 INJECTION INTRAVENOUS at 08:43

## 2022-10-19 RX ADMIN — MIDAZOLAM 2 MG: 1 INJECTION INTRAMUSCULAR; INTRAVENOUS at 07:40

## 2022-10-19 RX ADMIN — FENTANYL CITRATE 50 MCG: 50 INJECTION, SOLUTION INTRAMUSCULAR; INTRAVENOUS at 08:24

## 2022-10-19 RX ADMIN — LABETALOL HYDROCHLORIDE 5 MG: 5 INJECTION INTRAVENOUS at 09:02

## 2022-10-19 RX ADMIN — LABETALOL HYDROCHLORIDE 5 MG: 5 INJECTION INTRAVENOUS at 09:56

## 2022-10-19 RX ADMIN — FERROUS SULFATE TAB 325 MG (65 MG ELEMENTAL FE) 325 MG: 325 (65 FE) TAB at 16:25

## 2022-10-19 RX ADMIN — METOPROLOL SUCCINATE 50 MG: 50 TABLET, EXTENDED RELEASE ORAL at 21:18

## 2022-10-19 RX ADMIN — Medication: at 10:27

## 2022-10-19 RX ADMIN — LABETALOL HYDROCHLORIDE 5 MG: 5 INJECTION INTRAVENOUS at 09:49

## 2022-10-19 ASSESSMENT — PAIN - FUNCTIONAL ASSESSMENT: PAIN_FUNCTIONAL_ASSESSMENT: ACTIVITIES ARE NOT PREVENTED

## 2022-10-19 ASSESSMENT — PAIN SCALES - GENERAL
PAINLEVEL_OUTOF10: 10
PAINLEVEL_OUTOF10: 10
PAINLEVEL_OUTOF10: 8
PAINLEVEL_OUTOF10: 10
PAINLEVEL_OUTOF10: 3
PAINLEVEL_OUTOF10: 0
PAINLEVEL_OUTOF10: 10
PAINLEVEL_OUTOF10: 0

## 2022-10-19 ASSESSMENT — PAIN DESCRIPTION - LOCATION
LOCATION: ABDOMEN

## 2022-10-19 ASSESSMENT — PAIN DESCRIPTION - ORIENTATION: ORIENTATION: RIGHT;UPPER

## 2022-10-19 ASSESSMENT — PAIN DESCRIPTION - DESCRIPTORS
DESCRIPTORS: ACHING

## 2022-10-19 ASSESSMENT — LIFESTYLE VARIABLES: SMOKING_STATUS: 0

## 2022-10-19 ASSESSMENT — PAIN DESCRIPTION - PAIN TYPE: TYPE: ACUTE PAIN

## 2022-10-19 NOTE — PROGRESS NOTES
Patient seen and evaluated. No new complaints or overnight events. Plan for open completion cholecystectomy with ultrasound today. All questions were answered to the patient's satisfaction. Consent has been obtained. The risks, benefits, alternatives, and potential complications of the procedure, including the risks of bleeding, infection, injury to surrounding structures, need for additional procedures, and death were explained to the patient. All questions were answered. The patient understands and agrees to proceed with the procedure.      Electronically signed by Alex Chung DO on 10/19/2022 at 7:08 AM

## 2022-10-19 NOTE — PROGRESS NOTES
[address] [facility]  Family Medicine Attending    TEMI RAMIREZ Course: 50 y.o. female with history of history of gastric bypass, CAD s/p CABG presented to the ED for right sided abdominal pain started day of admission. Pain became 10/10 and she called EMS. Pain happened after eating and associated with some nausea and vomiting. No fevers, chills, nausea, vomiting. No previous episodes of pain. She denies chest pain. CT abdomen and pelvis showed biliary duct dilation and gen surgery was consulted. S/p open completion cholecystectomy 10/19. S: Patient seen and examined after surgery. She was resting, had mild nausea and good pain control. O: VS- Blood pressure (!) 152/97, pulse 77, temperature 97.9 °F (36.6 °C), temperature source Oral, resp. rate 12, height 5' 4\" (1.626 m), weight 209 lb (94.8 kg), SpO2 96 %, not currently breastfeeding. Exam is as noted by resident with the following changes, additions or corrections:  Gen - lying in bed, NAD, AAOx3  Cardio - RRR, no murmur   Lungs - CTAB, no wheeze       Impressions:   Principal Problem:    Abdominal pain  Active Problems:    Cholecystitis, acute    Acute cholecystitis due to biliary calculus  Resolved Problems:    * No resolved hospital problems. *      Plan:   s/p open surgery for removal of remnant stones 10/19. Cont Zosyn. Hold ASA and brillinta [id number] post-op    Headache - Suspect BP related, has improved w/ BP control    HTN - amlodipine  10mg. Shoulder Ache / Back Ache - improved. , follow. Diet as tolerated  PCA per prtocol for pain  Zofran for nausea. DVT Ppx: Lovenox, held today. Attending Physician Statement  I have reviewed the chart, including any radiology or labs, and seen the patient with the resident(s). I personally reviewed and performed key elements of the history and exam.  I agree with the assessment, plan and orders as documented by the resident.   Please refer to the resident note for additional information.       Kendal Donald MD

## 2022-10-19 NOTE — PROGRESS NOTES
[facility] - Family Medicine Inpatient   Resident Progress Note    S:  Hospital day: 2    Brief Synopsis: Robyn Fisher is a 50 y.o. female with a PMH of hypertension, history of gastric bypass, CAD status post CABG in 6/2022, depression and bilateral renal artery stenosis presented to ED for abdominal pain for RUQ and RLQ that started after food. Pain was sharp and constant in nature. It was not alleviated with positioning. Patient also reports nausea and vomiting. Vomitus was non-bloody. She denies chest pain, SOB or loose BM. In the ED, patient was given fentanyl for pain management and general surgery was consulted. CT abdomen and pelvis showed biliary dilatation. Patient was admitted for abdominal pain possibly biliary stones. IV Zosyn was started. HBP was consulted and MRCP was ordered. Home ASA and Brilinta was held. ALP, AST and ALT were elevated, AST and ALT trended down. It showed several stones in the biliary duct up to 7mm. Open surgery for removal of stones was done on 10/19. Patient had been having elevated BP since she was admitted. On 10/17/2022, BP was eIevated to 208/98 and patient developed a headache. Norvasc was increased to 10mg PO daily. This morning patient was in the OR. Patient was evaluated at bedside after she was back from surgery in room 6421. Patient was resting, has pain in the incision site and also nausea.       Cont meds:    HYDROmorphone      sodium chloride      lactated ringers 75 mL/hr at 10/18/22 1202    sodium chloride       Scheduled meds:    sodium chloride flush  5-40 mL IntraVENous 2 times per day    amLODIPine  10 mg Oral Daily    lidocaine  1 patch TransDERmal Daily    ceFAZolin  2,000 mg IntraVENous See Admin Instructions    pantoprazole (PROTONIX) 40 mg injection  40 mg IntraVENous Daily    [Held by provider] aspirin  81 mg Oral Daily    escitalopram  5 mg Oral Daily    isosorbide mononitrate  30 mg Oral Daily    metoprolol succinate  50 mg Oral BID    rosuvastatin 40 mg Oral Daily    [Held by provider] ticagrelor  90 mg Oral BID    ferrous sulfate  325 mg Oral BID WC    sodium chloride flush  5-40 mL IntraVENous 2 times per day    piperacillin-tazobactam  3,375 mg IntraVENous Q8H     PRN meds: sodium chloride flush, sodium chloride, acetaminophen, HYDROmorphone, HYDROmorphone, ondansetron, droperidol, midazolam, diphenhydrAMINE, labetalol **OR** hydrALAZINE, ipratropium-albuterol, acetaminophen, fentanNYL, sodium chloride flush, sodium chloride, polyethylene glycol, ondansetron     I reviewed the patient's Past Medical and Surgical History, Medications and Allergies. O:  VS: BP (!) 164/90   Pulse 71   Temp 97.5 °F (36.4 °C) (Temporal)   Resp 22   Ht 5' 4\" (1.626 m)   Wt 209 lb (94.8 kg)   SpO2 94%   BMI 35.87 kg/m²     Physical Exam:  General: Alert, cooperative, no acute distress. HEENT: Normocephalic, atraumatic. PERRLA, conjunctiva/corneas clear    Chest: No tenderness or deformity, full & symmetric excursion   Lung: Clear to auscultation bilaterally,  respirations unlabored. No rales/wheezing/rubs . Heart: RRR, S1 and S2 normal, no murmur, rub or gallop. DP pulses 2/4   Abdomen: Soft, nondistended, tender at incision site, no masses, no organomegaly, no guarding, rebound or rigidity. Genital/Rectal: deferred   Extremities:  Extremities normal, atraumatic, no cyanosis or edema. Distal pulses equal bilaterally   Skin: Skin color, texture, turgor normal, no rashes or lesions   Musculoskeletal: No joint swelling, no tenderness anywhere. Normal ROM in extremities. Decreased sensation in the left lower extremity, chronic. Neurologic: Alert & Oriented; Normal and symmetric strength in UEs and LEs; Sensation intact, CN 1-12 intact. Psychiatric: appropriate affect. Intact judgment and insight.         Labs:  Na/K/Cl/CO2:  137/3.7/104/20 (10/19 0022)  BUN/Cr/glu/ALT/AST/amyl/lip:  20/1.2/--/94/41/--/-- (10/19 7237)  WBC/Hgb/Hct/Plts:  4.1/11.6/34.7/172 (10/19 5872)  estimated creatinine clearance is 63 mL/min (A) (based on SCr of 1.2 mg/dL (H)). Other pertinent labs as noted below    New Imaging:  MRI ABDOMEN W WO CONTRAST MRCP   Final Result   Limited evaluation due to motion artifact. The cystic duct remnant is   dilated and appears to contain several stones measuring up to 7 mm. Mild   biliary ductal dilatation without definite stones in the common bile duct. The degree of extrahepatic biliary ductal dilatation is not as pronounced as   on CT from yesterday. CT ABDOMEN PELVIS W IV CONTRAST Additional Contrast? None   Final Result   1. Biliary dilation redemonstrated, mildly worse. Suggestion of a small   chronic cystic duct calculus. 2. Suggestion of constipation and there are small bowel air-fluid levels. 3. Additionally dilation with retained contents within the gastric bypass   including afferent limb which is abnormal but present on prior exam.      RECOMMENDATIONS:   Clinical correlation. A/P:  Principal Problem:    Abdominal pain  Active Problems:    Cholecystitis, acute    Acute cholecystitis due to biliary calculus  Resolved Problems:    * No resolved hospital problems. *      Abdominal pain 2/2 acute cholecystitis due to biliary calculus  -CT abdomen showed biliary dilation. MRCP showed several stones up to 7mm in the cystic duct remnant, mild biliary ductal dilation, no stones seen.  -Patient is s/p open completion cholecystectomy with common bile duct exploration  -Continue to monitor LFTs  -Continue Protonix daily  -Fentanyl and Tylenol PRN for pain management, PCA of Dilaudid pump  -Zofran as needed for nausea    2. CAD s/p CABG  -Continue home dose metoprolol, Crestor and Imdur  -Hold aspirin and Brilinta for 48h post op  -EKG on admission normal sinus rhythm. Unchanged from previous    3. CKD  -Stable, baseline creatinine 1.2  -Avoid nephrotoxic medications    4.     Hypertension  -BP not controlled since admission, BP 208/98 on 10/17, resulting in headache  -Home Norvasc increased to 10mg daily  -Will continue to monitor vitals, consider adding another agent to control    5. Depression  -Continue home dose Lexapro    6.     Back pain - stable  -New onset back pain in the left subscapular region this morning, musculoskeletal pattern  -Continue lidocaine patches  -Tylenol 500mg PRN    DVT Prophylaxis: lovenox 40mg daily, held today  GI Prophylaxis: protonix  Diet: low fat diet      Electronically signed by Shannan Freire MD on 10/19/2022 at 12:14 PM  This case was discussed with attending physician Dr. Johnny Miller

## 2022-10-19 NOTE — OP NOTE
Operative Note      Patient: Margret Lam  YOB: 1970  MRN: [id number]    Date of Procedure: 10/19/2022    Pre-Op Diagnosis: chronic cholecystitis with retained stones within the cystic duct, multiple previous abdominal surgeries     Post-Op Diagnosis: same       Operation  1. Open completion cholecystectomy  2. Common bile duct exploration     Surgeon(s):  Zhanna Alvarenga MD     Assistant:   Resident: C. Frankey Daring MD ([id number])     Anesthesia: General     Anesthesia: GET     EBL: 50mL     Indication: chronic cholecystitis and calculus     Procedure Details: The patient was brought into the operating room and placed in a supine position on the OR table. Prior to proceeding, a complete time out was taken and recorded in OpTime to include identification of the patient, identification of the procedure, identification of the operative site, presence of all required radiographs and/or equipment, and the timely administration of appropriate antibiotics. A dose of appropriate antibiotics was given within 60 minutes of the incision and will be discontinued within 24 hours per protocol. This was done under my direct supervision. After the induction of GET anesthesia, lines were placed by the anesthesiologist. The urinary bladder was catheterized. There was no tension on the axillae and all pressure points were padded. Sequential compression boots were used as were Paul Huggers. The abdomen was prepped and draped in the usual sterile fashion. The usual right subcostal incision was made. The skin was deepened with electrocautery and the abdominal cavity was opened. The falciform ligament was ligated and divided. There were some omental adhesions to the gallbladder fossa along with the colon adhered to the liver. The colon was mobilized off the liver bed and the hooper retractor was placed. The duodenum was then mobilized off the gallbladder.   Next starting laterally along the liver the clips were identified. The jd hepatis was exposed. The cystic artery was ligated. Upon palpation of the duct there were multiple stones within the common bile duct. The cystic duct was opened and multiple stones were identified. There was sludge within her bile duct. These were removed. Next a #4 Francy catheter was inserted through the duct and through the papilla. It was inflated and only sludge removed. This was done multiple times. Then the Francy catheter was ran toward the left and right hepatic duct. It was inflated and again more stones were removed. The duct was copiously irrigated. The cystic duct was closed with 4.0 prolene suture followed by clips. The patient was checked again for hemostasis and once again good hemostasis was achieved. The wound was closed in layers with  0 looped PDS. Next, 3.0 vicryl suture was used to close scarpas followed by 4.0 monocryl suture. At the end of the case the needle, sponge and instrument counts were all correct. Sterile dressings were applied and the patient was brought to the recovery room in stable condition.     Electronically signed by Rosario Buitrago MD on 10/19/2022 at 2:46 PM

## 2022-10-19 NOTE — PROGRESS NOTES
Post op communication with Dr. Felicia Moore regarding pt room placement, ok for pt to go to floor able to accomodate PCA pump. Nursing supervisor placed pt 6WE.

## 2022-10-19 NOTE — PROGRESS NOTES
Attending group notified that patient was to transfer to 64. Advised that patient does not need to be on tele bad and can return to 82.

## 2022-10-19 NOTE — ANESTHESIA PROCEDURE NOTES
Arterial Line:    An arterial line was placed using surface landmarks, in the OR for the following indication(s): continuous blood pressure monitoring and blood sampling needed. A 20 gauge (size), 1 and 3/4 inch (length), Arrow (type) catheter was placed, into the left radial artery, secured by Tegaderm. Events:  patient tolerated procedure well with no complications. 10/19/2022 7:45 AM10/19/2022 8:03 AM  Anesthesiologist: Brenda Alarcon MD  Resident/CRNA: DA Jalloh - CAS  Other anesthesia staff: Lesly Mata RN  Performed:  Other anesthesia staff   Preanesthetic Checklist  Completed: patient identified, IV checked, site marked, risks and benefits discussed, surgical/procedural consents, equipment checked, pre-op evaluation, timeout performed, anesthesia consent given, oxygen available and monitors applied/VS acknowledged

## 2022-10-19 NOTE — PROGRESS NOTES
Belongings included cell phone and  placed in belonging bag with green bag that was at bedside and clothes.  Walked to 59 by staff member

## 2022-10-19 NOTE — ANESTHESIA POSTPROCEDURE EVALUATION
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     Camille Phase I: Camille Score: 10    Camille Phase II:        Anesthesia Post Evaluation    Patient location during evaluation: PACU  Patient participation: complete - patient participated  Level of consciousness: awake and alert  Airway patency: patent  Nausea & Vomiting: no nausea and no vomiting  Complications: no  Cardiovascular status: hemodynamically stable  Respiratory status: acceptable  Hydration status: euvolemic

## 2022-10-20 LAB
ALBUMIN SERPL-MCNC: 3.8 G/DL (ref 3.5–5.2)
ALP BLD-CCNC: 239 U/L (ref 35–104)
ALT SERPL-CCNC: 119 U/L (ref 0–32)
ANION GAP SERPL CALCULATED.3IONS-SCNC: 13 MMOL/L (ref 7–16)
AST SERPL-CCNC: 83 U/L (ref 0–31)
BASOPHILS ABSOLUTE: 0.02 E9/L (ref 0–0.2)
BASOPHILS RELATIVE PERCENT: 0.1 % (ref 0–2)
BILIRUB SERPL-MCNC: 0.6 MG/DL (ref 0–1.2)
BUN BLDV-MCNC: 15 MG/DL (ref 6–20)
CALCIUM SERPL-MCNC: 9.6 MG/DL (ref 8.6–10.2)
CHLORIDE BLD-SCNC: 102 MMOL/L (ref 98–107)
CO2: 21 MMOL/L (ref 22–29)
CREAT SERPL-MCNC: 1 MG/DL (ref 0.5–1)
EOSINOPHILS ABSOLUTE: 0.01 E9/L (ref 0.05–0.5)
EOSINOPHILS RELATIVE PERCENT: 0.1 % (ref 0–6)
GFR SERPL CREATININE-BSD FRML MDRD: >60 ML/MIN/1.73
GLUCOSE BLD-MCNC: 117 MG/DL (ref 74–99)
HCT VFR BLD CALC: 36.2 % (ref 34–48)
HEMOGLOBIN: 12.1 G/DL (ref 11.5–15.5)
IMMATURE GRANULOCYTES #: 0.08 E9/L
IMMATURE GRANULOCYTES %: 0.5 % (ref 0–5)
LYMPHOCYTES ABSOLUTE: 0.86 E9/L (ref 1.5–4)
LYMPHOCYTES RELATIVE PERCENT: 5.4 % (ref 20–42)
MCH RBC QN AUTO: 29.7 PG (ref 26–35)
MCHC RBC AUTO-ENTMCNC: 33.4 % (ref 32–34.5)
MCV RBC AUTO: 88.7 FL (ref 80–99.9)
MONOCYTES ABSOLUTE: 1.11 E9/L (ref 0.1–0.95)
MONOCYTES RELATIVE PERCENT: 6.9 % (ref 2–12)
NEUTROPHILS ABSOLUTE: 13.91 E9/L (ref 1.8–7.3)
NEUTROPHILS RELATIVE PERCENT: 87 % (ref 43–80)
PDW BLD-RTO: 13.4 FL (ref 11.5–15)
PLATELET # BLD: 178 E9/L (ref 130–450)
PMV BLD AUTO: 11.8 FL (ref 7–12)
POTASSIUM SERPL-SCNC: 4.2 MMOL/L (ref 3.5–5)
RBC # BLD: 4.08 E12/L (ref 3.5–5.5)
SODIUM BLD-SCNC: 136 MMOL/L (ref 132–146)
TOTAL PROTEIN: 7.1 G/DL (ref 6.4–8.3)
WBC # BLD: 16 E9/L (ref 4.5–11.5)

## 2022-10-20 PROCEDURE — 6360000002 HC RX W HCPCS: Performed by: STUDENT IN AN ORGANIZED HEALTH CARE EDUCATION/TRAINING PROGRAM

## 2022-10-20 PROCEDURE — 2580000003 HC RX 258: Performed by: STUDENT IN AN ORGANIZED HEALTH CARE EDUCATION/TRAINING PROGRAM

## 2022-10-20 PROCEDURE — 99232 SBSQ HOSP IP/OBS MODERATE 35: CPT | Performed by: FAMILY MEDICINE

## 2022-10-20 PROCEDURE — 97535 SELF CARE MNGMENT TRAINING: CPT

## 2022-10-20 PROCEDURE — 6370000000 HC RX 637 (ALT 250 FOR IP): Performed by: STUDENT IN AN ORGANIZED HEALTH CARE EDUCATION/TRAINING PROGRAM

## 2022-10-20 PROCEDURE — 85025 COMPLETE CBC W/AUTO DIFF WBC: CPT

## 2022-10-20 PROCEDURE — 36415 COLL VENOUS BLD VENIPUNCTURE: CPT

## 2022-10-20 PROCEDURE — A4216 STERILE WATER/SALINE, 10 ML: HCPCS | Performed by: STUDENT IN AN ORGANIZED HEALTH CARE EDUCATION/TRAINING PROGRAM

## 2022-10-20 PROCEDURE — 2140000000 HC CCU INTERMEDIATE R&B

## 2022-10-20 PROCEDURE — 97165 OT EVAL LOW COMPLEX 30 MIN: CPT

## 2022-10-20 PROCEDURE — 97530 THERAPEUTIC ACTIVITIES: CPT

## 2022-10-20 PROCEDURE — C9113 INJ PANTOPRAZOLE SODIUM, VIA: HCPCS | Performed by: STUDENT IN AN ORGANIZED HEALTH CARE EDUCATION/TRAINING PROGRAM

## 2022-10-20 PROCEDURE — 80053 COMPREHEN METABOLIC PANEL: CPT

## 2022-10-20 PROCEDURE — 97161 PT EVAL LOW COMPLEX 20 MIN: CPT

## 2022-10-20 RX ORDER — SENNA AND DOCUSATE SODIUM 50; 8.6 MG/1; MG/1
2 TABLET, FILM COATED ORAL DAILY
Status: DISCONTINUED | OUTPATIENT
Start: 2022-10-20 | End: 2022-10-23 | Stop reason: HOSPADM

## 2022-10-20 RX ORDER — POLYETHYLENE GLYCOL 3350 17 G/17G
17 POWDER, FOR SOLUTION ORAL 2 TIMES DAILY
Status: DISCONTINUED | OUTPATIENT
Start: 2022-10-20 | End: 2022-10-23 | Stop reason: HOSPADM

## 2022-10-20 RX ORDER — KETOROLAC TROMETHAMINE 30 MG/ML
15 INJECTION, SOLUTION INTRAMUSCULAR; INTRAVENOUS EVERY 6 HOURS
Status: DISCONTINUED | OUTPATIENT
Start: 2022-10-20 | End: 2022-10-21

## 2022-10-20 RX ORDER — POLYETHYLENE GLYCOL 3350 17 G/17G
17 POWDER, FOR SOLUTION ORAL DAILY
Status: DISCONTINUED | OUTPATIENT
Start: 2022-10-20 | End: 2022-10-20

## 2022-10-20 RX ADMIN — AMLODIPINE BESYLATE 10 MG: 10 TABLET ORAL at 09:11

## 2022-10-20 RX ADMIN — ONDANSETRON 4 MG: 2 INJECTION INTRAMUSCULAR; INTRAVENOUS at 06:15

## 2022-10-20 RX ADMIN — ASPIRIN 81 MG: 81 TABLET, COATED ORAL at 09:12

## 2022-10-20 RX ADMIN — ISOSORBIDE MONONITRATE 30 MG: 30 TABLET, EXTENDED RELEASE ORAL at 09:11

## 2022-10-20 RX ADMIN — SODIUM CHLORIDE, PRESERVATIVE FREE 10 ML: 5 INJECTION INTRAVENOUS at 13:42

## 2022-10-20 RX ADMIN — ENOXAPARIN SODIUM 40 MG: 100 INJECTION SUBCUTANEOUS at 09:12

## 2022-10-20 RX ADMIN — ACETAMINOPHEN 1000 MG: 500 TABLET ORAL at 21:36

## 2022-10-20 RX ADMIN — POLYETHYLENE GLYCOL 3350 17 G: 17 POWDER, FOR SOLUTION ORAL at 21:36

## 2022-10-20 RX ADMIN — FERROUS SULFATE TAB 325 MG (65 MG ELEMENTAL FE) 325 MG: 325 (65 FE) TAB at 09:10

## 2022-10-20 RX ADMIN — SODIUM CHLORIDE, POTASSIUM CHLORIDE, SODIUM LACTATE AND CALCIUM CHLORIDE: 600; 310; 30; 20 INJECTION, SOLUTION INTRAVENOUS at 11:39

## 2022-10-20 RX ADMIN — PIPERACILLIN AND TAZOBACTAM 3375 MG: 3; .375 INJECTION, POWDER, FOR SOLUTION INTRAVENOUS at 13:51

## 2022-10-20 RX ADMIN — Medication: at 18:38

## 2022-10-20 RX ADMIN — ACETAMINOPHEN 1000 MG: 500 TABLET ORAL at 13:41

## 2022-10-20 RX ADMIN — KETOROLAC TROMETHAMINE 15 MG: 30 INJECTION, SOLUTION INTRAMUSCULAR at 07:45

## 2022-10-20 RX ADMIN — SODIUM CHLORIDE, PRESERVATIVE FREE 40 MG: 5 INJECTION INTRAVENOUS at 09:12

## 2022-10-20 RX ADMIN — ROSUVASTATIN CALCIUM 40 MG: 20 TABLET, COATED ORAL at 09:11

## 2022-10-20 RX ADMIN — KETOROLAC TROMETHAMINE 15 MG: 30 INJECTION, SOLUTION INTRAMUSCULAR at 13:42

## 2022-10-20 RX ADMIN — ACETAMINOPHEN 1000 MG: 500 TABLET ORAL at 05:49

## 2022-10-20 RX ADMIN — KETOROLAC TROMETHAMINE 15 MG: 30 INJECTION, SOLUTION INTRAMUSCULAR at 19:06

## 2022-10-20 RX ADMIN — Medication 10 ML: at 09:13

## 2022-10-20 RX ADMIN — PIPERACILLIN AND TAZOBACTAM 3375 MG: 3; .375 INJECTION, POWDER, FOR SOLUTION INTRAVENOUS at 05:51

## 2022-10-20 RX ADMIN — ESCITALOPRAM 5 MG: 5 TABLET, FILM COATED ORAL at 09:11

## 2022-10-20 RX ADMIN — FERROUS SULFATE TAB 325 MG (65 MG ELEMENTAL FE) 325 MG: 325 (65 FE) TAB at 16:28

## 2022-10-20 RX ADMIN — METOPROLOL SUCCINATE 50 MG: 50 TABLET, EXTENDED RELEASE ORAL at 09:11

## 2022-10-20 RX ADMIN — METOPROLOL SUCCINATE 50 MG: 50 TABLET, EXTENDED RELEASE ORAL at 21:36

## 2022-10-20 RX ADMIN — SODIUM CHLORIDE, PRESERVATIVE FREE 10 ML: 5 INJECTION INTRAVENOUS at 07:48

## 2022-10-20 RX ADMIN — SENNOSIDES AND DOCUSATE SODIUM 2 TABLET: 50; 8.6 TABLET ORAL at 09:12

## 2022-10-20 RX ADMIN — POLYETHYLENE GLYCOL 3350 17 G: 17 POWDER, FOR SOLUTION ORAL at 09:12

## 2022-10-20 ASSESSMENT — PAIN SCALES - GENERAL
PAINLEVEL_OUTOF10: 6
PAINLEVEL_OUTOF10: 6
PAINLEVEL_OUTOF10: 8
PAINLEVEL_OUTOF10: 8
PAINLEVEL_OUTOF10: 10
PAINLEVEL_OUTOF10: 7
PAINLEVEL_OUTOF10: 0
PAINLEVEL_OUTOF10: 0
PAINLEVEL_OUTOF10: 6
PAINLEVEL_OUTOF10: 0

## 2022-10-20 ASSESSMENT — PAIN DESCRIPTION - ORIENTATION
ORIENTATION: RIGHT;UPPER
ORIENTATION: RIGHT;UPPER
ORIENTATION: RIGHT

## 2022-10-20 ASSESSMENT — PAIN DESCRIPTION - DESCRIPTORS
DESCRIPTORS: NAGGING;ACHING;DISCOMFORT
DESCRIPTORS: ACHING;SHARP;NAGGING
DESCRIPTORS: NAGGING;ACHING;DISCOMFORT
DESCRIPTORS: ACHING;DULL;DISCOMFORT

## 2022-10-20 ASSESSMENT — PAIN DESCRIPTION - LOCATION
LOCATION: ABDOMEN

## 2022-10-20 ASSESSMENT — PAIN SCALES - WONG BAKER: WONGBAKER_NUMERICALRESPONSE: 0

## 2022-10-20 NOTE — PROGRESS NOTES
GENERAL SURGERY  DAILY PROGRESS NOTE  10/20/2022    Subjective:  Tashia Zambrano. Patient states that her pain is somewhat controlled but she states that she doesn't want to ask for her medications all the time, and was encouraged to stay comfortable and to ask when she needs pain control. Used 8.3 of PCA pump overnight. Denies fevers and vomiting. Endorses chills and nausea. White count jumped from 4.1 pre op to 16.0 this morning. Will continue to monitor for signs of infection, patient afebrile. Did not know she had Zofran ordered, nurse was notified and gave her a dose. Patient also endorses pruritis of upper abdomen. Denies new and worsening SOB and chest pain. Ambulating to restroom. Urinating without difficulty. Last bowel movement was five days ago and that is normal for her, will continue to monitor. Objective:  /72   Pulse 75   Temp 98.3 °F (36.8 °C) (Oral)   Resp 18   Ht 5' 4\" (1.626 m)   Wt 209 lb 1.6 oz (94.8 kg)   SpO2 97%   BMI 35.89 kg/m²     General appearance: alert, cooperative and in no acute distress. Eyes: grossly normal  Lungs: nonlabored breathing on 3L nasal cannula. Heart: regular rate and rhythm. No murmurs, gallops or rubs. Abdomen: Soft, non-distended. TTP on right side. No rebound or guarding. Incision CDI with minimal surrounding erythema. Skin: No skin abnormalities  Neurologic: Alert and oriented x 3. Grossly normal  Musculoskeletal: No clubbing cyanosis or edema    Assessment/Plan:  50 y.o. female POD1 s/p hina completion for cystic duct cholelithiasis. Recovering well.      Diet CLD, continue LR @75  Lovenox for DVT ppx  Pruritis likely 2/2 to prep, patient has Benadryl ordered   Continue PCA pump and nausea control   On Zosyn   Daily CBC and BMP    Electronically signed by Marylee Pollock on 10/20/2022 at 9:56 AM

## 2022-10-20 NOTE — PROGRESS NOTES
[facility] - Family Medicine Inpatient   Resident Progress Note    S:  Hospital day: 3    Brief Synopsis: Bashir Cary is a 50 y.o. female with a PMH of hypertension, history of gastric bypass, CAD status post CABG in 6/2022, depression and bilateral renal artery stenosis presented to ED for abdominal pain for RUQ and RLQ that started after food. Pain was sharp and constant in nature. It was not alleviated with positioning. Patient also reports nausea and vomiting. Vomitus was non-bloody. She denies chest pain, SOB or loose BM. In the ED, patient was given fentanyl for pain management and general surgery was consulted. CT abdomen and pelvis showed biliary dilatation. Patient was admitted for abdominal pain possibly biliary stones. IV Zosyn was started. HBP was consulted and MRCP was ordered. Home ASA and Brilinta was held. ALP, AST and ALT were elevated, AST and ALT trended down. It showed several stones in the biliary duct up to 7mm. Open surgery for removal of stones was done on 10/19. Patient had been having elevated BP since she was admitted. On 10/17/2022, BP was eIevated to 208/98 and patient developed a headache. Norvasc was increased to 10mg PO daily. Overnight, no acute events. Patient unable to sleep all night as her Alaris was beeping everytime she fell asleep. This morning, patient was evaluated at bedside. Pain is well tolerated on Dilaudid. Reported she has ambulated to the bathroom, passing urine but no BM. Positive for flatulence. She did have her Jello with no aspiration or regurgitation.       Cont meds:    HYDROmorphone      lactated ringers 75 mL/hr at 10/20/22 1139    sodium chloride       Scheduled meds:    ketorolac  15 mg IntraVENous Q6H    sennosides-docusate sodium  2 tablet Oral Daily    polyethylene glycol  17 g Oral BID    acetaminophen  1,000 mg Oral 3 times per day    piperacillin-tazobactam  3,375 mg IntraVENous Q8H    amLODIPine  10 mg Oral Daily    isosorbide mononitrate  30 mg Oral Daily    metoprolol succinate  50 mg Oral BID    aspirin  81 mg Oral Daily    enoxaparin  40 mg SubCUTAneous Daily    lidocaine  1 patch TransDERmal Daily    pantoprazole (PROTONIX) 40 mg injection  40 mg IntraVENous Daily    escitalopram  5 mg Oral Daily    rosuvastatin  40 mg Oral Daily    [Held by provider] ticagrelor  90 mg Oral BID    ferrous sulfate  325 mg Oral BID WC    sodium chloride flush  5-40 mL IntraVENous 2 times per day     PRN meds: naloxone, sodium chloride flush, sodium chloride, ondansetron     I reviewed the patient's Past Medical and Surgical History, Medications and Allergies. O:  VS: /78   Pulse 77   Temp 98.1 °F (36.7 °C) (Oral)   Resp 17   Ht 5' 4\" (1.626 m)   Wt 209 lb 1.6 oz (94.8 kg)   SpO2 98%   BMI 35.89 kg/m²     Physical Exam:  General: Alert, cooperative, no acute distress. HEENT: Normocephalic, atraumatic. PERRLA, conjunctiva/corneas clear    Chest: No tenderness or deformity, full & symmetric excursion   Lung: Clear to auscultation bilaterally,  respirations unlabored. No rales/wheezing/rubs . Heart: RRR, S1 and S2 normal, no murmur, rub or gallop. DP pulses 2/4   Abdomen: Soft, nondistended, tender at incision site, no masses, no organomegaly, no guarding, rebound or rigidity. Genital/Rectal: deferred   Extremities:  Extremities normal, atraumatic, no cyanosis or edema. Distal pulses equal bilaterally   Skin: Skin color, texture, turgor normal, no rashes or lesions   Musculoskeletal: No joint swelling, no tenderness anywhere. Normal ROM in extremities. Decreased sensation in the left lower extremity, chronic. Neurologic: Alert & Oriented; Normal and symmetric strength in UEs and LEs; Sensation intact, CN 1-12 intact. Psychiatric: appropriate affect. Intact judgment and insight.         Labs:  Na/K/Cl/CO2:  136/4.2/102/21 (10/20 4588)  BUN/Cr/glu/ALT/AST/amyl/lip:  15/1.0/--/119/83/--/-- (10/20 0901)  WBC/Hgb/Hct/Plts:  16.0/12.1/36.2/178 (10/20 8740)  estimated creatinine clearance is 75 mL/min (based on SCr of 1 mg/dL). Other pertinent labs as noted below    New Imaging:  MRI ABDOMEN W WO CONTRAST MRCP   Final Result   Limited evaluation due to motion artifact. The cystic duct remnant is   dilated and appears to contain several stones measuring up to 7 mm. Mild   biliary ductal dilatation without definite stones in the common bile duct. The degree of extrahepatic biliary ductal dilatation is not as pronounced as   on CT from yesterday. CT ABDOMEN PELVIS W IV CONTRAST Additional Contrast? None   Final Result   1. Biliary dilation redemonstrated, mildly worse. Suggestion of a small   chronic cystic duct calculus. 2. Suggestion of constipation and there are small bowel air-fluid levels. 3. Additionally dilation with retained contents within the gastric bypass   including afferent limb which is abnormal but present on prior exam.      RECOMMENDATIONS:   Clinical correlation. A/P:  Principal Problem:    Abdominal pain  Active Problems:    Cholecystitis, acute    Acute cholecystitis due to biliary calculus  Resolved Problems:    * No resolved hospital problems. *      Abdominal pain 2/2 acute cholecystitis due to biliary calculus  -CT abdomen showed biliary dilation. MRCP showed several stones up to 7mm in the cystic duct remnant, mild biliary ductal dilation, no stones seen.  -Patient is s/p open completion cholecystectomy with common bile duct exploration on 10/19  -Continue to monitor LFTs  -Continue Protonix daily  -Fentanyl and Tylenol PRN for pain management, switching from PCA to scheduled Toradol  -Zofran as needed for nausea    2. CAD s/p CABG  -Continue home dose metoprolol, Crestor and Imdur  -Hold aspirin and Brilinta for 48h post op  -EKG on admission normal sinus rhythm. Unchanged from previous    3. CKD  -Stable, baseline creatinine 1.2  -Avoid nephrotoxic medications    4.     Hypertension  -BP not controlled since admission, /98 on 10/17, resulting in headache  -Home Norvasc increased to 10mg daily  -Will continue to monitor vitals, consider adding another agent to control    5. Depression  -Continue home dose Lexapro    6.     Back pain - stable  -New onset back pain in the left subscapular region this morning, musculoskeletal pattern  -Continue lidocaine patches  -Tylenol 500mg PRN    DVT Prophylaxis: lovenox 40mg daily, restarted today  GI Prophylaxis: protonix  Diet: low fat diet      Electronically signed by Kandice Slulivan MD on 10/20/2022 at 1:04 PM  This case was discussed with attending physician Dr. Shirley Duvall

## 2022-10-20 NOTE — PROGRESS NOTES
[address] [facility]  Family Medicine Attending    TEMI RAMIREZ Course: 50 y.o. female with history of history of gastric bypass, CAD s/p CABG presented to the ED for right sided abdominal pain started day of admission. Pain became 10/10 and she called EMS. Pain happened after eating and associated with some nausea and vomiting. No fevers, chills, nausea, vomiting. No previous episodes of pain. She denies chest pain. CT abdomen and pelvis showed biliary duct dilation and gen surgery was consulted. S/p open completion cholecystectomy 10/19. S: Patient sitting up in bed. C/o poor sleep. Tolerating sips well. +flatus this AM. +UOP. Pain reasonably controlled w/ PCA. She is awaiting transition to oral meds. Has been OOBTC. O: VS- Blood pressure 125/72, pulse 75, temperature 98.3 °F (36.8 °C), temperature source Oral, resp. rate 10, height 5' 4\" (1.626 m), weight 209 lb 1.6 oz (94.8 kg), SpO2 98 %, not currently breastfeeding. Exam is as noted by resident with the following changes, additions or corrections:    Abd: RUQ incision  c/d/I. Impressions:   Principal Problem:    Abdominal pain  Active Problems:    Cholecystitis, acute    Acute cholecystitis due to biliary calculus  Resolved Problems:    * No resolved hospital problems. *      Plan:   s/p open surgery for removal of remnant stones 10/19. Cont Zosyn. Team to discuss duration with surgery. Hold ASA and brillinta [id number] post-op    Headache - improved, cont to follow. HTN - amlodipine  10mg. improved, cont to follow. Shoulder Ache / Back Ache - improved. , follow. Diet as tolerated  PCA per prtocol for pain  Zofran for nausea. DVT Ppx: Lovenox. Attending Physician Statement  I have reviewed the chart, including any radiology or labs, and seen the patient with the resident(s).   I personally reviewed and performed key elements of the history and exam.  I agree with the assessment, plan and orders as documented by the resident. Please refer to the resident note for additional information.       Keith Nunez MD

## 2022-10-20 NOTE — PROGRESS NOTES
Occupational Therapy  OCCUPATIONAL THERAPY INITIAL EVALUATION    [place] [employer] [id number] [address]      Date:10/20/2022                                                Patient Name: Wilbert Anthony  MRN: [id number]  : 1970  Room: 5078/1286-U    Evaluating OT: Serge Linares OTR/L #[id number]     Referring Provider: Julia Cordova MD  Specific Provider Orders/Date: OT eval and treat 10/20/22    Diagnosis: Abdominal pain [R10.9]  Abdominal pain, unspecified abdominal location [R10.9]  Cholecystitis, acute [K81.0]   Pt admitted to hospital with abdominal pain    Surgery / Procedure: 10/19/22   1. Open completion cholecystectomy   2. Common bile duct exploration     Pertinent Medical History:  has a past medical history of Anorexia, Arthritis of knee, Back pain, chronic, Bilateral renal artery stenosis ([address] CAD (coronary artery disease), Chronic pain, Current severe episode of major depressive disorder without psychotic features without prior episode ([address] H/O gastric bypass, Hypertension, Hypertension, Metabolic acidosis, Ovarian cyst, and Seizures ([address]        Precautions:  Fall Risk, abdominal precautions, PCA pump    Assessment of current deficits    [x] Functional mobility  [x]ADLs  [x] Strength               []Cognition    [x] Functional transfers   [x] IADLs         [x] Safety Awareness   [x]Endurance    [] Fine Coordination              [x] Balance      [] Vision/perception   []Sensation     []Gross Motor Coordination  [] ROM  [] Delirium                   [] Motor Control     OT PLAN OF CARE   OT POC based on physician orders, patient diagnosis and results of clinical assessment    Frequency/Duration 1-3 days/wk for 2 weeks PRN   Specific OT Treatment Interventions to include:   * Instruction/training on adapted ADL techniques and AE recommendations to increase functional independence within precautions       * Training on energy conservation strategies, correct breathing pattern and techniques to improve independence/tolerance for self-care routine  * Functional transfer/mobility training/DME recommendations for increased independence, safety, and fall prevention  * Patient/Family education to increase follow through with safety techniques and functional independence  * Recommendation of environmental modifications for increased safety with functional transfers/mobility and ADLs  * Therapeutic exercise to improve motor endurance, ROM, and functional strength for ADLs/functional transfers  * Therapeutic activities to facilitate/challenge dynamic balance, stand tolerance for increased safety and independence with ADLs  * Therapeutic activities to facilitate gross/fine motor skills for increased independence with ADLs      Recommended Adaptive Equipment:  TBD     Home Living: Pt lives with son,daughter and son-in-law in a 2 story home with 2 JUAN and no hand rail   Bathroom setup: tub/shower combo    Equipment owned: none    Prior Level of Function: Independent with ADLs , Independent with IADLs; ambulated without AD   Driving: no   Occupation: works from home    Pain Level: Pt reports minimal abdominal pain;  Therapist facilitated repositioning to address pain      Cognition: A&O: 4/4; Follows 2 step directions   Memory:  good   Sequencing:  good   Problem solving:  good   Judgement/safety:  F+     Functional Assessment:  AM-PAC Daily Activity Raw Score: 19/24   Initial Eval Status  Date: 10/20/22 Treatment Status  Date: STGs = LTGs  Time frame: 10-14 days   Feeding Independent      Grooming Stand by Assist     Standing   Independent    UB Dressing Supervision   Independent    LB Dressing Stand by Assist   Modified [place]    Bathing Stand by Assist  Modified [place]    Toileting Stand by Assist   Modified [place]    Bed Mobility  Supine to sit: Supervision   Sit to supine: NT   Supine to sit: Modified [place]   Sit to supine: Modified [place]    Functional Transfers Stand by Assist   Modified [place]    Functional Mobility Stand by Assist     Ambulated in room and hallway without AD  Modified [place]    Balance Sitting:     Static:  indep    Dynamic:sup  Standing: SBA     Activity Tolerance F  G   Visual/  Perceptual wfl                    Hand Dominance right   Strength ROM Additional Info:    RUE  Wfl, formal MMT deferred due to abdominal precautions  wfl good  and wfl FMC/dexterity noted during ADL tasks     LUE Wfl, formal MMT deferred due to abdominal precautions  wfl good  and wfl FMC/dexterity noted during ADL tasks     Hearing: wfl  Sensation:wfl  Tone: wfl  Edema:none noted       Comments: Upon arrival patient supine in bed and agreeable to OT session. Therapist educated pt on role of OT. At end of session, patient seated in chair with call light and phone within reach, all lines and tubes intact. Overall patient demonstrated decreased independence and safety during completion of ADL/functional transfer/mobility tasks. Pt would benefit from continued skilled OT to increase safety and independence with completion of ADL/IADL tasks for functional independence and quality of life. Treatment: OT treatment provided this date includes: Facilitation of bed mobility, sitting balance at EOB (impacting ADLs; addressing posture, weight shifting, dynamic reaching), functional transfers (various surfaces: bed, toilet, chair), standing tolerance tasks (addressing posture, balance and activity tolerance while incorporating light functional reaching; impacting ADLs and functional activity) and functional ambulation tasks without AD (including to/ from bathroom and in preparation for item retrieval tasks; cuing on posture and safety) - skilled cuing on hand placement, posture, body mechanics, energy conservation techniques and safety.   Therapist facilitated self-care retraining: UB/LB self-care tasks (alpesh socks), simulated toileting task and standing grooming tasks at sink while educating pt on modified techniques, posture, safety and energy conservation techniques. Skilled monitoring of HR, O2 sats and pts response to treatment. Rehab Potential: Good  for established goals     Patient / Family Goal: return home      Patient and/or family were instructed on functional diagnosis, prognosis/goals and OT plan of care. Demonstrated fair+ understanding. Eval Complexity: Low    Time In: 1305  Time Out: 1345  Total Treatment Time: 25 minutes    Min Units   OT Eval Low 97165  x  1   OT Eval Medium 10838      OT Eval High 89785      OT Re-Eval [id number]       Therapeutic Ex 07292       Therapeutic Activities 56642  15  1   ADL/Self Care 29055  10  1   Orthotic Management 07746       Manual [id number]     Neuro Re-Ed 94579       Non-Billable Time          Evaluation Time additionally includes thorough review of current medical information, gathering information on past medical history/social history and prior level of function, interpretation of standardized testing/informal observation of tasks, assessment of data and development of plan of care and goals.           Syd Haas OTR/L #[id number]

## 2022-10-20 NOTE — PROGRESS NOTES
Physical Therapy  Physical Therapy Initial Assessment       Name: Wilbert Anthony  : 1970  MRN: [id number]      Date of Service: 10/20/2022    Evaluating PT:  Riley Alarcon PT, DPT  [id number]    Room #:  8431/7365-M  Diagnosis:  Abdominal pain [R10.9]  Abdominal pain, unspecified abdominal location [R10.9]  Cholecystitis, acute [K81.0]  PMHx/PSHx:   has a past medical history of Anorexia, Arthritis of knee, Back pain, chronic, Bilateral renal artery stenosis [address] CAD (coronary artery disease), Chronic pain, Current severe episode of major depressive disorder without psychotic features without prior episode ([facility] [address] H/O gastric bypass, Hypertension, Hypertension, Metabolic acidosis, Ovarian cyst, and Seizures ([facility] Procedure/Surgery:  1. Open completion cholecystectomy  2. Common bile duct exploration 10/19/22  Precautions:  Abdominal, Falls  Equipment Needs:      SUBJECTIVE:    Pt lives with son, dtr, son in law, 3 grandchildren in a 2 story home with 2 stairs to enter and no rail. Bed is on 2nd floor and bath is on 2nd floor. Flight of stairs to 2nd floor no hand rail. Pt ambulated with no device independently PTA. Equipment Owned:     OBJECTIVE:   Initial Evaluation  Date: 10/20/22 Treatment Short Term/ Long Term   Goals   AM-PAC 6 Clicks 47/09     Was pt agreeable to Eval/treatment? Yes     Does pt have pain? No c/o pain     Bed Mobility  Rolling: SBA  Supine to sit: SBA  Sit to supine: NT  Scooting: SBA  Rolling: Independent  Supine to sit:  Independent  Sit to supine: Independent  Scooting: Independent     Transfers Sit to stand: SBA  Stand to sit: SBA  Stand pivot: SBA  Sit to stand: Modified Independent  Stand to sit: Modified Independent  Stand pivot: Modified Independent     Ambulation    300 feet with SBA no AD  300 feet with Independent      Stair negotiation: ascended and descended  NT  >4 steps with single rail Modified Independent     ROM BUE:  Defer to OT  BLE:  WFL     Strength BUE: Defer to OT  BLE:  4/5  Improve 1 MMT   Balance Sitting EOB:  SBA  Dynamic Standing:  SBA [address]  Sitting EOB:  Independent    Dynamic Standing:  Modified Independent         Pt is A & O x 4  Sensation:  WNL  Edema:  WNL      Therapeutic Exercises:  functional mobility    Patient education  Pt educated on role of PT    Patient response to education:   Pt verbalized understanding Pt demonstrated skill Pt requires further education in this area   x x x     ASSESSMENT:    Conditions Requiring Skilled Therapeutic Intervention:    [x]Decreased strength     [x]Decreased ROM  [x]Decreased functional mobility  [x]Decreased balance   [x]Decreased endurance   []Decreased posture  []Decreased sensation  []Decreased coordination   []Decreased vision  [x]Decreased safety awareness   [x]Increased pain       Comments:  Pt agreeable to PT evaluation. Pt performing bed mobility, transfers, and ambulation without assist. Pt with 1 LOB, self corrected. Pt ambulation pattern is mildly unsteady due to pain. Patient would benefit from continued skilled PT to maximize functional mobility independence. Treatment:  Patient practiced and was instructed in the following treatment:    Bed mobility- verbal cues to perform via log roll  Functional transfers-Verbal cues for proper positioning and sequencing to perform transfers safely with maximum independence. Gait training-Verbal cues for proper positioning and sequencing using assistive device to maximize functional mobility independence. Pt's/ family goals   1. Get better    Prognosis is good for reaching above PT goals. Patient and or family understand(s) diagnosis, prognosis, and plan of care.   yes    PHYSICAL THERAPY PLAN OF CARE:    PT POC is established based on physician order and patient diagnosis     Referring provider/PT Order:    10/20/22 0815  PT eval and treat  Start:  10/20/22 0815,   End:  10/20/22 0815,   ONE TIME,   Standing Count:  1 Occurrences,   R Jose Young MD     Diagnosis:  Abdominal pain [R10.9]  Abdominal pain, unspecified abdominal location [R10.9]  Cholecystitis, acute [K81.0]  Specific instructions for next treatment:  Gait training    Current Treatment Recommendations:     [x] Strengthening to improve independence with functional mobility   [x] ROM to improve independence with functional mobility   [x] Balance Training to improve static/dynamic balance and to reduce fall risk  [x] Endurance Training to improve activity tolerance during functional mobility   [x] Transfer Training to improve safety and independence with all functional transfers   [x] Gait Training to improve gait mechanics, endurance and assess need for appropriate assistive device  [] Stair Training in preparation for safe discharge home and/or into the community   [x] Positioning to prevent skin breakdown and contractures  [x] Safety and Education Training   [x] Patient/Caregiver Education   [x] HEP  [] Other     PT long term treatment goals are located in above grid    Frequency of treatments: 2-5x/week x 1-2 weeks. Time in  1303  Time out  1343    Total Treatment Time  24 minutes     Evaluation Time includes thorough review of current medical information, gathering information on past medical history/social history and prior level of function, completion of standardized testing/informal observation of tasks, assessment of data and education on plan of care and goals.     CPT codes:  [x] Low Complexity PT evaluation 33873  [] Moderate Complexity PT evaluation 61382  [] High Complexity PT evaluation 99994  [] PT Re-evaluation 86319  [] Gait training 04885 0 minutes  [] Manual therapy 01424 0 minutes  [x] Therapeutic activities 23961 24 minutes  [] Therapeutic exercises 43985 0 minutes  [] Neuromuscular reeducation 25954 0 minutes       Belkys Mate PT, DPT   [id number]

## 2022-10-20 NOTE — PROGRESS NOTES
HEPATOBILIARY SURGERY  DAILY PROGRESS NOTE  10/20/2022    Subjective:  Some nausea and belching overnight, controlled with medication. No vomiting. No flatus or BM. Ambulated yesterday. Tolerating clear liquids    Objective:  /72   Pulse 75   Temp 98.3 °F (36.8 °C) (Oral)   Resp 18   Ht 5' 4\" (1.626 m)   Wt 209 lb 1.6 oz (94.8 kg)   SpO2 97%   BMI 35.89 kg/m²     General Appearance:  awake, alert, oriented, in no acute distress  Skin:  Skin color, texture, turgor normal  Head/face:  NCAT  Eyes:  No gross abnormalities. Sclera nonicteric  Lungs/Chest:  Normal expansion. No respiratory distress. On room air  Heart: Warm throughout. Regular rate   Abdomen:  Soft, mild incisional tenderness, mildly distended. Extremities: Extremities warm to touch, pink      I have personally reviewed all relevant labs and imaging. WBC 16    Assessment/Plan:  50 y.o. female s/p completion cholecystectomy 10/19 for chronic cholecystitis with retained stones within the cystic duct.  Multiple previous abdominal surgeries    - clear liquid diet  - PCA  - bowel regimen  - Toradol scheduled  - PT/OT  - stop IVF    Electronically signed by Zackary Spears DO on 10/20/2022 at 10:10 AM

## 2022-10-21 LAB
ALBUMIN SERPL-MCNC: 3.1 G/DL (ref 3.5–5.2)
ALP BLD-CCNC: 190 U/L (ref 35–104)
ALT SERPL-CCNC: 75 U/L (ref 0–32)
ANION GAP SERPL CALCULATED.3IONS-SCNC: 10 MMOL/L (ref 7–16)
AST SERPL-CCNC: 58 U/L (ref 0–31)
BASOPHILS ABSOLUTE: 0.06 E9/L (ref 0–0.2)
BASOPHILS RELATIVE PERCENT: 0.6 % (ref 0–2)
BILIRUB SERPL-MCNC: 0.5 MG/DL (ref 0–1.2)
BLOOD BANK DISPENSE STATUS: NORMAL
BLOOD BANK PRODUCT CODE: NORMAL
BPU ID: NORMAL
BUN BLDV-MCNC: 15 MG/DL (ref 6–20)
CALCIUM SERPL-MCNC: 9.1 MG/DL (ref 8.6–10.2)
CHLORIDE BLD-SCNC: 105 MMOL/L (ref 98–107)
CO2: 22 MMOL/L (ref 22–29)
CREAT SERPL-MCNC: 1.1 MG/DL (ref 0.5–1)
DESCRIPTION BLOOD BANK: NORMAL
EOSINOPHILS ABSOLUTE: 0.35 E9/L (ref 0.05–0.5)
EOSINOPHILS RELATIVE PERCENT: 3.5 % (ref 0–6)
GFR SERPL CREATININE-BSD FRML MDRD: >60 ML/MIN/1.73
GLUCOSE BLD-MCNC: 88 MG/DL (ref 74–99)
HCT VFR BLD CALC: 31.2 % (ref 34–48)
HEMOGLOBIN: 10.2 G/DL (ref 11.5–15.5)
IMMATURE GRANULOCYTES #: 0.03 E9/L
IMMATURE GRANULOCYTES %: 0.3 % (ref 0–5)
LYMPHOCYTES ABSOLUTE: 1.07 E9/L (ref 1.5–4)
LYMPHOCYTES RELATIVE PERCENT: 10.6 % (ref 20–42)
MCH RBC QN AUTO: 29.8 PG (ref 26–35)
MCHC RBC AUTO-ENTMCNC: 32.7 % (ref 32–34.5)
MCV RBC AUTO: 91.2 FL (ref 80–99.9)
MONOCYTES ABSOLUTE: 0.72 E9/L (ref 0.1–0.95)
MONOCYTES RELATIVE PERCENT: 7.1 % (ref 2–12)
NEUTROPHILS ABSOLUTE: 7.86 E9/L (ref 1.8–7.3)
NEUTROPHILS RELATIVE PERCENT: 77.9 % (ref 43–80)
PDW BLD-RTO: 14.2 FL (ref 11.5–15)
PLATELET # BLD: 122 E9/L (ref 130–450)
PMV BLD AUTO: 12.6 FL (ref 7–12)
POTASSIUM SERPL-SCNC: 4.2 MMOL/L (ref 3.5–5)
RBC # BLD: 3.42 E12/L (ref 3.5–5.5)
SODIUM BLD-SCNC: 137 MMOL/L (ref 132–146)
TOTAL PROTEIN: 6.5 G/DL (ref 6.4–8.3)
WBC # BLD: 10.1 E9/L (ref 4.5–11.5)

## 2022-10-21 PROCEDURE — 2140000000 HC CCU INTERMEDIATE R&B

## 2022-10-21 PROCEDURE — 6370000000 HC RX 637 (ALT 250 FOR IP): Performed by: STUDENT IN AN ORGANIZED HEALTH CARE EDUCATION/TRAINING PROGRAM

## 2022-10-21 PROCEDURE — 85025 COMPLETE CBC W/AUTO DIFF WBC: CPT

## 2022-10-21 PROCEDURE — 99232 SBSQ HOSP IP/OBS MODERATE 35: CPT | Performed by: FAMILY MEDICINE

## 2022-10-21 PROCEDURE — 6360000002 HC RX W HCPCS: Performed by: STUDENT IN AN ORGANIZED HEALTH CARE EDUCATION/TRAINING PROGRAM

## 2022-10-21 PROCEDURE — 6370000000 HC RX 637 (ALT 250 FOR IP)

## 2022-10-21 PROCEDURE — 6370000000 HC RX 637 (ALT 250 FOR IP): Performed by: OBSTETRICS & GYNECOLOGY

## 2022-10-21 PROCEDURE — 80053 COMPREHEN METABOLIC PANEL: CPT

## 2022-10-21 PROCEDURE — 36415 COLL VENOUS BLD VENIPUNCTURE: CPT

## 2022-10-21 RX ORDER — ONDANSETRON 4 MG/1
4 TABLET, FILM COATED ORAL EVERY 6 HOURS PRN
Status: DISCONTINUED | OUTPATIENT
Start: 2022-10-21 | End: 2022-10-23 | Stop reason: HOSPADM

## 2022-10-21 RX ORDER — CELECOXIB 100 MG/1
100 CAPSULE ORAL 2 TIMES DAILY
Status: DISCONTINUED | OUTPATIENT
Start: 2022-10-22 | End: 2022-10-23 | Stop reason: HOSPADM

## 2022-10-21 RX ORDER — GABAPENTIN 300 MG/1
300 CAPSULE ORAL NIGHTLY
Status: DISCONTINUED | OUTPATIENT
Start: 2022-10-21 | End: 2022-10-23 | Stop reason: HOSPADM

## 2022-10-21 RX ORDER — OXYCODONE HYDROCHLORIDE 10 MG/1
10 TABLET ORAL EVERY 4 HOURS PRN
Status: DISCONTINUED | OUTPATIENT
Start: 2022-10-21 | End: 2022-10-23 | Stop reason: HOSPADM

## 2022-10-21 RX ORDER — OXYCODONE HYDROCHLORIDE 5 MG/1
5 TABLET ORAL EVERY 4 HOURS PRN
Status: DISCONTINUED | OUTPATIENT
Start: 2022-10-21 | End: 2022-10-23 | Stop reason: HOSPADM

## 2022-10-21 RX ORDER — NAPROXEN 500 MG/1
500 TABLET ORAL 2 TIMES DAILY WITH MEALS
Status: DISCONTINUED | OUTPATIENT
Start: 2022-10-21 | End: 2022-10-21

## 2022-10-21 RX ORDER — PANTOPRAZOLE SODIUM 40 MG/1
40 TABLET, DELAYED RELEASE ORAL
Status: DISCONTINUED | OUTPATIENT
Start: 2022-10-22 | End: 2022-10-23 | Stop reason: HOSPADM

## 2022-10-21 RX ADMIN — OXYCODONE HYDROCHLORIDE 10 MG: 10 TABLET ORAL at 09:04

## 2022-10-21 RX ADMIN — OXYCODONE HYDROCHLORIDE 10 MG: 10 TABLET ORAL at 17:34

## 2022-10-21 RX ADMIN — POLYETHYLENE GLYCOL 3350 17 G: 17 POWDER, FOR SOLUTION ORAL at 20:56

## 2022-10-21 RX ADMIN — OXYCODONE HYDROCHLORIDE 10 MG: 10 TABLET ORAL at 13:24

## 2022-10-21 RX ADMIN — POLYETHYLENE GLYCOL 3350 17 G: 17 POWDER, FOR SOLUTION ORAL at 09:06

## 2022-10-21 RX ADMIN — METOPROLOL SUCCINATE 50 MG: 50 TABLET, EXTENDED RELEASE ORAL at 20:56

## 2022-10-21 RX ADMIN — METOPROLOL SUCCINATE 50 MG: 50 TABLET, EXTENDED RELEASE ORAL at 09:06

## 2022-10-21 RX ADMIN — GABAPENTIN 300 MG: 300 CAPSULE ORAL at 20:56

## 2022-10-21 RX ADMIN — ACETAMINOPHEN 1000 MG: 500 TABLET ORAL at 22:48

## 2022-10-21 RX ADMIN — ROSUVASTATIN CALCIUM 40 MG: 20 TABLET, COATED ORAL at 09:06

## 2022-10-21 RX ADMIN — ACETAMINOPHEN 1000 MG: 500 TABLET ORAL at 06:41

## 2022-10-21 RX ADMIN — SENNOSIDES AND DOCUSATE SODIUM 2 TABLET: 50; 8.6 TABLET ORAL at 09:06

## 2022-10-21 RX ADMIN — FERROUS SULFATE TAB 325 MG (65 MG ELEMENTAL FE) 325 MG: 325 (65 FE) TAB at 17:34

## 2022-10-21 RX ADMIN — AMLODIPINE BESYLATE 10 MG: 10 TABLET ORAL at 09:06

## 2022-10-21 RX ADMIN — KETOROLAC TROMETHAMINE 15 MG: 30 INJECTION, SOLUTION INTRAMUSCULAR at 06:40

## 2022-10-21 RX ADMIN — OXYCODONE HYDROCHLORIDE 10 MG: 10 TABLET ORAL at 22:52

## 2022-10-21 RX ADMIN — KETOROLAC TROMETHAMINE 15 MG: 30 INJECTION, SOLUTION INTRAMUSCULAR at 00:39

## 2022-10-21 RX ADMIN — ONDANSETRON HYDROCHLORIDE 4 MG: 4 TABLET, FILM COATED ORAL at 22:48

## 2022-10-21 RX ADMIN — ISOSORBIDE MONONITRATE 30 MG: 30 TABLET, EXTENDED RELEASE ORAL at 09:06

## 2022-10-21 RX ADMIN — ESCITALOPRAM 5 MG: 5 TABLET, FILM COATED ORAL at 09:06

## 2022-10-21 RX ADMIN — ACETAMINOPHEN 1000 MG: 500 TABLET ORAL at 13:23

## 2022-10-21 RX ADMIN — ASPIRIN 81 MG: 81 TABLET, COATED ORAL at 09:06

## 2022-10-21 RX ADMIN — ENOXAPARIN SODIUM 40 MG: 100 INJECTION SUBCUTANEOUS at 09:06

## 2022-10-21 RX ADMIN — FERROUS SULFATE TAB 325 MG (65 MG ELEMENTAL FE) 325 MG: 325 (65 FE) TAB at 09:06

## 2022-10-21 ASSESSMENT — PAIN SCALES - GENERAL
PAINLEVEL_OUTOF10: 0
PAINLEVEL_OUTOF10: 5
PAINLEVEL_OUTOF10: 7

## 2022-10-21 ASSESSMENT — PAIN SCALES - WONG BAKER: WONGBAKER_NUMERICALRESPONSE: 0

## 2022-10-21 ASSESSMENT — PAIN DESCRIPTION - FREQUENCY: FREQUENCY: INTERMITTENT

## 2022-10-21 ASSESSMENT — PAIN DESCRIPTION - ORIENTATION: ORIENTATION: RIGHT;UPPER

## 2022-10-21 ASSESSMENT — PAIN DESCRIPTION - LOCATION
LOCATION: ABDOMEN
LOCATION: ABDOMEN

## 2022-10-21 ASSESSMENT — PAIN DESCRIPTION - DESCRIPTORS: DESCRIPTORS: CRAMPING;DULL

## 2022-10-21 ASSESSMENT — PAIN DESCRIPTION - PAIN TYPE: TYPE: ACUTE PAIN;SURGICAL PAIN

## 2022-10-21 ASSESSMENT — PAIN - FUNCTIONAL ASSESSMENT: PAIN_FUNCTIONAL_ASSESSMENT: ACTIVITIES ARE NOT PREVENTED

## 2022-10-21 NOTE — PROGRESS NOTES
[address] [facility]  Family Medicine Attending    TEMI RAMIREZ Course: 50 y.o. female with history of history of gastric bypass, CAD s/p CABG presented to the ED for right sided abdominal pain started day of admission. Pain became 10/10 and she called EMS. Pain happened after eating and associated with some nausea and vomiting. No fevers, chills, nausea, vomiting. No previous episodes of pain. She denies chest pain. CT abdomen and pelvis showed biliary duct dilation and gen surgery was consulted. S/p open completion cholecystectomy 10/19. S: Sitting in bed, minimal pain. Tolerating PO well. Ambulating. Passing flatus and belching. No BM yet. O: VS- Blood pressure 135/79, pulse 69, temperature 98.5 °F (36.9 °C), temperature source Oral, resp. rate 16, height 5' 4\" (1.626 m), weight 209 lb 1.6 oz (94.8 kg), SpO2 93 %, not currently breastfeeding. Exam is as noted by resident with the following changes, additions or corrections:  NAD  RRR no MR, CTAB. Abd: RUQ incision  c/d/I.   CANTU=. LLE parasthesia (chronic) no edema. Impressions:   Principal Problem:    Abdominal pain  Active Problems:    Cholecystitis, acute    Acute cholecystitis due to biliary calculus  Resolved Problems:    * No resolved hospital problems. *      Plan:   s/p open surgery for removal of remnant stones 10/19. S/p Zosyn. ASA and restart brillinta 48hours post-op    Headache - improved, cont to follow. HTN - amlodipine  10mg. improved, cont to follow. Shoulder Ache / Back Ache - improved. , follow. Diet as tolerated  Zofran for nausea. S/p PCA for pain. Naproxyn in place of toradol given loss of IV access and generalized improvement. ASA/Brillinta likely would ameliorate any NSAID CVD risk. DVT Ppx: Lovenox. Likely discharge pending BM. Attending Physician Statement  I have reviewed the chart, including any radiology or labs, and seen the patient with the resident(s).   I personally reviewed and performed key elements of the history and exam.  I agree with the assessment, plan and orders as documented by the resident. Please refer to the resident note for additional information.       Fidelina Holcomb MD

## 2022-10-21 NOTE — PROGRESS NOTES
[facility] - Family Medicine Inpatient   Resident Progress Note    S:  Hospital day: 4    Brief Synopsis: Kale Morrison is a 50 y.o. female with a PMH of hypertension, history of gastric bypass, CAD status post CABG in 6/2022, depression and bilateral renal artery stenosis presented to ED for abdominal pain for RUQ and RLQ that started after food. Pain was sharp and constant in nature. It was not alleviated with positioning. Patient also reports nausea and vomiting. Vomitus was non-bloody. She denies chest pain, SOB or loose BM. In the ED, patient was given fentanyl for pain management and general surgery was consulted. CT abdomen and pelvis showed biliary dilatation. Patient was admitted for abdominal pain possibly biliary stones. IV Zosyn was started. HBP was consulted and MRCP was ordered. Home ASA and Brilinta was held. ALP, AST and ALT were elevated, AST and ALT trended down. It showed several stones in the biliary duct up to 7mm. Open surgery for removal of stones was done on 10/19. Patient had been having elevated BP since she was admitted. On 10/17/2022, BP was eIevated to 208/98 and patient developed a headache. Norvasc was increased to 10mg PO daily. Overnight, no acute events. She was seen by PT/OT and ambulated. Patient was evaluated at bedside. Reported she has some pain in the abdomen relieved by passing gas. No BM yet. She is on a regular diet, ate broccoli last night. Wants to be put on gabapentin for her parasthesia of left leg. IV blown during rounds, so switched IV ketorolac to PO naprosyn. She is also on PRN pain meds.       Cont meds:    sodium chloride       Scheduled meds:    ketorolac  15 mg IntraVENous Q6H    sennosides-docusate sodium  2 tablet Oral Daily    polyethylene glycol  17 g Oral BID    acetaminophen  1,000 mg Oral 3 times per day    amLODIPine  10 mg Oral Daily    isosorbide mononitrate  30 mg Oral Daily    metoprolol succinate  50 mg Oral BID    aspirin  81 mg Oral Daily enoxaparin  40 mg SubCUTAneous Daily    lidocaine  1 patch TransDERmal Daily    pantoprazole (PROTONIX) 40 mg injection  40 mg IntraVENous Daily    escitalopram  5 mg Oral Daily    rosuvastatin  40 mg Oral Daily    [Held by provider] ticagrelor  90 mg Oral BID    ferrous sulfate  325 mg Oral BID WC    sodium chloride flush  5-40 mL IntraVENous 2 times per day     PRN meds: oxyCODONE **OR** oxyCODONE, HYDROmorphone, sodium chloride flush, sodium chloride, ondansetron     I reviewed the patient's Past Medical and Surgical History, Medications and Allergies. O:  VS: BP 91/81   Pulse 69   Temp 98.5 °F (36.9 °C) (Oral)   Resp 16   Ht 5' 4\" (1.626 m)   Wt 209 lb 1.6 oz (94.8 kg)   SpO2 93%   BMI 35.89 kg/m²     Physical Exam:  General: Alert, cooperative, no acute distress. HEENT: Normocephalic, atraumatic. PERRLA, conjunctiva/corneas clear    Chest: No tenderness or deformity, full & symmetric excursion   Lung: Clear to auscultation bilaterally,  respirations unlabored. No rales/wheezing/rubs . Heart: RRR, S1 and S2 normal, no murmur, rub or gallop. DP pulses 2/4   Abdomen: Soft, nondistended, tender at incision site, no masses, no organomegaly, no guarding, rebound or rigidity. Bowel sounds present. Genital/Rectal: deferred   Extremities:  Extremities normal, atraumatic, no cyanosis or edema. Distal pulses equal bilaterally   Skin: Skin color, texture, turgor normal, no rashes or lesions   Musculoskeletal: No joint swelling, no tenderness anywhere. Normal ROM in extremities. Parasthesia, pins and needles in the left lower extremity, chronic. Neurologic: Alert & Oriented; Normal and symmetric strength in UEs and LEs; Sensation intact, CN 1-12 intact. Psychiatric: appropriate affect. Intact judgment and insight.         Labs:  Na/K/Cl/CO2:  137/4.2/105/22 (10/21 58)  BUN/Cr/glu/ALT/AST/amyl/lip:  15/1.1/--/75/58/--/-- (10/21 4133)  WBC/Hgb/Hct/Plts:  10.1/10.2/31.2/122 (10/21 0431)  estimated creatinine clearance is 68 mL/min (A) (based on SCr of 1.1 mg/dL (H)). Other pertinent labs as noted below    New Imaging:  MRI ABDOMEN W WO CONTRAST MRCP   Final Result   Limited evaluation due to motion artifact. The cystic duct remnant is   dilated and appears to contain several stones measuring up to 7 mm. Mild   biliary ductal dilatation without definite stones in the common bile duct. The degree of extrahepatic biliary ductal dilatation is not as pronounced as   on CT from yesterday. CT ABDOMEN PELVIS W IV CONTRAST Additional Contrast? None   Final Result   1. Biliary dilation redemonstrated, mildly worse. Suggestion of a small   chronic cystic duct calculus. 2. Suggestion of constipation and there are small bowel air-fluid levels. 3. Additionally dilation with retained contents within the gastric bypass   including afferent limb which is abnormal but present on prior exam.      RECOMMENDATIONS:   Clinical correlation. A/P:  Principal Problem:    Abdominal pain  Active Problems:    Cholecystitis, acute    Acute cholecystitis due to biliary calculus  Resolved Problems:    * No resolved hospital problems. *      Abdominal pain 2/2 acute cholecystitis due to biliary calculus  -CT abdomen showed biliary dilation. MRCP showed several stones up to 7mm in the cystic duct remnant, mild biliary ductal dilation, no stones seen.  -Patient is s/p open completion cholecystectomy with common bile duct exploration on 10/19  -Continue to monitor LFTs  -Continue Protonix daily  -Fentanyl, Tylenol and Norco PRN for pain management, stop PCA today, scheduled IV Toradol however her IV was blown so PO Naprosyn was given instead. -Zofran as needed for nausea  -Continue to get recommendation from General surgery on D/C planning    2. CAD s/p CABG  -Continue home dose metoprolol, Crestor and Imdur  -EKG on admission normal sinus rhythm. Unchanged from previous  -ASA restarted, hold Brilinta till 10/22    3. CKD Stage 1  -Stable, baseline creatinine 1.2  -Avoid nephrotoxic medications    4. Hypertension  -BP not controlled since admission, /98 on 10/17, resulting in headache  -Home Norvasc increased to 10mg daily  -Will continue to monitor vitals, consider adding another agent to control    5. Depression  -Continue home dose Lexapro    6. Back pain - stable  -New onset back pain in the left subscapular region this morning, musculoskeletal pattern  -Continue lidocaine patches  -Tylenol 500mg PRN    7.  LLE parasthesia - chronic  -Gabapentin trial    DVT Prophylaxis: lovenox 40mg daily, restarted today  GI Prophylaxis: protonix  Diet: low fat diet      Electronically signed by Jacob Keys MD on 10/21/2022 at 8:56 AM  This case was discussed with attending physician Dr. Page Bolden

## 2022-10-21 NOTE — CARE COORDINATION
Care Coordination  The patient is post op day # 2  open completion cholecystectomy. The patient is tolerating a regular diet with some belching. She is passing gas. Wbc this am is 10.1 trending down. Start lowfat diet, dc pca pump, dc ivf. Continue to hold brilenta plan to restart in am 10/22. Pt ampac 18/24 300 feet , ot 19/24. Her plan is home once she is medically stable. Her ride home is one of her children.

## 2022-10-21 NOTE — PROGRESS NOTES
HEPATOBILIARY SURGERY  DAILY PROGRESS NOTE  10/21/2022    Subjective: Tolerating regular diet. Some belching. Passing flatus. Complaining of abdominal soreness      Objective:  /76   Pulse 71   Temp 98.7 °F (37.1 °C) (Oral)   Resp 18   Ht 5' 4\" (1.626 m)   Wt 209 lb 1.6 oz (94.8 kg)   SpO2 94%   BMI 35.89 kg/m²     General Appearance:  awake, alert, oriented, in no acute distress  Skin:  Skin color, texture, turgor normal  Head/face:  NCAT  Eyes:  No gross abnormalities. Sclera nonicteric  Lungs/Chest:  Normal expansion. No respiratory distress. On room air  Heart: Warm throughout. Regular rate   Abdomen:  Soft, mild incisional tenderness, mildly distended. Extremities: Extremities warm to touch, pink      I have personally reviewed all relevant labs and imaging. WBC 10.1, decreasing    Assessment/Plan:  50 y.o. female s/p completion cholecystectomy 10/19 for chronic cholecystitis with retained stones within the cystic duct. Multiple previous abdominal surgeries    - low fat diet  - stop PCA, transition to PRN pain control  - Toradol scheduled  - bowel regimen  - PT/OT  - stop IVF  - Continue to hole Brilinta.  Okay to restart tomorrow, 10/22    Electronically signed by Allegra García DO on 10/21/2022 at 7:49 AM    Doing well  Ambulating  Okay to restart tomorrow  Likely home tomorrow from a surgical standpoint    Electronically signed by Gerold Gitelman, MD on 10/21/2022 at 4:47 PM

## 2022-10-22 PROBLEM — K81.0 CHOLECYSTITIS, ACUTE: Status: RESOLVED | Noted: 2022-10-17 | Resolved: 2022-10-22

## 2022-10-22 PROBLEM — K80.00 ACUTE CHOLECYSTITIS DUE TO BILIARY CALCULUS: Status: RESOLVED | Noted: 2022-10-17 | Resolved: 2022-10-22

## 2022-10-22 LAB
ALBUMIN SERPL-MCNC: 3.4 G/DL (ref 3.5–5.2)
ALP BLD-CCNC: 194 U/L (ref 35–104)
ALT SERPL-CCNC: 54 U/L (ref 0–32)
ANION GAP SERPL CALCULATED.3IONS-SCNC: 12 MMOL/L (ref 7–16)
AST SERPL-CCNC: 49 U/L (ref 0–31)
BASOPHILS ABSOLUTE: 0.04 E9/L (ref 0–0.2)
BASOPHILS RELATIVE PERCENT: 0.5 % (ref 0–2)
BILIRUB SERPL-MCNC: 0.6 MG/DL (ref 0–1.2)
BLOOD CULTURE, ROUTINE: NORMAL
BUN BLDV-MCNC: 13 MG/DL (ref 6–20)
CALCIUM SERPL-MCNC: 9.1 MG/DL (ref 8.6–10.2)
CHLORIDE BLD-SCNC: 104 MMOL/L (ref 98–107)
CO2: 21 MMOL/L (ref 22–29)
CREAT SERPL-MCNC: 0.9 MG/DL (ref 0.5–1)
CULTURE, BLOOD 2: NORMAL
EOSINOPHILS ABSOLUTE: 0.39 E9/L (ref 0.05–0.5)
EOSINOPHILS RELATIVE PERCENT: 4.9 % (ref 0–6)
FERRITIN: 152 NG/ML
GFR SERPL CREATININE-BSD FRML MDRD: >60 ML/MIN/1.73
GLUCOSE BLD-MCNC: 87 MG/DL (ref 74–99)
HCT VFR BLD CALC: 34.1 % (ref 34–48)
HEMOGLOBIN: 11.1 G/DL (ref 11.5–15.5)
IMMATURE GRANULOCYTES #: 0.02 E9/L
IMMATURE GRANULOCYTES %: 0.3 % (ref 0–5)
IRON SATURATION: 12 % (ref 15–50)
IRON: 32 MCG/DL (ref 37–145)
LYMPHOCYTES ABSOLUTE: 1.2 E9/L (ref 1.5–4)
LYMPHOCYTES RELATIVE PERCENT: 15.1 % (ref 20–42)
MCH RBC QN AUTO: 29.5 PG (ref 26–35)
MCHC RBC AUTO-ENTMCNC: 32.6 % (ref 32–34.5)
MCV RBC AUTO: 90.7 FL (ref 80–99.9)
MONOCYTES ABSOLUTE: 0.45 E9/L (ref 0.1–0.95)
MONOCYTES RELATIVE PERCENT: 5.7 % (ref 2–12)
NEUTROPHILS ABSOLUTE: 5.86 E9/L (ref 1.8–7.3)
NEUTROPHILS RELATIVE PERCENT: 73.5 % (ref 43–80)
PDW BLD-RTO: 13.9 FL (ref 11.5–15)
PLATELET # BLD: 176 E9/L (ref 130–450)
PMV BLD AUTO: 12.4 FL (ref 7–12)
POTASSIUM SERPL-SCNC: 4.5 MMOL/L (ref 3.5–5)
RBC # BLD: 3.76 E12/L (ref 3.5–5.5)
SODIUM BLD-SCNC: 137 MMOL/L (ref 132–146)
TOTAL IRON BINDING CAPACITY: 263 MCG/DL (ref 250–450)
TOTAL PROTEIN: 6.9 G/DL (ref 6.4–8.3)
WBC # BLD: 8 E9/L (ref 4.5–11.5)

## 2022-10-22 PROCEDURE — 99232 SBSQ HOSP IP/OBS MODERATE 35: CPT | Performed by: FAMILY MEDICINE

## 2022-10-22 PROCEDURE — 36415 COLL VENOUS BLD VENIPUNCTURE: CPT

## 2022-10-22 PROCEDURE — 85025 COMPLETE CBC W/AUTO DIFF WBC: CPT

## 2022-10-22 PROCEDURE — 6370000000 HC RX 637 (ALT 250 FOR IP): Performed by: STUDENT IN AN ORGANIZED HEALTH CARE EDUCATION/TRAINING PROGRAM

## 2022-10-22 PROCEDURE — 6370000000 HC RX 637 (ALT 250 FOR IP): Performed by: OBSTETRICS & GYNECOLOGY

## 2022-10-22 PROCEDURE — 83550 IRON BINDING TEST: CPT

## 2022-10-22 PROCEDURE — 82728 ASSAY OF FERRITIN: CPT

## 2022-10-22 PROCEDURE — 6360000002 HC RX W HCPCS: Performed by: STUDENT IN AN ORGANIZED HEALTH CARE EDUCATION/TRAINING PROGRAM

## 2022-10-22 PROCEDURE — 83540 ASSAY OF IRON: CPT

## 2022-10-22 PROCEDURE — 6370000000 HC RX 637 (ALT 250 FOR IP)

## 2022-10-22 PROCEDURE — 2140000000 HC CCU INTERMEDIATE R&B

## 2022-10-22 PROCEDURE — 80053 COMPREHEN METABOLIC PANEL: CPT

## 2022-10-22 RX ORDER — PANTOPRAZOLE SODIUM 40 MG/1
40 TABLET, DELAYED RELEASE ORAL DAILY
Qty: 30 TABLET | Refills: 0 | Status: SHIPPED | OUTPATIENT
Start: 2022-10-22 | End: 2022-11-21

## 2022-10-22 RX ORDER — LIDOCAINE 4 G/G
1 PATCH TOPICAL DAILY
Qty: 10 PATCH | Refills: 0 | Status: SHIPPED | OUTPATIENT
Start: 2022-10-22

## 2022-10-22 RX ORDER — ONDANSETRON 4 MG/1
4 TABLET, ORALLY DISINTEGRATING ORAL EVERY 8 HOURS PRN
Qty: 15 TABLET | Refills: 0 | Status: SHIPPED | OUTPATIENT
Start: 2022-10-22 | End: 2022-11-20 | Stop reason: ALTCHOICE

## 2022-10-22 RX ORDER — OXYCODONE HYDROCHLORIDE 5 MG/1
5 TABLET ORAL EVERY 6 HOURS PRN
Qty: 28 TABLET | Refills: 0 | Status: SHIPPED | OUTPATIENT
Start: 2022-10-22 | End: 2022-10-29

## 2022-10-22 RX ORDER — SENNA AND DOCUSATE SODIUM 50; 8.6 MG/1; MG/1
1 TABLET, FILM COATED ORAL DAILY
Qty: 30 TABLET | Refills: 0 | Status: SHIPPED | OUTPATIENT
Start: 2022-10-22 | End: 2022-11-20 | Stop reason: ALTCHOICE

## 2022-10-22 RX ADMIN — OXYCODONE HYDROCHLORIDE 10 MG: 10 TABLET ORAL at 12:20

## 2022-10-22 RX ADMIN — ACETAMINOPHEN 1000 MG: 500 TABLET ORAL at 13:56

## 2022-10-22 RX ADMIN — ROSUVASTATIN CALCIUM 40 MG: 20 TABLET, COATED ORAL at 08:10

## 2022-10-22 RX ADMIN — SENNOSIDES AND DOCUSATE SODIUM 2 TABLET: 50; 8.6 TABLET ORAL at 08:11

## 2022-10-22 RX ADMIN — POLYETHYLENE GLYCOL 3350 17 G: 17 POWDER, FOR SOLUTION ORAL at 20:48

## 2022-10-22 RX ADMIN — ONDANSETRON HYDROCHLORIDE 4 MG: 4 TABLET, FILM COATED ORAL at 12:20

## 2022-10-22 RX ADMIN — METOPROLOL SUCCINATE 50 MG: 50 TABLET, EXTENDED RELEASE ORAL at 20:48

## 2022-10-22 RX ADMIN — PANTOPRAZOLE SODIUM 40 MG: 40 TABLET, DELAYED RELEASE ORAL at 06:24

## 2022-10-22 RX ADMIN — ACETAMINOPHEN 1000 MG: 500 TABLET ORAL at 06:24

## 2022-10-22 RX ADMIN — ISOSORBIDE MONONITRATE 30 MG: 30 TABLET, EXTENDED RELEASE ORAL at 08:09

## 2022-10-22 RX ADMIN — ENOXAPARIN SODIUM 40 MG: 100 INJECTION SUBCUTANEOUS at 08:07

## 2022-10-22 RX ADMIN — TICAGRELOR 90 MG: 90 TABLET ORAL at 20:48

## 2022-10-22 RX ADMIN — POLYETHYLENE GLYCOL 3350 17 G: 17 POWDER, FOR SOLUTION ORAL at 08:08

## 2022-10-22 RX ADMIN — AMLODIPINE BESYLATE 10 MG: 10 TABLET ORAL at 08:11

## 2022-10-22 RX ADMIN — OXYCODONE HYDROCHLORIDE 10 MG: 10 TABLET ORAL at 08:12

## 2022-10-22 RX ADMIN — OXYCODONE HYDROCHLORIDE 10 MG: 10 TABLET ORAL at 22:35

## 2022-10-22 RX ADMIN — FERROUS SULFATE TAB 325 MG (65 MG ELEMENTAL FE) 325 MG: 325 (65 FE) TAB at 17:54

## 2022-10-22 RX ADMIN — FERROUS SULFATE TAB 325 MG (65 MG ELEMENTAL FE) 325 MG: 325 (65 FE) TAB at 08:12

## 2022-10-22 RX ADMIN — OXYCODONE HYDROCHLORIDE 10 MG: 10 TABLET ORAL at 17:56

## 2022-10-22 RX ADMIN — METOPROLOL SUCCINATE 50 MG: 50 TABLET, EXTENDED RELEASE ORAL at 08:12

## 2022-10-22 RX ADMIN — TICAGRELOR 90 MG: 90 TABLET ORAL at 08:12

## 2022-10-22 RX ADMIN — CELECOXIB 100 MG: 100 CAPSULE ORAL at 20:47

## 2022-10-22 RX ADMIN — ESCITALOPRAM 5 MG: 5 TABLET, FILM COATED ORAL at 08:13

## 2022-10-22 RX ADMIN — GABAPENTIN 300 MG: 300 CAPSULE ORAL at 20:48

## 2022-10-22 RX ADMIN — ASPIRIN 81 MG: 81 TABLET, COATED ORAL at 08:09

## 2022-10-22 RX ADMIN — CELECOXIB 100 MG: 100 CAPSULE ORAL at 08:13

## 2022-10-22 RX ADMIN — ACETAMINOPHEN 1000 MG: 500 TABLET ORAL at 20:47

## 2022-10-22 ASSESSMENT — PAIN SCALES - WONG BAKER
WONGBAKER_NUMERICALRESPONSE: 2
WONGBAKER_NUMERICALRESPONSE: 2
WONGBAKER_NUMERICALRESPONSE: 0
WONGBAKER_NUMERICALRESPONSE: 2

## 2022-10-22 ASSESSMENT — PAIN SCALES - GENERAL
PAINLEVEL_OUTOF10: 8
PAINLEVEL_OUTOF10: 8
PAINLEVEL_OUTOF10: 6
PAINLEVEL_OUTOF10: 8
PAINLEVEL_OUTOF10: 8
PAINLEVEL_OUTOF10: 5
PAINLEVEL_OUTOF10: 7
PAINLEVEL_OUTOF10: 3
PAINLEVEL_OUTOF10: 4
PAINLEVEL_OUTOF10: 8
PAINLEVEL_OUTOF10: 3
PAINLEVEL_OUTOF10: 6
PAINLEVEL_OUTOF10: 10
PAINLEVEL_OUTOF10: 4

## 2022-10-22 ASSESSMENT — PAIN - FUNCTIONAL ASSESSMENT
PAIN_FUNCTIONAL_ASSESSMENT: PREVENTS OR INTERFERES SOME ACTIVE ACTIVITIES AND ADLS
PAIN_FUNCTIONAL_ASSESSMENT: ACTIVITIES ARE NOT PREVENTED

## 2022-10-22 ASSESSMENT — PAIN DESCRIPTION - LOCATION
LOCATION: ABDOMEN
LOCATION: ABDOMEN
LOCATION: OTHER (COMMENT)
LOCATION: ABDOMEN
LOCATION: ABDOMEN

## 2022-10-22 ASSESSMENT — PAIN DESCRIPTION - DESCRIPTORS
DESCRIPTORS: ACHING;SHARP;SORE
DESCRIPTORS: ACHING;TIGHTNESS
DESCRIPTORS: SHARP;SORE;TENDER
DESCRIPTORS: ACHING;SORE;SHARP
DESCRIPTORS: ACHING

## 2022-10-22 ASSESSMENT — PAIN DESCRIPTION - ORIENTATION
ORIENTATION: RIGHT
ORIENTATION: RIGHT;UPPER
ORIENTATION: MID
ORIENTATION: RIGHT
ORIENTATION: RIGHT

## 2022-10-22 ASSESSMENT — PAIN DESCRIPTION - FREQUENCY: FREQUENCY: CONTINUOUS

## 2022-10-22 ASSESSMENT — PAIN DESCRIPTION - ONSET: ONSET: ON-GOING

## 2022-10-22 ASSESSMENT — PAIN DESCRIPTION - DIRECTION: RADIATING_TOWARDS: RADIATES TO THE BACK

## 2022-10-22 NOTE — PROGRESS NOTES
Kervin, pt's son, called for an update. Kervin notified that we are waiting for pt to have a BM before discharge and that she will stay with us another night. Pauline Swansoner satisfied with update. Will monitor.

## 2022-10-22 NOTE — PROGRESS NOTES
[address] [facility]  Family Medicine Attending    TEMI RAMIREZ Course: 50 y.o. female with history of history of gastric bypass, CAD s/p CABG presented to the ED for right sided abdominal pain started day of admission. Pain became 10/10 and she called EMS. Pain happened after eating and associated with some nausea and vomiting. No fevers, chills, nausea, vomiting. No previous episodes of pain. She denies chest pain. CT abdomen and pelvis showed biliary duct dilation and gen surgery was consulted. S/p open completion cholecystectomy 10/19. S: Sitting in bed, moderate pain. Tolerating PO well. Ambulating. Passing flatus and belching. No BM yet. Pt reports that 300 mg gabapentin also helps with her left leg post-op pain from the CABG. Patient reports receiving 20 mg of oxycodone yesterday early evening and at 11 pm. One of the nurses notified her of the incorrect dose early last night and the next shift nurse notified of the 11pm dose at 7 am this morning. She denies any side effects. O: VS- Blood pressure (!) 150/79, pulse 64, temperature 97.6 °F (36.4 °C), temperature source Oral, resp. rate 16, height 5' 4\" (1.626 m), weight 209 lb 1.6 oz (94.8 kg), SpO2 95 %, not currently breastfeeding. Exam is as noted by resident with the following changes, additions or corrections:  NAD  RRR no MR, CTAB. Abd: RUQ incision  c/d/I. Mild abd ttp no r/g  CANTU=. LLE parasthesia (chronic) no edema. Impressions:   Principal Problem:    Abdominal pain  Resolved Problems:    Cholecystitis, acute    Acute cholecystitis due to biliary calculus      Plan:   s/p open surgery for removal of remnant stones 10/19. S/p Zosyn. ASA and restart brillinta 48hours post-op    Headache - improved, cont to follow. HTN - amlodipine  10mg. improved, cont to follow. Shoulder Ache / Back Ache - improved. , follow. Leg pain-increase gabapentin to 300 mg     Diet as tolerated    Zofran for nausea. S/p PCA for pain. Naproxyn in place of toradol given loss of IV access and generalized improvement. ASA/Brillinta likely would ameliorate any NSAID CVD risk. Discussed oxycodone dosing with the patient. Offered a patient advocate speak with the patient. Of note, received [employer] at 4:11 am that the patient was inadvertently given 20 mg of oxycodone instead of 10 mg at 2300. Patient and vitals have remained stable. Discussed with floor staff and clinical nursing supervisor. DVT Ppx: Lovenox. Likely discharge today or tomorrow per surgery. Attending Physician Statement  I have reviewed the chart, including any radiology or labs, and seen the patient with the resident(s). I personally reviewed and performed key elements of the history and exam.  I agree with the assessment, plan and orders as documented by the resident. Please refer to the resident note for additional information.       Jyotsna Juarez MD

## 2022-10-22 NOTE — DISCHARGE SUMMARY
Discharge Summary    Miguel Angel Shabazz  :  1970  MRN:  [id number]    Admit date:  10/15/2022  Discharge date:  10/23/22    Admitting Physician:  Bri Schreiber MD    Discharge Diagnoses:    Patient Active Problem List   Diagnosis    Abdominal tenderness, LLQ (left lower quadrant)    Postoperative anemia due to acute blood loss    Chronic pain    Anorexia    H/O gastric bypass    Back pain    Encounter for palliative care    Hypertension    Epigastric pain    GI bleed due to NSAIDs    Gastrointestinal hemorrhage associated with gastric ulcer    Acute cystitis without hematuria    Current severe episode of major depressive disorder without psychotic features without prior episode (HCC)    Acute MI, inferior wall (HCC)    Chest pain    Bilateral renal artery stenosis (HCC)    CAD in native artery    DVT (deep vein thrombosis) in pregnancy    Leg swelling    Abdominal pain       Admission Condition:  fair    Discharged Condition:  stable    Hospital Course: Miguel Angel Shabazz is a 50 y.o. female with a PMH of hypertension,  gastric bypass, CAD status post CABG in 2022, depression and bilateral renal artery stenosis presented to ED for abdominal pain for RUQ and RLQ that started after food. Pain was sharp and constant in nature. No alleviating factors. Patient also reports nausea and vomiting. Vomitus was non-bloody. She denies chest pain, SOB or loose BM. H/o cholecystectomy in . In the ED, she had elevated LFTs. Patient was given fentanyl for pain management. CT abdomen and pelvis showed dilation of intra and extrahepatic ducts with retained calculus at the remnant of the cystic duct from prior cholecystectomy as well as CBD dilated at greater than 1 cm. General surgery was consulted. Patient was admitted for abdominal pain 2/2 biliary dilation. IV Zosyn was started. GS  recommended MRCP so HBP was consulted. MRCP showed several stones in the biliary duct up to 7mm. Home ASA and Brilinta was held.  It . Open surgery for removal of stones was completed on 10/19. LFTs improved. BP was uncontrolled and home Norvasc was increased to 10mg PO daily. Patient tolerated surgery well. The patient's course was otherwise uneventful. She progressed well. Pain was initially controlled PCA x 48 hours. She was switched to PO medications. She was tolerating a regular diet with no nausea or vomiting, and was in a suitable condition for discharge to home. Discharge plan: Increase amlodipine to 10 mg daily   Start celecoxib 100 mg BID x14 days for pain control. Avoid NSAIDs due to h/o gastric bypass and ulcers   Start oxycodone 5 mg q6h as needed for up to 7 days for severe pain   Start senokot-S to prevent opioid induced constipation   Follow up with HBP, Dr. Vj Malone  Follow up with PCP, Dr. Jhon Little     Discharge Medications:         Medication List        START taking these medications      acetaminophen 500 MG tablet  Commonly known as: TYLENOL  Take 1 tablet by mouth 4 times daily as needed for Pain     celecoxib 100 MG capsule  Commonly known as: CELEBREX  Take 1 capsule by mouth 2 times daily for 14 days     lidocaine 4 % external patch  Place 1 patch onto the skin daily     ondansetron 4 MG disintegrating tablet  Commonly known as: Zofran ODT  Take 1 tablet by mouth every 8 hours as needed for Nausea or Vomiting     oxyCODONE 5 MG immediate release tablet  Commonly known as: ROXICODONE  Take 1 tablet by mouth every 6 hours as needed for Pain for up to 7 days. sennosides-docusate sodium 8.6-50 MG tablet  Commonly known as: SENOKOT-S  Take 1 tablet by mouth daily            CHANGE how you take these medications      amLODIPine 10 MG tablet  Commonly known as: NORVASC  Take 1 tablet by mouth daily  What changed:   medication strength  how much to take     gabapentin 300 MG capsule  Commonly known as: NEURONTIN  Take 1 capsule by mouth at bedtime for 30 days.   What changed:   medication strength  how much to take  when to take this            CONTINUE taking these medications      aspirin 81 MG EC tablet  Take 1 tablet by mouth daily     escitalopram 5 MG tablet  Commonly known as: LEXAPRO  Take 1 tablet by mouth daily     ferrous sulfate 325 (65 Fe) MG tablet  Commonly known as: IRON 325  Take 1 tablet by mouth 2 times daily (with meals)     folic acid 1 MG tablet  Commonly known as: FOLVITE  Take 1 tablet by mouth daily     isosorbide mononitrate 30 MG extended release tablet  Commonly known as: IMDUR  Take 1 tablet by mouth daily     metoprolol succinate 50 MG extended release tablet  Commonly known as: TOPROL XL  Take 1 tablet by mouth 2 times daily     pantoprazole 40 MG tablet  Commonly known as: PROTONIX  Take 1 tablet by mouth daily     rosuvastatin 40 MG tablet  Commonly known as: CRESTOR  Take 1 tablet by mouth daily     ticagrelor 90 MG Tabs tablet  Commonly known as: BRILINTA  Take 1 tablet by mouth 2 times daily     vitamin D 1.25 MG (97171 UT) Caps capsule  Commonly known as: ERGOCALCIFEROL  take 1 capsule by mouth every Sunday AND 2000 UNITS DAILY EXCEPT SUNDAY            STOP taking these medications      ascorbic acid 500 MG tablet  Commonly known as: VITAMIN C               Where to Get Your Medications        These medications were sent to [facility] [address] [facility], [address] [place] [address] [phone]  [address] [place] [address]      Phone: [phone]   acetaminophen 500 MG tablet  amLODIPine 10 MG tablet  gabapentin 300 MG capsule  lidocaine 4 % external patch  ondansetron 4 MG disintegrating tablet  oxyCODONE 5 MG immediate release tablet  pantoprazole 40 MG tablet  sennosides-docusate sodium 8.6-50 MG tablet       You can get these medications from any pharmacy    Bring a paper prescription for each of these medications  celecoxib 100 MG capsule         Consults:  general surgery and HBP     Significant Diagnostic Studies: See below    Labs:  Na/K/Cl/CO2:  138/3.5/104/23 (10/23 9009)  BUN/Cr/glu/ALT/AST/amyl/lip:  11/0.9/--/38/27/--/-- (10/23 2986)  WBC/Hgb/Hct/Plts:  8.4/11.8/35.4/210 (10/23 4703)  estimated creatinine clearance is 79 mL/min (based on SCr of 0.9 mg/dL). Treatments:   see hospital course     Discharge Exam:  General: Alert, cooperative, no acute distress. HEENT: Normocephalic, atraumatic. conjunctiva/corneas clear    Chest: No tenderness or deformity, full & symmetric excursion   Lung: Clear to auscultation bilaterally,  respirations unlabored. No rales/wheezing/rubs . Heart: RRR, S1 and S2 normal, no murmur, rub or gallop. DP pulses 2/4   Abdomen: Soft, nondistended, tender at incision site,Incision is C/D/I , no masses, no organomegaly, no guarding, rebound or rigidity. Bowel sounds present. Genital/Rectal: deferred   Extremities:  Extremities normal, atraumatic, no cyanosis or edema. Distal pulses equal bilaterally   Skin: Skin color, texture, turgor normal, no rashes or lesions   Musculoskeletal: No joint swelling, no tenderness anywhere. Parasthesias in the left lower extremity, chronic. Neurologic: Alert & Oriented; Normal and symmetric strength in UEs and LEs; Sensation intact  Psychiatric: appropriate affect. Intact judgment and insight. Disposition:   home    Future Appointments   Date Time Provider Jam Marisol   10/28/2022  2:00 PM Berolle Marylene Route, MD Aliene Dings [facility]       More than 30 minutes was spent in preparation of this patient's discharge including, but not limited to, examination, preparation of documents, prescription preparation, counseling and coordination.     Signed:  Agusto Cisneros MD  10/23/2022, 10:16 AM

## 2022-10-22 NOTE — PROGRESS NOTES
[facility] - [facility] Inpatient   Resident Progress Note    S:  Hospital day: 5    Brief Synopsis: Margret Lam is a 50 y.o. female with a PMH of hypertension, history of gastric bypass, CAD status post CABG in 6/2022, depression and bilateral renal artery stenosis presented to ED for abdominal pain for RUQ and RLQ that started after food. Pain was sharp and constant in nature. It was not alleviated with positioning. Patient also reports nausea and vomiting. Vomitus was non-bloody. She denies chest pain, SOB or loose BM. In the ED, patient was given fentanyl for pain management and general surgery was consulted. CT abdomen and pelvis showed biliary dilatation. Patient was admitted for abdominal pain possibly biliary stones. IV Zosyn was started. HBP was consulted; MRCP showed several stones in the biliary duct up to 7mm. Home ASA and Brilinta was held. It . Open surgery for removal of stones was done on 10/19. ALP, AST and ALT were elevated initially, but improved. Patient had been having elevated BP since she was admitted. On 10/17/2022, BP was eIevated to 208/98 and patient developed a headache. Norvasc was increased to 10mg PO daily. Overnight,patient was inadvertently given 20 mg of oxycodone rather than her ordered dose 10 mg. She remained stable and alert with no signs of respiratory depression. Pain minimally controlled on lavern and tylenol. She states that she was used to being in no pain with the pump, but she understands that she has to learn to cope with  some pain. Endorses nausea, but no vomiting. Appetite is poorer due to pain. Has been up to walk around. Passing gas, but no BM. Utilizing incentive spirometer. Low grade temp 100.2 overnight. BP controlled.        Cont meds:    sodium chloride       Scheduled meds:    gabapentin  300 mg Oral Nightly    pantoprazole  40 mg Oral QAM AC    celecoxib  100 mg Oral BID    sennosides-docusate sodium  2 tablet Oral Daily    polyethylene glycol  17 g Oral BID    acetaminophen  1,000 mg Oral 3 times per day    amLODIPine  10 mg Oral Daily    isosorbide mononitrate  30 mg Oral Daily    metoprolol succinate  50 mg Oral BID    aspirin  81 mg Oral Daily    enoxaparin  40 mg SubCUTAneous Daily    lidocaine  1 patch TransDERmal Daily    escitalopram  5 mg Oral Daily    rosuvastatin  40 mg Oral Daily    [Held by provider] ticagrelor  90 mg Oral BID    ferrous sulfate  325 mg Oral BID WC    sodium chloride flush  5-40 mL IntraVENous 2 times per day     PRN meds: oxyCODONE **OR** oxyCODONE, HYDROmorphone, ondansetron, sodium chloride flush, sodium chloride     I reviewed the patient's Past Medical and Surgical History, Medications and Allergies. O:  VS: /78   Pulse 66   Temp 98.1 °F (36.7 °C) (Oral)   Resp 18   Ht 5' 4\" (1.626 m)   Wt 209 lb 1.6 oz (94.8 kg)   SpO2 95%   BMI 35.89 kg/m²     Physical Exam:  General: Alert, cooperative, no acute distress. HEENT: Normocephalic, atraumatic. conjunctiva/corneas clear    Chest: No tenderness or deformity, full & symmetric excursion   Lung: Clear to auscultation bilaterally,  respirations unlabored. No rales/wheezing/rubs . Heart: RRR, S1 and S2 normal, no murmur, rub or gallop. DP pulses 2/4   Abdomen: Soft, nondistended, tender at incision site,Incision is C/D/I , no masses, no organomegaly, no guarding, rebound or rigidity. Bowel sounds present. Genital/Rectal: deferred   Extremities:  Extremities normal, atraumatic, no cyanosis or edema. Distal pulses equal bilaterally   Skin: Skin color, texture, turgor normal, no rashes or lesions   Musculoskeletal: No joint swelling, no tenderness anywhere. Parasthesias in the left lower extremity, chronic. Neurologic: Alert & Oriented; Normal and symmetric strength in UEs and LEs; Sensation intact  Psychiatric: appropriate affect. Intact judgment and insight.         Labs:  Na/K/Cl/CO2:  137/4.2/105/22 (10/21 8888)  BUN/Cr/glu/ALT/AST/amyl/lip:  15/1.1/--/75/58/--/-- (10/21 5405)  WBC/Hgb/Hct/Plts:  10.1/10.2/31.2/122 (10/21 8641)  estimated creatinine clearance is 68 mL/min (A) (based on SCr of 1.1 mg/dL (H)). Other pertinent labs as noted below    New Imaging:  MRI ABDOMEN W WO CONTRAST MRCP   Final Result   Limited evaluation due to motion artifact. The cystic duct remnant is   dilated and appears to contain several stones measuring up to 7 mm. Mild   biliary ductal dilatation without definite stones in the common bile duct. The degree of extrahepatic biliary ductal dilatation is not as pronounced as   on CT from yesterday. CT ABDOMEN PELVIS W IV CONTRAST Additional Contrast? None   Final Result   1. Biliary dilation redemonstrated, mildly worse. Suggestion of a small   chronic cystic duct calculus. 2. Suggestion of constipation and there are small bowel air-fluid levels. 3. Additionally dilation with retained contents within the gastric bypass   including afferent limb which is abnormal but present on prior exam.      RECOMMENDATIONS:   Clinical correlation. A/P:  Principal Problem:    Abdominal pain  Active Problems:    Cholecystitis, acute    Acute cholecystitis due to biliary calculus  Resolved Problems:    * No resolved hospital problems. *      Acute cholecystitis  -CT abdomen showed biliary dilation. MRCP showed several stones up to 7mm in the cystic duct remnant, mild biliary ductal dilation, no stones seen.   - s/p open completion cholecystectomy with common bile duct exploration on 10/19  -Continue Protonix daily  - oxycodone panel 5mg/10mg, IV dilaudid 1.5 mg and celebrex 100 mg BID for pain control   -ok to DC today per HBP when has BM and follow up OP      CAD s/p CABG  -Continue home dose metoprolol, Crestor and Imdur  -Continue ASA , Brilinta restarted today per HBP    CKD Stage 1  -Stable, baseline creatinine 1.2  -Avoid nephrotoxic medications    Hypertension  -BP under better control   -Continue Norvasc 10mg daily, toprol XL 50 mg BID and imdur 30 mg daily     Depression  -Continue home dose Lexapro    Back pain - stable  -New onset back pain in the left subscapular region this morning, musculoskeletal pattern  -Continue lidocaine patches and Tylenol    LLE parasthesia - chronic  -Gabapentin trial 300 mg       DVT Prophylaxis: lovenox 40mg daily  GI Prophylaxis: protonix  Diet: low fat diet  Disposition: possible dc today pending BM      Electronically signed by Sepideh Mcpherson MD on 10/22/2022 at 6:44 AM  This case was discussed with attending physician Dr. Nicole Single

## 2022-10-22 NOTE — PROGRESS NOTES
HEPATOBILIARY SURGERY  DAILY PROGRESS NOTE  10/22/2022  Cc: abd pain    Subjective: Tolerating diet. Reports mild nausea overnight related to pain. PCA stopped yesterday. No new issues. Has not had BM yet. Objective:  BP (!) 150/79   Pulse 64   Temp 97.6 °F (36.4 °C) (Oral)   Resp 18   Ht 5' 4\" (1.626 m)   Wt 209 lb 1.6 oz (94.8 kg)   SpO2 95%   BMI 35.89 kg/m²     General Appearance:  awake, alert, oriented, in no acute distress  Skin:  Skin color, texture, turgor normal  Head/face:  NCAT  Eyes:  No gross abnormalities. Sclera nonicteric  Lungs/Chest:  Normal expansion. No respiratory distress. On room air  Heart: Warm throughout. Regular rate   Abdomen:  Soft, mild incisional tenderness,non distended. Extremities: Extremities warm to touch, pink      I have personally reviewed all relevant labs and imaging. WBC 10.1, decreasing    Assessment/Plan:  50 y.o. female s/p completion cholecystectomy 10/19 for chronic cholecystitis with retained stones within the cystic duct.  Multiple previous abdominal surgeries    - low fat diet  -  PRN pain control  - Toradol scheduled  - bowel regimen  - continue PPI as outpatient, rx on chart  - ok to resume Brilinta from surgery standpoint  - ok to DC and follow up with Dr. Vj Malone    D/w Dr. Vj Malone    Electronically signed by Fred Mejía DO on 10/22/2022 at 8:16 AM

## 2022-10-22 NOTE — PROGRESS NOTES
HEPATOBILIARY SURGERY  DAILY PROGRESS NOTE  10/22/2022    Subjective:  No acute events overnight. Patient continues to have abdominal pain and decreased appetite she attributes to her PCA being discontinued. She has not had a BM since last Sunday. She endorses nausea but denies vomiting. Objective:  BP (!) 150/79   Pulse 64   Temp 97.6 °F (36.4 °C) (Oral)   Resp 18   Ht 5' 4\" (1.626 m)   Wt 209 lb 1.6 oz (94.8 kg)   SpO2 95%   BMI 35.89 kg/m²     General Appearance: Awake, alert, oriented, in no acute distress  Skin:  Skin color, texture, turgor normal  Head/face: Normocephalic, atraumatic  Eyes:  No gross abnormalities. Sclera nonicteric  Lungs/Chest: On room air   Heart: Warm throughout. Regular rate   Abdomen:  Soft, mild incisional tenderness, nondistended  Extremities: Extremities warm to touch, pink      Assessment/Plan:  50 y.o. female s/p completion cholecystectomy 10/19 for chronic cholecystitis with retained stones within the cystic duct.  Multiple previous abdominal surgeries    - Schedule bowel regimen  - Continue low fat diet as tolerated  - Scheduled gabapentin, Tylenol, prn Roxicodone pain panel  - Encourage PT/OT, OOB  - Okay to restart Brilinta  - Okay for discharge from surgery point of view  - Follow up outpatient with Dr. Blake Wood    Electronically signed by Cornelio Tena on 68/94/9966 at 8:43 AM

## 2022-10-22 NOTE — PROGRESS NOTES
Pt lost IV access last night on night shift per prev RN. Pt asking for IV dilaudid. Two RNS attempted to obtain IV access. PT educated that she still has oxycodone as needed Q4PRN for pain. Pt doesn't want to be stuck for an IV again so she says the oxycodone will be fine for pain control. Will monitor.

## 2022-10-22 NOTE — PROGRESS NOTES
HEPATOBILIARY SURGERY  DAILY PROGRESS NOTE  10/22/2022    Subjective:  Patient admits to some increased pain after having PCA discontinued. Admits to some nausea but denies emesis. Has diminished appetite. Objective:  /78   Pulse 66   Temp 98.1 °F (36.7 °C) (Oral)   Resp 18   Ht 5' 4\" (1.626 m)   Wt 209 lb 1.6 oz (94.8 kg)   SpO2 95%   BMI 35.89 kg/m²     General Appearance:  awake, alert, oriented, in no acute distress  Skin:  Skin color, texture, turgor normal  Head/face:  NCAT  Eyes:  No gross abnormalities. Sclera nonicteric  Lungs/Chest:  Normal expansion. No respiratory distress. On room air  Heart: Warm throughout. Regular rate   Abdomen:  Soft, mild incisional tenderness, nondistended  Extremities: Extremities warm to touch, pink      Assessment/Plan:  50 y.o. female s/p completion cholecystectomy 10/19 for chronic cholecystitis with retained stones within the cystic duct.  Multiple previous abdominal surgeries    - low fat diet  - Scheduled gabapentin, Tylenol, prn Roxicodone pain panel  - bowel regimen  - Encourage PT/OT, OOB  - Okay to restart [address] for discharge from surgery point of view  - Outpatient follow-up with Dr. Ronni Pretty    Discussed w Dr. Ronni Pretty    Electronically signed by Ngoc Cotto DO on 10/22/2022 at 7:52 AM    Lactulose x 1  Home today    Electronically signed by Michael Biggs MD on 10/23/2022 at 9:16 AM

## 2022-10-22 NOTE — DISCHARGE INSTRUCTIONS
Discharge Instructions for Cholecystectomy     A cholecystectomy is the surgical removal of the gallbladder. Home Care    Keep the incision area clean and dry, okay to shower and wash incisions after 6:00pm tonight with soap and water, and pat dry. Diet    You will be started on a clear-liquid diet after surgery and advanced to a regular diet, depending on your progress and tolerance. Your liver will take over the functions of the gallbladder, although some people notice that they have a little more trouble digesting fatty foods, particularly for the first month after surgery. You may notice increased gas or changes in your bowel habits during the first month after surgery. Keep the bowels soft with Metamucil and bran cereal.  Physical Activity    Walk frequently and it is okay to do stairs but do not do anything that causes pain in the incisions. Medications    Take Ibuprofen 800mg every 8 hours and Tylenol 500mg every 6 hours for moderate pain. You will start them both at the same time and then they will overlap by 2 hours. Use the Oxycodone for more severe pain. Take the stool softeners if you are taking the oxycodone  Follow-up   Follow up with Dr. Lj Coon in 2 weeks, please call his office ([phone]) upon discharge to schedule an appointment. Call Your Doctor If Any of the Following Occurs     Signs of infection, including fever and chills   Redness, swelling, increasing pain, excessive bleeding, or discharge at the incision site   Cough, shortness of breath, chest pain   Increased abdominal pain   Nausea and/or vomiting that does not resolve off narcotics. Pain, burning, urgency or frequency of urination, or blood in the urine   Pain and/or swelling in your feet, calves, or legs   Dark urine, light stools, or evidence of jaundice (yellowing of the skin or eyes).    In case of an emergency,    CALL 911  immediately.       =====================================  [facility], [address]  =====================================    Take your medications as directed in this summary    Future scheduled appointments are listed below or recommended appointments are mentioned above. Future Appointments   Date Time Provider Jam Fernandezi   10/28/2022  2:00 PM [id number] [address] [facility],[address] MD Romeo Bolaños [facility]       It is important that you follow up with Dr. Tiff Ortiz for better monitoring of the reason of your hospitalization. Follow up with the specialists that saw you during your stay as well. If you have any questions call your PCP at [phone]. Once discharged from [facility] [address] , you can :     Return to work : Yes, you may return to work  Activity : As tolerated  Stairs : As tolerated  Exercise : As tolerated  Lifting : As tolerated   Sexual activity : Yes  Driving : With seat belt on. NO driving on narcotic pain medication if prescribed   Medications : Always take your medications as prescribed  Wound Care: none needed  Diet : You are asked to make an attempt to improve diet and exercise patterns to aid in medical management of your medical condition/problem. Call [facility] with any further questions. Return to Emergency Department with any worsening of your condition and/or fever greater than 101 degrees, new weakness, shortness of breath or chest pain.

## 2022-10-22 NOTE — PLAN OF CARE
Received perfectserve from nurse Willy Marx about patient medication error. Patient was inadvertently given 20 mg of oxycodone rather than her ordered dose 10 mg at 2300. Medication error went unnoticed until now when cycle counts were off. Patient has been alert and not showing any signs of adverse effects, such as respiratory depression per nurse. Wadell Cosmos was placed. Patient continues to be stable. Expressed to nurse to inform patient of the error as she has not been made aware yet. Also discussed continuing to monitor for additional 4 hours.      Electronically signed by Leann Chandler MD on 10/22/2022 at 5:03 AM

## 2022-10-23 VITALS
RESPIRATION RATE: 18 BRPM | HEIGHT: 64 IN | WEIGHT: 190 LBS | HEART RATE: 71 BPM | OXYGEN SATURATION: 98 % | TEMPERATURE: 98.3 F | BODY MASS INDEX: 32.44 KG/M2 | SYSTOLIC BLOOD PRESSURE: 127 MMHG | DIASTOLIC BLOOD PRESSURE: 81 MMHG

## 2022-10-23 LAB
ALBUMIN SERPL-MCNC: 3.6 G/DL (ref 3.5–5.2)
ALP BLD-CCNC: 191 U/L (ref 35–104)
ALT SERPL-CCNC: 38 U/L (ref 0–32)
ANION GAP SERPL CALCULATED.3IONS-SCNC: 11 MMOL/L (ref 7–16)
AST SERPL-CCNC: 27 U/L (ref 0–31)
BASOPHILS ABSOLUTE: 0.04 E9/L (ref 0–0.2)
BASOPHILS RELATIVE PERCENT: 0.5 % (ref 0–2)
BILIRUB SERPL-MCNC: 0.7 MG/DL (ref 0–1.2)
BUN BLDV-MCNC: 11 MG/DL (ref 6–20)
CALCIUM SERPL-MCNC: 9 MG/DL (ref 8.6–10.2)
CHLORIDE BLD-SCNC: 104 MMOL/L (ref 98–107)
CO2: 23 MMOL/L (ref 22–29)
CREAT SERPL-MCNC: 0.9 MG/DL (ref 0.5–1)
EOSINOPHILS ABSOLUTE: 0.48 E9/L (ref 0.05–0.5)
EOSINOPHILS RELATIVE PERCENT: 5.7 % (ref 0–6)
GFR SERPL CREATININE-BSD FRML MDRD: >60 ML/MIN/1.73
GLUCOSE BLD-MCNC: 100 MG/DL (ref 74–99)
HCT VFR BLD CALC: 35.4 % (ref 34–48)
HEMOGLOBIN: 11.8 G/DL (ref 11.5–15.5)
IMMATURE GRANULOCYTES #: 0.02 E9/L
IMMATURE GRANULOCYTES %: 0.2 % (ref 0–5)
LYMPHOCYTES ABSOLUTE: 1.02 E9/L (ref 1.5–4)
LYMPHOCYTES RELATIVE PERCENT: 12.2 % (ref 20–42)
MCH RBC QN AUTO: 30.3 PG (ref 26–35)
MCHC RBC AUTO-ENTMCNC: 33.3 % (ref 32–34.5)
MCV RBC AUTO: 90.8 FL (ref 80–99.9)
MONOCYTES ABSOLUTE: 0.61 E9/L (ref 0.1–0.95)
MONOCYTES RELATIVE PERCENT: 7.3 % (ref 2–12)
NEUTROPHILS ABSOLUTE: 6.2 E9/L (ref 1.8–7.3)
NEUTROPHILS RELATIVE PERCENT: 74.1 % (ref 43–80)
PDW BLD-RTO: 13.8 FL (ref 11.5–15)
PLATELET # BLD: 210 E9/L (ref 130–450)
PMV BLD AUTO: 11.2 FL (ref 7–12)
POTASSIUM SERPL-SCNC: 3.5 MMOL/L (ref 3.5–5)
RBC # BLD: 3.9 E12/L (ref 3.5–5.5)
SODIUM BLD-SCNC: 138 MMOL/L (ref 132–146)
TOTAL PROTEIN: 7 G/DL (ref 6.4–8.3)
WBC # BLD: 8.4 E9/L (ref 4.5–11.5)

## 2022-10-23 PROCEDURE — 6370000000 HC RX 637 (ALT 250 FOR IP): Performed by: STUDENT IN AN ORGANIZED HEALTH CARE EDUCATION/TRAINING PROGRAM

## 2022-10-23 PROCEDURE — 6370000000 HC RX 637 (ALT 250 FOR IP): Performed by: TRANSPLANT SURGERY

## 2022-10-23 PROCEDURE — 99239 HOSP IP/OBS DSCHRG MGMT >30: CPT | Performed by: FAMILY MEDICINE

## 2022-10-23 PROCEDURE — 85025 COMPLETE CBC W/AUTO DIFF WBC: CPT

## 2022-10-23 PROCEDURE — 6360000002 HC RX W HCPCS: Performed by: STUDENT IN AN ORGANIZED HEALTH CARE EDUCATION/TRAINING PROGRAM

## 2022-10-23 PROCEDURE — 80053 COMPREHEN METABOLIC PANEL: CPT

## 2022-10-23 PROCEDURE — 36415 COLL VENOUS BLD VENIPUNCTURE: CPT

## 2022-10-23 RX ORDER — AMLODIPINE BESYLATE 10 MG/1
10 TABLET ORAL DAILY
Qty: 30 TABLET | Refills: 3 | Status: SHIPPED | OUTPATIENT
Start: 2022-10-23

## 2022-10-23 RX ORDER — GABAPENTIN 300 MG/1
300 CAPSULE ORAL NIGHTLY
Qty: 30 CAPSULE | Refills: 0 | Status: SHIPPED | OUTPATIENT
Start: 2022-10-23 | End: 2022-11-17 | Stop reason: SDUPTHER

## 2022-10-23 RX ORDER — CELECOXIB 100 MG/1
100 CAPSULE ORAL 2 TIMES DAILY
Qty: 28 CAPSULE | Refills: 0 | Status: SHIPPED | OUTPATIENT
Start: 2022-10-23 | End: 2022-11-20

## 2022-10-23 RX ORDER — LACTULOSE 10 G/15ML
30 SOLUTION ORAL ONCE
Status: COMPLETED | OUTPATIENT
Start: 2022-10-23 | End: 2022-10-23

## 2022-10-23 RX ORDER — ACETAMINOPHEN 500 MG
500 TABLET ORAL 4 TIMES DAILY PRN
Qty: 360 TABLET | Refills: 1 | Status: SHIPPED | OUTPATIENT
Start: 2022-10-23

## 2022-10-23 RX ORDER — BISACODYL 10 MG
10 SUPPOSITORY, RECTAL RECTAL ONCE
Status: DISCONTINUED | OUTPATIENT
Start: 2022-10-23 | End: 2022-10-23

## 2022-10-23 RX ADMIN — TICAGRELOR 90 MG: 90 TABLET ORAL at 08:41

## 2022-10-23 RX ADMIN — METOPROLOL SUCCINATE 50 MG: 50 TABLET, EXTENDED RELEASE ORAL at 08:40

## 2022-10-23 RX ADMIN — ASPIRIN 81 MG: 81 TABLET, COATED ORAL at 08:40

## 2022-10-23 RX ADMIN — OXYCODONE HYDROCHLORIDE 10 MG: 10 TABLET ORAL at 02:45

## 2022-10-23 RX ADMIN — ROSUVASTATIN CALCIUM 40 MG: 20 TABLET, COATED ORAL at 08:40

## 2022-10-23 RX ADMIN — AMLODIPINE BESYLATE 10 MG: 10 TABLET ORAL at 08:41

## 2022-10-23 RX ADMIN — PANTOPRAZOLE SODIUM 40 MG: 40 TABLET, DELAYED RELEASE ORAL at 07:01

## 2022-10-23 RX ADMIN — OXYCODONE HYDROCHLORIDE 10 MG: 10 TABLET ORAL at 11:01

## 2022-10-23 RX ADMIN — ENOXAPARIN SODIUM 40 MG: 100 INJECTION SUBCUTANEOUS at 08:48

## 2022-10-23 RX ADMIN — CELECOXIB 100 MG: 100 CAPSULE ORAL at 08:41

## 2022-10-23 RX ADMIN — ACETAMINOPHEN 1000 MG: 500 TABLET ORAL at 07:01

## 2022-10-23 RX ADMIN — LACTULOSE 30 G: 20 SOLUTION ORAL at 09:57

## 2022-10-23 RX ADMIN — FERROUS SULFATE TAB 325 MG (65 MG ELEMENTAL FE) 325 MG: 325 (65 FE) TAB at 08:41

## 2022-10-23 RX ADMIN — POLYETHYLENE GLYCOL 3350 17 G: 17 POWDER, FOR SOLUTION ORAL at 08:38

## 2022-10-23 RX ADMIN — ESCITALOPRAM 5 MG: 5 TABLET, FILM COATED ORAL at 08:41

## 2022-10-23 RX ADMIN — SENNOSIDES AND DOCUSATE SODIUM 2 TABLET: 50; 8.6 TABLET ORAL at 08:40

## 2022-10-23 RX ADMIN — ISOSORBIDE MONONITRATE 30 MG: 30 TABLET, EXTENDED RELEASE ORAL at 08:48

## 2022-10-23 RX ADMIN — OXYCODONE HYDROCHLORIDE 10 MG: 10 TABLET ORAL at 07:01

## 2022-10-23 ASSESSMENT — PAIN DESCRIPTION - ORIENTATION
ORIENTATION: RIGHT
ORIENTATION: RIGHT

## 2022-10-23 ASSESSMENT — PAIN DESCRIPTION - DESCRIPTORS
DESCRIPTORS: ACHING;TIGHTNESS
DESCRIPTORS: ACHING;DISCOMFORT;THROBBING

## 2022-10-23 ASSESSMENT — PAIN SCALES - GENERAL
PAINLEVEL_OUTOF10: 5
PAINLEVEL_OUTOF10: 8
PAINLEVEL_OUTOF10: 7

## 2022-10-23 ASSESSMENT — PAIN DESCRIPTION - LOCATION
LOCATION: ABDOMEN
LOCATION: BACK

## 2022-10-23 NOTE — PROGRESS NOTES
[address] [facility]  Family Medicine Attending    TEMI RAMIREZ Course: 50 y.o. female with history of history of gastric bypass, CAD s/p CABG presented to the ED for right sided abdominal pain started day of admission. Pain became 10/10 and she called EMS. Pain happened after eating and associated with some nausea and vomiting. No fevers, chills, nausea, vomiting. No previous episodes of pain. She denies chest pain. CT abdomen and pelvis showed biliary duct dilation and gen surgery was consulted. S/p open completion cholecystectomy 10/19. S: Sitting in bed, pain is manageable. Tolerating PO well. Ambulating. Passing flatus and belching. No BM yet. O: VS- Blood pressure 127/81, pulse 71, temperature 98.3 °F (36.8 °C), temperature source Oral, resp. rate 18, height 5' 4\" (1.626 m), weight 190 lb (86.2 kg), SpO2 98 %, not currently breastfeeding. Exam is as noted by resident with the following changes, additions or corrections:  NAD  RRR no MR, CTAB. Abd: RUQ incision  c/d/I. Minimal abd ttp no r/g  CANTU=. LLE parasthesia (chronic) no edema. Impressions:   Principal Problem:    Abdominal pain  Resolved Problems:    Cholecystitis, acute    Acute cholecystitis due to biliary calculus      Plan:   s/p open surgery for removal of remnant stones 10/19. S/p Zosyn. ASA and restart brillinta restarted    HTN - amlodipine  10mg. improved, cont to follow. Leg pain-increase gabapentin to 300 mg     Diet as tolerated    S/p PCA for pain. Naproxyn in place of toradol given loss of IV access and generalized improvement. ASA/Brillinta likely would ameliorate any NSAID CVD risk. DVT Ppx: Lovenox. Plans for discharge today per surgery. Lactulose added to bowel regimen. Attending Physician Statement  I have reviewed the chart, including any radiology or labs, and seen the patient with the resident(s).   I personally reviewed and performed key elements of the history and exam.  I agree with the assessment, plan and orders as documented by the resident. Please refer to the resident note for additional information.       Yonatan Joyce MD

## 2022-10-23 NOTE — PLAN OF CARE
Problem: Safety - Adult  Goal: Free from fall injury  Outcome: Progressing     Problem: Pain  Goal: Verbalizes/displays adequate comfort level or baseline comfort level  Outcome: Progressing     Problem: Discharge Planning  Goal: Discharge to home or other facility with appropriate resources  Outcome: Progressing     Problem: ABCDS Injury Assessment  Goal: Absence of physical injury  Outcome: Progressing

## 2022-10-23 NOTE — PROGRESS NOTES
[facility] - [facility] Inpatient   Resident Progress Note    S:  Hospital day: 6    Brief Synopsis: Kimberly Mendez is a 50 y.o. female with a PMH of hypertension, history of gastric bypass, CAD status post CABG in 6/2022, depression and bilateral renal artery stenosis presented to ED for abdominal pain for RUQ and RLQ that started after food. Pain was sharp and constant in nature. It was not alleviated with positioning. Patient also reports nausea and vomiting. Vomitus was non-bloody. She denies chest pain, SOB or loose BM. In the ED, patient was given fentanyl for pain management and general surgery was consulted. CT abdomen and pelvis showed biliary dilatation. Patient was admitted for abdominal pain possibly biliary stones. IV Zosyn was started. HBP was consulted and MRCP was ordered. Home ASA and Brilinta was held. ALP, AST and ALT were elevated, AST and ALT trended down. It showed several stones in the biliary duct up to 7mm. Open surgery for removal of stones was done on 10/19. Patient had been having elevated BP since she was admitted. On 10/17/2022, BP was eIevated to 208/98 and patient developed a headache. Norvasc was increased to 10mg PO daily. Overnight, no acute events. She was able to ambulate on her own. Patient was evaluated at bedside. Reported her pain is well tolerated. No BM yet but she is passing gas. She is on a regular diet, and reports no nausea. She desires to go home.       Cont meds:    sodium chloride       Scheduled meds:    gabapentin  300 mg Oral Nightly    pantoprazole  40 mg Oral QAM AC    celecoxib  100 mg Oral BID    sennosides-docusate sodium  2 tablet Oral Daily    polyethylene glycol  17 g Oral BID    acetaminophen  1,000 mg Oral 3 times per day    amLODIPine  10 mg Oral Daily    isosorbide mononitrate  30 mg Oral Daily    metoprolol succinate  50 mg Oral BID    aspirin  81 mg Oral Daily    enoxaparin  40 mg SubCUTAneous Daily    lidocaine  1 patch TransDERmal Daily escitalopram  5 mg Oral Daily    rosuvastatin  40 mg Oral Daily    ticagrelor  90 mg Oral BID    ferrous sulfate  325 mg Oral BID     sodium chloride flush  5-40 mL IntraVENous 2 times per day     PRN meds: oxyCODONE **OR** oxyCODONE, HYDROmorphone, ondansetron, sodium chloride flush, sodium chloride     I reviewed the patient's Past Medical and Surgical History, Medications and Allergies. O:  VS: /81   Pulse 71   Temp 98.3 °F (36.8 °C) (Oral)   Resp 18   Ht 5' 4\" (1.626 m)   Wt 190 lb (86.2 kg)   SpO2 98%   BMI 32.61 kg/m²     Physical Exam:  General: Alert, cooperative, no acute distress. HEENT: Normocephalic, atraumatic. PERRLA, conjunctiva/corneas clear    Chest: No tenderness or deformity, full & symmetric excursion   Lung: Clear to auscultation bilaterally,  respirations unlabored. No rales/wheezing/rubs . Heart: RRR, S1 and S2 normal, no murmur, rub or gallop. DP pulses 2/4   Abdomen: Soft, nondistended, tender at incision site, no masses, no organomegaly, no guarding, rebound or rigidity. Bowel sounds present. Genital/Rectal: deferred   Extremities:  Extremities normal, atraumatic, no cyanosis or edema. Distal pulses equal bilaterally   Skin: Skin color, texture, turgor normal, no rashes or lesions   Musculoskeletal: No joint swelling, no tenderness anywhere. Normal ROM in extremities. Parasthesia, pins and needles in the left lower extremity, chronic. Neurologic: Alert & Oriented; Normal and symmetric strength in UEs and LEs; Sensation intact, CN 1-12 intact. Psychiatric: appropriate affect. Intact judgment and insight. Labs:  Na/K/Cl/CO2:  138/3.5/104/23 (10/23 2042)  BUN/Cr/glu/ALT/AST/amyl/lip:  11/0.9/--/38/27/--/-- (10/23 7592)  WBC/Hgb/Hct/Plts:  8.4/11.8/35.4/210 (10/23 8605)  estimated creatinine clearance is 79 mL/min (based on SCr of 0.9 mg/dL).   Other pertinent labs as noted below    New Imaging:  MRI ABDOMEN W WO CONTRAST MRCP   Final Result   Limited evaluation due to motion artifact. The cystic duct remnant is   dilated and appears to contain several stones measuring up to 7 mm. Mild   biliary ductal dilatation without definite stones in the common bile duct. The degree of extrahepatic biliary ductal dilatation is not as pronounced as   on CT from yesterday. CT ABDOMEN PELVIS W IV CONTRAST Additional Contrast? None   Final Result   1. Biliary dilation redemonstrated, mildly worse. Suggestion of a small   chronic cystic duct calculus. 2. Suggestion of constipation and there are small bowel air-fluid levels. 3. Additionally dilation with retained contents within the gastric bypass   including afferent limb which is abnormal but present on prior exam.      RECOMMENDATIONS:   Clinical correlation. A/P:  Principal Problem:    Abdominal pain  Resolved Problems:    Cholecystitis, acute    Acute cholecystitis due to biliary calculus      Abdominal pain 2/2 acute cholecystitis due to biliary calculus  -CT abdomen showed biliary dilation. MRCP showed several stones up to 7mm in the cystic duct remnant, mild biliary ductal dilation, no stones seen.  -Patient is s/p open completion cholecystectomy with common bile duct exploration on 10/19  -Continue Protonix daily  -oxycodone panel 5mg/10mg, Tylenol 1000mg TID and celebrex 100 mg BID for pain control  -Zofran as needed for nausea  -ok to DC today per HBP and follow up OP  -on bowel regime, lactulose added per HBP    2. CAD s/p CABG  -Continue home dose metoprolol, Crestor and Imdur  -Continue ASA, Brilinta    3. CKD Stage 1  -Stable, baseline creatinine 1.2  -Avoid nephrotoxic medications    4. Hypertension  -BP not controlled since admission, /98 on 10/17, resulting in headache  -Home Norvasc increased to 10mg daily  -Continue toprol XL 50mg BID and imdur 30mg daily    5. Depression  -Continue home dose Lexapro    6.     Back pain - stable  -New onset back pain in the left subscapular region this morning, musculoskeletal pattern  -Continue lidocaine patches  -Tylenol 500mg PRN    7.     LLE parasthesia - chronic  -Gabapentin trial 300mg nightly    DVT Prophylaxis: lovenox 40mg daily, restarted today  GI Prophylaxis: protonix  Diet: low fat diet      Electronically signed by Sania Kellogg MD on 10/23/2022 at 8:59 AM  This case was discussed with attending physician Dr. Delia Neal

## 2022-10-23 NOTE — PROGRESS NOTES
All discharge information reviewed with patient at this time including new medications and important follow up visits. Verified medications were escribed to correct pharmacy. Patient provided with pt advocate phone number to follow up.

## 2022-10-26 NOTE — PROGRESS NOTES
Physician Progress Note      Paresh Stephens  CSN #:                  [id number]  :                       1970  ADMIT DATE:       10/15/2022 8:27 PM  [address] DATE:        10/23/2022 12:30 PM  RESPONDING  PROVIDER #:        Kira Lopez MD          QUERY TEXT:    Patient admitted with abdominal pain. Noted documentation of Acute   cholecystitis per 10/21-10/23 Family Med PN  and  chronic cholecystitis per   10/19 Op note. If possible, please document in progress notes and discharge summary if you   are evaluating and /or treating any of the following: The medical record reflects the following:  Risk Factors: abdominal pain  Clinical Indicators: 10/19 Op note-  chronic cholecystitis with retained   stones within the cystic duct, multiple previous abdominal surgeries. Indication- chronic cholecystitis and calculus. 10/21-10/23 Family Med PN-     Acute cholecystitis due to biliary calculus. MRI- The cystic duct remnant is   dilated and appears to contain several stones measuring up to 7 mm. Mild   biliary ductal dilatation without definite stones in the common bile duct. The   degree of extrahepatic biliary ductal dilatation is not as pronounced as  on CT from yesterday. Treatment: 1L NS, CT, labs, consults, s/p Open completion cholecystectomy &    Common bile duct exploration, MRI    Thank you,  Bladimir Holbrook RN, CRCR  Clinical Documentation Improvement  Options provided:  -- Acute cholecystitis  confirmed and  chronic cholecystitis ruled out  -- chronic cholecystitis confirmed and  Acute cholecystitis  ruled out  -- Other - I will add my own diagnosis  -- Disagree - Not applicable / Not valid  -- Disagree - Clinically unable to determine / Unknown  -- Refer to Clinical Documentation Reviewer    PROVIDER RESPONSE TEXT:    After study, chronic cholecystitis confirmed and Acute cholecystitis ruled   out.     Query created by: Rusty Parrish on 10/26/2022 12:47 PM      Electronically signed by:  Keisha Skelton MD 10/26/2022 1:19 PM

## 2022-10-27 NOTE — PROGRESS NOTES
Physician Progress Note      Paresh Stephens  CSN #:                  [id number]  :                       1970  ADMIT DATE:       10/15/2022 8:27 PM  [address] [place] DATE:        10/23/2022 12:30 PM  RESPONDING  PROVIDER #:        Vandana Cho TEXT:    Patient admitted with  chronic cholecystitis. Noted documentation of   Postoperative anemia due to acute blood loss as an active hospital problem in   DC summary on 10/22/22. ? Please document in progress notes the clinical   indicators to support this diagnosis on current admission, or document if this   diagnosis is PMH only, or has been ruled out after study. ? Please update   active hospital problems appropriately to reflect response. The medical record reflects the following:  Risk Factors: gastric bypass, CAD status post CABG, depression and bilateral   renal artery stenosis, abdominal pain  Clinical Indicators: 10/22 DC summary- active problem list \" Postoperative   anemia due to acute blood loss. \"  Pt is s/p  Open completion cholecystectomy,   Common bile duct exploration on 10/19/22. H&H 11.7 &    35.7, 12.8 & 41.6,   14.0  & 43.0,  10.2 & 31.2. Treatment: s/p  Open completion cholecystectomy, Common bile duct exploration,   serial labs, monitoring    Thank you,  Bladimir Holbrook RN, CRCR  Clinical Documentation Improvement  Options provided:  -- Postoperative anemia due to acute blood loss is currently being   treated/evaluated  -- Postoperative anemia due to acute blood loss is currently being   treated/evaluated as evidenced by, Please document clinical support.   -- Postoperative anemia due to acute blood loss has been ruled out after study  -- Postoperative anemia due to acute blood loss is PMH only  -- Other - I will add my own diagnosis  -- Disagree - Not applicable / Not valid  -- Disagree - Clinically unable to determine / Unknown  -- Refer to Clinical Documentation Reviewer    PROVIDER RESPONSE TEXT:    Postoperative anemia due to acute blood loss has been ruled out after study.     Query created by: Alida Kent on 10/27/2022 9:55 AM      Electronically signed by:  Liliam Gtz 10/27/2022 12:09 PM

## 2022-11-17 ENCOUNTER — OFFICE VISIT (OUTPATIENT)
Dept: FAMILY MEDICINE CLINIC | Age: 52
End: 2022-11-17
Payer: COMMERCIAL

## 2022-11-17 VITALS
SYSTOLIC BLOOD PRESSURE: 143 MMHG | WEIGHT: 195 LBS | HEART RATE: 70 BPM | DIASTOLIC BLOOD PRESSURE: 78 MMHG | BODY MASS INDEX: 33.29 KG/M2 | TEMPERATURE: 98.1 F | HEIGHT: 64 IN | OXYGEN SATURATION: 98 %

## 2022-11-17 DIAGNOSIS — Z90.49 HISTORY OF CHOLECYSTECTOMY: Primary | ICD-10-CM

## 2022-11-17 DIAGNOSIS — G62.9 NEUROPATHY: ICD-10-CM

## 2022-11-17 DIAGNOSIS — M54.50 CHRONIC BILATERAL LOW BACK PAIN WITHOUT SCIATICA: ICD-10-CM

## 2022-11-17 DIAGNOSIS — Z59.811: ICD-10-CM

## 2022-11-17 DIAGNOSIS — G89.29 CHRONIC BILATERAL LOW BACK PAIN WITHOUT SCIATICA: ICD-10-CM

## 2022-11-17 DIAGNOSIS — F33.9 EPISODE OF RECURRENT MAJOR DEPRESSIVE DISORDER, UNSPECIFIED DEPRESSION EPISODE SEVERITY (HCC): ICD-10-CM

## 2022-11-17 PROCEDURE — 99214 OFFICE O/P EST MOD 30 MIN: CPT

## 2022-11-17 PROCEDURE — G8484 FLU IMMUNIZE NO ADMIN: HCPCS

## 2022-11-17 PROCEDURE — G8427 DOCREV CUR MEDS BY ELIG CLIN: HCPCS

## 2022-11-17 PROCEDURE — 1111F DSCHRG MED/CURRENT MED MERGE: CPT

## 2022-11-17 PROCEDURE — 3078F DIAST BP <80 MM HG: CPT

## 2022-11-17 PROCEDURE — 1090RET LONGITUDINAL PLAN OF CARE: Performed by: FAMILY MEDICINE

## 2022-11-17 PROCEDURE — G8417 CALC BMI ABV UP PARAM F/U: HCPCS

## 2022-11-17 PROCEDURE — 3074F SYST BP LT 130 MM HG: CPT

## 2022-11-17 PROCEDURE — 1036F TOBACCO NON-USER: CPT

## 2022-11-17 PROCEDURE — 3017F COLORECTAL CA SCREEN DOC REV: CPT

## 2022-11-17 PROCEDURE — 99212 OFFICE O/P EST SF 10 MIN: CPT

## 2022-11-17 RX ORDER — FLUOXETINE HYDROCHLORIDE 20 MG/1
20 CAPSULE ORAL DAILY
Qty: 30 CAPSULE | Refills: 3 | Status: SHIPPED | OUTPATIENT
Start: 2022-11-17

## 2022-11-17 RX ORDER — ACETAMINOPHEN 500 MG
500 TABLET ORAL 4 TIMES DAILY PRN
Qty: 360 TABLET | Refills: 1 | Status: SHIPPED | OUTPATIENT
Start: 2022-11-17

## 2022-11-17 RX ORDER — GABAPENTIN 300 MG/1
CAPSULE ORAL
Qty: 30 CAPSULE | Refills: 0 | OUTPATIENT
Start: 2022-11-17

## 2022-11-17 RX ORDER — CELECOXIB 200 MG/1
200 CAPSULE ORAL DAILY
Qty: 15 CAPSULE | Refills: 0 | Status: SHIPPED | OUTPATIENT
Start: 2022-11-17 | End: 2022-12-02

## 2022-11-17 RX ORDER — GABAPENTIN 400 MG/1
400 CAPSULE ORAL 3 TIMES DAILY
Qty: 90 CAPSULE | Refills: 2 | Status: SHIPPED | OUTPATIENT
Start: 2022-11-17 | End: 2023-02-15

## 2022-11-17 SDOH — ECONOMIC STABILITY - HOUSING INSECURITY: HOUSING INSTABILITY WITH RISK OF HOMELESSNESS: Z59.811

## 2022-11-17 NOTE — PATIENT INSTRUCTIONS
Make an appointment with Dr. Robin Eldridge at [phone] as soon as possible.    Address: [address] [place] [address]

## 2022-11-17 NOTE — TELEPHONE ENCOUNTER
Last Appointment:  Visit date not found  Future Appointments   Date Time Provider Jam Damon   11/17/2022  3:00 PM [id number] Franc[facility] Simmons [facility]

## 2022-11-17 NOTE — PROGRESS NOTES
S: 50 y.o. female here for follow up from recent hospitalization in October with d/c 10/27 for cholecytitis  S/p open completion cholecystectomy 10/19 with s/p open surgery for removal of remnant stones 10/19. Pain 6/10 now. Taking gabapentin 300 mg for leg pain but pain is worse. No motivation to work or get out of bed due to pain in leg and ongoing discomfort in the area of her recent surgery. Trouble sleeping due to leg numbness. Low mood. Hx of MDD. Passive ideation that sometimes she wishes that after she had the heart attack this year, she did not wake up. Low energy, guilt, poor concentration, low appetite. Eviction in 3 days    Patient anticipates eviction at the end of this month. O: VS: BP (!) 143/78   Pulse 70   Temp 98.1 °F (36.7 °C) (Temporal)   Ht 5' 4\" (1.626 m)   Wt 195 lb (88.5 kg)   SpO2 98%   BMI 33.47 kg/m²    General: NAD   CV:  RRR, no gallops, rubs, or murmurs   Resp: CTAB no R/R/W   Abd:  Soft, minimal erythema, crusting at incision site; ttp over ruq   Ext:  no C/C/E    Impression/Plan:   1. Ruq pain S/p cholecystectomy-follow up with Dr. Nii Guevara, u/s  2. Neuropathy s/p CABG-continue gabapentin  3. HTN-monitor  4. Depressive symptoms-start ssri and referral to [facility] in 2 weeks    Attending Physician Statement  I have discussed the case, including pertinent history and exam findings with the resident. I also have seen the patient and performed key portions of the examination. I agree with the documented assessment and plan.         Jovan Lima MD

## 2022-11-17 NOTE — PROGRESS NOTES
[facility]  FAMILY MEDICINE RESIDENCY PROGRAM  DATE OF VISIT : 2022    Patient : Hilda Gonzalez   Age : 50 y.o.  : 1970   MRN : [id number]   ______________________________________________________________________    Chief Complaint :   Chief Complaint   Patient presents with    Follow-Up from Hospital       HPI : Hilda Gonzalez is 50 y.o. female who presented to the clinic today for follow-up from hospital.  Patient was hospitalized due to acute cholecystitis secondary to remnant biliary stones in duct. This year, patient has been hospitalized due to need for CABG and open cholecystectomy. Pain at surgical site - patient continues to have pain at incision site at the RUQ, described it as a sharp pain shooting towards the back. The pain originates from under the incision site. It is not relieved by any position changes, or use of Tylenol 500 mg. Patient reports that she works from home in front of the computer, to avoid pain she is constantly sitting up straight that hurts her back as a consequence. Patient has not been back to see [facility] surgery for outpatient follow-up. Neuropathy - patient had neuropathy post CABG when graft was taken from great saphenous vein of her left leg. She constantly feels \"numbness and tingling\" but does not resolve from anything. She denies any pain, intermittent or constant. Since a few weeks ago, patient developed the similar feeling on her left arm. She denies any shooting pain or weakness. She feels that it is difficult for her to continue typing as her fingers get cramped up sometimes. MDD - patient was tearful during her clinic visit secondary to frustration from having so many hospitalizations this year, pain and numbness that does not seem to be resolving and she just got an eviction notice that requires her to leave in 3 days due to not having enough money to pay rent.   Patient reports having decreased energy and anhedonia for the last few weeks. She has sleep disturbances, due to numbness and also appetite changes. She has passive suicide ideation, \"it would have been better if the heart attack took me. \"    Review of Systems :  All systems negative except what mentioned in HPI.  ______________________________________________________________________    Physical Exam :  Vitals: BP (!) 143/78   Pulse 70   Temp 98.1 °F (36.7 °C) (Temporal)   Ht 5' 4\" (1.626 m)   Wt 195 lb (88.5 kg)   SpO2 98%   BMI 33.47 kg/m²   General Appearance: Well developed, awake, alert, oriented, and in NAD  HEENT: NCAT, MMM, no pallor or icterus. Neck: Symmetrical, trachea midline. Chest wall/Lung: CTAB, respirations unlabored. No ronchi/wheezing/rales  Heart: RRR, normal S1 and S2, no murmurs, rubs or gallops. Abdomen: SNTND  Extremities: Extremities normal, atraumatic, no cyanosis, clubbing or edema. Skin: Skin color, texture, turgor normal, no rashes or lesions. Dry incision site, some crusting. Redness around the most lateral part of incision. Musculokeletal: ROM grossly normal in all joints of extremities, no obvious joint swelling. No tinnel sign. Lymphnodes: No lymph node enlargement appreciated  Neurologic: Alert&Oriented x3. No focal motor deficits detected. CN I to XII intact, UEs and LE has 5/5 motor strength, no loss of sensations. No cerebellar deficits or ataxia. Numbness in the left medial thigh and calf, and left arm. Psychiatric: Severely depressed mood. ______________________________________________________________________    Assessment & Plan :    1. History of cholecystectomy  Assessment & Plan:    patient is strongly advised to follow-up with surgeon outpatient so that he can evaluate any post surgery complications. Ultrasound of RUQ has been ordered. To deal with pain management, Tylenol and Celebrex has been represcribed and patient advised on how to take them alternately.   Orders:  -     US GALLBLADDER RUQ; Future  - acetaminophen (TYLENOL) 500 MG tablet; Take 1 tablet by mouth 4 times daily as needed for Pain, Disp-360 tablet, R-1Normal  -     celecoxib (CELEBREX) 200 MG capsule; Take 1 capsule by mouth daily for 15 days, Disp-15 capsule, R-0Normal  2. Neuropathy  Assessment & Plan:    DDx includes MDD manifestations somatically versus cervical radiculopathy versus PAD. Patient previously on 300 mg gabapentin daily, trial of 400 mg gabapentin 3 times daily will be started and monitored for the next 4 weeks. Subsequently patient will be started on fluoxetine and therapy, however if numbness does not subside additional imaging will be considered. Orders:  -     gabapentin (NEURONTIN) 400 MG capsule; Take 1 capsule by mouth 3 times daily for 90 days. , Disp-90 capsule, R-2Normal  3. Episode of recurrent major depressive disorder, unspecified depression episode severity ([facility] [address]  Assessment & Plan:    because of MBD can be multifactorial and can be manifesting neuropathy and pain somatically. Patient is open to speak to Dr. Lisa Hernandes, or any other therapist.  She is counseled on side effects of SSRIs and length of time taken to initiate therapy. Orders:  -     FLUoxetine (PROZAC) 20 MG capsule; Take 1 capsule by mouth daily, Disp-30 capsule, R-3Normal  4. Chronic bilateral low back pain without sciatica  Assessment & Plan:    patient given home exercises to relieve back pain and also exercises for people with carpal tunnel. Will consider physical therapy. 5. Eviction notice served  Assessment & Plan:    Epic message sent out to [employer], RN, social work, for care coordination and further assistance on this. Additional plan and future considerations:   Future physical therapy or imaging if needed. Return to Office: Return in about 3 weeks (around 12/8/2022).     Matias Sifuentes MD  Case discussed with Dr. Amada Rivas

## 2022-11-20 PROBLEM — Z90.49 HISTORY OF CHOLECYSTECTOMY: Status: ACTIVE | Noted: 2022-11-20

## 2022-11-20 PROBLEM — G62.9 NEUROPATHY: Status: ACTIVE | Noted: 2022-11-20

## 2022-11-20 PROBLEM — F33.9 EPISODE OF RECURRENT MAJOR DEPRESSIVE DISORDER (HCC): Status: ACTIVE | Noted: 2022-11-20

## 2022-11-20 PROBLEM — Z59.811: Status: ACTIVE | Noted: 2022-11-20

## 2022-11-20 NOTE — ASSESSMENT & PLAN NOTE
patient given home exercises to relieve back pain and also exercises for people with carpal tunnel. Will consider physical therapy.

## 2022-11-20 NOTE — ASSESSMENT & PLAN NOTE
DDx includes MDD manifestations somatically versus cervical radiculopathy versus PAD. Patient previously on 300 mg gabapentin daily, trial of 400 mg gabapentin 3 times daily will be started and monitored for the next 4 weeks. Subsequently patient will be started on fluoxetine and therapy, however if numbness does not subside additional imaging will be considered.

## 2022-11-20 NOTE — ASSESSMENT & PLAN NOTE
because of MBD can be multifactorial and can be manifesting neuropathy and pain somatically. Patient is open to speak to Dr. Marcelo Roger, or any other therapist.  She is counseled on side effects of SSRIs and length of time taken to initiate therapy.

## 2022-11-20 NOTE — ASSESSMENT & PLAN NOTE
patient is strongly advised to follow-up with surgeon outpatient so that he can evaluate any post surgery complications. Ultrasound of RUQ has been ordered. To deal with pain management, Tylenol and Celebrex has been represcribed and patient advised on how to take them alternately.

## 2022-11-20 NOTE — ASSESSMENT & PLAN NOTE
Epic message sent out to Shayla Betancourt RN, social work, for care coordination and further assistance on this.

## 2022-11-21 ENCOUNTER — CLINICAL DOCUMENTATION (OUTPATIENT)
Dept: SPIRITUAL SERVICES | Age: 52
End: 2022-11-21

## 2022-11-21 NOTE — PROGRESS NOTES
was contacted by [facility] to contact Patient in regard to providing spiritual support.  texted Patient, as instructed by email, as Patient does not answer phone and voicemail is full.  contacted Patient via text with no response this morning. This afternoon,  called phone number of Patient and Patient texted back asking  to text.  texted again, stating who  is and why  is contacting Patient. Again  received no response.

## 2023-02-13 RX ORDER — AMLODIPINE BESYLATE 10 MG/1
TABLET ORAL
Qty: 30 TABLET | Refills: 3 | Status: SHIPPED | OUTPATIENT
Start: 2023-02-13

## 2023-02-18 DIAGNOSIS — G62.9 NEUROPATHY: ICD-10-CM

## 2023-02-27 RX ORDER — GABAPENTIN 400 MG/1
400 CAPSULE ORAL 3 TIMES DAILY
Qty: 270 CAPSULE | Refills: 2 | Status: SHIPPED | OUTPATIENT
Start: 2023-02-27 | End: 2023-11-24

## 2023-05-31 ENCOUNTER — HOSPITAL ENCOUNTER (EMERGENCY)
Age: 53
Discharge: HOME | End: 2023-05-31
Payer: COMMERCIAL

## 2023-05-31 ENCOUNTER — APPOINTMENT (OUTPATIENT)
Dept: CT IMAGING | Age: 53
End: 2023-05-31
Payer: COMMERCIAL

## 2023-05-31 VITALS
HEART RATE: 74 BPM | SYSTOLIC BLOOD PRESSURE: 136 MMHG | DIASTOLIC BLOOD PRESSURE: 74 MMHG | OXYGEN SATURATION: 99 % | RESPIRATION RATE: 16 BRPM

## 2023-05-31 DIAGNOSIS — R10.84 GENERALIZED ABDOMINAL PAIN: Primary | ICD-10-CM

## 2023-05-31 DIAGNOSIS — R11.0 NAUSEA: ICD-10-CM

## 2023-05-31 LAB
ALBUMIN SERPL-MCNC: 4.1 G/DL (ref 3.5–5.2)
ALP SERPL-CCNC: 202 U/L (ref 35–104)
ALT SERPL-CCNC: 18 U/L (ref 0–32)
ANION GAP SERPL CALCULATED.3IONS-SCNC: 12 MMOL/L (ref 7–16)
AST SERPL-CCNC: 34 U/L (ref 0–31)
BACTERIA URNS QL MICRO: ABNORMAL /HPF
BASOPHILS # BLD: 0.03 E9/L (ref 0–0.2)
BASOPHILS NFR BLD: 0.5 % (ref 0–2)
BILIRUB SERPL-MCNC: 0.5 MG/DL (ref 0–1.2)
BILIRUB UR QL STRIP: NEGATIVE
BUN SERPL-MCNC: 20 MG/DL (ref 6–20)
CALCIUM SERPL-MCNC: 9.2 MG/DL (ref 8.6–10.2)
CHLORIDE SERPL-SCNC: 107 MMOL/L (ref 98–107)
CLARITY UR: CLEAR
CO2 SERPL-SCNC: 18 MMOL/L (ref 22–29)
COLOR UR: YELLOW
CREAT SERPL-MCNC: 1 MG/DL (ref 0.5–1)
EOSINOPHIL # BLD: 0.06 E9/L (ref 0.05–0.5)
EOSINOPHIL NFR BLD: 0.9 % (ref 0–6)
ERYTHROCYTE [DISTWIDTH] IN BLOOD BY AUTOMATED COUNT: 13.2 FL (ref 11.5–15)
GLUCOSE SERPL-MCNC: 124 MG/DL (ref 74–99)
GLUCOSE UR STRIP-MCNC: NEGATIVE MG/DL
HCT VFR BLD AUTO: 39.6 % (ref 34–48)
HGB BLD-MCNC: 12.7 G/DL (ref 11.5–15.5)
HGB UR QL STRIP: NEGATIVE
IMM GRANULOCYTES # BLD: 0.03 E9/L
IMM GRANULOCYTES NFR BLD: 0.5 % (ref 0–5)
KETONES UR STRIP-MCNC: NEGATIVE MG/DL
LACTATE BLDV-SCNC: 1.2 MMOL/L (ref 0.5–2.2)
LEUKOCYTE ESTERASE UR QL STRIP: NEGATIVE
LIPASE: 16 U/L (ref 13–60)
LYMPHOCYTES # BLD: 1.21 E9/L (ref 1.5–4)
LYMPHOCYTES NFR BLD: 18.4 % (ref 20–42)
MCH RBC QN AUTO: 30 PG (ref 26–35)
MCHC RBC AUTO-ENTMCNC: 32.1 % (ref 32–34.5)
MCV RBC AUTO: 93.6 FL (ref 80–99.9)
MONOCYTES # BLD: 0.33 E9/L (ref 0.1–0.95)
MONOCYTES NFR BLD: 5 % (ref 2–12)
NEUTROPHILS # BLD: 4.92 E9/L (ref 1.8–7.3)
NEUTS SEG NFR BLD: 74.7 % (ref 43–80)
NITRITE UR QL STRIP: NEGATIVE
PH UR STRIP: 6 [PH] (ref 5–9)
PLATELET # BLD AUTO: 165 E9/L (ref 130–450)
PMV BLD AUTO: 11.2 FL (ref 7–12)
POTASSIUM SERPL-SCNC: 4.7 MMOL/L (ref 3.5–5)
PROT SERPL-MCNC: 7.3 G/DL (ref 6.4–8.3)
PROT UR STRIP-MCNC: ABNORMAL MG/DL
RBC # BLD AUTO: 4.23 E12/L (ref 3.5–5.5)
RBC #/AREA URNS HPF: ABNORMAL /HPF (ref 0–2)
SODIUM SERPL-SCNC: 137 MMOL/L (ref 132–146)
SP GR UR STRIP: >=1.03 (ref 1–1.03)
UROBILINOGEN UR STRIP-ACNC: 1 E.U./DL
WBC # BLD: 6.6 E9/L (ref 4.5–11.5)
WBC #/AREA URNS HPF: ABNORMAL /HPF (ref 0–5)

## 2023-05-31 PROCEDURE — 74177 CT ABD & PELVIS W/CONTRAST: CPT

## 2023-05-31 PROCEDURE — 85025 COMPLETE CBC W/AUTO DIFF WBC: CPT

## 2023-05-31 PROCEDURE — 83605 ASSAY OF LACTIC ACID: CPT

## 2023-05-31 PROCEDURE — 6360000004 HC RX CONTRAST MEDICATION: Performed by: RADIOLOGY

## 2023-05-31 PROCEDURE — 81001 URINALYSIS AUTO W/SCOPE: CPT

## 2023-05-31 PROCEDURE — 80053 COMPREHEN METABOLIC PANEL: CPT

## 2023-05-31 PROCEDURE — 6370000000 HC RX 637 (ALT 250 FOR IP): Performed by: NURSE PRACTITIONER

## 2023-05-31 PROCEDURE — 83690 ASSAY OF LIPASE: CPT

## 2023-05-31 PROCEDURE — 99285 EMERGENCY DEPT VISIT HI MDM: CPT

## 2023-05-31 RX ORDER — ONDANSETRON 4 MG/1
4 TABLET, ORALLY DISINTEGRATING ORAL ONCE
Status: COMPLETED | OUTPATIENT
Start: 2023-05-31 | End: 2023-05-31

## 2023-05-31 RX ORDER — ONDANSETRON 4 MG/1
4 TABLET, ORALLY DISINTEGRATING ORAL 3 TIMES DAILY PRN
Qty: 21 TABLET | Refills: 0 | Status: SHIPPED | OUTPATIENT
Start: 2023-05-31

## 2023-05-31 RX ORDER — SUCRALFATE 1 G/1
1 TABLET ORAL 4 TIMES DAILY
Qty: 120 TABLET | Refills: 3 | Status: SHIPPED | OUTPATIENT
Start: 2023-05-31

## 2023-05-31 RX ADMIN — ONDANSETRON 4 MG: 4 TABLET, ORALLY DISINTEGRATING ORAL at 10:51

## 2023-05-31 RX ADMIN — IOPAMIDOL 75 ML: 755 INJECTION, SOLUTION INTRAVENOUS at 12:36

## 2023-05-31 NOTE — ED NOTES
Department of Emergency Medicine  FIRST PROVIDER TRIAGE NOTE             Independent MLP           5/31/23  10:26 AM EDT    Date of Encounter: 5/31/23   MRN: [id number]      HPI: Michele Mena is a 48 y.o. female who presents to the ED for No chief complaint on file. Nausea and vomiting for 3 weeks. Abdominal pain has been intermittent for a week but worse today. Diarrhea at times. States she has been taking immodium. Feels hot at times - does not take her temperature. HX: gastric bypass surgery, cholecystectomy    ROS: Negative for cp or sob. PE: Gen Appearance/Constitutional: alert  HEENT: NC/NT. PERRLA,  Airway patent. Initial Plan of Care: All treatment areas with department are currently occupied. Plan to order/Initiate the following while awaiting opening in ED: labs and imaging studies.   Initiate Treatment-Testing, Proceed toTreatment Area When Bed Available for ED Attending/MLP to Continue Care    Electronically signed by DA Vee CNP   DD: 5/31/23      DA Vee CNP  05/31/23 1029

## 2023-05-31 NOTE — ED PROVIDER NOTES
Resp 16   SpO2 98%   Oxygen Saturation Interpretation: Normal      ---------------------------------------------------PHYSICAL EXAM--------------------------------------    Constitutional/General: Alert and oriented x3, well appearing, non toxic in NAD. Ambulates well and without assistance. Head: NC/AT  Eyes: PERRL, EOMI  Mouth: Oropharynx clear, handling secretions, no trismus  Neck: Supple, full ROM, no meningeal signs  Pulmonary: Lungs clear to auscultation bilaterally, no wheezes, rales, or rhonchi. Not in respiratory distress  Cardiovascular:  Regular rate and rhythm, no murmurs, gallops, or rubs. 2+ distal pulses  Abdomen: Soft, +diffuse TTP , non distended, no peritoneal signs or guarding. Extremities: Moves all extremities x 4. Warm and well perfused  Skin: warm and dry without rash  Neurologic: GCS 15,  Psych: Normal Affect      ------------------------------ ED COURSE/MEDICAL DECISION MAKING----------------------  Medications   ondansetron (ZOFRAN-ODT) disintegrating tablet 4 mg (4 mg Oral Given 5/31/23 1051)   iopamidol (ISOVUE-370) 76 % injection 75 mL (75 mLs IntraVENous Given 5/31/23 1236)         Medical Decision Making:       Nehal Ly is a 48 y.o. female presenting to the ED for abdominal pain that is been present for 3 weeks. Patient reports the pain is diffuse, crampy and intermittent. Patient reports having her gallbladder removed back in October. Patient also reports occasional episodes of vomiting and diarrhea. She has not taken anything for his symptoms. Nothing makes her symptoms better or worse. While interviewing the patient she does have a cola in her hand that she is actively drinking. Patient has not vomited throughout her entire visit here in the ER today. She denies any fever, chills, bodies, headache, dizziness, syncope, chest pain, shortness of breath, hematic emesis, melena, hematochezia, dysuria or hematuria. VS stable: pt is afebrile and not tachycardic.  PE

## 2023-07-03 ENCOUNTER — OFFICE VISIT (OUTPATIENT)
Dept: FAMILY MEDICINE CLINIC | Age: 53
End: 2023-07-03
Payer: COMMERCIAL

## 2023-07-03 VITALS
DIASTOLIC BLOOD PRESSURE: 102 MMHG | SYSTOLIC BLOOD PRESSURE: 161 MMHG | HEIGHT: 64 IN | WEIGHT: 197 LBS | BODY MASS INDEX: 33.63 KG/M2 | HEART RATE: 78 BPM | RESPIRATION RATE: 16 BRPM | TEMPERATURE: 98.2 F | OXYGEN SATURATION: 96 %

## 2023-07-03 DIAGNOSIS — G62.9 NEUROPATHY: Primary | ICD-10-CM

## 2023-07-03 DIAGNOSIS — G62.9 NEUROPATHY: ICD-10-CM

## 2023-07-03 DIAGNOSIS — I25.10 CAD IN NATIVE ARTERY: ICD-10-CM

## 2023-07-03 DIAGNOSIS — F33.9 EPISODE OF RECURRENT MAJOR DEPRESSIVE DISORDER, UNSPECIFIED DEPRESSION EPISODE SEVERITY (HCC): ICD-10-CM

## 2023-07-03 DIAGNOSIS — I15.0 RENOVASCULAR HYPERTENSION: Chronic | ICD-10-CM

## 2023-07-03 DIAGNOSIS — Z13.9 ENCOUNTER FOR SCREENING INVOLVING SOCIAL DETERMINANTS OF HEALTH (SDOH): ICD-10-CM

## 2023-07-03 LAB
FERRITIN SERPL-MCNC: 42 NG/ML
IRON SATN MFR SERPL: 24 % (ref 15–50)
IRON SERPL-MCNC: 73 MCG/DL (ref 37–145)
TIBC SERPL-MCNC: 305 MCG/DL (ref 250–450)

## 2023-07-03 PROCEDURE — G8417 CALC BMI ABV UP PARAM F/U: HCPCS | Performed by: FAMILY MEDICINE

## 2023-07-03 PROCEDURE — G8427 DOCREV CUR MEDS BY ELIG CLIN: HCPCS | Performed by: FAMILY MEDICINE

## 2023-07-03 PROCEDURE — 36415 COLL VENOUS BLD VENIPUNCTURE: CPT | Performed by: FAMILY MEDICINE

## 2023-07-03 PROCEDURE — 3017F COLORECTAL CA SCREEN DOC REV: CPT | Performed by: FAMILY MEDICINE

## 2023-07-03 PROCEDURE — 3078F DIAST BP <80 MM HG: CPT | Performed by: FAMILY MEDICINE

## 2023-07-03 PROCEDURE — 1036F TOBACCO NON-USER: CPT | Performed by: FAMILY MEDICINE

## 2023-07-03 PROCEDURE — 3074F SYST BP LT 130 MM HG: CPT | Performed by: FAMILY MEDICINE

## 2023-07-03 PROCEDURE — 99213 OFFICE O/P EST LOW 20 MIN: CPT | Performed by: FAMILY MEDICINE

## 2023-07-03 RX ORDER — PANTOPRAZOLE SODIUM 40 MG/1
40 TABLET, DELAYED RELEASE ORAL DAILY
Qty: 30 TABLET | Refills: 0 | Status: SHIPPED | OUTPATIENT
Start: 2023-07-03 | End: 2023-08-02

## 2023-07-03 RX ORDER — ROSUVASTATIN CALCIUM 40 MG/1
40 TABLET, COATED ORAL DAILY
Qty: 90 TABLET | Refills: 3 | Status: SHIPPED | OUTPATIENT
Start: 2023-07-03

## 2023-07-03 RX ORDER — FLUOXETINE HYDROCHLORIDE 20 MG/1
20 CAPSULE ORAL DAILY
Qty: 30 CAPSULE | Refills: 3 | Status: CANCELLED | OUTPATIENT
Start: 2023-07-03

## 2023-07-03 RX ORDER — GABAPENTIN 400 MG/1
400 CAPSULE ORAL 3 TIMES DAILY
Qty: 270 CAPSULE | Refills: 2 | Status: SHIPPED | OUTPATIENT
Start: 2023-07-03 | End: 2024-03-29

## 2023-07-03 RX ORDER — SUCRALFATE 1 G/1
1 TABLET ORAL 4 TIMES DAILY
Qty: 120 TABLET | Refills: 3 | Status: SHIPPED | OUTPATIENT
Start: 2023-07-03

## 2023-07-03 RX ORDER — ASPIRIN 81 MG/1
81 TABLET ORAL DAILY
Qty: 90 TABLET | Refills: 3 | Status: SHIPPED | OUTPATIENT
Start: 2023-07-03

## 2023-07-03 RX ORDER — AMLODIPINE BESYLATE 10 MG/1
10 TABLET ORAL DAILY
Qty: 30 TABLET | Refills: 3 | Status: SHIPPED | OUTPATIENT
Start: 2023-07-03

## 2023-07-03 RX ORDER — FERROUS SULFATE 325(65) MG
325 TABLET ORAL 2 TIMES DAILY WITH MEALS
Qty: 60 TABLET | Refills: 0 | Status: SHIPPED | OUTPATIENT
Start: 2023-07-03 | End: 2023-08-02

## 2023-07-03 RX ORDER — CELECOXIB 200 MG/1
200 CAPSULE ORAL DAILY
Qty: 30 CAPSULE | Refills: 1 | Status: SHIPPED | OUTPATIENT
Start: 2023-07-03 | End: 2023-09-01

## 2023-07-03 RX ORDER — ACETAMINOPHEN 500 MG
500 TABLET ORAL 4 TIMES DAILY PRN
Qty: 360 TABLET | Refills: 1 | Status: CANCELLED | OUTPATIENT
Start: 2023-07-03

## 2023-07-03 RX ORDER — LIDOCAINE 4 G/G
1 PATCH TOPICAL DAILY
Qty: 10 PATCH | Refills: 0 | Status: SHIPPED | OUTPATIENT
Start: 2023-07-03

## 2023-07-03 RX ORDER — ONDANSETRON 4 MG/1
4 TABLET, ORALLY DISINTEGRATING ORAL 3 TIMES DAILY PRN
Qty: 21 TABLET | Refills: 0 | Status: CANCELLED | OUTPATIENT
Start: 2023-07-03

## 2023-07-03 RX ORDER — ISOSORBIDE MONONITRATE 30 MG/1
30 TABLET, EXTENDED RELEASE ORAL DAILY
Qty: 360 TABLET | Refills: 0 | Status: SHIPPED | OUTPATIENT
Start: 2023-07-03 | End: 2024-07-02

## 2023-07-03 RX ORDER — METOPROLOL SUCCINATE 50 MG/1
50 TABLET, EXTENDED RELEASE ORAL 2 TIMES DAILY
Qty: 30 TABLET | Refills: 3 | Status: SHIPPED | OUTPATIENT
Start: 2023-07-03

## 2023-07-03 RX ORDER — DULOXETIN HYDROCHLORIDE 30 MG/1
30 CAPSULE, DELAYED RELEASE ORAL DAILY
Qty: 30 CAPSULE | Refills: 3 | Status: SHIPPED | OUTPATIENT
Start: 2023-07-03 | End: 2023-10-31

## 2023-07-03 SDOH — ECONOMIC STABILITY: HOUSING INSECURITY
IN THE LAST 12 MONTHS, WAS THERE A TIME WHEN YOU DID NOT HAVE A STEADY PLACE TO SLEEP OR SLEPT IN A SHELTER (INCLUDING NOW)?: NO

## 2023-07-03 SDOH — ECONOMIC STABILITY: FOOD INSECURITY: WITHIN THE PAST 12 MONTHS, YOU WORRIED THAT YOUR FOOD WOULD RUN OUT BEFORE YOU GOT MONEY TO BUY MORE.: OFTEN TRUE

## 2023-07-03 SDOH — ECONOMIC STABILITY: INCOME INSECURITY: HOW HARD IS IT FOR YOU TO PAY FOR THE VERY BASICS LIKE FOOD, HOUSING, MEDICAL CARE, AND HEATING?: VERY HARD

## 2023-07-03 SDOH — ECONOMIC STABILITY: FOOD INSECURITY: WITHIN THE PAST 12 MONTHS, THE FOOD YOU BOUGHT JUST DIDN'T LAST AND YOU DIDN'T HAVE MONEY TO GET MORE.: OFTEN TRUE

## 2023-07-03 ASSESSMENT — PATIENT HEALTH QUESTIONNAIRE - PHQ9
SUM OF ALL RESPONSES TO PHQ QUESTIONS 1-9: 22
5. POOR APPETITE OR OVEREATING: 3
4. FEELING TIRED OR HAVING LITTLE ENERGY: 3
7. TROUBLE CONCENTRATING ON THINGS, SUCH AS READING THE NEWSPAPER OR WATCHING TELEVISION: 3
8. MOVING OR SPEAKING SO SLOWLY THAT OTHER PEOPLE COULD HAVE NOTICED. OR THE OPPOSITE - BEING SO FIDGETY OR RESTLESS THAT YOU HAVE BEEN MOVING AROUND A LOT MORE THAN USUAL: NEARLY EVERY DAY
6. FEELING BAD ABOUT YOURSELF - OR THAT YOU ARE A FAILURE OR HAVE LET YOURSELF OR YOUR FAMILY DOWN: NEARLY EVERY DAY
5. POOR APPETITE OR OVEREATING: NEARLY EVERY DAY
10. IF YOU CHECKED OFF ANY PROBLEMS, HOW DIFFICULT HAVE THESE PROBLEMS MADE IT FOR YOU TO DO YOUR WORK, TAKE CARE OF THINGS AT HOME, OR GET ALONG WITH OTHER PEOPLE: SOMEWHAT DIFFICULT
6. FEELING BAD ABOUT YOURSELF - OR THAT YOU ARE A FAILURE OR HAVE LET YOURSELF OR YOUR FAMILY DOWN: 3
8. MOVING OR SPEAKING SO SLOWLY THAT OTHER PEOPLE COULD HAVE NOTICED. OR THE OPPOSITE, BEING SO FIGETY OR RESTLESS THAT YOU HAVE BEEN MOVING AROUND A LOT MORE THAN USUAL: 3
SUM OF ALL RESPONSES TO PHQ QUESTIONS 1-9: 22
1. LITTLE INTEREST OR PLEASURE IN DOING THINGS: MORE THAN HALF THE DAYS
SUM OF ALL RESPONSES TO PHQ QUESTIONS 1-9: 22
4. FEELING TIRED OR HAVING LITTLE ENERGY: NEARLY EVERY DAY
SUM OF ALL RESPONSES TO PHQ QUESTIONS 1-9: 22
1. LITTLE INTEREST OR PLEASURE IN DOING THINGS: 2
7. TROUBLE CONCENTRATING ON THINGS, SUCH AS READING THE NEWSPAPER OR WATCHING TELEVISION: NEARLY EVERY DAY
SUM OF ALL RESPONSES TO PHQ QUESTIONS 1-9: 22
9. THOUGHTS THAT YOU WOULD BE BETTER OFF DEAD, OR OF HURTING YOURSELF: NOT AT ALL
2. FEELING DOWN, DEPRESSED OR HOPELESS: 2
SUM OF ALL RESPONSES TO PHQ9 QUESTIONS 1 & 2: 4
9. THOUGHTS THAT YOU WOULD BE BETTER OFF DEAD, OR OF HURTING YOURSELF: 0
2. FEELING DOWN, DEPRESSED OR HOPELESS: MORE THAN HALF THE DAYS
3. TROUBLE FALLING OR STAYING ASLEEP: NEARLY EVERY DAY
3. TROUBLE FALLING OR STAYING ASLEEP: 3
10. IF YOU CHECKED OFF ANY PROBLEMS, HOW DIFFICULT HAVE THESE PROBLEMS MADE IT FOR YOU TO DO YOUR WORK, TAKE CARE OF THINGS AT HOME, OR GET ALONG WITH OTHER PEOPLE: 1

## 2023-07-03 ASSESSMENT — COLUMBIA-SUICIDE SEVERITY RATING SCALE - C-SSRS
6. IN YOUR LIFETIME, HAVE YOU EVER DONE ANYTHING, STARTED TO DO ANYTHING, OR PREPARED TO DO ANYTHING TO END YOUR LIFE?: NO
1. IN THE PAST MONTH, HAVE YOU WISHED YOU WERE DEAD OR WISHED YOU COULD GO TO SLEEP AND NOT WAKE UP?: NO
2. IN THE PAST MONTH, HAVE YOU ACTUALLY HAD ANY THOUGHTS OF KILLING YOURSELF?: NO

## 2023-07-03 ASSESSMENT — LIFESTYLE VARIABLES
HOW MANY STANDARD DRINKS CONTAINING ALCOHOL DO YOU HAVE ON A TYPICAL DAY: PATIENT DOES NOT DRINK
HOW OFTEN DO YOU HAVE A DRINK CONTAINING ALCOHOL: NEVER

## 2023-07-03 NOTE — PATIENT INSTRUCTIONS
"Reports of "wobbly" sensation and intermittent leaning to the right or left side when ambulating.   Onset ~ 2-3 months ago and has improved overall .   Pt denies spinning sensation but does endorse associated nausea occasionally as well as visual changes. No LOC, headaches, or focal weakness  Obtain MRI brain scan r/o central component  Recommend pt f/u with ophthalmology as she has not see them in ~ 6-7 years.   Referral to balance therapy  Can consider ENT referral in the future but may be unrevealing.   " [facility]   [address], [address]   Phone: [phone]   Office Hours: Mon-Fri 8:00am-6:00pm   Delivers refrigerated meals to heat. Special diets. Private pay, government funded programs and some insurance plans. Current Medicaid and Medicare recipients contact . [employer]  Website: [employer]   [address] [place] [address]   Phone: [phone], [phone]   Delivers vacuum packaged, refrigerated meals ready to heat. Weekly deliveries, refrigerate or freeze. All meals are diabetic friendly and low sodium. Private pay, government funded programs and some insurance plans. Current Medicaid/Medicare recipients contact  to determine eligibility.

## 2023-07-05 ENCOUNTER — TELEPHONE (OUTPATIENT)
Dept: INTERNAL MEDICINE | Age: 53
End: 2023-07-05

## 2023-07-05 ENCOUNTER — TELEPHONE (OUTPATIENT)
Dept: FAMILY MEDICINE CLINIC | Age: 53
End: 2023-07-05

## 2023-07-05 NOTE — TELEPHONE ENCOUNTER
Phone contact to pt per consult of Dr. Lu Keys of [facility] related to positive SDOH screening for food insecurity, transportation and housing instability. Pt was open and engaged with call. She reports her main priority is having court date for eviction on 7.11.23. Pt and 23 yo son reside together in apartment on [address] for past 4 years with same landlord; rent is $425 plus utilities  Pt works remotely from home for [employer]; son is unemployed and is planning to attend college. Pt states she paid half of April rent and non in May, June or July. She reports being ill in April May with less earned income. Pt reports she is in need of about $1300 to avoid eviction. Reviewed weekly gross and net pay for past 6 weeks. Pt states she received help nando tejada from [facility] in the past.  Chart reviewed and may have been for RETAIN program.  Pt states she has contacted "[employer]" and [employer] without success. LSW advised attempt contacts and provided other info as possible. Pt staes she is unable to move in with either of her 2 children and maintain job; discussed possibility of [address] and LSW willing to check if able to work privately which uninterested in. Following calls made and outcome:    "[employer]" is accepting rent referrals on 7.11.23 which is same date of eviction hearing. 2. [employer] has zero assistance for rent and helping with water. 3.  [employer] not assisting with rent but can for food and utilities  4. Spoke  w RETAIN  [facility] [facility]; reports pt is no longer active in program and did receive $620 which was paid to [facility] and is ineligible for   Any further assistance  5. Contact with [employer] and advised there are no other agencies assisting with rent. Provided following info for possible resolution towards eviction        A.  Legal Assistance for those facing eviction at [phone]         B.   [facility]

## 2023-07-11 ENCOUNTER — TELEPHONE (OUTPATIENT)
Dept: INTERNAL MEDICINE | Age: 53
End: 2023-07-11

## 2023-07-11 NOTE — TELEPHONE ENCOUNTER
Follow up call to pt for status of eviction. Pt states she was able to pay her all her back rent and July rent prior to court case. Pt states has longterm plan of managing as her son will be working as well.   Encouraged pt to contact LSW as needed

## 2023-07-31 DIAGNOSIS — G62.9 NEUROPATHY: ICD-10-CM

## 2023-08-01 RX ORDER — PANTOPRAZOLE SODIUM 40 MG/1
40 TABLET, DELAYED RELEASE ORAL DAILY
Qty: 30 TABLET | Refills: 2 | Status: SHIPPED | OUTPATIENT
Start: 2023-08-01 | End: 2023-10-30

## 2023-08-01 NOTE — TELEPHONE ENCOUNTER
Last Appointment:  7/3/2023  Future Appointments  8/8/2023   2:40 PM    [facility] MD [place] [facility]        [facility]

## 2023-08-08 ENCOUNTER — OFFICE VISIT (OUTPATIENT)
Dept: FAMILY MEDICINE CLINIC | Age: 53
End: 2023-08-08

## 2023-08-08 VITALS
RESPIRATION RATE: 16 BRPM | HEIGHT: 64 IN | DIASTOLIC BLOOD PRESSURE: 62 MMHG | WEIGHT: 193 LBS | BODY MASS INDEX: 32.95 KG/M2 | SYSTOLIC BLOOD PRESSURE: 110 MMHG | HEART RATE: 81 BPM | TEMPERATURE: 97.9 F

## 2023-08-08 DIAGNOSIS — G62.9 NEUROPATHY: Primary | ICD-10-CM

## 2023-08-08 DIAGNOSIS — M25.552 PAIN OF LEFT HIP: ICD-10-CM

## 2023-08-08 DIAGNOSIS — L30.4 ERYTHEMA INTERTRIGO: ICD-10-CM

## 2023-08-08 DIAGNOSIS — R15.9 INCONTINENCE OF FECES WITH FECAL URGENCY: ICD-10-CM

## 2023-08-08 DIAGNOSIS — G89.29 CHRONIC BILATERAL LOW BACK PAIN WITHOUT SCIATICA: ICD-10-CM

## 2023-08-08 DIAGNOSIS — R15.2 INCONTINENCE OF FECES WITH FECAL URGENCY: ICD-10-CM

## 2023-08-08 DIAGNOSIS — F33.9 EPISODE OF RECURRENT MAJOR DEPRESSIVE DISORDER, UNSPECIFIED DEPRESSION EPISODE SEVERITY (HCC): ICD-10-CM

## 2023-08-08 DIAGNOSIS — M54.50 CHRONIC BILATERAL LOW BACK PAIN WITHOUT SCIATICA: ICD-10-CM

## 2023-08-08 RX ORDER — NYSTATIN 100000 [USP'U]/G
POWDER TOPICAL 3 TIMES DAILY
Qty: 60 G | Refills: 2 | Status: SHIPPED | OUTPATIENT
Start: 2023-08-08 | End: 2023-11-06

## 2023-08-08 RX ORDER — GABAPENTIN 300 MG/1
600 CAPSULE ORAL 3 TIMES DAILY
Qty: 270 CAPSULE | Refills: 2 | Status: SHIPPED | OUTPATIENT
Start: 2023-08-08 | End: 2024-05-04

## 2023-08-08 RX ORDER — CELECOXIB 200 MG/1
200 CAPSULE ORAL DAILY
Qty: 30 CAPSULE | Refills: 1 | Status: CANCELLED | OUTPATIENT
Start: 2023-08-08 | End: 2023-10-07

## 2023-08-08 RX ORDER — DULOXETIN HYDROCHLORIDE 30 MG/1
60 CAPSULE, DELAYED RELEASE ORAL DAILY
Qty: 30 CAPSULE | Refills: 3 | Status: SHIPPED | OUTPATIENT
Start: 2023-08-08 | End: 2023-12-06

## 2023-08-08 RX ORDER — CAPSAICIN 0.025 %
CREAM (GRAM) TOPICAL
Qty: 120 G | Refills: 1 | Status: SHIPPED | OUTPATIENT
Start: 2023-08-08 | End: 2023-09-07

## 2023-08-09 ENCOUNTER — TELEPHONE (OUTPATIENT)
Dept: SURGERY | Age: 53
End: 2023-08-09

## 2023-08-09 NOTE — TELEPHONE ENCOUNTER
LPN called patient regarding appointment scheduled on 8/10/202 with Dr Juanita Gifford. LPN gave detailed message about appointment needing rescheduled. LPN left number to call back to change appt.     Electronically signed by Nirali Crawford LPN on 9/5/96 at 6:59 PM EDT

## 2023-08-13 PROBLEM — R15.2 INCONTINENCE OF FECES WITH FECAL URGENCY: Status: ACTIVE | Noted: 2023-08-13

## 2023-08-13 PROBLEM — L30.4 ERYTHEMA INTERTRIGO: Status: ACTIVE | Noted: 2023-08-13

## 2023-08-13 PROBLEM — M25.552 PAIN OF LEFT HIP: Status: ACTIVE | Noted: 2023-08-13

## 2023-08-13 PROBLEM — R15.9 INCONTINENCE OF FECES WITH FECAL URGENCY: Status: ACTIVE | Noted: 2023-08-13

## 2023-08-14 ENCOUNTER — APPOINTMENT (OUTPATIENT)
Dept: GENERAL RADIOLOGY | Age: 53
End: 2023-08-14
Payer: COMMERCIAL

## 2023-08-14 ENCOUNTER — HOSPITAL ENCOUNTER (EMERGENCY)
Age: 53
Discharge: HOME | End: 2023-08-15
Attending: STUDENT IN AN ORGANIZED HEALTH CARE EDUCATION/TRAINING PROGRAM
Payer: COMMERCIAL

## 2023-08-14 DIAGNOSIS — R51.9 NONINTRACTABLE HEADACHE, UNSPECIFIED CHRONICITY PATTERN, UNSPECIFIED HEADACHE TYPE: ICD-10-CM

## 2023-08-14 DIAGNOSIS — R07.9 CHEST PAIN, UNSPECIFIED TYPE: Primary | ICD-10-CM

## 2023-08-14 LAB
ALBUMIN SERPL-MCNC: 4.2 G/DL (ref 3.5–5.2)
ALP SERPL-CCNC: 203 U/L (ref 35–104)
ALT SERPL-CCNC: 24 U/L (ref 0–32)
ANION GAP SERPL CALCULATED.3IONS-SCNC: 12 MMOL/L (ref 7–16)
AST SERPL-CCNC: 53 U/L (ref 0–31)
BASOPHILS # BLD: 0.05 K/UL (ref 0–0.2)
BASOPHILS NFR BLD: 1 % (ref 0–2)
BILIRUB SERPL-MCNC: 0.3 MG/DL (ref 0–1.2)
BNP SERPL-MCNC: 192 PG/ML (ref 0–125)
BUN SERPL-MCNC: 13 MG/DL (ref 6–20)
CALCIUM SERPL-MCNC: 8.8 MG/DL (ref 8.6–10.2)
CHLORIDE SERPL-SCNC: 107 MMOL/L (ref 98–107)
CO2 SERPL-SCNC: 20 MMOL/L (ref 22–29)
CREAT SERPL-MCNC: 1.2 MG/DL (ref 0.5–1)
EOSINOPHIL # BLD: 0.14 K/UL (ref 0.05–0.5)
EOSINOPHILS RELATIVE PERCENT: 1 % (ref 0–6)
ERYTHROCYTE [DISTWIDTH] IN BLOOD BY AUTOMATED COUNT: 13.6 % (ref 11.5–15)
GFR SERPL CREATININE-BSD FRML MDRD: 54 ML/MIN/1.73M2
GLUCOSE SERPL-MCNC: 93 MG/DL (ref 74–99)
HCT VFR BLD AUTO: 36.9 % (ref 34–48)
HGB BLD-MCNC: 12.5 G/DL (ref 11.5–15.5)
IMM GRANULOCYTES # BLD AUTO: 0.1 K/UL (ref 0–0.58)
IMM GRANULOCYTES NFR BLD: 1 % (ref 0–5)
LYMPHOCYTES NFR BLD: 2.04 K/UL (ref 1.5–4)
LYMPHOCYTES RELATIVE PERCENT: 21 % (ref 20–42)
MCH RBC QN AUTO: 31 PG (ref 26–35)
MCHC RBC AUTO-ENTMCNC: 33.9 G/DL (ref 32–34.5)
MCV RBC AUTO: 91.6 FL (ref 80–99.9)
MONOCYTES NFR BLD: 0.51 K/UL (ref 0.1–0.95)
MONOCYTES NFR BLD: 5 % (ref 2–12)
NEUTROPHILS NFR BLD: 71 % (ref 43–80)
NEUTS SEG NFR BLD: 6.91 K/UL (ref 1.8–7.3)
PLATELET # BLD AUTO: 208 K/UL (ref 130–450)
PMV BLD AUTO: 12.1 FL (ref 7–12)
POTASSIUM SERPL-SCNC: 4.5 MMOL/L (ref 3.5–5)
PROT SERPL-MCNC: 7.7 G/DL (ref 6.4–8.3)
RBC # BLD AUTO: 4.03 M/UL (ref 3.5–5.5)
REASON FOR REJECTION: NORMAL
SODIUM SERPL-SCNC: 139 MMOL/L (ref 132–146)
SPECIMEN SOURCE: NORMAL
WBC OTHER # BLD: 9.8 K/UL (ref 4.5–11.5)
ZZ NTE CLEAN UP: ORDERED TEST: NORMAL

## 2023-08-14 PROCEDURE — 85025 COMPLETE CBC W/AUTO DIFF WBC: CPT

## 2023-08-14 PROCEDURE — 84484 ASSAY OF TROPONIN QUANT: CPT

## 2023-08-14 PROCEDURE — 83880 ASSAY OF NATRIURETIC PEPTIDE: CPT

## 2023-08-14 PROCEDURE — 99285 EMERGENCY DEPT VISIT HI MDM: CPT

## 2023-08-14 PROCEDURE — 2580000003 HC RX 258

## 2023-08-14 PROCEDURE — 85379 FIBRIN DEGRADATION QUANT: CPT

## 2023-08-14 PROCEDURE — 80053 COMPREHEN METABOLIC PANEL: CPT

## 2023-08-14 PROCEDURE — 71045 X-RAY EXAM CHEST 1 VIEW: CPT

## 2023-08-14 PROCEDURE — 80048 BASIC METABOLIC PNL TOTAL CA: CPT

## 2023-08-14 PROCEDURE — 93005 ELECTROCARDIOGRAM TRACING: CPT

## 2023-08-14 RX ORDER — 0.9 % SODIUM CHLORIDE 0.9 %
1000 INTRAVENOUS SOLUTION INTRAVENOUS ONCE
Status: COMPLETED | OUTPATIENT
Start: 2023-08-14 | End: 2023-08-15

## 2023-08-14 RX ADMIN — SODIUM CHLORIDE 1000 ML: 9 INJECTION, SOLUTION INTRAVENOUS at 23:40

## 2023-08-14 NOTE — ASSESSMENT & PLAN NOTE
Ensure adequate bowel regimen especially with iron supplement at the moment. Referral to general surgery for colonoscopy.

## 2023-08-14 NOTE — ASSESSMENT & PLAN NOTE
referral to PT placed, increase gabapentin to 300 mg 3 times daily. Vitamin B12 in 2020 was in excess, consider repeating if needing blood work.

## 2023-08-15 ENCOUNTER — APPOINTMENT (OUTPATIENT)
Dept: CT IMAGING | Age: 53
End: 2023-08-15
Payer: COMMERCIAL

## 2023-08-15 VITALS
TEMPERATURE: 98.2 F | HEART RATE: 84 BPM | RESPIRATION RATE: 18 BRPM | SYSTOLIC BLOOD PRESSURE: 150 MMHG | DIASTOLIC BLOOD PRESSURE: 76 MMHG | OXYGEN SATURATION: 99 %

## 2023-08-15 LAB
ANION GAP SERPL CALCULATED.3IONS-SCNC: 13 MMOL/L (ref 7–16)
BASOPHILS # BLD: 0.04 K/UL (ref 0–0.2)
BASOPHILS NFR BLD: 0 % (ref 0–2)
BUN SERPL-MCNC: 14 MG/DL (ref 6–20)
CALCIUM SERPL-MCNC: 9.1 MG/DL (ref 8.6–10.2)
CHLORIDE SERPL-SCNC: 106 MMOL/L (ref 98–107)
CO2 SERPL-SCNC: 23 MMOL/L (ref 22–29)
CREAT SERPL-MCNC: 1.2 MG/DL (ref 0.5–1)
D DIMER: <200 NG/ML DDU (ref 0–232)
EKG ATRIAL RATE: 70 BPM
EKG P AXIS: 53 DEGREES
EKG P-R INTERVAL: 172 MS
EKG Q-T INTERVAL: 396 MS
EKG QRS DURATION: 76 MS
EKG QTC CALCULATION (BAZETT): 427 MS
EKG R AXIS: 6 DEGREES
EKG T AXIS: 36 DEGREES
EKG VENTRICULAR RATE: 70 BPM
EOSINOPHIL # BLD: 0.16 K/UL (ref 0.05–0.5)
EOSINOPHILS RELATIVE PERCENT: 2 % (ref 0–6)
ERYTHROCYTE [DISTWIDTH] IN BLOOD BY AUTOMATED COUNT: 13.3 % (ref 11.5–15)
GFR SERPL CREATININE-BSD FRML MDRD: 54 ML/MIN/1.73M2
GLUCOSE SERPL-MCNC: 89 MG/DL (ref 74–99)
HCT VFR BLD AUTO: 35.8 % (ref 34–48)
HGB BLD-MCNC: 12.1 G/DL (ref 11.5–15.5)
IMM GRANULOCYTES # BLD AUTO: <0.03 K/UL (ref 0–0.58)
IMM GRANULOCYTES NFR BLD: 0 % (ref 0–5)
LYMPHOCYTES NFR BLD: 2.13 K/UL (ref 1.5–4)
LYMPHOCYTES RELATIVE PERCENT: 22 % (ref 20–42)
MCH RBC QN AUTO: 30.8 PG (ref 26–35)
MCHC RBC AUTO-ENTMCNC: 33.8 G/DL (ref 32–34.5)
MCV RBC AUTO: 91.1 FL (ref 80–99.9)
MONOCYTES NFR BLD: 0.55 K/UL (ref 0.1–0.95)
MONOCYTES NFR BLD: 6 % (ref 2–12)
NEUTROPHILS NFR BLD: 70 % (ref 43–80)
NEUTS SEG NFR BLD: 6.86 K/UL (ref 1.8–7.3)
PLATELET # BLD AUTO: 183 K/UL (ref 130–450)
PMV BLD AUTO: 11.7 FL (ref 7–12)
POTASSIUM SERPL-SCNC: 3.4 MMOL/L (ref 3.5–5)
RBC # BLD AUTO: 3.93 M/UL (ref 3.5–5.5)
SODIUM SERPL-SCNC: 142 MMOL/L (ref 132–146)
TROPONIN I SERPL HS-MCNC: 7 NG/L (ref 0–9)
TROPONIN I SERPL HS-MCNC: 8 NG/L (ref 0–9)
WBC OTHER # BLD: 9.8 K/UL (ref 4.5–11.5)

## 2023-08-15 PROCEDURE — 6370000000 HC RX 637 (ALT 250 FOR IP): Performed by: STUDENT IN AN ORGANIZED HEALTH CARE EDUCATION/TRAINING PROGRAM

## 2023-08-15 PROCEDURE — 93010 ELECTROCARDIOGRAM REPORT: CPT | Performed by: INTERNAL MEDICINE

## 2023-08-15 PROCEDURE — 84484 ASSAY OF TROPONIN QUANT: CPT

## 2023-08-15 PROCEDURE — 70450 CT HEAD/BRAIN W/O DYE: CPT

## 2023-08-15 RX ORDER — ACETAMINOPHEN 325 MG/1
650 TABLET ORAL ONCE
Status: COMPLETED | OUTPATIENT
Start: 2023-08-15 | End: 2023-08-15

## 2023-08-15 RX ADMIN — ACETAMINOPHEN 650 MG: 325 TABLET ORAL at 01:32

## 2023-08-15 ASSESSMENT — PAIN SCALES - GENERAL
PAINLEVEL_OUTOF10: 3
PAINLEVEL_OUTOF10: 7

## 2023-08-15 ASSESSMENT — PAIN - FUNCTIONAL ASSESSMENT: PAIN_FUNCTIONAL_ASSESSMENT: 0-10

## 2023-08-15 NOTE — ED PROVIDER NOTES
SL dose of nitro en route. Patient A&Ox4)   and was initially evaluated by the Medical Resident. See Original ED Note for H&P and ED course above. I have reviewed and discussed the case, including pertinent history (medical, surgical, family and social) and exam findings with the Medical Resident assigned to [facility]. I have personally performed and/or participated in the history, exam, medical decision making, EKG interpretation and procedures and agree with all pertinent clinical information. 51-year-old female present emergency department complaint of chest pain. Her symptoms appear to be noncardiac in nature. EMS did also elicit concern of unequal pupils however on examination pupils are equal 3 mm bilaterally and equally responsive to light. She has no weakness sensation loss NIH of 0. Her CT scan head is unremarkable for any acute process. EKG is stable no signs of ischemia troponin with no significant elevation. D-dimer is less than 200. Due to patient's reassuring cardiac work-up as well as remaining laboratory work-up is reassuring she is safe to be discharged home and follow-up with her family doctor. I have reviewed my findings and recommendations with the assigned Medical Resident, [facility] and members of family present at the time of disposition. My findings/plan: The primary encounter diagnosis was Chest pain, unspecified type. A diagnosis of Nonintractable headache, unspecified chronicity pattern, unspecified headache type was also pertinent to this visit.   Discharge Medication List as of 8/15/2023  2:16 AM        MD Chris Eduardo MD  08/17/23 8841

## 2023-08-15 NOTE — ED NOTES
2 nurses unable to get IV access, other nursing staff to try and do US IV.      Khushboo Berry, RN  08/14/23 4269

## 2023-08-21 ENCOUNTER — HOSPITAL ENCOUNTER (OUTPATIENT)
Dept: PHYSICAL THERAPY | Age: 53
Setting detail: THERAPIES SERIES
Discharge: HOME | End: 2023-08-21

## 2023-08-21 NOTE — PROGRESS NOTES
[address] [place]                Phone: [phone]  Fax: [phone]    Physical Therapy  Cancellation/No-show Note  Patient Name:  Jose C Reaves  :  1970   Date:  2023    For today's appointment patient:  []  Cancelled  []  Rescheduled appointment  [x]  No-show     Reason given by patient:  []  Patient ill  []  Conflicting appointment  []  No transportation    []  Conflict with work  []  No reason given  []  Other:     Comments:      Electronically signed by:  Mahnaz Canas PT

## 2023-08-29 NOTE — PROGRESS NOTES
Nadir Lu  8/30/2023  [address]              History and Physical      Chief Complaint   Patient presents with    Incontinence     Patient here today for incontinence of feces with fecal urgency. HISTORY OF PRESENT ILLNESS: Nadir Lu is a  48 y.o.  female,  who has fecal urgency and incontinence for the last 1 year. Hb 12.1   . Stool mainly yellow but sometimes dark. States she had a colonoscopy in the past but >5 years ago and thinks it was normal. Previous abdominal surgeries are RNYGB then reversal in 2010 by Dr Destiney Lane , 2022  - completion gallbladder and CBD exploration , 2019 EGD - normal  , 2019 EGD- Gastric AVMs . She has chronic lower abdominal pain at her pannus and has severe abdominal cramping followed by diarrhea that is limiting her lifestyle as she can not leave the house. 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 CT abd pelvis 5/2022 reviewed- post surgical findings- cholecystectomy and gastric staple lines with SB in proximity .  In addition to above symptoms patient with nausea       Past Medical History:   Diagnosis Date    Anorexia 06/01/2015    Arthritis of knee     Back pain, chronic     Bilateral renal artery stenosis ([address]) 03/13/2022    CAD (coronary artery disease)     Chronic pain 06/01/2015    Current severe episode of major depressive disorder without psychotic features without prior episode ([address]) 03/16/2021    H/O gastric bypass 06/01/2015    Hypertension 01/01/2012    Hypertension 80/14/0850    Metabolic acidosis 88/03/7802    Ovarian cyst     Seizures ([address])      Past Surgical History:   Procedure Laterality Date    CHOLECYSTECTOMY N/A 10/19/2022    OPEN  COMPLETION CHOLECYSTECTOMY WITH BILE DUCT EXPLORATION performed by Zuhair Owen MD at [address], LAPAROSCOPIC  09/19/1995    [facility]

## 2023-08-30 ENCOUNTER — OFFICE VISIT (OUTPATIENT)
Dept: SURGERY | Age: 53
End: 2023-08-30
Payer: COMMERCIAL

## 2023-08-30 VITALS
HEART RATE: 73 BPM | DIASTOLIC BLOOD PRESSURE: 87 MMHG | SYSTOLIC BLOOD PRESSURE: 135 MMHG | BODY MASS INDEX: 33.63 KG/M2 | WEIGHT: 197 LBS | OXYGEN SATURATION: 98 % | HEIGHT: 64 IN

## 2023-08-30 DIAGNOSIS — R15.2 INCONTINENCE OF FECES WITH FECAL URGENCY: Primary | ICD-10-CM

## 2023-08-30 DIAGNOSIS — R15.9 INCONTINENCE OF FECES WITH FECAL URGENCY: Primary | ICD-10-CM

## 2023-08-30 DIAGNOSIS — F41.9 ANXIETY: ICD-10-CM

## 2023-08-30 PROCEDURE — 99214 OFFICE O/P EST MOD 30 MIN: CPT | Performed by: SURGERY

## 2023-08-30 PROCEDURE — 3017F COLORECTAL CA SCREEN DOC REV: CPT | Performed by: SURGERY

## 2023-08-30 PROCEDURE — 3075F SYST BP GE 130 - 139MM HG: CPT | Performed by: SURGERY

## 2023-08-30 PROCEDURE — 99212 OFFICE O/P EST SF 10 MIN: CPT | Performed by: SURGERY

## 2023-08-30 PROCEDURE — 1036F TOBACCO NON-USER: CPT | Performed by: SURGERY

## 2023-08-30 PROCEDURE — G8427 DOCREV CUR MEDS BY ELIG CLIN: HCPCS | Performed by: SURGERY

## 2023-08-30 PROCEDURE — G8417 CALC BMI ABV UP PARAM F/U: HCPCS | Performed by: SURGERY

## 2023-08-30 PROCEDURE — 3079F DIAST BP 80-89 MM HG: CPT | Performed by: SURGERY

## 2023-08-30 RX ORDER — BISACODYL 5 MG/1
5 TABLET, DELAYED RELEASE ORAL DAILY
Qty: 4 TABLET | Refills: 0 | Status: SHIPPED | OUTPATIENT
Start: 2023-08-30 | End: 2023-09-03

## 2023-08-30 RX ORDER — POLYETHYLENE GLYCOL 3350 17 G/17G
238 POWDER, FOR SOLUTION ORAL ONCE
Qty: 255 G | Refills: 0 | Status: SHIPPED | OUTPATIENT
Start: 2023-08-30 | End: 2023-08-30

## 2023-08-30 RX ORDER — ONDANSETRON 4 MG/1
4 TABLET, FILM COATED ORAL DAILY PRN
Qty: 30 TABLET | Refills: 0 | Status: SHIPPED | OUTPATIENT
Start: 2023-08-30

## 2023-08-31 ENCOUNTER — TELEPHONE (OUTPATIENT)
Dept: SURGERY | Age: 53
End: 2023-08-31

## 2023-08-31 ENCOUNTER — TELEPHONE (OUTPATIENT)
Dept: INTERNAL MEDICINE | Age: 53
End: 2023-08-31

## 2023-08-31 NOTE — TELEPHONE ENCOUNTER
LPN schedule EGD/Colonoscopy. Scheduled patient 2023 @ 12:15pm at [facility], patient is to arrive at 11:15am. Informed patient of date and time, patient verbalized understanding. Prior Authorization Form:      DEMOGRAPHICS:                     Patient Name:  Ari Smith  Patient :  1970            Insurance:  Payor: Robert Salas / Plan: [facility] / Product Type: *No Product type* /   Insurance ID Number:    Payer/Plan Subscr  Sex Relation Sub.  Ins. ID Effective Group Num   1. STEFANY - * VICTORIA MORA 1970 Female Self [id number] [address] [facility] [address]         DIAGNOSIS & PROCEDURE:                       Procedure/Operation: EGD/Colonoscopy           CPT Code: 53493, 85544    Diagnosis:  Fecal Urgency, Incontience    ICD10 Code: R15.9, R15.2    Location:  [facility]    Surgeon:  Dr Carlisle Lennox INFORMATION:                          Date: 2023    Time: 12:15pm              Anesthesia:  MAC                                                       Status:  Outpatient            Electronically signed by Ganesh Mcgarry LPN on  at 3:43 PM

## 2023-08-31 NOTE — TELEPHONE ENCOUNTER
Consult on 8.30.23 per Dr Tree Andrea in Surgical Clinic re: financial concerns. Pt is known to LSW ; see recent encounter on 7.5.23 with similar issue. Pt endorses financial concerns are mostly related to her reduce work hours due to stomach issues. Pt's stated income may be within food stamp guidelines and pt encouraged to apply and is familiar with process and will complete by phone. Pt's 26 yo son is helping some financially while preparing to go to school. Pt has appt with [employer] for [employer] for possible utility assistance as well. Endorses having food pantry text list still which LSW completed in July and will try to arrange schedule between son and self in order to attend. *Provided info re: [employer] has September schedule    *Has no utility disconnection at this point; unsure if would meet medical certification for utility to avoid disconnection. and advised to discuss with PCP.      *Encouraged to contact [employer] with up to date community resources      *Mailed donated $25 [employer] card    Pt encouraged to call LSW as needed

## 2023-09-08 ENCOUNTER — OFFICE VISIT (OUTPATIENT)
Dept: FAMILY MEDICINE CLINIC | Age: 53
End: 2023-09-08
Payer: COMMERCIAL

## 2023-09-08 VITALS
DIASTOLIC BLOOD PRESSURE: 68 MMHG | TEMPERATURE: 97.9 F | BODY MASS INDEX: 34.31 KG/M2 | HEART RATE: 55 BPM | HEIGHT: 64 IN | OXYGEN SATURATION: 96 % | RESPIRATION RATE: 18 BRPM | SYSTOLIC BLOOD PRESSURE: 120 MMHG | WEIGHT: 201 LBS

## 2023-09-08 DIAGNOSIS — R15.9 INCONTINENCE OF FECES WITH FECAL URGENCY: ICD-10-CM

## 2023-09-08 DIAGNOSIS — M25.552 PAIN OF LEFT HIP: ICD-10-CM

## 2023-09-08 DIAGNOSIS — F33.9 EPISODE OF RECURRENT MAJOR DEPRESSIVE DISORDER, UNSPECIFIED DEPRESSION EPISODE SEVERITY (HCC): Primary | ICD-10-CM

## 2023-09-08 DIAGNOSIS — R15.2 INCONTINENCE OF FECES WITH FECAL URGENCY: ICD-10-CM

## 2023-09-08 DIAGNOSIS — G89.29 CHRONIC BILATERAL LOW BACK PAIN WITHOUT SCIATICA: ICD-10-CM

## 2023-09-08 DIAGNOSIS — G62.9 NEUROPATHY: ICD-10-CM

## 2023-09-08 DIAGNOSIS — M54.50 CHRONIC BILATERAL LOW BACK PAIN WITHOUT SCIATICA: ICD-10-CM

## 2023-09-08 LAB
ANION GAP SERPL CALCULATED.3IONS-SCNC: 7 MMOL/L (ref 7–16)
BUN BLDV-MCNC: 10 MG/DL (ref 6–20)
CALCIUM SERPL-MCNC: 9.1 MG/DL (ref 8.6–10.2)
CHLORIDE BLD-SCNC: 107 MMOL/L (ref 98–107)
CO2: 23 MMOL/L (ref 22–29)
CREAT SERPL-MCNC: 1 MG/DL (ref 0.5–1)
GFR SERPL CREATININE-BSD FRML MDRD: >60 ML/MIN/1.73M2
GLUCOSE BLD-MCNC: 95 MG/DL (ref 74–99)
POTASSIUM SERPL-SCNC: 4.1 MMOL/L (ref 3.5–5)
SODIUM BLD-SCNC: 137 MMOL/L (ref 132–146)

## 2023-09-08 PROCEDURE — 99213 OFFICE O/P EST LOW 20 MIN: CPT | Performed by: FAMILY MEDICINE

## 2023-09-08 PROCEDURE — 1036F TOBACCO NON-USER: CPT | Performed by: FAMILY MEDICINE

## 2023-09-08 PROCEDURE — G8417 CALC BMI ABV UP PARAM F/U: HCPCS | Performed by: FAMILY MEDICINE

## 2023-09-08 PROCEDURE — 36415 COLL VENOUS BLD VENIPUNCTURE: CPT | Performed by: FAMILY MEDICINE

## 2023-09-08 PROCEDURE — 3074F SYST BP LT 130 MM HG: CPT | Performed by: FAMILY MEDICINE

## 2023-09-08 PROCEDURE — G8427 DOCREV CUR MEDS BY ELIG CLIN: HCPCS | Performed by: FAMILY MEDICINE

## 2023-09-08 PROCEDURE — 3078F DIAST BP <80 MM HG: CPT | Performed by: FAMILY MEDICINE

## 2023-09-08 PROCEDURE — 3017F COLORECTAL CA SCREEN DOC REV: CPT | Performed by: FAMILY MEDICINE

## 2023-09-08 RX ORDER — DULOXETIN HYDROCHLORIDE 30 MG/1
60 CAPSULE, DELAYED RELEASE ORAL DAILY
Qty: 60 CAPSULE | Refills: 3 | Status: SHIPPED | OUTPATIENT
Start: 2023-09-08 | End: 2024-01-06

## 2023-09-08 RX ORDER — LIDOCAINE 4 G/G
1 PATCH TOPICAL DAILY
Qty: 10 PATCH | Refills: 0 | Status: SHIPPED | OUTPATIENT
Start: 2023-09-08

## 2023-09-08 RX ORDER — ACETAMINOPHEN/DIPHENHYDRAMINE 500MG-25MG
1 TABLET ORAL NIGHTLY PRN
Qty: 30 TABLET | Refills: 0 | Status: SHIPPED | OUTPATIENT
Start: 2023-09-08 | End: 2023-10-08

## 2023-09-08 RX ORDER — CELECOXIB 200 MG/1
200 CAPSULE ORAL DAILY
Qty: 30 CAPSULE | Refills: 1 | Status: SHIPPED | OUTPATIENT
Start: 2023-09-08 | End: 2023-11-07

## 2023-09-08 NOTE — PROGRESS NOTES
[address]  FAMILY MEDICINE RESIDENCY PROGRAM  DATE OF VISIT : 2023    Patient : Sanford Brandt   Age : 48 y.o.  : 1970   MRN : [id number]   ______________________________________________________________________    Chief Complaint :   Chief Complaint   Patient presents with    Follow-up     Neuropathy    Abdominal Pain       HPI : Sanford Brandt is 48 y.o. female who presented to the clinic today for follow up of chronic conditions. CABG and cholecystectomy in -should be following with HBP surgery outpatient. Patient has not been back to see HBP surgery for outpatient follow-up. Neuropathy - patient had neuropathy post CABG when graft was taken from great saphenous vein of her left leg. She constantly feels \"numbness and tingling\" but does not resolve from anything. She denies any pain, intermittent or constant. Used to be in left arm, since resolved. Right hip pain resolved, some shooting pain down the back of leg. Had 3 falls since December because of lack of sensation in bottom of feet. Rechecked iron studies, ferritin iron and iron saturation on the lower end of normal.  Iron supplement started however patient noncompliant. She admitted to taking tramadol of unknown dosage, given to her by her relative. She had 10 in the last month. States that she takes it she admits to feeling better mood wise. MDD -was started on Cymbalta on last visit because Zoloft was not helping her. She was stressed frustrated with multiple hospitalizations and being evicted from her rental place. Reduction has since been canceled because she managed to pull and money. States she spoke to case management who helps her with medication management. She has other stressors, her nephew got murdered and his brother as well recently. She feels like she does not have energy in the morning. Sleep is also difficult to come by due to pain.   Patient has been given Cymbalta 30mg, told to

## 2023-09-08 NOTE — PATIENT INSTRUCTIONS
Duloxetine 30mg tablets - please take 2 daily to make it 60mg  Call me back with bottle that is empty  Call physical therapy

## 2023-09-15 NOTE — PROGRESS NOTES
[address] [place] [address] PRE-ADMISSION TESTING   ENDOSCOPY/ COLONSCOPY INSTRUCTIONS  PAT- Phone Number: [phone]    ENDOSCOPY/ COLONSCOPY INSTRUCTIONS:     [x] Bowel Prep instructions reviewed. [x] Colonoscopy- The day prior: No solid foods. Clear liquids only. (Follow instructions given at appointment with Dr. Cristhian Burns)  [x] Nothing by mouth after midnight. Including no gum, candy, mints, or water. [x] You may brush your teeth, gargle, but do NOT swallow water. [x] Do not wear makeup, lotions, powders, deodorant. [] Urine Pregnancy test will be preformed the day of surgery. A specimen sample may be brought from home. [x] Arrange transportation with a responsible adult  to and from the hospital. If you do not have a responsible adult  to transport you, you will need to make arrangements with a medical transportation company. Arrange for someone to be with you for the remainder of the day and for 24 hours after your procedure due to having had anesthesia. -Who will be your  for transportation? Daisy-daughter  -Who will be staying with you for 24 hrs after your procedure? Daisy-daughter    PARKING INSTRUCTIONS:     [x] ARRIVAL DATE & TIME: 9/21 at 10:45 am   [x] Times are subject to change. We will contact you the business day before surgery if that were to occur. [x] Enter into the [employer]. Two people may accompany you. Masks are not required. [x] Parking Lot \"I\" is where you will park. It is located on the corner of [address] [place] [address] and [address] [facility]. The entrance is on [address] [facility]. Only one vehicle - per patient, is permitted in parking lot. Upon entering the parking lot, a voucher ticket will print. MEDICATION INSTRUCTIONS:    [x] Bring a complete list of your medications, please write the last time you took the medicine, give this list to the nurse in Pre-Op.   [x] Take only the following medications the morning of surgery with

## 2023-09-21 ENCOUNTER — ANESTHESIA (OUTPATIENT)
Dept: ENDOSCOPY | Age: 53
End: 2023-09-21
Payer: COMMERCIAL

## 2023-09-21 ENCOUNTER — ANESTHESIA EVENT (OUTPATIENT)
Dept: ENDOSCOPY | Age: 53
End: 2023-09-21
Payer: COMMERCIAL

## 2023-09-21 ENCOUNTER — HOSPITAL ENCOUNTER (OUTPATIENT)
Age: 53
Setting detail: OUTPATIENT SURGERY
Discharge: HOME | End: 2023-09-21
Attending: SURGERY | Admitting: SURGERY
Payer: COMMERCIAL

## 2023-09-21 VITALS
RESPIRATION RATE: 16 BRPM | HEART RATE: 68 BPM | DIASTOLIC BLOOD PRESSURE: 85 MMHG | TEMPERATURE: 97.4 F | OXYGEN SATURATION: 99 % | HEIGHT: 64 IN | SYSTOLIC BLOOD PRESSURE: 154 MMHG | WEIGHT: 198 LBS | BODY MASS INDEX: 33.8 KG/M2

## 2023-09-21 DIAGNOSIS — R15.2 FECAL URGENCY: ICD-10-CM

## 2023-09-21 PROCEDURE — 7100000011 HC PHASE II RECOVERY - ADDTL 15 MIN: Performed by: SURGERY

## 2023-09-21 PROCEDURE — 3700000000 HC ANESTHESIA ATTENDED CARE: Performed by: SURGERY

## 2023-09-21 PROCEDURE — 6360000002 HC RX W HCPCS

## 2023-09-21 PROCEDURE — 2580000003 HC RX 258: Performed by: SURGERY

## 2023-09-21 PROCEDURE — 3609012400 HC EGD TRANSORAL BIOPSY SINGLE/MULTIPLE: Performed by: SURGERY

## 2023-09-21 PROCEDURE — 7100000010 HC PHASE II RECOVERY - FIRST 15 MIN: Performed by: SURGERY

## 2023-09-21 PROCEDURE — 3700000001 HC ADD 15 MINUTES (ANESTHESIA): Performed by: SURGERY

## 2023-09-21 PROCEDURE — 88305 TISSUE EXAM BY PATHOLOGIST: CPT

## 2023-09-21 PROCEDURE — 3609010300 HC COLONOSCOPY W/BIOPSY SINGLE/MULTIPLE: Performed by: SURGERY

## 2023-09-21 PROCEDURE — 2709999900 HC NON-CHARGEABLE SUPPLY: Performed by: SURGERY

## 2023-09-21 RX ORDER — SODIUM CHLORIDE 0.9 % (FLUSH) 0.9 %
10 SYRINGE (ML) INJECTION PRN
Status: DISCONTINUED | OUTPATIENT
Start: 2023-09-21 | End: 2023-09-21 | Stop reason: HOSPADM

## 2023-09-21 RX ORDER — SODIUM CHLORIDE 9 MG/ML
INJECTION, SOLUTION INTRAVENOUS CONTINUOUS
Status: DISCONTINUED | OUTPATIENT
Start: 2023-09-21 | End: 2023-09-21 | Stop reason: HOSPADM

## 2023-09-21 RX ORDER — SODIUM CHLORIDE 9 MG/ML
INJECTION, SOLUTION INTRAVENOUS PRN
Status: DISCONTINUED | OUTPATIENT
Start: 2023-09-21 | End: 2023-09-21 | Stop reason: HOSPADM

## 2023-09-21 RX ORDER — PROPOFOL 10 MG/ML
INJECTION, EMULSION INTRAVENOUS PRN
Status: DISCONTINUED | OUTPATIENT
Start: 2023-09-21 | End: 2023-09-21 | Stop reason: SDUPTHER

## 2023-09-21 RX ORDER — SODIUM CHLORIDE 0.9 % (FLUSH) 0.9 %
10 SYRINGE (ML) INJECTION EVERY 12 HOURS SCHEDULED
Status: DISCONTINUED | OUTPATIENT
Start: 2023-09-21 | End: 2023-09-21 | Stop reason: HOSPADM

## 2023-09-21 RX ADMIN — SODIUM CHLORIDE: 9 INJECTION, SOLUTION INTRAVENOUS at 11:54

## 2023-09-21 RX ADMIN — SODIUM CHLORIDE: 9 INJECTION, SOLUTION INTRAVENOUS at 11:21

## 2023-09-21 RX ADMIN — PROPOFOL 510 MG: 10 INJECTION, EMULSION INTRAVENOUS at 11:56

## 2023-09-21 ASSESSMENT — PAIN - FUNCTIONAL ASSESSMENT: PAIN_FUNCTIONAL_ASSESSMENT: 0-10

## 2023-09-21 ASSESSMENT — PAIN DESCRIPTION - DESCRIPTORS: DESCRIPTORS: CRAMPING;SHARP

## 2023-09-21 ASSESSMENT — LIFESTYLE VARIABLES: SMOKING_STATUS: 0

## 2023-09-21 NOTE — OP NOTE
Operative Note      Patient: Dodie Ba  YOB: 1970  MRN: [id number]    Date of Procedure: 9/21/2023    Pre-Op Diagnosis Codes:     * Fecal urgency [R15.2]    Post-Op Diagnosis: Post-Op Diagnosis Codes:     * Fecal urgency [R15.2]     Elon stomach with visible midbody staple line with wide open GJ no anastomotic ulcers but small ulcer in the prior gastric pouch , no visualized pylorus , proctitis     Procedure(s):  EGD ESOPHAGOGASTRODUODENOSCOPY  COLONOSCOPY DIAGNOSTIC    Surgeon(s):  Winston Villalobos MD    Assistant:   * No surgical staff found *    Anesthesia: Monitor Anesthesia Care    Estimated Blood Loss (mL): Minimal    Complications: None    Specimens:   ID Type Source Tests Collected by Time Destination   A : Gastric Pouch Biopsy Respiratory Esophagus SURGICAL PATHOLOGY Winston Villalobos MD 9/21/2023 1205    B : Rectal Biopsy r/o inflammatory bowel disease Tissue Tissue SURGICAL PATHOLOGY Winston Villalobos MD 9/21/2023 1228        Implants:  * No implants in log *      Drains: * No LDAs found *    Findings: As above         Detailed Description of Procedure: The patient was placed on the table and sedated. The throat was numbed with Cetacaine spray. Bite block was placed. A lubricated scope was easily passed into the upper esophagus which looked  normal. . The distal esophagus looked  normal. . The scope was passed into the stomach and retroflexed. There was no hiatal hernia. The GE [place]unction was at 40cm. Elon stomach with visible midbody staple line with wide open GJ no anastomotic ulcers but small ulcer in the prior gastric pouch , no visualized pylorus. Biopsy in distal stomach was taken to check for H. pylori. The patient tolerated the procedure well. In the left side down decubitus position, a digital rectal exam was performed, External Hemorrhoids  were found   The scope was lubricated and inserted into the anus.  The scope was then passed under direct

## 2023-09-21 NOTE — ANESTHESIA PRE PROCEDURE
Department of Anesthesiology  Preprocedure Note       Name:  Dodie Ba   Age:  48 y.o.  :  1970                                          MRN:  [id number]         Date:  2023      Surgeon: Derrick Kirk):  Winston Villalobos MD    Procedure: Procedure(s):  EGD ESOPHAGOGASTRODUODENOSCOPY  COLONOSCOPY DIAGNOSTIC    Medications prior to admission:   Prior to Admission medications    Medication Sig Start Date End Date Taking? Authorizing Provider   psyllium (KONSYL) 28.3 % POWD powder Take 3.4 g by mouth daily  Patient not taking: Reported on 9/15/2023 9/11/23 2/16/24  Tyler Santillan MD   celecoxib (CELEBREX) 200 MG capsule Take 1 capsule by mouth daily 23  Lisbeth Santillan MD   DULoxetine (CYMBALTA) 30 MG extended release capsule Take 2 capsules by mouth daily 23  Lisbeth Santillan MD   lidocaine 4 % external patch Place 1 patch onto the skin daily  Patient not taking: Reported on 9/15/2023 9/8/23   Ramin Lopez MD   diphenhydrAMINE-APAP, sleep, (TYLENOL PM EXTRA STRENGTH)  MG tablet Take 1 tablet by mouth nightly as needed for Sleep 9/8/23 10/8/23  Lisbeth Santillan MD   loperamide (ANTI-DIARRHEAL) 1 MG/5ML solution Take 5 mLs by mouth 3 times daily as needed for Diarrhea 23  Ramin Lopez MD   psyllium (METAMUCIL SMOOTH TEXTURE) 28 % packet Take 1 packet by mouth 2 times daily Take with full glass of h20 or juice  Patient not taking: Reported on 9/15/2023 8/30/23 9/29/23  Winston Villalobos MD   ondansetron (ZOFRAN) 4 MG tablet Take 1 tablet by mouth daily as needed for Nausea or Vomiting 23   Winston Villalobos MD   gabapentin (NEURONTIN) 300 MG capsule Take 2 capsules by mouth 3 times daily for 270 days. 23  Lisbeth Santillan MD   nystatin (MYCOSTATIN) 592689 UNIT/GM powder Apply topically 3 times daily Apply 3 times daily.  23  Lisbeth Santillan MD   pantoprazole (PROTONIX) 40 MG tablet Take 1

## 2023-09-21 NOTE — H&P
Kaleb Aldana  9/21/2023  [address]              History and Physical             Chief Complaint   Patient presents with    Incontinence       Patient here today for incontinence of feces with fecal urgency. HISTORY OF PRESENT ILLNESS: Kaleb Aldana is a  48 y.o.  female,  who has fecal urgency and incontinence for the last 1 year. Hb 12.1   . Stool mainly yellow but sometimes dark. States she had a colonoscopy in the past but >5 years ago and thinks it was normal. Previous abdominal surgeries are RNYGB then reversal in 2010 by Dr Len Jacobsen , 2022  - completion gallbladder and CBD exploration , 2019 EGD - normal  , 2019 EGD- Gastric AVMs . She has chronic lower abdominal pain at her pannus and has severe abdominal cramping followed by diarrhea that is limiting her lifestyle as she can not leave the house. 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 CT abd pelvis 5/2022 reviewed- post surgical findings- cholecystectomy and gastric staple lines with SB in proximity .  In addition to above symptoms patient with nausea         Past Medical History        Past Medical History:   Diagnosis Date    Anorexia 06/01/2015    Arthritis of knee      Back pain, chronic      Bilateral renal artery stenosis ([address]) 03/13/2022    CAD (coronary artery disease)      Chronic pain 06/01/2015    Current severe episode of major depressive disorder without psychotic features without prior episode ([address]) 03/16/2021    H/O gastric bypass 06/01/2015    Hypertension 01/01/2012    Hypertension 56/06/3491    Metabolic acidosis 76/96/2662    Ovarian cyst      Seizures ([address])           Past Surgical History         Past Surgical History:   Procedure Laterality Date    CHOLECYSTECTOMY N/A 10/19/2022     OPEN  COMPLETION CHOLECYSTECTOMY WITH BILE DUCT EXPLORATION performed by Todd Vinson possible biopsy with deep sedation with the patient. I have detailed the risks of deep sedation (hypotension, hypoxia) as well as complications of bleeding and perforation.   The patient understands the above and agrees to proceed     Order fiber and imodium for diarrhea   Miralax and dulcolax for Colonoscopy prep   Zofran for nausea   Consulted SW at last apt      Physician Signature: Alta Fothergill, MD

## 2023-09-21 NOTE — ANESTHESIA POSTPROCEDURE EVALUATION
Department of Anesthesiology  Postprocedure Note    Patient: Yoni hCo  MRN: [id number]  YOB: 1970  Date of evaluation: 9/21/2023      Procedure Summary     Date: 09/21/23 Room / Location: [address] [place] [address] / [facility]    Anesthesia Start: 1150 Anesthesia Stop: 4163    Procedures:       EGD BIOPSY      COLONOSCOPY WITH BIOPSY Diagnosis:       Fecal urgency      (Fecal urgency [R15.2])    Surgeons: Morris Mckeon MD Responsible Provider: Yancy Malone MD    Anesthesia Type: MAC ASA Status: 3          Anesthesia Type: No value filed.     Camille Phase I: Camille Score: 10    Camille Phase II: Camille Score: 10      Anesthesia Post Evaluation    Patient location during evaluation: PACU  Patient participation: complete - patient participated  Level of consciousness: awake and alert  Airway patency: patent  Nausea & Vomiting: no nausea and no vomiting  Complications: no  Cardiovascular status: blood pressure returned to baseline and hemodynamically stable  Respiratory status: acceptable and spontaneous ventilation  Hydration status: euvolemic  Multimodal analgesia pain management approach  Pain management: adequate

## 2023-09-21 NOTE — DISCHARGE INSTRUCTIONS
Eat small frequent meals, limit fluid with meals  1/2 cup remaining fluid eat 60in before and after meals , high protein diet , limit high sugar drinks

## 2023-09-26 LAB — SURGICAL PATHOLOGY REPORT: NORMAL

## 2023-09-29 PROCEDURE — 96372 THER/PROPH/DIAG INJ SC/IM: CPT

## 2023-09-29 PROCEDURE — 99284 EMERGENCY DEPT VISIT MOD MDM: CPT

## 2023-09-30 ENCOUNTER — HOSPITAL ENCOUNTER (EMERGENCY)
Age: 53
Discharge: HOME | End: 2023-09-30
Payer: COMMERCIAL

## 2023-09-30 ENCOUNTER — APPOINTMENT (OUTPATIENT)
Dept: CT IMAGING | Age: 53
End: 2023-09-30
Payer: COMMERCIAL

## 2023-09-30 VITALS
OXYGEN SATURATION: 94 % | HEART RATE: 73 BPM | RESPIRATION RATE: 17 BRPM | TEMPERATURE: 98.2 F | SYSTOLIC BLOOD PRESSURE: 144 MMHG | WEIGHT: 198 LBS | DIASTOLIC BLOOD PRESSURE: 69 MMHG | BODY MASS INDEX: 33.8 KG/M2 | HEIGHT: 64 IN

## 2023-09-30 DIAGNOSIS — N30.01 ACUTE CYSTITIS WITH HEMATURIA: Primary | ICD-10-CM

## 2023-09-30 LAB
ALBUMIN SERPL-MCNC: 3.8 G/DL (ref 3.5–5.2)
ALP SERPL-CCNC: 185 U/L (ref 35–104)
ALT SERPL-CCNC: 11 U/L (ref 0–32)
ANION GAP SERPL CALCULATED.3IONS-SCNC: 13 MMOL/L (ref 7–16)
AST SERPL-CCNC: 17 U/L (ref 0–31)
BACTERIA URNS QL MICRO: ABNORMAL
BASOPHILS # BLD: 0.07 K/UL (ref 0–0.2)
BASOPHILS NFR BLD: 1 % (ref 0–2)
BILIRUB SERPL-MCNC: 0.6 MG/DL (ref 0–1.2)
BILIRUB UR QL STRIP: NEGATIVE
BUN SERPL-MCNC: 17 MG/DL (ref 6–20)
CALCIUM SERPL-MCNC: 8.4 MG/DL (ref 8.6–10.2)
CHLORIDE SERPL-SCNC: 113 MMOL/L (ref 98–107)
CLARITY UR: ABNORMAL
CO2 SERPL-SCNC: 17 MMOL/L (ref 22–29)
COLOR UR: YELLOW
CREAT SERPL-MCNC: 1.1 MG/DL (ref 0.5–1)
EOSINOPHIL # BLD: 0 K/UL (ref 0.05–0.5)
EOSINOPHILS RELATIVE PERCENT: 0 % (ref 0–6)
EPI CELLS #/AREA URNS HPF: ABNORMAL /HPF
ERYTHROCYTE [DISTWIDTH] IN BLOOD BY AUTOMATED COUNT: 14 % (ref 11.5–15)
GFR SERPL CREATININE-BSD FRML MDRD: >60 ML/MIN/1.73M2
GLUCOSE SERPL-MCNC: 117 MG/DL (ref 74–99)
GLUCOSE UR STRIP-MCNC: NEGATIVE MG/DL
HCT VFR BLD AUTO: 31.6 % (ref 34–48)
HGB BLD-MCNC: 10.8 G/DL (ref 11.5–15.5)
HGB UR QL STRIP.AUTO: ABNORMAL
KETONES UR STRIP-MCNC: NEGATIVE MG/DL
LACTATE BLDV-SCNC: 1.1 MMOL/L (ref 0.5–2.2)
LEUKOCYTE ESTERASE UR QL STRIP: ABNORMAL
LIPASE SERPL-CCNC: 23 U/L (ref 13–60)
LYMPHOCYTES NFR BLD: 0.89 K/UL (ref 1.5–4)
LYMPHOCYTES RELATIVE PERCENT: 11 % (ref 20–42)
MCH RBC QN AUTO: 31.5 PG (ref 26–35)
MCHC RBC AUTO-ENTMCNC: 34.2 G/DL (ref 32–34.5)
MCV RBC AUTO: 92.1 FL (ref 80–99.9)
MONOCYTES NFR BLD: 0.2 K/UL (ref 0.1–0.95)
MONOCYTES NFR BLD: 3 % (ref 2–12)
NEUTROPHILS NFR BLD: 85 % (ref 43–80)
NEUTS SEG NFR BLD: 6.74 K/UL (ref 1.8–7.3)
NITRITE UR QL STRIP: NEGATIVE
PH UR STRIP: 6.5 [PH] (ref 5–9)
PLATELET, FLUORESCENCE: 123 K/UL (ref 130–450)
PMV BLD AUTO: 11.2 FL (ref 7–12)
POTASSIUM SERPL-SCNC: 3.6 MMOL/L (ref 3.5–5)
PROT SERPL-MCNC: 6.3 G/DL (ref 6.4–8.3)
PROT UR STRIP-MCNC: 30 MG/DL
RBC # BLD AUTO: 3.43 M/UL (ref 3.5–5.5)
RBC # BLD: NORMAL 10*6/UL
RBC #/AREA URNS HPF: ABNORMAL /HPF
SODIUM SERPL-SCNC: 143 MMOL/L (ref 132–146)
SP GR UR STRIP: 1.01 (ref 1–1.03)
UROBILINOGEN UR STRIP-ACNC: 0.2 EU/DL (ref 0–1)
WBC #/AREA URNS HPF: ABNORMAL /HPF
WBC OTHER # BLD: 7.9 K/UL (ref 4.5–11.5)

## 2023-09-30 PROCEDURE — 81001 URINALYSIS AUTO W/SCOPE: CPT

## 2023-09-30 PROCEDURE — 85025 COMPLETE CBC W/AUTO DIFF WBC: CPT

## 2023-09-30 PROCEDURE — 83690 ASSAY OF LIPASE: CPT

## 2023-09-30 PROCEDURE — 83605 ASSAY OF LACTIC ACID: CPT

## 2023-09-30 PROCEDURE — 80053 COMPREHEN METABOLIC PANEL: CPT

## 2023-09-30 PROCEDURE — 2580000003 HC RX 258: Performed by: NURSE PRACTITIONER

## 2023-09-30 PROCEDURE — 6370000000 HC RX 637 (ALT 250 FOR IP): Performed by: NURSE PRACTITIONER

## 2023-09-30 PROCEDURE — 87077 CULTURE AEROBIC IDENTIFY: CPT

## 2023-09-30 PROCEDURE — 74176 CT ABD & PELVIS W/O CONTRAST: CPT

## 2023-09-30 PROCEDURE — 87086 URINE CULTURE/COLONY COUNT: CPT

## 2023-09-30 PROCEDURE — 6360000002 HC RX W HCPCS: Performed by: NURSE PRACTITIONER

## 2023-09-30 RX ORDER — KETOROLAC TROMETHAMINE 30 MG/ML
30 INJECTION, SOLUTION INTRAMUSCULAR; INTRAVENOUS ONCE
Status: COMPLETED | OUTPATIENT
Start: 2023-09-30 | End: 2023-09-30

## 2023-09-30 RX ORDER — 0.9 % SODIUM CHLORIDE 0.9 %
1000 INTRAVENOUS SOLUTION INTRAVENOUS ONCE
Status: DISCONTINUED | OUTPATIENT
Start: 2023-09-30 | End: 2023-09-30 | Stop reason: HOSPADM

## 2023-09-30 RX ORDER — PHENAZOPYRIDINE HYDROCHLORIDE 100 MG/1
100 TABLET, FILM COATED ORAL 3 TIMES DAILY PRN
Qty: 15 TABLET | Refills: 0 | Status: SHIPPED | OUTPATIENT
Start: 2023-09-30 | End: 2023-10-03

## 2023-09-30 RX ORDER — HYDROCODONE BITARTRATE AND ACETAMINOPHEN 5; 325 MG/1; MG/1
1 TABLET ORAL ONCE
Status: COMPLETED | OUTPATIENT
Start: 2023-09-30 | End: 2023-09-30

## 2023-09-30 RX ORDER — KETOROLAC TROMETHAMINE 30 MG/ML
30 INJECTION, SOLUTION INTRAMUSCULAR; INTRAVENOUS ONCE
Status: DISCONTINUED | OUTPATIENT
Start: 2023-09-30 | End: 2023-09-30

## 2023-09-30 RX ORDER — ONDANSETRON 4 MG/1
4 TABLET, ORALLY DISINTEGRATING ORAL ONCE
Status: COMPLETED | OUTPATIENT
Start: 2023-09-30 | End: 2023-09-30

## 2023-09-30 RX ORDER — ONDANSETRON 2 MG/ML
4 INJECTION INTRAMUSCULAR; INTRAVENOUS ONCE
Status: DISCONTINUED | OUTPATIENT
Start: 2023-09-30 | End: 2023-09-30

## 2023-09-30 RX ORDER — CEFDINIR 300 MG/1
300 CAPSULE ORAL 2 TIMES DAILY
Qty: 20 CAPSULE | Refills: 0 | Status: SHIPPED | OUTPATIENT
Start: 2023-09-30 | End: 2023-10-10

## 2023-09-30 RX ADMIN — HYDROCODONE BITARTRATE AND ACETAMINOPHEN 1 TABLET: 5; 325 TABLET ORAL at 02:14

## 2023-09-30 RX ADMIN — WATER 1000 MG: 1 INJECTION INTRAMUSCULAR; INTRAVENOUS; SUBCUTANEOUS at 04:19

## 2023-09-30 RX ADMIN — ONDANSETRON 4 MG: 4 TABLET, ORALLY DISINTEGRATING ORAL at 02:18

## 2023-09-30 RX ADMIN — KETOROLAC TROMETHAMINE 30 MG: 30 INJECTION, SOLUTION INTRAMUSCULAR; INTRAVENOUS at 02:17

## 2023-09-30 ASSESSMENT — PAIN DESCRIPTION - ORIENTATION
ORIENTATION: LOWER;RIGHT;LEFT
ORIENTATION: LOWER

## 2023-09-30 ASSESSMENT — PAIN DESCRIPTION - LOCATION
LOCATION: ABDOMEN
LOCATION: ABDOMEN

## 2023-09-30 ASSESSMENT — PAIN - FUNCTIONAL ASSESSMENT: PAIN_FUNCTIONAL_ASSESSMENT: 0-10

## 2023-09-30 ASSESSMENT — PAIN DESCRIPTION - DESCRIPTORS: DESCRIPTORS: PRESSURE

## 2023-09-30 ASSESSMENT — PAIN SCALES - GENERAL: PAINLEVEL_OUTOF10: 10

## 2023-09-30 ASSESSMENT — PAIN DESCRIPTION - FREQUENCY: FREQUENCY: CONTINUOUS

## 2023-09-30 NOTE — ED NOTES
This RN attempted IV insertion x2. Another RN to attempt ultrasound IV.       Lashon Ortiz, RN  09/30/23 [id number]

## 2023-10-01 LAB
MICROORGANISM SPEC CULT: ABNORMAL
SPECIMEN DESCRIPTION: ABNORMAL

## 2023-10-02 LAB
MICROORGANISM SPEC CULT: ABNORMAL
SPECIMEN DESCRIPTION: ABNORMAL

## 2023-10-04 ENCOUNTER — TELEPHONE (OUTPATIENT)
Dept: SURGERY | Age: 53
End: 2023-10-04

## 2023-10-04 DIAGNOSIS — R15.9 INCONTINENCE OF FECES WITH FECAL URGENCY: Primary | ICD-10-CM

## 2023-10-04 DIAGNOSIS — K91.2 MALNUTRITION FOLLOWING GASTROINTESTINAL SURGERY: Primary | ICD-10-CM

## 2023-10-04 DIAGNOSIS — R15.2 INCONTINENCE OF FECES WITH FECAL URGENCY: Primary | ICD-10-CM

## 2023-10-04 DIAGNOSIS — Z98.84 H/O GASTRIC BYPASS: Chronic | ICD-10-CM

## 2023-10-04 NOTE — TELEPHONE ENCOUNTER
LPN called and left message for patient to call office back for results of biopsy. Per Dr Heydi Manzano \"Please let the patient know her biopsies came back normal\"    LPN placed referral to Bariatric surgery at [facility], per Dr Heydi Manzano.     Electronically signed by Varun Kebede LPN on 19/1/50 at 51:64 AM EDT

## 2023-10-04 NOTE — TELEPHONE ENCOUNTER
----- Message from Pushpa Murcia MD sent at 10/3/2023 11:33 AM EDT -----  Please let the patient know her biopsies came back normal I recommend her follow up with Dr. Annie Roth and Dr. Clau Gee still

## 2023-10-09 ENCOUNTER — OFFICE VISIT (OUTPATIENT)
Dept: FAMILY MEDICINE CLINIC | Age: 53
End: 2023-10-09
Payer: COMMERCIAL

## 2023-10-09 VITALS
SYSTOLIC BLOOD PRESSURE: 146 MMHG | RESPIRATION RATE: 18 BRPM | BODY MASS INDEX: 34.66 KG/M2 | DIASTOLIC BLOOD PRESSURE: 69 MMHG | TEMPERATURE: 98.2 F | HEIGHT: 64 IN | WEIGHT: 203 LBS | HEART RATE: 69 BPM | OXYGEN SATURATION: 96 %

## 2023-10-09 DIAGNOSIS — G89.29 CHRONIC PAIN OF LEFT KNEE: ICD-10-CM

## 2023-10-09 DIAGNOSIS — G89.29 CHRONIC BILATERAL LOW BACK PAIN WITHOUT SCIATICA: ICD-10-CM

## 2023-10-09 DIAGNOSIS — G89.29 OTHER CHRONIC PAIN: Primary | Chronic | ICD-10-CM

## 2023-10-09 DIAGNOSIS — Z11.59 ENCOUNTER FOR HEPATITIS C SCREENING TEST FOR LOW RISK PATIENT: ICD-10-CM

## 2023-10-09 DIAGNOSIS — Z11.4 SCREENING FOR HIV (HUMAN IMMUNODEFICIENCY VIRUS): ICD-10-CM

## 2023-10-09 DIAGNOSIS — M54.50 CHRONIC BILATERAL LOW BACK PAIN WITHOUT SCIATICA: ICD-10-CM

## 2023-10-09 DIAGNOSIS — F33.9 EPISODE OF RECURRENT MAJOR DEPRESSIVE DISORDER, UNSPECIFIED DEPRESSION EPISODE SEVERITY (HCC): ICD-10-CM

## 2023-10-09 DIAGNOSIS — M25.562 CHRONIC PAIN OF LEFT KNEE: ICD-10-CM

## 2023-10-09 DIAGNOSIS — N30.01 ACUTE CYSTITIS WITH HEMATURIA: ICD-10-CM

## 2023-10-09 PROCEDURE — 3078F DIAST BP <80 MM HG: CPT

## 2023-10-09 PROCEDURE — 1036F TOBACCO NON-USER: CPT

## 2023-10-09 PROCEDURE — 99213 OFFICE O/P EST LOW 20 MIN: CPT

## 2023-10-09 PROCEDURE — 3017F COLORECTAL CA SCREEN DOC REV: CPT

## 2023-10-09 PROCEDURE — G8417 CALC BMI ABV UP PARAM F/U: HCPCS

## 2023-10-09 PROCEDURE — G8427 DOCREV CUR MEDS BY ELIG CLIN: HCPCS

## 2023-10-09 PROCEDURE — G8484 FLU IMMUNIZE NO ADMIN: HCPCS

## 2023-10-09 PROCEDURE — 3074F SYST BP LT 130 MM HG: CPT

## 2023-10-09 RX ORDER — NORTRIPTYLINE HYDROCHLORIDE 10 MG/1
10 CAPSULE ORAL NIGHTLY
Qty: 30 CAPSULE | Refills: 2 | Status: SHIPPED | OUTPATIENT
Start: 2023-10-09 | End: 2024-01-07

## 2023-10-09 RX ORDER — PREGABALIN 75 MG/1
75 CAPSULE ORAL 3 TIMES DAILY
Qty: 60 CAPSULE | Refills: 3 | Status: CANCELLED | OUTPATIENT
Start: 2023-10-09 | End: 2024-01-07

## 2023-10-09 RX ORDER — DULOXETIN HYDROCHLORIDE 60 MG/1
120 CAPSULE, DELAYED RELEASE ORAL DAILY
Qty: 60 CAPSULE | Refills: 3 | Status: CANCELLED | OUTPATIENT
Start: 2023-10-09 | End: 2024-02-06

## 2023-10-10 ENCOUNTER — TELEPHONE (OUTPATIENT)
Dept: FAMILY MEDICINE CLINIC | Age: 53
End: 2023-10-10

## 2023-10-10 NOTE — TELEPHONE ENCOUNTER
Kelsey called in and she is asking about Lyrica that was to be sent into the pharmacy. I did not see anything prescribed. She states that it was to replace the gabapentin.      Please advise    Thank you

## 2023-10-11 PROBLEM — M25.552 PAIN OF LEFT HIP: Status: RESOLVED | Noted: 2023-08-13 | Resolved: 2023-10-11

## 2023-10-11 PROBLEM — Z59.811: Status: RESOLVED | Noted: 2022-11-20 | Resolved: 2023-10-11

## 2023-10-13 ENCOUNTER — HOSPITAL ENCOUNTER (EMERGENCY)
Age: 53
Discharge: HOME | End: 2023-10-14
Payer: COMMERCIAL

## 2023-10-13 ENCOUNTER — APPOINTMENT (OUTPATIENT)
Dept: GENERAL RADIOLOGY | Age: 53
End: 2023-10-13
Payer: COMMERCIAL

## 2023-10-13 VITALS
OXYGEN SATURATION: 100 % | WEIGHT: 198 LBS | SYSTOLIC BLOOD PRESSURE: 163 MMHG | RESPIRATION RATE: 20 BRPM | HEIGHT: 64 IN | TEMPERATURE: 98.5 F | DIASTOLIC BLOOD PRESSURE: 90 MMHG | BODY MASS INDEX: 33.8 KG/M2 | HEART RATE: 78 BPM
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
## 2023-10-13 DIAGNOSIS — S49.91XA INJURY OF RIGHT UPPER EXTREMITY, INITIAL ENCOUNTER: Primary | ICD-10-CM

## 2023-10-13 PROCEDURE — 73030 X-RAY EXAM OF SHOULDER: CPT

## 2023-10-13 PROCEDURE — 73080 X-RAY EXAM OF ELBOW: CPT

## 2023-10-13 PROCEDURE — 73110 X-RAY EXAM OF WRIST: CPT

## 2023-10-13 PROCEDURE — 73090 X-RAY EXAM OF FOREARM: CPT

## 2023-10-13 PROCEDURE — 96372 THER/PROPH/DIAG INJ SC/IM: CPT

## 2023-10-13 PROCEDURE — 71101 X-RAY EXAM UNILAT RIBS/CHEST: CPT

## 2023-10-13 PROCEDURE — 99284 EMERGENCY DEPT VISIT MOD MDM: CPT

## 2023-10-13 PROCEDURE — 73060 X-RAY EXAM OF HUMERUS: CPT

## 2023-10-13 RX ORDER — KETOROLAC TROMETHAMINE 30 MG/ML
30 INJECTION, SOLUTION INTRAMUSCULAR; INTRAVENOUS ONCE
Status: COMPLETED | OUTPATIENT
Start: 2023-10-13 | End: 2023-10-14

## 2023-10-13 ASSESSMENT — PAIN SCALES - GENERAL: PAINLEVEL_OUTOF10: 5

## 2023-10-13 ASSESSMENT — PAIN - FUNCTIONAL ASSESSMENT: PAIN_FUNCTIONAL_ASSESSMENT: 0-10

## 2023-10-14 PROCEDURE — 6360000002 HC RX W HCPCS

## 2023-10-14 RX ORDER — IBUPROFEN 600 MG/1
600 TABLET ORAL 3 TIMES DAILY PRN
Qty: 30 TABLET | Refills: 0 | Status: SHIPPED | OUTPATIENT
Start: 2023-10-14

## 2023-10-14 RX ADMIN — KETOROLAC TROMETHAMINE 30 MG: 30 INJECTION, SOLUTION INTRAMUSCULAR at 00:46

## 2023-10-14 ASSESSMENT — PAIN DESCRIPTION - LOCATION: LOCATION: ARM

## 2023-10-14 ASSESSMENT — PAIN SCALES - GENERAL: PAINLEVEL_OUTOF10: 10

## 2023-10-14 ASSESSMENT — PAIN DESCRIPTION - ORIENTATION: ORIENTATION: RIGHT

## 2023-11-01 DIAGNOSIS — G62.9 NEUROPATHY: ICD-10-CM

## 2023-11-01 DIAGNOSIS — I15.0 RENOVASCULAR HYPERTENSION: Chronic | ICD-10-CM

## 2023-11-01 DIAGNOSIS — L30.4 ERYTHEMA INTERTRIGO: ICD-10-CM

## 2023-11-02 RX ORDER — NYSTATIN 100000 [USP'U]/G
POWDER TOPICAL
Qty: 60 G | Refills: 2 | Status: SHIPPED | OUTPATIENT
Start: 2023-11-02

## 2023-11-02 RX ORDER — AMLODIPINE BESYLATE 10 MG/1
10 TABLET ORAL DAILY
Qty: 30 TABLET | Refills: 3 | Status: SHIPPED | OUTPATIENT
Start: 2023-11-02

## 2023-11-02 RX ORDER — CELECOXIB 200 MG/1
200 CAPSULE ORAL DAILY
Qty: 30 CAPSULE | Refills: 1 | OUTPATIENT
Start: 2023-11-02 | End: 2024-01-01

## 2023-11-02 RX ORDER — PANTOPRAZOLE SODIUM 40 MG/1
40 TABLET, DELAYED RELEASE ORAL DAILY
Qty: 30 TABLET | Refills: 2 | Status: SHIPPED | OUTPATIENT
Start: 2023-11-02 | End: 2024-01-31

## 2023-11-02 NOTE — TELEPHONE ENCOUNTER
Last Appointment:  10/9/2023  Future Appointments  12/15/2023 3:40 PM    Lisbeth Amaya Chi, MD    [facility]

## 2023-11-02 NOTE — TELEPHONE ENCOUNTER
Last Appointment:  7/3/2023  Future Appointments  12/15/2023 3:40 PM    MD Marsha Kuhn [facility]        [facility]

## 2023-11-03 DIAGNOSIS — G62.9 NEUROPATHY: ICD-10-CM

## 2023-11-03 RX ORDER — CELECOXIB 200 MG/1
200 CAPSULE ORAL DAILY
Qty: 30 CAPSULE | Refills: 1 | OUTPATIENT
Start: 2023-11-03 | End: 2024-01-02

## 2023-11-03 NOTE — TELEPHONE ENCOUNTER
Last Appointment:  10/9/2023  Future Appointments  12/15/2023 3:40 PM    Lisbeth Amaya Chi, MD    [facility]

## 2023-12-16 NOTE — PLAN OF CARE
Spoke with patient and she stated that she is frustrated that two errors occurred with her pain medication dosing. Per patient, she stated that her afternoon nurse yesterday, Patricia Spangler, had made the same mistake at [phone] as her night nurse last night at [phone]. However, this was not relayed to the team nor the nighttime nurse. She would like to speak to patient advocate. Nurse manager was made aware and will be speaking with the patient.          Electronically signed by Gavi Costello MD on 10/22/2022 at 11:11 AM English

## 2023-12-17 PROBLEM — M79.89 LEG SWELLING: Status: RESOLVED | Noted: 2022-08-07 | Resolved: 2023-12-17

## 2023-12-17 PROBLEM — N30.00 ACUTE CYSTITIS WITHOUT HEMATURIA: Status: RESOLVED | Noted: 2019-05-14 | Resolved: 2023-12-17

## 2023-12-17 PROBLEM — R07.9 CHEST PAIN: Status: RESOLVED | Noted: 2022-03-11 | Resolved: 2023-12-17

## 2023-12-17 PROBLEM — R10.13 EPIGASTRIC PAIN: Status: RESOLVED | Noted: 2019-05-09 | Resolved: 2023-12-17

## 2023-12-17 PROBLEM — I21.19 ACUTE MI, INFERIOR WALL (HCC): Status: RESOLVED | Noted: 2022-03-11 | Resolved: 2023-12-17

## 2023-12-17 PROBLEM — O22.30 DVT (DEEP VEIN THROMBOSIS) IN PREGNANCY: Status: RESOLVED | Noted: 2022-08-06 | Resolved: 2023-12-17

## 2024-03-11 ENCOUNTER — OFFICE VISIT (OUTPATIENT)
Dept: FAMILY MEDICINE CLINIC | Age: 54
End: 2024-03-11
Payer: COMMERCIAL

## 2024-03-11 VITALS
SYSTOLIC BLOOD PRESSURE: 148 MMHG | TEMPERATURE: 98.3 F | WEIGHT: 196 LBS | RESPIRATION RATE: 16 BRPM | DIASTOLIC BLOOD PRESSURE: 106 MMHG | OXYGEN SATURATION: 98 % | BODY MASS INDEX: 33.46 KG/M2 | HEIGHT: 64 IN | HEART RATE: 93 BPM

## 2024-03-11 DIAGNOSIS — G62.9 NEUROPATHY: ICD-10-CM

## 2024-03-11 DIAGNOSIS — R15.2 INCONTINENCE OF FECES WITH FECAL URGENCY: ICD-10-CM

## 2024-03-11 DIAGNOSIS — G89.29 OTHER CHRONIC PAIN: Chronic | ICD-10-CM

## 2024-03-11 DIAGNOSIS — I25.10 CAD IN NATIVE ARTERY: ICD-10-CM

## 2024-03-11 DIAGNOSIS — R10.31 ABDOMINAL PAIN, RLQ: Primary | ICD-10-CM

## 2024-03-11 DIAGNOSIS — R15.9 INCONTINENCE OF FECES WITH FECAL URGENCY: ICD-10-CM

## 2024-03-11 PROCEDURE — 3080F DIAST BP >= 90 MM HG: CPT

## 2024-03-11 PROCEDURE — 3017F COLORECTAL CA SCREEN DOC REV: CPT

## 2024-03-11 PROCEDURE — G8484 FLU IMMUNIZE NO ADMIN: HCPCS

## 2024-03-11 PROCEDURE — 3077F SYST BP >= 140 MM HG: CPT

## 2024-03-11 PROCEDURE — 99213 OFFICE O/P EST LOW 20 MIN: CPT

## 2024-03-11 PROCEDURE — 1036F TOBACCO NON-USER: CPT

## 2024-03-11 PROCEDURE — G8417 CALC BMI ABV UP PARAM F/U: HCPCS

## 2024-03-11 PROCEDURE — G8427 DOCREV CUR MEDS BY ELIG CLIN: HCPCS

## 2024-03-11 RX ORDER — NORTRIPTYLINE HYDROCHLORIDE 10 MG/1
10 CAPSULE ORAL NIGHTLY
Qty: 30 CAPSULE | Refills: 2 | Status: SHIPPED | OUTPATIENT
Start: 2024-03-11 | End: 2024-06-09

## 2024-03-11 RX ORDER — ROSUVASTATIN CALCIUM 40 MG/1
40 TABLET, COATED ORAL DAILY
Qty: 90 TABLET | Refills: 3 | Status: SHIPPED | OUTPATIENT
Start: 2024-03-11

## 2024-03-11 RX ORDER — PANTOPRAZOLE SODIUM 40 MG/1
40 TABLET, DELAYED RELEASE ORAL DAILY
Qty: 30 TABLET | Refills: 2 | Status: SHIPPED | OUTPATIENT
Start: 2024-03-11 | End: 2024-06-09

## 2024-03-11 RX ORDER — METHYLPREDNISOLONE 4 MG/1
TABLET ORAL
Qty: 21 TABLET | Refills: 0 | Status: CANCELLED | OUTPATIENT
Start: 2024-03-11 | End: 2024-03-20

## 2024-03-11 RX ORDER — ASPIRIN 81 MG/1
81 TABLET ORAL DAILY
Qty: 90 TABLET | Refills: 3 | Status: SHIPPED | OUTPATIENT
Start: 2024-03-11

## 2024-03-11 RX ORDER — LIDOCAINE 4 G/G
1 PATCH TOPICAL DAILY
Qty: 10 PATCH | Refills: 0 | Status: SHIPPED | OUTPATIENT
Start: 2024-03-11

## 2024-03-11 RX ORDER — SUCRALFATE 1 G/1
1 TABLET ORAL 4 TIMES DAILY
Qty: 120 TABLET | Refills: 3 | Status: SHIPPED | OUTPATIENT
Start: 2024-03-11

## 2024-03-11 RX ORDER — METOPROLOL SUCCINATE 50 MG/1
50 TABLET, EXTENDED RELEASE ORAL 2 TIMES DAILY
Qty: 30 TABLET | Refills: 3 | Status: SHIPPED | OUTPATIENT
Start: 2024-03-11

## 2024-03-11 RX ORDER — ISOSORBIDE MONONITRATE 30 MG/1
30 TABLET, EXTENDED RELEASE ORAL DAILY
Qty: 360 TABLET | Refills: 0 | Status: SHIPPED | OUTPATIENT
Start: 2024-03-11 | End: 2025-03-11

## 2024-03-11 ASSESSMENT — PATIENT HEALTH QUESTIONNAIRE - PHQ9
SUM OF ALL RESPONSES TO PHQ QUESTIONS 1-9: 10
10. IF YOU CHECKED OFF ANY PROBLEMS, HOW DIFFICULT HAVE THESE PROBLEMS MADE IT FOR YOU TO DO YOUR WORK, TAKE CARE OF THINGS AT HOME, OR GET ALONG WITH OTHER PEOPLE: 0
6. FEELING BAD ABOUT YOURSELF - OR THAT YOU ARE A FAILURE OR HAVE LET YOURSELF OR YOUR FAMILY DOWN: 1
3. TROUBLE FALLING OR STAYING ASLEEP: 1
8. MOVING OR SPEAKING SO SLOWLY THAT OTHER PEOPLE COULD HAVE NOTICED. OR THE OPPOSITE, BEING SO FIGETY OR RESTLESS THAT YOU HAVE BEEN MOVING AROUND A LOT MORE THAN USUAL: 1
9. THOUGHTS THAT YOU WOULD BE BETTER OFF DEAD, OR OF HURTING YOURSELF: 0
4. FEELING TIRED OR HAVING LITTLE ENERGY: 1
SUM OF ALL RESPONSES TO PHQ QUESTIONS 1-9: 10
SUM OF ALL RESPONSES TO PHQ9 QUESTIONS 1 & 2: 2
SUM OF ALL RESPONSES TO PHQ QUESTIONS 1-9: 10
5. POOR APPETITE OR OVEREATING: 1
2. FEELING DOWN, DEPRESSED OR HOPELESS: 2
1. LITTLE INTEREST OR PLEASURE IN DOING THINGS: 0
SUM OF ALL RESPONSES TO PHQ QUESTIONS 1-9: 10
7. TROUBLE CONCENTRATING ON THINGS, SUCH AS READING THE NEWSPAPER OR WATCHING TELEVISION: 3

## 2024-03-11 NOTE — PROGRESS NOTES
S: 53 y.o. female here for HTN, chronic pain in back and thigh (surgical site), constipation and RLQ pain, CAD.   RLQ pain started on Saturday after lifting grandkids. No n/v. Had BM this AM, but didn't feel she emptied appropriately.   Daily THC. Daily pepsi.     O: VS: BP (!) 165/101   Pulse 93   Temp 98.3 °F (36.8 °C) (Temporal)   Resp 16   Ht 1.626 m (5' 4\")   Wt 88.9 kg (196 lb)   SpO2 98%   BMI 33.64 kg/m²    General: NAD, alert and interacting appropriately.    CV:  RRR, no gallops, rubs, or murmurs    Resp: CTAB   Abd:  Soft, ttp RLQ. Groin ttp. Painful when lying down and manipulating the hips. Neg Rovsing. No hernia palpable.       Impression: HTN. RLQ pain 2/2 strain vs constipation > hernia > hip arthritis vs appendicitis vs GYN vs stone  Plan:   Resume all BP meds.   CT a/p. Tylenol. Pt declines nsaids 2/2 cardiac hx, celebrex ineffective in past. Medrol dosepak      Attending Physician Statement  I have discussed the case, including pertinent history and exam findings with the resident.  I agree with the documented assessment and plan.

## 2024-03-11 NOTE — PROGRESS NOTES
[facility]  FAMILY MEDICINE RESIDENCY PROGRAM  DATE OF VISIT : 3/11/2024    Patient : Kelsey Cobb   Age : 53 y.o.    : 1970   MRN : [id number]   ______________________________________________________________________    Chief Complaint :   Chief Complaint   Patient presents with    Medication Refill    Abdominal Cramping     RLQ    Depression     PHQ-9 score 10    Drug / Alcohol Assessment     Drug intervention  flowsheet needs completed        HPI : Kelsey Cobb is 53 y.o. female who presented to the clinic today for follow up of chronic conditions.    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  Only one episode since c scope  Encourage to continue with fiber, imodium and psyllium     MDD  Better mood with cymbalta and pamelor  Still having sleep issue  Takes tylenol PM      Last Visit  : 12/15/2023    Health Maintenance :  Health Maintenance Due   Topic Date Due    Hepatitis B vaccine (1 of 3 - 3-dose series) Never done    COVID-19 Vaccine (1) Never done    HIV screen  Never done    Hepatitis C screen  Never done    DTaP/Tdap/Td vaccine (1 - Tdap) Never done    Breast cancer screen  2022    Shingles vaccine (1 of 2) Never done    Lipids  2023    Flu vaccine (1) Never done       Review of Systems :  An extended review of symptoms obtained during physical exam was otherwise unremarkable  ______________________________________________________________________    Physical Exam :  Last 3

## 2024-03-12 RX ORDER — METHYLPREDNISOLONE 4 MG/1
TABLET ORAL
Qty: 14 TABLET | Refills: 0 | Status: SHIPPED | OUTPATIENT
Start: 2024-03-12 | End: 2024-03-18

## 2024-03-25 ENCOUNTER — HOSPITAL ENCOUNTER (EMERGENCY)
Age: 54
Discharge: HOME | End: 2024-03-25
Payer: COMMERCIAL

## 2024-03-25 VITALS
HEART RATE: 72 BPM | SYSTOLIC BLOOD PRESSURE: 168 MMHG | BODY MASS INDEX: 33.64 KG/M2 | OXYGEN SATURATION: 97 % | TEMPERATURE: 98.1 F | RESPIRATION RATE: 16 BRPM | DIASTOLIC BLOOD PRESSURE: 92 MMHG | WEIGHT: 196 LBS

## 2024-03-25 DIAGNOSIS — H92.11 EAR DRAINAGE RIGHT: ICD-10-CM

## 2024-03-25 DIAGNOSIS — S00.411A EAR CANAL ABRASION, RIGHT, INITIAL ENCOUNTER: Primary | ICD-10-CM

## 2024-03-25 DIAGNOSIS — I10 ELEVATED SYSTOLIC BLOOD PRESSURE READING WITH DIAGNOSIS OF HYPERTENSION: ICD-10-CM

## 2024-03-25 PROCEDURE — 2500000003 HC RX 250 WO HCPCS

## 2024-03-25 PROCEDURE — 99283 EMERGENCY DEPT VISIT LOW MDM: CPT

## 2024-03-25 RX ORDER — TRANEXAMIC ACID 100 MG/ML
100 INJECTION, SOLUTION INTRAVENOUS ONCE
Status: COMPLETED | OUTPATIENT
Start: 2024-03-25 | End: 2024-03-25

## 2024-03-25 RX ADMIN — TRANEXAMIC ACID 100 MG: 100 INJECTION, SOLUTION INTRAVENOUS at 19:52

## 2024-03-25 ASSESSMENT — PAIN DESCRIPTION - PAIN TYPE: TYPE: ACUTE PAIN

## 2024-03-25 ASSESSMENT — PAIN DESCRIPTION - FREQUENCY: FREQUENCY: CONTINUOUS

## 2024-03-25 ASSESSMENT — PAIN SCALES - GENERAL: PAINLEVEL_OUTOF10: 10

## 2024-03-25 ASSESSMENT — PAIN DESCRIPTION - DESCRIPTORS: DESCRIPTORS: ACHING;SORE;TENDER

## 2024-03-25 ASSESSMENT — PAIN DESCRIPTION - ORIENTATION: ORIENTATION: RIGHT

## 2024-03-25 ASSESSMENT — LIFESTYLE VARIABLES
HOW OFTEN DO YOU HAVE A DRINK CONTAINING ALCOHOL: NEVER
HOW MANY STANDARD DRINKS CONTAINING ALCOHOL DO YOU HAVE ON A TYPICAL DAY: PATIENT DOES NOT DRINK

## 2024-03-25 ASSESSMENT — PAIN DESCRIPTION - ONSET: ONSET: ON-GOING

## 2024-03-25 ASSESSMENT — PAIN - FUNCTIONAL ASSESSMENT: PAIN_FUNCTIONAL_ASSESSMENT: 0-10

## 2024-03-25 ASSESSMENT — PAIN DESCRIPTION - LOCATION: LOCATION: EAR

## 2024-03-25 NOTE — ED PROVIDER NOTES
right TM normal  Nose:   There is no abnormalities present  Throat:  Pharynx without injection, exudate, or tonsillar hypertrophy.  Airway patient.  Neck:  Normal ROM.  Supple.  Respiratory:  Clear to auscultation and breath sounds equal.    CV: Regular rate and rhythm, normal heart sounds, without pathological murmurs, ectopy, gallops, or rubs.  Skin:  No rashes, erythema present, unless noted elsewhere.  Lymphatic: No lymphangitis or adenopathy noted.  Neurological:  Oriented x 4.  Motor functions intact.    Lab / Imaging Results   (All laboratory and radiology results have been personally reviewed by myself)  Labs:  No results found for this visit on 03/25/24.  Imaging:  All Radiology results interpreted by Radiologist unless otherwise noted.  No orders to display     ED Course / Medical Decision Making     Medications   tranexamic acid (CYKLOKAPRON) injection 100 mg (100 mg Topical Given 3/25/24 1952)        Consult(s):   None    Procedure(s):   none    MDM:     Kelsey Cobb is a 53 y.o. old female who presenting to the emergency department with complaint of sudden onset of bloody right ear drainage . Symptoms began shortly prior to arrival and have been waxing and waning since that time. Patient denies chills, bilateral ear congestion, left ear pain, facial pain, fever, myalgias, nasal congestion, productive cough, rhinorrhea, sore throat, sweats, tooth pain, or wheezing.  Her symptoms are relieved by nothing.  She did have some pain in the right ear few days prior to onset of bleeding and has been using Q-tips to get the blood out of her ear.    There is a small superficial abrasion anterior canal at about the 5 o'clock position with small sanguinous drainage, right TM normal.  On reevaluation, bleeding continued.  Therefore, small amount of topical TXA applied directly to the site with decreased/near cessation of bleeding.  Plan will be for discharge home.  Recommended that she not put anything in her ear

## 2024-03-26 DIAGNOSIS — I15.0 RENOVASCULAR HYPERTENSION: Chronic | ICD-10-CM

## 2024-03-26 DIAGNOSIS — G89.29 OTHER CHRONIC PAIN: Chronic | ICD-10-CM

## 2024-03-26 RX ORDER — AMLODIPINE BESYLATE 10 MG/1
10 TABLET ORAL DAILY
Qty: 90 TABLET | Refills: 1 | Status: SHIPPED | OUTPATIENT
Start: 2024-03-26 | End: 2024-09-22

## 2024-03-26 RX ORDER — GABAPENTIN 800 MG/1
800 TABLET ORAL 3 TIMES DAILY
Qty: 90 TABLET | Refills: 3 | Status: SHIPPED | OUTPATIENT
Start: 2024-03-26 | End: 2024-07-24

## 2024-04-10 ENCOUNTER — HOSPITAL ENCOUNTER (EMERGENCY)
Age: 54
Discharge: HOME | End: 2024-04-10
Payer: COMMERCIAL

## 2024-04-10 ENCOUNTER — APPOINTMENT (OUTPATIENT)
Dept: CT IMAGING | Age: 54
End: 2024-04-10
Payer: COMMERCIAL

## 2024-04-10 VITALS
OXYGEN SATURATION: 96 % | SYSTOLIC BLOOD PRESSURE: 151 MMHG | DIASTOLIC BLOOD PRESSURE: 86 MMHG | RESPIRATION RATE: 16 BRPM | TEMPERATURE: 98.7 F | HEART RATE: 88 BPM

## 2024-04-10 DIAGNOSIS — K59.00 CONSTIPATION, UNSPECIFIED CONSTIPATION TYPE: Primary | ICD-10-CM

## 2024-04-10 DIAGNOSIS — R10.9 ABDOMINAL PAIN, UNSPECIFIED ABDOMINAL LOCATION: ICD-10-CM

## 2024-04-10 LAB
ALBUMIN SERPL-MCNC: 4.4 G/DL (ref 3.5–5.2)
ALP SERPL-CCNC: 186 U/L (ref 35–104)
ALT SERPL-CCNC: 27 U/L (ref 0–32)
ANION GAP SERPL CALCULATED.3IONS-SCNC: 15 MMOL/L (ref 7–16)
AST SERPL-CCNC: 59 U/L (ref 0–31)
BACTERIA URNS QL MICRO: ABNORMAL
BASOPHILS # BLD: 0.04 K/UL (ref 0–0.2)
BASOPHILS NFR BLD: 1 % (ref 0–2)
BILIRUB SERPL-MCNC: 0.7 MG/DL (ref 0–1.2)
BILIRUB UR QL STRIP: ABNORMAL
BUN SERPL-MCNC: 15 MG/DL (ref 6–20)
CALCIUM SERPL-MCNC: 8.9 MG/DL (ref 8.6–10.2)
CHLORIDE SERPL-SCNC: 105 MMOL/L (ref 98–107)
CLARITY UR: ABNORMAL
CO2 SERPL-SCNC: 20 MMOL/L (ref 22–29)
COLOR UR: YELLOW
CREAT SERPL-MCNC: 0.9 MG/DL (ref 0.5–1)
EOSINOPHIL # BLD: 0.15 K/UL (ref 0.05–0.5)
EOSINOPHILS RELATIVE PERCENT: 2 % (ref 0–6)
EPI CELLS #/AREA URNS HPF: ABNORMAL /HPF
ERYTHROCYTE [DISTWIDTH] IN BLOOD BY AUTOMATED COUNT: 13.2 % (ref 11.5–15)
GFR SERPL CREATININE-BSD FRML MDRD: 74 ML/MIN/1.73M2
GLUCOSE SERPL-MCNC: 96 MG/DL (ref 74–99)
GLUCOSE UR STRIP-MCNC: NEGATIVE MG/DL
HCT VFR BLD AUTO: 37.1 % (ref 34–48)
HGB BLD-MCNC: 12.9 G/DL (ref 11.5–15.5)
HGB UR QL STRIP.AUTO: ABNORMAL
IMM GRANULOCYTES # BLD AUTO: <0.03 K/UL (ref 0–0.58)
IMM GRANULOCYTES NFR BLD: 0 % (ref 0–5)
KETONES UR STRIP-MCNC: ABNORMAL MG/DL
LACTATE BLDV-SCNC: 0.6 MMOL/L (ref 0.5–2.2)
LEUKOCYTE ESTERASE UR QL STRIP: NEGATIVE
LIPASE SERPL-CCNC: 22 U/L (ref 13–60)
LYMPHOCYTES NFR BLD: 2.08 K/UL (ref 1.5–4)
LYMPHOCYTES RELATIVE PERCENT: 30 % (ref 20–42)
MAGNESIUM SERPL-MCNC: 2 MG/DL (ref 1.6–2.6)
MCH RBC QN AUTO: 30.9 PG (ref 26–35)
MCHC RBC AUTO-ENTMCNC: 34.8 G/DL (ref 32–34.5)
MCV RBC AUTO: 89 FL (ref 80–99.9)
MONOCYTES NFR BLD: 0.46 K/UL (ref 0.1–0.95)
MONOCYTES NFR BLD: 7 % (ref 2–12)
MUCOUS THREADS URNS QL MICRO: PRESENT
NEUTROPHILS NFR BLD: 61 % (ref 43–80)
NEUTS SEG NFR BLD: 4.21 K/UL (ref 1.8–7.3)
NITRITE UR QL STRIP: NEGATIVE
PH UR STRIP: 6 [PH] (ref 5–9)
PLATELET # BLD AUTO: 191 K/UL (ref 130–450)
PMV BLD AUTO: 11.8 FL (ref 7–12)
POTASSIUM SERPL-SCNC: 4.4 MMOL/L (ref 3.5–5)
PROT SERPL-MCNC: 7.9 G/DL (ref 6.4–8.3)
PROT UR STRIP-MCNC: 30 MG/DL
RBC # BLD AUTO: 4.17 M/UL (ref 3.5–5.5)
RBC #/AREA URNS HPF: ABNORMAL /HPF
SODIUM SERPL-SCNC: 140 MMOL/L (ref 132–146)
SP GR UR STRIP: >1.03 (ref 1–1.03)
UROBILINOGEN UR STRIP-ACNC: 2 EU/DL (ref 0–1)
WBC #/AREA URNS HPF: ABNORMAL /HPF
WBC OTHER # BLD: 7 K/UL (ref 4.5–11.5)

## 2024-04-10 PROCEDURE — 83605 ASSAY OF LACTIC ACID: CPT

## 2024-04-10 PROCEDURE — 6370000000 HC RX 637 (ALT 250 FOR IP): Performed by: NURSE PRACTITIONER

## 2024-04-10 PROCEDURE — 99284 EMERGENCY DEPT VISIT MOD MDM: CPT

## 2024-04-10 PROCEDURE — 83735 ASSAY OF MAGNESIUM: CPT

## 2024-04-10 PROCEDURE — 74176 CT ABD & PELVIS W/O CONTRAST: CPT

## 2024-04-10 PROCEDURE — 80053 COMPREHEN METABOLIC PANEL: CPT

## 2024-04-10 PROCEDURE — 6360000002 HC RX W HCPCS: Performed by: NURSE PRACTITIONER

## 2024-04-10 PROCEDURE — 81001 URINALYSIS AUTO W/SCOPE: CPT

## 2024-04-10 PROCEDURE — 96374 THER/PROPH/DIAG INJ IV PUSH: CPT

## 2024-04-10 PROCEDURE — 83690 ASSAY OF LIPASE: CPT

## 2024-04-10 PROCEDURE — 85025 COMPLETE CBC W/AUTO DIFF WBC: CPT

## 2024-04-10 RX ORDER — ALPRAZOLAM 0.25 MG/1
0.5 TABLET ORAL ONCE
Status: COMPLETED | OUTPATIENT
Start: 2024-04-10 | End: 2024-04-10

## 2024-04-10 RX ORDER — KETOROLAC TROMETHAMINE 30 MG/ML
15 INJECTION, SOLUTION INTRAMUSCULAR; INTRAVENOUS ONCE
Status: COMPLETED | OUTPATIENT
Start: 2024-04-10 | End: 2024-04-10

## 2024-04-10 RX ORDER — SENNA AND DOCUSATE SODIUM 50; 8.6 MG/1; MG/1
1 TABLET, FILM COATED ORAL DAILY
Qty: 30 TABLET | Refills: 0 | Status: SHIPPED | OUTPATIENT
Start: 2024-04-10

## 2024-04-10 RX ORDER — ACETAMINOPHEN 500 MG
1000 TABLET ORAL EVERY 6 HOURS PRN
Qty: 60 TABLET | Refills: 0 | Status: SHIPPED | OUTPATIENT
Start: 2024-04-10 | End: 2024-04-24

## 2024-04-10 RX ORDER — MAGNESIUM CARB/ALUMINUM HYDROX 105-160MG
TABLET,CHEWABLE ORAL
Qty: 150 ML | Refills: 0 | Status: SHIPPED | OUTPATIENT
Start: 2024-04-10

## 2024-04-10 RX ORDER — POLYETHYLENE GLYCOL 3350 17 G/17G
17 POWDER, FOR SOLUTION ORAL DAILY
Qty: 225 G | Refills: 1 | Status: SHIPPED | OUTPATIENT
Start: 2024-04-10 | End: 2024-05-10

## 2024-04-10 RX ORDER — DOCUSATE SODIUM 100 MG/1
100 CAPSULE, LIQUID FILLED ORAL 2 TIMES DAILY
Qty: 28 CAPSULE | Refills: 0 | Status: SHIPPED | OUTPATIENT
Start: 2024-04-10 | End: 2024-04-24

## 2024-04-10 RX ADMIN — MAGNESIUM HYDROXIDE 30 ML: 400 SUSPENSION ORAL at 19:20

## 2024-04-10 RX ADMIN — ALPRAZOLAM 0.5 MG: 0.25 TABLET ORAL at 19:58

## 2024-04-10 RX ADMIN — KETOROLAC TROMETHAMINE 15 MG: 30 INJECTION, SOLUTION INTRAMUSCULAR at 19:58

## 2024-04-10 ASSESSMENT — PAIN DESCRIPTION - ORIENTATION: ORIENTATION: LOWER

## 2024-04-10 ASSESSMENT — PAIN SCALES - GENERAL: PAINLEVEL_OUTOF10: 10

## 2024-04-10 ASSESSMENT — ENCOUNTER SYMPTOMS: CONSTIPATION: 1

## 2024-04-10 ASSESSMENT — PAIN DESCRIPTION - LOCATION: LOCATION: BACK;ABDOMEN

## 2024-04-10 NOTE — ED PROVIDER NOTES
[facility] EMERGENCY DEPARTMENT  ED  Encounter Note  Admit Date/RoomTime: 4/10/2024  2:43 PM  ED Room: 31/31    Shared DEBORAH-ED Attending Visit.  CC: No   HPI:  4/10/24, Time: 6:58 PM EDT         Kelsey Cobb is a 53 y.o. female presenting to the ED for abdominal pain related to suspected constipation, beginning 2 weeks however is chronic.  The complaint has been persistent, moderate in severity, and worsened by nothing.  Patient presents stating that she has not had a bowel movement in approximately 2 weeks and is having severe abdominal pain on the left side and into the left flank area.  She states she has chronic constipation since she had her gastric bypass surgery in 2008.  She denies any chest pain or shortness of breath or fever.  She states she has had multiple admissions related to the persistent constipation despite any attempts at using stool softeners laxatives changing items in her diet or even beverages.  She states she has not had a bowel movement in approximately 2 weeks she has taken multiple over-the-counter medications but with no relief in symptoms.  She denies any history of kidney stones but is having left flank pain as well.  She reports nausea but denies any vomiting.    ROS:   Pertinent positives and negatives are stated within HPI, all other systems reviewed and are negative.  --------------------------------------------- PAST HISTORY ---------------------------------------------  Past Medical History:  has a past medical history of Acute MI, inferior wall (HCC), Anorexia, Arthritis of knee, Back pain, chronic, Bilateral renal artery stenosis (HCC), CAD (coronary artery disease), Chronic pain, Current severe episode of major depressive disorder without psychotic features without prior episode (HCC), DVT (deep vein thrombosis) in pregnancy, H/O gastric bypass, Hypertension, Hypertension, Metabolic acidosis, Ovarian cyst, and Seizures ([facility]).    Past

## 2024-04-10 NOTE — ED TRIAGE NOTES
Department of Emergency Medicine  FIRST PROVIDER TRIAGE NOTE             Independent MLP           4/10/24  2:40 PM EDT    Date of Encounter: 4/10/24   MRN: [id number]      HPI: Kelsey Cobb is a 53 y.o. female who presents to the ED for Constipation (Patient states she has not moved her bowels in 2 weeks. C/o abdominal pain. )       Hx of gastric bypass. Issues with constipation. Tried coffee, and miralax at home with no relief. Last bowel movement 4-5 days.     ROS: Negative for fever or vomiting.    PE: Gen Appearance/Constitutional: alert     Initial Plan of Care: All treatment areas with department are currently occupied. Plan to order/Initiate the following while awaiting opening in ED: labs.  Initiate Treatment-Testing, Proceed toTreatment Area When Bed Available for ED Attending/MLP to Continue Care    Electronically signed by Nanci Pompa PA-C   DD: 4/10/24

## 2024-04-11 NOTE — CONSULTS
GENERAL SURGERY  CONSULT NOTE    Patient's Name/Date of Birth: Kelsey Cobb / 1970    Date: April 10, 2024     PCP: Lisbeth Amaya Chi, MD     Chief Complaint:   Chief Complaint   Patient presents with    Constipation     Patient states she has not moved her bowels in 2 weeks. C/o abdominal pain.        Physician Consulted: Dr. Alegre  Reason for Consult: Abdominal pain, constipation  Referring DEBORAH: Rhea García, CHRIS    HPI  Kelsey Cobb is a 53 y.o. female who presents for evaluation of abdominal pain and constipation.  Patient had a prior Jeannine-en-Y gastric bypass s/p reversal 2010 with history of marginal ulcer and gastric AVM and extensive abdominal surgical history.  Patient states she has been dealing with constipation since her reversal in 2010.  Today she states she is having generalized abdominal pain and has not been able to have a bowel movement in over a week.  Denies any nausea or vomiting.  CT scan reviewed, no sign of obstruction, anastomosis sites are patent, no free fluid noted, moderate stool burden in the transverse colon.  Labs reviewed, CBC CMP unremarkable.  Alk phos 186 which appears around baseline.  Prior EGD/colonoscopy in 9/23 which was unremarkable.  General surgery was consulted for the above.      Past Medical History:   Diagnosis Date    Acute MI, inferior wall (HCC) 3/11/2022    Anorexia 06/01/2015    Arthritis of knee     Back pain, chronic     Bilateral renal artery stenosis (HCC) 03/13/2022    CAD (coronary artery disease)     Chronic pain 06/01/2015    Current severe episode of major depressive disorder without psychotic features without prior episode (HCC) 03/16/2021    DVT (deep vein thrombosis) in pregnancy 8/6/2022    H/O gastric bypass 06/01/2015    Hypertension 01/01/2012    Hypertension 06/05/2015    Metabolic acidosis 06/01/2015    Ovarian cyst     Seizures (HCC)        Past Surgical History:   Procedure Laterality Date    CHOLECYSTECTOMY N/A 10/19/2022    OPEN

## 2024-07-15 PROCEDURE — 99284 EMERGENCY DEPT VISIT MOD MDM: CPT

## 2024-07-15 PROCEDURE — 6370000000 HC RX 637 (ALT 250 FOR IP): Performed by: PHYSICIAN ASSISTANT

## 2024-07-15 PROCEDURE — 6360000002 HC RX W HCPCS: Performed by: PHYSICIAN ASSISTANT

## 2024-07-15 PROCEDURE — 96372 THER/PROPH/DIAG INJ SC/IM: CPT

## 2024-07-15 RX ORDER — CYCLOBENZAPRINE HCL 10 MG
10 TABLET ORAL ONCE
Status: COMPLETED | OUTPATIENT
Start: 2024-07-16 | End: 2024-07-15

## 2024-07-15 RX ORDER — ORPHENADRINE CITRATE 30 MG/ML
60 INJECTION INTRAMUSCULAR; INTRAVENOUS ONCE
Status: DISCONTINUED | OUTPATIENT
Start: 2024-07-15 | End: 2024-07-15

## 2024-07-15 RX ORDER — TRIAMCINOLONE ACETONIDE 40 MG/ML
40 INJECTION, SUSPENSION INTRA-ARTICULAR; INTRAMUSCULAR ONCE
Status: COMPLETED | OUTPATIENT
Start: 2024-07-15 | End: 2024-07-15

## 2024-07-15 RX ORDER — KETOROLAC TROMETHAMINE 30 MG/ML
30 INJECTION, SOLUTION INTRAMUSCULAR; INTRAVENOUS ONCE
Status: COMPLETED | OUTPATIENT
Start: 2024-07-15 | End: 2024-07-15

## 2024-07-15 RX ADMIN — KETOROLAC TROMETHAMINE 30 MG: 30 INJECTION, SOLUTION INTRAMUSCULAR at 23:52

## 2024-07-15 RX ADMIN — CYCLOBENZAPRINE 10 MG: 10 TABLET, FILM COATED ORAL at 23:52

## 2024-07-15 RX ADMIN — TRIAMCINOLONE ACETONIDE 40 MG: 40 INJECTION, SUSPENSION INTRA-ARTICULAR; INTRAMUSCULAR at 23:52

## 2024-07-15 ASSESSMENT — PAIN SCALES - GENERAL: PAINLEVEL_OUTOF10: 10

## 2024-07-15 ASSESSMENT — PAIN DESCRIPTION - LOCATION: LOCATION: BACK

## 2024-07-15 ASSESSMENT — PAIN DESCRIPTION - ORIENTATION: ORIENTATION: MID

## 2024-07-16 ENCOUNTER — HOSPITAL ENCOUNTER (EMERGENCY)
Age: 54
Discharge: HOME | End: 2024-07-16
Payer: COMMERCIAL

## 2024-07-16 ENCOUNTER — APPOINTMENT (OUTPATIENT)
Dept: GENERAL RADIOLOGY | Age: 54
End: 2024-07-16
Payer: COMMERCIAL

## 2024-07-16 VITALS
HEIGHT: 64 IN | TEMPERATURE: 98 F | DIASTOLIC BLOOD PRESSURE: 71 MMHG | SYSTOLIC BLOOD PRESSURE: 144 MMHG | WEIGHT: 195 LBS | HEART RATE: 55 BPM | RESPIRATION RATE: 16 BRPM | OXYGEN SATURATION: 98 % | BODY MASS INDEX: 33.29 KG/M2

## 2024-07-16 DIAGNOSIS — M54.50 ACUTE EXACERBATION OF CHRONIC LOW BACK PAIN: Primary | ICD-10-CM

## 2024-07-16 DIAGNOSIS — G89.29 ACUTE EXACERBATION OF CHRONIC LOW BACK PAIN: Primary | ICD-10-CM

## 2024-07-16 PROCEDURE — 72100 X-RAY EXAM L-S SPINE 2/3 VWS: CPT

## 2024-07-16 RX ORDER — LIDOCAINE 50 MG/G
1 PATCH TOPICAL DAILY
Qty: 10 PATCH | Refills: 0 | Status: SHIPPED | OUTPATIENT
Start: 2024-07-16 | End: 2024-07-26

## 2024-07-16 RX ORDER — CYCLOBENZAPRINE HCL 5 MG
5 TABLET ORAL 2 TIMES DAILY PRN
Qty: 10 TABLET | Refills: 0 | Status: SHIPPED | OUTPATIENT
Start: 2024-07-16 | End: 2024-07-26

## 2024-07-16 RX ORDER — PREDNISONE 20 MG/1
20 TABLET ORAL 2 TIMES DAILY
Qty: 10 TABLET | Refills: 0 | Status: SHIPPED | OUTPATIENT
Start: 2024-07-16 | End: 2024-07-21

## 2024-07-16 RX ORDER — NAPROXEN 500 MG/1
500 TABLET ORAL 2 TIMES DAILY PRN
Qty: 14 TABLET | Refills: 0 | Status: SHIPPED | OUTPATIENT
Start: 2024-07-16

## 2024-07-16 NOTE — ED PROVIDER NOTES
Independent DEBORAH Visit.    HPI:  7/15/24,   Time: 11:14 PM EDT         Kelsey Cobb is a 54 y.o. female with past medical history of chronic pain, gastric bypass, neuropathy and chronic back pain presenting to the ED private vehicle for back pain.  She reports yesterday her daughter was going to throw up and she went to stop her from throwing up and pulled something in her back.  She states it is sharp shooting pains across her lower back with radiation down the right leg.  No numbness and tingling.  Denies fall.  Has been taking gabapentin for pain with no relief.  Trying to move makes it worse and resting makes it better.  Denies fever, chills, body aches, headache, dizziness, syncope, chest pain, shortness of breath, abdominal pain, nausea, vomiting, diarrhea, bladder or bowel continence, saddle anesthesia or IV drug use.  ROS:   Pertinent positives and negatives are stated within HPI, all other systems reviewed and are negative.  --------------------------------------------- PAST HISTORY ---------------------------------------------  Past Medical History:  has a past medical history of Acute MI, inferior wall (HCC), Anorexia, Arthritis of knee, Back pain, chronic, Bilateral renal artery stenosis (HCC), CAD (coronary artery disease), Chronic pain, Current severe episode of major depressive disorder without psychotic features without prior episode (HCC), DVT (deep vein thrombosis) in pregnancy, H/O gastric bypass, Hypertension, Hypertension, Metabolic acidosis, Ovarian cyst, and Seizures (HCC).    Past Surgical History:  has a past surgical history that includes Jeannine-en-Y Gastric Bypass (12/02/2008); Endometrial ablation (2003); Tonsillectomy (1980); Cholecystectomy, laparoscopic (09/19/1995); Upper gastrointestinal endoscopy (10/03/2008); Upper gastrointestinal endoscopy (01/09/2009); Upper gastrointestinal endoscopy (02/04/2009); laparoscopy (02/12/2009); Upper gastrointestinal endoscopy (03/06/2009); Upper

## 2024-07-22 DIAGNOSIS — G62.9 NEUROPATHY: ICD-10-CM

## 2024-07-22 DIAGNOSIS — G89.29 OTHER CHRONIC PAIN: Chronic | ICD-10-CM

## 2024-07-24 RX ORDER — SUCRALFATE 1 G/1
1 TABLET ORAL 4 TIMES DAILY
Qty: 120 TABLET | Refills: 3 | Status: SHIPPED | OUTPATIENT
Start: 2024-07-24

## 2024-07-24 RX ORDER — GABAPENTIN 800 MG/1
800 TABLET ORAL 3 TIMES DAILY
Qty: 90 TABLET | Refills: 0 | Status: SHIPPED | OUTPATIENT
Start: 2024-07-24 | End: 2024-08-23

## 2024-08-20 DIAGNOSIS — G89.29 OTHER CHRONIC PAIN: Chronic | ICD-10-CM

## 2024-08-20 DIAGNOSIS — F33.9 EPISODE OF RECURRENT MAJOR DEPRESSIVE DISORDER, UNSPECIFIED DEPRESSION EPISODE SEVERITY (HCC): ICD-10-CM

## 2024-08-21 RX ORDER — DULOXETIN HYDROCHLORIDE 60 MG/1
60 CAPSULE, DELAYED RELEASE ORAL DAILY
Qty: 30 CAPSULE | Refills: 1 | Status: SHIPPED | OUTPATIENT
Start: 2024-08-21

## 2024-08-22 DIAGNOSIS — G89.29 OTHER CHRONIC PAIN: Chronic | ICD-10-CM

## 2024-08-22 RX ORDER — GABAPENTIN 800 MG/1
800 TABLET ORAL 3 TIMES DAILY
Qty: 90 TABLET | Refills: 0 | Status: SHIPPED | OUTPATIENT
Start: 2024-08-22 | End: 2024-09-21

## 2024-08-22 NOTE — TELEPHONE ENCOUNTER
Last refill for 30 days for gabapentin.  Patient needs to be seen in the office.  Also probably need a UDS the next time.  She was previously referred to pain management but did not follow-up    Dr Amaya

## 2024-09-25 ENCOUNTER — OFFICE VISIT (OUTPATIENT)
Dept: FAMILY MEDICINE CLINIC | Age: 54
End: 2024-09-25
Payer: COMMERCIAL

## 2024-09-25 VITALS
DIASTOLIC BLOOD PRESSURE: 80 MMHG | OXYGEN SATURATION: 97 % | RESPIRATION RATE: 18 BRPM | HEIGHT: 64 IN | WEIGHT: 191 LBS | TEMPERATURE: 98.1 F | SYSTOLIC BLOOD PRESSURE: 135 MMHG | BODY MASS INDEX: 32.61 KG/M2 | HEART RATE: 81 BPM

## 2024-09-25 DIAGNOSIS — F33.9 EPISODE OF RECURRENT MAJOR DEPRESSIVE DISORDER, UNSPECIFIED DEPRESSION EPISODE SEVERITY (HCC): ICD-10-CM

## 2024-09-25 DIAGNOSIS — L08.9 RECURRENT INFECTION OF SKIN: ICD-10-CM

## 2024-09-25 DIAGNOSIS — Z98.84 H/O GASTRIC BYPASS: Primary | Chronic | ICD-10-CM

## 2024-09-25 DIAGNOSIS — R25.2 LEG CRAMP: ICD-10-CM

## 2024-09-25 DIAGNOSIS — G89.29 OTHER CHRONIC PAIN: Chronic | ICD-10-CM

## 2024-09-25 DIAGNOSIS — L98.7 EXCESS SKIN OF ABDOMEN: ICD-10-CM

## 2024-09-25 DIAGNOSIS — I15.0 RENOVASCULAR HYPERTENSION: Chronic | ICD-10-CM

## 2024-09-25 DIAGNOSIS — I25.10 CAD IN NATIVE ARTERY: ICD-10-CM

## 2024-09-25 DIAGNOSIS — G62.9 NEUROPATHY: ICD-10-CM

## 2024-09-25 LAB
ANION GAP SERPL CALCULATED.3IONS-SCNC: 13 MMOL/L (ref 7–16)
BASOPHILS ABSOLUTE: 0.03 K/UL (ref 0–0.2)
BASOPHILS RELATIVE PERCENT: 1 % (ref 0–2)
BUN BLDV-MCNC: 11 MG/DL (ref 6–20)
CALCIUM SERPL-MCNC: 8.9 MG/DL (ref 8.6–10.2)
CHLORIDE BLD-SCNC: 111 MMOL/L (ref 98–107)
CHOLESTEROL, TOTAL: 125 MG/DL
CO2: 21 MMOL/L (ref 22–29)
CREAT SERPL-MCNC: 0.8 MG/DL (ref 0.5–1)
EOSINOPHILS ABSOLUTE: 0.11 K/UL (ref 0.05–0.5)
EOSINOPHILS RELATIVE PERCENT: 3 % (ref 0–6)
FERRITIN: 79 NG/ML
FOLATE: 5.8 NG/ML (ref 4.8–24.2)
GFR, ESTIMATED: 85 ML/MIN/1.73M2
GLUCOSE BLD-MCNC: 94 MG/DL (ref 74–99)
HCT VFR BLD CALC: 36.5 % (ref 34–48)
HDLC SERPL-MCNC: 30 MG/DL
HEMOGLOBIN: 12.3 G/DL (ref 11.5–15.5)
IMMATURE GRANULOCYTES %: 0 % (ref 0–5)
IMMATURE GRANULOCYTES ABSOLUTE: <0.03 K/UL (ref 0–0.58)
IRON % SATURATION: 34 % (ref 15–50)
IRON: 78 UG/DL (ref 37–145)
LDL CHOLESTEROL: 67 MG/DL
LYMPHOCYTES ABSOLUTE: 1.96 K/UL (ref 1.5–4)
LYMPHOCYTES RELATIVE PERCENT: 46 % (ref 20–42)
MCH RBC QN AUTO: 30.7 PG (ref 26–35)
MCHC RBC AUTO-ENTMCNC: 33.7 G/DL (ref 32–34.5)
MCV RBC AUTO: 91 FL (ref 80–99.9)
MONOCYTES ABSOLUTE: 0.3 K/UL (ref 0.1–0.95)
MONOCYTES RELATIVE PERCENT: 7 % (ref 2–12)
NEUTROPHILS ABSOLUTE: 1.89 K/UL (ref 1.8–7.3)
NEUTROPHILS RELATIVE PERCENT: 44 % (ref 43–80)
PDW BLD-RTO: 12.7 % (ref 11.5–15)
PLATELET # BLD: 129 K/UL (ref 130–450)
PMV BLD AUTO: 12.6 FL (ref 7–12)
POTASSIUM SERPL-SCNC: 3.5 MMOL/L (ref 3.5–5)
RBC # BLD: 4.01 M/UL (ref 3.5–5.5)
SODIUM BLD-SCNC: 145 MMOL/L (ref 132–146)
TOTAL IRON BINDING CAPACITY: 228 UG/DL (ref 250–450)
TRIGL SERPL-MCNC: 142 MG/DL
VITAMIN B-12: >2000 PG/ML (ref 211–946)
VITAMIN D 25-HYDROXY: 17.6 NG/ML (ref 30–100)
VLDLC SERPL CALC-MCNC: 28 MG/DL
WBC # BLD: 4.3 K/UL (ref 4.5–11.5)

## 2024-09-25 PROCEDURE — 3075F SYST BP GE 130 - 139MM HG: CPT

## 2024-09-25 PROCEDURE — 3079F DIAST BP 80-89 MM HG: CPT

## 2024-09-25 PROCEDURE — G8427 DOCREV CUR MEDS BY ELIG CLIN: HCPCS

## 2024-09-25 PROCEDURE — 3017F COLORECTAL CA SCREEN DOC REV: CPT

## 2024-09-25 PROCEDURE — G2211 COMPLEX E/M VISIT ADD ON: HCPCS

## 2024-09-25 PROCEDURE — 99213 OFFICE O/P EST LOW 20 MIN: CPT

## 2024-09-25 PROCEDURE — 1036F TOBACCO NON-USER: CPT

## 2024-09-25 PROCEDURE — 36415 COLL VENOUS BLD VENIPUNCTURE: CPT | Performed by: FAMILY MEDICINE

## 2024-09-25 PROCEDURE — G8417 CALC BMI ABV UP PARAM F/U: HCPCS

## 2024-09-25 RX ORDER — ISOSORBIDE MONONITRATE 30 MG/1
30 TABLET, EXTENDED RELEASE ORAL DAILY
Qty: 90 TABLET | Refills: 1 | Status: SHIPPED | OUTPATIENT
Start: 2024-09-25 | End: 2025-03-24

## 2024-09-25 RX ORDER — METOPROLOL SUCCINATE 50 MG/1
50 TABLET, EXTENDED RELEASE ORAL 2 TIMES DAILY
Qty: 360 TABLET | Refills: 0 | Status: SHIPPED | OUTPATIENT
Start: 2024-09-25 | End: 2025-03-24

## 2024-09-25 RX ORDER — PANTOPRAZOLE SODIUM 40 MG/1
40 TABLET, DELAYED RELEASE ORAL DAILY
Qty: 90 TABLET | Refills: 1 | Status: SHIPPED | OUTPATIENT
Start: 2024-09-25 | End: 2025-03-24

## 2024-09-25 RX ORDER — AMLODIPINE BESYLATE 10 MG/1
10 TABLET ORAL DAILY
Qty: 90 TABLET | Refills: 1 | Status: SHIPPED | OUTPATIENT
Start: 2024-09-25 | End: 2025-03-24

## 2024-09-25 RX ORDER — GABAPENTIN 800 MG/1
800 TABLET ORAL 3 TIMES DAILY
Qty: 90 TABLET | Refills: 0 | Status: SHIPPED | OUTPATIENT
Start: 2024-09-25 | End: 2024-12-24

## 2024-09-25 RX ORDER — ASPIRIN 81 MG/1
81 TABLET ORAL DAILY
Qty: 90 TABLET | Refills: 1 | Status: SHIPPED | OUTPATIENT
Start: 2024-09-25 | End: 2025-03-24

## 2024-09-25 RX ORDER — DULOXETIN HYDROCHLORIDE 60 MG/1
60 CAPSULE, DELAYED RELEASE ORAL DAILY
Qty: 90 CAPSULE | Refills: 0 | Status: SHIPPED | OUTPATIENT
Start: 2024-09-25 | End: 2024-12-24

## 2024-09-25 RX ORDER — ROSUVASTATIN CALCIUM 40 MG/1
40 TABLET, COATED ORAL DAILY
Qty: 90 TABLET | Refills: 1 | Status: SHIPPED | OUTPATIENT
Start: 2024-09-25 | End: 2025-03-24

## 2024-09-25 RX ORDER — SENNA AND DOCUSATE SODIUM 50; 8.6 MG/1; MG/1
1 TABLET, FILM COATED ORAL DAILY
Qty: 90 TABLET | Refills: 0 | Status: SHIPPED | OUTPATIENT
Start: 2024-09-25 | End: 2024-12-24

## 2024-09-25 RX ORDER — NORTRIPTYLINE HCL 10 MG
10 CAPSULE ORAL NIGHTLY
Qty: 90 CAPSULE | Refills: 0 | Status: SHIPPED | OUTPATIENT
Start: 2024-09-25 | End: 2024-12-24

## 2024-09-25 NOTE — PROGRESS NOTES
S: 54 y.o. female presents today for:   CAD- due to see cardio   Gastric bypass, now with persistent intertrigo. Painful. Would like to consider surgical removal of excess skin  Left calf cramping. Vein harvested from same side. + smoking.       O: VS: /80 (Site: Left Upper Arm, Position: Sitting, Cuff Size: Large Adult)   Pulse 81   Temp 98.1 °F (36.7 °C) (Temporal)   Resp 18   Ht 1.626 m (5' 4\")   Wt 86.6 kg (191 lb)   SpO2 97%   BMI 32.79 kg/m²   AAO/NAD, appropriate affect for mood  CV:  RRR, no murmur  Resp: CTAB  Ext- DPP-B, no clubbing, cyanosis, edema     Impression/Plan:   1) left calf cramping- labs   2) intertrigo, recurrent- to plastics.   3) CAD_ schedule with cardio       Attending Physician Statement  I have discussed the case, including pertinent history and exam findings with the resident.  I agree with the documented assessment and plan.      Alanis Ayala, DO

## 2024-09-25 NOTE — PATIENT INSTRUCTIONS
[facility]  [address], [address], [place] [address]    Heart doctor - Sylvia Burk  [address], [place] [id number]

## 2024-09-25 NOTE — PROGRESS NOTES
[facility]  FAMILY MEDICINE RESIDENCY PROGRAM  DATE OF VISIT : 2024    Patient : Kelsey Cobb   Age : 54 y.o.    : 1970   MRN : [id number]   ______________________________________________________________________    Assessment & Plan :    1. H/O gastric bypass  -     Basic Metabolic Panel  -     CBC with Auto Differential  -     Vitamin D 25 Hydroxy  -     Vitamin B12 & Folate  -     Iron and TIBC  -     Ferritin  -     Janae - Nando Houston MD, Plastic & Reconstructive Surgery, [place]  2. Other chronic pain  Overview:  Multiple visits for pain management of lumbar back, left knee, left hip (resolved), lower abdominal region  Has affected patient's mood and motivation, no real source of pain  Being worked up for gastric ulcers ulcers  Current regimen includes Tylenol PM, duloxetine 60 mg, gabapentin 600 mg 3 times daily and Pamelor 10 mg (that has been added 10/2023)  Patient had polysubstance abuse history in the past, last visit admits to taking tramadol unprescribed  No more sleeping on the couch, movement and physical activity 30 mins daily  TENS Unit not received, she will buy from [employer]  Encourage to follow with pain mangement however patient states she does not want to be on opioid  Orders:  -     gabapentin (NEURONTIN) 800 MG tablet; Take 1 tablet by mouth 3 times daily for 90 days., Disp-90 tablet, R-0Normal  -     DULoxetine (CYMBALTA) 60 MG extended release capsule; Take 1 capsule by mouth daily, Disp-90 capsule, R-0Normal  -     nortriptyline (PAMELOR) 10 MG capsule; Take 1 capsule by mouth nightly, Disp-90 capsule, R-0Normal  3. CAD in native artery  Overview:  S/p triple bypass surgery on 2022 with saphenous vein  Has seen cardiology once since discharge  Not on NSAIDs  Follow cardio yearly  Orders:  -     Lipid Panel  -     metoprolol succinate (TOPROL XL) 50 MG extended release tablet; Take 1 tablet by mouth 2 times daily, Disp-360 tablet, R-0Normal  -

## 2024-09-30 ENCOUNTER — TELEPHONE (OUTPATIENT)
Dept: FAMILY MEDICINE CLINIC | Age: 54
End: 2024-09-30

## 2024-09-30 DIAGNOSIS — E55.9 VITAMIN D DEFICIENCY: ICD-10-CM

## 2024-09-30 DIAGNOSIS — R25.2 LEG CRAMP: Primary | ICD-10-CM

## 2024-09-30 RX ORDER — FERROUS SULFATE 325(65) MG
325 TABLET ORAL
Qty: 90 TABLET | Refills: 1 | Status: SHIPPED | OUTPATIENT
Start: 2024-09-30

## 2024-09-30 RX ORDER — ERGOCALCIFEROL 1.25 MG/1
50000 CAPSULE, LIQUID FILLED ORAL WEEKLY
Qty: 12 CAPSULE | Refills: 1 | Status: SHIPPED | OUTPATIENT
Start: 2024-09-30

## 2024-09-30 NOTE — TELEPHONE ENCOUNTER
Tried calling patient for results of leg cramps  She is iron deficient - start iron supplement  Start vitamin D    Please let pt know thanks    Electronically signed by Lisbeth Amaya MD on 9/30/2024 at 3:03 PM

## 2024-10-23 DIAGNOSIS — G89.29 OTHER CHRONIC PAIN: Chronic | ICD-10-CM

## 2024-10-23 NOTE — TELEPHONE ENCOUNTER
Last Appointment:  9/25/2024  Future Appointments   Date Time Provider Department Center   11/3/2025  2:00 PM Jhonatan Cruz, PA [facility]

## 2024-10-24 RX ORDER — GABAPENTIN 800 MG/1
800 TABLET ORAL 3 TIMES DAILY
Qty: 270 TABLET | Refills: 0 | Status: SHIPPED | OUTPATIENT
Start: 2024-10-24 | End: 2025-01-22

## 2024-12-19 DIAGNOSIS — G89.29 OTHER CHRONIC PAIN: Chronic | ICD-10-CM

## 2024-12-19 DIAGNOSIS — F33.9 EPISODE OF RECURRENT MAJOR DEPRESSIVE DISORDER, UNSPECIFIED DEPRESSION EPISODE SEVERITY (HCC): ICD-10-CM

## 2024-12-20 RX ORDER — NORTRIPTYLINE HYDROCHLORIDE 10 MG/1
10 CAPSULE ORAL NIGHTLY
Qty: 90 CAPSULE | Refills: 0 | Status: SHIPPED | OUTPATIENT
Start: 2024-12-20 | End: 2025-03-20

## 2024-12-20 RX ORDER — DULOXETIN HYDROCHLORIDE 60 MG/1
60 CAPSULE, DELAYED RELEASE ORAL DAILY
Qty: 90 CAPSULE | Refills: 0 | Status: SHIPPED | OUTPATIENT
Start: 2024-12-20 | End: 2025-03-20

## 2024-12-20 RX ORDER — AMOXICILLIN 250 MG
1 CAPSULE ORAL DAILY
Qty: 90 TABLET | Refills: 0 | Status: SHIPPED | OUTPATIENT
Start: 2024-12-20 | End: 2025-03-20

## 2025-01-16 DIAGNOSIS — G89.29 OTHER CHRONIC PAIN: Chronic | ICD-10-CM

## 2025-01-16 DIAGNOSIS — G62.9 NEUROPATHY: ICD-10-CM

## 2025-01-16 RX ORDER — GABAPENTIN 800 MG/1
800 TABLET ORAL 3 TIMES DAILY
Qty: 270 TABLET | Refills: 0 | Status: SHIPPED | OUTPATIENT
Start: 2025-01-16 | End: 2025-04-16

## 2025-01-16 RX ORDER — SUCRALFATE 1 G/1
1 TABLET ORAL 4 TIMES DAILY
Qty: 120 TABLET | Refills: 3 | Status: SHIPPED | OUTPATIENT
Start: 2025-01-16

## 2025-01-16 NOTE — TELEPHONE ENCOUNTER
Name of Medication(s) Requested:  Requested Prescriptions     Pending Prescriptions Disp Refills    gabapentin (NEURONTIN) 800 MG tablet [Pharmacy Med Name: GABAPENTIN 800 MG TABS 800 Tablet] 90 tablet 0     Sig: Take 1 tablet by mouth 3 times    sucralfate (CARAFATE) 1 GM tablet [Pharmacy Med Name: SUCRALFATE 1 GM TAB 1 Tablet] 120 tablet 3     Sig: Take 1 tablet by mouth 4 times daily       Medication is on current medication list Yes    Dosage and directions were verified? Yes    Quantity verified: 90 day supply     Pharmacy Verified?  Yes    Last Appointment:  9/25/2024    Future appts:  Future Appointments   Date Time Provider Department Center   11/3/2025  2:00 PM Jhonatan Cruz PA [facility]        (If no appt send self scheduling link. .REFILLAPPT)  Scheduling request sent?     [] Yes  [x] No    Does patient need updated?  [] Yes  [x] No

## 2025-03-10 SDOH — ECONOMIC STABILITY: FOOD INSECURITY: WITHIN THE PAST 12 MONTHS, THE FOOD YOU BOUGHT JUST DIDN'T LAST AND YOU DIDN'T HAVE MONEY TO GET MORE.: SOMETIMES TRUE

## 2025-03-10 SDOH — ECONOMIC STABILITY: INCOME INSECURITY: IN THE LAST 12 MONTHS, WAS THERE A TIME WHEN YOU WERE NOT ABLE TO PAY THE MORTGAGE OR RENT ON TIME?: YES

## 2025-03-10 SDOH — ECONOMIC STABILITY: FOOD INSECURITY: WITHIN THE PAST 12 MONTHS, YOU WORRIED THAT YOUR FOOD WOULD RUN OUT BEFORE YOU GOT MONEY TO BUY MORE.: SOMETIMES TRUE

## 2025-03-10 SDOH — ECONOMIC STABILITY: TRANSPORTATION INSECURITY
IN THE PAST 12 MONTHS, HAS THE LACK OF TRANSPORTATION KEPT YOU FROM MEDICAL APPOINTMENTS OR FROM GETTING MEDICATIONS?: YES

## 2025-03-10 SDOH — ECONOMIC STABILITY: TRANSPORTATION INSECURITY
IN THE PAST 12 MONTHS, HAS LACK OF TRANSPORTATION KEPT YOU FROM MEETINGS, WORK, OR FROM GETTING THINGS NEEDED FOR DAILY LIVING?: NO

## 2025-03-10 ASSESSMENT — PATIENT HEALTH QUESTIONNAIRE - PHQ9
SUM OF ALL RESPONSES TO PHQ QUESTIONS 1-9: 18
7. TROUBLE CONCENTRATING ON THINGS, SUCH AS READING THE NEWSPAPER OR WATCHING TELEVISION: MORE THAN HALF THE DAYS
8. MOVING OR SPEAKING SO SLOWLY THAT OTHER PEOPLE COULD HAVE NOTICED. OR THE OPPOSITE - BEING SO FIDGETY OR RESTLESS THAT YOU HAVE BEEN MOVING AROUND A LOT MORE THAN USUAL: MORE THAN HALF THE DAYS
9. THOUGHTS THAT YOU WOULD BE BETTER OFF DEAD, OR OF HURTING YOURSELF: NOT AT ALL
5. POOR APPETITE OR OVEREATING: MORE THAN HALF THE DAYS
6. FEELING BAD ABOUT YOURSELF - OR THAT YOU ARE A FAILURE OR HAVE LET YOURSELF OR YOUR FAMILY DOWN: MORE THAN HALF THE DAYS
2. FEELING DOWN, DEPRESSED OR HOPELESS: NEARLY EVERY DAY
10. IF YOU CHECKED OFF ANY PROBLEMS, HOW DIFFICULT HAVE THESE PROBLEMS MADE IT FOR YOU TO DO YOUR WORK, TAKE CARE OF THINGS AT HOME, OR GET ALONG WITH OTHER PEOPLE: VERY DIFFICULT
6. FEELING BAD ABOUT YOURSELF - OR THAT YOU ARE A FAILURE OR HAVE LET YOURSELF OR YOUR FAMILY DOWN: MORE THAN HALF THE DAYS
7. TROUBLE CONCENTRATING ON THINGS, SUCH AS READING THE NEWSPAPER OR WATCHING TELEVISION: MORE THAN HALF THE DAYS
SUM OF ALL RESPONSES TO PHQ QUESTIONS 1-9: 18
5. POOR APPETITE OR OVEREATING: MORE THAN HALF THE DAYS
3. TROUBLE FALLING OR STAYING ASLEEP: MORE THAN HALF THE DAYS
9. THOUGHTS THAT YOU WOULD BE BETTER OFF DEAD, OR OF HURTING YOURSELF: NOT AT ALL
10. IF YOU CHECKED OFF ANY PROBLEMS, HOW DIFFICULT HAVE THESE PROBLEMS MADE IT FOR YOU TO DO YOUR WORK, TAKE CARE OF THINGS AT HOME, OR GET ALONG WITH OTHER PEOPLE: VERY DIFFICULT
2. FEELING DOWN, DEPRESSED OR HOPELESS: NEARLY EVERY DAY
SUM OF ALL RESPONSES TO PHQ QUESTIONS 1-9: 18
3. TROUBLE FALLING OR STAYING ASLEEP: MORE THAN HALF THE DAYS
SUM OF ALL RESPONSES TO PHQ QUESTIONS 1-9: 18
1. LITTLE INTEREST OR PLEASURE IN DOING THINGS: MORE THAN HALF THE DAYS
SUM OF ALL RESPONSES TO PHQ QUESTIONS 1-9: 18
8. MOVING OR SPEAKING SO SLOWLY THAT OTHER PEOPLE COULD HAVE NOTICED. OR THE OPPOSITE, BEING SO FIGETY OR RESTLESS THAT YOU HAVE BEEN MOVING AROUND A LOT MORE THAN USUAL: MORE THAN HALF THE DAYS
4. FEELING TIRED OR HAVING LITTLE ENERGY: NEARLY EVERY DAY
1. LITTLE INTEREST OR PLEASURE IN DOING THINGS: MORE THAN HALF THE DAYS
4. FEELING TIRED OR HAVING LITTLE ENERGY: NEARLY EVERY DAY

## 2025-03-12 ENCOUNTER — OFFICE VISIT (OUTPATIENT)
Dept: FAMILY MEDICINE CLINIC | Age: 55
End: 2025-03-12

## 2025-03-12 VITALS
DIASTOLIC BLOOD PRESSURE: 71 MMHG | OXYGEN SATURATION: 99 % | RESPIRATION RATE: 17 BRPM | TEMPERATURE: 97.9 F | SYSTOLIC BLOOD PRESSURE: 120 MMHG | WEIGHT: 205 LBS | HEIGHT: 64 IN | BODY MASS INDEX: 35 KG/M2 | HEART RATE: 61 BPM

## 2025-03-12 DIAGNOSIS — E55.9 VITAMIN D DEFICIENCY: ICD-10-CM

## 2025-03-12 DIAGNOSIS — I15.0 RENOVASCULAR HYPERTENSION: Chronic | ICD-10-CM

## 2025-03-12 DIAGNOSIS — G62.9 NEUROPATHY: ICD-10-CM

## 2025-03-12 DIAGNOSIS — R10.31 RLQ ABDOMINAL PAIN: Primary | ICD-10-CM

## 2025-03-12 DIAGNOSIS — G89.29 OTHER CHRONIC PAIN: Chronic | ICD-10-CM

## 2025-03-12 DIAGNOSIS — S93.401A SPRAIN OF RIGHT ANKLE, UNSPECIFIED LIGAMENT, INITIAL ENCOUNTER: ICD-10-CM

## 2025-03-12 DIAGNOSIS — F33.9 EPISODE OF RECURRENT MAJOR DEPRESSIVE DISORDER, UNSPECIFIED DEPRESSION EPISODE SEVERITY: ICD-10-CM

## 2025-03-12 DIAGNOSIS — I25.10 CAD IN NATIVE ARTERY: ICD-10-CM

## 2025-03-12 DIAGNOSIS — R25.2 LEG CRAMP: ICD-10-CM

## 2025-03-12 RX ORDER — SUCRALFATE 1 G/1
1 TABLET ORAL 4 TIMES DAILY
Qty: 120 TABLET | Refills: 3 | Status: SHIPPED | OUTPATIENT
Start: 2025-03-12

## 2025-03-12 RX ORDER — AMLODIPINE BESYLATE 10 MG/1
10 TABLET ORAL DAILY
Qty: 90 TABLET | Refills: 1 | Status: SHIPPED | OUTPATIENT
Start: 2025-03-12 | End: 2025-09-08

## 2025-03-12 RX ORDER — ISOSORBIDE MONONITRATE 30 MG/1
30 TABLET, EXTENDED RELEASE ORAL DAILY
Qty: 90 TABLET | Refills: 1 | Status: SHIPPED | OUTPATIENT
Start: 2025-03-12 | End: 2025-09-08

## 2025-03-12 RX ORDER — METRONIDAZOLE 500 MG/1
500 TABLET ORAL 2 TIMES DAILY
Qty: 14 TABLET | Refills: 0 | Status: SHIPPED | OUTPATIENT
Start: 2025-03-12 | End: 2025-03-19

## 2025-03-12 RX ORDER — ERGOCALCIFEROL 1.25 MG/1
50000 CAPSULE, LIQUID FILLED ORAL WEEKLY
Qty: 12 CAPSULE | Refills: 1 | Status: SHIPPED | OUTPATIENT
Start: 2025-03-12

## 2025-03-12 RX ORDER — ROSUVASTATIN CALCIUM 40 MG/1
40 TABLET, COATED ORAL DAILY
Qty: 90 TABLET | Refills: 1 | Status: SHIPPED | OUTPATIENT
Start: 2025-03-12 | End: 2025-09-08

## 2025-03-12 RX ORDER — NORTRIPTYLINE HYDROCHLORIDE 10 MG/1
10 CAPSULE ORAL NIGHTLY
Qty: 90 CAPSULE | Refills: 0 | Status: SHIPPED | OUTPATIENT
Start: 2025-03-12 | End: 2025-06-10

## 2025-03-12 RX ORDER — METOPROLOL SUCCINATE 50 MG/1
50 TABLET, EXTENDED RELEASE ORAL 2 TIMES DAILY
Qty: 360 TABLET | Refills: 0 | Status: SHIPPED | OUTPATIENT
Start: 2025-03-12 | End: 2025-09-08

## 2025-03-12 RX ORDER — FERROUS SULFATE 325(65) MG
325 TABLET ORAL
Qty: 90 TABLET | Refills: 1 | Status: SHIPPED | OUTPATIENT
Start: 2025-03-12

## 2025-03-12 RX ORDER — PANTOPRAZOLE SODIUM 40 MG/1
40 TABLET, DELAYED RELEASE ORAL DAILY
Qty: 90 TABLET | Refills: 1 | Status: SHIPPED | OUTPATIENT
Start: 2025-03-12 | End: 2025-09-08

## 2025-03-12 RX ORDER — AMOXICILLIN 250 MG
1 CAPSULE ORAL DAILY
Qty: 90 TABLET | Refills: 0 | Status: SHIPPED | OUTPATIENT
Start: 2025-03-12 | End: 2025-06-10

## 2025-03-12 RX ORDER — DULOXETIN HYDROCHLORIDE 60 MG/1
60 CAPSULE, DELAYED RELEASE ORAL DAILY
Qty: 90 CAPSULE | Refills: 0 | Status: SHIPPED | OUTPATIENT
Start: 2025-03-12 | End: 2025-06-10

## 2025-03-12 RX ORDER — ASPIRIN 81 MG/1
81 TABLET ORAL DAILY
Qty: 90 TABLET | Refills: 1 | Status: SHIPPED | OUTPATIENT
Start: 2025-03-12 | End: 2025-09-08

## 2025-03-12 NOTE — PROGRESS NOTES
[facility]  FAMILY MEDICINE RESIDENCY PROGRAM  DATE OF VISIT : 2025    Patient : Kelsey Cobb   Age : 54 y.o.    : 1970   MRN : [id number]   ______________________________________________________________________    Assessment & Plan :    1. RLQ abdominal pain  Difficult to assess with excess skin in abd  Last CT AP with diverticulosis, ddx include diverticulitis vs appendicitis vs cystic duct stenosis  Obtain CTAP w contrast and inflammatory labs.  Trial Flagyl for diverticulitis  Red flags sxs discussed and RTO prior to next appt if sxs persist or get worse  -     CT ABDOMEN PELVIS W WO CONTRAST Additional Contrast? Oral; Future  -     CBC with Auto Differential; Future  -     Comprehensive Metabolic Panel; Future  -     C-Reactive Protein; Future  -     Sedimentation Rate; Future  -     senna-docusate (SENNA-PLUS) 8.6-50 MG per tablet; Take 1 tablet by mouth daily, Disp-90 tablet, R-0Normal  -     metroNIDAZOLE (FLAGYL) 500 MG tablet; Take 1 tablet by mouth 2 times daily for 7 days, Disp-14 tablet, R-0Normal  2. Sprain of right ankle, unspecified ligament, initial encounter  Most likely sprain but with swelling, better to get XR to make sure no underlying fx  -     XR ANKLE RIGHT (MIN 3 VIEWS); Future  3. CAD in native artery  Overview:  S/p triple bypass surgery on 2022 with saphenous vein  Has seen cardiology once since discharge  Not on NSAIDs  Follow cardio yearly  Orders:  -     aspirin 81 MG EC tablet; Take 1 tablet by mouth daily, Disp-90 tablet, R-1Normal  -     isosorbide mononitrate (IMDUR) 30 MG extended release tablet; Take 1 tablet by mouth daily, Disp-90 tablet, R-1Normal  -     metoprolol succinate (TOPROL XL) 50 MG extended release tablet; Take 1 tablet by mouth 2 times daily, Disp-360 tablet, R-0Normal  -     rosuvastatin (CRESTOR) 40 MG tablet; Take 1 tablet by mouth daily, Disp-90 tablet, R-1Normal  4. Other chronic pain  Orders:  -     DULoxetine (CYMBALTA) 60

## 2025-03-12 NOTE — PROGRESS NOTES
S: 54 y.o. female presents today for:   Acute abdominal pain for 2 weeks. After seafood boil. S/P GB 10 years ago. Fecal incontinence history 1 year ago. Normal/   RLQ, intermittent, appendix still present. Fevers absent, chills and pain when she eats. Did have diverticulosis on last CT scan.     Right ankle pain- fell, pain    O: VS: /71 (BP Site: Right Upper Arm, Patient Position: Sitting, BP Cuff Size: Medium Adult)   Pulse 61   Temp 97.9 °F (36.6 °C) (Temporal)   Resp 17   Ht 1.626 m (5' 4\")   Wt 93 kg (205 lb)   SpO2 99%   BMI 35.19 kg/m²   AAO/NAD, appropriate affect for mood  CV:  RRR, no murmur  Resp: CTAB  Abd: tender to palp, RLQ    Impression/Plan:   1) RLQ pain- labs, CT, consider diverticulitis  2) right ankle pain- xray, symptomatic relief    Attending Physician Statement  I have discussed the case, including pertinent history and exam findings with the resident.  I agree with the documented assessment and plan.      Alanis Ayala, DO

## 2025-04-07 DIAGNOSIS — G89.29 OTHER CHRONIC PAIN: Chronic | ICD-10-CM

## 2025-04-07 RX ORDER — GABAPENTIN 800 MG/1
800 TABLET ORAL 3 TIMES DAILY
Qty: 270 TABLET | Refills: 1 | Status: SHIPPED | OUTPATIENT
Start: 2025-04-07 | End: 2025-10-04

## 2025-04-07 NOTE — TELEPHONE ENCOUNTER
Name of Medication(s) Requested:  Requested Prescriptions     Pending Prescriptions Disp Refills    gabapentin (NEURONTIN) 800 MG tablet [Pharmacy Med Name: GABAPENTIN 800 MG TABS 800 Tablet] 270 tablet 1     Sig: Take 1 tablet by mouth 3 times daily.       Medication is on current medication list Yes    Dosage and directions were verified? Yes    Quantity verified: 90 day supply     Pharmacy Verified?  Yes    Last Appointment:  3/12/2025    Future appts:  Future Appointments   Date Time Provider Department Center   4/18/2025 11:30 AM [facility] CT SCAN 3 SEY[facility] CT [facility] Rad/Car   5/12/2025  9:15 AM Lisbeth Amaya Chi, MD [facility] ECC DEP   11/3/2025  2:00 PM Jhonatan Cruz PA Plastics [facility]        (If no appt send self scheduling link. .REFILLAPPT)  Scheduling request sent?     [] Yes  [x] No    Does patient need updated?  [] Yes  [x] No

## 2025-04-18 ENCOUNTER — HOSPITAL ENCOUNTER (OUTPATIENT)
Dept: CT IMAGING | Age: 55
Discharge: HOME | End: 2025-04-20
Payer: COMMERCIAL

## 2025-04-18 ENCOUNTER — HOSPITAL ENCOUNTER (OUTPATIENT)
Age: 55
Discharge: HOME | End: 2025-04-18
Payer: COMMERCIAL

## 2025-04-18 DIAGNOSIS — R10.31 RLQ ABDOMINAL PAIN: ICD-10-CM

## 2025-04-18 LAB
ALBUMIN SERPL-MCNC: 4 G/DL (ref 3.5–5.2)
ALP SERPL-CCNC: 142 U/L (ref 35–104)
ALT SERPL-CCNC: 21 U/L (ref 0–35)
ANION GAP SERPL CALCULATED.3IONS-SCNC: 11 MMOL/L (ref 7–16)
AST SERPL-CCNC: 27 U/L (ref 0–35)
BASOPHILS # BLD: 0.02 K/UL (ref 0–0.2)
BASOPHILS NFR BLD: 0 % (ref 0–2)
BILIRUB SERPL-MCNC: 0.9 MG/DL (ref 0–1.2)
BUN SERPL-MCNC: 12 MG/DL (ref 6–20)
CALCIUM SERPL-MCNC: 9 MG/DL (ref 8.6–10)
CHLORIDE SERPL-SCNC: 110 MMOL/L (ref 98–107)
CO2 SERPL-SCNC: 21 MMOL/L (ref 22–29)
CREAT SERPL-MCNC: 1.2 MG/DL (ref 0.5–1)
CRP SERPL HS-MCNC: <3 MG/L (ref 0–5)
EOSINOPHIL # BLD: 0.09 K/UL (ref 0.05–0.5)
EOSINOPHILS RELATIVE PERCENT: 1 % (ref 0–6)
ERYTHROCYTE [DISTWIDTH] IN BLOOD BY AUTOMATED COUNT: 12.8 % (ref 11.5–15)
ERYTHROCYTE [SEDIMENTATION RATE] IN BLOOD BY WESTERGREN METHOD: 23 MM/HR (ref 0–20)
GFR, ESTIMATED: 53 ML/MIN/1.73M2
GLUCOSE SERPL-MCNC: 84 MG/DL (ref 74–99)
HCT VFR BLD AUTO: 34.6 % (ref 34–48)
HGB BLD-MCNC: 12 G/DL (ref 11.5–15.5)
IMM GRANULOCYTES # BLD AUTO: <0.03 K/UL (ref 0–0.58)
IMM GRANULOCYTES NFR BLD: 0 % (ref 0–5)
LYMPHOCYTES NFR BLD: 1.08 K/UL (ref 1.5–4)
LYMPHOCYTES RELATIVE PERCENT: 17 % (ref 20–42)
MCH RBC QN AUTO: 32.6 PG (ref 26–35)
MCHC RBC AUTO-ENTMCNC: 34.7 G/DL (ref 32–34.5)
MCV RBC AUTO: 94 FL (ref 80–99.9)
MONOCYTES NFR BLD: 0.38 K/UL (ref 0.1–0.95)
MONOCYTES NFR BLD: 6 % (ref 2–12)
NEUTROPHILS NFR BLD: 75 % (ref 43–80)
NEUTS SEG NFR BLD: 4.66 K/UL (ref 1.8–7.3)
PLATELET, FLUORESCENCE: 124 K/UL (ref 130–450)
PMV BLD AUTO: 11.2 FL (ref 7–12)
POTASSIUM SERPL-SCNC: 3.8 MMOL/L (ref 3.5–5.1)
PROT SERPL-MCNC: 6.8 G/DL (ref 6.4–8.3)
RBC # BLD AUTO: 3.68 M/UL (ref 3.5–5.5)
SODIUM SERPL-SCNC: 141 MMOL/L (ref 136–145)
WBC OTHER # BLD: 6.2 K/UL (ref 4.5–11.5)

## 2025-04-18 PROCEDURE — 85652 RBC SED RATE AUTOMATED: CPT

## 2025-04-18 PROCEDURE — 86140 C-REACTIVE PROTEIN: CPT

## 2025-04-18 PROCEDURE — 74178 CT ABD&PLV WO CNTR FLWD CNTR: CPT

## 2025-04-18 PROCEDURE — 85025 COMPLETE CBC W/AUTO DIFF WBC: CPT

## 2025-04-18 PROCEDURE — 6360000004 HC RX CONTRAST MEDICATION: Performed by: RADIOLOGY

## 2025-04-18 PROCEDURE — 36415 COLL VENOUS BLD VENIPUNCTURE: CPT

## 2025-04-18 PROCEDURE — 80053 COMPREHEN METABOLIC PANEL: CPT

## 2025-04-18 RX ORDER — IOPAMIDOL 755 MG/ML
18 INJECTION, SOLUTION INTRAVASCULAR
Status: COMPLETED | OUTPATIENT
Start: 2025-04-18 | End: 2025-04-18

## 2025-04-18 RX ORDER — IOPAMIDOL 755 MG/ML
75 INJECTION, SOLUTION INTRAVASCULAR
Status: COMPLETED | OUTPATIENT
Start: 2025-04-18 | End: 2025-04-18

## 2025-04-18 RX ADMIN — IOPAMIDOL 18 ML: 755 INJECTION, SOLUTION INTRAVENOUS at 12:19

## 2025-04-18 RX ADMIN — IOPAMIDOL 75 ML: 755 INJECTION, SOLUTION INTRAVENOUS at 12:19

## 2025-04-24 ENCOUNTER — RESULTS FOLLOW-UP (OUTPATIENT)
Dept: FAMILY MEDICINE CLINIC | Age: 55
End: 2025-04-24

## 2025-04-25 ENCOUNTER — TELEPHONE (OUTPATIENT)
Dept: FAMILY MEDICINE CLINIC | Age: 55
End: 2025-04-25

## 2025-04-25 NOTE — TELEPHONE ENCOUNTER
Called patient to review results, she stated that she is having pelvic and Vaginal pain  x 1 week she says that at times  the pain  and is so bad she can barley walk. Scheduled her for this afternoon with Dr Bloom.

## 2025-05-07 DIAGNOSIS — G62.9 NEUROPATHY: ICD-10-CM

## 2025-05-07 RX ORDER — SUCRALFATE 1 G/1
1 TABLET ORAL 4 TIMES DAILY
Qty: 120 TABLET | Refills: 4 | Status: SHIPPED | OUTPATIENT
Start: 2025-05-07

## 2025-05-07 NOTE — TELEPHONE ENCOUNTER
Name of Medication(s) Requested:  Requested Prescriptions     Pending Prescriptions Disp Refills    sucralfate (CARAFATE) 1 GM tablet [Pharmacy Med Name: SUCRALFATE 1 GM TAB 1 Tablet] 120 tablet 4     Sig: Take 1 tablet by mouth 4 times daily       Medication is on current medication list Yes    Dosage and directions were verified? Yes    Quantity verified: 30 day supply     Pharmacy Verified?  Yes    Last Appointment:  3/12/2025    Future appts:  Future Appointments   Date Time Provider Department Center   5/12/2025  9:15 AM Lisbeth Amaya Chi, MD [facility]   11/3/2025  2:00 PM Jhonatan Cruz PA [facility]        (If no appt send self scheduling link. .REFILLAPPT)  Scheduling request sent?     [] Yes  [x] No    Does patient need updated?  [] Yes  [x] No

## 2025-05-12 ENCOUNTER — OFFICE VISIT (OUTPATIENT)
Dept: FAMILY MEDICINE CLINIC | Age: 55
End: 2025-05-12
Payer: COMMERCIAL

## 2025-05-12 VITALS
SYSTOLIC BLOOD PRESSURE: 123 MMHG | WEIGHT: 199 LBS | HEART RATE: 85 BPM | RESPIRATION RATE: 18 BRPM | OXYGEN SATURATION: 97 % | BODY MASS INDEX: 33.97 KG/M2 | TEMPERATURE: 98.1 F | HEIGHT: 64 IN | DIASTOLIC BLOOD PRESSURE: 75 MMHG

## 2025-05-12 DIAGNOSIS — F33.1 MODERATE EPISODE OF RECURRENT MAJOR DEPRESSIVE DISORDER (HCC): Primary | ICD-10-CM

## 2025-05-12 DIAGNOSIS — Z11.59 ENCOUNTER FOR HEPATITIS C SCREENING TEST FOR LOW RISK PATIENT: ICD-10-CM

## 2025-05-12 DIAGNOSIS — R74.8 ELEVATED ALKALINE PHOSPHATASE LEVEL: ICD-10-CM

## 2025-05-12 DIAGNOSIS — R68.83 SHIVERING: ICD-10-CM

## 2025-05-12 DIAGNOSIS — D69.6 THROMBOCYTOPENIA: ICD-10-CM

## 2025-05-12 DIAGNOSIS — Z12.31 SCREENING MAMMOGRAM FOR BREAST CANCER: ICD-10-CM

## 2025-05-12 DIAGNOSIS — G89.29 OTHER CHRONIC PAIN: Chronic | ICD-10-CM

## 2025-05-12 DIAGNOSIS — Z11.4 SCREENING FOR HIV (HUMAN IMMUNODEFICIENCY VIRUS): ICD-10-CM

## 2025-05-12 LAB
ALBUMIN: 4.3 G/DL (ref 3.5–5.2)
ALP BLD-CCNC: 137 U/L (ref 35–104)
ALT SERPL-CCNC: 16 U/L (ref 0–35)
ANION GAP SERPL CALCULATED.3IONS-SCNC: 16 MMOL/L (ref 7–16)
AST SERPL-CCNC: 25 U/L (ref 0–35)
BASOPHILS ABSOLUTE: 0.04 K/UL (ref 0–0.2)
BASOPHILS RELATIVE PERCENT: 0 % (ref 0–2)
BILIRUB SERPL-MCNC: 1.3 MG/DL (ref 0–1.2)
BUN BLDV-MCNC: 19 MG/DL (ref 6–20)
CALCIUM SERPL-MCNC: 9.5 MG/DL (ref 8.6–10)
CHLORIDE BLD-SCNC: 104 MMOL/L (ref 98–107)
CO2: 22 MMOL/L (ref 22–29)
CREAT SERPL-MCNC: 1.5 MG/DL (ref 0.5–1)
EOSINOPHILS ABSOLUTE: 0.01 K/UL (ref 0.05–0.5)
EOSINOPHILS RELATIVE PERCENT: 0 % (ref 0–6)
GFR, ESTIMATED: 43 ML/MIN/1.73M2
GGT, 20027: 20 U/L (ref 5–36)
GLUCOSE BLD-MCNC: 106 MG/DL (ref 74–99)
HCT VFR BLD CALC: 42.6 % (ref 34–48)
HEMOGLOBIN: 14.2 G/DL (ref 11.5–15.5)
HEPATITIS C ANTIBODY: NONREACTIVE
HIV AG/AB: NONREACTIVE
IMMATURE GRANULOCYTES %: 0 % (ref 0–5)
IMMATURE GRANULOCYTES ABSOLUTE: 0.04 K/UL (ref 0–0.58)
LYMPHOCYTES ABSOLUTE: 1.36 K/UL (ref 1.5–4)
LYMPHOCYTES RELATIVE PERCENT: 10 % (ref 20–42)
MCH RBC QN AUTO: 31.7 PG (ref 26–35)
MCHC RBC AUTO-ENTMCNC: 33.3 G/DL (ref 32–34.5)
MCV RBC AUTO: 95.1 FL (ref 80–99.9)
MONOCYTES ABSOLUTE: 1.05 K/UL (ref 0.1–0.95)
MONOCYTES RELATIVE PERCENT: 8 % (ref 2–12)
NEUTROPHILS ABSOLUTE: 10.87 K/UL (ref 1.8–7.3)
NEUTROPHILS RELATIVE PERCENT: 81 % (ref 43–80)
PDW BLD-RTO: 12.9 % (ref 11.5–15)
PLATELET # BLD: 137 K/UL (ref 130–450)
PMV BLD AUTO: 12.3 FL (ref 7–12)
POTASSIUM SERPL-SCNC: 3.9 MMOL/L (ref 3.5–5.1)
RBC # BLD: 4.48 M/UL (ref 3.5–5.5)
SODIUM BLD-SCNC: 142 MMOL/L (ref 136–145)
TOTAL PROTEIN: 8 G/DL (ref 6.4–8.3)
TSH SERPL DL<=0.05 MIU/L-ACNC: 0.73 UIU/ML (ref 0.27–4.2)
WBC # BLD: 13.4 K/UL (ref 4.5–11.5)

## 2025-05-12 PROCEDURE — G2211 COMPLEX E/M VISIT ADD ON: HCPCS

## 2025-05-12 PROCEDURE — 3074F SYST BP LT 130 MM HG: CPT

## 2025-05-12 PROCEDURE — 3078F DIAST BP <80 MM HG: CPT

## 2025-05-12 PROCEDURE — G8417 CALC BMI ABV UP PARAM F/U: HCPCS

## 2025-05-12 PROCEDURE — 99213 OFFICE O/P EST LOW 20 MIN: CPT

## 2025-05-12 PROCEDURE — G8428 CUR MEDS NOT DOCUMENT: HCPCS

## 2025-05-12 PROCEDURE — 3017F COLORECTAL CA SCREEN DOC REV: CPT

## 2025-05-12 PROCEDURE — 1036F TOBACCO NON-USER: CPT

## 2025-05-12 NOTE — PROGRESS NOTES
S: 54 y.o. female presents today for Abdominal Pain (Follow Up) and Other (Patient stated that she is experiencing cold chills with uncontrolled shivering.)      MDD: hx of cardiac bypass, multiple tragedies over years; cymbalta 60mg and pamelor 10mg nightly; previously [facility]; would like to re-establish elsewhere; no si/hi    Abdominal pain: hx of gastric bypass 20 years; gained weight back; hx of fecal incontinence; prior c-scope wnl 2023; planning repeat 2028; now having cramping abodminal pain, some nausea, frequent bowel movements; ct a/p previous visit normal, given empiric abx and did help; now recurrent symptoms more cramping than anything; recent family stressors; no vaginal symptoms or urinary symptoms    Shivering spells: whole body jerking for minutes; no loc; recalls everything; feels cold; not on her iron     O: VS: /75 (BP Site: Right Upper Arm, Patient Position: Sitting, BP Cuff Size: Medium Adult)   Pulse 85   Temp 98.1 °F (36.7 °C) (Temporal)   Resp 18   Ht 1.626 m (5' 4\")   Wt 90.3 kg (199 lb)   SpO2 97%   BMI 34.16 kg/m²   AAO/NAD, appropriate affect for mood  Neck: wnl  CV:  RRR, no murmur  Resp: CTAB  Abdomen;  mildly tender throughout; normal bowel sounds; no organomegaly  Ext: no edema    Assessment/Plan:   1) MDD - continue cymbalta and pamelor; referrla to counseling  2) Abdominal cramping - low dose magnesium for now; discuss bowels  3) shivering spells - labs as ordered  4) elevated alk phos - ggt; isoenzymes; fasting labs and consider referral to HB  5) HM as ordered  RTO: Return in about 4 weeks (around 6/9/2025).      Attending Physician Statement  I have discussed the case, including pertinent history and exam findings with the resident.  I agree with the documented assessment and plan.      Electronically signed by Arminda Singer MD on 5/13/2025 at 6:06 PM

## 2025-05-12 NOTE — PROGRESS NOTES
[facility]  FAMILY MEDICINE RESIDENCY PROGRAM  DATE OF VISIT : 2025    Patient : Kelsey Cobb   Age : 54 y.o.    : 1970   MRN : [id number]   ______________________________________________________________________    Assessment & Plan :    1. Moderate episode of recurrent major depressive disorder (HCC)  Overview:  Patient previously diagnosed with MDD and prescribed Lexapro, but did not continue with medication due to side effects.  Cymbalta 60mg and Pamelor 10mg  Improved mood, still has difficulty sleeping  Counseling encouraged  2. Other chronic pain  Overview:  Multiple visits for pain management of lumbar back, left knee, left hip (resolved), lower abdominal region  Has affected patient's mood and motivation, no real source of pain  Being worked up for gastric ulcers ulcers  Current regimen includes Tylenol PM, duloxetine 60 mg, gabapentin 600 mg 3 times daily and Pamelor 10 mg (that has been added 10/2023)  Patient had polysubstance abuse history in the past, patient states she does not want to be on opioid  CTAP no acute abnormalities /  Cscope  WDL  Psych referral given  3. Elevated alkaline phosphatase level  -     Comprehensive Metabolic Panel  -     Gamma GT  -     Alkaline Phosphatase, Isoenzymes  4. Thrombocytopenia  -     CBC with Auto Differential  5. Shivering  -     TSH  6. Screening for HIV (human immunodeficiency virus)  -     HIV Screen  7. Encounter for hepatitis C screening test for low risk patient  -     Hepatitis C Antibody  8. Screening mammogram for breast cancer  -     ALIN DIGITAL SCREEN BILATERAL PER PROTOCOL; Future      Last colon ca screen Date of last Colonoscopy: 2023 due at least  - wanted to get 2nd opinion from [facility] due to hx of bypass  Last cervical ca screen No cervical cancer screening on file - [facility] done     Additional plan and future considerations:     psych    Return to Office: Return in about 4 weeks (around

## 2025-05-13 ENCOUNTER — TELEPHONE (OUTPATIENT)
Dept: FAMILY MEDICINE CLINIC | Age: 55
End: 2025-05-13

## 2025-05-13 LAB
ALBUMIN: 4.4 G/DL (ref 3.5–5.2)
ALP BLD-CCNC: 136 U/L (ref 35–104)
ALT SERPL-CCNC: 19 U/L (ref 0–35)
AST SERPL-CCNC: 28 U/L (ref 0–35)
BILIRUB SERPL-MCNC: 1 MG/DL (ref 0–1.2)
BILIRUBIN DIRECT: 0.4 MG/DL (ref 0–0.2)
BILIRUBIN, INDIRECT: 0.6 MG/DL (ref 0–1)
TOTAL PROTEIN: 8 G/DL (ref 6.4–8.3)

## 2025-05-13 NOTE — TELEPHONE ENCOUNTER
Called Radiology department to clarify read it states prostate is unremarkable however this patient is a female. Reception will send an email out to radiologist and have read addended. Will call department tomorrow if no addendum seen    Lisbeth Amaya MD

## 2025-05-14 LAB — PATHOLOGIST REVIEW: NORMAL

## 2025-05-15 ENCOUNTER — RESULTS FOLLOW-UP (OUTPATIENT)
Dept: FAMILY MEDICINE CLINIC | Age: 55
End: 2025-05-15

## 2025-05-15 DIAGNOSIS — N17.9 AKI (ACUTE KIDNEY INJURY): Primary | ICD-10-CM

## 2025-05-15 LAB
ALK PHOS BONE SPECIFIC: 84 U/L (ref 0–55)
ALK PHOS OTHER CALC: 0 U/L
ALK PHOSPHATASE: 143 U/L (ref 40–120)
ALKALINE PHOSPHATASE LIVER FRACTION: 59 U/L (ref 0–94)

## 2025-05-15 NOTE — TELEPHONE ENCOUNTER
Spoke to patient regarding her blood work  Seems to be having dehydrated issues  Cr elevated, WBC elevated  Alk phos bone elevated    Plan  Repeat blood work in 2 weeks after rehydration 64 oz daily  Obtain UA and urine Na    Patient voiced understanding    Electronically signed by Lisbeth Amaya MD on 5/15/2025 at 2:13 PM

## 2025-06-05 DIAGNOSIS — G89.29 OTHER CHRONIC PAIN: Chronic | ICD-10-CM

## 2025-06-05 RX ORDER — NORTRIPTYLINE HYDROCHLORIDE 10 MG/1
10 CAPSULE ORAL NIGHTLY
Qty: 90 CAPSULE | Refills: 0 | Status: SHIPPED | OUTPATIENT
Start: 2025-06-05 | End: 2025-09-03

## 2025-06-05 NOTE — TELEPHONE ENCOUNTER
Name of Medication(s) Requested:  Requested Prescriptions     Pending Prescriptions Disp Refills    nortriptyline (PAMELOR) 10 MG capsule [Pharmacy Med Name: NORTRIPTYLINE HCL 10 MG CAP 10 Capsule] 90 capsule 5     Sig: Take 1 capsule by mouth nightly       Medication is on current medication list Yes    Dosage and directions were verified? Yes    Quantity verified: 90 day supply     Pharmacy Verified?  Yes    Last Appointment:  5/12/2025    Future appts:  Future Appointments   Date Time Provider Department Center   6/12/2025 10:30 AM Lisbeth Amaya Chi, MD [facility]   11/3/2025  2:00 PM Jhonatan Cruz PA Plastics [facility]        (If no appt send self scheduling link. .REFILLAPPT)  Scheduling request sent?     [] Yes  [x] No    Does patient need updated?  [] Yes  [x] No

## 2025-07-07 DIAGNOSIS — F33.9 EPISODE OF RECURRENT MAJOR DEPRESSIVE DISORDER, UNSPECIFIED DEPRESSION EPISODE SEVERITY: ICD-10-CM

## 2025-07-07 DIAGNOSIS — G89.29 OTHER CHRONIC PAIN: Chronic | ICD-10-CM

## 2025-07-07 RX ORDER — DULOXETIN HYDROCHLORIDE 60 MG/1
60 CAPSULE, DELAYED RELEASE ORAL DAILY
Qty: 90 CAPSULE | Refills: 0 | Status: SHIPPED | OUTPATIENT
Start: 2025-07-07 | End: 2025-10-05

## 2025-07-07 NOTE — TELEPHONE ENCOUNTER
Name of Medication(s) Requested:  Requested Prescriptions     Pending Prescriptions Disp Refills    DULoxetine (CYMBALTA) 60 MG extended release capsule [Pharmacy Med Name: DULOXETINE HCL 60 MG CAP 60 Capsule] 30 capsule 1     Sig: Take 1 capsule by mouth daily       Medication is on current medication list Yes    Dosage and directions were verified? Yes    Quantity verified: 90 day supply     Pharmacy Verified?  Yes    Last Appointment:  5/12/2025    Future appts:  Future Appointments   Date Time Provider Department Center   11/3/2025  2:00 PM Jhonatan Cruz PA Pl[facility]        (If no appt send self scheduling link. .REFILLAPPT)  Scheduling request sent?     [] Yes  [x] No    Does patient need updated?  [] Yes  [x] No

## 2025-07-12 ENCOUNTER — APPOINTMENT (OUTPATIENT)
Dept: GENERAL RADIOLOGY | Age: 55
End: 2025-07-12
Payer: COMMERCIAL

## 2025-07-12 ENCOUNTER — HOSPITAL ENCOUNTER (EMERGENCY)
Age: 55
Discharge: HOME | End: 2025-07-12
Payer: COMMERCIAL

## 2025-07-12 VITALS
HEART RATE: 64 BPM | WEIGHT: 199 LBS | RESPIRATION RATE: 18 BRPM | SYSTOLIC BLOOD PRESSURE: 148 MMHG | DIASTOLIC BLOOD PRESSURE: 85 MMHG | TEMPERATURE: 99.7 F | BODY MASS INDEX: 34.16 KG/M2 | OXYGEN SATURATION: 95 %

## 2025-07-12 DIAGNOSIS — T23.211A: ICD-10-CM

## 2025-07-12 DIAGNOSIS — S63.501A SPRAIN OF RIGHT WRIST, INITIAL ENCOUNTER: Primary | ICD-10-CM

## 2025-07-12 PROCEDURE — 73130 X-RAY EXAM OF HAND: CPT

## 2025-07-12 PROCEDURE — 73110 X-RAY EXAM OF WRIST: CPT

## 2025-07-12 PROCEDURE — 99283 EMERGENCY DEPT VISIT LOW MDM: CPT

## 2025-07-12 PROCEDURE — 6370000000 HC RX 637 (ALT 250 FOR IP): Performed by: PHYSICIAN ASSISTANT

## 2025-07-12 RX ORDER — HYDROCODONE BITARTRATE AND ACETAMINOPHEN 5; 325 MG/1; MG/1
1 TABLET ORAL EVERY 8 HOURS PRN
Qty: 6 TABLET | Refills: 0 | Status: SHIPPED | OUTPATIENT
Start: 2025-07-12 | End: 2025-07-14

## 2025-07-12 RX ORDER — BACITRACIN ZINC 500 [USP'U]/G
OINTMENT TOPICAL ONCE
Status: COMPLETED | OUTPATIENT
Start: 2025-07-12 | End: 2025-07-12

## 2025-07-12 RX ADMIN — BACITRACIN ZINC: 500 OINTMENT TOPICAL at 16:26

## 2025-07-12 ASSESSMENT — PAIN DESCRIPTION - PAIN TYPE: TYPE: ACUTE PAIN

## 2025-07-12 ASSESSMENT — PAIN DESCRIPTION - ONSET: ONSET: SUDDEN

## 2025-07-12 ASSESSMENT — PAIN DESCRIPTION - ORIENTATION: ORIENTATION: RIGHT

## 2025-07-12 ASSESSMENT — PAIN DESCRIPTION - FREQUENCY: FREQUENCY: CONTINUOUS

## 2025-07-12 ASSESSMENT — PAIN SCALES - GENERAL: PAINLEVEL_OUTOF10: 10

## 2025-07-12 ASSESSMENT — PAIN - FUNCTIONAL ASSESSMENT: PAIN_FUNCTIONAL_ASSESSMENT: 0-10

## 2025-07-12 ASSESSMENT — PAIN DESCRIPTION - DESCRIPTORS: DESCRIPTORS: THROBBING;ACHING

## 2025-07-12 ASSESSMENT — PAIN DESCRIPTION - LOCATION: LOCATION: WRIST

## 2025-07-12 NOTE — ED NOTES
Completed wound care on pt's right thumb burns. burns were cleaned with skintegrity, gauze pads, and normal saline. burns were appropriately scrubbed out and irrigated. Bacitracin ointment was applied to the wound. Pt tolerated well.

## 2025-07-12 NOTE — ED PROVIDER NOTES
Independent DEBORAH Visit.     [facility], [place]  Department of Emergency Medicine   ED  Encounter Note  Admit Date/RoomTime: 2025  3:50 PM  ED Room:   NAME: Kelsey Cobb  : 1970  MRN: [id number]     Chief Complaint:  Fall (Fell yesterday on right hand/wrist.  )    HISTORY OF PRESENT ILLNESS        Kelsey Cobb is a 55 y.o. female with a PMH significant for CAD, hypertension presents to the ED 2 separate injuries to her right hand.  Patient states she sustained a burn to her right thumb on .  Then she fell yesterday and landed on her right hand and right wrist.  She is right-hand dominant.  She does not remember her last tetanus shot  Complains of 10 out of 10 pain to her right hand and wrist.      ROS   Pertinent positives and negatives are stated within HPI, all other systems reviewed and are negative.    Past Medical History:  has a past medical history of Acute MI, inferior wall (HCC), Anorexia, Arthritis of knee, Back pain, chronic, Bilateral renal artery stenosis, CAD (coronary artery disease), Chronic pain, Current severe episode of major depressive disorder without psychotic features without prior episode (HCC), DVT (deep vein thrombosis) in pregnancy, H/O gastric bypass, Hypertension, Hypertension, Metabolic acidosis, Ovarian cyst, and Seizures (HCC).    Surgical History:  has a past surgical history that includes Jeannine-en-Y Gastric Bypass (2008); Endometrial ablation (); Tonsillectomy (); Cholecystectomy, laparoscopic (1995); Upper gastrointestinal endoscopy (10/03/2008); Upper gastrointestinal endoscopy (2009); Upper gastrointestinal endoscopy (2009); laparoscopy (2009); Upper gastrointestinal endoscopy (2009); Upper gastrointestinal endoscopy (2009); Upper gastrointestinal endoscopy (2009); Upper gastrointestinal endoscopy (10/27/2009); Upper gastrointestinal endoscopy (2010); Upper gastrointestinal  regarding the diagnosis and prognosis.  Questions are answered at this time and are agreeable with the plan.    ASSESSMENT     1. Sprain of right wrist, initial encounter    2. Burn of right thumb, second degree, initial encounter      PLAN   Discharged home.  Patient condition is good    New Medications     New Prescriptions    HYDROCODONE-ACETAMINOPHEN (NORCO) 5-325 MG PER TABLET    Take 1 tablet by mouth every 8 hours as needed for Pain for up to 2 days. Intended supply: 3 days. Take lowest dose possible to manage pain Max Daily Amount: 3 tablets     Electronically signed by Radha Dunn PA-C   DD: 7/12/25  **This report was transcribed using voice recognition software. Every effort was made to ensure accuracy; however, inadvertent computerized transcription errors may be present.  END OF ED PROVIDER NOTE       Radha Dunn PA-C  07/12/25 [id number]

## 2025-07-18 ENCOUNTER — OFFICE VISIT (OUTPATIENT)
Dept: FAMILY MEDICINE CLINIC | Age: 55
End: 2025-07-18
Payer: COMMERCIAL

## 2025-07-18 VITALS
RESPIRATION RATE: 18 BRPM | HEIGHT: 64 IN | BODY MASS INDEX: 34.15 KG/M2 | WEIGHT: 200 LBS | SYSTOLIC BLOOD PRESSURE: 154 MMHG | HEART RATE: 70 BPM | OXYGEN SATURATION: 96 % | TEMPERATURE: 98.3 F | DIASTOLIC BLOOD PRESSURE: 81 MMHG

## 2025-07-18 DIAGNOSIS — R19.7 DIARRHEA, UNSPECIFIED TYPE: ICD-10-CM

## 2025-07-18 PROCEDURE — 1036F TOBACCO NON-USER: CPT

## 2025-07-18 PROCEDURE — G8428 CUR MEDS NOT DOCUMENT: HCPCS

## 2025-07-18 PROCEDURE — 3077F SYST BP >= 140 MM HG: CPT

## 2025-07-18 PROCEDURE — 3079F DIAST BP 80-89 MM HG: CPT

## 2025-07-18 PROCEDURE — G8417 CALC BMI ABV UP PARAM F/U: HCPCS

## 2025-07-18 PROCEDURE — 81002 URINALYSIS NONAUTO W/O SCOPE: CPT

## 2025-07-18 PROCEDURE — 3017F COLORECTAL CA SCREEN DOC REV: CPT

## 2025-07-18 PROCEDURE — 99213 OFFICE O/P EST LOW 20 MIN: CPT

## 2025-07-18 RX ORDER — LOPERAMIDE HYDROCHLORIDE 2 MG/1
2 CAPSULE ORAL 4 TIMES DAILY PRN
Qty: 40 CAPSULE | Refills: 0 | Status: SHIPPED | OUTPATIENT
Start: 2025-07-18 | End: 2025-07-28

## 2025-07-18 RX ORDER — CHOLESTYRAMINE 4 G/9G
1 POWDER, FOR SUSPENSION ORAL 2 TIMES DAILY
Qty: 90 PACKET | Refills: 3 | Status: SHIPPED | OUTPATIENT
Start: 2025-07-18

## 2025-07-18 NOTE — PROGRESS NOTES
S: 55 y.o. female here for diarrhea. Ongoing, but pain becoming intolerable.   H/o gastric bypass. Diarrhea since then. Cholecystectomy 2022.   No fever  No mucus  No blood  Reviewed CTs  Reviewed cscope   H/o E coli urine    O: VS: BP (!) 154/81 (BP Site: Right Upper Arm, Patient Position: Sitting, BP Cuff Size: Large Adult)   Pulse 70   Temp 98.3 °F (36.8 °C) (Temporal)   Resp 18   Ht 1.626 m (5' 4\")   Wt 90.7 kg (200 lb)   SpO2 96%   BMI 34.33 kg/m²    General: NAD, alert and interacting appropriately.   CV:  RRR, no gallops, rubs, or murmurs    Resp: CTAB   Abd:  LLQ > diffuse ttp.    Ext:  No edema    Impression: chronic diarrhea and lower abd pain  Plan:   Given repeated CTs neg for diverticulitis, will start w/u w/ labs to eval for infxn or inflammatory etiology. CRP, CBC, fecal calprotectin.   CMP for Cr and lytes and bili  Questran  imodium  Refer GI      Attending Physician Statement  I have discussed the case, including pertinent history and exam findings with the resident.  I agree with the documented assessment and plan.

## 2025-07-18 NOTE — PROGRESS NOTES
[facility]  FAMILY MEDICINE RESIDENCY PROGRAM  DATE OF VISIT : 2025    Patient : Kelsey Cobb   Age : 55 y.o.    : 1970   MRN : [id number]   ______________________________________________________________________    Chief Complaint:   Chief Complaint   Patient presents with    OTHER     Nausea, vomiting, headaches, dark diarrhea, lower left abdominal pain   for one week       HPI:   History obtained from the patient. Kelsey Cobb is a 55 y.o. female with Hx significant for gastric bypass, cholecystectomy. Today, she presents to the clinic for non-bloody diarrhea and abd pain which starts in the left lower quadrant that sometimes radiates to her left lower back.  Patient reports 1 week h/o worsening non bilious, non bloody vomit, nonbloody diarrhea and anorexia with bowel incontinence \" I threw away four pants from soiling myself.\" She describes the stool as dark green in color, denies melena, hematochezia, or mucous in stool. She has not tried any medication for relief. Patient reports she drinks a lot of Pepsi but has been trying to increase her water intake.  She is unable to identify any food triggers and denies any recent sick contacts.  She states Tylenol does not help with pain.  She denies fever, chills, chest pain, shortness of breath or bladder changes.    Review of Systems:  All pertinent as stated in HPI  ______________________________________________________________________    Physical Exam:    Vitals: BP (!) 154/81 (BP Site: Right Upper Arm, Patient Position: Sitting, BP Cuff Size: Large Adult)   Pulse 70   Temp 98.3 °F (36.8 °C) (Temporal)   Resp 18   Ht 1.626 m (5' 4\")   Wt 90.7 kg (200 lb)   SpO2 96%   BMI 34.33 kg/m²        GEN: Well appearing, NAD  CVS: RRR, S1,S2, no murmur  RESP: CTAB, no wheezing, rhonchi or crackles  ABD: Soft, normal bowel sounds, no rigidity, no rebound, no guarding, no organomegaly appreciated, diffuse right and left lower quadrant

## 2025-08-07 ENCOUNTER — APPOINTMENT (OUTPATIENT)
Dept: CT IMAGING | Age: 55
End: 2025-08-07
Payer: COMMERCIAL

## 2025-08-07 ENCOUNTER — HOSPITAL ENCOUNTER (EMERGENCY)
Age: 55
Discharge: HOME | End: 2025-08-07
Attending: STUDENT IN AN ORGANIZED HEALTH CARE EDUCATION/TRAINING PROGRAM
Payer: COMMERCIAL

## 2025-08-07 VITALS
OXYGEN SATURATION: 98 % | HEART RATE: 57 BPM | SYSTOLIC BLOOD PRESSURE: 164 MMHG | WEIGHT: 198 LBS | RESPIRATION RATE: 16 BRPM | TEMPERATURE: 98 F | DIASTOLIC BLOOD PRESSURE: 88 MMHG | BODY MASS INDEX: 33.99 KG/M2

## 2025-08-07 DIAGNOSIS — H11.31 SUBCONJUNCTIVAL HEMORRHAGE OF RIGHT EYE: Primary | ICD-10-CM

## 2025-08-07 DIAGNOSIS — R51.9 ACUTE NONINTRACTABLE HEADACHE, UNSPECIFIED HEADACHE TYPE: ICD-10-CM

## 2025-08-07 LAB
ALBUMIN SERPL-MCNC: 4.2 G/DL (ref 3.5–5.2)
ALP SERPL-CCNC: 144 U/L (ref 35–104)
ALT SERPL-CCNC: 14 U/L (ref 0–35)
ANION GAP SERPL CALCULATED.3IONS-SCNC: 11 MMOL/L (ref 7–16)
AST SERPL-CCNC: 25 U/L (ref 0–35)
BASOPHILS # BLD: 0.03 K/UL (ref 0–0.2)
BASOPHILS NFR BLD: 0 % (ref 0–2)
BILIRUB SERPL-MCNC: 0.6 MG/DL (ref 0–1.2)
BUN SERPL-MCNC: 13 MG/DL (ref 6–20)
CALCIUM SERPL-MCNC: 8.9 MG/DL (ref 8.6–10)
CHLORIDE SERPL-SCNC: 106 MMOL/L (ref 98–107)
CO2 SERPL-SCNC: 25 MMOL/L (ref 22–29)
CREAT SERPL-MCNC: 1.4 MG/DL (ref 0.5–1)
EOSINOPHIL # BLD: 0.1 K/UL (ref 0.05–0.5)
EOSINOPHILS RELATIVE PERCENT: 2 % (ref 0–6)
ERYTHROCYTE [DISTWIDTH] IN BLOOD BY AUTOMATED COUNT: 13.3 % (ref 11.5–15)
GFR, ESTIMATED: 44 ML/MIN/1.73M2
GLUCOSE SERPL-MCNC: 151 MG/DL (ref 74–99)
HCT VFR BLD AUTO: 35.9 % (ref 34–48)
HGB BLD-MCNC: 12.6 G/DL (ref 11.5–15.5)
IMM GRANULOCYTES # BLD AUTO: <0.03 K/UL (ref 0–0.58)
IMM GRANULOCYTES NFR BLD: 0 % (ref 0–5)
LYMPHOCYTES NFR BLD: 1.9 K/UL (ref 1.5–4)
LYMPHOCYTES RELATIVE PERCENT: 28 % (ref 20–42)
MCH RBC QN AUTO: 31.7 PG (ref 26–35)
MCHC RBC AUTO-ENTMCNC: 35.1 G/DL (ref 32–34.5)
MCV RBC AUTO: 90.2 FL (ref 80–99.9)
MONOCYTES NFR BLD: 0.35 K/UL (ref 0.1–0.95)
MONOCYTES NFR BLD: 5 % (ref 2–12)
NEUTROPHILS NFR BLD: 65 % (ref 43–80)
NEUTS SEG NFR BLD: 4.5 K/UL (ref 1.8–7.3)
PLATELET # BLD AUTO: 152 K/UL (ref 130–450)
PMV BLD AUTO: 11.7 FL (ref 7–12)
POTASSIUM SERPL-SCNC: 2.9 MMOL/L (ref 3.5–5.1)
PROT SERPL-MCNC: 6.9 G/DL (ref 6.4–8.3)
RBC # BLD AUTO: 3.98 M/UL (ref 3.5–5.5)
SODIUM SERPL-SCNC: 142 MMOL/L (ref 136–145)
WBC OTHER # BLD: 6.9 K/UL (ref 4.5–11.5)

## 2025-08-07 PROCEDURE — 70450 CT HEAD/BRAIN W/O DYE: CPT

## 2025-08-07 PROCEDURE — 6360000002 HC RX W HCPCS: Performed by: STUDENT IN AN ORGANIZED HEALTH CARE EDUCATION/TRAINING PROGRAM

## 2025-08-07 PROCEDURE — 80053 COMPREHEN METABOLIC PANEL: CPT

## 2025-08-07 PROCEDURE — 2580000003 HC RX 258: Performed by: STUDENT IN AN ORGANIZED HEALTH CARE EDUCATION/TRAINING PROGRAM

## 2025-08-07 PROCEDURE — 99284 EMERGENCY DEPT VISIT MOD MDM: CPT

## 2025-08-07 PROCEDURE — 96375 TX/PRO/DX INJ NEW DRUG ADDON: CPT

## 2025-08-07 PROCEDURE — 70496 CT ANGIOGRAPHY HEAD: CPT

## 2025-08-07 PROCEDURE — 36410 VNPNXR 3YR/> PHY/QHP DX/THER: CPT

## 2025-08-07 PROCEDURE — 85025 COMPLETE CBC W/AUTO DIFF WBC: CPT

## 2025-08-07 PROCEDURE — 6370000000 HC RX 637 (ALT 250 FOR IP): Performed by: STUDENT IN AN ORGANIZED HEALTH CARE EDUCATION/TRAINING PROGRAM

## 2025-08-07 PROCEDURE — 96374 THER/PROPH/DIAG INJ IV PUSH: CPT

## 2025-08-07 RX ORDER — 0.9 % SODIUM CHLORIDE 0.9 %
1000 INTRAVENOUS SOLUTION INTRAVENOUS ONCE
Status: COMPLETED | OUTPATIENT
Start: 2025-08-07 | End: 2025-08-07

## 2025-08-07 RX ORDER — DIPHENHYDRAMINE HYDROCHLORIDE 50 MG/ML
25 INJECTION, SOLUTION INTRAMUSCULAR; INTRAVENOUS ONCE
Status: COMPLETED | OUTPATIENT
Start: 2025-08-07 | End: 2025-08-07

## 2025-08-07 RX ORDER — METOCLOPRAMIDE HYDROCHLORIDE 5 MG/ML
10 INJECTION INTRAMUSCULAR; INTRAVENOUS ONCE
Status: COMPLETED | OUTPATIENT
Start: 2025-08-07 | End: 2025-08-07

## 2025-08-07 RX ORDER — POTASSIUM CHLORIDE 1500 MG/1
40 TABLET, EXTENDED RELEASE ORAL ONCE
Status: COMPLETED | OUTPATIENT
Start: 2025-08-07 | End: 2025-08-07

## 2025-08-07 RX ADMIN — SODIUM CHLORIDE 1000 ML: 0.9 INJECTION, SOLUTION INTRAVENOUS at 17:27

## 2025-08-07 RX ADMIN — POTASSIUM CHLORIDE 40 MEQ: 1500 TABLET, EXTENDED RELEASE ORAL at 20:32

## 2025-08-07 RX ADMIN — DIPHENHYDRAMINE HYDROCHLORIDE 25 MG: 50 INJECTION INTRAMUSCULAR; INTRAVENOUS at 17:28

## 2025-08-07 RX ADMIN — METOCLOPRAMIDE 10 MG: 5 INJECTION, SOLUTION INTRAMUSCULAR; INTRAVENOUS at 17:28

## 2025-08-07 ASSESSMENT — PAIN DESCRIPTION - LOCATION: LOCATION: HEAD

## 2025-08-07 ASSESSMENT — PAIN DESCRIPTION - PAIN TYPE: TYPE: ACUTE PAIN

## 2025-08-07 ASSESSMENT — ENCOUNTER SYMPTOMS
SORE THROAT: 0
ABDOMINAL DISTENTION: 0
SHORTNESS OF BREATH: 0
EYE PAIN: 1
BACK PAIN: 0
EYE DISCHARGE: 0
EYE REDNESS: 1
SINUS PRESSURE: 0
WHEEZING: 0
NAUSEA: 0
COUGH: 0
DIARRHEA: 0
VOMITING: 0

## 2025-08-07 ASSESSMENT — PAIN SCALES - GENERAL: PAINLEVEL_OUTOF10: 8

## 2025-08-07 ASSESSMENT — PAIN - FUNCTIONAL ASSESSMENT: PAIN_FUNCTIONAL_ASSESSMENT: 0-10

## 2025-08-07 ASSESSMENT — VISUAL ACUITY
OU: 20/30
OS: 20/40
OD: 20/40

## 2025-08-07 ASSESSMENT — PAIN DESCRIPTION - DESCRIPTORS: DESCRIPTORS: SHARP;SHOOTING

## 2025-08-07 ASSESSMENT — PAIN DESCRIPTION - ORIENTATION: ORIENTATION: RIGHT

## 2025-08-07 ASSESSMENT — LIFESTYLE VARIABLES: HOW OFTEN DO YOU HAVE A DRINK CONTAINING ALCOHOL: NEVER

## (undated) DEVICE — INSUFFLATION TUBING SET WITH FILTER, FUNNEL CONNECTOR AND LUER LOCK: Brand: JOSNOE MEDICAL INC

## (undated) DEVICE — CONNECTOR IRRIGATION AUXILIARY H2O JET W/ PRT MTL THRD HYDR

## (undated) DEVICE — TTL1LYR 16FR10ML 100%SIL TMPST TR: Brand: MEDLINE

## (undated) DEVICE — Device

## (undated) DEVICE — SET SURG BASIN OPEN HEART NO  1 REUSABLE

## (undated) DEVICE — DEFENDO AIR WATER SUCTION AND BIOPSY VALVE KIT FOR  OLYMPUS: Brand: DEFENDO AIR/WATER/SUCTION AND BIOPSY VALVE

## (undated) DEVICE — AORTIC PUNCHES ARE USED TO CREATE A UNIFORM OPENING IN BLOOD VESSELS DURING CARDIOVASCULAR SURGERY. THE VESSEL GRAFT IS INSERTED INTO THE CREATED OPENING AND SUTURED TO THE VESSEL WALL. AORTIC LANCETS ARE USED TO MAKE A SMALL UNIFORM CUT IN A BLOOD VESSEL TO FACILITATE INSERTION OF AN AORTIC PUNCH.  PUNCHES COME IN VARIOUS LENGTHS, DIAMETERS AND TIP CONFIGURATIONS.: Brand: CLEANCUT ROTATING AORTIC PUNCH

## (undated) DEVICE — SYRINGE MED 50ML LUERSLIP TIP

## (undated) DEVICE — BLOOD TRANSFUSION FILTER: Brand: HAEMONETICS

## (undated) DEVICE — OPTIFOAM GENTLE EX, SACRUM, 9X9: Brand: MEDLINE

## (undated) DEVICE — GAUZE,SPONGE,4"X4",16PLY,XRAY,STRL,LF: Brand: MEDLINE

## (undated) DEVICE — CONNECTOR DRNGE W3/8-0.5XH3/16XL3/16IN 2:1 SIL CARD STR

## (undated) DEVICE — GLOVE SURG SZ 6 THK91MIL LTX FREE SYN POLYISOPRENE ANTI

## (undated) DEVICE — TUBING, SUCTION, 3/16" X 12', STRAIGHT: Brand: MEDLINE

## (undated) DEVICE — GLOVE SURG SZ 75 L12IN FNGR THK94MIL TRNSLUC YEL LTX

## (undated) DEVICE — CLIP LIG M BLU TI HRT SHP WIRE HORZ 600 PER BX

## (undated) DEVICE — STERILE LATEX POWDER FREE SURGICAL GLOVES WITH HYDROGEL COATING: Brand: PROTEXIS

## (undated) DEVICE — BLOCK BITE 60FR RUBBER ADLT DENTAL

## (undated) DEVICE — SOLUTION IV 1000ML 140MEQ/L SOD 5MEQ/L K 3MEQ/L MG 27MEQ/L

## (undated) DEVICE — TIP APPL GEL PLT ENDO 5MMX32CM

## (undated) DEVICE — DRAIN CHN 19FR L0.25MM DIA6.3MM SIL RND HUBLESS FULL FLUT

## (undated) DEVICE — GOWN,SIRUS,FABRNF,XL,20/CS: Brand: MEDLINE

## (undated) DEVICE — DRESSING TRNSPAR W4XL55IN ACRYL SUP FLM W ADH WTRPRF OPSITE

## (undated) DEVICE — DRAIN SURG SGL COLL PT TB FOR ATS BG OASIS

## (undated) DEVICE — AVID DUAL STAGE VENOUS DRAINAGE CANNULA: Brand: AVID DUAL STAGE VENOUS DRAINAGE CANNULA

## (undated) DEVICE — GLOVE SURG SZ 65 THK91MIL LTX FREE SYN POLYISOPRENE

## (undated) DEVICE — CANNULA NSL ORAL AD FOR CAPNOFLEX CO2 O2 AIRLFE

## (undated) DEVICE — ALCOHOL RUBBING ISO 16OZ 70%

## (undated) DEVICE — TOWEL,OR,DSP,ST,BLUE,STD,6/PK,12PK/CS: Brand: MEDLINE

## (undated) DEVICE — BLANKET WRM W35.4XL86.6IN FULL UNDERBODY + FORC AIR

## (undated) DEVICE — GLOVE SURG SZ 7.5 L11.73IN FNGR THK9.8MIL STRW LTX POLYMER

## (undated) DEVICE — SYSTEM ENDOSCP VES HARV W/ TOOL CANN SEAL SHT PRT BLNT TIP

## (undated) DEVICE — CHANNEL DRAIN, 28FR, HUBLESS: Brand: JACKSON-PRATT

## (undated) DEVICE — GLOVE ORANGE PI 7   MSG9070

## (undated) DEVICE — ADHESIVE SKIN CLOSURE TOP 36 CC HI VISC DERMBND MINI

## (undated) DEVICE — CONNECTOR TBNG AUX H2O JET DISP FOR OLY 160/180 SER

## (undated) DEVICE — GAUZE,SPONGE,POST-OP,4X3,STRL,LF: Brand: MEDLINE

## (undated) DEVICE — 6 FOOT DISPOSABLE EXTENSION CABLE WITH SAFE CONNECT / SCREW-DOWN

## (undated) DEVICE — MAGNETIC INSTR DRAPE 20X16: Brand: MEDLINE INDUSTRIES, INC.

## (undated) DEVICE — GAUZE,SPONGE,4"X4",8PLY,STRL,LF,10/TRAY: Brand: MEDLINE

## (undated) DEVICE — PERFUSION PACK CUST OPN HRT

## (undated) DEVICE — CLIP INT SM TI EZ LD LIG SYS WECK HORZ

## (undated) DEVICE — DOUBLE BASIN SET: Brand: MEDLINE INDUSTRIES, INC.

## (undated) DEVICE — SPONGE,PEANUT,XRAY,ST,SM,3/8",5/CARD: Brand: MEDLINE INDUSTRIES, INC.

## (undated) DEVICE — CATHETER IV 24GA L3/4IN PERIPH YEL S STL POLYUR PLAS HUB

## (undated) DEVICE — KIT PLT RATIO DISPNS KT 2IN CANN TIP SPRY TIP DISP MAGELLAN

## (undated) DEVICE — SOLUTION IV IRRIG WATER 1000ML POUR BRL 2F7114

## (undated) DEVICE — BITEBLOCK 54FR W/ DENT RIM BLOX

## (undated) DEVICE — DRAIN SURG 19FR 100% SIL RADPQ RND CHN FULL FLUT

## (undated) DEVICE — CONTAINER SPEC 60ML PH 7NEUTRAL BUFF FRMLN RDY TO USE

## (undated) DEVICE — SYRINGE WITH HYPODERMIC SAFETY NEEDLE: Brand: MAGELLAN

## (undated) DEVICE — CATHETER THOR 32FR L23IN PVC 6 EYELET STR ATRAUM

## (undated) DEVICE — CATHETER,URETHRAL,REDRUBBER,STERILE,8FR: Brand: MEDLINE

## (undated) DEVICE — PACK OPEN HRT DRP

## (undated) DEVICE — CATHETER THOR 32FR L23IN PVC 5 EYELET STR ATRAUM

## (undated) DEVICE — GARMENT,MEDLINE,DVT,INT,CALF,MED, GEN2: Brand: MEDLINE

## (undated) DEVICE — GLOVE ORANGE PI 7 1/2   MSG9075

## (undated) DEVICE — OPEN HEART: Brand: MEDLINE INDUSTRIES, INC.

## (undated) DEVICE — DRAPE THER FLUID WARMING 66X44 IN FLAT SLUSH DBL DISC ORS

## (undated) DEVICE — EZ GLIDE AORTIC CANNULA: Brand: EDWARDS LIFESCIENCES EZ GLIDE AORTIC CANNULA

## (undated) DEVICE — TOWEL,OR,DSP,ST,WHITE,DLX,4/PK,20PK/CS: Brand: MEDLINE

## (undated) DEVICE — RETROGRADE CARDIOPLEGIA CATHETER: Brand: EDWARDS LIFESCIENCES RETROGRADE CARDIOPLEGIA CATHETER

## (undated) DEVICE — CANNULA INJ L2.5IN BLNT TIP 3MM CLR BODY W/ 1 W VLV DLP

## (undated) DEVICE — ELECTRODE PT RET AD L9FT HI MOIST COND ADH HYDRGEL CORDED

## (undated) DEVICE — CLIP INT M L GRN TI TRNSVRS GRV CHEVRON SHP W/ PRECIS TIP

## (undated) DEVICE — SOLUTION IV 50ML 0.9% SOD CHL PLAS CONT USP VIAFLX

## (undated) DEVICE — CANNULA PERF 7FR L5.5IN AORT ROOT RADPQ STD TIP W/ VENT LN

## (undated) DEVICE — BASIC SINGLE BASIN 1-LF: Brand: MEDLINE INDUSTRIES, INC.

## (undated) DEVICE — FORCEPS BX L240CM JAW DIA2.4MM ORNG L CAP W/ NDL DISP RAD

## (undated) DEVICE — MARKER A/C LOCATOR GRAFT U SILICONE

## (undated) DEVICE — 3M™ IOBAN™ 2 ANTIMICROBIAL INCISE DRAPE 6650EZ: Brand: IOBAN™ 2

## (undated) DEVICE — SOLUTION IRRIG 1000ML STRL H2O USP PLAS POUR BTL

## (undated) DEVICE — FORCEPS BX L160CM DIA8MM GRSP DISECT CUP TIP NONLOCKING ROT

## (undated) DEVICE — BATTERY PACK FOR VARISPEED: Brand: STRYKER VARISPEED

## (undated) DEVICE — SOLUTION IRRIG 1000ML 0.9% SOD CHL USP POUR PLAS BTL

## (undated) DEVICE — PICO 7 10CM X 30CM: Brand: PICO™ 7

## (undated) DEVICE — BLOWER COR ART L16.5CM PLAS SHFT MAL W/ MIST IV SET AXIUS

## (undated) DEVICE — GOWN,SIRUS,FABRNF,L,20/CS: Brand: MEDLINE

## (undated) DEVICE — AGENT HEMSTAT W4XL8IN OXIDIZED REGENERATED CELOS ABSRB

## (undated) DEVICE — DRAPE,REIN 53X77,STERILE: Brand: MEDLINE

## (undated) DEVICE — DRAIN SURG L3/8-1/2IN DIA3/16IN SIL CARD CONN 1:1 BLAK